# Patient Record
Sex: MALE | Race: ASIAN | NOT HISPANIC OR LATINO
[De-identification: names, ages, dates, MRNs, and addresses within clinical notes are randomized per-mention and may not be internally consistent; named-entity substitution may affect disease eponyms.]

---

## 2017-04-05 PROBLEM — Z00.00 ENCOUNTER FOR PREVENTIVE HEALTH EXAMINATION: Status: ACTIVE | Noted: 2017-04-05

## 2017-04-12 ENCOUNTER — APPOINTMENT (OUTPATIENT)
Dept: CARDIOLOGY | Facility: CLINIC | Age: 67
End: 2017-04-12

## 2017-04-13 ENCOUNTER — APPOINTMENT (OUTPATIENT)
Dept: CARDIOLOGY | Facility: CLINIC | Age: 67
End: 2017-04-13

## 2017-04-13 ENCOUNTER — NON-APPOINTMENT (OUTPATIENT)
Age: 67
End: 2017-04-13

## 2017-04-13 VITALS
SYSTOLIC BLOOD PRESSURE: 128 MMHG | DIASTOLIC BLOOD PRESSURE: 62 MMHG | HEART RATE: 46 BPM | HEIGHT: 64 IN | WEIGHT: 128 LBS | BODY MASS INDEX: 21.85 KG/M2 | OXYGEN SATURATION: 99 %

## 2017-04-13 DIAGNOSIS — Z82.49 FAMILY HISTORY OF ISCHEMIC HEART DISEASE AND OTHER DISEASES OF THE CIRCULATORY SYSTEM: ICD-10-CM

## 2017-05-02 ENCOUNTER — APPOINTMENT (OUTPATIENT)
Dept: CARDIOLOGY | Facility: CLINIC | Age: 67
End: 2017-05-02

## 2017-05-02 ENCOUNTER — NON-APPOINTMENT (OUTPATIENT)
Age: 67
End: 2017-05-02

## 2017-05-02 VITALS
OXYGEN SATURATION: 98 % | HEART RATE: 64 BPM | BODY MASS INDEX: 21.68 KG/M2 | HEIGHT: 64 IN | WEIGHT: 127 LBS | SYSTOLIC BLOOD PRESSURE: 124 MMHG | DIASTOLIC BLOOD PRESSURE: 74 MMHG

## 2017-05-02 RX ORDER — METOPROLOL TARTRATE 50 MG/1
50 TABLET, FILM COATED ORAL TWICE DAILY
Refills: 0 | Status: DISCONTINUED | COMMUNITY
End: 2017-05-02

## 2017-06-05 ENCOUNTER — MEDICATION RENEWAL (OUTPATIENT)
Age: 67
End: 2017-06-05

## 2017-06-06 ENCOUNTER — MEDICATION RENEWAL (OUTPATIENT)
Age: 67
End: 2017-06-06

## 2017-08-08 ENCOUNTER — APPOINTMENT (OUTPATIENT)
Dept: CARDIOLOGY | Facility: CLINIC | Age: 67
End: 2017-08-08

## 2018-08-14 ENCOUNTER — NON-APPOINTMENT (OUTPATIENT)
Age: 68
End: 2018-08-14

## 2018-08-14 ENCOUNTER — APPOINTMENT (OUTPATIENT)
Dept: CARDIOLOGY | Facility: CLINIC | Age: 68
End: 2018-08-14
Payer: MEDICAID

## 2018-08-14 VITALS
WEIGHT: 123 LBS | HEIGHT: 64 IN | HEART RATE: 55 BPM | DIASTOLIC BLOOD PRESSURE: 80 MMHG | SYSTOLIC BLOOD PRESSURE: 198 MMHG | OXYGEN SATURATION: 100 % | BODY MASS INDEX: 21 KG/M2

## 2018-08-14 PROCEDURE — 99214 OFFICE O/P EST MOD 30 MIN: CPT

## 2018-08-14 PROCEDURE — 93000 ELECTROCARDIOGRAM COMPLETE: CPT

## 2018-08-17 ENCOUNTER — APPOINTMENT (OUTPATIENT)
Dept: CARDIOLOGY | Facility: CLINIC | Age: 68
End: 2018-08-17
Payer: MEDICAID

## 2018-08-17 VITALS
HEART RATE: 51 BPM | HEIGHT: 64 IN | BODY MASS INDEX: 21 KG/M2 | SYSTOLIC BLOOD PRESSURE: 150 MMHG | DIASTOLIC BLOOD PRESSURE: 66 MMHG | OXYGEN SATURATION: 100 % | WEIGHT: 123 LBS

## 2018-08-17 PROCEDURE — 93306 TTE W/DOPPLER COMPLETE: CPT

## 2018-08-17 PROCEDURE — 99213 OFFICE O/P EST LOW 20 MIN: CPT

## 2018-11-13 ENCOUNTER — NON-APPOINTMENT (OUTPATIENT)
Age: 68
End: 2018-11-13

## 2018-11-13 ENCOUNTER — APPOINTMENT (OUTPATIENT)
Dept: CARDIOLOGY | Facility: CLINIC | Age: 68
End: 2018-11-13
Payer: MEDICAID

## 2018-11-13 VITALS — BODY MASS INDEX: 22.22 KG/M2 | WEIGHT: 130.13 LBS | HEIGHT: 64 IN

## 2018-11-13 VITALS
HEART RATE: 53 BPM | WEIGHT: 130.13 LBS | SYSTOLIC BLOOD PRESSURE: 158 MMHG | OXYGEN SATURATION: 100 % | HEIGHT: 64 IN | BODY MASS INDEX: 22.22 KG/M2 | DIASTOLIC BLOOD PRESSURE: 74 MMHG

## 2018-11-13 PROCEDURE — 99214 OFFICE O/P EST MOD 30 MIN: CPT

## 2018-11-13 PROCEDURE — 93000 ELECTROCARDIOGRAM COMPLETE: CPT

## 2018-11-13 RX ORDER — CLOPIDOGREL BISULFATE 75 MG/1
75 TABLET, FILM COATED ORAL
Qty: 30 | Refills: 3 | Status: DISCONTINUED | COMMUNITY
Start: 1900-01-01 | End: 2018-11-13

## 2018-11-13 NOTE — PHYSICAL EXAM
[General Appearance - Well Developed] : well developed [Normal Appearance] : normal appearance [Well Groomed] : well groomed [General Appearance - Well Nourished] : well nourished [No Deformities] : no deformities [General Appearance - In No Acute Distress] : no acute distress [Normal Conjunctiva] : the conjunctiva exhibited no abnormalities [Eyelids - No Xanthelasma] : the eyelids demonstrated no xanthelasmas [Normal Oral Mucosa] : normal oral mucosa [No Oral Pallor] : no oral pallor [No Oral Cyanosis] : no oral cyanosis [Respiration, Rhythm And Depth] : normal respiratory rhythm and effort [Auscultation Breath Sounds / Voice Sounds] : lungs were clear to auscultation bilaterally [Heart Rate And Rhythm] : heart rate and rhythm were normal [Heart Sounds] : normal S1 and S2 [Arterial Pulses Normal] : the arterial pulses were normal [Edema] : no peripheral edema present [Bowel Sounds] : normal bowel sounds [Abdomen Soft] : soft [Abdomen Tenderness] : non-tender [Abnormal Walk] : normal gait [Nail Clubbing] : no clubbing of the fingernails [Cyanosis, Localized] : no localized cyanosis [Skin Color & Pigmentation] : normal skin color and pigmentation [Skin Turgor] : normal skin turgor [] : no rash [Oriented To Time, Place, And Person] : oriented to person, place, and time [Impaired Insight] : insight and judgment were intact [No Anxiety] : not feeling anxious [FreeTextEntry1] : No JVD

## 2018-11-13 NOTE — REASON FOR VISIT
[Follow-Up - Clinic] : a clinic follow-up of [Coronary Artery Disease] : coronary artery disease [Hypertension] : hypertension

## 2018-11-13 NOTE — REVIEW OF SYSTEMS
[Blurry Vision] : blurred vision [Heartburn] : heartburn [Joint Pain] : joint pain [Dizziness] : dizziness [Numbness (Hypesthesia)] : numbness [Negative] : Endocrine [Seeing Double (Diplopia)] : no diplopia [Eye Pain] : no eye pain [Eyeglasses] : not currently wearing eyeglasses [Shortness Of Breath] : no shortness of breath [Chest Pain] : no chest pain [Lower Ext Edema] : no extremity edema [Palpitations] : no palpitations [Abdominal Pain] : no abdominal pain [Change in Appetite] : no change in appetite [Dysphagia] : no dysphagia [Muscle Cramps] : no muscle cramps [Limb Weakness (Paresis)] : no limb weakness [Tremor] : no tremor was seen [Convulsions] : no convulsions [Tingling (Paresthesia)] : no tingling [Easy Bleeding] : no tendency for easy bleeding [Easy Bruising] : no tendency for easy bruising

## 2018-11-13 NOTE — DISCUSSION/SUMMARY
[FreeTextEntry1] : In a summary Yoly López is a middle aged male with CAD s/p stent, stable. Continue Aspirin. Plavix discontinued as stent is 3 years ago. Recent stress test is negative for ischemia. Hypertension, controlled usually. Did not take medications yet.Continue current medications and 2 gm sodium diet. Hypercholesterolemia, on statins and low cholesterol diet. LDL goal less than 70 g/dL. Healthy lifestyle. Follow up in 4 months.

## 2018-11-13 NOTE — HISTORY OF PRESENT ILLNESS
[FreeTextEntry1] : Robert [place]ópezNew [place] is a 68 year old male with history of CAD s/p stent in 2015 , hypertension and hypercholesterolemia comes for follow up visit. Denies any exertional chest pain or shortness of breath. No palpitations. Complaining of gas like pain moving around the chest when he lies down and gets better when he walks around. Compliant to medications. Follows up with PCP.

## 2019-01-02 ENCOUNTER — APPOINTMENT (OUTPATIENT)
Dept: CARDIOLOGY | Facility: CLINIC | Age: 69
End: 2019-01-02
Payer: MEDICAID

## 2019-01-02 VITALS
DIASTOLIC BLOOD PRESSURE: 76 MMHG | SYSTOLIC BLOOD PRESSURE: 158 MMHG | OXYGEN SATURATION: 100 % | WEIGHT: 138 LBS | BODY MASS INDEX: 23.56 KG/M2 | HEIGHT: 64 IN | HEART RATE: 60 BPM

## 2019-01-02 PROCEDURE — 99214 OFFICE O/P EST MOD 30 MIN: CPT

## 2019-01-02 RX ORDER — AMLODIPINE BESYLATE 10 MG/1
10 TABLET ORAL
Qty: 30 | Refills: 3 | Status: ACTIVE | COMMUNITY
Start: 1900-01-01 | End: 1900-01-01

## 2019-01-02 NOTE — HISTORY OF PRESENT ILLNESS
[FreeTextEntry1] : Robert LópezGlen[place] is a 69 year old male with history of CAD s/p stent, hypertension and hypercholesterolemia comes for follow up visit. Denies any exertional chest pains or shortness of breath. No palpitations. Compliant to medications. Follows up with PCP. Physically active.

## 2019-01-02 NOTE — REVIEW OF SYSTEMS
[Blurry Vision] : blurred vision [Heartburn] : heartburn [Joint Pain] : joint pain [Numbness (Hypesthesia)] : numbness [Negative] : Endocrine [Seeing Double (Diplopia)] : no diplopia [Eye Pain] : no eye pain [Eyeglasses] : not currently wearing eyeglasses [Shortness Of Breath] : no shortness of breath [Chest Pain] : no chest pain [Lower Ext Edema] : no extremity edema [Palpitations] : no palpitations [Abdominal Pain] : no abdominal pain [Change in Appetite] : no change in appetite [Dysphagia] : no dysphagia [Muscle Cramps] : no muscle cramps [Limb Weakness (Paresis)] : no limb weakness [Dizziness] : no dizziness [Tremor] : no tremor was seen [Convulsions] : no convulsions [Tingling (Paresthesia)] : no tingling [Easy Bleeding] : no tendency for easy bleeding [Easy Bruising] : no tendency for easy bruising

## 2019-01-02 NOTE — DISCUSSION/SUMMARY
[FreeTextEntry1] : In a summary Yoly López is an elderly male with CAD s/p stent, stable. Continue Aspirin. Hypertension, continue current medications and 2 gm sodium diet. Hypercholesterolemia, on statins and low cholesterol diet. LDL goal less than 70 g/dL. Healthy lifestyle. Follow up in 3 months.

## 2019-04-04 ENCOUNTER — APPOINTMENT (OUTPATIENT)
Dept: CARDIOLOGY | Facility: CLINIC | Age: 69
End: 2019-04-04

## 2019-04-15 ENCOUNTER — NON-APPOINTMENT (OUTPATIENT)
Age: 69
End: 2019-04-15

## 2019-04-15 ENCOUNTER — APPOINTMENT (OUTPATIENT)
Dept: CARDIOLOGY | Facility: CLINIC | Age: 69
End: 2019-04-15
Payer: MEDICARE

## 2019-04-15 VITALS
WEIGHT: 128 LBS | SYSTOLIC BLOOD PRESSURE: 148 MMHG | OXYGEN SATURATION: 99 % | HEIGHT: 64 IN | BODY MASS INDEX: 21.85 KG/M2 | HEART RATE: 64 BPM | DIASTOLIC BLOOD PRESSURE: 62 MMHG

## 2019-04-15 PROCEDURE — 99214 OFFICE O/P EST MOD 30 MIN: CPT

## 2019-04-15 PROCEDURE — 93000 ELECTROCARDIOGRAM COMPLETE: CPT

## 2019-04-15 NOTE — PHYSICAL EXAM
[General Appearance - Well Developed] : well developed [General Appearance - Well Nourished] : well nourished [Normal Appearance] : normal appearance [Well Groomed] : well groomed [General Appearance - In No Acute Distress] : no acute distress [No Deformities] : no deformities [Normal Oral Mucosa] : normal oral mucosa [Eyelids - No Xanthelasma] : the eyelids demonstrated no xanthelasmas [Normal Conjunctiva] : the conjunctiva exhibited no abnormalities [No Oral Cyanosis] : no oral cyanosis [No Oral Pallor] : no oral pallor [Respiration, Rhythm And Depth] : normal respiratory rhythm and effort [Heart Rate And Rhythm] : heart rate and rhythm were normal [Auscultation Breath Sounds / Voice Sounds] : lungs were clear to auscultation bilaterally [Heart Sounds] : normal S1 and S2 [Arterial Pulses Normal] : the arterial pulses were normal [Bowel Sounds] : normal bowel sounds [Edema] : no peripheral edema present [Abnormal Walk] : normal gait [Abdomen Soft] : soft [Abdomen Tenderness] : non-tender [Nail Clubbing] : no clubbing of the fingernails [Cyanosis, Localized] : no localized cyanosis [Skin Color & Pigmentation] : normal skin color and pigmentation [] : no rash [Skin Turgor] : normal skin turgor [No Anxiety] : not feeling anxious [Impaired Insight] : insight and judgment were intact [Oriented To Time, Place, And Person] : oriented to person, place, and time [FreeTextEntry1] : No JVD

## 2019-04-15 NOTE — REVIEW OF SYSTEMS
[Blurry Vision] : blurred vision [Joint Pain] : joint pain [Numbness (Hypesthesia)] : numbness [Negative] : Gastrointestinal [Seeing Double (Diplopia)] : no diplopia [Eye Pain] : no eye pain [Shortness Of Breath] : no shortness of breath [Eyeglasses] : not currently wearing eyeglasses [Chest Pain] : no chest pain [Lower Ext Edema] : no extremity edema [Palpitations] : no palpitations [Abdominal Pain] : no abdominal pain [Heartburn] : no heartburn [Change in Appetite] : no change in appetite [Dysphagia] : no dysphagia [Muscle Cramps] : no muscle cramps [Limb Weakness (Paresis)] : no limb weakness [Tremor] : no tremor was seen [Dizziness] : no dizziness [Convulsions] : no convulsions [Tingling (Paresthesia)] : no tingling [Easy Bleeding] : no tendency for easy bleeding [Easy Bruising] : no tendency for easy bruising

## 2019-04-15 NOTE — DISCUSSION/SUMMARY
[FreeTextEntry1] : In a summary Yoly López is an elderly male with CAD s/p stent, stable. Hypertension, controlled. Continue current medications and 2 gm sodium diet. Hypercholesterolemia, on statins and low cholesterol diet. Healthy life style. Follow up in 4 months.

## 2019-04-15 NOTE — HISTORY OF PRESENT ILLNESS
[FreeTextEntry1] : Yoly López is a 69 year old male with history of CAD s/p stent, hypertension and hypercholesterolemia comes for follow up visit. Denies any chest pain or palpitations. No shortness of breath. Compliant to medications and diet. Heart burn significantly improved after starting on Omeprazole. Compliant to medications and diet.

## 2019-04-15 NOTE — REASON FOR VISIT
[Follow-Up - Clinic] : a clinic follow-up of [Hypertension] : hypertension [Coronary Artery Disease] : coronary artery disease

## 2019-07-16 ENCOUNTER — APPOINTMENT (OUTPATIENT)
Dept: SPINE | Facility: CLINIC | Age: 69
End: 2019-07-16

## 2019-08-01 ENCOUNTER — OUTPATIENT (OUTPATIENT)
Dept: OUTPATIENT SERVICES | Facility: HOSPITAL | Age: 69
LOS: 1 days | End: 2019-08-01
Payer: MEDICARE

## 2019-08-01 PROCEDURE — G9001: CPT

## 2019-08-13 ENCOUNTER — APPOINTMENT (OUTPATIENT)
Dept: SPINE | Facility: CLINIC | Age: 69
End: 2019-08-13
Payer: MEDICARE

## 2019-08-13 VITALS
SYSTOLIC BLOOD PRESSURE: 189 MMHG | HEIGHT: 64 IN | WEIGHT: 120 LBS | HEART RATE: 48 BPM | DIASTOLIC BLOOD PRESSURE: 65 MMHG | BODY MASS INDEX: 20.49 KG/M2

## 2019-08-13 DIAGNOSIS — M54.6 PAIN IN THORACIC SPINE: ICD-10-CM

## 2019-08-13 PROCEDURE — 99203 OFFICE O/P NEW LOW 30 MIN: CPT

## 2019-08-18 ENCOUNTER — INPATIENT (INPATIENT)
Facility: HOSPITAL | Age: 69
LOS: 10 days | Discharge: HOME HEALTH | End: 2019-08-29
Attending: HOSPITALIST | Admitting: HOSPITALIST
Payer: MEDICARE

## 2019-08-18 VITALS
HEART RATE: 60 BPM | SYSTOLIC BLOOD PRESSURE: 189 MMHG | RESPIRATION RATE: 16 BRPM | DIASTOLIC BLOOD PRESSURE: 90 MMHG | OXYGEN SATURATION: 100 % | TEMPERATURE: 98 F

## 2019-08-18 DIAGNOSIS — E78.5 HYPERLIPIDEMIA, UNSPECIFIED: ICD-10-CM

## 2019-08-18 DIAGNOSIS — D64.9 ANEMIA, UNSPECIFIED: ICD-10-CM

## 2019-08-18 DIAGNOSIS — M54.9 DORSALGIA, UNSPECIFIED: ICD-10-CM

## 2019-08-18 DIAGNOSIS — C79.51 SECONDARY MALIGNANT NEOPLASM OF BONE: ICD-10-CM

## 2019-08-18 DIAGNOSIS — I25.10 ATHEROSCLEROTIC HEART DISEASE OF NATIVE CORONARY ARTERY WITHOUT ANGINA PECTORIS: ICD-10-CM

## 2019-08-18 DIAGNOSIS — E83.52 HYPERCALCEMIA: ICD-10-CM

## 2019-08-18 DIAGNOSIS — Z29.9 ENCOUNTER FOR PROPHYLACTIC MEASURES, UNSPECIFIED: ICD-10-CM

## 2019-08-18 DIAGNOSIS — I10 ESSENTIAL (PRIMARY) HYPERTENSION: ICD-10-CM

## 2019-08-18 LAB
ALBUMIN SERPL ELPH-MCNC: 4.7 G/DL — SIGNIFICANT CHANGE UP (ref 3.3–5)
ALP SERPL-CCNC: 110 U/L — SIGNIFICANT CHANGE UP (ref 40–120)
ALT FLD-CCNC: 18 U/L — SIGNIFICANT CHANGE UP (ref 4–41)
ANION GAP SERPL CALC-SCNC: 14 MMO/L — SIGNIFICANT CHANGE UP (ref 7–14)
APPEARANCE UR: CLEAR — SIGNIFICANT CHANGE UP
APTT BLD: 30.8 SEC — SIGNIFICANT CHANGE UP (ref 27.5–36.3)
AST SERPL-CCNC: 25 U/L — SIGNIFICANT CHANGE UP (ref 4–40)
BASOPHILS # BLD AUTO: 0.07 K/UL — SIGNIFICANT CHANGE UP (ref 0–0.2)
BASOPHILS NFR BLD AUTO: 0.8 % — SIGNIFICANT CHANGE UP (ref 0–2)
BILIRUB SERPL-MCNC: 0.4 MG/DL — SIGNIFICANT CHANGE UP (ref 0.2–1.2)
BILIRUB UR-MCNC: NEGATIVE — SIGNIFICANT CHANGE UP
BLD GP AB SCN SERPL QL: POSITIVE — SIGNIFICANT CHANGE UP
BLOOD UR QL VISUAL: NEGATIVE — SIGNIFICANT CHANGE UP
BUN SERPL-MCNC: 9 MG/DL — SIGNIFICANT CHANGE UP (ref 7–23)
CALCIUM SERPL-MCNC: 12.1 MG/DL — HIGH (ref 8.4–10.5)
CHLORIDE SERPL-SCNC: 98 MMOL/L — SIGNIFICANT CHANGE UP (ref 98–107)
CO2 SERPL-SCNC: 23 MMOL/L — SIGNIFICANT CHANGE UP (ref 22–31)
COLOR SPEC: COLORLESS — SIGNIFICANT CHANGE UP
CREAT SERPL-MCNC: 1.01 MG/DL — SIGNIFICANT CHANGE UP (ref 0.5–1.3)
EOSINOPHIL # BLD AUTO: 0.12 K/UL — SIGNIFICANT CHANGE UP (ref 0–0.5)
EOSINOPHIL NFR BLD AUTO: 1.3 % — SIGNIFICANT CHANGE UP (ref 0–6)
GLUCOSE SERPL-MCNC: 100 MG/DL — HIGH (ref 70–99)
GLUCOSE UR-MCNC: NEGATIVE — SIGNIFICANT CHANGE UP
HCT VFR BLD CALC: 36.1 % — LOW (ref 39–50)
HGB BLD-MCNC: 11.6 G/DL — LOW (ref 13–17)
IMM GRANULOCYTES NFR BLD AUTO: 0.3 % — SIGNIFICANT CHANGE UP (ref 0–1.5)
INR BLD: 1.04 — SIGNIFICANT CHANGE UP (ref 0.88–1.17)
KETONES UR-MCNC: NEGATIVE — SIGNIFICANT CHANGE UP
LEUKOCYTE ESTERASE UR-ACNC: NEGATIVE — SIGNIFICANT CHANGE UP
LYMPHOCYTES # BLD AUTO: 1.96 K/UL — SIGNIFICANT CHANGE UP (ref 1–3.3)
LYMPHOCYTES # BLD AUTO: 21.6 % — SIGNIFICANT CHANGE UP (ref 13–44)
MCHC RBC-ENTMCNC: 28.9 PG — SIGNIFICANT CHANGE UP (ref 27–34)
MCHC RBC-ENTMCNC: 32.1 % — SIGNIFICANT CHANGE UP (ref 32–36)
MCV RBC AUTO: 89.8 FL — SIGNIFICANT CHANGE UP (ref 80–100)
MONOCYTES # BLD AUTO: 0.65 K/UL — SIGNIFICANT CHANGE UP (ref 0–0.9)
MONOCYTES NFR BLD AUTO: 7.2 % — SIGNIFICANT CHANGE UP (ref 2–14)
NEUTROPHILS # BLD AUTO: 6.23 K/UL — SIGNIFICANT CHANGE UP (ref 1.8–7.4)
NEUTROPHILS NFR BLD AUTO: 68.8 % — SIGNIFICANT CHANGE UP (ref 43–77)
NITRITE UR-MCNC: NEGATIVE — SIGNIFICANT CHANGE UP
NRBC # FLD: 0 K/UL — SIGNIFICANT CHANGE UP (ref 0–0)
PH UR: 7.5 — SIGNIFICANT CHANGE UP (ref 5–8)
PLATELET # BLD AUTO: 255 K/UL — SIGNIFICANT CHANGE UP (ref 150–400)
PMV BLD: 11.3 FL — SIGNIFICANT CHANGE UP (ref 7–13)
POTASSIUM SERPL-MCNC: 4.5 MMOL/L — SIGNIFICANT CHANGE UP (ref 3.5–5.3)
POTASSIUM SERPL-SCNC: 4.5 MMOL/L — SIGNIFICANT CHANGE UP (ref 3.5–5.3)
PROT SERPL-MCNC: 10.2 G/DL — HIGH (ref 6–8.3)
PROT SERPL-MCNC: 9 G/DL — HIGH (ref 6–8.3)
PROT UR-MCNC: 10 — SIGNIFICANT CHANGE UP
PROT UR-MCNC: 16.2 MG/DL — SIGNIFICANT CHANGE UP
PROTHROM AB SERPL-ACNC: 11.6 SEC — SIGNIFICANT CHANGE UP (ref 9.8–13.1)
PTH-INTACT SERPL-MCNC: 7.42 PG/ML — LOW (ref 15–65)
RBC # BLD: 4.02 M/UL — LOW (ref 4.2–5.8)
RBC # FLD: 12.4 % — SIGNIFICANT CHANGE UP (ref 10.3–14.5)
RH IG SCN BLD-IMP: POSITIVE — SIGNIFICANT CHANGE UP
SODIUM SERPL-SCNC: 135 MMOL/L — SIGNIFICANT CHANGE UP (ref 135–145)
SP GR SPEC: 1 — SIGNIFICANT CHANGE UP (ref 1–1.04)
UROBILINOGEN FLD QL: NORMAL — SIGNIFICANT CHANGE UP
WBC # BLD: 9.06 K/UL — SIGNIFICANT CHANGE UP (ref 3.8–10.5)
WBC # FLD AUTO: 9.06 K/UL — SIGNIFICANT CHANGE UP (ref 3.8–10.5)

## 2019-08-18 PROCEDURE — 74177 CT ABD & PELVIS W/CONTRAST: CPT | Mod: 26

## 2019-08-18 PROCEDURE — 84166 PROTEIN E-PHORESIS/URINE/CSF: CPT | Mod: 26

## 2019-08-18 PROCEDURE — 84165 PROTEIN E-PHORESIS SERUM: CPT | Mod: 26

## 2019-08-18 PROCEDURE — 99223 1ST HOSP IP/OBS HIGH 75: CPT | Mod: GC

## 2019-08-18 PROCEDURE — 71260 CT THORAX DX C+: CPT | Mod: 26

## 2019-08-18 RX ORDER — OXYCODONE HYDROCHLORIDE 5 MG/1
5 TABLET ORAL EVERY 6 HOURS
Refills: 0 | Status: DISCONTINUED | OUTPATIENT
Start: 2019-08-18 | End: 2019-08-25

## 2019-08-18 RX ORDER — METOPROLOL TARTRATE 50 MG
25 TABLET ORAL
Refills: 0 | Status: DISCONTINUED | OUTPATIENT
Start: 2019-08-18 | End: 2019-08-29

## 2019-08-18 RX ORDER — DIAZEPAM 5 MG
5 TABLET ORAL ONCE
Refills: 0 | Status: DISCONTINUED | OUTPATIENT
Start: 2019-08-18 | End: 2019-08-18

## 2019-08-18 RX ORDER — ATORVASTATIN CALCIUM 80 MG/1
40 TABLET, FILM COATED ORAL AT BEDTIME
Refills: 0 | Status: DISCONTINUED | OUTPATIENT
Start: 2019-08-18 | End: 2019-08-29

## 2019-08-18 RX ORDER — LIDOCAINE 4 G/100G
1 CREAM TOPICAL ONCE
Refills: 0 | Status: COMPLETED | OUTPATIENT
Start: 2019-08-18 | End: 2019-08-18

## 2019-08-18 RX ORDER — ACETAMINOPHEN 500 MG
650 TABLET ORAL EVERY 6 HOURS
Refills: 0 | Status: DISCONTINUED | OUTPATIENT
Start: 2019-08-18 | End: 2019-08-29

## 2019-08-18 RX ORDER — ASPIRIN/CALCIUM CARB/MAGNESIUM 324 MG
81 TABLET ORAL DAILY
Refills: 0 | Status: DISCONTINUED | OUTPATIENT
Start: 2019-08-18 | End: 2019-08-29

## 2019-08-18 RX ORDER — DOCUSATE SODIUM 100 MG
100 CAPSULE ORAL THREE TIMES A DAY
Refills: 0 | Status: DISCONTINUED | OUTPATIENT
Start: 2019-08-18 | End: 2019-08-29

## 2019-08-18 RX ORDER — MORPHINE SULFATE 50 MG/1
4 CAPSULE, EXTENDED RELEASE ORAL ONCE
Refills: 0 | Status: DISCONTINUED | OUTPATIENT
Start: 2019-08-18 | End: 2019-08-18

## 2019-08-18 RX ORDER — SENNA PLUS 8.6 MG/1
2 TABLET ORAL AT BEDTIME
Refills: 0 | Status: DISCONTINUED | OUTPATIENT
Start: 2019-08-18 | End: 2019-08-29

## 2019-08-18 RX ORDER — SODIUM CHLORIDE 9 MG/ML
1000 INJECTION INTRAMUSCULAR; INTRAVENOUS; SUBCUTANEOUS ONCE
Refills: 0 | Status: COMPLETED | OUTPATIENT
Start: 2019-08-18 | End: 2019-08-18

## 2019-08-18 RX ORDER — AMLODIPINE BESYLATE 2.5 MG/1
10 TABLET ORAL DAILY
Refills: 0 | Status: DISCONTINUED | OUTPATIENT
Start: 2019-08-18 | End: 2019-08-29

## 2019-08-18 RX ORDER — LOSARTAN POTASSIUM 100 MG/1
50 TABLET, FILM COATED ORAL DAILY
Refills: 0 | Status: DISCONTINUED | OUTPATIENT
Start: 2019-08-18 | End: 2019-08-29

## 2019-08-18 RX ADMIN — LIDOCAINE 1 PATCH: 4 CREAM TOPICAL at 15:03

## 2019-08-18 RX ADMIN — SODIUM CHLORIDE 1000 MILLILITER(S): 9 INJECTION INTRAMUSCULAR; INTRAVENOUS; SUBCUTANEOUS at 15:03

## 2019-08-18 RX ADMIN — Medication 5 MILLIGRAM(S): at 15:03

## 2019-08-18 RX ADMIN — MORPHINE SULFATE 4 MILLIGRAM(S): 50 CAPSULE, EXTENDED RELEASE ORAL at 19:41

## 2019-08-18 NOTE — H&P ADULT - PROBLEM SELECTOR PLAN 3
2/2 osteolytic lesions  -acetaminophen prn mild pain  -oxycodone prn mod-severe pain  -bowel regimen

## 2019-08-18 NOTE — ED ADULT TRIAGE NOTE - CHIEF COMPLAINT QUOTE
Pt BIB EMS for back pain x 2 months worse the past week denies trauma   PMH: HTN, High Cholesterol Pt BIB EMS for back pain x 2 months worse the past week denies trauma   PMH: HTN, High Cholesterol, cardiac stents Pt BIB EMS for back pain x 2 months worse the past week denies trauma pt has multiple complaints and states its been going on for months   PMH: HTN, High Cholesterol, cardiac stents

## 2019-08-18 NOTE — H&P ADULT - NSHPOUTPATIENTPROVIDERS_GEN_ALL_CORE
PCP: Sharon Allen [phone]  Heme/Onc: Cristhian Mays [phone]  Cards: Reji  Nephro: Lucy Gonzalez [phone]

## 2019-08-18 NOTE — ED PROVIDER NOTE - CLINICAL SUMMARY MEDICAL DECISION MAKING FREE TEXT BOX
Pt is a 68 y/o M former smoker PMHx HTN, HLD, CAD s/p stent x 1 p/w back pain x 2 months -- likely musculoskeletal back pain w/o e/o spinal cord compression or cauda equina syndrome; ? malignancy given weight loss and chronicity of symptoms -- labs, ua, ucx, ct abd and pelvis, ct chest

## 2019-08-18 NOTE — H&P ADULT - NSHPSOCIALHISTORY_GEN_ALL_CORE
Former smoker Former smoker, quit 5-6 years ago. Smoke 1ppd x10 yrs then smoked 0.25ppd x10 yrs. No alcohol or recreational drugs. Lives with wife, daughter, and grandchildren.

## 2019-08-18 NOTE — H&P ADULT - PROBLEM SELECTOR PLAN 8
DVT PPx: subcu enoxaparin renally dosed given concern for malignancy  Diet: DASH  Dispo: pending workup of osteolytic lesions DVT PPx: subcu enoxaparin, pending weight  Diet: DASH  Dispo: pending workup of osteolytic lesions

## 2019-08-18 NOTE — H&P ADULT - PROBLEM SELECTOR PLAN 4
Normocytic anemia in setting of hypercalcemia and bone lesions concerning for MM  -Ramírez positive concerning for AIHA, in setting of MM vs. Waldenstrom's macroglobulinemia  -LDH, haptoglobin  -trend cbc Normocytic anemia in setting of hypercalcemia and bone lesions concerning for MM  -Ramírez positive concerning for AIHA, in setting of MM vs. Waldenstrom's macroglobulinemia  -LDH, haptoglobin  -trend cbc  -Iron studies

## 2019-08-18 NOTE — H&P ADULT - NSHPREVIEWOFSYSTEMS_GEN_ALL_CORE
REVIEW OF SYSTEMS:    CONSTITUTIONAL: No weakness, fevers or chills  EYES/ENT: No visual changes;  No vertigo or throat pain   NECK: No pain or stiffness  RESPIRATORY: No cough, wheezing, hemoptysis; No shortness of breath  CARDIOVASCULAR: No chest pain or palpitations  GASTROINTESTINAL: +abdominal pain diffuse & constipation. No melena or hematochezia.  GENITOURINARY: No dysuria, frequency or hematuria  NEUROLOGICAL: No numbness or weakness  SKIN: No itching, burning, rashes, or lesions   All other review of systems is negative unless indicated above. REVIEW OF SYSTEMS:    CONSTITUTIONAL: No weakness, fevers or chills  EYES/ENT: No visual changes;  No vertigo or throat pain   NECK: No pain or stiffness  RESPIRATORY: No cough, wheezing, hemoptysis; No shortness of breath  CARDIOVASCULAR: No chest pain or palpitations  GASTROINTESTINAL: +abdominal pain diffuse & constipation. No melena or hematochezia.  GENITOURINARY: No dysuria, frequency or hematuria  NEUROLOGICAL: L thumb and 2nd finger numbness. No weakness.   SKIN: No itching, burning, rashes, or lesions   All other review of systems is negative unless indicated above.

## 2019-08-18 NOTE — ED PROVIDER NOTE - OBJECTIVE STATEMENT
Pt is a 68 y/o M former smoker PMHx HTN, HLD, CAD s/p stent x 1 p/w back pain x 2 months.  Pt notes initially intermittent bilateral flank pain radiating intermittently into generalized abdomen for past two months, which worsens with movement, standing, sitting up and walking.  Pt had renal sono showing renal cyst.  Pt then had MRI showing moderate central canal stenosis at L4-L5, right paracentral disc herniation at L4-L5 and mild bilateral foraminal narrowing at L4-L5 and L5-S1.  Pt had follow up with a neurosurgeon a few days ago who told pt he needed a thoracic spine MRI on outpatient basis.  Pt also notes intermittent constipation, decreased appetite and ~6 lb weight loss in past three weeks.  Pt notes last BM was three days ago, but passing gas daily.  Pt presents today because pain worsening over past three days.  Pt denies any fevers, chills, nausea, vomiting, dysuria, cloudy urine, hematuria, melena, brbpr, fecal/urinary incontinence, saddle anesthesia, numbness, weakness, falls, illicit drug use.

## 2019-08-18 NOTE — H&P ADULT - PROBLEM SELECTOR PLAN 7
CAD s/p 1 YAMILET (performed in [facility] in 2015)  -high intensity statin  -ASA CAD s/p 1 YAMILET (performed in [facility] in 2015)  -high intensity statin  -ASA  -Metoprolol 25mg bid CAD s/p 1 YAMILET (performed in [facility] in 2015)  -high intensity statin  -ASA 81mg qd  -Metoprolol 25mg bid

## 2019-08-18 NOTE — ED PROVIDER NOTE - NONTENDER LOCATION
periumbilical/left costovertebral angle/right lower quadrant/left lower quadrant/suprapubic/right costovertebral angle/left upper quadrant/right upper quadrant/umbilical

## 2019-08-18 NOTE — H&P ADULT - NSHPLABSRESULTS_GEN_ALL_CORE
11.6   9.06  )-----------( 255      ( 18 Aug 2019 15:05 )             36.1           135  |  98  |  9   ----------------------------<  100<H>  4.5   |  23  |  1.01    Ca    12.1<H>      18 Aug 2019 15:05    TPro  9.0<H>  /  Alb  x   /  TBili  x   /  DBili  x   /  AST  x   /  ALT  x   /  AlkPhos  x                 Urinalysis Basic - ( 18 Aug 2019 15:45 )    Color: COLORLESS / Appearance: CLEAR / S.005 / pH: 7.5  Gluc: NEGATIVE / Ketone: NEGATIVE  / Bili: NEGATIVE / Urobili: NORMAL   Blood: NEGATIVE / Protein: 10 / Nitrite: NEGATIVE   Leuk Esterase: NEGATIVE / RBC: x / WBC x   Sq Epi: x / Non Sq Epi: x / Bacteria: x        PT/INR - ( 18 Aug 2019 15:05 )   PT: 11.6 SEC;   INR: 1.04          PTT - ( 18 Aug 2019 15:05 )  PTT:30.8 SEC    Lactate Trend            CAPILLARY BLOOD GLUCOSE      < from: CT Chest w/ IV Cont (19 @ 17:00) >    IMPRESSION:     Diffuse lytic expansile osseous lesions representing metastatic disease.  A lytic expansile soft tissue lesion in the T8 vertebral body causing   mass effect on spinal canal and obliterating left neuroforamina at T7-8   and T8-9.    No lung mass.    Tiny/small bilateral thyroid nodules.    No CT evidence of suspicious lesion for primary malignant lesion.    < end of copied text >

## 2019-08-18 NOTE — ED PROVIDER NOTE - ATTENDING CONTRIBUTION TO CARE
Dr Bloch, ATTENDING MD-  I performed a face to face bedside interview with patient regarding history of present illness, review of symptoms and past medical history. I completed an independent physical exam.  I have discussed patient's plan of care with PA.   Documentation as above in the note.  Patient examined, thin male NAD HEENT nml heart sounds nml lungs clear, abd soft, tender RUQ epigastric. no cva tenderness, edema nml, neuro nml no thoracic spine tenderness, right and left flank pain.

## 2019-08-18 NOTE — H&P ADULT - PROBLEM SELECTOR PLAN 1
Diffuse osteolytic lesions concerning for multiple myeloma vs. waldenstrom's macroglobulinemia.   -Onc consult  -serum FLC  -speak to private heme/onc about marrow bx indication/result Diffuse osteolytic lesions concerning for multiple myeloma vs. waldenstrom's macroglobulinemia.   -Onc consult  -serum FLC  -SPEP  -Beta-2 microglobulin  -Serum viscosity  -speak to private heme/onc about marrow bx indication/result

## 2019-08-18 NOTE — H&P ADULT - NSHPPHYSICALEXAM_GEN_ALL_CORE
PHYSICAL EXAM:  GENERAL: NAD, well-developed  HEAD:  Atraumatic, Normocephalic  EYES: EOMI, PERRLA, conjunctiva and sclera clear  NECK: Supple, No JVD  CHEST/LUNG: Clear to auscultation bilaterally; No wheeze  HEART: NL s1s2, regular rate and rhythm; No murmurs, rubs, or gallops  ABDOMEN: Soft, Nontender, Nondistended; Bowel sounds present  EXTREMITIES:  2+ Peripheral Pulses, No clubbing, cyanosis, or edema  PSYCH: AAOx3  NEUROLOGY: non-focal  SKIN: No rashes or lesions PHYSICAL EXAM:  GENERAL: NAD, well-developed  HEAD:  Atraumatic, Normocephalic  EYES: EOMI, PERRLA, conjunctiva and sclera clear  NECK: Supple, No JVD  CHEST/LUNG: Clear to auscultation bilaterally; No wheeze  HEART: NL s1s2, regular rate and rhythm; No murmurs, rubs, or gallops  ABDOMEN: Soft, RLQ tender. Nondistended; Bowel sounds present  EXTREMITIES:  2+ Peripheral Pulses, No clubbing, cyanosis, or edema  PSYCH: AAOx3  NEUROLOGY: non-focal, 5/5 motor throughout all extremities.   SKIN: Mid sternal mass, nontender approx 2-3cm across.

## 2019-08-18 NOTE — H&P ADULT - HISTORY OF PRESENT ILLNESS
68 yo M former smoker with a PMHx of HTN, HLD, CAD s/p 1 YAMILET who p/w acute exacerbation over the past 3 days of his ongoing bilateral flank pain x 2 months. The pain can radiate diffusely into the abdomen and is worse on movement. He has also had intermittent constipation, decreased appetite and an unintentional 6lb weightloss over 3 weeks.  As an outpatient, he had an ultrasound showing renal cyst, MR showing moderate central canal stenosis and R disc herniation at the L4-L5 level and mild formainal narrowing @ L4-L5 & L5-S1. Denies fever, sweats, and chills. Alecia CP, SOB, and VEGA. No gait disturbance.     ED Course:  T 97.5, HR 60, /90, RR 16, SpO2 100% RA  S/p 5mg diazepam PO x1, lido patch, 4mg morphine IV x1, 1L NS x1. Hypercalcemia 12.1, PTH 7.42, serum protein 10.2, hgb 11.6. CT A/P whosing diffuse lytic expansile osseous lesions including a lesion at T8 causing mass effect on the spinal canal. Small bl thyroid nodules 68 yo M former smoker with a PMHx of HTN, HLD, CAD s/p 1 DES 2015 who p/w acute exacerbation of his back & flank pain which has been ongoing for 2-3 months. The pain can radiate diffusely into the abdomen and is worse on movement. He has also had intermittent constipation, decreased appetite and an unintentional 6lb weight loss over 3 weeks. He notes that it started with L leg pain which migrated up to the bilateral flanks and then ascended to his spine about MCC up with the pain stopped.  As an outpatient, he had an ultrasound showing renal cyst and in June 2019 he had a MR showing moderate central canal stenosis and R disc herniation at the L4-L5 level and mild formainal narrowing @ L4-L5 & L5-S1. No mention lytic lesions on MR. He also noticed a mid sternal non-painful mass for the past 2-3 weeks. Numbness and tingling in his L thumb and 2nd finger. Daughter states he went to a hematologist recently and had a marrow biopsy, but they do not know why. Denies fever, sweats, and chills. Alecia CP, SOB, and VEGA. No gait disturbance. No incontinence of stool or urine.     ED Course:  T 97.5, HR 60, /90, RR 16, SpO2 100% RA  S/p 5mg diazepam PO x1, lido patch, 4mg morphine IV x1, 1L NS x1. Hypercalcemia 12.1, PTH 7.42, serum protein 10.2, hgb 11.6. CT A/P revealing diffuse lytic expansile osseous lesions including a lesion at T8 causing mass effect on the spinal canal. Small bl thyroid nodules

## 2019-08-18 NOTE — H&P ADULT - ASSESSMENT
70 yo M former smoker with a PMHx of HTN, HLD, CAD s/p 1 YAMILET who p/w acute exacerbation over the past 3 days of his ongoing bilateral flank pain x 2 months found to have diffuse osteolytic lesions on CT chest, A/P suggesting diffuse metastatic disease c/b hypercalcemia w/o clear evidence of primary malignancy. 68 yo M former smoker with a PMHx of HTN, HLD, CAD s/p 1 YAMILET who p/w acute exacerbation over the past 3 days of his ongoing bilateral flank pain x 2 months found to have diffuse osteolytic lesions on CT chest, A/P concerning for diffuse metastatic disease c/b hypercalcemia w/o clear evidence of primary malignancy. Hypercalcemia, elevated protein, anemia concerning for MM vs. Waldenstroms macroglobulinema given + elham test. Also, reports he had a marrow Bx as outpatient, but doesn't know what for. 70 yo M former smoker with a PMHx of HTN, HLD, CAD s/p 1 YAMILET who p/w acute exacerbation over the past 3 days of his ongoing bilateral flank pain x 2 months found to have diffuse osteolytic lesions on CT chest, A/P concerning for diffuse metastatic disease c/b hypercalcemia w/o clear evidence of primary malignancy. Hypercalcemia, elevated protein, anemia concerning for MM vs. Waldenstrom's macroglobulinemia given + elham test. Also, reports he had a marrow Bx as outpatient, but doesn't know what for. Hand neuropathy could be c/w WM or MM.

## 2019-08-18 NOTE — H&P ADULT - PROBLEM SELECTOR PLAN 5
-Lisinopril   -Losartan 50mg qd  -Amlodipine 10mg qd -Lisinopril (need to rec dosage, could not remember)  -Losartan 50mg qd  -Amlodipine 10mg qd -Lisinopril (need to rec dosage, patient could not remember)  -Losartan 50mg qd  -Amlodipine 10mg qd

## 2019-08-19 ENCOUNTER — APPOINTMENT (OUTPATIENT)
Dept: CARDIOLOGY | Facility: CLINIC | Age: 69
End: 2019-08-19

## 2019-08-19 LAB
ANION GAP SERPL CALC-SCNC: 12 MMO/L — SIGNIFICANT CHANGE UP (ref 7–14)
B2 MICROGLOB SERPL-MCNC: 3.3 MG/L — HIGH (ref 0.8–2.2)
BUN SERPL-MCNC: 11 MG/DL — SIGNIFICANT CHANGE UP (ref 7–23)
CALCIUM SERPL-MCNC: 11.4 MG/DL — HIGH (ref 8.4–10.5)
CHLORIDE SERPL-SCNC: 102 MMOL/L — SIGNIFICANT CHANGE UP (ref 98–107)
CO2 SERPL-SCNC: 23 MMOL/L — SIGNIFICANT CHANGE UP (ref 22–31)
CREAT SERPL-MCNC: 1.1 MG/DL — SIGNIFICANT CHANGE UP (ref 0.5–1.3)
FERRITIN SERPL-MCNC: 368.5 NG/ML — SIGNIFICANT CHANGE UP (ref 30–400)
FOLATE SERPL-MCNC: 10.4 NG/ML — SIGNIFICANT CHANGE UP (ref 4.7–20)
GLUCOSE SERPL-MCNC: 88 MG/DL — SIGNIFICANT CHANGE UP (ref 70–99)
HAPTOGLOB SERPL-MCNC: 178 MG/DL — SIGNIFICANT CHANGE UP (ref 34–200)
HCT VFR BLD CALC: 30.2 % — LOW (ref 39–50)
HGB BLD-MCNC: 10.1 G/DL — LOW (ref 13–17)
IRON SATN MFR SERPL: 252 UG/DL — SIGNIFICANT CHANGE UP (ref 155–535)
IRON SATN MFR SERPL: 52 UG/DL — SIGNIFICANT CHANGE UP (ref 45–165)
LDH SERPL L TO P-CCNC: 147 U/L — SIGNIFICANT CHANGE UP (ref 135–225)
MAGNESIUM SERPL-MCNC: 1.7 MG/DL — SIGNIFICANT CHANGE UP (ref 1.6–2.6)
MCHC RBC-ENTMCNC: 30 PG — SIGNIFICANT CHANGE UP (ref 27–34)
MCHC RBC-ENTMCNC: 33.4 % — SIGNIFICANT CHANGE UP (ref 32–36)
MCV RBC AUTO: 89.6 FL — SIGNIFICANT CHANGE UP (ref 80–100)
NRBC # FLD: 0 K/UL — SIGNIFICANT CHANGE UP (ref 0–0)
PHOSPHATE SERPL-MCNC: 4.3 MG/DL — SIGNIFICANT CHANGE UP (ref 2.5–4.5)
PLATELET # BLD AUTO: 233 K/UL — SIGNIFICANT CHANGE UP (ref 150–400)
PMV BLD: 11.1 FL — SIGNIFICANT CHANGE UP (ref 7–13)
POTASSIUM SERPL-MCNC: 4 MMOL/L — SIGNIFICANT CHANGE UP (ref 3.5–5.3)
POTASSIUM SERPL-SCNC: 4 MMOL/L — SIGNIFICANT CHANGE UP (ref 3.5–5.3)
PROT SERPL-MCNC: 8.6 G/DL — HIGH (ref 6–8.3)
PROT UR-MCNC: 83.9 MG/DL — SIGNIFICANT CHANGE UP
RBC # BLD: 3.37 M/UL — LOW (ref 4.2–5.8)
RBC # FLD: 12.4 % — SIGNIFICANT CHANGE UP (ref 10.3–14.5)
SODIUM SERPL-SCNC: 137 MMOL/L — SIGNIFICANT CHANGE UP (ref 135–145)
UIBC SERPL-MCNC: 200.4 UG/DL — SIGNIFICANT CHANGE UP (ref 110–370)
VIT B12 SERPL-MCNC: 590 PG/ML — SIGNIFICANT CHANGE UP (ref 200–900)
WBC # BLD: 6.3 K/UL — SIGNIFICANT CHANGE UP (ref 3.8–10.5)
WBC # FLD AUTO: 6.3 K/UL — SIGNIFICANT CHANGE UP (ref 3.8–10.5)

## 2019-08-19 PROCEDURE — 99233 SBSQ HOSP IP/OBS HIGH 50: CPT | Mod: GC

## 2019-08-19 RX ORDER — ENOXAPARIN SODIUM 100 MG/ML
40 INJECTION SUBCUTANEOUS DAILY
Refills: 0 | Status: DISCONTINUED | OUTPATIENT
Start: 2019-08-19 | End: 2019-08-20

## 2019-08-19 RX ORDER — SODIUM CHLORIDE 9 MG/ML
1000 INJECTION INTRAMUSCULAR; INTRAVENOUS; SUBCUTANEOUS
Refills: 0 | Status: DISCONTINUED | OUTPATIENT
Start: 2019-08-19 | End: 2019-08-19

## 2019-08-19 RX ADMIN — Medication 25 MILLIGRAM(S): at 18:26

## 2019-08-19 RX ADMIN — Medication 100 MILLIGRAM(S): at 22:00

## 2019-08-19 RX ADMIN — LOSARTAN POTASSIUM 50 MILLIGRAM(S): 100 TABLET, FILM COATED ORAL at 05:33

## 2019-08-19 RX ADMIN — Medication 81 MILLIGRAM(S): at 12:15

## 2019-08-19 RX ADMIN — OXYCODONE HYDROCHLORIDE 5 MILLIGRAM(S): 5 TABLET ORAL at 04:12

## 2019-08-19 RX ADMIN — Medication 100 MILLIGRAM(S): at 05:34

## 2019-08-19 RX ADMIN — OXYCODONE HYDROCHLORIDE 5 MILLIGRAM(S): 5 TABLET ORAL at 18:26

## 2019-08-19 RX ADMIN — LIDOCAINE 1 PATCH: 4 CREAM TOPICAL at 02:49

## 2019-08-19 RX ADMIN — ATORVASTATIN CALCIUM 40 MILLIGRAM(S): 80 TABLET, FILM COATED ORAL at 22:00

## 2019-08-19 RX ADMIN — OXYCODONE HYDROCHLORIDE 5 MILLIGRAM(S): 5 TABLET ORAL at 03:42

## 2019-08-19 RX ADMIN — ENOXAPARIN SODIUM 40 MILLIGRAM(S): 100 INJECTION SUBCUTANEOUS at 12:06

## 2019-08-19 RX ADMIN — Medication 650 MILLIGRAM(S): at 23:00

## 2019-08-19 RX ADMIN — SODIUM CHLORIDE 150 MILLILITER(S): 9 INJECTION INTRAMUSCULAR; INTRAVENOUS; SUBCUTANEOUS at 03:09

## 2019-08-19 RX ADMIN — OXYCODONE HYDROCHLORIDE 5 MILLIGRAM(S): 5 TABLET ORAL at 12:09

## 2019-08-19 RX ADMIN — AMLODIPINE BESYLATE 10 MILLIGRAM(S): 2.5 TABLET ORAL at 05:34

## 2019-08-19 RX ADMIN — Medication 100 MILLIGRAM(S): at 14:04

## 2019-08-19 RX ADMIN — SENNA PLUS 2 TABLET(S): 8.6 TABLET ORAL at 22:00

## 2019-08-19 RX ADMIN — Medication 650 MILLIGRAM(S): at 22:06

## 2019-08-19 RX ADMIN — OXYCODONE HYDROCHLORIDE 5 MILLIGRAM(S): 5 TABLET ORAL at 13:10

## 2019-08-19 NOTE — PROGRESS NOTE ADULT - ATTENDING COMMENTS
Pt with multifocal bony pain, hypercalcemia, anemia, and lytic lesions in the spine, pelvis, and ribs concerning for metastatic malignancy, possibly MM. Appreciate IR consult. Anticipate biopsy of lytic lesion tomorrow.

## 2019-08-19 NOTE — CHART NOTE - NSCHARTNOTEFT_GEN_A_CORE
IR Procedure Note    Patient Age: 69  Patient Gender: M  Procedure (including site / side if known): sacral bone bx  Diagnosis/Indication: multiple bone lytic lesions  Interventional Radiology Attending Physician: Dr. Kitchen  Ordering Attending Physician: Dr. Meehan  Pertinent medical history: HTN, HLD, CAD s/p 1 YAMILET   Pertinent labs:     LABS:                        10.1   6.30  )-----------( 233      ( 19 Aug 2019 07:18 )             30.2         137  |  102  |  11  ----------------------------<  88  4.0   |  23  |  1.10    Ca    11.4<H>      19 Aug 2019 07:18  Phos  4.3       Mg     1.7         TPro  8.6<H>  /  Alb  x   /  TBili  x   /  DBili  x   /  AST  x   /  ALT  x   /  AlkPhos  x       PT/INR - ( 18 Aug 2019 15:05 )   PT: 11.6 SEC;   INR: 1.04          PTT - ( 18 Aug 2019 15:05 )  PTT:30.8 SEC  Urinalysis Basic - ( 18 Aug 2019 15:45 )    Color: COLORLESS / Appearance: CLEAR / S.005 / pH: 7.5  Gluc: NEGATIVE / Ketone: NEGATIVE  / Bili: NEGATIVE / Urobili: NORMAL   Blood: NEGATIVE / Protein: 10 / Nitrite: NEGATIVE   Leuk Esterase: NEGATIVE / RBC: x / WBC x   Sq Epi: x / Non Sq Epi: x / Bacteria: x          Patient and family aware?  [x] Yes    [ ] No      Patient is medically optimized to proceed for IR guided lytic lesion bx    Earlene García, PGY-3

## 2019-08-19 NOTE — PROGRESS NOTE ADULT - PROBLEM SELECTOR PLAN 4
Normocytic anemia in setting of hypercalcemia and bone lesions concerning for MM  -Ramírez positive concerning for AIHA, in setting of MM vs. Waldenstrom's macroglobulinemia  -LDH, haptoglobin  -trend cbc  -Iron studies Normocytic anemia in setting of hypercalcemia and bone lesions concerning for MM  -Ramírez positive concerning for AIHA, in setting of MM vs. Waldenstrom's macroglobulinemia  -LDH normal, haptoglobin pending  -trend cbc  -Iron studies Normocytic anemia in setting of hypercalcemia and bone lesions concerning for MM  -Ramírez positive concerning for AIHA, in setting of ?MM vs. Waldenstrom's macroglobulinemia  -LDH and Haptoglobin normal, reassuring against hemolysis  -trend cbc  -Iron studies

## 2019-08-19 NOTE — PROGRESS NOTE ADULT - PROBLEM SELECTOR PLAN 7
CAD s/p 1 YAMILET (performed in [facility] in 2015)  -high intensity statin  -ASA 81mg qd  -Metoprolol 25mg bid

## 2019-08-19 NOTE — ED ADULT NURSE NOTE - CHIEF COMPLAINT QUOTE
Pt BIB EMS for back pain x 2 months worse the past week denies trauma pt has multiple complaints and states its been going on for months   PMH: HTN, High Cholesterol, cardiac stents

## 2019-08-19 NOTE — PROGRESS NOTE ADULT - PROBLEM SELECTOR PLAN 1
Diffuse osteolytic lesions concerning for multiple myeloma vs. waldenstrom's macroglobulinemia.   -Onc consult  -serum FLC  -SPEP  -Beta-2 microglobulin  -Serum viscosity  -speak to private heme/onc about marrow bx indication/result Diffuse osteolytic lesions concerning for multiple myeloma vs. waldenstrom's macroglobulinemia.   -Onc consult  -serum FLC  -SPEP/UPEP  -Beta-2 microglobulin  -Serum viscosity  -speak to private heme/onc about marrow bx indication/result Diffuse osteolytic lesions concerning for multiple myeloma vs. waldenstrom's macroglobulinemia. Also concern for metastasis though primary cancer not yet known.  -IR consulted for biopsy of lesion  -Elevated B2 Microglobulin to 3.3, raises concern for MM. Will hold off on Heme/Onc consult at this time pending IR biopsy  -serum FLC  -SPEP/UPEP  -Serum viscosity  -speak to private heme/onc about marrow bx indication/result

## 2019-08-19 NOTE — ED ADULT NURSE NOTE - OBJECTIVE STATEMENT
pt presents to the ed today from intake a&Ox4 male presents to the ed today for back pain numbness and tinegling. ct done showing renal cysts. 20g iv in place in right ac labs drawn and sent report given to GAURANG Martinez pt presents to the ed today from intake a&Ox4 male presents to the ed today for back pain numbness and tinegling. ct done showing renal cysts. 22g iv in place in left ac by ANDREW. labs drawn and sent report given to GAURANG Martinez

## 2019-08-19 NOTE — PROGRESS NOTE ADULT - PROBLEM SELECTOR PLAN 8
DVT PPx: subcu enoxaparin, pending weight  Diet: DASH  Dispo: pending workup of osteolytic lesions DVT PPx: subq enoxaparin, pending weight  Diet: DASH  Dispo: pending workup of osteolytic lesions DVT PPx: subq enoxaparin. Will hold prior to IR procedure  Diet: DASH  Dispo: pending workup of osteolytic lesions

## 2019-08-19 NOTE — CONSULT NOTE ADULT - ASSESSMENT
68 yo M former smoker with a PMHx of HTN, HLD, CAD s/p 1 DES 2015 who p/w acute exacerbation of his back & flank pain found on CT new lytic expansile osseous lesions possible mets of unclear primary    Hypercalcemia  likely sec to lytic lesions  check pth, vit d 1,25, vit d 25, spep serum immuno fixation, k/l light chains  pth related peptide   on NS @ 150cc/hr  monitor bmp    HTN  controlled  monitor

## 2019-08-19 NOTE — PROGRESS NOTE ADULT - ASSESSMENT
70 yo M former smoker with a PMHx of HTN, HLD, CAD s/p 1 YAMILET who p/w acute exacerbation over the past 3 days of his ongoing bilateral flank pain x 2 months found to have diffuse osteolytic lesions on CT chest, A/P concerning for diffuse metastatic disease. Patient also with hypercalcemia w/o clear evidence of primary malignancy. In combination, patient's Hypercalcemia, elevated protein, anemia concerning for MM vs. Waldenstrom's macroglobulinemia given + elham test. Also, reports he had a bone marrow biopsy as outpatient, but doesn't know what for. Hand neuropathy could be c/w WM or MM. 70 yo M former smoker with a PMHx of HTN, HLD, CAD s/p 1 YAMILET who p/w acute exacerbation over the past 3 days of his ongoing bilateral flank pain x 2 months found to have diffuse osteolytic lesions on CT chest, A/P concerning for diffuse metastatic disease, weight loss, primary cancer not found at this time. Patient also with hypercalcemia w/o clear evidence of primary malignancy. In combination, patient's Hypercalcemia, elevated protein, elevated B2M, anemia concerning for MM vs. Waldenstrom's macroglobulinemia given + elham test. PCP does not recall why she had initially referred patient to heme/onc, but reports she remembers he had negative BMB in February.

## 2019-08-19 NOTE — CONSULT NOTE ADULT - SUBJECTIVE AND OBJECTIVE BOX
[facility] [place] [facility]   MD ANABEL MOSS DO ANGELA WONG, PA    TEL:  OFFICE: [phone]  DR WARREN CELL: [phone]  DR. HERNANDEZ CELL: [phone]  DANIELE DALTON CELL: [phone]      HPI:  70 yo M former smoker with a PMHx of HTN, HLD, CAD s/p 1 2015 who p/w acute exacerbation of his back & flank pain which has been ongoing for 2-3 months. As an outpatient, he had an ultrasound showing renal cyst and in 2019 he had a MR showing moderate central canal stenosis and R disc herniation at the L4-L5 level and mild formainal narrowing @ L4-L5 & L5-S1. No mention lytic lesions on MR. Denies fever, sweats, and chills. Alecia CP, SOB, and VEGA. No gait disturbance. No incontinence of stool or urine.     patient follow up with dr. warren for hyponatremia, scr ~ 132, urine work up was ordered but never done.     Allergies:  No Known Allergies      PAST MEDICAL & SURGICAL HISTORY:  Coronary artery disease  HLD (hyperlipidemia)  HTN (hypertension)  No significant past surgical history      Home Medications Reviewed    Hospital Medications:   MEDICATIONS  (STANDING):  amLODIPine   Tablet 10 milliGRAM(s) Oral daily  aspirin enteric coated 81 milliGRAM(s) Oral daily  atorvastatin 40 milliGRAM(s) Oral at bedtime  docusate sodium 100 milliGRAM(s) Oral three times a day  enoxaparin Injectable 40 milliGRAM(s) SubCutaneous daily  losartan 50 milliGRAM(s) Oral daily  metoprolol tartrate 25 milliGRAM(s) Oral two times a day  senna 2 Tablet(s) Oral at bedtime      SOCIAL HISTORY:  Denies ETOh, Smoking,     FAMILY HISTORY:  Maternal family history of hypertension: Mother      REVIEW OF SYSTEMS:  CONSTITUTIONAL: No weakness, fevers or chills  EYES/ENT: No visual changes;  No vertigo or throat pain   NECK: No pain or stiffness  RESPIRATORY: No cough, wheezing, hemoptysis; No shortness of breath  CARDIOVASCULAR: No chest pain or palpitations.  GASTROINTESTINAL: No abdominal or epigastric pain. No nausea, vomiting, or hematemesis; No diarrhea or constipation. No melena or hematochezia.  GENITOURINARY: No dysuria, frequency, foamy urine, urinary urgency, incontinence or hematuria  NEUROLOGICAL: No numbness or weakness  SKIN: No itching, burning, rashes, or lesions   Musculoskeletal see hpi  VASCULAR: No bilateral lower extremity edema.   All other review of systems is negative unless indicated above.    VITALS:  T(F): 97.2 (19 @ 05:32), Max: 98.3 (19 @ 16:16)  HR: 54 (19 @ 05:32)  BP: 130/60 (19 @ 05:32)  RR: 16 (19 05:32)  SpO2: 100% (19 @ 05:32)  Wt(kg): --     @ 07:01  -   @ 07:00  --------------------------------------------------------  IN: 800 mL / OUT: 0 mL / NET: 800 mL     @ 07:01  -   @ 13:15  --------------------------------------------------------  IN: 540 mL / OUT: 300 mL / NET: 240 mL      Height (cm): 162.6 ( 02:29)  Weight (kg): 52 ( 02:29)  BMI (kg/m2): 19.7 ( @ 02:29)  BSA (m2): 1.54 ( @ 02:29)    PHYSICAL EXAM:  Constitutional: NAD  HEENT: anicteric sclera, oropharynx clear, MMM  Neck: No JVD  Respiratory: CTAB, no wheezes, rales or rhonchi  Cardiovascular: S1, S2, RRR  Gastrointestinal: BS+, soft, NT/ND  Extremities: No cyanosis or clubbing. No peripheral edema  Neurological: A/O x 3, no focal deficits  Psychiatric: Normal mood, normal affect  : No CVA tenderness. No moser.   Skin: No rashes      LABS:      137  |  102  |  11  ----------------------------<  88  4.0   |  23  |  1.10    Ca    11.4<H>      19 Aug 2019 07:18  Phos  4.3       Mg     1.7         TPro  8.6<H>  /  Alb      /  TBili      /  DBili      /  AST      /  ALT      /  AlkPhos          Creatinine Trend: 1.10 <--, 1.01 <--                        10.1   6.30  )-----------( 233      ( 19 Aug 2019 07:18 )             30.2     Urine Studies:  Urinalysis Basic - ( 18 Aug 2019 15:45 )    Color: COLORLESS / Appearance: CLEAR / S.005 / pH: 7.5  Gluc: NEGATIVE / Ketone: NEGATIVE  / Bili: NEGATIVE / Urobili: NORMAL   Blood: NEGATIVE / Protein: 10 / Nitrite: NEGATIVE   Leuk Esterase: NEGATIVE / RBC:  / WBC    Sq Epi:  / Non Sq Epi:  / Bacteria:       Protein, Random Urine: 83.9 mg/dL ( @ 09:36)  Protein, Random Urine: 16.2 mg/dL ( @ 18:52)      RADIOLOGY & ADDITIONAL STUDIES:

## 2019-08-20 ENCOUNTER — RESULT REVIEW (OUTPATIENT)
Age: 69
End: 2019-08-20

## 2019-08-20 DIAGNOSIS — Z71.89 OTHER SPECIFIED COUNSELING: ICD-10-CM

## 2019-08-20 LAB
ANION GAP SERPL CALC-SCNC: 11 MMO/L — SIGNIFICANT CHANGE UP (ref 7–14)
APTT BLD: 32.4 SEC — SIGNIFICANT CHANGE UP (ref 27.5–36.3)
BACTERIA UR CULT: SIGNIFICANT CHANGE UP
BUN SERPL-MCNC: 19 MG/DL — SIGNIFICANT CHANGE UP (ref 7–23)
CALCIUM SERPL-MCNC: 11.1 MG/DL — HIGH (ref 8.4–10.5)
CHLORIDE SERPL-SCNC: 100 MMOL/L — SIGNIFICANT CHANGE UP (ref 98–107)
CO2 SERPL-SCNC: 25 MMOL/L — SIGNIFICANT CHANGE UP (ref 22–31)
CREAT SERPL-MCNC: 1.27 MG/DL — SIGNIFICANT CHANGE UP (ref 0.5–1.3)
GLUCOSE SERPL-MCNC: 98 MG/DL — SIGNIFICANT CHANGE UP (ref 70–99)
HCT VFR BLD CALC: 31.9 % — LOW (ref 39–50)
HCV AB S/CO SERPL IA: 0.1 S/CO — SIGNIFICANT CHANGE UP (ref 0–0.99)
HCV AB SERPL-IMP: SIGNIFICANT CHANGE UP
HGB BLD-MCNC: 10.6 G/DL — LOW (ref 13–17)
INR BLD: 0.97 — SIGNIFICANT CHANGE UP (ref 0.88–1.17)
KAPPA FREE LIGHT CHAINS, SERUM: 214.98 MG/DL — HIGH (ref 0.33–1.94)
LAMBDA FREE LIGHT CHAINS, SERUM: 1.04 MG/DL — SIGNIFICANT CHANGE UP (ref 0.57–2.63)
MAGNESIUM SERPL-MCNC: 1.7 MG/DL — SIGNIFICANT CHANGE UP (ref 1.6–2.6)
MCHC RBC-ENTMCNC: 29.5 PG — SIGNIFICANT CHANGE UP (ref 27–34)
MCHC RBC-ENTMCNC: 33.2 % — SIGNIFICANT CHANGE UP (ref 32–36)
MCV RBC AUTO: 88.9 FL — SIGNIFICANT CHANGE UP (ref 80–100)
NRBC # FLD: 0 K/UL — SIGNIFICANT CHANGE UP (ref 0–0)
PHOSPHATE SERPL-MCNC: 4.2 MG/DL — SIGNIFICANT CHANGE UP (ref 2.5–4.5)
PLATELET # BLD AUTO: 243 K/UL — SIGNIFICANT CHANGE UP (ref 150–400)
PMV BLD: 11.2 FL — SIGNIFICANT CHANGE UP (ref 7–13)
POTASSIUM SERPL-MCNC: 4 MMOL/L — SIGNIFICANT CHANGE UP (ref 3.5–5.3)
POTASSIUM SERPL-SCNC: 4 MMOL/L — SIGNIFICANT CHANGE UP (ref 3.5–5.3)
PROT SERPL-MCNC: 9 G/DL — HIGH (ref 6–8.3)
PROTHROM AB SERPL-ACNC: 11.1 SEC — SIGNIFICANT CHANGE UP (ref 9.8–13.1)
RBC # BLD: 3.59 M/UL — LOW (ref 4.2–5.8)
RBC # FLD: 12.3 % — SIGNIFICANT CHANGE UP (ref 10.3–14.5)
SODIUM SERPL-SCNC: 136 MMOL/L — SIGNIFICANT CHANGE UP (ref 135–145)
SPECIMEN SOURCE: SIGNIFICANT CHANGE UP
VISC SER: 1.9 — HIGH (ref 1.4–1.8)
WBC # BLD: 6.45 K/UL — SIGNIFICANT CHANGE UP (ref 3.8–10.5)
WBC # FLD AUTO: 6.45 K/UL — SIGNIFICANT CHANGE UP (ref 3.8–10.5)

## 2019-08-20 PROCEDURE — 88173 CYTOPATH EVAL FNA REPORT: CPT | Mod: 26

## 2019-08-20 PROCEDURE — 88342 IMHCHEM/IMCYTCHM 1ST ANTB: CPT | Mod: 26,59

## 2019-08-20 PROCEDURE — 88305 TISSUE EXAM BY PATHOLOGIST: CPT | Mod: 26

## 2019-08-20 PROCEDURE — 88364 INSITU HYBRIDIZATION (FISH): CPT | Mod: 26

## 2019-08-20 PROCEDURE — 88360 TUMOR IMMUNOHISTOCHEM/MANUAL: CPT | Mod: 26

## 2019-08-20 PROCEDURE — 99233 SBSQ HOSP IP/OBS HIGH 50: CPT | Mod: GC

## 2019-08-20 PROCEDURE — 77012 CT SCAN FOR NEEDLE BIOPSY: CPT | Mod: 26

## 2019-08-20 PROCEDURE — 88367 INSITU HYBRIDIZATION AUTO: CPT | Mod: 26

## 2019-08-20 PROCEDURE — 88365 INSITU HYBRIDIZATION (FISH): CPT | Mod: 26,59

## 2019-08-20 PROCEDURE — 20225 BONE BIOPSY TROCAR/NDL DEEP: CPT

## 2019-08-20 PROCEDURE — 88341 IMHCHEM/IMCYTCHM EA ADD ANTB: CPT | Mod: 26,59

## 2019-08-20 RX ORDER — ENOXAPARIN SODIUM 100 MG/ML
40 INJECTION SUBCUTANEOUS DAILY
Refills: 0 | Status: DISCONTINUED | OUTPATIENT
Start: 2019-08-20 | End: 2019-08-29

## 2019-08-20 RX ORDER — LIDOCAINE 4 G/100G
1 CREAM TOPICAL DAILY
Refills: 0 | Status: DISCONTINUED | OUTPATIENT
Start: 2019-08-20 | End: 2019-08-29

## 2019-08-20 RX ADMIN — ENOXAPARIN SODIUM 40 MILLIGRAM(S): 100 INJECTION SUBCUTANEOUS at 18:40

## 2019-08-20 RX ADMIN — LOSARTAN POTASSIUM 50 MILLIGRAM(S): 100 TABLET, FILM COATED ORAL at 05:57

## 2019-08-20 RX ADMIN — Medication 100 MILLIGRAM(S): at 05:57

## 2019-08-20 RX ADMIN — Medication 100 MILLIGRAM(S): at 21:22

## 2019-08-20 RX ADMIN — ATORVASTATIN CALCIUM 40 MILLIGRAM(S): 80 TABLET, FILM COATED ORAL at 21:22

## 2019-08-20 RX ADMIN — AMLODIPINE BESYLATE 10 MILLIGRAM(S): 2.5 TABLET ORAL at 05:57

## 2019-08-20 RX ADMIN — SENNA PLUS 2 TABLET(S): 8.6 TABLET ORAL at 21:22

## 2019-08-20 RX ADMIN — Medication 25 MILLIGRAM(S): at 05:57

## 2019-08-20 NOTE — PROGRESS NOTE ADULT - ASSESSMENT
68 yo M former smoker with a PMHx of HTN, HLD, CAD s/p 1 DES 2015 who p/w acute exacerbation of his back & flank pain found on CT new lytic expansile osseous lesions possible mets of unclear primary    Hypercalcemia  likely sec to lytic lesions  pth 7.42  pending vit d 1,25, vit d 25, spep serum immuno fixation, k/l light chains  pth related peptide   completed NS @ 150cc/hr x 10 hrs.  calcium improving   monitor bmp    HTN  normally controlled  monitor for now  on norvasc 10, metoprolol 25 bid and losartan 50 68 yo M former smoker with a PMHx of HTN, HLD, CAD s/p 1 YAMILET 2015 who p/w acute exacerbation of his back & flank pain found on CT new lytic expansile osseous lesions possible mets of unclear primary    Hypercalcemia  likely sec to lytic lesions  pth 7.42  pending vit d 1,25, vit d 25, spep serum immuno fixation, k/l light chains  pth related peptide   completed NS @ 150cc/hr x 10 hrs.  calcium improving   monitor bmp    HTN  normally controlled  monitor for now  on norvasc 10, metoprolol 25 bid and losartan 50    Proteinuria  mild  check urine p/c ratio  on losartan

## 2019-08-20 NOTE — PROGRESS NOTE ADULT - PROBLEM SELECTOR PLAN 8
DVT PPx: subq enoxaparin. Will hold prior to IR procedure  Diet: DASH  Dispo: pending workup of osteolytic lesions. Home health consult placed per patient preference

## 2019-08-20 NOTE — PROGRESS NOTE ADULT - PROBLEM SELECTOR PLAN 7
CAD s/p 1 YAMILET [facility])  -high intensity statin  -ASA 81mg qd  -Metoprolol 25mg bid

## 2019-08-20 NOTE — PROGRESS NOTE ADULT - ASSESSMENT
68 yo M former smoker with a PMHx of HTN, HLD, CAD s/p 1 YAMILET who p/w acute exacerbation over the past 3 days of his ongoing bilateral flank pain x 2 months found to have diffuse osteolytic lesions on CT chest, A/P concerning for diffuse metastatic disease, weight loss, primary cancer not found at this time. Patient also with hypercalcemia w/o clear evidence of primary malignancy. In combination, patient's Hypercalcemia, elevated protein, elevated B2M, anemia concerning for MM vs. Waldenstrom's macroglobulinemia given + elham test. 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 Patient also with hypercalcemia w/o clear evidence of primary malignancy. In combination, patient's Hypercalcemia, elevated protein, elevated B2M, anemia concerning for MM vs. Waldenstrom's macroglobulinemia given + elham test. 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

## 2019-08-20 NOTE — PHYSICAL THERAPY INITIAL EVALUATION ADULT - DISCHARGE DISPOSITION, PT EVAL
no skilled PT needs after discharge from hospital/no skilled PT needs/home w/ home PT no no skilled PT needs/no skilled PT needs after discharge from hospital

## 2019-08-20 NOTE — PROGRESS NOTE ADULT - PROBLEM SELECTOR PLAN 4
Normocytic anemia in setting of hypercalcemia and bone lesions concerning for MM  -Ramírez positive concerning for AIHA, in setting of ?MM vs. Waldenstrom's macroglobulinemia  -LDH and Haptoglobin normal, reassuring against hemolysis  -trend cbc

## 2019-08-20 NOTE — PROGRESS NOTE ADULT - SUBJECTIVE AND OBJECTIVE BOX
Subjective:        Medications:  acetaminophen   Tablet .. 650 milliGRAM(s) Oral every 6 hours PRN  amLODIPine   Tablet 10 milliGRAM(s) Oral daily  aspirin enteric coated 81 milliGRAM(s) Oral daily  atorvastatin 40 milliGRAM(s) Oral at bedtime  docusate sodium 100 milliGRAM(s) Oral three times a day  losartan 50 milliGRAM(s) Oral daily  metoprolol tartrate 25 milliGRAM(s) Oral two times a day  oxyCODONE    IR 5 milliGRAM(s) Oral every 6 hours PRN  senna 2 Tablet(s) Oral at bedtime      PHYSICAL EXAM:  Vital Signs Last 24 Hrs  T(C): 36.7 (20 Aug 2019 05:44), Max: 36.8 (19 Aug 2019 18:25)  T(F): 98 (20 Aug 2019 05:44), Max: 98.3 (19 Aug 2019 18:25)  HR: 59 (20 Aug 2019 05:44) (59 - 69)  BP: 168/71 (20 Aug 2019 05:44) (117/50 - 168/71)  BP(mean): --  RR: 18 (20 Aug 2019 05:44) (16 - 18)  SpO2: 98% (20 Aug 2019 05:44) (98% - 100%)  Daily     Daily   I&O's Detail    19 Aug 2019 07:01  -  20 Aug 2019 07:00  --------------------------------------------------------  IN:    Oral Fluid: 360 mL    sodium chloride 0.9%: 900 mL  Total IN: 1260 mL    OUT:    Voided: 300 mL  Total OUT: 300 mL    Total NET: 960 mL      General: A/ox 3, No acute Distress  Neck: Supple, NO JVD  Cardiac: S1 S2, No M/R/G  Pulmonary: CTAB, Breathing unlabored, No Rhonchi/Rales/Wheezing  Abdomen: Soft, Non -tender, +BS   Extremities: No Rashes, No edema  Neuro: A/o x 3, No focal deficits  Psch: normal mood , normal affect    LABS:                          10.1   6.30  )-----------( 233      ( 19 Aug 2019 07:18 )             30.2     08-19    137  |  102  |  11  ----------------------------<  88  4.0   |  23  |  1.10    Ca    11.4<H>      19 Aug 2019 07:18  Phos  4.3     08-19  Mg     1.7     08-19    TPro  8.6<H>  /  Alb  x   /  TBili  x   /  DBili  x   /  AST  x   /  ALT  x   /  AlkPhos  x   08-19    PT/INR - ( 18 Aug 2019 15:05 )   PT: 11.6 SEC;   INR: 1.04          PTT - ( 18 Aug 2019 15:05 )  PTT:30.8 SEC Subjective:    Mr. Rand is a 68 yo M former smoker with a PMHx of HTN, HLD, CAD s/p 1 YAMILET who p/w acute exacerbation over the past 3 days of his ongoing bilateral flank pain x 2 months found to have diffuse osteolytic lesions on CT chest, A/P concerning for diffuse metastatic disease, weight loss, primary cancer not found at this time. No acute events overnight. Patient with most recent /71--denies chest pain, SOB, headache, palpitations. Continues to endorse abdominal, flank pain but says he is managing. Denies f/c, n/v, diarrhea. Still feels constipated--last BM two days ago.     Pt was NPO at midnight for IR procedure today.     Medications:  acetaminophen   Tablet .. 650 milliGRAM(s) Oral every 6 hours PRN  amLODIPine   Tablet 10 milliGRAM(s) Oral daily  aspirin enteric coated 81 milliGRAM(s) Oral daily  atorvastatin 40 milliGRAM(s) Oral at bedtime  docusate sodium 100 milliGRAM(s) Oral three times a day  losartan 50 milliGRAM(s) Oral daily  metoprolol tartrate 25 milliGRAM(s) Oral two times a day  oxyCODONE    IR 5 milliGRAM(s) Oral every 6 hours PRN  senna 2 Tablet(s) Oral at bedtime      PHYSICAL EXAM:  Vital Signs Last 24 Hrs  T(C): 36.7 (20 Aug 2019 05:44), Max: 36.8 (19 Aug 2019 18:25)  T(F): 98 (20 Aug 2019 05:44), Max: 98.3 (19 Aug 2019 18:25)  HR: 59 (20 Aug 2019 05:44) (59 - 69)  BP: 168/71 (20 Aug 2019 05:44) (117/50 - 168/71)  BP(mean): --  RR: 18 (20 Aug 2019 05:44) (16 - 18)  SpO2: 98% (20 Aug 2019 05:44) (98% - 100%)  Daily     Daily   I&O's Detail    19 Aug 2019 07:01  -  20 Aug 2019 07:00  --------------------------------------------------------  IN:    Oral Fluid: 360 mL    sodium chloride 0.9%: 900 mL  Total IN: 1260 mL    OUT:    Voided: 300 mL  Total OUT: 300 mL    Total NET: 960 mL      General: A/ox 3, No acute Distress  Neck: Supple, NO JVD  Cardiac: S1 S2, No M/R/G  Pulmonary: CTAB, Breathing unlabored, No Rhonchi/Rales/Wheezing  Abdomen: Soft, Non -tender, +BS   Extremities: No Rashes, No edema  Neuro: A/o x 3, No focal deficits  Psch: normal mood , normal affect    LABS:                          10.1   6.30  )-----------( 233      ( 19 Aug 2019 07:18 )             30.2     08-19    137  |  102  |  11  ----------------------------<  88  4.0   |  23  |  1.10    Ca    11.4<H>      19 Aug 2019 07:18  Phos  4.3     08-19  Mg     1.7     08-19    TPro  8.6<H>  /  Alb  x   /  TBili  x   /  DBili  x   /  AST  x   /  ALT  x   /  AlkPhos  x   08-19    PT/INR - ( 18 Aug 2019 15:05 )   PT: 11.6 SEC;   INR: 1.04          PTT - ( 18 Aug 2019 15:05 )  PTT:30.8 SEC    No new imaging from previous CT A/P and Chest showing multiple osseus lytic lesions

## 2019-08-20 NOTE — PROGRESS NOTE ADULT - PROBLEM SELECTOR PLAN 1
Diffuse osteolytic lesions concerning for multiple myeloma vs. waldenstrom's macroglobulinemia. Also concern for metastasis though primary cancer not yet known.  -IR consulted for biopsy of lesion  -Elevated B2 Microglobulin to 3.3, raises concern for MM. 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 Anemia at that time c/w NANDINI

## 2019-08-20 NOTE — PROGRESS NOTE ADULT - ATTENDING COMMENTS
Pt's pain is unchanged today. 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 The pt was unfortunately lost to follow up. This is consistent with suspicions that the pt's current presentation (with lytic lesions) is due to MM. F/u sacral biopsy today.

## 2019-08-20 NOTE — PROGRESS NOTE ADULT - SUBJECTIVE AND OBJECTIVE BOX
[facility] [place] [facility]   MD ANABEL MOSS DO ANGELA WONG, PA    TEL:  OFFICE: [phone]  DR HOLT CELL: [phone]  DR. HERNANDEZ CELL: [phone]  DANIELE DALTON CELL: [phone]        Patient is a 69y old  Male who presents with a chief complaint of Abdominal pain (20 Aug 2019 07:01)      Patient seen and examined at bedside. No chest pain/sob    VITALS:  T(F): 98 (08-20-19 @ 05:44), Max: 98.3 (08-19-19 @ 18:25)  HR: 59 (08-20-19 @ 05:44)  BP: 168/71 (08-20-19 @ 05:44)  RR: 18 (08-20-19 @ 05:44)  SpO2: 98% (08-20-19 @ 05:44)  Wt(kg): --    08-19 @ 07:01  -  08-20 @ 07:00  --------------------------------------------------------  IN: 1260 mL / OUT: 300 mL / NET: 960 mL          PHYSICAL EXAM:  Constitutional: NAD  Neck: No JVD  Respiratory: CTAB, no wheezes, rales or rhonchi  Cardiovascular: S1, S2, RRR  Gastrointestinal: BS+, soft, NT/ND  Extremities: No peripheral edema    Hospital Medications:   MEDICATIONS  (STANDING):  amLODIPine   Tablet 10 milliGRAM(s) Oral daily  aspirin enteric coated 81 milliGRAM(s) Oral daily  atorvastatin 40 milliGRAM(s) Oral at bedtime  docusate sodium 100 milliGRAM(s) Oral three times a day  losartan 50 milliGRAM(s) Oral daily  metoprolol tartrate 25 milliGRAM(s) Oral two times a day  senna 2 Tablet(s) Oral at bedtime      LABS:  08-20    136  |  100  |  19  ----------------------------<  98  4.0   |  25  |  1.27    Ca    11.1<H>      20 Aug 2019 07:13  Phos  4.2     08-20  Mg     1.7     08-20    TPro  9.0<H>  /  Alb      /  TBili      /  DBili      /  AST      /  ALT      /  AlkPhos      08-20    Creatinine Trend: 1.27 <--, 1.10 <--, 1.01 <--    Phosphorus Level, Serum: 4.2 mg/dL (08-20 @ 07:13)                              10.6   6.45  )-----------( 243      ( 20 Aug 2019 07:13 )             31.9     Urine Studies:  Urinalysis - [08-18-19 @ 15:45]      Color COLORLESS / Appearance CLEAR / SG 1.005 / pH 7.5      Gluc NEGATIVE / Ketone NEGATIVE  / Bili NEGATIVE / Urobili NORMAL       Blood NEGATIVE / Protein 10 / Leuk Est NEGATIVE / Nitrite NEGATIVE      RBC  / WBC  / Hyaline  / Gran  / Sq Epi  / Non Sq Epi  / Bacteria     Urine Protein 83.9      [08-19-19 @ 09:36]    Iron 52, TIBC 252, %sat --      [08-19-19 @ 07:18]  Ferritin 368.5      [08-19-19 @ 07:18]  PTH 7.42 (Ca --)      [08-18-19 @ 18:52]   --        RADIOLOGY & ADDITIONAL STUDIES:

## 2019-08-21 ENCOUNTER — TRANSCRIPTION ENCOUNTER (OUTPATIENT)
Age: 69
End: 2019-08-21

## 2019-08-21 LAB
ANION GAP SERPL CALC-SCNC: 13 MMO/L — SIGNIFICANT CHANGE UP (ref 7–14)
BUN SERPL-MCNC: 24 MG/DL — HIGH (ref 7–23)
CALCIUM SERPL-MCNC: 10.6 MG/DL — HIGH (ref 8.4–10.5)
CHLORIDE SERPL-SCNC: 98 MMOL/L — SIGNIFICANT CHANGE UP (ref 98–107)
CO2 SERPL-SCNC: 21 MMOL/L — LOW (ref 22–31)
CREAT SERPL-MCNC: 1.24 MG/DL — SIGNIFICANT CHANGE UP (ref 0.5–1.3)
GLUCOSE SERPL-MCNC: 120 MG/DL — HIGH (ref 70–99)
HCT VFR BLD CALC: 26.9 % — LOW (ref 39–50)
HGB BLD-MCNC: 9.1 G/DL — LOW (ref 13–17)
KAPPA/LAMBDA FREE LIGHT CHAIN RATIO, SERUM: SIGNIFICANT CHANGE UP
MAGNESIUM SERPL-MCNC: 1.7 MG/DL — SIGNIFICANT CHANGE UP (ref 1.6–2.6)
MCHC RBC-ENTMCNC: 29.5 PG — SIGNIFICANT CHANGE UP (ref 27–34)
MCHC RBC-ENTMCNC: 33.8 % — SIGNIFICANT CHANGE UP (ref 32–36)
MCV RBC AUTO: 87.3 FL — SIGNIFICANT CHANGE UP (ref 80–100)
NRBC # FLD: 0 K/UL — SIGNIFICANT CHANGE UP (ref 0–0)
PHOSPHATE SERPL-MCNC: 4.1 MG/DL — SIGNIFICANT CHANGE UP (ref 2.5–4.5)
PLATELET # BLD AUTO: 231 K/UL — SIGNIFICANT CHANGE UP (ref 150–400)
PMV BLD: 11.2 FL — SIGNIFICANT CHANGE UP (ref 7–13)
POTASSIUM SERPL-MCNC: 4.2 MMOL/L — SIGNIFICANT CHANGE UP (ref 3.5–5.3)
POTASSIUM SERPL-SCNC: 4.2 MMOL/L — SIGNIFICANT CHANGE UP (ref 3.5–5.3)
RBC # BLD: 3.08 M/UL — LOW (ref 4.2–5.8)
RBC # FLD: 12.1 % — SIGNIFICANT CHANGE UP (ref 10.3–14.5)
SODIUM SERPL-SCNC: 132 MMOL/L — LOW (ref 135–145)
TM INTERPRETATION: SIGNIFICANT CHANGE UP
WBC # BLD: 9.49 K/UL — SIGNIFICANT CHANGE UP (ref 3.8–10.5)
WBC # FLD AUTO: 9.49 K/UL — SIGNIFICANT CHANGE UP (ref 3.8–10.5)

## 2019-08-21 PROCEDURE — 99233 SBSQ HOSP IP/OBS HIGH 50: CPT | Mod: GC

## 2019-08-21 PROCEDURE — 99223 1ST HOSP IP/OBS HIGH 75: CPT | Mod: GC

## 2019-08-21 RX ORDER — ACETAMINOPHEN 500 MG
2 TABLET ORAL
Qty: 0 | Refills: 0 | DISCHARGE
Start: 2019-08-21

## 2019-08-21 RX ORDER — OXYCODONE HYDROCHLORIDE 5 MG/1
1 TABLET ORAL
Qty: 14 | Refills: 0
Start: 2019-08-21 | End: 2019-08-27

## 2019-08-21 RX ORDER — LIDOCAINE 4 G/100G
1 CREAM TOPICAL
Qty: 14 | Refills: 0
Start: 2019-08-21 | End: 2019-09-03

## 2019-08-21 RX ORDER — DEXAMETHASONE 0.5 MG/5ML
20 ELIXIR ORAL DAILY
Refills: 0 | Status: DISCONTINUED | OUTPATIENT
Start: 2019-08-21 | End: 2019-08-22

## 2019-08-21 RX ORDER — DEXAMETHASONE 0.5 MG/5ML
20 ELIXIR ORAL DAILY
Refills: 0 | Status: DISCONTINUED | OUTPATIENT
Start: 2019-08-21 | End: 2019-08-21

## 2019-08-21 RX ADMIN — OXYCODONE HYDROCHLORIDE 5 MILLIGRAM(S): 5 TABLET ORAL at 10:19

## 2019-08-21 RX ADMIN — LOSARTAN POTASSIUM 50 MILLIGRAM(S): 100 TABLET, FILM COATED ORAL at 06:00

## 2019-08-21 RX ADMIN — Medication 100 MILLIGRAM(S): at 13:47

## 2019-08-21 RX ADMIN — OXYCODONE HYDROCHLORIDE 5 MILLIGRAM(S): 5 TABLET ORAL at 10:49

## 2019-08-21 RX ADMIN — Medication 100 MILLIGRAM(S): at 05:59

## 2019-08-21 RX ADMIN — ENOXAPARIN SODIUM 40 MILLIGRAM(S): 100 INJECTION SUBCUTANEOUS at 13:47

## 2019-08-21 RX ADMIN — ATORVASTATIN CALCIUM 40 MILLIGRAM(S): 80 TABLET, FILM COATED ORAL at 21:21

## 2019-08-21 RX ADMIN — SENNA PLUS 2 TABLET(S): 8.6 TABLET ORAL at 21:21

## 2019-08-21 RX ADMIN — AMLODIPINE BESYLATE 10 MILLIGRAM(S): 2.5 TABLET ORAL at 05:59

## 2019-08-21 RX ADMIN — Medication 110 MILLIGRAM(S): at 20:26

## 2019-08-21 RX ADMIN — Medication 100 MILLIGRAM(S): at 21:21

## 2019-08-21 RX ADMIN — Medication 81 MILLIGRAM(S): at 13:48

## 2019-08-21 NOTE — PROGRESS NOTE ADULT - PROBLEM SELECTOR PLAN 8
DVT PPx: subq enoxaparin.   Diet: DASH  Dispo: pending workup of osteolytic lesions. Home health consult placed per patient preference DVT PPx: subq enoxaparin.   Diet: DASH  Dispo: Home today with daughter

## 2019-08-21 NOTE — CONSULT NOTE ADULT - SUBJECTIVE AND OBJECTIVE BOX
REASON FOR CONSULTATION: Smoldering myeloma, lytic lesions    INTERVAL HISTORY: 68 yo M former smoker with a PMHx of smoldering myeloma diagnosed by bone marrow bx performed by outside hematologist in February 2019 who p/w acute exacerbation of his back & flank pain, now found to have diffuse lytic lesions.   Patient seen and examined. Spoke to patient with  phone ID #[phone]. He defers all questions to be answered by his daughter who is not at bedside. He is unsure what his results from his past bone marrow bx are. With regard to his pain, he states his back pain is improved, and has no pain elsewhere. No other complaints at this time. No pain in the ribs.         Allergies    No Known Allergies    Intolerances        MEDICATIONS  (STANDING):  amLODIPine   Tablet 10 milliGRAM(s) Oral daily  aspirin enteric coated 81 milliGRAM(s) Oral daily  atorvastatin 40 milliGRAM(s) Oral at bedtime  docusate sodium 100 milliGRAM(s) Oral three times a day  enoxaparin Injectable 40 milliGRAM(s) SubCutaneous daily  losartan 50 milliGRAM(s) Oral daily  metoprolol tartrate 25 milliGRAM(s) Oral two times a day  senna 2 Tablet(s) Oral at bedtime    MEDICATIONS  (PRN):  acetaminophen   Tablet .. 650 milliGRAM(s) Oral every 6 hours PRN Mild Pain (1 - 3)  lidocaine   Patch 1 Patch Transdermal daily PRN Pain  oxyCODONE    IR 5 milliGRAM(s) Oral every 6 hours PRN moderate to severe pain      Vital Signs Last 24 Hrs  T(C): 36.5 (21 Aug 2019 05:54), Max: 36.7 (20 Aug 2019 18:39)  T(F): 97.7 (21 Aug 2019 05:54), Max: 98 (20 Aug 2019 18:39)  HR: 57 (21 Aug 2019 05:54) (52 - 61)  BP: 169/76 (21 Aug 2019 05:54) (100/53 - 169/76)  BP(mean): --  RR: 18 (21 Aug 2019 05:54) (16 - 18)  SpO2: 97% (21 Aug 2019 05:54) (97% - 99%)    PHYSICAL EXAM:    General: AOx3, no acute distress  EYES: EOMI, PERRLA, conjunctiva and sclera clear  CHEST/LUNG: Clear to auscultation bilaterally; no wheezes, crackles or rales  HEART: Regular rate and rhythm; no murmurs  ABDOMEN: Soft, Nontender, Nondistended; BS+  LYMPH: No lymphadenopathy noted.  Extremities: No peripheral edema, tenderness over right flank      LABS:                        9.1    9.49  )-----------( 231      ( 21 Aug 2019 06:00 )             26.9     08-21    132<L>  |  98  |  24<H>  ----------------------------<  120<H>  4.2   |  21<L>  |  1.24    Ca    10.6<H>      21 Aug 2019 06:00  Phos  4.1     08-21  Mg     1.7     08-21    TPro  9.0<H>  /  Alb  x   /  TBili  x   /  DBili  x   /  AST  x   /  ALT  x   /  AlkPhos  x   08-20    PT/INR - ( 20 Aug 2019 07:13 )   PT: 11.1 SEC;   INR: 0.97          PTT - ( 20 Aug 2019 07:13 )  PTT:32.4 SEC          RADIOLOGY & ADDITIONAL STUDIES:    PATHOLOGY:

## 2019-08-21 NOTE — DISCHARGE NOTE NURSING/CASE MANAGEMENT/SOCIAL WORK - NSDCPNDISPN_GEN_ALL_CORE
Activities of daily living, including home environment that might     exacerbate pain or reduce effectiveness of the pain management plan of care as well as strategies to address these issues/Side effects of pain management treatment/Education provided on the pain management plan of care/Opioids not applicable/not prescribed

## 2019-08-21 NOTE — PROGRESS NOTE ADULT - SUBJECTIVE AND OBJECTIVE BOX
[facility]   MD ANABEL MOSS DO ANGELA WONG, PA    TEL:  OFFICE: [phone]  DR HOLT CELL: [phone]  DR. HERNANDEZ CELL: [phone]  DANIELE DALTON CELL: [phone]        Patient is a 69y old  Male who presents with a chief complaint of Abdominal pain (21 Aug 2019 07:04)      Patient seen and examined at bedside. No chest pain/sob. c/o right side headache and right flank pain    VITALS:  T(F): 97.7 (08-21-19 @ 05:54), Max: 98 (08-20-19 @ 18:39)  HR: 57 (08-21-19 @ 05:54)  BP: 169/76 (08-21-19 @ 05:54)  RR: 18 (08-21-19 @ 05:54)  SpO2: 97% (08-21-19 @ 05:54)  Wt(kg): --        PHYSICAL EXAM:  Constitutional: NAD  Neck: No JVD  Respiratory: CTAB, no wheezes, rales or rhonchi  Cardiovascular: S1, S2, RRR  Gastrointestinal: BS+, soft, NT/ND  Extremities: No peripheral edema    Hospital Medications:   MEDICATIONS  (STANDING):  amLODIPine   Tablet 10 milliGRAM(s) Oral daily  aspirin enteric coated 81 milliGRAM(s) Oral daily  atorvastatin 40 milliGRAM(s) Oral at bedtime  docusate sodium 100 milliGRAM(s) Oral three times a day  enoxaparin Injectable 40 milliGRAM(s) SubCutaneous daily  losartan 50 milliGRAM(s) Oral daily  metoprolol tartrate 25 milliGRAM(s) Oral two times a day  senna 2 Tablet(s) Oral at bedtime      LABS:  08-21    132<L>  |  98  |  24<H>  ----------------------------<  120<H>  4.2   |  21<L>  |  1.24    Ca    10.6<H>      21 Aug 2019 06:00  Phos  4.1     08-21  Mg     1.7     08-21    TPro  9.0<H>  /  Alb      /  TBili      /  DBili      /  AST      /  ALT      /  AlkPhos      08-20    Creatinine Trend: 1.24 <--, 1.27 <--, 1.10 <--, 1.01 <--    Phosphorus Level, Serum: 4.1 mg/dL (08-21 @ 06:00)                              9.1    9.49  )-----------( 231      ( 21 Aug 2019 06:00 )             26.9     Urine Studies:  Urinalysis - [08-18-19 @ 15:45]      Color COLORLESS / Appearance CLEAR / SG 1.005 / pH 7.5      Gluc NEGATIVE / Ketone NEGATIVE  / Bili NEGATIVE / Urobili NORMAL       Blood NEGATIVE / Protein 10 / Leuk Est NEGATIVE / Nitrite NEGATIVE      RBC  / WBC  / Hyaline  / Gran  / Sq Epi  / Non Sq Epi  / Bacteria     Urine Protein 83.9      [08-19-19 @ 09:36]    Iron 52, TIBC 252, %sat --      [08-19-19 @ 07:18]  Ferritin 368.5      [08-19-19 @ 07:18]  PTH 7.42 (Ca --)      [08-18-19 @ 18:52]   --    HCV 0.10, Nonreactive Hepatitis C AB  S/CO Ratio                        Interpretation  < 1.00                                   Non-Reactive  1.00 - 4.99                         Weakly-Reactive  >= 5.00                                Reactive  Non-Reactive: Aperson with a non-reactive HCV antibody  result is considered uninfected.  No further action is  needed unless recent infection is suspected.  In these  cases, consider repeat testing later to detect  seroconversion..  Weakly-Reactive: HCV antibody test is abnormal, HCV RNA  Qualitative test will follow.  Reactive: HCV antibody test is abnormal, HCV RNA  Qualitative test will follow.  Note: HCV antibody testing is performed on the [employer]   system.      [08-20-19 @ 07:13]    Free Light Chains: kappa 214.98, lambda 1.04, ratio = --      [08-20 @ 07:13]    RADIOLOGY & ADDITIONAL STUDIES:

## 2019-08-21 NOTE — DISCHARGE NOTE PROVIDER - NSDCFUSCHEDAPPT_GEN_ALL_CORE_FT
RASHID MEIER ; 08/27/2019 ; [facility] RASHID MEIER ; 08/27/2019 ; [facility] RASHID MEIER ; 08/27/2019 ; [facility] RASHID MEIER ; 09/03/2019 ; SHAILESH Augustine

## 2019-08-21 NOTE — DISCHARGE NOTE PROVIDER - HOSPITAL COURSE
Mr. Rand is a 69 year old Tamaz[place] speaking male with a history of HTN, HLD, CAD s/p YAMILET in 2015 who p/w acute exacerbation of his back and flank pain that has been ongoing for 2-3 months. Brought to the ED by his daughter, patient's pain started several months back in thighs then spread to his abdomen and back. MRI as an outpatient in June showed disc herniation at L4-L5, mild foraminal narrowing at l5-s1. No mention of lytic lesions on MR at that time. Patient was seen by neurosurgeon who recommended further thoracic MRI but patient presented to hospital before that could be done. 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     Besides pain, patient also endorsed some constipation but denied incontinence, saddle anesthesia, gait disturbance, f/c, n/v, chest pain, SOB. CT A/P and CT chest showed diffuse lytic lesions of manubrium, bilateral first rib, T8 lesion causing mass effect on spinal canal. Small bilateral thyroid nodules seen, but no primary malignancy found at this time. Patient's pain was controlled on oxycodone 5mg q6h. Had elevated B2 Microglobulin to 3.3, K/L ratio of 216 (H). Patient underwent biopsy of sacral lesion with Interventional Radiology,  Calcium elevated to 12.1 on admission, downtrended to 10.6. Kidney function wnl. Anemia downtrending to 9.1 on day of discharge from 11.6 on admission. He was seen by our Hematologists on day of discharge and him and daughter will be contacted to set up follow up as an outpatient to discuss next steps. On day of discahrge, patient's pain was controlled, continued to be afebrile, tolerating appropriate PO. Mr. Rand is a 69 year old English speaking male with a history of HTN, HLD, CAD s/p YAMILET in 2015 who p/w acute exacerbation of his back and flank pain that has been ongoing for 2-3 months. Brought to the ED by his daughter, patient's pain started several months back in thighs then spread to his abdomen and back. MRI as an outpatient in June showed disc herniation at L4-L5, mild foraminal narrowing at l5-s1. No mention of lytic lesions on MR at that time. Patient was seen by neurosurgeon who recommended further thoracic MRI but patient presented to hospital before that could be done. 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     Besides pain, patient also endorsed some constipation but denied incontinence, saddle anesthesia, gait disturbance, f/c, n/v, chest pain, SOB. CT A/P and CT chest showed diffuse lytic lesions of manubrium, bilateral first rib, T8 lesion causing mass effect on spinal canal. Small bilateral thyroid nodules seen, but no primary malignancy found at this time. Patient's pain was controlled on oxycodone 5mg q6h. Had elevated B2 Microglobulin to 3.3, K/L ratio of 216 (H). Patient underwent biopsy of sacral lesion with Interventional Radiology,  Calcium elevated to 12.1 on admission, downtrended to 10.6. Kidney function wnl. Anemia downtrending to 9.1 on day of discharge from 11.6 on admission. He was seen by our Hematologists on day of discharge and him and daughter will be contacted to set up follow up as an outpatient to discuss next steps. On day of discahrge, patient's pain was controlled, continued to be afebrile, tolerating appropriate PO.     Patient was treated with RT and had 5 fx which ended 8/29/19.    Hematology rec out patient follow up with Dr HURTADO    Skeletal survey was done and patient found to have multiple lesions - of note L femoral lesio  with possible risk of Fx- Ortho was called and Dr Estrada rec out patient f/u     He was d/c home 8/29/18 with f/u with Heme and Ortho Mr. Rand is a 69 year old Macedonian speaking male with a history of HTN, HLD, CAD s/p YAMILET in 2015 who p/w acute exacerbation of his back and flank pain that has been ongoing for 2-3 months. Brought to the ED by his daughter, patient's pain started several months back in thighs then spread to his abdomen and back. MRI as an outpatient in June showed disc herniation at L4-L5, mild foraminal narrowing at l5-s1. No mention of lytic lesions on MR at that time. Patient was seen by neurosurgeon who recommended further thoracic MRI but patient presented to hospital before that could be done. 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     Besides pain, patient also endorsed some constipation but denied incontinence, saddle anesthesia, gait disturbance, f/c, n/v, chest pain, SOB. CT A/P and CT chest showed diffuse lytic lesions of manubrium, bilateral first rib, T8 lesion causing mass effect on spinal canal. Small bilateral thyroid nodules seen, but no primary malignancy found at this time. Patient's pain was controlled on oxycodone 5mg q6h. Had elevated B2 Microglobulin to 3.3, K/L ratio of 216 (H). Patient underwent biopsy of sacral lesion with Interventional Radiology,  Calcium elevated to 12.1 on admission, downtrended to 10.6. Kidney function wnl. Anemia downtrending to 9.1 on day of discharge from 11.6 on admission. He was seen by our Hematologists on day of discharge and him and daughter will be contacted to set up follow up as an outpatient to discuss next steps. On day of discahrge, patient's pain was controlled, continued to be afebrile, tolerating appropriate PO.     Patient was treated with RT and had 5 fx which ended 8/29/19.     DENTAL_-Recommend extraction of teeth #1, 3, 4, 5, 12, 13, 14, 15, 20, and 21. Remaining teeth can be maintained with good oral hygiene and regular perio maintenance. Stressed to patient and daughter that once patient starts bisphosphonate therapy, he can no longer have any invasive dental procedures such as extractions done. Reviewed risks associated with having invasive dental procedures done after bisphosphonate therapy. Teetjh extractions were done.        Hematology rec out patient follow up with Dr Velásquez at [facility].        Skeletal survey was done and patient found to have multiple lesions - of note L femoral lesio  with possible risk of Fx- Ortho was called and Dr Estrada rec out patient f/u     He was d/c home 8/29/18 with f/u with Heme and Ortho    Daughter Mirlande at [phone] updated

## 2019-08-21 NOTE — DISCHARGE NOTE PROVIDER - PROVIDER TOKENS
FREE:[LAST:[Hema],FIRST:[Sharon],PHONE:[phone]],FAX:[(   )    -],FOLLOWUP:[1 week]] FREE:[LAST:[Hema],FIRST:[Sharon],PHONE:[phone]],FAX:[(   )    -],FOLLOWUP:[1 week]],PROVIDER:[TOKEN:[[id number]],FOLLOWUP:[1 week]],PROVIDER:[TOKEN:[[id number]],FOLLOWUP:[1 week]]

## 2019-08-21 NOTE — DISCHARGE NOTE NURSING/CASE MANAGEMENT/SOCIAL WORK - NSDCPNINST_GEN_ALL_CORE
Mr disla is stable for discharge Home, alert , oriented x 4, partial assistance for walking, he is otherwise self sufficient with his care.  He has a band aid at the Biopsy site; vital signs are stable and discharge Instructions are discussed and given to  him.

## 2019-08-21 NOTE — DISCHARGE NOTE NURSING/CASE MANAGEMENT/SOCIAL WORK - NSDCDPATPORTLINK_GEN_ALL_CORE
You can access the [facility] Portal, offered by [facility], by registering with the following website: [email or web address]

## 2019-08-21 NOTE — DISCHARGE NOTE PROVIDER - NSDCCPCAREPLAN_GEN_ALL_CORE_FT
PRINCIPAL DISCHARGE DIAGNOSIS  Diagnosis: Back pain  Assessment and Plan of Treatment: You presented with back, abdominal and thigh pain that has been going on for the last 2-3 months. On scans done in the ED, you were found to have bone lesions in your ribs and spine. You had a biopsy of one of the lesions done with the Interventional radiology doctors in order to get a better picture of the disease process. You were seen by the hematology doctors and they will be reaching out to your and your daughter to schedule follow up with them. Also, please see your PCP Dr. Garrido in the next week to follow up and share results of your stay here. We will be sending you out with a script for one weeks worth of oxycodone to help you manage until you are able to follow up with your PCP.      SECONDARY DISCHARGE DIAGNOSES  Diagnosis: Hypercalcemia  Assessment and Plan of Treatment: You presented with elevated calcium levels, which can sometimes be seen in patients who have a possible myeloma (an increase in a certain type of cells in the blood). Your calcium levels improved with some IV fluids and then returned back to normal on the day of discharge. Please watch for urinary pain or if you're becoming confused as these can be signs of elevated calcium causing problems in your body.    Diagnosis: Anemia  Assessment and Plan of Treatment: Similar to the high calcium level, anemia can also be present in patients with an elevated level of a certain type of cell in the blood. Anemia refers to a lower level of hemoglobin than your normal amount. Your hemoglobin was 9.1 upon discharge, lower than your initial hemoglobin of 11.6. We did not have any suspicion of bleeding as a cause of the hemoglobin being lower. PRINCIPAL DISCHARGE DIAGNOSIS  Diagnosis: Back pain  Assessment and Plan of Treatment: You presented with back, abdominal and thigh pain that has been going on for the last 2-3 months. On scans done in the ED, you were found to have bone lesions in your ribs and spine. You had a biopsy of one of the lesions done with the Interventional radiology doctors in order to get a better picture of the disease process. You were seen by the hematology doctors and to follow up with Dr HURTADO at [facility]   Also, please see your PCP Dr. Garrido in the next week to follow up and share results of your stay here. We will be sending you out with a script for one weeks worth of oxycodone to help you manage until you are able to follow up with your PCP.      SECONDARY DISCHARGE DIAGNOSES  Diagnosis: Anemia  Assessment and Plan of Treatment: Similar to the high calcium level, anemia can also be present in patients with an elevated level of a certain type of cell in the blood. Anemia refers to a lower level of hemoglobin than your normal amount. Your hemoglobin was 9.1 upon discharge, lower than your initial hemoglobin of 11.6. We did not have any suspicion of bleeding as a cause of the hemoglobin being lower.    Diagnosis: Hypercalcemia  Assessment and Plan of Treatment: You presented with elevated calcium levels, which can sometimes be seen in patients who have a possible myeloma (an increase in a certain type of cells in the blood). Your calcium levels improved with some IV fluids and then returned back to normal on the day of discharge. Please watch for urinary pain or if you're becoming confused as these can be signs of elevated calcium causing problems in your body.

## 2019-08-21 NOTE — PROGRESS NOTE ADULT - SUBJECTIVE AND OBJECTIVE BOX
Subjective:    Mr. Rand is a 70 yo M former smoker with a PMHx of HTN, HLD, CAD s/p 1 YAMILET who p/w acute exacerbation over the past 3 days of his ongoing bilateral flank pain x 2 months found to have diffuse osteolytic lesions on CT chest, A/P concerning for diffuse metastatic disease, weight loss, primary cancer not found at this time. Patient went for IR biopsy of sacral lesion yesterday. No acute events overnight.     Medications:  acetaminophen   Tablet .. 650 milliGRAM(s) Oral every 6 hours PRN  amLODIPine   Tablet 10 milliGRAM(s) Oral daily  aspirin enteric coated 81 milliGRAM(s) Oral daily  atorvastatin 40 milliGRAM(s) Oral at bedtime  docusate sodium 100 milliGRAM(s) Oral three times a day  enoxaparin Injectable 40 milliGRAM(s) SubCutaneous daily  lidocaine   Patch 1 Patch Transdermal daily PRN  losartan 50 milliGRAM(s) Oral daily  metoprolol tartrate 25 milliGRAM(s) Oral two times a day  oxyCODONE    IR 5 milliGRAM(s) Oral every 6 hours PRN  senna 2 Tablet(s) Oral at bedtime      PHYSICAL EXAM:  Vital Signs Last 24 Hrs  T(C): 36.5 (21 Aug 2019 05:54), Max: 36.7 (20 Aug 2019 18:39)  T(F): 97.7 (21 Aug 2019 05:54), Max: 98 (20 Aug 2019 18:39)  HR: 57 (21 Aug 2019 05:54) (52 - 61)  BP: 169/76 (21 Aug 2019 05:54) (100/53 - 169/76)  BP(mean): --  RR: 18 (21 Aug 2019 05:54) (16 - 18)  SpO2: 97% (21 Aug 2019 05:54) (97% - 99%)  Daily     Daily   I&O's Detail      General: A/ox 3, No acute Distress  Neck: Supple, NO JVD  Cardiac: S1 S2, No M/R/G  Pulmonary: CTAB, Breathing unlabored, No Rhonchi/Rales/Wheezing  Abdomen: Soft, Non -tender, +BS   Extremities: No Rashes, No edema  Neuro: A/o x 3, No focal deficits  Psch: normal mood , normal affect    LABS:                          9.1    9.49  )-----------( 231      ( 21 Aug 2019 06:00 )             26.9     08-20    136  |  100  |  19  ----------------------------<  98  4.0   |  25  |  1.27    Ca    11.1<H>      20 Aug 2019 07:13  Phos  4.2     08-20  Mg     1.7     08-20    TPro  9.0<H>  /  Alb  x   /  TBili  x   /  DBili  x   /  AST  x   /  ALT  x   /  AlkPhos  x   08-20    PT/INR - ( 20 Aug 2019 07:13 )   PT: 11.1 SEC;   INR: 0.97          PTT - ( 20 Aug 2019 07:13 )  PTT:32.4 SEC

## 2019-08-21 NOTE — CONSULT NOTE ADULT - ASSESSMENT
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 Now patient with anemia and hypercalcemia.   - Elevated K/L light chain 205.7 and diffuse lytic lesions found on CT abd/pelvis  - Pt underwent sacral lytic lesion:        - Monotypic plasma cells consistent with plasma cell neoplasm.            - Two aberrant monoclonal plasma cell population detected:            - Population #1 (16.5% of total analyzed cells, 68.6% of plasma cell population)  positive for cytoplasmic kappa, CD38 dimmer, CD45 dimmer, CD56, ; negative for , CD19,  CD20.              - Population # 2 (7.6% of total analyzed cells, 31.4% of plasma cell population)  positive for cytoplasmic kappa (brighter), , CD38 brighter, CD45 dim, CD56 (brighter), ;  negative for CD19, CD20.      Patient will be referred to a hematologist at [facility]. Will contact daughter and update her.     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 Now patient with anemia and hypercalcemia.   - Elevated K/L light chain 205.7 and diffuse lytic lesions found on CT abd/pelvis  - Pt underwent sacral lytic lesion:        - Monotypic plasma cells consistent with plasma cell neoplasm.            - Two aberrant monoclonal plasma cell population detected:            - Population #1 (16.5% of total analyzed cells, 68.6% of plasma cell population)  positive for cytoplasmic kappa, CD38 dimmer, CD45 dimmer, CD56, ; negative for , CD19,  CD20.              - Population # 2 (7.6% of total analyzed cells, 31.4% of plasma cell population)  positive for cytoplasmic kappa (brighter), , CD38 brighter, CD45 dim, CD56 (brighter), ;  negative for CD19, CD20.    - Based on these results, and CT scan findings of T8 vertebral body causing mass effect, pt needs thoracic MRI to r/o compression   - Will perform bone marrow bx tomorrow 8/22  - Will need inpatient treatment.         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 Now patient with anemia and hypercalcemia.   - Elevated K/L light chain 205.7 and diffuse lytic lesions found on CT abd/pelvis  - Pt underwent sacral lytic lesion:        - Monotypic plasma cells consistent with plasma cell neoplasm.            - Two aberrant monoclonal plasma cell population detected:            - Population #1 (16.5% of total analyzed cells, 68.6% of plasma cell population)  positive for cytoplasmic kappa, CD38 dimmer, CD45 dimmer, CD56, ; negative for , CD19,  CD20.              - Population # 2 (7.6% of total analyzed cells, 31.4% of plasma cell population)  positive for cytoplasmic kappa (brighter), , CD38 brighter, CD45 dim, CD56 (brighter), ;  negative for CD19, CD20.    - Based on these results, and CT scan findings of T8 vertebral body causing mass effect, pt needs thoracic MRI to r/o compression. If positive, please obtain rad onc consult    - Will perform bone marrow bx tomorrow 8/22  - Will need inpatient treatment. Please start Dexamethasone 20mg IV daily.         Domi Davila MD PGY-4  Hematology Fellow  [phone]

## 2019-08-21 NOTE — CONSULT NOTE ADULT - ATTENDING COMMENTS
Agree with above. Pt examined at bedside. He prefers that all communication to be had with his daughter. He does not want to be part of decision making process.  69 M w/ h/o MGUS now a/w diffuse lytic lesions, back pain, found to have an expansile lesion at T8 hypercalcemia. Was planned for discharge today.     Recommend:   1. cancel discharge.   2. Would obtain MRI T spine to eval for cord compression at T8.  3. rad onc consult   4. 1 dose of Decadron 20 mg x 1 today, Will help with pain, if any early cord compression, and is part of the tx for MM  5. BM bx to perform tomorrow   6. Repeat MM tests: SPEP, MICAH serum and urine, FLC, serum Ig, UPEP  7. Likely will need to start in patient treatment after above work up returns. Velcade/Dex  8. Discussed extensively with daughter by phone. Pt defers to her for all decision making.

## 2019-08-21 NOTE — PROGRESS NOTE ADULT - PROBLEM SELECTOR PLAN 7
CAD s/p 1 YAMILET (performed in [facility] in 2015)  -high intensity statin  -ASA 81mg qd  -Metoprolol 25mg bid

## 2019-08-21 NOTE — PROGRESS NOTE ADULT - ASSESSMENT
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 Anemia at that time c/w NANDINI.  pending vit d 1,25, vit d 25  pth related peptide   completed NS @ 150cc/hr x 10 hrs.  calcium improving   monitor bmp    HTN  uncontrolled  on norvasc 10, metoprolol 25 bid and losartan 50  consider up titrate losartan    Proteinuria  mild  check urine p/c ratio  on losartan    Hyponatremia  hx of siadh  check urine osmo, urine na  free water restriction <1.2L/day  monitor

## 2019-08-21 NOTE — PROGRESS NOTE ADULT - PROBLEM SELECTOR PLAN 1
Diffuse osteolytic lesions concerning for multiple myeloma vs. waldenstrom's macroglobulinemia. Also concern for metastasis though primary cancer not yet known.  -IR guided biopsy yesterday of sacral lesion, no complications  -Elevated B2 Microglobulin to 3.3, K/L ratio elevated. raises concern for MM.   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 Anemia at that time c/w NANDINI

## 2019-08-21 NOTE — PROGRESS NOTE ADULT - ATTENDING COMMENTS
Pt back pain is controlled today. Workup is consistent with multiple myeloma and plasma cell neoplasm. Appreciate Hematology consult. Will arrange outpatient follow up to discuss treatment options. Time planning discharge 35 minutes.

## 2019-08-21 NOTE — DISCHARGE NOTE PROVIDER - CARE PROVIDER_API CALL
Sharon Garrido  Phone: [phone]  Fax: (   )    -  Follow Up Time: 1 week Sharon Garrido  Phone: [phone]  Fax: (   )    -  Follow Up Time: 1 week    Rita Romero (DO)  Internal Medicine  Phone: [phone]  Follow Up Time: 1 week    Arnoldo Estrada)  [facility]  [address] [place] [address]  Phone: [phone]  Fax: [phone]  Follow Up Time: 1 week

## 2019-08-21 NOTE — PROGRESS NOTE ADULT - ASSESSMENT
68 yo M former smoker with a PMHx of HTN, HLD, CAD s/p 1 YAMILET who p/w acute exacerbation over the past 3 days of his ongoing bilateral flank pain x 2 months found to have diffuse osteolytic lesions on CT chest, A/P concerning for diffuse metastatic disease, weight loss, primary cancer not found at this time. Patient also with hypercalcemia w/o clear evidence of primary malignancy. In combination, patient's Hypercalcemia, elevated protein, elevated B2M, anemia concerning for MM vs. Waldenstrom's macroglobulinemia given + elham test. 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

## 2019-08-21 NOTE — PROGRESS NOTE ADULT - PROBLEM SELECTOR PLAN 2
Hypercalcemia 2/2 diffuse osteolytic lesions, down from 12.1-->11.4  -trend Ca  -Encourage PO Hypercalcemia 2/2 diffuse osteolytic lesions, down from 12.1-->11.4-->10.6  -trend Ca  -Encourage PO

## 2019-08-22 ENCOUNTER — RESULT REVIEW (OUTPATIENT)
Age: 69
End: 2019-08-22

## 2019-08-22 LAB
ANION GAP SERPL CALC-SCNC: 13 MMO/L — SIGNIFICANT CHANGE UP (ref 7–14)
BUN SERPL-MCNC: 20 MG/DL — SIGNIFICANT CHANGE UP (ref 7–23)
CALCIUM SERPL-MCNC: 10.3 MG/DL — SIGNIFICANT CHANGE UP (ref 8.4–10.5)
CHLORIDE SERPL-SCNC: 99 MMOL/L — SIGNIFICANT CHANGE UP (ref 98–107)
CO2 SERPL-SCNC: 22 MMOL/L — SIGNIFICANT CHANGE UP (ref 22–31)
CREAT SERPL-MCNC: 1.02 MG/DL — SIGNIFICANT CHANGE UP (ref 0.5–1.3)
GAS PNL BLDMV: SIGNIFICANT CHANGE UP
GAS PNL BLDMV: SIGNIFICANT CHANGE UP
GLUCOSE SERPL-MCNC: 120 MG/DL — HIGH (ref 70–99)
HCT VFR BLD CALC: 31.5 % — LOW (ref 39–50)
HGB BLD-MCNC: 10.3 G/DL — LOW (ref 13–17)
IGA FLD-MCNC: 80 MG/DL — SIGNIFICANT CHANGE UP (ref 70–400)
IGG FLD-MCNC: 2465 MG/DL — HIGH (ref 700–1600)
IGM SERPL-MCNC: 12 MG/DL — LOW (ref 40–230)
MAGNESIUM SERPL-MCNC: 1.8 MG/DL — SIGNIFICANT CHANGE UP (ref 1.6–2.6)
MCHC RBC-ENTMCNC: 29.2 PG — SIGNIFICANT CHANGE UP (ref 27–34)
MCHC RBC-ENTMCNC: 32.7 % — SIGNIFICANT CHANGE UP (ref 32–36)
MCV RBC AUTO: 89.2 FL — SIGNIFICANT CHANGE UP (ref 80–100)
NON-GYNECOLOGICAL CYTOLOGY STUDY: SIGNIFICANT CHANGE UP
NRBC # FLD: 0 K/UL — SIGNIFICANT CHANGE UP (ref 0–0)
OSMOLALITY UR: 552 MOSMO/KG — SIGNIFICANT CHANGE UP (ref 50–1200)
PHOSPHATE SERPL-MCNC: 4.7 MG/DL — HIGH (ref 2.5–4.5)
PLATELET # BLD AUTO: 247 K/UL — SIGNIFICANT CHANGE UP (ref 150–400)
PMV BLD: 11.3 FL — SIGNIFICANT CHANGE UP (ref 7–13)
POTASSIUM SERPL-MCNC: 4.4 MMOL/L — SIGNIFICANT CHANGE UP (ref 3.5–5.3)
POTASSIUM SERPL-SCNC: 4.4 MMOL/L — SIGNIFICANT CHANGE UP (ref 3.5–5.3)
PROT SERPL-MCNC: 8.9 G/DL — HIGH (ref 6–8.3)
PROT UR-MCNC: 19.5 MG/DL — SIGNIFICANT CHANGE UP
RBC # BLD: 3.53 M/UL — LOW (ref 4.2–5.8)
RBC # FLD: 12.3 % — SIGNIFICANT CHANGE UP (ref 10.3–14.5)
SODIUM SERPL-SCNC: 134 MMOL/L — LOW (ref 135–145)
SODIUM UR-SCNC: < 20 MMOL/L — SIGNIFICANT CHANGE UP
WBC # BLD: 9.63 K/UL — SIGNIFICANT CHANGE UP (ref 3.8–10.5)
WBC # FLD AUTO: 9.63 K/UL — SIGNIFICANT CHANGE UP (ref 3.8–10.5)

## 2019-08-22 PROCEDURE — 86335 IMMUNFIX E-PHORSIS/URINE/CSF: CPT | Mod: 26

## 2019-08-22 PROCEDURE — 77290 THER RAD SIMULAJ FIELD CPLX: CPT | Mod: 26

## 2019-08-22 PROCEDURE — 99232 SBSQ HOSP IP/OBS MODERATE 35: CPT

## 2019-08-22 PROCEDURE — 99233 SBSQ HOSP IP/OBS HIGH 50: CPT | Mod: GC

## 2019-08-22 PROCEDURE — 88189 FLOWCYTOMETRY/READ 16 & >: CPT

## 2019-08-22 PROCEDURE — 77334 RADIATION TREATMENT AID(S): CPT | Mod: 26

## 2019-08-22 PROCEDURE — 99222 1ST HOSP IP/OBS MODERATE 55: CPT | Mod: 25

## 2019-08-22 PROCEDURE — 88313 SPECIAL STAINS GROUP 2: CPT | Mod: 26

## 2019-08-22 PROCEDURE — 72157 MRI CHEST SPINE W/O & W/DYE: CPT | Mod: 26

## 2019-08-22 PROCEDURE — 77262 THER RADIOLOGY TX PLNG INTRM: CPT

## 2019-08-22 PROCEDURE — 88305 TISSUE EXAM BY PATHOLOGIST: CPT | Mod: 26

## 2019-08-22 PROCEDURE — 77333 RADIATION TREATMENT AID(S): CPT | Mod: 26,59

## 2019-08-22 PROCEDURE — 88342 IMHCHEM/IMCYTCHM 1ST ANTB: CPT | Mod: 26,59

## 2019-08-22 PROCEDURE — 86334 IMMUNOFIX E-PHORESIS SERUM: CPT | Mod: 26,59

## 2019-08-22 PROCEDURE — 77307 TELETHX ISODOSE PLAN CPLX: CPT | Mod: 26

## 2019-08-22 PROCEDURE — 88360 TUMOR IMMUNOHISTOCHEM/MANUAL: CPT | Mod: 26

## 2019-08-22 PROCEDURE — 85097 BONE MARROW INTERPRETATION: CPT

## 2019-08-22 RX ADMIN — OXYCODONE HYDROCHLORIDE 5 MILLIGRAM(S): 5 TABLET ORAL at 20:05

## 2019-08-22 RX ADMIN — ATORVASTATIN CALCIUM 40 MILLIGRAM(S): 80 TABLET, FILM COATED ORAL at 23:26

## 2019-08-22 RX ADMIN — Medication 81 MILLIGRAM(S): at 11:39

## 2019-08-22 RX ADMIN — Medication 25 MILLIGRAM(S): at 19:15

## 2019-08-22 RX ADMIN — OXYCODONE HYDROCHLORIDE 5 MILLIGRAM(S): 5 TABLET ORAL at 11:43

## 2019-08-22 RX ADMIN — LOSARTAN POTASSIUM 50 MILLIGRAM(S): 100 TABLET, FILM COATED ORAL at 06:12

## 2019-08-22 RX ADMIN — Medication 100 MILLIGRAM(S): at 06:12

## 2019-08-22 RX ADMIN — OXYCODONE HYDROCHLORIDE 5 MILLIGRAM(S): 5 TABLET ORAL at 12:13

## 2019-08-22 RX ADMIN — ENOXAPARIN SODIUM 40 MILLIGRAM(S): 100 INJECTION SUBCUTANEOUS at 11:39

## 2019-08-22 RX ADMIN — AMLODIPINE BESYLATE 10 MILLIGRAM(S): 2.5 TABLET ORAL at 06:12

## 2019-08-22 RX ADMIN — OXYCODONE HYDROCHLORIDE 5 MILLIGRAM(S): 5 TABLET ORAL at 19:19

## 2019-08-22 NOTE — PROGRESS NOTE ADULT - ASSESSMENT
70 yo M former smoker with a PMHx of HTN, HLD, CAD s/p 1 YAMILET who p/w acute exacerbation over the past 3 days of his ongoing bilateral flank pain x 2 months found to have diffuse osteolytic lesions on CT chest, A/P concerning for diffuse metastatic disease, weight loss, primary cancer not found at this time. Patient also with hypercalcemia w/o clear evidence of primary malignancy, initial labs c/w MM. BMB with heme onc today. 68 yo M former smoker with a PMHx of HTN, HLD, CAD s/p 1 YAMILET who p/w acute exacerbation over the past 3 days of his ongoing bilateral flank pain x 2 months found to have diffuse osteolytic lesions on CT chest, A/P concerning for diffuse metastatic disease, weight loss, primary cancer not found at this time. Patient also with hypercalcemia w/o clear evidence of primary malignancy, initial labs c/w MM. BMB with heme onc today at 1pm.

## 2019-08-22 NOTE — PROGRESS NOTE ADULT - PROBLEM SELECTOR PLAN 2
Hypercalcemia 2/2 diffuse osteolytic lesions, down from 12.1-->11.4-->10.6  -trend Ca  -Encourage PO Hypercalcemia 2/2 diffuse osteolytic lesions, down from 12.1-->11.4-->10.6-->10.3  -trend Ca  -Encourage PO

## 2019-08-22 NOTE — PROCEDURAL SAFETY CHECKLIST WITH OR WITHOUT SEDATION - NSPOSTCOMMENTFT_GEN_ALL_CORE
patient tolerated procedure well. no sxs of acute bleeding noted. patient and family provided with instruction, no Asprin, no Tylenol or Motrin  , lay flat 30min.

## 2019-08-22 NOTE — PROGRESS NOTE ADULT - PROBLEM SELECTOR PLAN 4
Normocytic anemia in setting of hypercalcemia and bone lesions concerning for MM. Hgb 10-->9.1 this AM  -Ramírez positive concerning for AIHA, in setting of ?MM vs. Waldenstrom's macroglobulinemia  -LDH and Haptoglobin normal, reassuring against hemolysis  -trend cbc Normocytic anemia in setting of hypercalcemia and bone lesions concerning for MM. Hgb uptrending to 10.3.   -LDH and Haptoglobin normal, reassuring against hemolysis  -trend cbc daily

## 2019-08-22 NOTE — PROGRESS NOTE ADULT - PROBLEM SELECTOR PLAN 8
DVT PPx: subq enoxaparin.   Diet: DASH  Dispo: Home today with daughter DVT PPx: subq enoxaparin.   Diet: DASH  Dispo: Likely home, no PT needs

## 2019-08-22 NOTE — PROGRESS NOTE ADULT - PROBLEM SELECTOR PLAN 7
CAD s/p 1 YAMILET (performed in [facility] in 2015)  -high intensity statin  -ASA 81mg qd  -Metoprolol 25mg bid

## 2019-08-22 NOTE — PROGRESS NOTE ADULT - ATTENDING COMMENTS
Pt feels well with controlled pain today. MRI thoracic spine does not explicitly report cord compression but does note epidural extension of masses at C7 and T8. Radiation Medicine has been consulted to simulate pt and consider palliative RT options. Anticipate bone marrow biopsy this afternoon. Appreciate Hematology consult and will discuss plans for chemotherapy.

## 2019-08-22 NOTE — PROGRESS NOTE ADULT - SUBJECTIVE AND OBJECTIVE BOX
Subjective:    Mr. Rand is a 70 yo M former smoker with a PMHx of HTN, HLD, CAD s/p 1 YAMILET who p/w acute exacerbation over the past 3 days of his ongoing bilateral flank pain x 2 months found to have diffuse osteolytic lesions on CT chest, A/P concerning for diffuse metastatic disease, weight loss, primary cancer not found at this time. No acute events overnight. Yesterday was seen by [facility] Hematology team--recommended continued inptatient stay, beginning steroids, MRI thoracic spine, inpatient BMB.     This morning patient ***    Medications:  acetaminophen   Tablet .. 650 milliGRAM(s) Oral every 6 hours PRN  amLODIPine   Tablet 10 milliGRAM(s) Oral daily  aspirin enteric coated 81 milliGRAM(s) Oral daily  atorvastatin 40 milliGRAM(s) Oral at bedtime  dexamethasone  IVPB 20 milliGRAM(s) IV Intermittent daily  docusate sodium 100 milliGRAM(s) Oral three times a day  enoxaparin Injectable 40 milliGRAM(s) SubCutaneous daily  lidocaine   Patch 1 Patch Transdermal daily PRN  losartan 50 milliGRAM(s) Oral daily  metoprolol tartrate 25 milliGRAM(s) Oral two times a day  oxyCODONE    IR 5 milliGRAM(s) Oral every 6 hours PRN  senna 2 Tablet(s) Oral at bedtime      PHYSICAL EXAM:  Vital Signs Last 24 Hrs  T(C): 36.2 (22 Aug 2019 06:08), Max: 36.8 (21 Aug 2019 22:06)  T(F): 97.2 (22 Aug 2019 06:08), Max: 98.2 (21 Aug 2019 22:06)  HR: 50 (22 Aug 2019 06:08) (50 - 59)  BP: 151/55 (22 Aug 2019 06:08) (115/52 - 151/55)  BP(mean): --  RR: 18 (22 Aug 2019 06:08) (18 - 18)  SpO2: 98% (22 Aug 2019 06:08) (97% - 99%)  Daily     Daily   I&O's Detail    21 Aug 2019 07:01  -  22 Aug 2019 07:00  --------------------------------------------------------  IN:    IV PiggyBack: 50 mL    Oral Fluid: 650 mL  Total IN: 700 mL    OUT:  Total OUT: 0 mL    Total NET: 700 mL      General: A/ox 3, No acute Distress  Neck: Supple, NO JVD  Cardiac: S1 S2, No M/R/G  Pulmonary: CTAB, Breathing unlabored, No Rhonchi/Rales/Wheezing  Abdomen: Soft, Non -tender, +BS   Extremities: No Rashes, No edema  Neuro: A/o x 3, No focal deficits  Psch: normal mood , normal affect    LABS:                          9.1    9.49  )-----------( 231      ( 21 Aug 2019 06:00 )             26.9     08-21    132<L>  |  98  |  24<H>  ----------------------------<  120<H>  4.2   |  21<L>  |  1.24    Ca    10.6<H>      21 Aug 2019 06:00  Phos  4.1     08-21  Mg     1.7     08-21    TPro  8.9<H>  /  Alb  x   /  TBili  x   /  DBili  x   /  AST  x   /  ALT  x   /  AlkPhos  x   08-22 Subjective:    Mr. Rand is a 70 yo M former smoker with a PMHx of HTN, HLD, CAD s/p 1 YAMILET who p/w acute exacerbation over the past 3 days of his ongoing bilateral flank pain x 2 months found to have diffuse osteolytic lesions on CT chest, A/P concerning for diffuse metastatic disease, weight loss, primary cancer not found at this time. No acute events overnight. Yesterday was seen by [facility] Hematology team--recommended continued inptatient stay, beginning steroids, MRI thoracic spine, inpatient BMB.     This morning patient continues to endorse pain to palpation on left scalp. Otherwise has minimal back pain at this time. Tolerating appropriate PO. Denies CP, SOB, f/c, n/v    Medications:  acetaminophen   Tablet .. 650 milliGRAM(s) Oral every 6 hours PRN  amLODIPine   Tablet 10 milliGRAM(s) Oral daily  aspirin enteric coated 81 milliGRAM(s) Oral daily  atorvastatin 40 milliGRAM(s) Oral at bedtime  dexamethasone  IVPB 20 milliGRAM(s) IV Intermittent daily  docusate sodium 100 milliGRAM(s) Oral three times a day  enoxaparin Injectable 40 milliGRAM(s) SubCutaneous daily  lidocaine   Patch 1 Patch Transdermal daily PRN  losartan 50 milliGRAM(s) Oral daily  metoprolol tartrate 25 milliGRAM(s) Oral two times a day  oxyCODONE    IR 5 milliGRAM(s) Oral every 6 hours PRN  senna 2 Tablet(s) Oral at bedtime      PHYSICAL EXAM:  Vital Signs Last 24 Hrs  T(C): 36.2 (22 Aug 2019 06:08), Max: 36.8 (21 Aug 2019 22:06)  T(F): 97.2 (22 Aug 2019 06:08), Max: 98.2 (21 Aug 2019 22:06)  HR: 50 (22 Aug 2019 06:08) (50 - 59)  BP: 151/55 (22 Aug 2019 06:08) (115/52 - 151/55)  BP(mean): --  RR: 18 (22 Aug 2019 06:08) (18 - 18)  SpO2: 98% (22 Aug 2019 06:08) (97% - 99%)  Daily     Daily   I&O's Detail    21 Aug 2019 07:01  -  22 Aug 2019 07:00  --------------------------------------------------------  IN:    IV PiggyBack: 50 mL    Oral Fluid: 650 mL  Total IN: 700 mL    OUT:  Total OUT: 0 mL    Total NET: 700 mL      General: A/ox 3, No acute Distress  Neck: Supple, NO JVD  Cardiac: S1 S2, No M/R/G  Pulmonary: CTAB, Breathing unlabored, No Rhonchi/Rales/Wheezing  Abdomen: Soft, Non -tender, +BS   Extremities: No Rashes, No edema. Pain to palpation left upper scalp  Neuro: A/o x 3, No focal deficits  Psch: normal mood , normal affect    LABS:  CBC Full  -  ( 22 Aug 2019 09:00 )  WBC Count : 9.63 K/uL  RBC Count : 3.53 M/uL  Hemoglobin : 10.3 g/dL  Hematocrit : 31.5 %  Platelet Count - Automated : 247 K/uL  Mean Cell Volume : 89.2 fL  Mean Cell Hemoglobin : 29.2 pg  Mean Cell Hemoglobin Concentration : 32.7 %    08-22    134<L>  |  99  |  20  ----------------------------<  120<H>  4.4   |  22  |  1.02    Ca    10.3      22 Aug 2019 09:00  Phos  4.7     08-22  Mg     1.8     08-22    TPro  8.9<H>  /  Alb  x   /  TBili  x   /  DBili  x   /  AST  x   /  ALT  x   /  AlkPhos  x   08-22    LIVER FUNCTIONS - ( 22 Aug 2019 04:20 )  Alb: x     / Pro: 8.9 g/dL / ALK PHOS: x     / ALT: x     / AST: x     / GGT: x            IMAGING:  MRI Thoracic Spine:  IMPRESSION:    Widespread lytic lesions involve the thoracic spine most likely   reflecting bony metastatic disease or multiple myeloma. The largest   lesions are located at the T8 and C7 levels, with associated epidural   tumor extension. The lesions are grossly stable compared to the recent   chest CT study. Consider follow-up total spine MRI with and without   contrast for further evaluation if clinically warranted.

## 2019-08-22 NOTE — CONSULT NOTE ADULT - SUBJECTIVE AND OBJECTIVE BOX
HPI:  68 yo M former smoker with a PMHx of HTN, HLD, CAD s/p 1 DES 2015 who p/w acute exacerbation of his back & flank pain which has been ongoing for 2-3 months. The pain can radiate diffusely into the abdomen and is worse on movement. He has also had intermittent constipation, decreased appetite and an unintentional 6lb weight loss over 3 weeks. He notes that it started with L leg pain which migrated up to the bilateral flanks and then ascended to his spine about assisted up with the pain stopped.  As an outpatient, he had an ultrasound showing renal cyst and in June 2019 he had a MR showing moderate central canal stenosis and R disc herniation at the L4-L5 level and mild formainal narrowing @ L4-L5 & L5-S1. No mention lytic lesions on MR. He also noticed a mid sternal non-painful mass for the past 2-3 weeks. Numbness and tingling in his L thumb and 2nd finger. Daughter states he went to a hematologist recently and had a marrow biopsy, but they do not know why. Denies fever, sweats, and chills. Alecia CP, SOB, and VEGA. No gait disturbance. No incontinence of stool or urine. No pelvic numbness, limb weakness. As per my discussion today with Dr Lynn, resident , bone marrow biopsy is pending at 1 PM today.    EXAM:  MR SPINE THORACIC      PROCEDURE DATE:  Aug 22 2019       Widespread lytic lesions are noted throughout the thoracic spine, most   likely representing either bony metastatic disease or multiple myeloma.    A large lesion is again noted replacing the majority of the T8 vertebral   body, with involvement of the left pedicle and left posterior elements,   corresponding to the large lytic lesion noted on the prior chest CT   study. Epidural tumor extension is present with resulting moderate   central spinal canal stenosis. Paraspinal extension of tumor is also   noted. Slight loss of height of the T8 vertebral body is noted, stable.    A prominent lytic lesion is noted involving the C7 vertebral body and   right pedicle with likely some epidural tumor extension, with moderate   central canal stenosis.    Rib lesions are partially visualized.    An intermittent syrinx involves the thoracic spinal cord. Evaluation for   the presence or absence of underlying spinal cord lesion is limited   without intravenous contrast. A follow-up contrast-enhanced exam is   recommended for further workup, provided there are no contrast   contraindications.    Multiple large renal cysts are partially visualized.    IMPRESSION:    Widespread lytic lesions involve the thoracic spine most likely   reflecting bony metastatic disease or multiple myeloma. The largest   lesions are located at the T8 and C7 levels, with associated epidural   tumor extension. The lesions are grossly stable compared to the recent   chest CT study. Consider follow-up total spine MRI with and without   contrast for further evaluation if clinically warranted.    ED Course:  T 97.5, HR 60, /90, RR 16, SpO2 100% RA  S/p 5mg diazepam PO x1, lido patch, 4mg morphine IV x1, 1L NS x1. Hypercalcemia 12.1, PTH 7.42, serum protein 10.2, hgb 11.6. CT A/P revealing diffuse lytic expansile osseous lesions including a lesion at T8 causing mass effect on the spinal canal. Small bl thyroid nodules (18 Aug 2019 21:19)          No Known Allergies    Intolerances        ROS: [  ] Fever  [  ] Chills  [  ]Chest Pain [  ] SOB  [  ]Cough [  ] N/V  [  ] Diarrhea [  ]Constipation [  ]Other ROS:  [  ] ROS otherwise negative    PAST MEDICAL & SURGICAL HISTORY:  Coronary artery disease  HLD (hyperlipidemia)  HTN (hypertension)  No significant past surgical history      FAMILY HISTORY:  Maternal family history of hypertension: Mother      MEDICATIONS  (STANDING):  amLODIPine   Tablet 10 milliGRAM(s) Oral daily  aspirin enteric coated 81 milliGRAM(s) Oral daily  atorvastatin 40 milliGRAM(s) Oral at bedtime  dexamethasone  IVPB 20 milliGRAM(s) IV Intermittent daily  docusate sodium 100 milliGRAM(s) Oral three times a day  enoxaparin Injectable 40 milliGRAM(s) SubCutaneous daily  losartan 50 milliGRAM(s) Oral daily  metoprolol tartrate 25 milliGRAM(s) Oral two times a day  senna 2 Tablet(s) Oral at bedtime    MEDICATIONS  (PRN):  acetaminophen   Tablet .. 650 milliGRAM(s) Oral every 6 hours PRN Mild Pain (1 - 3)  lidocaine   Patch 1 Patch Transdermal daily PRN Pain  oxyCODONE    IR 5 milliGRAM(s) Oral every 6 hours PRN moderate to severe pain      PHYSICAL EXAM  Vital Signs Last 24 Hrs  T(C): 36.2 (22 Aug 2019 06:08), Max: 36.8 (21 Aug 2019 22:06)  T(F): 97.2 (22 Aug 2019 06:08), Max: 98.2 (21 Aug 2019 22:06)  HR: 50 (22 Aug 2019 06:08) (50 - 59)  BP: 151/55 (22 Aug 2019 06:08) (115/52 - 151/55)  BP(mean): --  RR: 18 (22 Aug 2019 06:08) (18 - 18)  SpO2: 98% (22 Aug 2019 06:08) (97% - 99%)    General: Well nourished, well developed, no acute distress  HEENT: NC/AT; EOMI, PERRL, sclera nonicteric; external ears normal; no rhinorrhea or epistaxis; mucous membranes moist; oropharynx clear and without erythema  CV: NR, RR; no appreciable r/m/g  Lungs: CTAB, no increased work of breathing  Abdomen: Bowel sounds present; soft, NTND  MSK: Vertebral spine non-tender to palpation  Neuro: AAOx3; cranial nerves II-XII intact; strength 5/5 in upper and lower extremities; sensation to light touch in tact bilaterally.  Psych: Full affect; mood congruent  Skin: no visible rashes on limited examination    IMAGING/LABS/PATHOLOGY: I have personally reviewed the relevant labs, pathology, and imaging as noted in the HPI.  In addition,                          10.3   9.63  )-----------( 247      ( 22 Aug 2019 09:00 )             31.5     08-22    134<L>  |  99  |  20  ----------------------------<  120<H>  4.4   |  22  |  1.02    Ca    10.3      22 Aug 2019 09:00  Phos  4.7     08-22  Mg     1.8     08-22    TPro  8.9<H>  /  Alb  x   /  TBili  x   /  DBili  x   /  AST  x   /  ALT  x   /  AlkPhos  x   08-22          ASSESSMENT/PLAN    RASHID MEIER is a 69y man with     We discussed the use of palliative radiation in this setting, namely to improve quality of life through the reduction of symptoms.  We talked about the risks, benefits, acute and long term side effects, as well as expected treatment outcomes.  He/She was given the opportunity to ask questions, which were answered to his/her apparent satisfaction.  He/She provided written consent to proceed with radiation therapy. We will arrange for inpatient/outpatient treatment. HPI:  68 yo M former smoker with a PMHx of HTN, HLD, CAD s/p 1 DES 2015 who p/w acute exacerbation of his back & flank pain which has been ongoing for 2-3 months. The pain can radiate diffusely into the abdomen and is worse on movement. He has also had intermittent constipation, decreased appetite and an unintentional 6lb weight loss over 3 weeks. He notes that it started with L leg pain which migrated up to the bilateral flanks and then ascended to his spine about correction up with the pain stopped.  As an outpatient, he had an ultrasound showing renal cyst and in June 2019 he had a MR showing moderate central canal stenosis and R disc herniation at the L4-L5 level and mild formainal narrowing @ L4-L5 & L5-S1. No mention lytic lesions on MR. He also noticed a mid sternal non-painful mass for the past 2-3 weeks. Numbness and tingling in his L thumb and 2nd finger. Daughter states he went to a hematologist recently and had a marrow biopsy, but they do not know why. Denies fever, sweats, and chills. Alecia CP, SOB, and VEGA. No gait disturbance. Denies incontinence of stool or urine. Verified by his daughter Mirlande, who was present throughout the entire interview/exam. No pelvic numbness, limb weakness. Mid-back pain is 3-4. As per my discussion today with Dr Lynn, resident , bone marrow biopsy is pending at 1 PM today.    EXAM:  MR SPINE THORACIC      PROCEDURE DATE:  Aug 22 2019       Widespread lytic lesions are noted throughout the thoracic spine, most   likely representing either bony metastatic disease or multiple myeloma.    A large lesion is again noted replacing the majority of the T8 vertebral   body, with involvement of the left pedicle and left posterior elements,   corresponding to the large lytic lesion noted on the prior chest CT   study. Epidural tumor extension is present with resulting moderate   central spinal canal stenosis. Paraspinal extension of tumor is also   noted. Slight loss of height of the T8 vertebral body is noted, stable.    A prominent lytic lesion is noted involving the C7 vertebral body and   right pedicle with likely some epidural tumor extension, with moderate   central canal stenosis.    Rib lesions are partially visualized.    An intermittent syrinx involves the thoracic spinal cord. Evaluation for   the presence or absence of underlying spinal cord lesion is limited   without intravenous contrast. A follow-up contrast-enhanced exam is   recommended for further workup, provided there are no contrast   contraindications.    Multiple large renal cysts are partially visualized.    IMPRESSION:    Widespread lytic lesions involve the thoracic spine most likely   reflecting bony metastatic disease or multiple myeloma. The largest   lesions are located at the T8 and C7 levels, with associated epidural   tumor extension. The lesions are grossly stable compared to the recent   chest CT study. Consider follow-up total spine MRI with and without   contrast for further evaluation if clinically warranted.    ED Course:  T 97.5, HR 60, /90, RR 16, SpO2 100% RA  S/p 5mg diazepam PO x1, lido patch, 4mg morphine IV x1, 1L NS x1. Hypercalcemia 12.1, PTH 7.42, serum protein 10.2, hgb 11.6. CT A/P revealing diffuse lytic expansile osseous lesions including a lesion at T8 causing mass effect on the spinal canal. Small bl thyroid nodules (18 Aug 2019 21:19)    No Known Allergies    Intolerances    ROS: [  ] Fever  [  ] Chills  [  ]Chest Pain [  ] SOB  [  ]Cough [  ] N/V  [  ] Diarrhea [  ]Constipation [  ]Other ROS:  [  ] ROS otherwise negative    PAST MEDICAL & SURGICAL HISTORY:  Coronary artery disease  HLD (hyperlipidemia)  HTN (hypertension)  No significant past surgical history      FAMILY HISTORY:  Maternal family history of hypertension: Mother      MEDICATIONS  (STANDING):  amLODIPine   Tablet 10 milliGRAM(s) Oral daily  aspirin enteric coated 81 milliGRAM(s) Oral daily  atorvastatin 40 milliGRAM(s) Oral at bedtime  dexamethasone  IVPB 20 milliGRAM(s) IV Intermittent daily  docusate sodium 100 milliGRAM(s) Oral three times a day  enoxaparin Injectable 40 milliGRAM(s) SubCutaneous daily  losartan 50 milliGRAM(s) Oral daily  metoprolol tartrate 25 milliGRAM(s) Oral two times a day  senna 2 Tablet(s) Oral at bedtime    MEDICATIONS  (PRN):  acetaminophen   Tablet .. 650 milliGRAM(s) Oral every 6 hours PRN Mild Pain (1 - 3)  lidocaine   Patch 1 Patch Transdermal daily PRN Pain  oxyCODONE    IR 5 milliGRAM(s) Oral every 6 hours PRN moderate to severe pain      PHYSICAL EXAM  Vital Signs Last 24 Hrs  T(C): 36.2 (22 Aug 2019 06:08), Max: 36.8 (21 Aug 2019 22:06)  T(F): 97.2 (22 Aug 2019 06:08), Max: 98.2 (21 Aug 2019 22:06)  HR: 50 (22 Aug 2019 06:08) (50 - 59)  BP: 151/55 (22 Aug 2019 06:08) (115/52 - 151/55)  BP(mean): --  RR: 18 (22 Aug 2019 06:08) (18 - 18)  SpO2: 98% (22 Aug 2019 06:08) (97% - 99%)    General: Well nourished, well developed, no acute distress  HEENT: NC/AT; EOMI, PERRL, sclera nonicteric; external ears normal; no rhinorrhea or epistaxis; mucous membranes moist; oropharynx clear and without erythema  CV: NR, RR; no appreciable r/m/g  Lungs: CTAB, no increased work of breathing  Abdomen: Bowel sounds present; soft, NTND  MSK: Moderate mid T-spine tenderness to percussion  Neuro: AAOx3; cranial nerves II-XII intact; strength 5/5 in upper and lower extremities; sensation to light touch in tact bilaterally.  Psych: Full affect; mood congruent  Skin: no visible rashes on limited examination    IMAGING/LABS/PATHOLOGY: I have personally reviewed the relevant labs, pathology, and imaging as noted in the HPI.  In addition,                          10.3   9.63  )-----------( 247      ( 22 Aug 2019 09:00 )             31.5     08-22    134<L>  |  99  |  20  ----------------------------<  120<H>  4.4   |  22  |  1.02    Ca    10.3      22 Aug 2019 09:00  Phos  4.7     08-22  Mg     1.8     08-22    TPro  8.9<H>  /  Alb  x   /  TBili  x   /  DBili  x   /  AST  x   /  ALT  x   /  AlkPhos  x   08-22          ASSESSMENT/PLAN    RASHID RAND is a 69y man with suspected multiple myeloma and multiple bone lesions, including C-7 and T-8, with spinal canal encroachment but no motor signs/symptoms or cord effacement. (MRI reviewed today with Dr Edward Quinones). Bone marrow biopsy pending for today.    I discussed with Mr Rand and his daughter Mirlande the use of palliative radiation in this setting, namely to improve quality of life through the reduction of symptoms.  We talked about the risks, benefits, acute and long term side effects, as well as expected treatment outcomes.  They were given the opportunity to ask questions, which were answered to their apparent satisfaction.  Mr Rand  provided written consent to proceed with radiation therapy. Simulation/planning  will be done today, treatment will start tomorrow.

## 2019-08-22 NOTE — PROGRESS NOTE ADULT - SUBJECTIVE AND OBJECTIVE BOX
[facility] [place] [facility]OLOG[facility]   MD ANABEL MOSS DO ANGELA WONG, PA    TEL:  OFFICE: [phone]  DR HOLT CELL: [phone]  DR. HERNANDEZ CELL: [phone]  DANIELE DALTON CELL: [phone]        Patient is a 69y old  Male who presents with a chief complaint of Abdominal pain (22 Aug 2019 10:28)      Patient seen and examined at bedside. No chest pain/sob    VITALS:  T(F): 97.2 (08-22-19 @ 06:08), Max: 98.2 (08-21-19 @ 22:06)  HR: 50 (08-22-19 @ 06:08)  BP: 151/55 (08-22-19 @ 06:08)  RR: 18 (08-22-19 @ 06:08)  SpO2: 98% (08-22-19 @ 06:08)  Wt(kg): --    08-21 @ 07:01  -  08-22 @ 07:00  --------------------------------------------------------  IN: 700 mL / OUT: 0 mL / NET: 700 mL          PHYSICAL EXAM:  Constitutional: NAD  Neck: No JVD  Respiratory: CTAB, no wheezes, rales or rhonchi  Cardiovascular: S1, S2, RRR  Gastrointestinal: BS+, soft, NT/ND  Extremities: No peripheral edema    Hospital Medications:   MEDICATIONS  (STANDING):  amLODIPine   Tablet 10 milliGRAM(s) Oral daily  aspirin enteric coated 81 milliGRAM(s) Oral daily  atorvastatin 40 milliGRAM(s) Oral at bedtime  docusate sodium 100 milliGRAM(s) Oral three times a day  enoxaparin Injectable 40 milliGRAM(s) SubCutaneous daily  losartan 50 milliGRAM(s) Oral daily  metoprolol tartrate 25 milliGRAM(s) Oral two times a day  senna 2 Tablet(s) Oral at bedtime      LABS:  08-22    134<L>  |  99  |  20  ----------------------------<  120<H>  4.4   |  22  |  1.02    Ca    10.3      22 Aug 2019 09:00  Phos  4.7     08-22  Mg     1.8     08-22    TPro  8.9<H>  /  Alb      /  TBili      /  DBili      /  AST      /  ALT      /  AlkPhos      08-22    Creatinine Trend: 1.02 <--, 1.24 <--, 1.27 <--, 1.10 <--, 1.01 <--    Phosphorus Level, Serum: 4.7 mg/dL (08-22 @ 09:00)                              10.3   9.63  )-----------( 247      ( 22 Aug 2019 09:00 )             31.5     Urine Studies:  Urinalysis - [08-18-19 @ 15:45]      Color COLORLESS / Appearance CLEAR / SG 1.005 / pH 7.5      Gluc NEGATIVE / Ketone NEGATIVE  / Bili NEGATIVE / Urobili NORMAL       Blood NEGATIVE / Protein 10 / Leuk Est NEGATIVE / Nitrite NEGATIVE      RBC  / WBC  / Hyaline  / Gran  / Sq Epi  / Non Sq Epi  / Bacteria     Urine Protein 19.5      [08-22-19 @ 04:30]    Iron 52, TIBC 252, %sat --      [08-19-19 @ 07:18]  Ferritin 368.5      [08-19-19 @ 07:18]  PTH 7.42 (Ca --)      [08-18-19 @ 18:52]   --    HCV 0.10, Nonreactive Hepatitis C AB  S/CO Ratio                        Interpretation  < 1.00                                   Non-Reactive  1.00 - 4.99                         Weakly-Reactive  >= 5.00                                Reactive  Non-Reactive: Aperson with a non-reactive HCV antibody  result is considered uninfected.  No further action is  needed unless recent infection is suspected.  In these  cases, consider repeat testing later to detect  seroconversion..  Weakly-Reactive: HCV antibody test is abnormal, HCV RNA  Qualitative test will follow.  Reactive: HCV antibody test is abnormal, HCV RNA  Qualitative test will follow.  Note: HCV antibody testing is performed on the [employer]   system.      [08-20-19 @ 07:13]    Free Light Chains: kappa 214.98, lambda 1.04, ratio = 206.71 H      [08-20 @ 07:13]    RADIOLOGY & ADDITIONAL STUDIES:

## 2019-08-22 NOTE — PROGRESS NOTE ADULT - ASSESSMENT
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 Anemia at that time c/w NANDINI.  pending vit d 1,25, vit d 25  pth related peptide   completed NS @ 150cc/hr x 10 hrs.  calcium improving   monitor bmp    HTN  uncontrolled  on norvasc 10, metoprolol 25 bid and losartan 50  consider up titrate losartan    Proteinuria  mild  check urine p/c ratio  on losartan    Hyponatremia  hx of siadh  Na slightly better today  check urine osmo, urine na  free water restriction <1.2L/day  monitor

## 2019-08-22 NOTE — PROGRESS NOTE ADULT - PROBLEM SELECTOR PLAN 1
Diffuse osteolytic lesions concerning for multiple myeloma vs. waldenstrom's macroglobulinemia. Also concern for metastasis though primary cancer not yet known.  -To have BMB with Heme today  -Elevated B2 Microglobulin to 3.3, K/L ratio elevated. raises concern for MM.   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 Anemia at that time c/w NANDINI Diffuse osteolytic lesions concerning for multiple myeloma. Also concern for metastasis though primary cancer not yet known, labs most c/w MM at this time. MRI showing moderate central spinal canal stenosis and prominent T8 and C7 lesions  -To have BMB with Heme today  -Consult Rad Onc regarding concern for cord compression  -Elevated B2 Microglobulin to 3.3, K/L ratio elevated. raises concern for MM.   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 Anemia at that time c/w NANDINI

## 2019-08-23 DIAGNOSIS — Z01.818 ENCOUNTER FOR OTHER PREPROCEDURAL EXAMINATION: ICD-10-CM

## 2019-08-23 PROBLEM — I10 ESSENTIAL (PRIMARY) HYPERTENSION: Chronic | Status: ACTIVE | Noted: 2019-08-18

## 2019-08-23 PROBLEM — I25.10 ATHEROSCLEROTIC HEART DISEASE OF NATIVE CORONARY ARTERY WITHOUT ANGINA PECTORIS: Chronic | Status: ACTIVE | Noted: 2019-08-18

## 2019-08-23 PROBLEM — E78.5 HYPERLIPIDEMIA, UNSPECIFIED: Chronic | Status: ACTIVE | Noted: 2019-08-18

## 2019-08-23 LAB
24R-OH-CALCIDIOL SERPL-MCNC: 19.6 NG/ML — LOW (ref 30–80)
ANION GAP SERPL CALC-SCNC: 12 MMO/L — SIGNIFICANT CHANGE UP (ref 7–14)
BUN SERPL-MCNC: 26 MG/DL — HIGH (ref 7–23)
CALCIUM SERPL-MCNC: 9.5 MG/DL — SIGNIFICANT CHANGE UP (ref 8.4–10.5)
CHLORIDE SERPL-SCNC: 101 MMOL/L — SIGNIFICANT CHANGE UP (ref 98–107)
CO2 SERPL-SCNC: 23 MMOL/L — SIGNIFICANT CHANGE UP (ref 22–31)
CREAT SERPL-MCNC: 1.17 MG/DL — SIGNIFICANT CHANGE UP (ref 0.5–1.3)
GLUCOSE SERPL-MCNC: 90 MG/DL — SIGNIFICANT CHANGE UP (ref 70–99)
HCT VFR BLD CALC: 26.6 % — LOW (ref 39–50)
HGB BLD-MCNC: 8.9 G/DL — LOW (ref 13–17)
MAGNESIUM SERPL-MCNC: 1.7 MG/DL — SIGNIFICANT CHANGE UP (ref 1.6–2.6)
MCHC RBC-ENTMCNC: 29.4 PG — SIGNIFICANT CHANGE UP (ref 27–34)
MCHC RBC-ENTMCNC: 33.5 % — SIGNIFICANT CHANGE UP (ref 32–36)
MCV RBC AUTO: 87.8 FL — SIGNIFICANT CHANGE UP (ref 80–100)
NRBC # FLD: 0 K/UL — SIGNIFICANT CHANGE UP (ref 0–0)
PHOSPHATE SERPL-MCNC: 3.5 MG/DL — SIGNIFICANT CHANGE UP (ref 2.5–4.5)
PLATELET # BLD AUTO: 217 K/UL — SIGNIFICANT CHANGE UP (ref 150–400)
PMV BLD: 11.4 FL — SIGNIFICANT CHANGE UP (ref 7–13)
POTASSIUM SERPL-MCNC: 4 MMOL/L — SIGNIFICANT CHANGE UP (ref 3.5–5.3)
POTASSIUM SERPL-SCNC: 4 MMOL/L — SIGNIFICANT CHANGE UP (ref 3.5–5.3)
RBC # BLD: 3.03 M/UL — LOW (ref 4.2–5.8)
RBC # FLD: 12.6 % — SIGNIFICANT CHANGE UP (ref 10.3–14.5)
SODIUM SERPL-SCNC: 136 MMOL/L — SIGNIFICANT CHANGE UP (ref 135–145)
TM INTERPRETATION: SIGNIFICANT CHANGE UP
WBC # BLD: 8.75 K/UL — SIGNIFICANT CHANGE UP (ref 3.8–10.5)
WBC # FLD AUTO: 8.75 K/UL — SIGNIFICANT CHANGE UP (ref 3.8–10.5)

## 2019-08-23 PROCEDURE — 99232 SBSQ HOSP IP/OBS MODERATE 35: CPT | Mod: GC

## 2019-08-23 PROCEDURE — 77427 RADIATION TX MANAGEMENT X5: CPT

## 2019-08-23 PROCEDURE — 99233 SBSQ HOSP IP/OBS HIGH 50: CPT | Mod: GC

## 2019-08-23 PROCEDURE — 77280 THER RAD SIMULAJ FIELD SMPL: CPT | Mod: 26

## 2019-08-23 RX ORDER — ERGOCALCIFEROL 1.25 MG/1
50000 CAPSULE ORAL
Refills: 0 | Status: DISCONTINUED | OUTPATIENT
Start: 2019-08-23 | End: 2019-08-29

## 2019-08-23 RX ORDER — DEXAMETHASONE 0.5 MG/5ML
4 ELIXIR ORAL
Refills: 0 | Status: DISCONTINUED | OUTPATIENT
Start: 2019-08-23 | End: 2019-08-29

## 2019-08-23 RX ORDER — SIMETHICONE 80 MG/1
80 TABLET, CHEWABLE ORAL ONCE
Refills: 0 | Status: COMPLETED | OUTPATIENT
Start: 2019-08-23 | End: 2019-08-23

## 2019-08-23 RX ORDER — OXYCODONE HYDROCHLORIDE 5 MG/1
5 TABLET ORAL ONCE
Refills: 0 | Status: DISCONTINUED | OUTPATIENT
Start: 2019-08-23 | End: 2019-08-23

## 2019-08-23 RX ADMIN — SIMETHICONE 80 MILLIGRAM(S): 80 TABLET, CHEWABLE ORAL at 07:39

## 2019-08-23 RX ADMIN — SENNA PLUS 2 TABLET(S): 8.6 TABLET ORAL at 22:08

## 2019-08-23 RX ADMIN — LIDOCAINE 1 PATCH: 4 CREAM TOPICAL at 19:31

## 2019-08-23 RX ADMIN — ENOXAPARIN SODIUM 40 MILLIGRAM(S): 100 INJECTION SUBCUTANEOUS at 12:44

## 2019-08-23 RX ADMIN — ATORVASTATIN CALCIUM 40 MILLIGRAM(S): 80 TABLET, FILM COATED ORAL at 22:08

## 2019-08-23 RX ADMIN — OXYCODONE HYDROCHLORIDE 5 MILLIGRAM(S): 5 TABLET ORAL at 23:05

## 2019-08-23 RX ADMIN — OXYCODONE HYDROCHLORIDE 5 MILLIGRAM(S): 5 TABLET ORAL at 17:26

## 2019-08-23 RX ADMIN — OXYCODONE HYDROCHLORIDE 5 MILLIGRAM(S): 5 TABLET ORAL at 13:13

## 2019-08-23 RX ADMIN — AMLODIPINE BESYLATE 10 MILLIGRAM(S): 2.5 TABLET ORAL at 06:30

## 2019-08-23 RX ADMIN — OXYCODONE HYDROCHLORIDE 5 MILLIGRAM(S): 5 TABLET ORAL at 17:56

## 2019-08-23 RX ADMIN — SIMETHICONE 80 MILLIGRAM(S): 80 TABLET, CHEWABLE ORAL at 17:26

## 2019-08-23 RX ADMIN — OXYCODONE HYDROCHLORIDE 5 MILLIGRAM(S): 5 TABLET ORAL at 22:08

## 2019-08-23 RX ADMIN — LOSARTAN POTASSIUM 50 MILLIGRAM(S): 100 TABLET, FILM COATED ORAL at 06:30

## 2019-08-23 RX ADMIN — SIMETHICONE 80 MILLIGRAM(S): 80 TABLET, CHEWABLE ORAL at 22:08

## 2019-08-23 RX ADMIN — Medication 4 MILLIGRAM(S): at 17:28

## 2019-08-23 RX ADMIN — LIDOCAINE 1 PATCH: 4 CREAM TOPICAL at 12:43

## 2019-08-23 RX ADMIN — OXYCODONE HYDROCHLORIDE 5 MILLIGRAM(S): 5 TABLET ORAL at 12:43

## 2019-08-23 RX ADMIN — Medication 100 MILLIGRAM(S): at 06:30

## 2019-08-23 RX ADMIN — Medication 100 MILLIGRAM(S): at 22:08

## 2019-08-23 RX ADMIN — Medication 100 MILLIGRAM(S): at 17:25

## 2019-08-23 NOTE — PROGRESS NOTE ADULT - SUBJECTIVE AND OBJECTIVE BOX
Subjective:    Mr. Rand is a 68 yo M former smoker with a PMHx of HTN, HLD, CAD s/p 1 YAMILET who p/w acute exacerbation over the past 3 days of his ongoing bilateral flank pain x 2 months found to have diffuse osteolytic lesions on CT chest, A/P concerning for diffuse metastatic disease, weight loss, primary cancer not found at this time. Patient also with hypercalcemia w/o clear evidence of primary malignancy, initial labs c/w MM. s/p BMB yesterday. Radiation to spinal lesions today, Day 1/5. Overnight patient had right abdomen and chest pain 4/10, relieved by belching. HR 48, EKG ordered, simethicone given. Low suspicion for ACS. This morning ***    Medications:  acetaminophen   Tablet .. 650 milliGRAM(s) Oral every 6 hours PRN  amLODIPine   Tablet 10 milliGRAM(s) Oral daily  aspirin enteric coated 81 milliGRAM(s) Oral daily  atorvastatin 40 milliGRAM(s) Oral at bedtime  docusate sodium 100 milliGRAM(s) Oral three times a day  enoxaparin Injectable 40 milliGRAM(s) SubCutaneous daily  lidocaine   Patch 1 Patch Transdermal daily PRN  losartan 50 milliGRAM(s) Oral daily  metoprolol tartrate 25 milliGRAM(s) Oral two times a day  oxyCODONE    IR 5 milliGRAM(s) Oral every 6 hours PRN  senna 2 Tablet(s) Oral at bedtime  simethicone 80 milliGRAM(s) Chew once      PHYSICAL EXAM:  Vital Signs Last 24 Hrs  T(C): 36.6 (23 Aug 2019 06:02), Max: 36.6 (23 Aug 2019 06:02)  T(F): 97.9 (23 Aug 2019 06:02), Max: 97.9 (23 Aug 2019 06:02)  HR: 48 (23 Aug 2019 06:02) (48 - 66)  BP: 126/57 (23 Aug 2019 06:02) (123/63 - 135/55)  BP(mean): --  RR: 16 (23 Aug 2019 06:02) (16 - 18)  SpO2: 100% (23 Aug 2019 06:02) (99% - 100%)  Daily     Daily   I&O's Detail    22 Aug 2019 07:01  -  23 Aug 2019 07:00  --------------------------------------------------------  IN:    Oral Fluid: 950 mL  Total IN: 950 mL    OUT:    Voided: 250 mL  Total OUT: 250 mL    Total NET: 700 mL      General: A/ox 3, No acute Distress  Neck: Supple, NO JVD  Cardiac: S1 S2, No M/R/G  Pulmonary: CTAB, Breathing unlabored, No Rhonchi/Rales/Wheezing  Abdomen: Soft, Non -tender, +BS   Extremities: No Rashes, No edema  Neuro: A/o x 3, No focal deficits  Psch: normal mood , normal affect    LABS:                          8.9    8.75  )-----------( 217      ( 23 Aug 2019 06:15 )             26.6     08-23    136  |  101  |  26<H>  ----------------------------<  90  4.0   |  23  |  1.17    Ca    9.5      23 Aug 2019 06:15  Phos  3.5     08-23  Mg     1.7     08-23    TPro  8.9<H>  /  Alb  x   /  TBili  x   /  DBili  x   /  AST  x   /  ALT  x   /  AlkPhos  x   08-22 Subjective:    Mr. Rand is a 68 yo M former smoker with a PMHx of HTN, HLD, CAD s/p 1 YAMILET who p/w acute exacerbation over the past 3 days of his ongoing bilateral flank pain x 2 months found to have diffuse osteolytic lesions on CT chest, A/P concerning for diffuse metastatic disease, weight loss, primary cancer not found at this time. Patient also with hypercalcemia w/o clear evidence of primary malignancy, initial labs c/w MM. s/p BMB yesterday. Radiation to spinal lesions today, Day 1/5. Overnight patient had right abdomen and chest pain 4/10, relieved by belching. HR 48, EKG ordered, simethicone given. Low suspicion for ACS. This morning denies chest pain. Says it felt last night like gas. No arm, jaw pain or diaphoresis.     Medications:  acetaminophen   Tablet .. 650 milliGRAM(s) Oral every 6 hours PRN  amLODIPine   Tablet 10 milliGRAM(s) Oral daily  aspirin enteric coated 81 milliGRAM(s) Oral daily  atorvastatin 40 milliGRAM(s) Oral at bedtime  docusate sodium 100 milliGRAM(s) Oral three times a day  enoxaparin Injectable 40 milliGRAM(s) SubCutaneous daily  lidocaine   Patch 1 Patch Transdermal daily PRN  losartan 50 milliGRAM(s) Oral daily  metoprolol tartrate 25 milliGRAM(s) Oral two times a day  oxyCODONE    IR 5 milliGRAM(s) Oral every 6 hours PRN  senna 2 Tablet(s) Oral at bedtime  simethicone 80 milliGRAM(s) Chew once      PHYSICAL EXAM:  Vital Signs Last 24 Hrs  T(C): 36.6 (23 Aug 2019 06:02), Max: 36.6 (23 Aug 2019 06:02)  T(F): 97.9 (23 Aug 2019 06:02), Max: 97.9 (23 Aug 2019 06:02)  HR: 48 (23 Aug 2019 06:02) (48 - 66)  BP: 126/57 (23 Aug 2019 06:02) (123/63 - 135/55)  BP(mean): --  RR: 16 (23 Aug 2019 06:02) (16 - 18)  SpO2: 100% (23 Aug 2019 06:02) (99% - 100%)  Daily     Daily   I&O's Detail    22 Aug 2019 07:01  -  23 Aug 2019 07:00  --------------------------------------------------------  IN:    Oral Fluid: 950 mL  Total IN: 950 mL    OUT:    Voided: 250 mL  Total OUT: 250 mL    Total NET: 700 mL      General: A/ox 3, No acute Distress  Neck: Supple, NO JVD  Cardiac: S1 S2, No M/R/G  Pulmonary: CTAB, Breathing unlabored, No Rhonchi/Rales/Wheezing  Abdomen: Soft, Non -tender, +BS   Extremities: No Rashes, No edema  Neuro: A/o x 3, No focal deficits  Psch: normal mood , normal affect    LABS:                          8.9    8.75  )-----------( 217      ( 23 Aug 2019 06:15 )             26.6     08-23    136  |  101  |  26<H>  ----------------------------<  90  4.0   |  23  |  1.17    Ca    9.5      23 Aug 2019 06:15  Phos  3.5     08-23  Mg     1.7     08-23    TPro  8.9<H>  /  Alb  x   /  TBili  x   /  DBili  x   /  AST  x   /  ALT  x   /  AlkPhos  x   08-22 Subjective:    Mr. Rand is a 70 yo M former smoker with a PMHx of HTN, HLD, CAD s/p 1 YAMILET who p/w acute exacerbation over the past 3 days of his ongoing bilateral flank pain x 2 months found to have diffuse osteolytic lesions on CT chest, A/P concerning for diffuse metastatic disease, weight loss, primary cancer not found at this time. Patient also with hypercalcemia w/o clear evidence of primary malignancy, initial labs c/w MM. s/p BMB yesterday. Radiation to spinal lesions today, Day 1/5. Overnight patient had right abdomen and chest pain 4/10, relieved by belching. HR 48, EKG ordered, simethicone given. Low suspicion for ACS. This morning denies chest pain. Says it felt last night like gas. No arm, jaw pain or diaphoresis.     Medications:  acetaminophen   Tablet .. 650 milliGRAM(s) Oral every 6 hours PRN  amLODIPine   Tablet 10 milliGRAM(s) Oral daily  aspirin enteric coated 81 milliGRAM(s) Oral daily  atorvastatin 40 milliGRAM(s) Oral at bedtime  docusate sodium 100 milliGRAM(s) Oral three times a day  enoxaparin Injectable 40 milliGRAM(s) SubCutaneous daily  lidocaine   Patch 1 Patch Transdermal daily PRN  losartan 50 milliGRAM(s) Oral daily  metoprolol tartrate 25 milliGRAM(s) Oral two times a day  oxyCODONE    IR 5 milliGRAM(s) Oral every 6 hours PRN  senna 2 Tablet(s) Oral at bedtime  simethicone 80 milliGRAM(s) Chew once      PHYSICAL EXAM:  Vital Signs Last 24 Hrs  T(C): 36.6 (23 Aug 2019 06:02), Max: 36.6 (23 Aug 2019 06:02)  T(F): 97.9 (23 Aug 2019 06:02), Max: 97.9 (23 Aug 2019 06:02)  HR: 48 (23 Aug 2019 06:02) (48 - 66)  BP: 126/57 (23 Aug 2019 06:02) (123/63 - 135/55)  BP(mean): --  RR: 16 (23 Aug 2019 06:02) (16 - 18)  SpO2: 100% (23 Aug 2019 06:02) (99% - 100%)  Daily     Daily   I&O's Detail    22 Aug 2019 07:01  -  23 Aug 2019 07:00  --------------------------------------------------------  IN:    Oral Fluid: 950 mL  Total IN: 950 mL    OUT:    Voided: 250 mL  Total OUT: 250 mL    Total NET: 700 mL      General: A/ox 3, No acute Distress  Neck: Supple, NO JVD  Cardiac: S1 S2, No M/R/G  Pulmonary: CTAB, Breathing unlabored, No Rhonchi/Rales/Wheezing  Abdomen: Soft, Non -tender, +BS. No rebound or guarding  Extremities: No Rashes, No edema  Neuro: A/o x 3, No focal deficits  Psch: normal mood , normal affect    LABS:                          8.9    8.75  )-----------( 217      ( 23 Aug 2019 06:15 )             26.6     08-23    136  |  101  |  26<H>  ----------------------------<  90  4.0   |  23  |  1.17    Ca    9.5      23 Aug 2019 06:15  Phos  3.5     08-23  Mg     1.7     08-23    TPro  8.9<H>  /  Alb  x   /  TBili  x   /  DBili  x   /  AST  x   /  ALT  x   /  AlkPhos  x   08-22

## 2019-08-23 NOTE — PROGRESS NOTE ADULT - PROBLEM SELECTOR PLAN 7
CAD s/p 1 YAMILET (performed in [facility] in 2015)  -high intensity statin  -ASA 81mg qd, being held today per heme onc post BMB  -Metoprolol 25mg bid as HR tolerate CAD s/p 1 YAMILET (performed in [facility] in 2015)  -high intensity statin  -ASA 81mg qd, being held today per heme onc post BMB, will restart tomorrow  -Metoprolol 25mg bid as HR tolerate

## 2019-08-23 NOTE — PROGRESS NOTE ADULT - SUBJECTIVE AND OBJECTIVE BOX
INTERVAL History:    Allergies    No Known Allergies    Intolerances        MEDICATIONS  (STANDING):  amLODIPine   Tablet 10 milliGRAM(s) Oral daily  aspirin enteric coated 81 milliGRAM(s) Oral daily  atorvastatin 40 milliGRAM(s) Oral at bedtime  docusate sodium 100 milliGRAM(s) Oral three times a day  enoxaparin Injectable 40 milliGRAM(s) SubCutaneous daily  losartan 50 milliGRAM(s) Oral daily  metoprolol tartrate 25 milliGRAM(s) Oral two times a day  senna 2 Tablet(s) Oral at bedtime    MEDICATIONS  (PRN):  acetaminophen   Tablet .. 650 milliGRAM(s) Oral every 6 hours PRN Mild Pain (1 - 3)  lidocaine   Patch 1 Patch Transdermal daily PRN Pain  oxyCODONE    IR 5 milliGRAM(s) Oral every 6 hours PRN moderate to severe pain      Vital Signs Last 24 Hrs  T(C): 36.6 (23 Aug 2019 06:02), Max: 36.6 (23 Aug 2019 06:02)  T(F): 97.9 (23 Aug 2019 06:02), Max: 97.9 (23 Aug 2019 06:02)  HR: 48 (23 Aug 2019 06:02) (48 - 66)  BP: 126/57 (23 Aug 2019 06:02) (123/63 - 135/55)  BP(mean): --  RR: 16 (23 Aug 2019 06:02) (16 - 18)  SpO2: 100% (23 Aug 2019 06:02) (99% - 100%)    PHYSICAL EXAM:    General: AOX3, no acute distress  EYES: EOMI, PERRLA, conjunctiva and sclera clear  NECK: Supple, No JVD, Normal thyroid  CHEST/LUNG: Clear to auscultation bilaterally; No rales, rhonchi, or wheezing  HEART: Regular rate and rhythm; no murmurs  ABDOMEN: Soft, Nontender. BS+  LYMPH: No lymphadenopathy noted  Extremities: No peripheral edema      LABS:                        8.9    8.75  )-----------( 217      ( 23 Aug 2019 06:15 )             26.6     08-23    136  |  101  |  26<H>  ----------------------------<  90  4.0   |  23  |  1.17    Ca    9.5      23 Aug 2019 06:15  Phos  3.5     08-23  Mg     1.7     08-23    TPro  8.9<H>  /  Alb  x   /  TBili  x   /  DBili  x   /  AST  x   /  ALT  x   /  AlkPhos  x   08-22            RADIOLOGY & ADDITIONAL STUDIES:    PATHOLOGY: INTERVAL History: Patient away at radiation treatment this morning. Site of bone marrow bx without bleeding or hemorrhage. Pt tolerated radiation well.     Allergies    No Known Allergies    Intolerances        MEDICATIONS  (STANDING):  amLODIPine   Tablet 10 milliGRAM(s) Oral daily  aspirin enteric coated 81 milliGRAM(s) Oral daily  atorvastatin 40 milliGRAM(s) Oral at bedtime  docusate sodium 100 milliGRAM(s) Oral three times a day  enoxaparin Injectable 40 milliGRAM(s) SubCutaneous daily  losartan 50 milliGRAM(s) Oral daily  metoprolol tartrate 25 milliGRAM(s) Oral two times a day  senna 2 Tablet(s) Oral at bedtime    MEDICATIONS  (PRN):  acetaminophen   Tablet .. 650 milliGRAM(s) Oral every 6 hours PRN Mild Pain (1 - 3)  lidocaine   Patch 1 Patch Transdermal daily PRN Pain  oxyCODONE    IR 5 milliGRAM(s) Oral every 6 hours PRN moderate to severe pain      Vital Signs Last 24 Hrs  T(C): 36.6 (23 Aug 2019 06:02), Max: 36.6 (23 Aug 2019 06:02)  T(F): 97.9 (23 Aug 2019 06:02), Max: 97.9 (23 Aug 2019 06:02)  HR: 48 (23 Aug 2019 06:02) (48 - 66)  BP: 126/57 (23 Aug 2019 06:02) (123/63 - 135/55)  BP(mean): --  RR: 16 (23 Aug 2019 06:02) (16 - 18)  SpO2: 100% (23 Aug 2019 06:02) (99% - 100%)    PHYSICAL EXAM:    General: AOX3, no acute distress  EYES: EOMI, PERRLA, conjunctiva and sclera clear  NECK: Supple, No JVD, Normal thyroid  CHEST/LUNG: Clear to auscultation bilaterally; No rales, rhonchi, or wheezing  HEART: Regular rate and rhythm; no murmurs  ABDOMEN: Soft, Nontender. BS+ mild pain to palpation in right flank   LYMPH: No lymphadenopathy noted  Extremities: No peripheral edema,       LABS:                        8.9    8.75  )-----------( 217      ( 23 Aug 2019 06:15 )             26.6     08-23    136  |  101  |  26<H>  ----------------------------<  90  4.0   |  23  |  1.17    Ca    9.5      23 Aug 2019 06:15  Phos  3.5     08-23  Mg     1.7     08-23    TPro  8.9<H>  /  Alb  x   /  TBili  x   /  DBili  x   /  AST  x   /  ALT  x   /  AlkPhos  x   08-22            RADIOLOGY & ADDITIONAL STUDIES:    PATHOLOGY: INTERVAL History: Patient away at radiation treatment this morning. Site of bone marrow bx without bleeding or hemorrhage. Pt tolerated radiation well.     Allergies    No Known Allergies    Intolerances        MEDICATIONS  (STANDING):  amLODIPine   Tablet 10 milliGRAM(s) Oral daily  aspirin enteric coated 81 milliGRAM(s) Oral daily  atorvastatin 40 milliGRAM(s) Oral at bedtime  docusate sodium 100 milliGRAM(s) Oral three times a day  enoxaparin Injectable 40 milliGRAM(s) SubCutaneous daily  losartan 50 milliGRAM(s) Oral daily  metoprolol tartrate 25 milliGRAM(s) Oral two times a day  senna 2 Tablet(s) Oral at bedtime    MEDICATIONS  (PRN):  acetaminophen   Tablet .. 650 milliGRAM(s) Oral every 6 hours PRN Mild Pain (1 - 3)  lidocaine   Patch 1 Patch Transdermal daily PRN Pain  oxyCODONE    IR 5 milliGRAM(s) Oral every 6 hours PRN moderate to severe pain      Vital Signs Last 24 Hrs  T(C): 36.6 (23 Aug 2019 06:02), Max: 36.6 (23 Aug 2019 06:02)  T(F): 97.9 (23 Aug 2019 06:02), Max: 97.9 (23 Aug 2019 06:02)  HR: 48 (23 Aug 2019 06:02) (48 - 66)  BP: 126/57 (23 Aug 2019 06:02) (123/63 - 135/55)  BP(mean): --  RR: 16 (23 Aug 2019 06:02) (16 - 18)  SpO2: 100% (23 Aug 2019 06:02) (99% - 100%)    PHYSICAL EXAM:    General: AOX3, no acute distress  EYES: EOMI, PERRLA, conjunctiva and sclera clear  NECK: Supple, No JVD, Normal thyroid  CHEST/LUNG: Clear to auscultation bilaterally; No rales, rhonchi, or wheezing  HEART: Regular rate and rhythm; no murmurs  ABDOMEN: Soft, Nontender. BS+ mild pain to palpation in right flank   LYMPH: No lymphadenopathy noted  Extremities: No peripheral edema,       LABS:                        8.9    8.75  )-----------( 217      ( 23 Aug 2019 06:15 )             26.6     08-23    136  |  101  |  26<H>  ----------------------------<  90  4.0   |  23  |  1.17    Ca    9.5      23 Aug 2019 06:15  Phos  3.5     08-23  Mg     1.7     08-23    TPro  8.9<H>  /  Alb  x   /  TBili  x   /  DBili  x   /  AST  x   /  ALT  x   /  AlkPhos  x   08-22    TM Interpretation (08.22.19 @ 15:54)    TM Interpretation:   Flow Cytometry Final Report  ________________________________________________________________________  Specimen: Bone marrow  Collected: 08/22/2019 14:0008/22/2019 15:54  Received: 08/22/2019 15:54  Processed: 08/22/2019 16:15  Reported: 08/23/201917:26  Accession #: [id number]  Surgical Pathology Number: [id number]  ________________________________________________________________________  CLINICAL DATA: Rule out multiple myeloma    ________________________________________________________________________  Granulocytes: 84 % ;    Lymphocytes: 8 % ;    Monocytes: 6 % ;    Dim CD45 and/or blast gate: <1 ;    Erythroid/debris: 2 %  ________________________________________________________________________  DIAGNOSIS:  Bone marrow aspirate (hemodilute)       - The findings are consistent with plasma cell neoplasm.       - The myeloid and lymphoid immunophenotypic findings show no significant diagnostic  abnormalities.  Please see interpretation.    INTERPRETATION:  MORPHOLOGY:  CYTOSPIN: Maturing and increased mature myeloid elements with plasmacytosis (mild hemodilution).    IMMUNOPHENOTYPE: Monotypic plasma cells (1% of cells), positive for cytoplasmic kappa, CD38, ,  dimmer CD45, partial CD56; negative CD19, CD20, .  CD45/side scatter shows no significant blast population. There is no increase in CD34,  or  CD14 positive cells.  Lymphocytes (8% of cells) show a heterogeneous population of T-cells (with normal CD4 to CD8  ratio), and polytypic B-cells.    The findings are consistent with plasma cell neoplasm.  The myeloid and lymphoid immunophenotypic findings show no significant diagnostic abnormalities.    Note: The percentage of plasma cells by flow cytometry may not accurately reflect the morphologic  percentage due to cell fragility or hemodilution.  _____________________________________________________________________  Viability ................. 93 %    The results reported are based on the plasma cell population (Bright CD38 positive).  1% of cells.    CD45 .......... 100 %  CD19 ............ 3 %  CD20 ........... <1 %  CD56 ........... 35 %   ........... 2 %  CD38 ........... 99 %   .......... 96 %  CD38/cyto.kappa .......... 96 %  CD38/cyto.lambda .......... <1 %          Valuesreported are based on the lymphocyte gate. (Bright CD45 positive; low side scatter, low  forward scatter).  8% of cells.    CD45 .......... 100 %  CD3 ........... 55 %  CD5 ........... 56 %  CD7 ........... 66 %  CD4 ........... 40 %  CD8 ...........15 %  CD10 .......... <1 %  CD19 .......... 5 %  CD20 .......... 4 %  CD19/kappa ......... 3 %  CD19/lambda ........ 1 %    Results reported are based on an open gate.    CD45 .......... 100 %  CD2 ........... 21 %  CD7 ........... 7 %  CD56 .......... 3 %  CD14 .......... 3 %  HLA-DR ........ 41 %  CD34 .......... <1 %   ......... 1 %  CD11b ......... 90 %  CD13 .......... 83 %  CD15 .......... 35 %  CD33 .......... 54 %

## 2019-08-23 NOTE — PROGRESS NOTE ADULT - ASSESSMENT
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 Now patient with anemia and hypercalcemia.   - Elevated K/L light chain 205.7 and diffuse lytic lesions found on CT abd/pelvis  - Pt underwent sacral lytic lesion:        - Monotypic plasma cells consistent with plasma cell neoplasm.            - Two aberrant monoclonal plasma cell population detected:            - Population #1 (16.5% of total analyzed cells, 68.6% of plasma cell population)  positive for cytoplasmic kappa, CD38 dimmer, CD45 dimmer, CD56, ; negative for , CD19,  CD20.              - Population # 2 (7.6% of total analyzed cells, 31.4% of plasma cell population)  positive for cytoplasmic kappa (brighter), , CD38 brighter, CD45 dim, CD56 (brighter), ;  negative for CD19, CD20.    - patient underwent RT today Day #1  - Started on Dexamethasone 4mg BID IV   - Spoke with Dr. Pereira from Radiation [facility]c and pt will receive 4 more days of inpatient RT- to be completed on Thursday  - Pt can likely be discharged after RT as we have secured an appointment with pt at [facility] on September 3rd at 9:30 AM with Dr. Grayson and can get his first chemo treatment at that time.   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 Now patient with anemia and hypercalcemia. pending SPEP and serum immunofixation, and urine immunofixation and UPEP  - Elevated K/L light chain 205.7 and diffuse lytic lesions found on CT abd/pelvis  - Pt underwent sacral lytic lesion: Monotypic plasma cells consistent with plasma cell neoplasm. Two aberrant monoclonal plasma cell population detected:  Population #1 (16.5% of total analyzed cells, 68.6% of plasma cell population) positive for cytoplasmic kappa, CD38 dimmer, CD45 dimmer, CD56, ; negative for , CD19, CD20. Population # 2 (7.6% of total analyzed cells, 31.4% of plasma cell population) positive for cytoplasmic kappa (brighter), , CD38 brighter, CD45 dim, CD56 (brighter), ; negative for CD19, CD20.  - patient underwent RT today to thoracic and cervical lesion Day #1  - Started on Dexamethasone 4mg BID IV   - Spoke with Dr. Pereira from Radiation [facility]c and pt will receive 4 more days of inpatient RT- to be completed on Thursday  - Pt can likely be discharged after RT as we have secured an appointment with pt at [facility] on September 3rd at 9:30 AM with Dr. Grayson and can get his first chemo treatment at that time.   - F/u dental consult with regards to giving bisphosphonate therapy          Domi Davila MD PGY-4  Hematology Fellow  [phone] 68 yo M former smoker with a PMHx of smoldering myeloma  p/w acute exacerbation of his back & flank pain, now found to have diffuse lytic lesions concerning for multiple myeloma.     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 s/p bone marrow biopsy done on 8-22-19 , will follow up results  - Pt had biopsy at outpatient hematologist office in February c/w smoldering myeloma- 10-15% plasma cells without end organ damage. Now patient with anemia and hypercalcemia. pending SPEP and serum immunofixation, and urine immunofixation and UPEP  - Elevated K/L light chain 205.7 and diffuse lytic lesions found on CT abd/pelvis  - Pt underwent sacral lytic lesion: Monotypic plasma cells consistent with plasma cell neoplasm. Two aberrant monoclonal plasma cell population detected:  Population #1 (16.5% of total analyzed cells, 68.6% of plasma cell population) positive for cytoplasmic kappa, CD38 dimmer, CD45 dimmer, CD56, ; negative for , CD19, CD20. Population # 2 (7.6% of total analyzed cells, 31.4% of plasma cell population) positive for cytoplasmic kappa (brighter), , CD38 brighter, CD45 dim, CD56 (brighter), ; negative for CD19, CD20.  - patient underwent RT today to thoracic and cervical lesion Day #1  - Started on Dexamethasone 4mg BID IV   - Spoke with Dr. Pereira from Radiation [facility]c and pt will receive 4 more days of inpatient RT- to be completed on Thursday  - Pt can likely be discharged after RT as we have secured an appointment with pt at [facility] on September 3rd at 9:30 AM with Dr. Grayson and can get his first chemo treatment at that time.   - F/u dental consult with regards to giving bisphosphonate therapy          Domi Davila MD PGY-4  Hematology Fellow  [phone] 70 yo M former smoker with a PMHx of smoldering myeloma  p/w acute exacerbation of his back & flank pain, now found to have diffuse lytic lesions concerning for multiple myeloma.     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 s/p bone marrow biopsy done on 8-22-19 , will follow up results  - Pt had biopsy at outpatient hematologist office in February c/w smoldering myeloma- 10-15% plasma cells without end organ damage. Now patient with anemia and hypercalcemia. pending SPEP and serum immunofixation, and urine immunofixation and UPEP  - Elevated K/L light chain 205.7 and diffuse lytic lesions found on CT abd/pelvis  - Pt underwent sacral lytic lesion: Monotypic plasma cells consistent with plasma cell neoplasm. Two aberrant monoclonal plasma cell population detected:  Population #1 (16.5% of total analyzed cells, 68.6% of plasma cell population) positive for cytoplasmic kappa, CD38 dimmer, CD45 dimmer, CD56, ; negative for , CD19, CD20. Population # 2 (7.6% of total analyzed cells, 31.4% of plasma cell population) positive for cytoplasmic kappa (brighter), , CD38 brighter, CD45 dim, CD56 (brighter), ; negative for CD19, CD20.  - patient underwent RT today to thoracic and cervical lesion Day #1.  - Started on Dexamethasone 4mg BID IV   - Spoke with Dr. Pereira from Radiation Onc and pt will receive 4 more days of inpatient RT- to be completed on Thursday 8-29-19  - Pt can likely be discharged after RT as we have secured an appointment with pt at [facility] on September 3rd at 9:30 AM with Dr. Grayson and can get his first chemo treatment at that time.   - F/u dental consult with regards to giving bisphosphonate therapy          Domi Davila MD PGY-4  Hematology Fellow  [phone]

## 2019-08-23 NOTE — PROGRESS NOTE ADULT - ATTENDING COMMENTS
Agree with above.   Patient with MM s/p BM bx. Noted to have epidural extension at T8. Started RT today, plan for 5 doses in patient.   Start Decadron 4 mg BID  Will hold off on starting and MM therapy until after completion of RT.   Has an apt at [facility] on 9/3.

## 2019-08-23 NOTE — PROGRESS NOTE ADULT - PROBLEM SELECTOR PLAN 1
Diffuse osteolytic lesions concerning for multiple myeloma. Also concern for metastasis though primary cancer not yet known, labs most c/w MM at this time. MRI showing moderate central spinal canal stenosis and prominent T8 and C7 lesions  -Day 1/5 of radiation therapy today with Rad[facility]c  -Elevated B2 Microglobulin to 3.3, K/L ratio elevated. raises concern for MM.   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 Anemia at that time c/w NANDINI Diffuse osteolytic lesions concerning for multiple myeloma. Also concern for metastasis though primary cancer not yet known, labs most c/w MM at this time. MRI showing moderate central spinal canal stenosis and prominent T8 and C7 lesions. BMB with heme onc yesterday  -Day 1/5 of radiation therapy today with [facility]  -Elevated B2 Microglobulin to 3.3, K/L ratio elevated   -SPEP/UPEP showing gamma spike, c/w MM  -Serum viscosity slightly elevated  -Per private Heme/Onc patient had BMB in February c/w smoldering myeloma--10-15% Plasma cells w/o end organ damage. Anemia at that time c/w NANDINI

## 2019-08-23 NOTE — PROGRESS NOTE ADULT - PROBLEM SELECTOR PLAN 4
Normocytic anemia in setting of hypercalcemia and bone lesions concerning for MM. Hgb 8.9 this AM from 10.3 yesterday   -LDH and Haptoglobin normal, reassuring against hemolysis  -trend cbc daily

## 2019-08-23 NOTE — PROGRESS NOTE ADULT - ASSESSMENT
70 yo M former smoker with a PMHx of HTN, HLD, CAD s/p 1 YAMILET who p/w acute exacerbation over the past 3 days of his ongoing bilateral flank pain x 2 months found to have diffuse osteolytic lesions on CT chest, A/P concerning for diffuse metastatic disease, weight loss, primary cancer not found at this time. Patient also with hypercalcemia w/o clear evidence of primary malignancy, initial labs c/w MM. s/p BMB yesterday. Radiation to spinal lesions today, Day 1/5

## 2019-08-23 NOTE — PROGRESS NOTE ADULT - ASSESSMENT
68 yo M former smoker with a PMHx of HTN, HLD, CAD s/p 1 DES 2015 who p/w acute exacerbation of his back & flank pain found on CT new lytic expansile osseous lesions possible mets of unclear primary    Hypercalcemia  likely sec to lytic lesions  pth 7.42  K/L light chain 205.7, pending spep, sif  had BMB in February c/w smoldering myeloma--10-15% Plasma cells w/o end organ damage. Anemia at that time c/w NANDINI.  pending vit d 1,25, vit d 25  pth related peptide   completed NS @ 150cc/hr x 10 hrs.  calcium improving   monitor bmp    HTN  controlled  on norvasc 10, metoprolol 25 bid and losartan 50  monitor    Proteinuria  mild  check urine p/c ratio  on losartan    Hyponatremia  hx of siadh  Na  better today  check urine osmo, urine na  free water restriction <1.2L/day  monitor

## 2019-08-23 NOTE — PROGRESS NOTE ADULT - PROBLEM SELECTOR PLAN 2
Resolved. Hypercalcemia 2/2 diffuse osteolytic lesions, down from 12.1-->9.5  -trend Ca  -Encourage PO

## 2019-08-23 NOTE — PROGRESS NOTE ADULT - SUBJECTIVE AND OBJECTIVE BOX
[facility]   MD ANABEL MOSS DO ANGELA WONG, PA    TEL:  OFFICE: [phone]  DR HOLT CELL: [phone]  DR. HERNANDEZ CELL: [phone]  DANIELE DALTON CELL: [phone]        Patient is a 69y old  Male who presents with a chief complaint of Abdominal pain (23 Aug 2019 11:15)      Patient seen and examined at bedside. No chest pain/sob    VITALS:  T(F): 97.9 (08-23-19 @ 06:02), Max: 97.9 (08-23-19 @ 06:02)  HR: 48 (08-23-19 @ 06:02)  BP: 126/57 (08-23-19 @ 06:02)  RR: 16 (08-23-19 @ 06:02)  SpO2: 100% (08-23-19 @ 06:02)  Wt(kg): --    08-22 @ 07:01  -  08-23 @ 07:00  --------------------------------------------------------  IN: 950 mL / OUT: 250 mL / NET: 700 mL          PHYSICAL EXAM:  Constitutional: NAD  Neck: No JVD  Respiratory: CTAB, no wheezes, rales or rhonchi  Cardiovascular: S1, S2, RRR  Gastrointestinal: BS+, soft, NT/ND  Extremities: No peripheral edema    Hospital Medications:   MEDICATIONS  (STANDING):  amLODIPine   Tablet 10 milliGRAM(s) Oral daily  aspirin enteric coated 81 milliGRAM(s) Oral daily  atorvastatin 40 milliGRAM(s) Oral at bedtime  docusate sodium 100 milliGRAM(s) Oral three times a day  enoxaparin Injectable 40 milliGRAM(s) SubCutaneous daily  losartan 50 milliGRAM(s) Oral daily  metoprolol tartrate 25 milliGRAM(s) Oral two times a day  senna 2 Tablet(s) Oral at bedtime      LABS:  08-23    136  |  101  |  26<H>  ----------------------------<  90  4.0   |  23  |  1.17    Ca    9.5      23 Aug 2019 06:15  Phos  3.5     08-23  Mg     1.7     08-23    TPro  8.9<H>  /  Alb      /  TBili      /  DBili      /  AST      /  ALT      /  AlkPhos      08-22    Creatinine Trend: 1.17 <--, 1.02 <--, 1.24 <--, 1.27 <--, 1.10 <--, 1.01 <--    Phosphorus Level, Serum: 3.5 mg/dL (08-23 @ 06:15)                              8.9    8.75  )-----------( 217      ( 23 Aug 2019 06:15 )             26.6     Urine Studies:  Urinalysis - [08-18-19 @ 15:45]      Color COLORLESS / Appearance CLEAR / SG 1.005 / pH 7.5      Gluc NEGATIVE / Ketone NEGATIVE  / Bili NEGATIVE / Urobili NORMAL       Blood NEGATIVE / Protein 10 / Leuk Est NEGATIVE / Nitrite NEGATIVE      RBC  / WBC  / Hyaline  / Gran  / Sq Epi  / Non Sq Epi  / Bacteria     Urine Protein 19.5      [08-22-19 @ 04:30]  Urine Sodium < 20      [08-22-19 @ 20:00]  Urine Osmolality 552      [08-22-19 @ 20:00]    Iron 52, TIBC 252, %sat --      [08-19-19 @ 07:18]  Ferritin 368.5      [08-19-19 @ 07:18]  PTH 7.42 (Ca --)      [08-18-19 @ 18:52]   --    HCV 0.10, Nonreactive Hepatitis C AB  S/CO Ratio                        Interpretation  < 1.00                                   Non-Reactive  1.00 - 4.99                         Weakly-Reactive  >= 5.00                                Reactive  Non-Reactive: Aperson with a non-reactive HCV antibody  result is considered uninfected.  No further action is  needed unless recent infection is suspected.  In these  cases, consider repeat testing later to detect  seroconversion..  Weakly-Reactive: HCV antibody test is abnormal, HCV RNA  Qualitative test will follow.  Reactive: HCV antibody test is abnormal, HCV RNA  Qualitative test will follow.  Note: HCV antibody testing is performed on the [employer]   system.      [08-20-19 @ 07:13]    Free Light Chains: kappa 214.98, lambda 1.04, ratio = 206.71 H      [08-20 @ 07:13]    RADIOLOGY & ADDITIONAL STUDIES:

## 2019-08-23 NOTE — PROGRESS NOTE ADULT - ATTENDING COMMENTS
Pt with serum studies, and tissue flow cytometry/pathology consistent with MM with plasma cell neoplasm. To initiate RT to spinal lesions today (total 5 sessions planned). Will f/u with Heme regarding velcade and/or additional steroids.

## 2019-08-23 NOTE — CONSULT NOTE ADULT - SUBJECTIVE AND OBJECTIVE BOX
Dental paged for consult on 8/23/2019 for dental clearance for bisphosphonate tx. Limited bedside exam performed on 8/23/2019. Patient will need to be transported to [facility] dental clinic for full evaluation with radiographs. Appointment scheduled for 8/26/2019 at 1pm. Dental will arrange transport. Patient warrants an extraction of at least 2 teeth. Written medical clearance by MD team is required prior to patient's appointment. Medical clearance must address:  (1) Is patient medically optimized for invasive dental treatments (extractions).  (2) Is HTN well managed? Is local anesthesia with epinephrine permitted?   (3) How is the patient transportable?     Limited bedside performed. Patient reports tenderness in tooth #1 (erupted, class 3 mobility) and #14 (MO broken tooth), and discomfort tooth #23 (class 3 mobility). Patient reports that these three teeth have been bothering him.     Preliminary findings:  Generalized staining of teeth. Tooth #1 (erupted, class 3 mobility) and tooth #14 (MO broken tooth), and tooth #23 (class 3 mobility Tooth #1 and tooth #23 warrant extraction. Other remaining teeth appear clinically intact but, radiographs are necessary to fully evaluate. No acute signs of odontogenic infections present. EOE/IOE WNL.     Dental evaluation note to follow after appointment tomorrow in regards to dental clearance for bisphosphonate tx.     --------------------------------------  Patient is a 69y old  Male who presents with a chief complaint of Abdominal pain (23 Aug 2019 12:03)      HPI:  68 yo M former smoker with a PMHx of HTN, HLD, CAD s/p 1 DES 2015 who p/w acute exacerbation of his back & flank pain which has been ongoing for 2-3 months. The pain can radiate diffusely into the abdomen and is worse on movement. He has also had intermittent constipation, decreased appetite and an unintentional 6lb weight loss over 3 weeks. He notes that it started with L leg pain which migrated up to the bilateral flanks and then ascended to his spine about USP up with the pain stopped.  As an outpatient, he had an ultrasound showing renal cyst and in June 2019 he had a MR showing moderate central canal stenosis and R disc herniation at the L4-L5 level and mild formainal narrowing @ L4-L5 & L5-S1. No mention lytic lesions on MR. He also noticed a mid sternal non-painful mass for the past 2-3 weeks. Numbness and tingling in his L thumb and 2nd finger. Daughter states he went to a hematologist recently and had a marrow biopsy, but they do not know why. Denies fever, sweats, and chills. Alecia CP, SOB, and VEGA. No gait disturbance. No incontinence of stool or urine.     ED Course:  T 97.5, HR 60, /90, RR 16, SpO2 100% RA  S/p 5mg diazepam PO x1, lido patch, 4mg morphine IV x1, 1L NS x1. Hypercalcemia 12.1, PTH 7.42, serum protein 10.2, hgb 11.6. CT A/P revealing diffuse lytic expansile osseous lesions including a lesion at T8 causing mass effect on the spinal canal. Small bl thyroid nodules (18 Aug 2019 21:19)      PAST MEDICAL & SURGICAL HISTORY:  Coronary artery disease  HLD (hyperlipidemia)  HTN (hypertension)  No significant past surgical history      MEDICATIONS  (STANDING):  amLODIPine   Tablet 10 milliGRAM(s) Oral daily  aspirin enteric coated 81 milliGRAM(s) Oral daily  atorvastatin 40 milliGRAM(s) Oral at bedtime  docusate sodium 100 milliGRAM(s) Oral three times a day  enoxaparin Injectable 40 milliGRAM(s) SubCutaneous daily  ergocalciferol 06879 Unit(s) Oral every week  losartan 50 milliGRAM(s) Oral daily  metoprolol tartrate 25 milliGRAM(s) Oral two times a day  senna 2 Tablet(s) Oral at bedtime    MEDICATIONS  (PRN):  acetaminophen   Tablet .. 650 milliGRAM(s) Oral every 6 hours PRN Mild Pain (1 - 3)  lidocaine   Patch 1 Patch Transdermal daily PRN Pain  oxyCODONE    IR 5 milliGRAM(s) Oral every 6 hours PRN moderate to severe pain      Allergies    No Known Allergies    Intolerances        FAMILY HISTORY:  Maternal family history of hypertension: Mother      *SOCIAL HISTORY: Daughter was present at bedside exam. Daughter would like to be called prior to dental treatment. She would like to be present at the dental exam.     *Last Dental Visit: Recently.     Vital Signs Last 24 Hrs  T(C): 36.6 (23 Aug 2019 06:02), Max: 36.6 (23 Aug 2019 06:02)  T(F): 97.9 (23 Aug 2019 06:02), Max: 97.9 (23 Aug 2019 06:02)  HR: 48 (23 Aug 2019 06:02) (48 - 66)  BP: 126/57 (23 Aug 2019 06:02) (123/63 - 135/55)  BP(mean): --  RR: 16 (23 Aug 2019 06:02) (16 - 18)  SpO2: 100% (23 Aug 2019 06:02) (99% - 100%)    EOE:   TMJ  (  - ) clicks                    (  -  ) pops                    (  -  ) crepitus             Mandible FROM             Facial bones and MOM grossly intact             (  - ) trismus             (  - ) LAD             (  - ) swelling             (  - ) asymmetry             (  - ) palpation             (  - ) SOB             (  - ) dysphagia             (  - ) LOC  EOE WNL.     IOE:  permanent dentition: grossly intact, multiple missing teeth           hard/soft palate:  (  - ) palatal torus           tongue/FOM WNL           labial/buccal mucosa WNL           ( -  ) percussion           ( -  ) palpation           (  - ) swelling     No acute dental needs. Patient reports tenderness in tooth #1 (erupted, class 3 mobility) and #14 (MO broken tooth), and discomfort tooth #23 (class 3 mobility).    LABS:                        8.9    8.75  )-----------( 217      ( 23 Aug 2019 06:15 )             26.6     08-23    136  |  101  |  26<H>  ----------------------------<  90  4.0   |  23  |  1.17    Ca    9.5      23 Aug 2019 06:15  Phos  3.5     08-23  Mg     1.7     08-23    TPro  8.9<H>  /  Alb  x   /  TBili  x   /  DBili  x   /  AST  x   /  ALT  x   /  AlkPhos  x   08-22    WBC Count: 8.75 K/uL [3.8 - 10.5] (08-23 @ 06:15)  Platelet Count - Automated: 217 K/uL [150 - 400] (08-23 @ 06:15)  WBC Count: 9.63 K/uL [3.8 - 10.5] (08-22 @ 09:00)  Platelet Count - Automated: 247 K/uL [150 - 400] (08-22 @ 09:00)  WBC Count: 9.49 K/uL [3.8 - 10.5] (08-21 @ 06:00)  Platelet Count - Automated: 231 K/uL [150 - 400] (08-21 @ 06:00)        DENTAL RADIOGRAPHS: To be taken.     ASSESSMENT: Patient warrants an extraction of at least 2 teeth. Tooth #1 (erupted, class 3 mobility) and #14 (MO broken tooth),  tooth #23 (class 3 mobility). Further dental work up necessary.     PROCEDURE:  Limited bedside exam. Spoke to daughter who would like to be contacted prior to dental treatment.     RECOMMENDATIONS:  1) Transport to [facility] dental clinic for evaluation. Scheduled Monday 8/26/2019 at 1PM. Patient requires medical consult prior to transport. See above.   2) If any difficulty swallowing/breathing, fever occur, page dental.     Akhil Suarez DDS [id number] Dental paged for consult on 8/23/2019 for dental clearance for bisphosphonate tx. Limited bedside exam performed on 8/23/2019. Patient will need to be transported to [facility] dental clinic for full evaluation with radiographs. Appointment scheduled for 8/26/2019 at 1pm. Dental will arrange transport. Patient warrants an extraction of at least 2 teeth. Written medical clearance by MD team is required prior to patient's appointment. Medical clearance must address:  (1) Is patient medically optimized for invasive dental treatments (extractions).  (2) Is HTN well managed? Is local anesthesia with epinephrine permitted?   (3) How is the patient transportable?     Limited bedside performed. Patient reports tenderness in tooth #1 (erupted, class 3 mobility) and #14 (MO broken tooth), and discomfort tooth #23 (class 3 mobility). Patient reports that these three teeth have been bothering him.     Preliminary findings:  Generalized staining of teeth. Tooth #1 (erupted, class 3 mobility) and tooth #14 (MO broken tooth), and tooth #23 (class 3 mobility Tooth #1 and tooth #23 warrant extraction. Other remaining teeth appear clinically intact but, radiographs are necessary to fully evaluate. No acute signs of odontogenic infections present. EOE/IOE WNL.     Dental evaluation note to follow after appointment in regards to dental clearance for bisphosphonate tx.     --------------------------------------  Patient is a 69y old  Male who presents with a chief complaint of Abdominal pain (23 Aug 2019 12:03)      HPI:  68 yo M former smoker with a PMHx of HTN, HLD, CAD s/p 1 DES 2015 who p/w acute exacerbation of his back & flank pain which has been ongoing for 2-3 months. The pain can radiate diffusely into the abdomen and is worse on movement. He has also had intermittent constipation, decreased appetite and an unintentional 6lb weight loss over 3 weeks. He notes that it started with L leg pain which migrated up to the bilateral flanks and then ascended to his spine about prison up with the pain stopped.  As an outpatient, he had an ultrasound showing renal cyst and in June 2019 he had a MR showing moderate central canal stenosis and R disc herniation at the L4-L5 level and mild formainal narrowing @ L4-L5 & L5-S1. No mention lytic lesions on MR. He also noticed a mid sternal non-painful mass for the past 2-3 weeks. Numbness and tingling in his L thumb and 2nd finger. Daughter states he went to a hematologist recently and had a marrow biopsy, but they do not know why. Denies fever, sweats, and chills. Alecia CP, SOB, and VEGA. No gait disturbance. No incontinence of stool or urine.     ED Course:  T 97.5, HR 60, /90, RR 16, SpO2 100% RA  S/p 5mg diazepam PO x1, lido patch, 4mg morphine IV x1, 1L NS x1. Hypercalcemia 12.1, PTH 7.42, serum protein 10.2, hgb 11.6. CT A/P revealing diffuse lytic expansile osseous lesions including a lesion at T8 causing mass effect on the spinal canal. Small bl thyroid nodules (18 Aug 2019 21:19)      PAST MEDICAL & SURGICAL HISTORY:  Coronary artery disease  HLD (hyperlipidemia)  HTN (hypertension)  No significant past surgical history      MEDICATIONS  (STANDING):  amLODIPine   Tablet 10 milliGRAM(s) Oral daily  aspirin enteric coated 81 milliGRAM(s) Oral daily  atorvastatin 40 milliGRAM(s) Oral at bedtime  docusate sodium 100 milliGRAM(s) Oral three times a day  enoxaparin Injectable 40 milliGRAM(s) SubCutaneous daily  ergocalciferol 65053 Unit(s) Oral every week  losartan 50 milliGRAM(s) Oral daily  metoprolol tartrate 25 milliGRAM(s) Oral two times a day  senna 2 Tablet(s) Oral at bedtime    MEDICATIONS  (PRN):  acetaminophen   Tablet .. 650 milliGRAM(s) Oral every 6 hours PRN Mild Pain (1 - 3)  lidocaine   Patch 1 Patch Transdermal daily PRN Pain  oxyCODONE    IR 5 milliGRAM(s) Oral every 6 hours PRN moderate to severe pain      Allergies    No Known Allergies    Intolerances        FAMILY HISTORY:  Maternal family history of hypertension: Mother      *SOCIAL HISTORY: Daughter was present at bedside exam. Daughter would like to be called prior to dental treatment. She would like to be present at the dental exam.     *Last Dental Visit: Recently.     Vital Signs Last 24 Hrs  T(C): 36.6 (23 Aug 2019 06:02), Max: 36.6 (23 Aug 2019 06:02)  T(F): 97.9 (23 Aug 2019 06:02), Max: 97.9 (23 Aug 2019 06:02)  HR: 48 (23 Aug 2019 06:02) (48 - 66)  BP: 126/57 (23 Aug 2019 06:02) (123/63 - 135/55)  BP(mean): --  RR: 16 (23 Aug 2019 06:02) (16 - 18)  SpO2: 100% (23 Aug 2019 06:02) (99% - 100%)    EOE:   TMJ  (  - ) clicks                    (  -  ) pops                    (  -  ) crepitus             Mandible FROM             Facial bones and MOM grossly intact             (  - ) trismus             (  - ) LAD             (  - ) swelling             (  - ) asymmetry             (  - ) palpation             (  - ) SOB             (  - ) dysphagia             (  - ) LOC  EOE WNL.     IOE:  permanent dentition: grossly intact, multiple missing teeth           hard/soft palate:  (  - ) palatal torus           tongue/FOM WNL           labial/buccal mucosa WNL           ( -  ) percussion           ( -  ) palpation           (  - ) swelling     No acute dental needs. Patient reports tenderness in tooth #1 (erupted, class 3 mobility) and #14 (MO broken tooth), and discomfort tooth #23 (class 3 mobility).    LABS:                        8.9    8.75  )-----------( 217      ( 23 Aug 2019 06:15 )             26.6     08-23    136  |  101  |  26<H>  ----------------------------<  90  4.0   |  23  |  1.17    Ca    9.5      23 Aug 2019 06:15  Phos  3.5     08-23  Mg     1.7     08-23    TPro  8.9<H>  /  Alb  x   /  TBili  x   /  DBili  x   /  AST  x   /  ALT  x   /  AlkPhos  x   08-22    WBC Count: 8.75 K/uL [3.8 - 10.5] (08-23 @ 06:15)  Platelet Count - Automated: 217 K/uL [150 - 400] (08-23 @ 06:15)  WBC Count: 9.63 K/uL [3.8 - 10.5] (08-22 @ 09:00)  Platelet Count - Automated: 247 K/uL [150 - 400] (08-22 @ 09:00)  WBC Count: 9.49 K/uL [3.8 - 10.5] (08-21 @ 06:00)  Platelet Count - Automated: 231 K/uL [150 - 400] (08-21 @ 06:00)        DENTAL RADIOGRAPHS: To be taken.     ASSESSMENT: Patient warrants an extraction of at least 2 teeth. Tooth #1 (erupted, class 3 mobility) and #14 (MO broken tooth),  tooth #23 (class 3 mobility). Further dental work up necessary.     PROCEDURE:  Limited bedside exam. Spoke to daughter who would like to be contacted prior to dental treatment.     RECOMMENDATIONS:  1) Transport to [facility] dental clinic for evaluation. Scheduled Monday 8/26/2019 at 1PM. Patient requires medical consult prior to transport. See above.   2) If any difficulty swallowing/breathing, fever occur, page dental.     Akhil Suarez DDS [id number]

## 2019-08-23 NOTE — CHART NOTE - NSCHARTNOTEFT_GEN_A_CORE
Pt seen and examined at bedside. Pt noted that he had right sided chest and right upper quadrant abdominal pain that started around 10PM last night. The pain is 3/10, intermittent and changes location around his right chest and right upper abd. The pt attributes his pain to gas and said that he has had gas pain identical to this before. Belching alleviates the pain. Pt denies any shortness of breath, diaphoresis, nausea, vomiting, leg pain or swelling or any other new symptoms.     VS: T 97.9, HR 48, 126/57, RR 16, 100% RA    Physical exam:   General: NAD  CVS: bradycardic, no m/r/g  Pulm: CTA b/l   Abd: soft, non-tender, non-distended  Ext: no edema or tenderness to palpation     Plan:   -simethicone Pt seen and examined at bedside. Pt noted that he had right sided chest and right upper quadrant abdominal pain that started around 10PM last night. The pain is 3/10, intermittent and changes location around his right chest and right upper abd. The pt attributes his pain to gas and said that he has had gas pain identical to this before. Belching alleviates the pain. Pt denies any shortness of breath, diaphoresis, nausea, vomiting, leg pain or swelling or any other new symptoms.     VS: T 97.9, HR 48 (pt is bradycardic at baseline), 126/57, RR 16, 100% RA    Physical exam:   General: NAD  CVS: bradycardic, no m/r/g  Pulm: CTA b/l   Abd: soft, non-tender, non-distended  Ext: no edema or tenderness to palpation     Plan:   -simethicone

## 2019-08-23 NOTE — PROGRESS NOTE ADULT - PROBLEM SELECTOR PLAN 9
Patient is cleared medically for possible dental procedure.   He does not require prophylactic antibiotics for any potential dental procedure  He is cleared to have local anesthesia for any potential procedure  Patient can be transported with just wheelchair, without need for IV pole or any additional cardiac monitors.     [id number] if additional questions

## 2019-08-24 LAB
ANION GAP SERPL CALC-SCNC: 12 MMO/L — SIGNIFICANT CHANGE UP (ref 7–14)
BUN SERPL-MCNC: 28 MG/DL — HIGH (ref 7–23)
CALCIUM SERPL-MCNC: 9.5 MG/DL — SIGNIFICANT CHANGE UP (ref 8.4–10.5)
CHLORIDE SERPL-SCNC: 101 MMOL/L — SIGNIFICANT CHANGE UP (ref 98–107)
CO2 SERPL-SCNC: 19 MMOL/L — LOW (ref 22–31)
CREAT SERPL-MCNC: 1.34 MG/DL — HIGH (ref 0.5–1.3)
GLUCOSE BLDC GLUCOMTR-MCNC: 118 MG/DL — HIGH (ref 70–99)
GLUCOSE SERPL-MCNC: 111 MG/DL — HIGH (ref 70–99)
HCT VFR BLD CALC: 28.8 % — LOW (ref 39–50)
HGB BLD-MCNC: 9.5 G/DL — LOW (ref 13–17)
MAGNESIUM SERPL-MCNC: 1.8 MG/DL — SIGNIFICANT CHANGE UP (ref 1.6–2.6)
MCHC RBC-ENTMCNC: 29.7 PG — SIGNIFICANT CHANGE UP (ref 27–34)
MCHC RBC-ENTMCNC: 33 % — SIGNIFICANT CHANGE UP (ref 32–36)
MCV RBC AUTO: 90 FL — SIGNIFICANT CHANGE UP (ref 80–100)
NRBC # FLD: 0 K/UL — SIGNIFICANT CHANGE UP (ref 0–0)
PHOSPHATE SERPL-MCNC: 4.1 MG/DL — SIGNIFICANT CHANGE UP (ref 2.5–4.5)
PLATELET # BLD AUTO: 227 K/UL — SIGNIFICANT CHANGE UP (ref 150–400)
PMV BLD: 11.6 FL — SIGNIFICANT CHANGE UP (ref 7–13)
POTASSIUM SERPL-MCNC: 4.6 MMOL/L — SIGNIFICANT CHANGE UP (ref 3.5–5.3)
POTASSIUM SERPL-SCNC: 4.6 MMOL/L — SIGNIFICANT CHANGE UP (ref 3.5–5.3)
RBC # BLD: 3.2 M/UL — LOW (ref 4.2–5.8)
RBC # FLD: 12.6 % — SIGNIFICANT CHANGE UP (ref 10.3–14.5)
SODIUM SERPL-SCNC: 132 MMOL/L — LOW (ref 135–145)
WBC # BLD: 8.09 K/UL — SIGNIFICANT CHANGE UP (ref 3.8–10.5)
WBC # FLD AUTO: 8.09 K/UL — SIGNIFICANT CHANGE UP (ref 3.8–10.5)

## 2019-08-24 PROCEDURE — 99233 SBSQ HOSP IP/OBS HIGH 50: CPT | Mod: GC

## 2019-08-24 RX ORDER — POLYETHYLENE GLYCOL 3350 17 G/17G
17 POWDER, FOR SOLUTION ORAL DAILY
Refills: 0 | Status: DISCONTINUED | OUTPATIENT
Start: 2019-08-24 | End: 2019-08-29

## 2019-08-24 RX ORDER — SIMETHICONE 80 MG/1
80 TABLET, CHEWABLE ORAL THREE TIMES A DAY
Refills: 0 | Status: DISCONTINUED | OUTPATIENT
Start: 2019-08-24 | End: 2019-08-29

## 2019-08-24 RX ADMIN — LIDOCAINE 1 PATCH: 4 CREAM TOPICAL at 01:09

## 2019-08-24 RX ADMIN — ERGOCALCIFEROL 50000 UNIT(S): 1.25 CAPSULE ORAL at 13:50

## 2019-08-24 RX ADMIN — Medication 650 MILLIGRAM(S): at 07:05

## 2019-08-24 RX ADMIN — OXYCODONE HYDROCHLORIDE 5 MILLIGRAM(S): 5 TABLET ORAL at 17:27

## 2019-08-24 RX ADMIN — POLYETHYLENE GLYCOL 3350 17 GRAM(S): 17 POWDER, FOR SOLUTION ORAL at 13:50

## 2019-08-24 RX ADMIN — Medication 4 MILLIGRAM(S): at 17:32

## 2019-08-24 RX ADMIN — Medication 100 MILLIGRAM(S): at 13:51

## 2019-08-24 RX ADMIN — ATORVASTATIN CALCIUM 40 MILLIGRAM(S): 80 TABLET, FILM COATED ORAL at 21:48

## 2019-08-24 RX ADMIN — LOSARTAN POTASSIUM 50 MILLIGRAM(S): 100 TABLET, FILM COATED ORAL at 06:06

## 2019-08-24 RX ADMIN — Medication 100 MILLIGRAM(S): at 06:05

## 2019-08-24 RX ADMIN — SIMETHICONE 80 MILLIGRAM(S): 80 TABLET, CHEWABLE ORAL at 17:28

## 2019-08-24 RX ADMIN — SENNA PLUS 2 TABLET(S): 8.6 TABLET ORAL at 21:49

## 2019-08-24 RX ADMIN — Medication 81 MILLIGRAM(S): at 13:50

## 2019-08-24 RX ADMIN — Medication 100 MILLIGRAM(S): at 21:48

## 2019-08-24 RX ADMIN — ENOXAPARIN SODIUM 40 MILLIGRAM(S): 100 INJECTION SUBCUTANEOUS at 13:50

## 2019-08-24 RX ADMIN — OXYCODONE HYDROCHLORIDE 5 MILLIGRAM(S): 5 TABLET ORAL at 18:22

## 2019-08-24 RX ADMIN — Medication 650 MILLIGRAM(S): at 06:05

## 2019-08-24 RX ADMIN — SIMETHICONE 80 MILLIGRAM(S): 80 TABLET, CHEWABLE ORAL at 13:49

## 2019-08-24 RX ADMIN — Medication 4 MILLIGRAM(S): at 06:12

## 2019-08-24 RX ADMIN — SIMETHICONE 80 MILLIGRAM(S): 80 TABLET, CHEWABLE ORAL at 21:48

## 2019-08-24 RX ADMIN — AMLODIPINE BESYLATE 10 MILLIGRAM(S): 2.5 TABLET ORAL at 06:06

## 2019-08-24 NOTE — PROGRESS NOTE ADULT - PROBLEM SELECTOR PLAN 7
CAD s/p 1 YAMILET (performed in [facility] in 2015)  -high intensity statin  -ASA 81mg qd  -Metoprolol 25mg bid as HR tolerate

## 2019-08-24 NOTE — PROGRESS NOTE ADULT - PROBLEM SELECTOR PLAN 9
Patient is cleared medically for possible dental procedure.   He does not require prophylactic antibiotics for any potential dental procedure  He is cleared to have local anesthesia for any potential procedure  Patient can be transported with just wheelchair, without need for IV pole or any additional cardiac monitors.     [id number] if additional questions

## 2019-08-24 NOTE — PROGRESS NOTE ADULT - SUBJECTIVE AND OBJECTIVE BOX
Patient is a 69y old  Male who presents with a chief complaint of Abdominal pain (23 Aug 2019 15:13)      SUBJECTIVE / OVERNIGHT EVENTS: overnight no events, patient has no active symptoms.    MEDICATIONS  (STANDING):  amLODIPine   Tablet 10 milliGRAM(s) Oral daily  aspirin enteric coated 81 milliGRAM(s) Oral daily  atorvastatin 40 milliGRAM(s) Oral at bedtime  dexamethasone  Injectable 4 milliGRAM(s) IV Push two times a day  docusate sodium 100 milliGRAM(s) Oral three times a day  enoxaparin Injectable 40 milliGRAM(s) SubCutaneous daily  ergocalciferol 88109 Unit(s) Oral every week  losartan 50 milliGRAM(s) Oral daily  metoprolol tartrate 25 milliGRAM(s) Oral two times a day  senna 2 Tablet(s) Oral at bedtime    MEDICATIONS  (PRN):  acetaminophen   Tablet .. 650 milliGRAM(s) Oral every 6 hours PRN Mild Pain (1 - 3)  lidocaine   Patch 1 Patch Transdermal daily PRN Pain  oxyCODONE    IR 5 milliGRAM(s) Oral every 6 hours PRN moderate to severe pain        CAPILLARY BLOOD GLUCOSE      POCT Blood Glucose.: 118 mg/dL (24 Aug 2019 08:58)    I&O's Summary    23 Aug 2019 07:01  -  24 Aug 2019 07:00  --------------------------------------------------------  IN: 970 mL / OUT: 300 mL / NET: 670 mL      Vital Signs Last 24 Hrs  T(C): 36.4 (24 Aug 2019 05:47), Max: 36.8 (23 Aug 2019 15:17)  T(F): 97.5 (24 Aug 2019 05:47), Max: 98.2 (23 Aug 2019 15:17)  HR: 58 (24 Aug 2019 05:47) (54 - 58)  BP: 141/67 (24 Aug 2019 05:47) (109/57 - 141/67)  BP(mean): --  RR: 16 (24 Aug 2019 05:47) (16 - 18)  SpO2: 97% (24 Aug 2019 05:47) (97% - 100%)      PHYSICAL EXAM:  General: A/ox 3, No acute Distress  Neck: Supple, NO JVD  Cardiac: S1 S2, No M/R/G  Pulmonary: CTAB, Breathing unlabored, No Rhonchi/Rales/Wheezing  Abdomen: Soft, Non -tender, +BS. No rebound or guarding  Extremities: No Rashes, No edema  Neuro: A/o x 3, No focal deficits  Psch: normal mood , normal affect    LABS:                        9.5    8.09  )-----------( 227      ( 24 Aug 2019 07:00 )             28.8     08-24    132<L>  |  101  |  28<H>  ----------------------------<  111<H>  4.6   |  19<L>  |  1.34<H>    Ca    9.5      24 Aug 2019 07:00  Phos  4.1     08-24  Mg     1.8     08-24

## 2019-08-24 NOTE — PROGRESS NOTE ADULT - SUBJECTIVE AND OBJECTIVE BOX
[facility]   MD ANABEL MOSS D.O, PA    TELEPHONE NUMBERS:  OFFICE: [phone]  DR. HOLT CELL: [phone]  AGUEDA GONZALEZ CELL: [phone]  DR. HERNANDEZ CELL:  [phone]    RENAL FOLLOW UP NOTE  --------------------------------------------------------------------------------  HPI: Pt seen and examined at bedside. Pt has no complaints   Denies SOB, chest pain     PAST HISTORY  --------------------------------------------------------------------------------  No significant changes to PMH, PSH, FHx, SHx, unless otherwise noted    ALLERGIES & MEDICATIONS  --------------------------------------------------------------------------------  Allergies    No Known Allergies    Intolerances      Standing Inpatient Medications  amLODIPine   Tablet 10 milliGRAM(s) Oral daily  aspirin enteric coated 81 milliGRAM(s) Oral daily  atorvastatin 40 milliGRAM(s) Oral at bedtime  dexamethasone  Injectable 4 milliGRAM(s) IV Push two times a day  docusate sodium 100 milliGRAM(s) Oral three times a day  enoxaparin Injectable 40 milliGRAM(s) SubCutaneous daily  ergocalciferol 40575 Unit(s) Oral every week  losartan 50 milliGRAM(s) Oral daily  metoprolol tartrate 25 milliGRAM(s) Oral two times a day  senna 2 Tablet(s) Oral at bedtime    PRN Inpatient Medications  acetaminophen   Tablet .. 650 milliGRAM(s) Oral every 6 hours PRN  lidocaine   Patch 1 Patch Transdermal daily PRN  oxyCODONE    IR 5 milliGRAM(s) Oral every 6 hours PRN      REVIEW OF SYSTEMS  --------------------------------------------------------------------------------  General: no fever  CVS: no chest pain  RESP: no sob, no cough  ABD: no abdominal pain  : no dysuria,  MSK: no edema     VITALS/PHYSICAL EXAM  --------------------------------------------------------------------------------  T(C): 36.4 (08-24-19 @ 05:47), Max: 36.8 (08-23-19 @ 15:17)  HR: 58 (08-24-19 @ 05:47) (54 - 58)  BP: 141/67 (08-24-19 @ 05:47) (109/57 - 141/67)  RR: 16 (08-24-19 @ 05:47) (16 - 18)  SpO2: 97% (08-24-19 @ 05:47) (97% - 100%)  Wt(kg): --        08-23-19 @ 07:01  -  08-24-19 @ 07:00  --------------------------------------------------------  IN: 970 mL / OUT: 300 mL / NET: 670 mL      Physical Exam:  	Gen: NAD  	HEENT: MMM  	Pulm: CTA B/L  	CV: S1S2  	Abd: Soft, +BS  	Ext: No LE edema B/L                      Neuro: Awake   	Skin: Warm and Dry       LABS/STUDIES  --------------------------------------------------------------------------------              9.5    8.09  >-----------<  227      [08-24-19 @ 07:00]              28.8     132  |  101  |  28  ----------------------------<  111      [08-24-19 @ 07:00]  4.6   |  19  |  1.34        Ca     9.5     [08-24-19 @ 07:00]      Mg     1.8     [08-24-19 @ 07:00]      Phos  4.1     [08-24-19 @ 07:00]    Creatinine Trend:  SCr 1.34 [08-24 @ 07:00]  SCr 1.17 [08-23 @ 06:15]  SCr 1.02 [08-22 @ 09:00]  SCr 1.24 [08-21 @ 06:00]  SCr 1.27 [08-20 @ 07:13]    Urinalysis - [08-18-19 @ 15:45]      Color COLORLESS / Appearnce CLEAR / SG 1.005 / pH 7.5      Gluc NEGATIVE / Ketone NEGATIVE  / Bili NEGATIVE / Urobili NORMAL       Blood NEGATIVE / Protein 10 / Leuk Est NEGATIVE / Nitrite NEGATIVE      RBC  / WBC  / Hyaline  / Gran  / Sq Epi  / Non Sq Epi  / Bacteria     Urine Protein 19.5      [08-22-19 @ 04:30]  Urine Sodium < 20      [08-22-19 @ 20:00]  Urine Osmolality 552      [08-22-19 @ 20:00]    Iron 52, TIBC 252, %sat --      [08-19-19 @ 07:18]  Ferritin 368.5      [08-19-19 @ 07:18]  PTH 7.42 (Ca --)      [08-18-19 @ 18:52]   --  Vitamin D (25OH) 19.6      [08-23-19 @ 06:15]    HCV 0.10, Nonreactive Hepatitis C AB  S/CO Ratio                        Interpretation  < 1.00                                   Non-Reactive  1.00 - 4.99                         Weakly-Reactive  >= 5.00                                Reactive  Non-Reactive: Aperson with a non-reactive HCV antibody  result is considered uninfected.  No further action is  needed unless recent infection is suspected.  In these  cases, consider repeat testing later to detect  seroconversion..  Weakly-Reactive: HCV antibody test is abnormal, HCV RNA  Qualitative test will follow.  Reactive: HCV antibody test is abnormal, HCV RNA  Qualitative test will follow.  Note: HCV antibody testing is performed on the [employer]   system.      [08-20-19 @ 07:13]    Free Light Chains: kappa 214.98, lambda 1.04, ratio = 206.71 H      [08-20 @ 07:13]

## 2019-08-24 NOTE — PROGRESS NOTE ADULT - ASSESSMENT
70 yo M former smoker with a PMHx of HTN, HLD, CAD s/p 1 YAMILET who p/w acute exacerbation over the past 3 days of his ongoing bilateral flank pain x 2 months found to have diffuse osteolytic lesions on CT chest, A/P concerning for diffuse metastatic disease, weight loss, primary cancer not found at this time. Patient also with hypercalcemia w/o clear evidence of primary malignancy, initial labs c/w MM. s/p BM biopsy. Radiation to spinal lesions Day 1/5, will resume on monday

## 2019-08-24 NOTE — PROGRESS NOTE ADULT - PROBLEM SELECTOR PLAN 1
Diffuse osteolytic lesions concerning for multiple myeloma. Also concern for metastasis though primary cancer not yet known, labs most c/w MM at this time. MRI showing moderate central spinal canal stenosis and prominent T8 and C7 lesions. 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 Anemia at that time c/w NANDINI

## 2019-08-24 NOTE — PROGRESS NOTE ADULT - ASSESSMENT
68 yo M former smoker with a PMHx of HTN, HLD, CAD s/p 1 DES 2015 who p/w acute exacerbation of his back & flank pain found on CT new lytic expansile osseous lesions possible mets of unclear primary    CRISTI  Pt with elevated Scr to 1.3 today.   Baseline 1.0  Recommend giving IV fluids today--NS @60cc/hr for 12 hours today  Pt also on losartan. 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 Anemia at that time c/w NANDINI.  pending vit d 1,25, vit d 25  pth related peptide   completed NS @ 150cc/hr x 10 hrs.  calcium improving   monitor bmp    HTN  controlled  on norvasc 10, metoprolol 25 bid and losartan 50  monitor    Proteinuria  mild  check urine p/c ratio  on losartan    Hyponatremia  hx of siadh in the past   urine osm high however urine na low, less likely SIADH, more consistent with hypovolemia/poor oral intake   IVF as above  monitor 70 yo M former smoker with a PMHx of HTN, HLD, CAD s/p 1 DES 2015 who p/w acute exacerbation of his back & flank pain found on CT new lytic expansile osseous lesions possible mets of unclear primary    CRISTI  Pt with elevated Scr to 1.3 today.   Baseline 1.0  Recommend giving IV fluids today--NS @60cc/hr for 12 hours today  Pt also on losartan. 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 Anemia at that time c/w NANDINI.  pending vit d 1,25, vit d 25  pth related peptide   completed NS @ 150cc/hr x 10 hrs.  calcium improving   monitor bmp    HTN  controlled  on norvasc 10, metoprolol 25 bid and losartan 50  monitor    Proteinuria  mild  check urine p/c ratio  on losartan    Hyponatremia  hx of siadh in the past   urine osm high however urine na low, less likely SIADH, more consistent with hypovolemia/poor oral intake   IVF as above  monitor    Vitamin D deficiency   Vitamin D low, monitor

## 2019-08-25 LAB
ANION GAP SERPL CALC-SCNC: 11 MMO/L — SIGNIFICANT CHANGE UP (ref 7–14)
BUN SERPL-MCNC: 26 MG/DL — HIGH (ref 7–23)
CALCIUM SERPL-MCNC: 9.5 MG/DL — SIGNIFICANT CHANGE UP (ref 8.4–10.5)
CHLORIDE SERPL-SCNC: 99 MMOL/L — SIGNIFICANT CHANGE UP (ref 98–107)
CO2 SERPL-SCNC: 23 MMOL/L — SIGNIFICANT CHANGE UP (ref 22–31)
CREAT SERPL-MCNC: 1.03 MG/DL — SIGNIFICANT CHANGE UP (ref 0.5–1.3)
GLUCOSE SERPL-MCNC: 108 MG/DL — HIGH (ref 70–99)
HCT VFR BLD CALC: 30 % — LOW (ref 39–50)
HGB BLD-MCNC: 10.1 G/DL — LOW (ref 13–17)
MAGNESIUM SERPL-MCNC: 2 MG/DL — SIGNIFICANT CHANGE UP (ref 1.6–2.6)
MCHC RBC-ENTMCNC: 29.8 PG — SIGNIFICANT CHANGE UP (ref 27–34)
MCHC RBC-ENTMCNC: 33.7 % — SIGNIFICANT CHANGE UP (ref 32–36)
MCV RBC AUTO: 88.5 FL — SIGNIFICANT CHANGE UP (ref 80–100)
NRBC # FLD: 0 K/UL — SIGNIFICANT CHANGE UP (ref 0–0)
PHOSPHATE SERPL-MCNC: 3.8 MG/DL — SIGNIFICANT CHANGE UP (ref 2.5–4.5)
PLATELET # BLD AUTO: 271 K/UL — SIGNIFICANT CHANGE UP (ref 150–400)
PMV BLD: 11.2 FL — SIGNIFICANT CHANGE UP (ref 7–13)
POTASSIUM SERPL-MCNC: 4.2 MMOL/L — SIGNIFICANT CHANGE UP (ref 3.5–5.3)
POTASSIUM SERPL-SCNC: 4.2 MMOL/L — SIGNIFICANT CHANGE UP (ref 3.5–5.3)
RBC # BLD: 3.39 M/UL — LOW (ref 4.2–5.8)
RBC # FLD: 12.6 % — SIGNIFICANT CHANGE UP (ref 10.3–14.5)
SODIUM SERPL-SCNC: 133 MMOL/L — LOW (ref 135–145)
WBC # BLD: 12.27 K/UL — HIGH (ref 3.8–10.5)
WBC # FLD AUTO: 12.27 K/UL — HIGH (ref 3.8–10.5)

## 2019-08-25 PROCEDURE — 99233 SBSQ HOSP IP/OBS HIGH 50: CPT | Mod: GC

## 2019-08-25 RX ADMIN — OXYCODONE HYDROCHLORIDE 5 MILLIGRAM(S): 5 TABLET ORAL at 07:32

## 2019-08-25 RX ADMIN — Medication 81 MILLIGRAM(S): at 13:13

## 2019-08-25 RX ADMIN — Medication 100 MILLIGRAM(S): at 06:35

## 2019-08-25 RX ADMIN — OXYCODONE HYDROCHLORIDE 5 MILLIGRAM(S): 5 TABLET ORAL at 19:06

## 2019-08-25 RX ADMIN — OXYCODONE HYDROCHLORIDE 5 MILLIGRAM(S): 5 TABLET ORAL at 18:18

## 2019-08-25 RX ADMIN — Medication 100 MILLIGRAM(S): at 21:00

## 2019-08-25 RX ADMIN — LOSARTAN POTASSIUM 50 MILLIGRAM(S): 100 TABLET, FILM COATED ORAL at 06:35

## 2019-08-25 RX ADMIN — Medication 4 MILLIGRAM(S): at 18:10

## 2019-08-25 RX ADMIN — SIMETHICONE 80 MILLIGRAM(S): 80 TABLET, CHEWABLE ORAL at 13:16

## 2019-08-25 RX ADMIN — SIMETHICONE 80 MILLIGRAM(S): 80 TABLET, CHEWABLE ORAL at 21:00

## 2019-08-25 RX ADMIN — Medication 100 MILLIGRAM(S): at 13:14

## 2019-08-25 RX ADMIN — SENNA PLUS 2 TABLET(S): 8.6 TABLET ORAL at 21:01

## 2019-08-25 RX ADMIN — OXYCODONE HYDROCHLORIDE 5 MILLIGRAM(S): 5 TABLET ORAL at 06:35

## 2019-08-25 RX ADMIN — POLYETHYLENE GLYCOL 3350 17 GRAM(S): 17 POWDER, FOR SOLUTION ORAL at 13:14

## 2019-08-25 RX ADMIN — ATORVASTATIN CALCIUM 40 MILLIGRAM(S): 80 TABLET, FILM COATED ORAL at 21:01

## 2019-08-25 RX ADMIN — Medication 30 MILLILITER(S): at 18:18

## 2019-08-25 RX ADMIN — AMLODIPINE BESYLATE 10 MILLIGRAM(S): 2.5 TABLET ORAL at 06:35

## 2019-08-25 RX ADMIN — Medication 4 MILLIGRAM(S): at 06:36

## 2019-08-25 NOTE — PROGRESS NOTE ADULT - PROBLEM SELECTOR PLAN 1
Diffuse osteolytic lesions concerning for multiple myeloma. Also concern for metastasis though primary cancer not yet known, labs most c/w MM at this time. MRI showing moderate central spinal canal stenosis and prominent T8 and C7 lesions. 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 Anemia at that time c/w NANDINI - Likely multiple myeloma given bone marrow bx, but other potential primary malignancy not ruled out. MRI showing moderate central spinal canal stenosis and prominent T8 and C7 lesions.   - Started radiation, s/p 1 day, to resume 8/26  - Dental procedure on 8/26 in advance of being able to get bisphosphonates  - Oncology following: chemo to resume this week, continuing on dexamethasone.

## 2019-08-25 NOTE — PROGRESS NOTE ADULT - SUBJECTIVE AND OBJECTIVE BOX
Sarah Barker M.D.  Beeper: [phone] ([facility])/ [phone] ([facility])     INTERNAL MEDICINE PROGRESS NOTE    Patient is a 69y old  Male who presents with a chief complaint of Abdominal pain (24 Aug 2019 11:33)      Overnight events:  Subjective:    Medications:  MEDICATIONS  (STANDING):  amLODIPine   Tablet 10 milliGRAM(s) Oral daily  aspirin enteric coated 81 milliGRAM(s) Oral daily  atorvastatin 40 milliGRAM(s) Oral at bedtime  dexamethasone  Injectable 4 milliGRAM(s) IV Push two times a day  docusate sodium 100 milliGRAM(s) Oral three times a day  enoxaparin Injectable 40 milliGRAM(s) SubCutaneous daily  ergocalciferol 46450 Unit(s) Oral every week  losartan 50 milliGRAM(s) Oral daily  metoprolol tartrate 25 milliGRAM(s) Oral two times a day  polyethylene glycol 3350 17 Gram(s) Oral daily  senna 2 Tablet(s) Oral at bedtime    MEDICATIONS  (PRN):  acetaminophen   Tablet .. 650 milliGRAM(s) Oral every 6 hours PRN Mild Pain (1 - 3)  lidocaine   Patch 1 Patch Transdermal daily PRN Pain  oxyCODONE    IR 5 milliGRAM(s) Oral every 6 hours PRN moderate to severe pain  simethicone 80 milliGRAM(s) Chew three times a day PRN Gas      Objective:    Vitals: Vital Signs Last 24 Hrs  T(C): 36.4 (08-25-19 @ 06:12), Max: 36.7 (08-24-19 @ 13:26)  T(F): 97.6 (08-25-19 @ 06:12), Max: 98 (08-24-19 @ 13:26)  HR: 57 (08-25-19 @ 06:12) (55 - 57)  BP: 159/66 (08-25-19 @ 06:12) (140/62 - 159/66)  BP(mean): --  RR: 20 (08-25-19 @ 06:12) (17 - 20)  SpO2: 99% (08-25-19 @ 06:12) (97% - 100%)              I&O's Summary    24 Aug 2019 07:01  -  25 Aug 2019 07:00  --------------------------------------------------------  IN: 120 mL / OUT: 0 mL / NET: 120 mL        PHYSICAL EXAM:  GENERAL: NAD, well-groomed, well-developed  EYES: EOMI, PERRLA, conjunctiva and sclera clear  ENMT: No tonsillar erythema, exudates, or enlargement; Moist mucous membranes, Good dentition, No lesions  NECK: Supple, No JVD, Normal thyroid  CHEST/LUNG: Clear to auscultation bilaterally; No rales, rhonchi, wheezing, or rubs  HEART: Regular rate and rhythm; No murmurs, rubs, or gallops  ABDOMEN: Soft, Nontender, Nondistended; Bowel sounds present  EXTREMITIES:  2+ Peripheral Pulses, No clubbing, cyanosis, or edema  LYMPH: No lymphadenopathy noted  SKIN: No rashes or lesions  NERVOUS SYSTEM:  Alert & Oriented X3, Good concentration; Motor Strength 5/5 B/L upper and lower extremities; DTRs 2+ intact and symmetric                                             LABS:  08-24    132<L>  |  101  |  28<H>  ----------------------------<  111<H>  4.6   |  19<L>  |  1.34<H>  08-23    136  |  101  |  26<H>  ----------------------------<  90  4.0   |  23  |  1.17  08-22    134<L>  |  99  |  20  ----------------------------<  120<H>  4.4   |  22  |  1.02    Ca    9.5      24 Aug 2019 07:00  Ca    9.5      23 Aug 2019 06:15  Ca    10.3      22 Aug 2019 09:00  Phos  4.1     08-24  Mg     1.8     08-24                                                      9.5    8.09  )-----------( 227      ( 24 Aug 2019 07:00 )             28.8                         8.9    8.75  )-----------( 217      ( 23 Aug 2019 06:15 )             26.6                         10.3   9.63  )-----------( 247      ( 22 Aug 2019 09:00 )             31.5     CAPILLARY BLOOD GLUCOSE      POCT Blood Glucose.: 118 mg/dL (24 Aug 2019 08:58)        RECENT CULTURES:  08-18 @ 15:54  URINE MIDSTREAM  --  --  --  --  --      RADIOLOGY & ADDITIONAL TESTS:    Consultants involved in case: Sarah Barker M.D.  Beeper: [phone] ([facility])/ [phone] ([place])     INTERNAL MEDICINE PROGRESS NOTE    Patient is a 69y old  Male who presents with a chief complaint of Abdominal pain (24 Aug 2019 11:33)    Overnight events: HR 57 once, metoprolol held.  Subjective: not sleeping well, c/o gas pain radiating to R side, R side CP is resolved when gas pain resolves. no f/c/cp/sob/n/v/d/ has little stool on bowel regimen.    Medications:  MEDICATIONS  (STANDING):  amLODIPine   Tablet 10 milliGRAM(s) Oral daily  aspirin enteric coated 81 milliGRAM(s) Oral daily  atorvastatin 40 milliGRAM(s) Oral at bedtime  dexamethasone  Injectable 4 milliGRAM(s) IV Push two times a day  docusate sodium 100 milliGRAM(s) Oral three times a day  enoxaparin Injectable 40 milliGRAM(s) SubCutaneous daily  ergocalciferol 52122 Unit(s) Oral every week  losartan 50 milliGRAM(s) Oral daily  metoprolol tartrate 25 milliGRAM(s) Oral two times a day  polyethylene glycol 3350 17 Gram(s) Oral daily  senna 2 Tablet(s) Oral at bedtime    MEDICATIONS  (PRN):  acetaminophen   Tablet .. 650 milliGRAM(s) Oral every 6 hours PRN Mild Pain (1 - 3)  lidocaine   Patch 1 Patch Transdermal daily PRN Pain  oxyCODONE    IR 5 milliGRAM(s) Oral every 6 hours PRN moderate to severe pain  simethicone 80 milliGRAM(s) Chew three times a day PRN Gas    Objective:    Vitals: Vital Signs Last 24 Hrs  T(C): 36.4 (08-25-19 @ 06:12), Max: 36.7 (08-24-19 @ 13:26)  T(F): 97.6 (08-25-19 @ 06:12), Max: 98 (08-24-19 @ 13:26)  HR: 57 (08-25-19 @ 06:12) (55 - 57)  BP: 159/66 (08-25-19 @ 06:12) (140/62 - 159/66)  BP(mean): --  RR: 20 (08-25-19 @ 06:12) (17 - 20)  SpO2: 99% (08-25-19 @ 06:12) (97% - 100%)                I&O's Summary  24 Aug 2019 07:01  -  25 Aug 2019 07:00  --------------------------------------------------------  IN: 120 mL / OUT: 0 mL / NET: 120 mL    PHYSICAL EXAM:  GENERAL: NAD, well-groomed, well-developed, conversant  NECK: Skin marked with radiation pointer site  CHEST/LUNG: Clear to auscultation bilaterally; No rales, rhonchi, wheezing, or rubs  HEART: Regular rate and rhythm; No murmurs, rubs, or gallops  ABDOMEN: Soft, Nontender, Nondistended; Bowel sounds present  EXTREMITIES:  2+ Peripheral Pulses, No clubbing, cyanosis, or edema  Back L lateral lower back tenderness.  SKIN: No rashes or lesions  NERVOUS SYSTEM:  Alert & Oriented, Good concentration.    LABS:  08-25    133<L>  |  99  |  26<H>  ----------------------------<  108<H>  4.2   |  23  |  1.03  08-24    132<L>  |  101  |  28<H>  ----------------------------<  111<H>  4.6   |  19<L>  |  1.34<H>  08-23    136  |  101  |  26<H>  ----------------------------<  90  4.0   |  23  |  1.17    Ca    9.5      25 Aug 2019 07:25  Ca    9.5      24 Aug 2019 07:00  Ca    9.5      23 Aug 2019 06:15  Phos  3.8     08-25  Mg     2.0     08-25                   10.1   12.27 )-----------( 271      ( 25 Aug 2019 07:25 )             30.0                         9.5    8.09  )-----------( 227      ( 24 Aug 2019 07:00 )             28.8                         8.9    8.75  )-----------( 217      ( 23 Aug 2019 06:15 )             26.6     RADIOLOGY & ADDITIONAL TESTS: none new    Consultants involved in case: oncology, dentistry, rad-onc

## 2019-08-25 NOTE — PROGRESS NOTE ADULT - PROBLEM SELECTOR PLAN 9
Patient is cleared medically for possible dental procedure.   He does not require prophylactic antibiotics for any potential dental procedure  He is cleared to have local anesthesia for any potential procedure  Patient can be transported with just wheelchair, without need for IV pole or any additional cardiac monitors.     [phone] if additional questions Patient is cleared medically for possible dental procedure.   He does not require prophylactic antibiotics for any potential dental procedure  He is cleared to have local anesthesia for any potential procedure  Patient can be transported with just wheelchair, without need for IV pole or any additional cardiac monitors.     call spectra [phone] if additional questions

## 2019-08-25 NOTE — PROGRESS NOTE ADULT - PROBLEM SELECTOR PLAN 8
DVT PPx: subq enoxaparin.   Diet: DASH  Dispo: Likely home, no PT needs Gas pain: simethicone prn ordered/  Bowel regimen: colace, senna nad miralax ordered  DVT PPx: subq enoxaparin.   Diet: DASH  Dispo: Likely home, no PT needs, s/p future tx plans per onc and rad onc.

## 2019-08-25 NOTE — PROGRESS NOTE ADULT - PROBLEM SELECTOR PLAN 7
CAD s/p 1 YAMILET (performed in [facility] in 2015)  -high intensity statin  -ASA 81mg qd  -Metoprolol 25mg bid as HR tolerate

## 2019-08-25 NOTE — PROVIDER CONTACT NOTE (OTHER) - RECOMMENDATIONS
Monitor
Continue to monitor pt
Continue to monitor pt
Continue to monitor pt, as per MD no intervention needed at this time, As per MD okay to give pt oxycodone PO
EKG
Monitor

## 2019-08-25 NOTE — PROGRESS NOTE ADULT - PROBLEM SELECTOR PLAN 2
Resolved. Hypercalcemia 2/2 diffuse osteolytic lesions, down from 12.1-->9.5  -trend Ca  -Encourage PO Resolved. Hypercalcemia 2/2 diffuse osteolytic lesions.   - trend Ca  - to get dental procedure in advance of being able to get bisphosphonates

## 2019-08-25 NOTE — PROGRESS NOTE ADULT - ASSESSMENT
68 yo M former smoker with a PMHx of HTN, HLD, CAD s/p 1 DES 2015 who p/w acute exacerbation of his back & flank pain found on CT new lytic expansile osseous lesions possible mets of unclear primary    CRISTI  Pt improved Scr to 1.03 today.   Baseline 1.0  Pt also on losartan. 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 Anemia at that time c/w NANDINI.  pending vit d 1,25, vit d 25  pth related peptide   completed IVF  calcium improving   monitor bmp    HTN  controlled  on norvasc 10, metoprolol 25 bid and losartan 50  monitor    Proteinuria  mild  check urine p/c ratio  on losartan    Hyponatremia  hx of siadh in the past   urine osm high however urine na low, less likely SIADH, more consistent with hypovolemia/poor oral intake   monitor    Vitamin D deficiency   Vitamin D low, monitor

## 2019-08-25 NOTE — PROGRESS NOTE ADULT - SUBJECTIVE AND OBJECTIVE BOX
[facility]   MD ANABEL MOSS D.O, PA    TELEPHONE NUMBERS:  OFFICE: [phone]  DR. HOLT CELL: [phone]  AGUEDA GONZALEZ CELL: [phone]  DR. HERNANDEZ CELL:  [phone]    RENAL FOLLOW UP NOTE  --------------------------------------------------------------------------------  HPI: Pt seen and examined at bedside. Pt complains of gas. No other complaints.   Denies SOB, chest pain     PAST HISTORY  --------------------------------------------------------------------------------  No significant changes to PMH, PSH, FHx, SHx, unless otherwise noted    ALLERGIES & MEDICATIONS  --------------------------------------------------------------------------------  Allergies    No Known Allergies    Intolerances      Standing Inpatient Medications  amLODIPine   Tablet 10 milliGRAM(s) Oral daily  aspirin enteric coated 81 milliGRAM(s) Oral daily  atorvastatin 40 milliGRAM(s) Oral at bedtime  dexamethasone  Injectable 4 milliGRAM(s) IV Push two times a day  docusate sodium 100 milliGRAM(s) Oral three times a day  enoxaparin Injectable 40 milliGRAM(s) SubCutaneous daily  ergocalciferol 07922 Unit(s) Oral every week  losartan 50 milliGRAM(s) Oral daily  metoprolol tartrate 25 milliGRAM(s) Oral two times a day  polyethylene glycol 3350 17 Gram(s) Oral daily  senna 2 Tablet(s) Oral at bedtime    PRN Inpatient Medications  acetaminophen   Tablet .. 650 milliGRAM(s) Oral every 6 hours PRN  lidocaine   Patch 1 Patch Transdermal daily PRN  oxyCODONE    IR 5 milliGRAM(s) Oral every 6 hours PRN  simethicone 80 milliGRAM(s) Chew three times a day PRN      REVIEW OF SYSTEMS  --------------------------------------------------------------------------------  General: no fever  CVS: no chest pain  RESP: no sob, no cough  ABD: no abdominal pain  : no dysuria  MSK: no edema     VITALS/PHYSICAL EXAM  --------------------------------------------------------------------------------  T(C): 36.4 (08-25-19 @ 06:12), Max: 36.7 (08-24-19 @ 13:26)  HR: 57 (08-25-19 @ 06:12) (55 - 57)  BP: 159/66 (08-25-19 @ 06:12) (140/62 - 159/66)  RR: 20 (08-25-19 @ 06:12) (17 - 20)  SpO2: 99% (08-25-19 @ 06:12) (97% - 100%)  Wt(kg): --        08-24-19 @ 07:01  -  08-25-19 @ 07:00  --------------------------------------------------------  IN: 120 mL / OUT: 0 mL / NET: 120 mL      Physical Exam:  	Gen: NAD  	HEENT: MMM  	Pulm: CTA B/L  	CV: S1S2  	Abd: Soft, +BS  	Ext: No LE edema B/L                      Neuro: Awake   	Skin: Warm and Dry     LABS/STUDIES  --------------------------------------------------------------------------------              10.1   12.27 >-----------<  271      [08-25-19 @ 07:25]              30.0     133  |  99  |  26  ----------------------------<  108      [08-25-19 @ 07:25]  4.2   |  23  |  1.03        Ca     9.5     [08-25-19 @ 07:25]      Mg     2.0     [08-25-19 @ 07:25]      Phos  3.8     [08-25-19 @ 07:25]    Creatinine Trend:  SCr 1.03 [08-25 @ 07:25]  SCr 1.34 [08-24 @ 07:00]  SCr 1.17 [08-23 @ 06:15]  SCr 1.02 [08-22 @ 09:00]  SCr 1.24 [08-21 @ 06:00]    Urinalysis - [08-18-19 @ 15:45]      Color COLORLESS / Appearance CLEAR / SG 1.005 / pH 7.5      Gluc NEGATIVE / Ketone NEGATIVE  / Bili NEGATIVE / Urobili NORMAL       Blood NEGATIVE / Protein 10 / Leuk Est NEGATIVE / Nitrite NEGATIVE      RBC  / WBC  / Hyaline  / Gran  / Sq Epi  / Non Sq Epi  / Bacteria     Urine Protein 19.5      [08-22-19 @ 04:30]  Urine Sodium < 20      [08-22-19 @ 20:00]  Urine Osmolality 552      [08-22-19 @ 20:00]    Iron 52, TIBC 252, %sat --      [08-19-19 @ 07:18]  Ferritin 368.5      [08-19-19 @ 07:18]  PTH 7.42 (Ca --)      [08-18-19 @ 18:52]   --  Vitamin D (25OH) 19.6      [08-23-19 @ 06:15]    HCV 0.10, Nonreactive Hepatitis C AB  S/CO Ratio                        Interpretation  < 1.00                                   Non-Reactive  1.00 - 4.99                         Weakly-Reactive  >= 5.00                                Reactive  Non-Reactive: Aperson with a non-reactive HCV antibody  result is considered uninfected.  No further action is  needed unless recent infection is suspected.  In these  cases, consider repeat testing later to detect  seroconversion..  Weakly-Reactive: HCV antibody test is abnormal, HCV RNA  Qualitative test will follow.  Reactive: HCV antibody test is abnormal, HCV RNA  Qualitative test will follow.  Note: HCV antibody testing is performed on the [employer]   system.      [08-20-19 @ 07:13]    Free Light Chains: kappa 214.98, lambda 1.04, ratio = 206.71 H      [08-20 @ 07:13]

## 2019-08-25 NOTE — PROGRESS NOTE ADULT - PROBLEM SELECTOR PLAN 4
Normocytic anemia in setting of hypercalcemia and bone lesions concerning for MM. Hgb 8.9 this AM from 10.3 yesterday   -LDH and Haptoglobin normal, reassuring against hemolysis  -trend cbc daily Normocytic anemia in setting of MM  -trend cbc daily

## 2019-08-25 NOTE — PROGRESS NOTE ADULT - PROBLEM SELECTOR PLAN 3
2/2 osteolytic lesions  -acetaminophen prn mild pain  -oxycodone prn mod-severe pain  -bowel regimen 2/2 osteolytic lesions  -acetaminophen prn mild pain  -oxycodone prn mod-severe pain

## 2019-08-25 NOTE — PROGRESS NOTE ADULT - ASSESSMENT
70 yo M former smoker with a PMHx of HTN, HLD, CAD s/p 1 YAMILET who p/w acute exacerbation over the past 3 days of his ongoing bilateral flank pain x 2 months found to have diffuse osteolytic lesions on CT chest, A/P concerning for diffuse metastatic disease, weight loss, primary cancer not found at this time. Patient also with hypercalcemia w/o clear evidence of primary malignancy, initial labs c/w MM. s/p BM biopsy. Radiation to spinal lesions Day 1/5, will resume on monday 70 yo M former smoker with a PMHx of HTN, HLD, CAD s/p 1 YAMILET found to have diffuse osteolytic lesions on CT chest A/P concerning for diffuse metastatic disease i/s/o and ptalso with hypercalcemia, c/f multiple myeloma, confirmed by bone marrow biopsy  - Started radiation, s/p 1 day, to resume 8/26  - Dental procedure on 8/26 in advance of being able to get bisphosphonates  - Oncology following: chemo to resume this week, continuing on dexamethasone.

## 2019-08-26 LAB
ANION GAP SERPL CALC-SCNC: 12 MMO/L — SIGNIFICANT CHANGE UP (ref 7–14)
BUN SERPL-MCNC: 26 MG/DL — HIGH (ref 7–23)
CALCIUM SERPL-MCNC: 9.2 MG/DL — SIGNIFICANT CHANGE UP (ref 8.4–10.5)
CHLORIDE SERPL-SCNC: 103 MMOL/L — SIGNIFICANT CHANGE UP (ref 98–107)
CO2 SERPL-SCNC: 23 MMOL/L — SIGNIFICANT CHANGE UP (ref 22–31)
CREAT SERPL-MCNC: 0.95 MG/DL — SIGNIFICANT CHANGE UP (ref 0.5–1.3)
GAS PNL BLDMV: SIGNIFICANT CHANGE UP
GLUCOSE SERPL-MCNC: 102 MG/DL — HIGH (ref 70–99)
HCT VFR BLD CALC: 34 % — LOW (ref 39–50)
HGB BLD-MCNC: 10.8 G/DL — LOW (ref 13–17)
MCHC RBC-ENTMCNC: 29 PG — SIGNIFICANT CHANGE UP (ref 27–34)
MCHC RBC-ENTMCNC: 31.8 % — LOW (ref 32–36)
MCV RBC AUTO: 91.4 FL — SIGNIFICANT CHANGE UP (ref 80–100)
NRBC # FLD: 0 K/UL — SIGNIFICANT CHANGE UP (ref 0–0)
PLATELET # BLD AUTO: 272 K/UL — SIGNIFICANT CHANGE UP (ref 150–400)
PMV BLD: 11.5 FL — SIGNIFICANT CHANGE UP (ref 7–13)
POTASSIUM SERPL-MCNC: 4.2 MMOL/L — SIGNIFICANT CHANGE UP (ref 3.5–5.3)
POTASSIUM SERPL-SCNC: 4.2 MMOL/L — SIGNIFICANT CHANGE UP (ref 3.5–5.3)
RBC # BLD: 3.72 M/UL — LOW (ref 4.2–5.8)
RBC # FLD: 12.9 % — SIGNIFICANT CHANGE UP (ref 10.3–14.5)
SODIUM SERPL-SCNC: 138 MMOL/L — SIGNIFICANT CHANGE UP (ref 135–145)
WBC # BLD: 11.33 K/UL — HIGH (ref 3.8–10.5)
WBC # FLD AUTO: 11.33 K/UL — HIGH (ref 3.8–10.5)

## 2019-08-26 PROCEDURE — 99233 SBSQ HOSP IP/OBS HIGH 50: CPT | Mod: GC

## 2019-08-26 PROCEDURE — 99233 SBSQ HOSP IP/OBS HIGH 50: CPT

## 2019-08-26 RX ORDER — OXYCODONE HYDROCHLORIDE 5 MG/1
5 TABLET ORAL EVERY 6 HOURS
Refills: 0 | Status: DISCONTINUED | OUTPATIENT
Start: 2019-08-26 | End: 2019-08-29

## 2019-08-26 RX ORDER — OXYCODONE HYDROCHLORIDE 5 MG/1
5 TABLET ORAL ONCE
Refills: 0 | Status: DISCONTINUED | OUTPATIENT
Start: 2019-08-26 | End: 2019-08-26

## 2019-08-26 RX ADMIN — Medication 100 MILLIGRAM(S): at 13:11

## 2019-08-26 RX ADMIN — OXYCODONE HYDROCHLORIDE 5 MILLIGRAM(S): 5 TABLET ORAL at 13:55

## 2019-08-26 RX ADMIN — Medication 100 MILLIGRAM(S): at 06:08

## 2019-08-26 RX ADMIN — POLYETHYLENE GLYCOL 3350 17 GRAM(S): 17 POWDER, FOR SOLUTION ORAL at 13:10

## 2019-08-26 RX ADMIN — AMLODIPINE BESYLATE 10 MILLIGRAM(S): 2.5 TABLET ORAL at 06:05

## 2019-08-26 RX ADMIN — SIMETHICONE 80 MILLIGRAM(S): 80 TABLET, CHEWABLE ORAL at 22:44

## 2019-08-26 RX ADMIN — Medication 4 MILLIGRAM(S): at 06:51

## 2019-08-26 RX ADMIN — SIMETHICONE 80 MILLIGRAM(S): 80 TABLET, CHEWABLE ORAL at 06:05

## 2019-08-26 RX ADMIN — ATORVASTATIN CALCIUM 40 MILLIGRAM(S): 80 TABLET, FILM COATED ORAL at 22:45

## 2019-08-26 RX ADMIN — Medication 100 MILLIGRAM(S): at 22:45

## 2019-08-26 RX ADMIN — Medication 4 MILLIGRAM(S): at 18:19

## 2019-08-26 RX ADMIN — OXYCODONE HYDROCHLORIDE 5 MILLIGRAM(S): 5 TABLET ORAL at 23:00

## 2019-08-26 RX ADMIN — SENNA PLUS 2 TABLET(S): 8.6 TABLET ORAL at 22:45

## 2019-08-26 RX ADMIN — OXYCODONE HYDROCHLORIDE 5 MILLIGRAM(S): 5 TABLET ORAL at 13:10

## 2019-08-26 RX ADMIN — OXYCODONE HYDROCHLORIDE 5 MILLIGRAM(S): 5 TABLET ORAL at 07:05

## 2019-08-26 RX ADMIN — SIMETHICONE 80 MILLIGRAM(S): 80 TABLET, CHEWABLE ORAL at 13:10

## 2019-08-26 RX ADMIN — OXYCODONE HYDROCHLORIDE 5 MILLIGRAM(S): 5 TABLET ORAL at 06:05

## 2019-08-26 RX ADMIN — OXYCODONE HYDROCHLORIDE 5 MILLIGRAM(S): 5 TABLET ORAL at 22:44

## 2019-08-26 RX ADMIN — LOSARTAN POTASSIUM 50 MILLIGRAM(S): 100 TABLET, FILM COATED ORAL at 06:07

## 2019-08-26 NOTE — DIETITIAN INITIAL EVALUATION ADULT. - OTHER INFO
70y/o Male former smoker with a PMHx of HTN, HLD, CAD who p/w acute exacerbation of his back & flank pain, now found to have diffuse lytic lesions concerning for multiple myeloma on RT in-house. Met with patient at bedside. Patient is Slovak Speaking-RDN able to communicate with patient in preferred language. Patient reports decrease appetite and PO intake with increase pain in stomach. Also, c/o "gas pain" and +constipation, no bowel movement last 4 days on bowel regimen. Otherwise, Patient denies any nausea/vomiting or difficulty chewing and swallowing. NKFA. Avoids Beef, Goat, Pork due to Sikhism preferences. Patient reports usual weight to be in the range of 128-130lbs which he has lost over >1 month. Per outpatient record, patient weight 128lbs last on 4/15/19 and this admission 52kg/114.6lbs indicative of significant 14lbs (10.9%) weight loss over 4 months. Patient reports appetite improving, consuming ~50% of meals at this time. Discussed importance of having nutrient and protein dense meals for optimal nutrition. High Calorie and High Protein nutrition therapy reviewed. Better food choices discussed. All questions and concerns answered to patient satisfaction.

## 2019-08-26 NOTE — DIETITIAN INITIAL EVALUATION ADULT. - PROBLEM SELECTOR PLAN 1
Diffuse osteolytic lesions concerning for multiple myeloma vs. waldenstrom's macroglobulinemia.   -Onc consult  -serum FLC  -SPEP  -Beta-2 microglobulin  -Serum viscosity  -speak to private heme/onc about marrow bx indication/result

## 2019-08-26 NOTE — DIETITIAN INITIAL EVALUATION ADULT. - PROBLEM SELECTOR PLAN 4
Normocytic anemia in setting of hypercalcemia and bone lesions concerning for MM  -Ramírez positive concerning for AIHA, in setting of MM vs. Waldenstrom's macroglobulinemia  -LDH, haptoglobin  -trend cbc  -Iron studies

## 2019-08-26 NOTE — PROGRESS NOTE ADULT - PROBLEM SELECTOR PLAN 9
Patient is cleared medically for possible dental procedure.   He does not require prophylactic antibiotics for any potential dental procedure  He is cleared to have local anesthesia for any potential procedure  Patient can be transported with just wheelchair, without need for IV pole or any additional cardiac monitors.     [phone] if additional questions

## 2019-08-26 NOTE — PROVIDER CONTACT NOTE (OTHER) - NAME OF MD/NP/PA/DO NOTIFIED:
9S [facility] Model Covering MD pager [phone]
Dr Aponte aware
Dr Aponte aware
Dr Cardona
Dr Cardona [id number] aware
Dr Cardona [id number] aware
Dr Cardona [id number]
Dr Cardona [id number] aware
Dr Katy Jasso, [id number]
Dr Cardona [phone] aware

## 2019-08-26 NOTE — PROGRESS NOTE ADULT - ASSESSMENT
70 yo M former smoker with a PMHx of HTN, HLD, CAD s/p 1 DES 2015 who p/w acute exacerbation of his back & flank pain found on CT new lytic expansile osseous lesions possible mets of unclear primary    CRISTI  Pt with improved Scr today.   Baseline 1.0  Pt on losartan. 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 Anemia at that time c/w NANDINI.  pending vit d 1,25, vit d 25  pth related peptide   completed IVF  calcium improved  monitor bmp    HTN  controlled  on norvasc 10, metoprolol 25 bid and losartan 50  monitor    Proteinuria  mild  check urine p/c ratio  on losartan    Hyponatremia  hx of siadh in the past   urine osm high however urine na low, less likely SIADH, more consistent with hypovolemia/poor oral intake   monitor    Vitamin D deficiency   Vitamin D low, monitor

## 2019-08-26 NOTE — PROVIDER CONTACT NOTE (OTHER) - ACTION/TREATMENT ORDERED:
No interventions presently.
MD made aware. Hold metoprolol at this time. As per MD, OK to administer losartan and amlodopine, and oxycodone as per order.
Dr Cardona notified, Continue to monitor pt, as per MD no intervention needed at this time, As per MD aleman to give pt oxycodone PO
Dr Cardona notified, will continue to monitor pt. As per Dr Cardona okay to give amlodipine and losartan at this time
Dr Jasso notified, as per MD aleman to give amlodipine and losartan at this time. Will continue to monitor pt
No interventions presently
No interventions presently per MD
No interventions presently per MD.
No interventions presently.
No interventions presently.

## 2019-08-26 NOTE — PROGRESS NOTE ADULT - ASSESSMENT
70 yo M former smoker with a PMHx of smoldering myeloma  p/w acute exacerbation of his back & flank pain, now found to have diffuse lytic lesions concerning for multiple myeloma.     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  - has follow up with Dr. Grayson on 9/3/19, alternatively could follow up with his primary hematologist  - Pt had biopsy at outpatient hematologist office in February c/w smoldering myeloma- 10-15% plasma cells without end organ damage.  - check serum/urine immunofixation, SPEP and UPEP results noted  - Pt underwent biopsy of sacral lytic lesion: Monotypic plasma cells consistent with plasma cell neoplasm. Two aberrant monoclonal plasma cell population detected:  Population #1 (16.5% of total analyzed cells, 68.6% of plasma cell population) positive for cytoplasmic kappa, CD38 dimmer, CD45 dimmer, CD56, ; negative for , CD19, CD20. Population # 2 (7.6% of total analyzed cells, 31.4% of plasma cell population) positive for cytoplasmic kappa (brighter), , CD38 brighter, CD45 dim, CD56 (brighter), ; negative for CD19, CD20.  - c/w dexamethasone 4mg IV BID  - Pt can likely be discharged after RT as we have secured an appointment with pt at [facility] on September 3rd at 9:30 AM with Dr. Grayson and can get his first chemo cycle as an outpatient  - F/u dental consult with regards to giving bisphosphonate therapy    Gregory García, PGY6  Hematology Fellow  [phone] 68 yo M former smoker with a PMHx of smoldering myeloma  p/w acute exacerbation of his back & flank pain, now found to have diffuse lytic lesions concerning for multiple myeloma.     Multiple Myeloma, IgG Kappa subtype   - f/u BM aspiration (unable to obtain core)  - patient now with K/L ratio of 205.7, anemia (Hb < 10) presence of lytic lesions and hypercalcemia fulfilling clinical CRAB criteria of multiple myeloma with biopsy proven plasma cell neoplasm with what appears to be two monoclonal plasma cell populations  - c/w palliative RT to cervical and thoracic spine to be completed on Thursday (8/29), started on 8/23.  - has follow up with Dr. Grayson on 9/3/19 to discuss systemic therapy  - Pt had biopsy at outpatient hematologist office in February c/w smoldering myeloma- 10-15% plasma cells without end organ damage at that time  - check serum/urine immunofixation, SPEP and UPEP results noted  - Pt underwent biopsy of sacral lytic lesion: Monotypic plasma cells consistent with plasma cell neoplasm. Two aberrant monoclonal plasma cell population detected:  Population #1 (16.5% of total analyzed cells, 68.6% of plasma cell population) positive for cytoplasmic kappa, CD38 dimmer, CD45 dimmer, CD56, ; negative for , CD19, CD20. Population # 2 (7.6% of total analyzed cells, 31.4% of plasma cell population) positive for cytoplasmic kappa (brighter), , CD38 brighter, CD45 dim, CD56 (brighter), ; negative for CD19, CD20.  - c/w dexamethasone 4mg IV BID  - Pt can likely be discharged after RT as we have secured an appointment with pt at [facility] on September 3rd at 9:30 AM with Dr. Grayson and can get his first chemo cycle as an outpatient  - appreciate dental evaluation, patient will need lifelong bisphosphonate therapy and should have extractions performed accordingly    Gregory García, PGY6  Hematology Fellow  [phone] 68 yo M former smoker with a PMHx of smoldering myeloma  p/w acute exacerbation of his back & flank pain, now found to have diffuse lytic lesions concerning for multiple myeloma.     Multiple Myeloma, IgG Kappa subtype   - f/u BM aspiration (unable to obtain core)  - check skeletal survey  - patient now with K/L ratio of 205.7, anemia (Hb < 10) presence of lytic lesions and hypercalcemia fulfilling clinical CRAB criteria of multiple myeloma with biopsy proven plasma cell neoplasm with what appears to be two monoclonal plasma cell populations. Will ask pathologist to test FISH/cytogenetics on biopsy sample for prognostication  - c/w palliative RT to cervical and thoracic spine to be completed on Thursday (8/29), started on 8/23.  - has follow up with Dr. Grayson on 9/3/19 to discuss systemic therapy  - Pt had biopsy at outpatient hematologist office in February c/w smoldering myeloma- 10-15% plasma cells without end organ damage at that time  - check serum/urine immunofixation, SPEP and UPEP results noted  - Pt underwent biopsy of sacral lytic lesion: Monotypic plasma cells consistent with plasma cell neoplasm. Two aberrant monoclonal plasma cell population detected:  Population #1 (16.5% of total analyzed cells, 68.6% of plasma cell population) positive for cytoplasmic kappa, CD38 dimmer, CD45 dimmer, CD56, ; negative for , CD19, CD20. Population # 2 (7.6% of total analyzed cells, 31.4% of plasma cell population) positive for cytoplasmic kappa (brighter), , CD38 brighter, CD45 dim, CD56 (brighter), ; negative for CD19, CD20.  - c/w dexamethasone 4mg IV BID  - Pt can likely be discharged after RT as we have secured an appointment with pt at [facility] on September 3rd at 9:30 AM with Dr. Grayson and can get his first chemo cycle as an outpatient  - appreciate dental evaluation, patient will need lifelong bisphosphonate therapy and should have extractions performed accordingly    Gregory García, PGY6  Hematology Fellow  [phone]

## 2019-08-26 NOTE — DIETITIAN INITIAL EVALUATION ADULT. - PERTINENT LABORATORY DATA
08-26 Na138 mmol/L Glu 102 mg/dL<H> K+ 4.2 mmol/L Cr  0.95 mg/dL BUN 26 mg/dL<H> 08-25 Phos 3.8 mg/dL

## 2019-08-26 NOTE — PROGRESS NOTE ADULT - ASSESSMENT
70 yo M former smoker with a PMHx of HTN, HLD, CAD s/p 1 YAMILET who p/w acute exacerbation over the past 3 days of his ongoing bilateral flank pain x 2 months found to have diffuse osteolytic lesions on CT chest, A/P concerning for diffuse metastatic disease, weight loss, primary cancer not found at this time. Patient also with hypercalcemia w/o clear evidence of primary malignancy, initial labs c/w MM. s/p BM biopsy. Radiation to spinal lesions Day 1/5 on Friday, will resume on Monday 8/26 70 yo M former smoker with a PMHx of HTN, HLD, CAD s/p 1 DES who p/w acute exacerbation over the past 3 days of his ongoing bilateral flank pain x 2 months found to have diffuse osteolytic lesions on CT chest, A/P concerning for diffuse metastatic disease, weight loss, primary cancer not found at this time. Patient also with hypercalcemia w/o clear evidence of primary malignancy, initial labs c/w MM. s/p BM biopsy.   70 yo M former smoker with a PMHx of HTN, HLD, CAD s/p 1 DES 2015 who p/w acute exacerbation of his back & flank pain found on CT new lytic expansile osseous lesions possible mets of unclear primary  Radiation to spinal lesions.   Day 1/5 on Friday, will resume on Monday 8/26 70 yo M former smoker with a PMHx of HTN, HLD, CAD s/p 1 DES who p/w acute exacerbation over the past 3 days of his ongoing bilateral flank pain x 2 months found to have diffuse osteolytic lesions on CT chest, A/P concerning for diffuse metastatic disease, weight loss, primary cancer not found at this time. Patient also with hypercalcemia w/o clear evidence of primary malignancy, initial labs c/w MM. s/p BM biopsy.   70 yo M former smoker with a PMHx of HTN, HLD, CAD s/p 1 DES 2015 who p/w acute exacerbation of his back & flank pain found on CT new lytic expansile osseous lesions possible mets of unclear primary  Radiation to spinal lesions.   Day 1/5 on Friday, will resume on Monday 8/26  severe protein sabine malnutrition 70 yo M former smoker with a PMHx of HTN, HLD, CAD s/p 1 DES who p/w acute exacerbation over the past 3 days of his ongoing bilateral flank pain x 2 months found to have diffuse osteolytic lesions on CT chest, A/P concerning for diffuse metastatic disease, weight loss, primary cancer not found at this time. Patient also with hypercalcemia w/o clear evidence of primary malignancy, initial labs c/w MM. s/p BM biopsy.   70 yo M former smoker with a PMHx of HTN, HLD, CAD s/p 1 DES 2015 who p/w acute exacerbation of his back & flank pain found on CT new lytic expansile osseous lesions possible mets of unclear primary  Radiation to spinal lesions.   Day 1/5 on Friday, will resume on Monday 8/26  severe protein sabine malnutrition- suplemented

## 2019-08-26 NOTE — DIETITIAN INITIAL EVALUATION ADULT. - PROBLEM SELECTOR PLAN 7
CAD s/p 1 YAMILET (performed in [facility] in 2015)  -high intensity statin  -ASA 81mg qd  -Metoprolol 25mg bid

## 2019-08-26 NOTE — DIETITIAN INITIAL EVALUATION ADULT. - ENERGY NEEDS
Weight 52kg/114.6lbs Height 5'4" BMI=19.7kg/m2  IBW: 130lbs (+/-10%) %IBW: 88%  No edema or pressure injury per flowsheet.

## 2019-08-26 NOTE — PROVIDER CONTACT NOTE (OTHER) - DATE AND TIME:
22-Aug-2019 22:10
19-Aug-2019 02:35
19-Aug-2019 05:55
22-Aug-2019 06:10
22-Aug-2019 21:45
23-Aug-2019 06:05
23-Aug-2019 21:50
24-Aug-2019 05:58
25-Aug-2019 06:21
26-Aug-2019 06:00

## 2019-08-26 NOTE — DIETITIAN INITIAL EVALUATION ADULT. - ADD RECOMMEND
1. Monitor weights, labs, BM's, skin integrity, p.o. intake. 2. Please Encourage po intake, assist with meals and menu selections, provide alternatives PRN.

## 2019-08-26 NOTE — DIETITIAN INITIAL EVALUATION ADULT. - DIET TYPE
DASH/TLC (sodium and cholesterol restricted diet)/Ensure Enlive 240mls 3x daily (1050kcal, 60g protein). oral

## 2019-08-26 NOTE — PROVIDER CONTACT NOTE (OTHER) - REASON
HR 48
PT HR is 48 bpm
Pt HR is 48 bpm
Pt HR is 50 bpm
Pt HR is 54 bpm
Pt HR is 58 bpm
Pulse 54
Pulse 57, /66
Pulse 58
Pt HR is 53 bpm

## 2019-08-26 NOTE — PROGRESS NOTE ADULT - SUBJECTIVE AND OBJECTIVE BOX
[facility]   MD AANBEL MOSS D.O, PA    TELEPHONE NUMBERS:  OFFICE: [phone]  DR. HOLT CELL: [phone]  AGUEDA GONZALEZ CELL: [phone]  DR. HERNANDEZ CELL:  [phone]    RENAL FOLLOW UP NOTE  --------------------------------------------------------------------------------  HPI: Pt seen and examined at bedside. No complaints   Denies SOB, chest pain     PAST HISTORY  --------------------------------------------------------------------------------  No significant changes to PMH, PSH, FHx, SHx, unless otherwise noted    ALLERGIES & MEDICATIONS  --------------------------------------------------------------------------------  Allergies    No Known Allergies    Intolerances      Standing Inpatient Medications  amLODIPine   Tablet 10 milliGRAM(s) Oral daily  aspirin enteric coated 81 milliGRAM(s) Oral daily  atorvastatin 40 milliGRAM(s) Oral at bedtime  dexamethasone  Injectable 4 milliGRAM(s) IV Push two times a day  docusate sodium 100 milliGRAM(s) Oral three times a day  enoxaparin Injectable 40 milliGRAM(s) SubCutaneous daily  ergocalciferol 60485 Unit(s) Oral every week  losartan 50 milliGRAM(s) Oral daily  metoprolol tartrate 25 milliGRAM(s) Oral two times a day  polyethylene glycol 3350 17 Gram(s) Oral daily  senna 2 Tablet(s) Oral at bedtime    PRN Inpatient Medications  acetaminophen   Tablet .. 650 milliGRAM(s) Oral every 6 hours PRN  lidocaine   Patch 1 Patch Transdermal daily PRN  oxyCODONE    IR 5 milliGRAM(s) Oral every 6 hours PRN  simethicone 80 milliGRAM(s) Chew three times a day PRN      REVIEW OF SYSTEMS  --------------------------------------------------------------------------------  General: no fever  CVS: no chest pain  RESP: no sob, no cough  ABD: no abdominal pain  : no dysuria,  MSK: no edema     VITALS/PHYSICAL EXAM  --------------------------------------------------------------------------------  T(C): 36.6 (08-26-19 @ 05:56), Max: 36.7 (08-25-19 @ 19:55)  HR: 56 (08-26-19 @ 07:05) (48 - 69)  BP: 132/58 (08-26-19 @ 07:05) (125/61 - 154/64)  RR: 19 (08-26-19 @ 05:56) (18 - 19)  SpO2: 100% (08-26-19 @ 05:56) (99% - 100%)  Wt(kg): --    Physical Exam:  	Gen: NAD  	HEENT: MMM  	Pulm: CTA B/L  	CV: S1S2  	Abd: Soft, +BS  	Ext: No LE edema B/L                      Neuro: Awake   	Skin: Warm and Dry       LABS/STUDIES  --------------------------------------------------------------------------------              10.8   11.33 >-----------<  272      [08-26-19 @ 06:08]              34.0     138  |  103  |  26  ----------------------------<  102      [08-26-19 @ 06:08]  4.2   |  23  |  0.95        Ca     9.2     [08-26-19 @ 06:08]      Mg     2.0     [08-25-19 @ 07:25]      Phos  3.8     [08-25-19 @ 07:25]    Creatinine Trend:  SCr 0.95 [08-26 @ 06:08]  SCr 1.03 [08-25 @ 07:25]  SCr 1.34 [08-24 @ 07:00]  SCr 1.17 [08-23 @ 06:15]  SCr 1.02 [08-22 @ 09:00]    Urinalysis - [08-18-19 @ 15:45]      Color COLORLESS / Appearance CLEAR / SG 1.005 / pH 7.5      Gluc NEGATIVE / Ketone NEGATIVE  / Bili NEGATIVE / Urobili NORMAL       Blood NEGATIVE / Protein 10 / Leuk Est NEGATIVE / Nitrite NEGATIVE      RBC  / WBC  / Hyaline  / Gran  / Sq Epi  / Non Sq Epi  / Bacteria     Urine Protein 19.5      [08-22-19 @ 04:30]  Urine Sodium < 20      [08-22-19 @ 20:00]  Urine Osmolality 552      [08-22-19 @ 20:00]    Iron 52, TIBC 252, %sat --      [08-19-19 @ 07:18]  Ferritin 368.5      [08-19-19 @ 07:18]  PTH 7.42 (Ca --)      [08-18-19 @ 18:52]   --  Vitamin D (25OH) 19.6      [08-23-19 @ 06:15]    HCV 0.10, Nonreactive Hepatitis C AB  S/CO Ratio                        Interpretation  < 1.00                                   Non-Reactive  1.00 - 4.99                         Weakly-Reactive  >= 5.00                                Reactive  Non-Reactive: Aperson with a non-reactive HCV antibody  result is considered uninfected.  No further action is  needed unless recent infection is suspected.  In these  cases, consider repeat testing later to detect  seroconversion..  Weakly-Reactive: HCV antibody test is abnormal, HCV RNA  Qualitative test will follow.  Reactive: HCV antibody test is abnormal, HCV RNA  Qualitative test will follow.  Note: HCV antibody testing is performed on the [employer]   system.      [08-20-19 @ 07:13]    Free Light Chains: kappa 214.98, lambda 1.04, ratio = 206.71 H      [08-20 @ 07:13]

## 2019-08-26 NOTE — PROVIDER CONTACT NOTE (OTHER) - ASSESSMENT
Pt HR is 53 bpm. Asymptomatic
No s/s of acute distress. Asymptomatic. Denies SOB, dizziness, lightheadedness. Pt states he feels "OK except pain."
PT HR is 48 bpm. Pt c/o R chest pain 4/10 sharp
Pt HR is 48 bpm. Asymptomatic
Pt HR is 50 bpm. Asymptomatic
Pt HR is 54 bpm. asymptomatic
Pt HR is 58 bpm
Pt alert, asymptomatic, no s/s of distress
Pt alert, asymptomatic, no s/s of distress
Pt alert, no s/s of distress

## 2019-08-26 NOTE — PROGRESS NOTE ADULT - SUBJECTIVE AND OBJECTIVE BOX
Medicine Accept Note:- transf frm HS  Patient is a 69y old  Male who presents with a chief complaint of Abdominal pain (26 Aug 2019 09:20)      SUBJECTIVE / OVERNIGHT EVENTS:    MEDICATIONS  (STANDING):  amLODIPine   Tablet 10 milliGRAM(s) Oral daily  aspirin enteric coated 81 milliGRAM(s) Oral daily  atorvastatin 40 milliGRAM(s) Oral at bedtime  dexamethasone  Injectable 4 milliGRAM(s) IV Push two times a day  docusate sodium 100 milliGRAM(s) Oral three times a day  enoxaparin Injectable 40 milliGRAM(s) SubCutaneous daily  ergocalciferol 52551 Unit(s) Oral every week  losartan 50 milliGRAM(s) Oral daily  metoprolol tartrate 25 milliGRAM(s) Oral two times a day  polyethylene glycol 3350 17 Gram(s) Oral daily  senna 2 Tablet(s) Oral at bedtime    MEDICATIONS  (PRN):  acetaminophen   Tablet .. 650 milliGRAM(s) Oral every 6 hours PRN Mild Pain (1 - 3)  lidocaine   Patch 1 Patch Transdermal daily PRN Pain  oxyCODONE    IR 5 milliGRAM(s) Oral every 6 hours PRN Moderate Pain (4 - 6)  simethicone 80 milliGRAM(s) Chew three times a day PRN Gas    Vital Signs Last 24 Hrs  T(C): 36.6 (26 Aug 2019 05:56), Max: 36.7 (25 Aug 2019 19:55)  T(F): 97.8 (26 Aug 2019 05:56), Max: 98 (25 Aug 2019 19:55)  HR: 56 (26 Aug 2019 07:05) (48 - 69)  BP: 132/58 (26 Aug 2019 07:05) (125/61 - 154/64)  BP(mean): --  RR: 19 (26 Aug 2019 05:56) (18 - 19)  SpO2: 100% (26 Aug 2019 05:56) (99% - 100%)      PHYSICAL EXAM:  GENERAL: NAD, well-developed  HEAD:  Atraumatic, Normocephalic  EYES: EOMI, PERRLA, conjunctiva and sclera clear  NECK: Supple, No JVD  CHEST/LUNG: Clear to auscultation bilaterally; No wheeze  HEART: Regular rate and rhythm; No murmurs, rubs, or gallops  ABDOMEN: Soft, Nontender, Nondistended; Bowel sounds present  EXTREMITIES:  2+ Peripheral Pulses, No clubbing, cyanosis, or edema  PSYCH: AAOx3  NEUROLOGY: non-focal  SKIN: No rashes or lesions    LABS:                        10.8   11.33 )-----------( 272      ( 26 Aug 2019 06:08 )             34.0     08-26    138  |  103  |  26<H>  ----------------------------<  102<H>  4.2   |  23  |  0.95    Ca    9.2      26 Aug 2019 06:08  Phos  3.8     08-25  Mg     2.0     08-25                RADIOLOGY & ADDITIONAL TESTS:    Imaging Personally Reviewed:    Consultant(s) Notes Reviewed:      Care Discussed with Consultants/Other Providers: Medicine Accept Note:- transf frm HS  Patient is a 69y old  Male who presents with a chief complaint of Abdominal pain (26 Aug 2019 09:20)      Patient notes pain3/10  SUBJECTIVE / OVERNIGHT EVENTS:  Patient notes pain3/10    MEDICATIONS  (STANDING):  amLODIPine   Tablet 10 milliGRAM(s) Oral daily  aspirin enteric coated 81 milliGRAM(s) Oral daily  atorvastatin 40 milliGRAM(s) Oral at bedtime  dexamethasone  Injectable 4 milliGRAM(s) IV Push two times a day  docusate sodium 100 milliGRAM(s) Oral three times a day  enoxaparin Injectable 40 milliGRAM(s) SubCutaneous daily  ergocalciferol 02163 Unit(s) Oral every week  losartan 50 milliGRAM(s) Oral daily  metoprolol tartrate 25 milliGRAM(s) Oral two times a day  polyethylene glycol 3350 17 Gram(s) Oral daily  senna 2 Tablet(s) Oral at bedtime    MEDICATIONS  (PRN):  acetaminophen   Tablet .. 650 milliGRAM(s) Oral every 6 hours PRN Mild Pain (1 - 3)  lidocaine   Patch 1 Patch Transdermal daily PRN Pain  oxyCODONE    IR 5 milliGRAM(s) Oral every 6 hours PRN Moderate Pain (4 - 6)  simethicone 80 milliGRAM(s) Chew three times a day PRN Gas    Vital Signs Last 24 Hrs  T(C): 36.6 (26 Aug 2019 05:56), Max: 36.7 (25 Aug 2019 19:55)  T(F): 97.8 (26 Aug 2019 05:56), Max: 98 (25 Aug 2019 19:55)  HR: 56 (26 Aug 2019 07:05) (48 - 69)  BP: 132/58 (26 Aug 2019 07:05) (125/61 - 154/64)  BP(mean): --  RR: 19 (26 Aug 2019 05:56) (18 - 19)  SpO2: 100% (26 Aug 2019 05:56) (99% - 100%)      PHYSICAL EXAM:  GENERAL: NAD, well-developed  HEAD:  Atraumatic, Normocephalic  EYES: EOMI, PERRLA, conjunctiva and sclera clear  NECK: Supple, No JVD  CHEST/LUNG: Clear to auscultation bilaterally; No wheeze  HEART: Regular rate and rhythm; No murmurs, rubs, or gallops  ABDOMEN: Soft, Nontender, Nondistended; Bowel sounds present  EXTREMITIES:  2+ Peripheral Pulses, No clubbing, cyanosis, or edema  PSYCH: AAOx3  NEUROLOGY: non-focal  SKIN: No rashes or lesions- has rt marks to abd/ neck- multiple areas    LABS:                        10.8   11.33 )-----------( 272      ( 26 Aug 2019 06:08 )             34.0     08-26    138  |  103  |  26<H>  ----------------------------<  102<H>  4.2   |  23  |  0.95    Ca    9.2      26 Aug 2019 06:08  Phos  3.8     08-25  Mg     2.0     08-25                RADIOLOGY & ADDITIONAL TESTS:    Imaging Personally Reviewed:    Consultant(s) Notes Reviewed:      Care Discussed with Consultants/Other Providers:

## 2019-08-26 NOTE — PROGRESS NOTE ADULT - ATTENDING COMMENTS
Mr. Rand was seen during hematology consultation followup rounds today. He has a new diagnosis of myeloma. Previous bone marrow biopsy was told he had smoldering myeloma. Imaging studies show an expansile lesion of the sternum as well as a lesion at T8 which compresses the spinal cord on MRI. He started on radiation and has been on steroids. He is able to walk without difficulty. He says his legs are not week at this time. He is not experiencing any pain.    Physical examination reveals the palpable abnormality over the manubrium. The lungs are clear. There is no palpable tenderness over the spine or ribs at this time. The lungs are clear.    I agree with the assessment and plan as stated above the note from Dr. García. A skeletal survey must be performed to assess for any other areas of lytic bone lesion along with the risk of fracture.

## 2019-08-26 NOTE — DIETITIAN INITIAL EVALUATION ADULT. - PHYSICAL APPEARANCE
Nutrition focused physical exam conducted - found signs of malnutrition [ ]absent [X]presentSubcutaneous fat loss: [Severe ] Orbital fat pads region, [Severe]Buccal fat region, [Severe ]Triceps region, Muscle wasting: [Severe ]Temples region, [Severe ]Clavicle region, [Severe ]Shoulder region, region, [Severe ]Interosseous region,  [Severe ]thigh region, [Severe ]Calf region

## 2019-08-26 NOTE — CHART NOTE - NSCHARTNOTEFT_GEN_A_CORE
NUTRITION SERVICES     Upon Nutritional Assessment by the Registered Dietitian your patient was determined to meet criteria/ has evidence of the following diagnosis/diagnoses:  [ ] Mild Protein Calorie Malnutrition   [ ] Moderate Protein Calorie Malnutrition   [ X] Severe Protein Calorie Malnutrition   [ ] Unspecified Protein Calorie Malnutrition   [ ] Underweight / BMI <19  [ ] Morbid Obesity / BMI >40    Findings as based on:  •  Comprehensive nutritional assessment and consultation    Please refer to Initial Dietitian Evaluation or Nutrition Follow-up via documents section of [employer] EMR for further recommendations.

## 2019-08-26 NOTE — DIETITIAN INITIAL EVALUATION ADULT. - PERTINENT MEDS FT
MEDICATIONS  (STANDING):  amLODIPine   Tablet 10 milliGRAM(s) Oral daily  aspirin enteric coated 81 milliGRAM(s) Oral daily  atorvastatin 40 milliGRAM(s) Oral at bedtime  dexamethasone  Injectable 4 milliGRAM(s) IV Push two times a day  docusate sodium 100 milliGRAM(s) Oral three times a day  enoxaparin Injectable 40 milliGRAM(s) SubCutaneous daily  ergocalciferol 53802 Unit(s) Oral every week  losartan 50 milliGRAM(s) Oral daily  metoprolol tartrate 25 milliGRAM(s) Oral two times a day  polyethylene glycol 3350 17 Gram(s) Oral daily  senna 2 Tablet(s) Oral at bedtime    MEDICATIONS  (PRN):  acetaminophen   Tablet .. 650 milliGRAM(s) Oral every 6 hours PRN Mild Pain (1 - 3)  lidocaine   Patch 1 Patch Transdermal daily PRN Pain  oxyCODONE    IR 5 milliGRAM(s) Oral every 6 hours PRN Moderate Pain (4 - 6)  simethicone 80 milliGRAM(s) Chew three times a day PRN Gas

## 2019-08-26 NOTE — PROVIDER CONTACT NOTE (OTHER) - BACKGROUND
Pt was admitted for dorsalgia
Patient admitted for dorsalgia. Diagnosed with multiple myeloma. Currently being treated with radiation during this admission. Patient on metoprolol.
Pt admitted with bilateral flank pain, pt found to have osteolytic bone lesions
Pt admitted with osteolytic bone lesions and bilateral flank pain
Pt admitted with pain in bilateral flank. Pt has osteolytic bone lesions
Pt was admitted for dorsalgia

## 2019-08-26 NOTE — PROGRESS NOTE ADULT - PROBLEM SELECTOR PLAN 7
CAD s/p 1 YAMILET (performed in [facility] in 2015)  -high intensity statin  -ASA 81mg qd  -Metoprolol 25mg bid as HR tolerate

## 2019-08-26 NOTE — PROVIDER CONTACT NOTE (OTHER) - SITUATION
Pt HR is 53 bpm
PT HR is 48 bpm
Pt HR is 48 bpm
Pt HR is 50 bpm
Pt HR is 54 bpm
Pt HR is 58 bpm
Pt pulse 54
Pts pulse 58
Pulse 57, /66
VSS except HR 48. Patient c/o back pain at this time.

## 2019-08-26 NOTE — PROGRESS NOTE ADULT - ATTENDING COMMENTS
Day 1/5 on Friday, will resume on Monday 8/26 Day 1/5 on Friday, will resume on Monday 8/26  70 yo M former smoker with a PMHx of HTN, HLD, CAD s/p 1 DES 2015 who p/w acute exacerbation of his back & flank pain found on CT new lytic expansile osseous lesions possible mets of unclear primary  Radiation to spinal lesions.

## 2019-08-26 NOTE — PROGRESS NOTE ADULT - SUBJECTIVE AND OBJECTIVE BOX
INTERVAL HPI/OVERNIGHT EVENTS:  Patient S&E at bedside. No o/n events, patient resting comfortably. No complaints at this time. Patient denies fever, chills, dizziness, weakness, CP, palpitations, SOB, cough, N/V/D/C, dysuria, changes in bowel movements, LE edema.    VITAL SIGNS:  T(F): 97.8 (08-26-19 @ 05:56)  HR: 56 (08-26-19 @ 07:05)  BP: 132/58 (08-26-19 @ 07:05)  RR: 19 (08-26-19 @ 05:56)  SpO2: 100% (08-26-19 @ 05:56)  Wt(kg): --    PHYSICAL EXAM:    Constitutional: NAD  Eyes: EOMI, sclera non-icteric  Neck: supple, no masses, no JVD  Respiratory: CTA b/l, good air entry b/l  Cardiovascular: RRR, no M/R/G  Gastrointestinal: soft, NTND, no masses palpable, + BS, no hepatosplenomegaly  Extremities: no c/c/e  Neurological: AAOx3      MEDICATIONS  (STANDING):  amLODIPine   Tablet 10 milliGRAM(s) Oral daily  aspirin enteric coated 81 milliGRAM(s) Oral daily  atorvastatin 40 milliGRAM(s) Oral at bedtime  dexamethasone  Injectable 4 milliGRAM(s) IV Push two times a day  docusate sodium 100 milliGRAM(s) Oral three times a day  enoxaparin Injectable 40 milliGRAM(s) SubCutaneous daily  ergocalciferol 04363 Unit(s) Oral every week  losartan 50 milliGRAM(s) Oral daily  metoprolol tartrate 25 milliGRAM(s) Oral two times a day  polyethylene glycol 3350 17 Gram(s) Oral daily  senna 2 Tablet(s) Oral at bedtime    MEDICATIONS  (PRN):  acetaminophen   Tablet .. 650 milliGRAM(s) Oral every 6 hours PRN Mild Pain (1 - 3)  lidocaine   Patch 1 Patch Transdermal daily PRN Pain  oxyCODONE    IR 5 milliGRAM(s) Oral every 6 hours PRN Moderate Pain (4 - 6)  simethicone 80 milliGRAM(s) Chew three times a day PRN Gas      Allergies    No Known Allergies    Intolerances        LABS:                        10.8   11.33 )-----------( 272      ( 26 Aug 2019 06:08 )             34.0     08-26    138  |  103  |  26<H>  ----------------------------<  102<H>  4.2   |  23  |  0.95    Ca    9.2      26 Aug 2019 06:08  Phos  3.8     08-25  Mg     2.0     08-25            RADIOLOGY & ADDITIONAL TESTS:  Studies reviewed.    ASSESSMENT & PLAN: INTERVAL HPI/OVERNIGHT EVENTS:  Patient reports improvement in his back pain. He reports the current pain regimen of oxycodone 5mg, taken approximately twice daily keeps his pain at a 2-3. He denies any weakness in his legs and is ambulating well. Denies urinary/fecal incontinence. Denies numbness/tingling.     VITAL SIGNS:  T(F): 97.8 (08-26-19 @ 05:56)  HR: 56 (08-26-19 @ 07:05)  BP: 132/58 (08-26-19 @ 07:05)  RR: 19 (08-26-19 @ 05:56)  SpO2: 100% (08-26-19 @ 05:56)  Wt(kg): --    PHYSICAL EXAM:    Constitutional: NAD, lean  Eyes: EOMI, sclera non-icteric  Neck: supple, no masses, no JVD  Respiratory: CTA b/l, good air entry b/l  Cardiovascular: RRR, no M/R/G  Gastrointestinal: soft, NTND, no masses palpable, + BS, no hepatosplenomegaly  Extremities: no c/c/e  Neurological: AAOx3      MEDICATIONS  (STANDING):  amLODIPine   Tablet 10 milliGRAM(s) Oral daily  aspirin enteric coated 81 milliGRAM(s) Oral daily  atorvastatin 40 milliGRAM(s) Oral at bedtime  dexamethasone  Injectable 4 milliGRAM(s) IV Push two times a day  docusate sodium 100 milliGRAM(s) Oral three times a day  enoxaparin Injectable 40 milliGRAM(s) SubCutaneous daily  ergocalciferol 50627 Unit(s) Oral every week  losartan 50 milliGRAM(s) Oral daily  metoprolol tartrate 25 milliGRAM(s) Oral two times a day  polyethylene glycol 3350 17 Gram(s) Oral daily  senna 2 Tablet(s) Oral at bedtime    MEDICATIONS  (PRN):  acetaminophen   Tablet .. 650 milliGRAM(s) Oral every 6 hours PRN Mild Pain (1 - 3)  lidocaine   Patch 1 Patch Transdermal daily PRN Pain  oxyCODONE    IR 5 milliGRAM(s) Oral every 6 hours PRN Moderate Pain (4 - 6)  simethicone 80 milliGRAM(s) Chew three times a day PRN Gas      Allergies    No Known Allergies    Intolerances        LABS:                        10.8   11.33 )-----------( 272      ( 26 Aug 2019 06:08 )             34.0     08-26    138  |  103  |  26<H>  ----------------------------<  102<H>  4.2   |  23  |  0.95    Ca    9.2      26 Aug 2019 06:08  Phos  3.8     08-25  Mg     2.0     08-25            RADIOLOGY & ADDITIONAL TESTS:  Studies reviewed.    ASSESSMENT & PLAN:

## 2019-08-26 NOTE — DIETITIAN INITIAL EVALUATION ADULT. - PROBLEM SELECTOR PLAN 5
-Lisinopril (need to rec dosage, patient could not remember)  -Losartan 50mg qd  -Amlodipine 10mg qd

## 2019-08-26 NOTE — PROGRESS NOTE ADULT - SUBJECTIVE AND OBJECTIVE BOX
Patient is a 69y old  Male who presents with a chief complaint of Abdominal pain (26 Aug 2019 11:28). Dental consulted for clearance prior to starting bisphosphonate therapy.     HPI:  70 yo M former smoker with a PMHx of HTN, HLD, CAD s/p 1 DES 2015 who p/w acute exacerbation of his back & flank pain which has been ongoing for 2-3 months. The pain can radiate diffusely into the abdomen and is worse on movement. He has also had intermittent constipation, decreased appetite and an unintentional 6lb weight loss over 3 weeks. He notes that it started with L leg pain which migrated up to the bilateral flanks and then ascended to his spine about senior living up with the pain stopped.  As an outpatient, he had an ultrasound showing renal cyst and in June 2019 he had a MR showing moderate central canal stenosis and R disc herniation at the L4-L5 level and mild formainal narrowing @ L4-L5 & L5-S1. No mention lytic lesions on MR. He also noticed a mid sternal non-painful mass for the past 2-3 weeks. Numbness and tingling in his L thumb and 2nd finger. Daughter states he went to a hematologist recently and had a marrow biopsy, but they do not know why. Denies fever, sweats, and chills. Alecia CP, SOB, and VEGA. No gait disturbance. No incontinence of stool or urine.     ED Course:  T 97.5, HR 60, /90, RR 16, SpO2 100% RA  S/p 5mg diazepam PO x1, lido patch, 4mg morphine IV x1, 1L NS x1. Hypercalcemia 12.1, PTH 7.42, serum protein 10.2, hgb 11.6. CT A/P revealing diffuse lytic expansile osseous lesions including a lesion at T8 causing mass effect on the spinal canal. Small bl thyroid nodules (18 Aug 2019 21:19)      PAST MEDICAL & SURGICAL HISTORY:  Coronary artery disease  HLD (hyperlipidemia)  HTN (hypertension)  No significant past surgical history    ( -  ) heart valve replacement  ( -  ) joint replacement  ( -  ) pregnancy    MEDICATIONS  (STANDING):  amLODIPine   Tablet 10 milliGRAM(s) Oral daily  aspirin enteric coated 81 milliGRAM(s) Oral daily  atorvastatin 40 milliGRAM(s) Oral at bedtime  dexamethasone  Injectable 4 milliGRAM(s) IV Push two times a day  docusate sodium 100 milliGRAM(s) Oral three times a day  enoxaparin Injectable 40 milliGRAM(s) SubCutaneous daily  ergocalciferol 69311 Unit(s) Oral every week  losartan 50 milliGRAM(s) Oral daily  metoprolol tartrate 25 milliGRAM(s) Oral two times a day  polyethylene glycol 3350 17 Gram(s) Oral daily  senna 2 Tablet(s) Oral at bedtime    MEDICATIONS  (PRN):  acetaminophen   Tablet .. 650 milliGRAM(s) Oral every 6 hours PRN Mild Pain (1 - 3)  lidocaine   Patch 1 Patch Transdermal daily PRN Pain  oxyCODONE    IR 5 milliGRAM(s) Oral every 6 hours PRN Moderate Pain (4 - 6)  simethicone 80 milliGRAM(s) Chew three times a day PRN Gas    Allergies    Beef (Unknown)  Drug Allergies Not Recorded  pork (Unknown)    Intolerances      FAMILY HISTORY:  Maternal family history of hypertension: Mother      *SOCIAL HISTORY: Patient presented to dental clinic alone. Pt has limited understanding of English. Pt's daughter would like to be called prior to dental treatment ([facility] [phone]).    *Last Dental Visit: Unknown    Vital Signs Last 24 Hrs  T(C): 36.6 (26 Aug 2019 13:17), Max: 36.7 (25 Aug 2019 19:55)  T(F): 97.8 (26 Aug 2019 13:17), Max: 98 (25 Aug 2019 19:55)  HR: 58 (26 Aug 2019 13:17) (48 - 66)  BP: 155/68 (26 Aug 2019 13:17) (125/61 - 155/68)  BP(mean): --  RR: 19 (26 Aug 2019 13:17) (18 - 19)  SpO2: 100% (26 Aug 2019 13:17) (99% - 100%)    EOE:  TMJ ( -  ) clicks                    (  -  ) pops                    (  -  ) crepitus             Mandible FROM             Facial bones and MOM grossly intact             ( -  ) trismus             ( -  ) LAD             ( -  ) swelling             ( -  ) asymmetry             ( -  ) palpation             ( -  ) SOB             ( -  ) dysphagia             ( -  ) LOC    IOE:  permanent dentition: grossly intact, generalized staining. Severe buccal abfraction of teeth #12, 13, 14.           hard/soft palate:  ( - ) palatal torus           tongue/FOM WNL           labial/buccal mucosa WNL           ( -  ) percussion           ( -  ) palpation           ( -  ) swelling     Dentition present: Complete permanent dentition with the exception of teeth #2, 16, 24, 25, and 26.   Mobility: Class 3 mobility: #1, 23. Class 1-2 mobility: #4, 5, 12, 13, 14, 15, 20, 21. Class + to 1 mobility of all other remaining teeth.  Caries: #15 distal caries approximating/into pulp. #6 mesial caries.    Radiographs: FMX, interpreted: Generalized chronic severe periodontitis. #15 distal caries approximating/into pulp. #6 mesial caries. Generalized radiographic calculus tags present.    LABS:                        10.8   11.33 )-----------( 272      ( 26 Aug 2019 06:08 )             34.0     08-26    138  |  103  |  26<H>  ----------------------------<  102<H>  4.2   |  23  |  0.95  mhs  Ca    9.2      26 Aug 2019 06:08  Phos  3.8     08-25  Mg     2.0     08-25      WBC Count: 11.33 K/uL <H> [3.8 - 10.5] (08-26 @ 06:08)    Platelet Count - Automated: 272 K/uL [150 - 400] (08-26 @ 06:08)  Platelet Count - Automated: 271 K/uL [150 - 400] (08-25 @ 07:25)    ASSESSMENT: Generalized chronic severe periodontitis. Large carious lesion tooth #15 approximating/into pulp. Caries #6 mesial. Teeth #4 and #5 have probing depths 6-8mm. Severe buccal abfraction of teeth #12, 13, 14.     Reviewed findings with patient and patient's daughter over the phone. Recommend extraction of teeth #1, 3, 4, 5, 12, 13, 14, 15, 20, and 21. Remaining teeth can be maintained with good oral hygiene and regular perio maintenance. Stressed to patient and daughter that once patient starts bisphosphonate therapy, he can no longer have any invasive dental procedures such as extractions done. Reviewed risks associated with having invasive dental procedures done after bisphosphonate therapy. Patient and his daughter understand. Daughter would like to review patient's FMX and treatment plan prior to starting extractions.    PROCEDURE: EOE, IOE, FMX  Verbal consent given.     RECOMMENDATIONS:   1) Dental extractions of teeth #1, 3, 4, 5, 12, 13, 14, 15, 20, and 21 in two separate visits. Will coordinate appointments once daughter reviews findings.  2) Dental F/U with outpatient dentist for comprehensive dental care and fabrication of partial denture.  3) If any difficulty swallowing/breathing, fever occur, page dental.     Maura Jasso DDS, pager #[phone]  Cindy Yee, #[phone] Patient is a 69y old  Male who presents with a chief complaint of Abdominal pain (26 Aug 2019 11:28). Dental consulted for clearance prior to starting bisphosphonate therapy.     HPI:  70 yo M former smoker with a PMHx of HTN, HLD, CAD s/p 1 DES 2015 who p/w acute exacerbation of his back & flank pain which has been ongoing for 2-3 months. The pain can radiate diffusely into the abdomen and is worse on movement. He has also had intermittent constipation, decreased appetite and an unintentional 6lb weight loss over 3 weeks. He notes that it started with L leg pain which migrated up to the bilateral flanks and then ascended to his spine about [facility] up with the pain stopped.  As an outpatient, he had an ultrasound showing renal cyst and in June 2019 he had a MR showing moderate central canal stenosis and R disc herniation at the L4-L5 level and mild formainal narrowing @ L4-L5 & L5-S1. No mention lytic lesions on MR. He also noticed a mid sternal non-painful mass for the past 2-3 weeks. Numbness and tingling in his L thumb and 2nd finger. Daughter states he went to a hematologist recently and had a marrow biopsy, but they do not know why. Denies fever, sweats, and chills. Alecia CP, SOB, and VEGA. No gait disturbance. No incontinence of stool or urine.     ED Course:  T 97.5, HR 60, /90, RR 16, SpO2 100% RA  S/p 5mg diazepam PO x1, lido patch, 4mg morphine IV x1, 1L NS x1. Hypercalcemia 12.1, PTH 7.42, serum protein 10.2, hgb 11.6. CT A/P revealing diffuse lytic expansile osseous lesions including a lesion at T8 causing mass effect on the spinal canal. Small bl thyroid nodules (18 Aug 2019 21:19)      PAST MEDICAL & SURGICAL HISTORY:  Coronary artery disease  HLD (hyperlipidemia)  HTN (hypertension)  No significant past surgical history    ( -  ) heart valve replacement  ( -  ) joint replacement  ( -  ) pregnancy    MEDICATIONS  (STANDING):  amLODIPine   Tablet 10 milliGRAM(s) Oral daily  aspirin enteric coated 81 milliGRAM(s) Oral daily  atorvastatin 40 milliGRAM(s) Oral at bedtime  dexamethasone  Injectable 4 milliGRAM(s) IV Push two times a day  docusate sodium 100 milliGRAM(s) Oral three times a day  enoxaparin Injectable 40 milliGRAM(s) SubCutaneous daily  ergocalciferol 47924 Unit(s) Oral every week  losartan 50 milliGRAM(s) Oral daily  metoprolol tartrate 25 milliGRAM(s) Oral two times a day  polyethylene glycol 3350 17 Gram(s) Oral daily  senna 2 Tablet(s) Oral at bedtime    MEDICATIONS  (PRN):  acetaminophen   Tablet .. 650 milliGRAM(s) Oral every 6 hours PRN Mild Pain (1 - 3)  lidocaine   Patch 1 Patch Transdermal daily PRN Pain  oxyCODONE    IR 5 milliGRAM(s) Oral every 6 hours PRN Moderate Pain (4 - 6)  simethicone 80 milliGRAM(s) Chew three times a day PRN Gas    Allergies    Beef (Unknown)  Drug Allergies Not Recorded  pork (Unknown)    Intolerances      FAMILY HISTORY:  Maternal family history of hypertension: Mother      *SOCIAL HISTORY: Patient presented to dental clinic alone. Pt has limited understanding of English. Pt's daughter would like to be called prior to dental treatment ([facility] [phone]).    *Last Dental Visit: Unknown    Vital Signs Last 24 Hrs  T(C): 36.6 (26 Aug 2019 13:17), Max: 36.7 (25 Aug 2019 19:55)  T(F): 97.8 (26 Aug 2019 13:17), Max: 98 (25 Aug 2019 19:55)  HR: 58 (26 Aug 2019 13:17) (48 - 66)  BP: 155/68 (26 Aug 2019 13:17) (125/61 - 155/68)  BP(mean): --  RR: 19 (26 Aug 2019 13:17) (18 - 19)  SpO2: 100% (26 Aug 2019 13:17) (99% - 100%)    EOE:  TMJ ( -  ) clicks                    (  -  ) pops                    (  -  ) crepitus             Mandible FROM             Facial bones and MOM grossly intact             ( -  ) trismus             ( -  ) LAD             ( -  ) swelling             ( -  ) asymmetry             ( -  ) palpation             ( -  ) SOB             ( -  ) dysphagia             ( -  ) LOC    IOE:  permanent dentition: grossly intact, generalized staining. Severe buccal abfraction of teeth #12, 13, 14.           hard/soft palate:  ( - ) palatal torus           tongue/FOM WNL           labial/buccal mucosa WNL           ( -  ) percussion           ( -  ) palpation           ( -  ) swelling     Dentition present: Complete permanent dentition with the exception of teeth #2, 16, 24, 25, and 26.   Mobility: Class 3 mobility: #1, 23. Class 1-2 mobility: #4, 5, 12, 13, 14, 15, 20, 21. Class + to 1 mobility of all other remaining teeth.  Caries: #15 distal caries approximating/into pulp. #6 mesial caries.    Radiographs: FMX, interpreted: Generalized chronic severe periodontitis. #15 distal caries approximating/into pulp. #6 mesial caries. Generalized radiographic calculus tags present.    LABS:                        10.8   11.33 )-----------( 272      ( 26 Aug 2019 06:08 )             34.0     08-26    138  |  103  |  26<H>  ----------------------------<  102<H>  4.2   |  23  |  0.95  mhs  Ca    9.2      26 Aug 2019 06:08  Phos  3.8     08-25  Mg     2.0     08-25      WBC Count: 11.33 K/uL <H> [3.8 - 10.5] (08-26 @ 06:08)    Platelet Count - Automated: 272 K/uL [150 - 400] (08-26 @ 06:08)  Platelet Count - Automated: 271 K/uL [150 - 400] (08-25 @ 07:25)    ASSESSMENT: Patient is not cleared for bisphosphonate therapy from dental perspective.  Generalized chronic severe periodontitis. Large carious lesion tooth #15 approximating/into pulp. Caries #6 mesial. Teeth #4 and #5 have probing depths 6-8mm. Severe buccal abfraction of teeth #12, 13, 14.     Reviewed findings with patient and patient's daughter over the phone. Recommend extraction of teeth #1, 3, 4, 5, 12, 13, 14, 15, 20, and 21. Remaining teeth can be maintained with good oral hygiene and regular perio maintenance. Stressed to patient and daughter that once patient starts bisphosphonate therapy, he can no longer have any invasive dental procedures such as extractions done. Reviewed risks associated with having invasive dental procedures done after bisphosphonate therapy. Patient and his daughter understand. Daughter would like to review patient's FMX and treatment plan prior to starting extractions.    PROCEDURE: EOE, IOE, FMX  Verbal consent given.     RECOMMENDATIONS:   1) Dental extractions of teeth #1, 3, 4, 5, 12, 13, 14, 15, 20, and 21 in two separate visits. Will coordinate appointments once daughter reviews findings.  2) Dental F/U with outpatient dentist for comprehensive dental care and fabrication of partial denture.  3) If any difficulty swallowing/breathing, fever occur, page dental.     Maura Jasso DDS, pager #[phone]  Cindy Yee, #[phone]

## 2019-08-27 ENCOUNTER — APPOINTMENT (OUTPATIENT)
Dept: SPINE | Facility: CLINIC | Age: 69
End: 2019-08-27

## 2019-08-27 PROCEDURE — 99232 SBSQ HOSP IP/OBS MODERATE 35: CPT

## 2019-08-27 PROCEDURE — 77075 RADEX OSSEOUS SURVEY COMPL: CPT | Mod: 26

## 2019-08-27 RX ADMIN — Medication 81 MILLIGRAM(S): at 14:51

## 2019-08-27 RX ADMIN — OXYCODONE HYDROCHLORIDE 5 MILLIGRAM(S): 5 TABLET ORAL at 22:30

## 2019-08-27 RX ADMIN — Medication 100 MILLIGRAM(S): at 21:35

## 2019-08-27 RX ADMIN — Medication 100 MILLIGRAM(S): at 14:51

## 2019-08-27 RX ADMIN — ENOXAPARIN SODIUM 40 MILLIGRAM(S): 100 INJECTION SUBCUTANEOUS at 14:51

## 2019-08-27 RX ADMIN — Medication 4 MILLIGRAM(S): at 17:49

## 2019-08-27 RX ADMIN — SIMETHICONE 80 MILLIGRAM(S): 80 TABLET, CHEWABLE ORAL at 15:04

## 2019-08-27 RX ADMIN — OXYCODONE HYDROCHLORIDE 5 MILLIGRAM(S): 5 TABLET ORAL at 21:35

## 2019-08-27 RX ADMIN — Medication 25 MILLIGRAM(S): at 07:31

## 2019-08-27 RX ADMIN — Medication 10 MILLIGRAM(S): at 21:38

## 2019-08-27 RX ADMIN — OXYCODONE HYDROCHLORIDE 5 MILLIGRAM(S): 5 TABLET ORAL at 08:15

## 2019-08-27 RX ADMIN — Medication 100 MILLIGRAM(S): at 07:31

## 2019-08-27 RX ADMIN — SENNA PLUS 2 TABLET(S): 8.6 TABLET ORAL at 21:37

## 2019-08-27 RX ADMIN — SIMETHICONE 80 MILLIGRAM(S): 80 TABLET, CHEWABLE ORAL at 22:51

## 2019-08-27 RX ADMIN — AMLODIPINE BESYLATE 10 MILLIGRAM(S): 2.5 TABLET ORAL at 07:31

## 2019-08-27 RX ADMIN — LOSARTAN POTASSIUM 50 MILLIGRAM(S): 100 TABLET, FILM COATED ORAL at 07:31

## 2019-08-27 RX ADMIN — POLYETHYLENE GLYCOL 3350 17 GRAM(S): 17 POWDER, FOR SOLUTION ORAL at 14:51

## 2019-08-27 RX ADMIN — SIMETHICONE 80 MILLIGRAM(S): 80 TABLET, CHEWABLE ORAL at 07:34

## 2019-08-27 RX ADMIN — OXYCODONE HYDROCHLORIDE 5 MILLIGRAM(S): 5 TABLET ORAL at 07:57

## 2019-08-27 RX ADMIN — ATORVASTATIN CALCIUM 40 MILLIGRAM(S): 80 TABLET, FILM COATED ORAL at 21:38

## 2019-08-27 RX ADMIN — Medication 4 MILLIGRAM(S): at 07:31

## 2019-08-27 NOTE — PROGRESS NOTE ADULT - SUBJECTIVE AND OBJECTIVE BOX
Patient is a 69y old  Male who presents with a chief complaint of Abdominal pain (27 Aug 2019 09:28)      SUBJECTIVE / OVERNIGHT EVENTS:  seen by dental  heme want [facility] survey  rt until Thursday    MEDICATIONS  (STANDING):  amLODIPine   Tablet 10 milliGRAM(s) Oral daily  aspirin enteric coated 81 milliGRAM(s) Oral daily  atorvastatin 40 milliGRAM(s) Oral at bedtime  dexamethasone  Injectable 4 milliGRAM(s) IV Push two times a day  docusate sodium 100 milliGRAM(s) Oral three times a day  enoxaparin Injectable 40 milliGRAM(s) SubCutaneous daily  ergocalciferol 40311 Unit(s) Oral every week  losartan 50 milliGRAM(s) Oral daily  metoprolol tartrate 25 milliGRAM(s) Oral two times a day  polyethylene glycol 3350 17 Gram(s) Oral daily  senna 2 Tablet(s) Oral at bedtime    MEDICATIONS  (PRN):  acetaminophen   Tablet .. 650 milliGRAM(s) Oral every 6 hours PRN Mild Pain (1 - 3)  lidocaine   Patch 1 Patch Transdermal daily PRN Pain  oxyCODONE    IR 5 milliGRAM(s) Oral every 6 hours PRN Moderate Pain (4 - 6)  simethicone 80 milliGRAM(s) Chew three times a day PRN Gas      Vital Signs Last 24 Hrs  T(C): 36.7 (27 Aug 2019 06:06), Max: 36.7 (27 Aug 2019 06:06)  T(F): 98.1 (27 Aug 2019 06:06), Max: 98.1 (27 Aug 2019 06:06)  HR: 60 (27 Aug 2019 06:06) (50 - 60)  BP: 141/74 (27 Aug 2019 06:06) (141/74 - 155/68)  BP(mean): --  RR: 17 (27 Aug 2019 06:06) (17 - 19)  SpO2: 100% (27 Aug 2019 06:06) (100% - 100%)      PHYSICAL EXAM:  GENERAL: NAD, well-developed  HEAD:  Atraumatic, Normocephalic  EYES: EOMI, PERRLA, conjunctiva and sclera clear  NECK: Supple, No JVD  CHEST/LUNG: Clear to auscultation bilaterally; No wheeze  HEART: Regular rate and rhythm; No murmurs, rubs, or gallops  ABDOMEN: Soft, Nontender, Nondistended; Bowel sounds present  EXTREMITIES:  2+ Peripheral Pulses, No clubbing, cyanosis, or edema  PSYCH: AAOx3  NEUROLOGY: non-focal  SKIN: No rashes or lesions    LABS:                        10.8   11.33 )-----------( 272      ( 26 Aug 2019 06:08 )             34.0     08-26    138  |  103  |  26<H>  ----------------------------<  102<H>  4.2   |  23  |  0.95    Ca    9.2      26 Aug 2019 06:08                RADIOLOGY & ADDITIONAL TESTS:    Imaging Personally Reviewed:    Consultant(s) Notes Reviewed:      Care Discussed with Consultants/Other Providers:

## 2019-08-27 NOTE — PROGRESS NOTE ADULT - PROBLEM SELECTOR PLAN 7
CAD s/p 1 YAMILET (performed in [facility] in 2015)  -high intensity statin  -ASA 81mg qd  -Metoprolol 25mg bid as HR tolerate

## 2019-08-27 NOTE — PROGRESS NOTE ADULT - ASSESSMENT
70 yo M former smoker with a PMHx of HTN, HLD, CAD s/p 1 DES who p/w acute exacerbation over the past 3 days of his ongoing bilateral flank pain x 2 months found to have diffuse osteolytic lesions on CT chest, A/P concerning for diffuse metastatic disease, weight loss, primary cancer not found at this time. Patient also with hypercalcemia w/o clear evidence of primary malignancy, initial labs c/w MM. s/p BM biopsy.   70 yo M former smoker with a PMHx of HTN, HLD, CAD s/p 1 DES 2015 who p/w acute exacerbation of his back & flank pain found on CT new lytic expansile osseous lesions possible mets of unclear primary  Radiation to spinal lesions.   Day 1/5 on Friday, will resume on Monday 8/26  severe protein sabine malnutrition- suplemented  seen by dental  heme want [facility] survey  rt until Thursday

## 2019-08-27 NOTE — PROGRESS NOTE ADULT - SUBJECTIVE AND OBJECTIVE BOX
[facility] [place] [facility]ROLOGY PRACTICE   MD ANABEL MOSS D.O, PA    TELEPHONE NUMBERS:  OFFICE: [phone]  DR. HOLT CELL: [phone]  AGUEDA GONZALEZ CELL: [phone]  DR. HERNANDEZ CELL:  [phone]    RENAL FOLLOW UP NOTE  --------------------------------------------------------------------------------  HPI: Pt seen and examined at bedside. No complaints  Denies SOB, chest pain     PAST HISTORY  --------------------------------------------------------------------------------  No significant changes to PMH, PSH, FHx, SHx, unless otherwise noted    ALLERGIES & MEDICATIONS  --------------------------------------------------------------------------------  Allergies    Beef (Unknown)  Drug Allergies Not Recorded  pork (Unknown)    Intolerances      Standing Inpatient Medications  amLODIPine   Tablet 10 milliGRAM(s) Oral daily  aspirin enteric coated 81 milliGRAM(s) Oral daily  atorvastatin 40 milliGRAM(s) Oral at bedtime  dexamethasone  Injectable 4 milliGRAM(s) IV Push two times a day  docusate sodium 100 milliGRAM(s) Oral three times a day  enoxaparin Injectable 40 milliGRAM(s) SubCutaneous daily  ergocalciferol 66813 Unit(s) Oral every week  losartan 50 milliGRAM(s) Oral daily  metoprolol tartrate 25 milliGRAM(s) Oral two times a day  polyethylene glycol 3350 17 Gram(s) Oral daily  senna 2 Tablet(s) Oral at bedtime    PRN Inpatient Medications  acetaminophen   Tablet .. 650 milliGRAM(s) Oral every 6 hours PRN  lidocaine   Patch 1 Patch Transdermal daily PRN  oxyCODONE    IR 5 milliGRAM(s) Oral every 6 hours PRN  simethicone 80 milliGRAM(s) Chew three times a day PRN      REVIEW OF SYSTEMS  --------------------------------------------------------------------------------  General: no fever  CVS: no chest pain  RESP: no sob, no cough  ABD: no abdominal pain  : no dysuria  MSK: no edema     VITALS/PHYSICAL EXAM  --------------------------------------------------------------------------------  T(C): 36.6 (08-27-19 @ 13:33), Max: 36.7 (08-27-19 @ 06:06)  HR: 66 (08-27-19 @ 13:33) (50 - 66)  BP: 144/60 (08-27-19 @ 13:33) (141/74 - 154/64)  RR: 17 (08-27-19 @ 06:06) (17 - 18)  SpO2: 99% (08-27-19 @ 13:33) (99% - 100%)  Wt(kg): --    Physical Exam:  	Gen: NAD  	HEENT: MMM  	Pulm: CTA B/L  	CV: S1S2  	Abd: Soft, +BS  	Ext: No LE edema B/L                      Neuro: Awake   	Skin: Warm and Dry     LABS/STUDIES  --------------------------------------------------------------------------------              10.8   11.33 >-----------<  272      [08-26-19 @ 06:08]              34.0     138  |  103  |  26  ----------------------------<  102      [08-26-19 @ 06:08]  4.2   |  23  |  0.95        Ca     9.2     [08-26-19 @ 06:08]    Creatinine Trend:  SCr 0.95 [08-26 @ 06:08]  SCr 1.03 [08-25 @ 07:25]  SCr 1.34 [08-24 @ 07:00]  SCr 1.17 [08-23 @ 06:15]  SCr 1.02 [08-22 @ 09:00]    Urinalysis - [08-18-19 @ 15:45]      Color COLORLESS / Appearance CLEAR / SG 1.005 / pH 7.5      Gluc NEGATIVE / Ketone NEGATIVE  / Bili NEGATIVE / Urobili NORMAL       Blood NEGATIVE / Protein 10 / Leuk Est NEGATIVE / Nitrite NEGATIVE      RBC  / WBC  / Hyaline  / Gran  / Sq Epi  / Non Sq Epi  / Bacteria     Urine Protein 19.5      [08-22-19 @ 04:30]  Urine Sodium < 20      [08-22-19 @ 20:00]  Urine Osmolality 552      [08-22-19 @ 20:00]    Iron 52, TIBC 252, %sat --      [08-19-19 @ 07:18]  Ferritin 368.5      [08-19-19 @ 07:18]  PTH 7.42 (Ca --)      [08-18-19 @ 18:52]   --  Vitamin D (25OH) 19.6      [08-23-19 @ 06:15]    HCV 0.10, Nonreactive Hepatitis C AB  S/CO Ratio                        Interpretation  < 1.00                                   Non-Reactive  1.00 - 4.99                         Weakly-Reactive  >= 5.00                                Reactive  Non-Reactive: Aperson with a non-reactive HCV antibody  result is considered uninfected.  No further action is  needed unless recent infection is suspected.  In these  cases, consider repeat testing later to detect  seroconversion..  Weakly-Reactive: HCV antibody test is abnormal, HCV RNA  Qualitative test will follow.  Reactive: HCV antibody test is abnormal, HCV RNA  Qualitative test will follow.  Note: HCV antibody testing is performed on the Abbott   system.      [08-20-19 @ 07:13]    Free Light Chains: kappa 214.98, lambda 1.04, ratio = 206.71 H      [08-20 @ 07:13]

## 2019-08-27 NOTE — PROGRESS NOTE ADULT - ASSESSMENT
68 yo M former smoker with a PMHx of HTN, HLD, CAD s/p 1 DES 2015 who p/w acute exacerbation of his back & flank pain found on CT new lytic expansile osseous lesions possible mets of unclear primary    CRISTI  Pt with improved Scr to baseline.   Scr peaked at 1.34; Baseline Scr 1.0  Pt on losartan. 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 Anemia at that time c/w NANDINI.  pth related peptide pending  completed IVF  serum calcium improved  monitor bmp    HTN  controlled  on norvasc 10, metoprolol 25 bid and losartan 50  monitor    Proteinuria  mild  check urine p/c ratio  on losartan    Hyponatremia  hx of siadh in the past   urine osm high however urine na low, less likely SIADH, more consistent with hypovolemia/poor oral intake   Promote oral intake   monitor

## 2019-08-27 NOTE — PROGRESS NOTE ADULT - PROBLEM SELECTOR PLAN 9
Patient is cleared medically for possible dental procedure.   He does not require prophylactic antibiotics for any potential dental procedure  He is cleared to have local anesthesia for any potential procedure  Patient can be transported with just wheelchair, without need for IV pole or any additional cardiac monitors.     [id number] if additional questions

## 2019-08-27 NOTE — PROGRESS NOTE ADULT - ATTENDING COMMENTS
Day 1/5 on Friday, will resume on Monday 8/26  70 yo M former smoker with a PMHx of HTN, HLD, CAD s/p 1 DES 2015 who p/w acute exacerbation of his back & flank pain found on CT new lytic expansile osseous lesions possible mets of unclear primary  Radiation to spinal lesions day 3/5 Day 1/5 on Friday, will resume on Monday 8/26  70 yo M former smoker with a PMHx of HTN, HLD, CAD s/p 1 DES 2015 who p/w acute exacerbation of his back & flank pain found on CT new lytic expansile osseous lesions possible mets of unclear primary  Radiation to spinal lesions day 3/5  seen by dental  heme want skeltal survey  rt until Thursday Day 1/5 on Friday, will resume on Monday 8/26  70 yo M former smoker with a PMHx of HTN, HLD, CAD s/p 1 DES 2015 who p/w acute exacerbation of his back & flank pain found on CT new lytic expansile osseous lesions possible mets of unclear primary  Radiation to spinal lesions day 3/5  seen by dental  manjeet andrews survey  rt until Thursday    called Daughter Mirlande at [phone]  she was updated to  [facility] hosp course- has appointment on 9/23/19 at [facility] with Dr Velásquez  d/c planning 9/29/19

## 2019-08-28 DIAGNOSIS — M89.9 DISORDER OF BONE, UNSPECIFIED: ICD-10-CM

## 2019-08-28 LAB
ANION GAP SERPL CALC-SCNC: 11 MMO/L — SIGNIFICANT CHANGE UP (ref 7–14)
BUN SERPL-MCNC: 24 MG/DL — HIGH (ref 7–23)
CALCIUM SERPL-MCNC: 9.1 MG/DL — SIGNIFICANT CHANGE UP (ref 8.4–10.5)
CHLORIDE SERPL-SCNC: 105 MMOL/L — SIGNIFICANT CHANGE UP (ref 98–107)
CO2 SERPL-SCNC: 21 MMOL/L — LOW (ref 22–31)
CREAT SERPL-MCNC: 0.92 MG/DL — SIGNIFICANT CHANGE UP (ref 0.5–1.3)
DEPRECATED KAPPA LC FREE/LAMBDA SER: 1015.54 — HIGH (ref 2.04–10.37)
GLUCOSE SERPL-MCNC: 100 MG/DL — HIGH (ref 70–99)
HCT VFR BLD CALC: 30 % — LOW (ref 39–50)
HGB BLD-MCNC: 9.9 G/DL — LOW (ref 13–17)
KAPPA LC UR-MCNC: 1960 MG/L — HIGH (ref 1.35–24.19)
LAMBDA LC UR-MCNC: 1.93 MG/L — SIGNIFICANT CHANGE UP (ref 0.24–6.66)
MCHC RBC-ENTMCNC: 29.7 PG — SIGNIFICANT CHANGE UP (ref 27–34)
MCHC RBC-ENTMCNC: 33 % — SIGNIFICANT CHANGE UP (ref 32–36)
MCV RBC AUTO: 90.1 FL — SIGNIFICANT CHANGE UP (ref 80–100)
NRBC # FLD: 0 K/UL — SIGNIFICANT CHANGE UP (ref 0–0)
PLATELET # BLD AUTO: 262 K/UL — SIGNIFICANT CHANGE UP (ref 150–400)
PMV BLD: 11.3 FL — SIGNIFICANT CHANGE UP (ref 7–13)
POTASSIUM SERPL-MCNC: 4.1 MMOL/L — SIGNIFICANT CHANGE UP (ref 3.5–5.3)
POTASSIUM SERPL-SCNC: 4.1 MMOL/L — SIGNIFICANT CHANGE UP (ref 3.5–5.3)
RBC # BLD: 3.33 M/UL — LOW (ref 4.2–5.8)
RBC # FLD: 12.9 % — SIGNIFICANT CHANGE UP (ref 10.3–14.5)
SODIUM SERPL-SCNC: 137 MMOL/L — SIGNIFICANT CHANGE UP (ref 135–145)
WBC # BLD: 9.7 K/UL — SIGNIFICANT CHANGE UP (ref 3.8–10.5)
WBC # FLD AUTO: 9.7 K/UL — SIGNIFICANT CHANGE UP (ref 3.8–10.5)

## 2019-08-28 PROCEDURE — 99233 SBSQ HOSP IP/OBS HIGH 50: CPT

## 2019-08-28 RX ADMIN — SIMETHICONE 80 MILLIGRAM(S): 80 TABLET, CHEWABLE ORAL at 22:26

## 2019-08-28 RX ADMIN — AMLODIPINE BESYLATE 10 MILLIGRAM(S): 2.5 TABLET ORAL at 06:22

## 2019-08-28 RX ADMIN — SENNA PLUS 1 TABLET(S): 8.6 TABLET ORAL at 22:26

## 2019-08-28 RX ADMIN — OXYCODONE HYDROCHLORIDE 5 MILLIGRAM(S): 5 TABLET ORAL at 23:00

## 2019-08-28 RX ADMIN — Medication 100 MILLIGRAM(S): at 14:34

## 2019-08-28 RX ADMIN — LOSARTAN POTASSIUM 50 MILLIGRAM(S): 100 TABLET, FILM COATED ORAL at 06:22

## 2019-08-28 RX ADMIN — Medication 4 MILLIGRAM(S): at 18:01

## 2019-08-28 RX ADMIN — Medication 100 MILLIGRAM(S): at 06:22

## 2019-08-28 RX ADMIN — ATORVASTATIN CALCIUM 40 MILLIGRAM(S): 80 TABLET, FILM COATED ORAL at 22:26

## 2019-08-28 RX ADMIN — Medication 100 MILLIGRAM(S): at 22:27

## 2019-08-28 RX ADMIN — SIMETHICONE 80 MILLIGRAM(S): 80 TABLET, CHEWABLE ORAL at 11:39

## 2019-08-28 RX ADMIN — Medication 81 MILLIGRAM(S): at 12:16

## 2019-08-28 RX ADMIN — OXYCODONE HYDROCHLORIDE 5 MILLIGRAM(S): 5 TABLET ORAL at 22:26

## 2019-08-28 RX ADMIN — ENOXAPARIN SODIUM 40 MILLIGRAM(S): 100 INJECTION SUBCUTANEOUS at 12:16

## 2019-08-28 RX ADMIN — POLYETHYLENE GLYCOL 3350 17 GRAM(S): 17 POWDER, FOR SOLUTION ORAL at 12:16

## 2019-08-28 RX ADMIN — Medication 4 MILLIGRAM(S): at 06:22

## 2019-08-28 NOTE — PROGRESS NOTE ADULT - PROBLEM SELECTOR PLAN 7
CAD s/p 1 YAMILET (performed in [facility] in 2015)  -high intensity statin  -ASA 81mg qd  -Metoprolol 25mg bid as HR tolerate

## 2019-08-28 NOTE — PROGRESS NOTE ADULT - SUBJECTIVE AND OBJECTIVE BOX
[facility] [place] [facility]   MD ANABEL MOSS DO ANGELA WONG, PA    TEL:  OFFICE: [phone]  DR HOLT CELL: [phone]  DR. HERNANDEZ CELL: [phone]  DANIELE DALTON CELL: [phone]        Patient is a 69y old  Male who presents with a chief complaint of Abdominal pain (28 Aug 2019 08:37)      Patient seen and examined at bedside. No chest pain/sob    VITALS:  T(F): 98.7 (08-28-19 @ 13:07), Max: 98.7 (08-28-19 @ 13:07)  HR: 66 (08-28-19 @ 13:07)  BP: 123/63 (08-28-19 @ 13:07)  RR: 17 (08-28-19 @ 09:50)  SpO2: 100% (08-28-19 @ 13:07)  Wt(kg): --        PHYSICAL EXAM:  Constitutional: NAD  Neck: No JVD  Respiratory: CTAB, no wheezes, rales or rhonchi  Cardiovascular: S1, S2, RRR  Gastrointestinal: BS+, soft, NT/ND  Extremities: No peripheral edema    Hospital Medications:   MEDICATIONS  (STANDING):  amLODIPine   Tablet 10 milliGRAM(s) Oral daily  aspirin enteric coated 81 milliGRAM(s) Oral daily  atorvastatin 40 milliGRAM(s) Oral at bedtime  dexamethasone  Injectable 4 milliGRAM(s) IV Push two times a day  docusate sodium 100 milliGRAM(s) Oral three times a day  enoxaparin Injectable 40 milliGRAM(s) SubCutaneous daily  ergocalciferol 58168 Unit(s) Oral every week  losartan 50 milliGRAM(s) Oral daily  metoprolol tartrate 25 milliGRAM(s) Oral two times a day  polyethylene glycol 3350 17 Gram(s) Oral daily  senna 2 Tablet(s) Oral at bedtime      LABS:  08-28    137  |  105  |  24<H>  ----------------------------<  100<H>  4.1   |  21<L>  |  0.92    Ca    9.1      28 Aug 2019 06:20      Creatinine Trend: 0.92 <--, 0.95 <--, 1.03 <--, 1.34 <--, 1.17 <--, 1.02 <--                                9.9    9.70  )-----------( 262      ( 28 Aug 2019 06:20 )             30.0     Urine Studies:  Urinalysis - [08-18-19 @ 15:45]      Color COLORLESS / Appearance CLEAR / SG 1.005 / pH 7.5      Gluc NEGATIVE / Ketone NEGATIVE  / Bili NEGATIVE / Urobili NORMAL       Blood NEGATIVE / Protein 10 / Leuk Est NEGATIVE / Nitrite NEGATIVE      RBC  / WBC  / Hyaline  / Gran  / Sq Epi  / Non Sq Epi  / Bacteria     Urine Protein 19.5      [08-22-19 @ 04:30]  Urine Sodium < 20      [08-22-19 @ 20:00]  Urine Osmolality 552      [08-22-19 @ 20:00]    Iron 52, TIBC 252, %sat --      [08-19-19 @ 07:18]  Ferritin 368.5      [08-19-19 @ 07:18]  PTH 7.42 (Ca --)      [08-18-19 @ 18:52]   --  Vitamin D (25OH) 19.6      [08-23-19 @ 06:15]    HCV 0.10, Nonreactive Hepatitis C AB  S/CO Ratio                        Interpretation  < 1.00                                   Non-Reactive  1.00 - 4.99                         Weakly-Reactive  >= 5.00                                Reactive  Non-Reactive: Aperson with a non-reactive HCV antibody  result is considered uninfected.  No further action is  needed unless recent infection is suspected.  In these  cases, consider repeat testing later to detect  seroconversion..  Weakly-Reactive: HCV antibody test is abnormal, HCV RNA  Qualitative test will follow.  Reactive: HCV antibody test is abnormal, HCV RNA  Qualitative test will follow.  Note: HCV antibody testing is performed on the Abbott   system.      [08-20-19 @ 07:13]    Free Light Chains: kappa 214.98, lambda 1.04, ratio = 206.71 H      [08-20 @ 07:13]    RADIOLOGY & ADDITIONAL STUDIES:

## 2019-08-28 NOTE — PROGRESS NOTE ADULT - ATTENDING COMMENTS
rt until 8/29 rt until 8/29  d/c planning 8/29 after RT rt until 8/29  d/c planning 8/29 after RT  Orthopedics called to asses Femur lesion- ? imp fx rt until 8/29  d/c planning 8/29 after RT  Orthopedics called to asses Femur lesion- ? imp fx- L femur.  < from: Xray Skeletal Survey, Complete (08.27.19 @ 19:35) >    IMPRESSION:  Multifocal osteolytic lesions consistent with disseminated myeloma. More   focal expansile lytic destructive lesion in left aspect of T8 indicated   by loss of the left pedicle cortical margins at this level. Pathologic   posterior left 4th and anterolateral left 7th rib fractures. Pathologic   left superior and inferior pubic rami fractures. Lesions better   demonstrated on chest abdomen and pelvis CT from 8/18/2019.    Slightly expansile intramedullary permeative lytic lesion in proximal   left femoral diaphysis with slightly scalloped endosteal margins places   this area at slightly increased risk for pathologic fracture. No   additional upper or lower extremity long bone lesions.     No vertebral compression fractures.      < end of copied text > rt until 8/29  d/c planning 8/29 after RT  Orthopedics called to asses Femur lesion- ? imp fx- L femur.  < from: Xray Skeletal Survey, Complete (08.27.19 @ 19:35) >    IMPRESSION:  Multifocal osteolytic lesions consistent with disseminated myeloma. More   focal expansile lytic destructive lesion in left aspect of T8 indicated   by loss of the left pedicle cortical margins at this level. Pathologic   posterior left 4th and anterolateral left 7th rib fractures. Pathologic   left superior and inferior pubic rami fractures. Lesions better   demonstrated on chest abdomen and pelvis CT from 8/18/2019.    Slightly expansile intramedullary permeative lytic lesion in proximal   left femoral diaphysis with slightly scalloped endosteal margins places   this area at slightly increased risk for pathologic fracture. No   additional upper or lower extremity long bone lesions.     No vertebral compression fractures.    Called Mirlande- daughter and updated- she will be in tomorrow to speak with heme and ortho

## 2019-08-28 NOTE — PROGRESS NOTE ADULT - SUBJECTIVE AND OBJECTIVE BOX
Patient is a 69y old  Male who presents with a chief complaint of Abdominal pain (28 Aug 2019 08:37)      SUBJECTIVE / OVERNIGHT EVENTS:  Sitting up and comfortably eating breakfast.  last rt 8/29    MEDICATIONS  (STANDING):  amLODIPine   Tablet 10 milliGRAM(s) Oral daily  aspirin enteric coated 81 milliGRAM(s) Oral daily  atorvastatin 40 milliGRAM(s) Oral at bedtime  dexamethasone  Injectable 4 milliGRAM(s) IV Push two times a day  docusate sodium 100 milliGRAM(s) Oral three times a day  enoxaparin Injectable 40 milliGRAM(s) SubCutaneous daily  ergocalciferol 87871 Unit(s) Oral every week  losartan 50 milliGRAM(s) Oral daily  metoprolol tartrate 25 milliGRAM(s) Oral two times a day  polyethylene glycol 3350 17 Gram(s) Oral daily  senna 2 Tablet(s) Oral at bedtime    MEDICATIONS  (PRN):  acetaminophen   Tablet .. 650 milliGRAM(s) Oral every 6 hours PRN Mild Pain (1 - 3)  lidocaine   Patch 1 Patch Transdermal daily PRN Pain  oxyCODONE    IR 5 milliGRAM(s) Oral every 6 hours PRN Moderate Pain (4 - 6)  simethicone 80 milliGRAM(s) Chew three times a day PRN Gas      Vital Signs Last 24 Hrs  T(C): 36.5 (28 Aug 2019 09:50), Max: 36.7 (27 Aug 2019 21:11)  T(F): 97.7 (28 Aug 2019 09:50), Max: 98.1 (27 Aug 2019 21:11)  HR: 53 (28 Aug 2019 09:50) (52 - 66)  BP: 127/55 (28 Aug 2019 09:50) (127/55 - 145/70)  BP(mean): --  RR: 17 (28 Aug 2019 09:50) (16 - 18)  SpO2: 100% (28 Aug 2019 09:50) (99% - 100%)      PHYSICAL EXAM:  GENERAL: NAD, well-developed  HEAD:  Atraumatic, Normocephalic  EYES: EOMI, PERRLA, conjunctiva and sclera clear  NECK: Supple, No JVD  CHEST/LUNG: Clear to auscultation bilaterally; No wheeze  HEART: Regular rate and rhythm; No murmurs, rubs, or gallops  ABDOMEN: Soft, Nontender, Nondistended; Bowel sounds present  EXTREMITIES:  2+ Peripheral Pulses, No clubbing, cyanosis, or edema  PSYCH: AAOx3  NEUROLOGY: non-focal  SKIN: No rashes or lesions    LABS:                        9.9    9.70  )-----------( 262      ( 28 Aug 2019 06:20 )             30.0     08-28    137  |  105  |  24<H>  ----------------------------<  100<H>  4.1   |  21<L>  |  0.92    Ca    9.1      28 Aug 2019 06:20        RADIOLOGY & ADDITIONAL TESTS:  Skel surv- done    Care Discussed with Consultants/Other Providers:  ONC

## 2019-08-28 NOTE — PROGRESS NOTE ADULT - PROBLEM SELECTOR PLAN 9
Patient is cleared medically for possible dental procedure.   He does not require prophylactic antibiotics for any potential dental procedure  He is cleared to have local anesthesia for any potential procedure  Patient can be transported with just wheelchair, without need for IV pole or any additional cardiac monitors.     [id number] if additional questions

## 2019-08-28 NOTE — PROGRESS NOTE ADULT - ASSESSMENT
70 yo M former smoker with a PMHx of HTN, HLD, CAD s/p 1 DES 2015 who p/w acute exacerbation of his back & flank pain found on CT new lytic expansile osseous lesions possible mets of unclear primary    CRISTI  Pt with improved Scr to baseline.   Scr peaked at 1.34; Baseline Scr 1.0  Pt on losartan. 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 Anemia at that time c/w NANDINI.  pth related peptide pending  completed IVF  serum calcium improved  monitor bmp    HTN  controlled  on norvasc 10, metoprolol 25 bid and losartan 50  monitor    Proteinuria  mild  check urine p/c ratio  on losartan    Hyponatremia  hx of siadh in the past   urine osm high however urine na low, less likely SIADH, more consistent with hypovolemia/poor oral intake   Promote oral intake   monitor

## 2019-08-28 NOTE — CONSULT NOTE ADULT - SUBJECTIVE AND OBJECTIVE BOX
69y Male community ambulatory presents to [facility] after being newly diagnosed with Multiple Myeloma. Patient states he was sent by his PMD to a hematologist for abnormal lab work. Biopsy obtained at hematology office and found to be multiple myeloma. Patient undergoing Radiation Therapy treatment. Skeletal survery was performed and found to have a femoral diaphysis lytic lesion and ortho consulted for further evaluation.     PAST MEDICAL & SURGICAL HISTORY:  Coronary artery disease  HLD (hyperlipidemia)  HTN (hypertension)  Multiple Myeloma   No significant past surgical history    Allergies  No Known Drug Allergies    Intolerances                            9.9    9.70  )-----------( 262      ( 28 Aug 2019 06:20 )             30.0     28 Aug 2019 06:20    137    |  105    |  24     ----------------------------<  100    4.1     |  21     |  0.92     Ca    9.1        28 Aug 2019 06:20        Vital Signs Last 24 Hrs  T(C): 37.1 (08-28-19 @ 13:07), Max: 37.1 (08-28-19 @ 13:07)  T(F): 98.7 (08-28-19 @ 13:07), Max: 98.7 (08-28-19 @ 13:07)  HR: 66 (08-28-19 @ 13:07) (52 - 66)  BP: 123/63 (08-28-19 @ 13:07) (123/63 - 145/70)  BP(mean): --  RR: 17 (08-28-19 @ 09:50) (16 - 18)  SpO2: 100% (08-28-19 @ 13:07) (100% - 100%)  Imaging: < from: Xray Skeletal Survey, Complete (08.27.19 @ 19:35) >    EXAM:  RAD SKELETAL SURVEY        PROCEDURE DATE:  Aug 27 2019         INTERPRETATION:  CLINICAL INDICATION: myeloma; evaluate evaluate for   osteolytic lesions    EXAM:   A skeletal survey was performed including images of the calvarium, spinal   column, chest, pelvis, and both upper and lower extremities on 8/27/2019   at 1935. No prior skeletal survey available for comparison.    FINDINGS/  IMPRESSION:  Multifocal osteolytic lesions consistent with disseminated myeloma. More   focal expansile lytic destructive lesion in left aspect of T8 indicated   by loss of the left pedicle cortical margins at this level. Pathologic   posterior left 4th and anterolateral left 7th rib fractures. Pathologic   left superior and inferior pubic rami fractures. Lesions better   demonstrated on chest abdomen and pelvis CT from 8/18/2019.    Slightly expansile intramedullary permeative lytic lesion in proximal   left femoral diaphysis with slightly scalloped endosteal margins places   this area at slightly increased risk for pathologic fracture. No   additional upper or lower extremity long bone lesions.     No vertebral compression fractures.    < end of copied text >    Physical Exam  General: NAD, Alert, Awake and oriented      LEFT LE: No open skin. Full painless ROM at ankle and toes with Negative log-roll and heel strike. Able to actively SLR without difficulty and pain. SILT. Extremities are warm. Compartments soft and compressible. No tenderness to palpation over femur and hip.     A/P: 70 y/o Male with L Femoral Diaphysis Lytic Lesion    -Pain control / analgesia  -WBAT  -FU Femur Xrays  -Patient refusing surgical intervention at moment even if indicated  - Consult and patient discussed and reviewed with Dr Estrada. Patient to follow up outpatient in 1-2 weeks.  patient can be weight bearing as tolerated

## 2019-08-29 ENCOUNTER — OUTPATIENT (OUTPATIENT)
Dept: OUTPATIENT SERVICES | Facility: HOSPITAL | Age: 69
LOS: 1 days | Discharge: HOME | End: 2019-08-29

## 2019-08-29 VITALS
HEART RATE: 72 BPM | DIASTOLIC BLOOD PRESSURE: 68 MMHG | TEMPERATURE: 98 F | SYSTOLIC BLOOD PRESSURE: 140 MMHG | OXYGEN SATURATION: 100 %

## 2019-08-29 DIAGNOSIS — C90.00 MULTIPLE MYELOMA NOT HAVING ACHIEVED REMISSION: ICD-10-CM

## 2019-08-29 LAB
ANION GAP SERPL CALC-SCNC: 11 MMO/L — SIGNIFICANT CHANGE UP (ref 7–14)
BUN SERPL-MCNC: 24 MG/DL — HIGH (ref 7–23)
CALCIUM SERPL-MCNC: 9.3 MG/DL — SIGNIFICANT CHANGE UP (ref 8.4–10.5)
CHLORIDE SERPL-SCNC: 104 MMOL/L — SIGNIFICANT CHANGE UP (ref 98–107)
CO2 SERPL-SCNC: 21 MMOL/L — LOW (ref 22–31)
CREAT SERPL-MCNC: 0.95 MG/DL — SIGNIFICANT CHANGE UP (ref 0.5–1.3)
GLUCOSE SERPL-MCNC: 91 MG/DL — SIGNIFICANT CHANGE UP (ref 70–99)
HCT VFR BLD CALC: 35 % — LOW (ref 39–50)
HGB BLD-MCNC: 11.1 G/DL — LOW (ref 13–17)
MCHC RBC-ENTMCNC: 29.4 PG — SIGNIFICANT CHANGE UP (ref 27–34)
MCHC RBC-ENTMCNC: 31.7 % — LOW (ref 32–36)
MCV RBC AUTO: 92.8 FL — SIGNIFICANT CHANGE UP (ref 80–100)
NRBC # FLD: 0 K/UL — SIGNIFICANT CHANGE UP (ref 0–0)
PLATELET # BLD AUTO: 291 K/UL — SIGNIFICANT CHANGE UP (ref 150–400)
PMV BLD: 12 FL — SIGNIFICANT CHANGE UP (ref 7–13)
POTASSIUM SERPL-MCNC: 4 MMOL/L — SIGNIFICANT CHANGE UP (ref 3.5–5.3)
POTASSIUM SERPL-SCNC: 4 MMOL/L — SIGNIFICANT CHANGE UP (ref 3.5–5.3)
RBC # BLD: 3.77 M/UL — LOW (ref 4.2–5.8)
RBC # FLD: 13 % — SIGNIFICANT CHANGE UP (ref 10.3–14.5)
SODIUM SERPL-SCNC: 136 MMOL/L — SIGNIFICANT CHANGE UP (ref 135–145)
WBC # BLD: 9.49 K/UL — SIGNIFICANT CHANGE UP (ref 3.8–10.5)
WBC # FLD AUTO: 9.49 K/UL — SIGNIFICANT CHANGE UP (ref 3.8–10.5)

## 2019-08-29 PROCEDURE — 73552 X-RAY EXAM OF FEMUR 2/>: CPT | Mod: 26,LT

## 2019-08-29 PROCEDURE — 99239 HOSP IP/OBS DSCHRG MGMT >30: CPT

## 2019-08-29 PROCEDURE — 99221 1ST HOSP IP/OBS SF/LOW 40: CPT

## 2019-08-29 RX ORDER — DEXAMETHASONE 0.5 MG/5ML
1 ELIXIR ORAL
Qty: 14 | Refills: 0
Start: 2019-08-29 | End: 2019-09-11

## 2019-08-29 RX ORDER — OXYCODONE HYDROCHLORIDE 5 MG/1
1 TABLET ORAL
Qty: 20 | Refills: 0
Start: 2019-08-29 | End: 2019-09-02

## 2019-08-29 RX ORDER — DEXAMETHASONE 0.5 MG/5ML
4 ELIXIR ORAL DAILY
Refills: 0 | Status: DISCONTINUED | OUTPATIENT
Start: 2019-08-29 | End: 2019-08-29

## 2019-08-29 RX ORDER — ERGOCALCIFEROL 1.25 MG/1
1 CAPSULE ORAL
Qty: 4 | Refills: 0
Start: 2019-08-29 | End: 2019-09-27

## 2019-08-29 RX ORDER — LIDOCAINE 4 G/100G
1 CREAM TOPICAL
Qty: 14 | Refills: 0
Start: 2019-08-29 | End: 2019-09-11

## 2019-08-29 RX ORDER — POLYETHYLENE GLYCOL 3350 17 G/17G
17 POWDER, FOR SOLUTION ORAL
Qty: 0 | Refills: 0 | DISCHARGE
Start: 2019-08-29

## 2019-08-29 RX ADMIN — OXYCODONE HYDROCHLORIDE 5 MILLIGRAM(S): 5 TABLET ORAL at 12:26

## 2019-08-29 RX ADMIN — OXYCODONE HYDROCHLORIDE 5 MILLIGRAM(S): 5 TABLET ORAL at 13:26

## 2019-08-29 RX ADMIN — Medication 100 MILLIGRAM(S): at 12:21

## 2019-08-29 RX ADMIN — ENOXAPARIN SODIUM 40 MILLIGRAM(S): 100 INJECTION SUBCUTANEOUS at 12:21

## 2019-08-29 RX ADMIN — SIMETHICONE 80 MILLIGRAM(S): 80 TABLET, CHEWABLE ORAL at 12:26

## 2019-08-29 RX ADMIN — SIMETHICONE 80 MILLIGRAM(S): 80 TABLET, CHEWABLE ORAL at 06:08

## 2019-08-29 RX ADMIN — Medication 81 MILLIGRAM(S): at 12:20

## 2019-08-29 RX ADMIN — Medication 4 MILLIGRAM(S): at 06:08

## 2019-08-29 RX ADMIN — LOSARTAN POTASSIUM 50 MILLIGRAM(S): 100 TABLET, FILM COATED ORAL at 06:08

## 2019-08-29 RX ADMIN — Medication 100 MILLIGRAM(S): at 06:08

## 2019-08-29 RX ADMIN — AMLODIPINE BESYLATE 10 MILLIGRAM(S): 2.5 TABLET ORAL at 06:08

## 2019-08-29 NOTE — PROGRESS NOTE ADULT - PROBLEM SELECTOR PROBLEM 6
HLD (hyperlipidemia)

## 2019-08-29 NOTE — PROGRESS NOTE ADULT - ASSESSMENT
70 yo M former smoker with a PMHx of HTN, HLD, CAD s/p 1 YAMILET who p/w acute exacerbation over the past 3 days of his ongoing bilateral flank pain x 2 months found to have diffuse osteolytic lesions on CT chest, A/P concerning for diffuse metastatic disease, weight loss, primary cancer not found at this time. Patient also with hypercalcemia w/o clear evidence of primary malignancy, initial labs c/w MM. s/p BM biopsy.   70 yo M former smoker with a PMHx of HTN, HLD, CAD s/p 1 DES 2015 who p/w acute exacerbation of his back & flank pain found on CT new lytic expansile osseous lesions possible mets of unclear primary  Radiation to spinal lesions.   Day 1/5 on Friday, will resume on Monday 8/26  severe protein sabine malnutrition- suplemented  seen by dental   skeltal survey- L prox lytic lesion- Out patient f/u with ortho  rt until Thursday    < from: Xray Skeletal Survey, Complete (08.27.19 @ 19:35) >  IMPRESSION:  Multifocal osteolytic lesions consistent with disseminated myeloma. More   focal expansile lytic destructive lesion in left aspect of T8 indicated   by loss of the left pedicle cortical margins at this level. Pathologic   posterior left 4th and anterolateral left 7th rib fractures. Pathologic   left superior and inferior pubic rami fractures. Lesions better   demonstrated on chest abdomen and pelvis CT from 8/18/2019.  Slightly expansile intramedullary permeative lytic lesion in proximal   left femoral diaphysis with slightly scalloped endosteal margins places   this area at slightly increased risk for pathologic fracture. No   additional upper or lower extremity long bone lesions.   No vertebral compression fractures.

## 2019-08-29 NOTE — PROGRESS NOTE ADULT - PROBLEM SELECTOR PROBLEM 7
Coronary artery disease

## 2019-08-29 NOTE — PROGRESS NOTE ADULT - PROBLEM SELECTOR PROBLEM 9
Pre-operative clearance

## 2019-08-29 NOTE — PROGRESS NOTE ADULT - SUBJECTIVE AND OBJECTIVE BOX
Patient is a 69y old  Male who presents with a chief complaint of Abdominal pain (28 Aug 2019 15:58)      SUBJECTIVE / OVERNIGHT EVENTS:  Patient wants no surgery- otho says out patient f/u    MEDICATIONS  (STANDING):  amLODIPine   Tablet 10 milliGRAM(s) Oral daily  aspirin enteric coated 81 milliGRAM(s) Oral daily  atorvastatin 40 milliGRAM(s) Oral at bedtime  dexamethasone  Injectable 4 milliGRAM(s) IV Push two times a day  docusate sodium 100 milliGRAM(s) Oral three times a day  enoxaparin Injectable 40 milliGRAM(s) SubCutaneous daily  ergocalciferol 36652 Unit(s) Oral every week  losartan 50 milliGRAM(s) Oral daily  metoprolol tartrate 25 milliGRAM(s) Oral two times a day  polyethylene glycol 3350 17 Gram(s) Oral daily  senna 2 Tablet(s) Oral at bedtime    MEDICATIONS  (PRN):  acetaminophen   Tablet .. 650 milliGRAM(s) Oral every 6 hours PRN Mild Pain (1 - 3)  lidocaine   Patch 1 Patch Transdermal daily PRN Pain  oxyCODONE    IR 5 milliGRAM(s) Oral every 6 hours PRN Moderate Pain (4 - 6)  simethicone 80 milliGRAM(s) Chew three times a day PRN Gas    Vital Signs Last 24 Hrs  T(C): 36.5 (29 Aug 2019 05:43), Max: 37.1 (28 Aug 2019 13:07)  T(F): 97.7 (29 Aug 2019 05:43), Max: 98.7 (28 Aug 2019 13:07)  HR: 54 (29 Aug 2019 05:43) (54 - 66)  BP: 155/76 (29 Aug 2019 05:43) (123/63 - 160/74)  BP(mean): --  RR: 18 (29 Aug 2019 05:43) (17 - 18)  SpO2: 100% (29 Aug 2019 05:43) (100% - 100%)    PHYSICAL EXAM:  GENERAL: NAD, well-developed  HEAD:  Atraumatic, Normocephalic  EYES: EOMI, PERRLA, conjunctiva and sclera clear  NECK: Supple, No JVD  CHEST/LUNG: Clear to auscultation bilaterally; No wheeze  HEART: Regular rate and rhythm; No murmurs, rubs, or gallops  ABDOMEN: Soft, Nontender, Nondistended; Bowel sounds present  EXTREMITIES:  2+ Peripheral Pulses, No clubbing, cyanosis, or edema  PSYCH: AAOx3  NEUROLOGY: non-focal  SKIN: No rashes or lesions    LABS:                        11.1   9.49  )-----------( 291      ( 29 Aug 2019 06:10 )             35.0     08-29    136  |  104  |  24<H>  ----------------------------<  91  4.0   |  21<L>  |  0.95    Ca    9.3      29 Aug 2019 06:10        RADIOLOGY & ADDITIONAL TESTS:    Imaging Personally Reviewed:    Consultant(s) Notes Reviewed:      Care Discussed with Consultants/Other Providers:  Ortho - Dr Estrada- out patient f/u  Heme- out patient f/u Patient is a 69y old  Male who presents with a chief complaint of Abdominal pain (28 Aug 2019 15:58)      SUBJECTIVE / OVERNIGHT EVENTS:  Patient wants no surgery- ortho says out patient f/u after xrays.    MEDICATIONS  (STANDING):  amLODIPine   Tablet 10 milliGRAM(s) Oral daily  aspirin enteric coated 81 milliGRAM(s) Oral daily  atorvastatin 40 milliGRAM(s) Oral at bedtime  dexamethasone  Injectable 4 milliGRAM(s) IV Push two times a day  docusate sodium 100 milliGRAM(s) Oral three times a day  enoxaparin Injectable 40 milliGRAM(s) SubCutaneous daily  ergocalciferol 64736 Unit(s) Oral every week  losartan 50 milliGRAM(s) Oral daily  metoprolol tartrate 25 milliGRAM(s) Oral two times a day  polyethylene glycol 3350 17 Gram(s) Oral daily  senna 2 Tablet(s) Oral at bedtime    MEDICATIONS  (PRN):  acetaminophen   Tablet .. 650 milliGRAM(s) Oral every 6 hours PRN Mild Pain (1 - 3)  lidocaine   Patch 1 Patch Transdermal daily PRN Pain  oxyCODONE    IR 5 milliGRAM(s) Oral every 6 hours PRN Moderate Pain (4 - 6)  simethicone 80 milliGRAM(s) Chew three times a day PRN Gas    Vital Signs Last 24 Hrs  T(C): 36.5 (29 Aug 2019 05:43), Max: 37.1 (28 Aug 2019 13:07)  T(F): 97.7 (29 Aug 2019 05:43), Max: 98.7 (28 Aug 2019 13:07)  HR: 54 (29 Aug 2019 05:43) (54 - 66)  BP: 155/76 (29 Aug 2019 05:43) (123/63 - 160/74)  BP(mean): --  RR: 18 (29 Aug 2019 05:43) (17 - 18)  SpO2: 100% (29 Aug 2019 05:43) (100% - 100%)    PHYSICAL EXAM:  GENERAL: NAD, well-developed  HEAD:  Atraumatic, Normocephalic  EYES: EOMI, PERRLA, conjunctiva and sclera clear  NECK: Supple, No JVD  CHEST/LUNG: Clear to auscultation bilaterally; No wheeze  HEART: Regular rate and rhythm; No murmurs, rubs, or gallops  ABDOMEN: Soft, Nontender, Nondistended; Bowel sounds present  EXTREMITIES:  2+ Peripheral Pulses, No clubbing, cyanosis, or edema  PSYCH: AAOx3  NEUROLOGY: non-focal  SKIN: No rashes or lesions    LABS:                        11.1   9.49  )-----------( 291      ( 29 Aug 2019 06:10 )             35.0     08-29    136  |  104  |  24<H>  ----------------------------<  91  4.0   |  21<L>  |  0.95    Ca    9.3      29 Aug 2019 06:10        RADIOLOGY & ADDITIONAL TESTS:    Imaging Personally Reviewed:    Consultant(s) Notes Reviewed:      Care Discussed with Consultants/Other Providers:  [facility] - Dr Estrada- out patient f/u  Heme- out patient f/u

## 2019-08-29 NOTE — PROGRESS NOTE ADULT - PROBLEM SELECTOR PLAN 9
Patient is cleared medically for possible dental procedure.   He does not require prophylactic antibiotics for any potential dental procedure  He is cleared to have local anesthesia for any potential procedure  Patient can be transported with just wheelchair, without need for IV pole or any additional cardiac monitors.     [id number] if additional questions

## 2019-08-29 NOTE — PROGRESS NOTE ADULT - PROBLEM SELECTOR PROBLEM 1
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

## 2019-08-29 NOTE — PROGRESS NOTE ADULT - SUBJECTIVE AND OBJECTIVE BOX
[facility]   MD ANABEL MOSS DO ANGELA WONG, PA    TEL:  OFFICE: [phone]  DR HOLT CELL: [phone]  DR. HERNANDEZ CELL: [phone]  DANIELE DALTON CELL: [phone]        Patient is a 69y old  Male who presents with a chief complaint of Abdominal pain (28 Aug 2019 15:58)      Patient seen and examined at bedside. No chest pain/sob    VITALS:  T(F): 97.7 (08-29-19 @ 05:43), Max: 98.7 (08-28-19 @ 13:07)  HR: 54 (08-29-19 @ 05:43)  BP: 155/76 (08-29-19 @ 05:43)  RR: 18 (08-29-19 @ 05:43)  SpO2: 100% (08-29-19 @ 05:43)  Wt(kg): --        PHYSICAL EXAM:  Constitutional: NAD  Neck: No JVD  Respiratory: CTAB, no wheezes, rales or rhonchi  Cardiovascular: S1, S2, RRR  Gastrointestinal: BS+, soft, NT/ND  Extremities: No peripheral edema    Hospital Medications:   MEDICATIONS  (STANDING):  amLODIPine   Tablet 10 milliGRAM(s) Oral daily  aspirin enteric coated 81 milliGRAM(s) Oral daily  atorvastatin 40 milliGRAM(s) Oral at bedtime  dexamethasone  Injectable 4 milliGRAM(s) IV Push two times a day  docusate sodium 100 milliGRAM(s) Oral three times a day  enoxaparin Injectable 40 milliGRAM(s) SubCutaneous daily  ergocalciferol 19474 Unit(s) Oral every week  losartan 50 milliGRAM(s) Oral daily  metoprolol tartrate 25 milliGRAM(s) Oral two times a day  polyethylene glycol 3350 17 Gram(s) Oral daily  senna 2 Tablet(s) Oral at bedtime      LABS:  08-29    136  |  104  |  24<H>  ----------------------------<  91  4.0   |  21<L>  |  0.95    Ca    9.3      29 Aug 2019 06:10      Creatinine Trend: 0.95 <--, 0.92 <--, 0.95 <--, 1.03 <--, 1.34 <--, 1.17 <--                                11.1   9.49  )-----------( 291      ( 29 Aug 2019 06:10 )             35.0     Urine Studies:  Urinalysis - [08-18-19 @ 15:45]      Color COLORLESS / Appearance CLEAR / SG 1.005 / pH 7.5      Gluc NEGATIVE / Ketone NEGATIVE  / Bili NEGATIVE / Urobili NORMAL       Blood NEGATIVE / Protein 10 / Leuk Est NEGATIVE / Nitrite NEGATIVE      RBC  / WBC  / Hyaline  / Gran  / Sq Epi  / Non Sq Epi  / Bacteria     Urine Sodium < 20      [08-22-19 @ 20:00]  Urine Osmolality 552      [08-22-19 @ 20:00]    Iron 52, TIBC 252, %sat --      [08-19-19 @ 07:18]  Ferritin 368.5      [08-19-19 @ 07:18]  PTH 7.42 (Ca --)      [08-18-19 @ 18:52]   --  Vitamin D (25OH) 19.6      [08-23-19 @ 06:15]    HCV 0.10, Nonreactive Hepatitis C AB  S/CO Ratio                        Interpretation  < 1.00                                   Non-Reactive  1.00 - 4.99                         Weakly-Reactive  >= 5.00                                Reactive  Non-Reactive: Aperson with a non-reactive HCV antibody  result is considered uninfected.  No further action is  needed unless recent infection is suspected.  In these  cases, consider repeat testing later to detect  seroconversion..  Weakly-Reactive: HCV antibody test is abnormal, HCV RNA  Qualitative test will follow.  Reactive: HCV antibody test is abnormal, HCV RNA  Qualitative test will follow.  Note: HCV antibody testing is performed on the [employer]   system.      [08-20-19 @ 07:13]    Free Light Chains: kappa 214.98, lambda 1.04, ratio = 206.71 H      [08-20 @ 07:13]    RADIOLOGY & ADDITIONAL STUDIES:

## 2019-08-29 NOTE — PROGRESS NOTE ADULT - PROBLEM SELECTOR PLAN 7
CAD s/p 1 YAMILET (performed in [facility] in 2015)  -high intensity statin  -ASA 81mg qd  -Metoprolol 25mg bid as HR tolerate

## 2019-08-29 NOTE — PROGRESS NOTE ADULT - ATTENDING COMMENTS
MM- out patient f/u with Heme  L lytic femur lesion- out patient f./u with Ortho  Rt - ends today    45 min to coordinate d/c

## 2019-08-29 NOTE — CHART NOTE - NSCHARTNOTEFT_GEN_A_CORE
Pt cleared for discharge. Writer spoke Ortho femur xray reviewed. Pt cleared an is to follow up with Dr. Madera   as out/pt.

## 2019-08-29 NOTE — PROGRESS NOTE ADULT - PROBLEM SELECTOR PROBLEM 2
Hypercalcemia

## 2019-08-29 NOTE — PROGRESS NOTE ADULT - REASON FOR ADMISSION
Abdominal pain

## 2019-08-29 NOTE — PROGRESS NOTE ADULT - PROBLEM SELECTOR PROBLEM 3
Back pain

## 2019-08-29 NOTE — PROGRESS NOTE ADULT - PROBLEM SELECTOR PLAN 5
-Losartan 50mg qd  -Amlodipine 10mg qd
-Lisinopril (need to rec dosage, patient could not remember)  -Losartan 50mg qd  -Amlodipine 10mg qd
-Losartan 50mg qd  -Amlodipine 10mg qd

## 2019-08-29 NOTE — PROGRESS NOTE ADULT - PROBLEM SELECTOR PLAN 6
-atorvastatin 40mg qhs

## 2019-09-03 ENCOUNTER — RESULT REVIEW (OUTPATIENT)
Age: 69
End: 2019-09-03

## 2019-09-03 ENCOUNTER — MED ADMIN CHARGE (OUTPATIENT)
Age: 69
End: 2019-09-03

## 2019-09-03 ENCOUNTER — LABORATORY RESULT (OUTPATIENT)
Age: 69
End: 2019-09-03

## 2019-09-03 ENCOUNTER — APPOINTMENT (OUTPATIENT)
Dept: HEMATOLOGY ONCOLOGY | Facility: CLINIC | Age: 69
End: 2019-09-03
Payer: MEDICARE

## 2019-09-03 VITALS
DIASTOLIC BLOOD PRESSURE: 67 MMHG | RESPIRATION RATE: 16 BRPM | WEIGHT: 122.58 LBS | BODY MASS INDEX: 20.67 KG/M2 | OXYGEN SATURATION: 99 % | SYSTOLIC BLOOD PRESSURE: 168 MMHG | TEMPERATURE: 98.7 F | HEIGHT: 64.57 IN | HEART RATE: 70 BPM

## 2019-09-03 DIAGNOSIS — Z86.79 PERSONAL HISTORY OF OTHER DISEASES OF THE CIRCULATORY SYSTEM: ICD-10-CM

## 2019-09-03 DIAGNOSIS — R06.02 SHORTNESS OF BREATH: ICD-10-CM

## 2019-09-03 DIAGNOSIS — Z87.39 PERSONAL HISTORY OF OTHER DISEASES OF THE MUSCULOSKELETAL SYSTEM AND CONNECTIVE TISSUE: ICD-10-CM

## 2019-09-03 DIAGNOSIS — Z87.891 PERSONAL HISTORY OF NICOTINE DEPENDENCE: ICD-10-CM

## 2019-09-03 DIAGNOSIS — Z92.3 PERSONAL HISTORY OF IRRADIATION: ICD-10-CM

## 2019-09-03 LAB
ALBUMIN SERPL ELPH-MCNC: 4.2 G/DL
ALP BLD-CCNC: 87 U/L
ALT SERPL-CCNC: 24 U/L
ANION GAP SERPL CALC-SCNC: 13 MMOL/L
AST SERPL-CCNC: 16 U/L
B2 MICROGLOB SERPL-MCNC: 2.4 MG/L
BASOPHILS # BLD AUTO: 0 K/UL — SIGNIFICANT CHANGE UP (ref 0–0.2)
BASOPHILS NFR BLD AUTO: 0.1 % — SIGNIFICANT CHANGE UP (ref 0–2)
BILIRUB SERPL-MCNC: 0.2 MG/DL
BUN SERPL-MCNC: 15 MG/DL
CALCIUM SERPL-MCNC: 9.6 MG/DL
CHLORIDE SERPL-SCNC: 102 MMOL/L
CO2 SERPL-SCNC: 25 MMOL/L
CREAT SERPL-MCNC: 0.98 MG/DL
EOSINOPHIL # BLD AUTO: 0.2 K/UL — SIGNIFICANT CHANGE UP (ref 0–0.5)
EOSINOPHIL NFR BLD AUTO: 1.5 % — SIGNIFICANT CHANGE UP (ref 0–6)
GLUCOSE SERPL-MCNC: 141 MG/DL
HCT VFR BLD CALC: 32.4 % — LOW (ref 39–50)
HGB BLD-MCNC: 10.8 G/DL — LOW (ref 13–17)
HIV1+2 AB SPEC QL IA.RAPID: NONREACTIVE
LDH SERPL-CCNC: 170 U/L
LYMPHOCYTES # BLD AUTO: 0.8 K/UL — LOW (ref 1–3.3)
LYMPHOCYTES # BLD AUTO: 6.5 % — LOW (ref 13–44)
MCHC RBC-ENTMCNC: 31 PG — SIGNIFICANT CHANGE UP (ref 27–34)
MCHC RBC-ENTMCNC: 33.3 G/DL — SIGNIFICANT CHANGE UP (ref 32–36)
MCV RBC AUTO: 93 FL — SIGNIFICANT CHANGE UP (ref 80–100)
MONOCYTES # BLD AUTO: 0.5 K/UL — SIGNIFICANT CHANGE UP (ref 0–0.9)
MONOCYTES NFR BLD AUTO: 4.3 % — SIGNIFICANT CHANGE UP (ref 2–14)
NEUTROPHILS # BLD AUTO: 10.4 K/UL — HIGH (ref 1.8–7.4)
NEUTROPHILS NFR BLD AUTO: 87.6 % — HIGH (ref 43–77)
PLATELET # BLD AUTO: 239 K/UL — SIGNIFICANT CHANGE UP (ref 150–400)
POTASSIUM SERPL-SCNC: 5.1 MMOL/L
PROT SERPL-MCNC: 8.2 G/DL
RBC # BLD: 3.48 M/UL — LOW (ref 4.2–5.8)
RBC # FLD: 12.9 % — SIGNIFICANT CHANGE UP (ref 10.3–14.5)
SODIUM SERPL-SCNC: 140 MMOL/L
WBC # BLD: 11.9 K/UL — HIGH (ref 3.8–10.5)
WBC # FLD AUTO: 11.9 K/UL — HIGH (ref 3.8–10.5)

## 2019-09-03 PROCEDURE — 99215 OFFICE O/P EST HI 40 MIN: CPT

## 2019-09-03 RX ORDER — OMEPRAZOLE 20 MG/1
20 TABLET, DELAYED RELEASE ORAL DAILY
Refills: 0 | Status: DISCONTINUED | COMMUNITY
End: 2019-09-03

## 2019-09-03 RX ORDER — METOPROLOL SUCCINATE 25 MG/1
25 TABLET, EXTENDED RELEASE ORAL DAILY
Qty: 30 | Refills: 5 | Status: DISCONTINUED | COMMUNITY
Start: 2017-05-02 | End: 2019-09-03

## 2019-09-03 RX ORDER — LISINOPRIL 40 MG/1
40 TABLET ORAL
Qty: 30 | Refills: 3 | Status: DISCONTINUED | COMMUNITY
End: 2019-09-03

## 2019-09-03 RX ORDER — OXYCODONE 5 MG/1
5 TABLET ORAL DAILY
Qty: 30 | Refills: 0 | Status: ACTIVE | COMMUNITY
Start: 2019-09-03

## 2019-09-03 NOTE — CONSULT LETTER
[Dear  ___] : Dear  [unfilled], [Consult Letter:] : I had the pleasure of evaluating your patient, [unfilled]. [Please see my note below.] : Please see my note below. [Sincerely,] : Sincerely, [Consult Closing:] : Thank you very much for allowing me to participate in the care of this patient.  If you have any questions, please do not hesitate to contact me. [DrDennys  ___] : Dr. MAZARIEGOS [DrDennys ___] : Dr. MAZARIEGOS

## 2019-09-03 NOTE — RESULTS/DATA
[FreeTextEntry1] : Today's CBC (On 9/3/19) wbc 11.9 Hb 10.8 plt 239 ANC 10.4k/ul  Mo 500 Eo 200 Ba 0\par \par The previous medical records were reviewed\par RADIOLOGY\par On 8/29/19 X ray femur\par IMPRESSION:\par Redemonstrated spotty lucent lesions in the imaged osseous structures \par consistent with disseminated myeloma.\par \par Healing subacute pathologic left superior and inferior pubic rami \par fractures again noted.\par \par Permeative lytic lesions in proximal left femoral diaphysis with \par associated slight circumferential endosteal scalloping placing this area \par at slightly increased risk for pathologic fracture. No additional lytic \par lesions in the distal two thirds of the left femur or imaged proximal \par tibia and fibula.\par \par No current fractures or dislocations.\par \par Preserved left hip and knee joint spaces.\par \par On 8/27/19 Skeletal survey\par FINDINGS/\par IMPRESSION:\par Multifocal osteolytic lesions consistent with disseminated myeloma. More \par focal expansile lytic destructive lesion in left aspect of T8 indicated \par by loss of the left pedicle cortical margins at this level. Pathologic \par posterior left 4th and anterolateral left 7th rib fractures. Pathologic \par left superior and inferior pubic rami fractures. Lesions better \par demonstrated on chest abdomen and pelvis CT from 8/18/2019.\par \par Slightly expansile intramedullary permeative lytic lesion in proximal \par left femoral diaphysis with slightly scalloped endosteal margins places \par this area at slightly increased risk for pathologic fracture. No \par additional upper or lower extremity long bone lesions. \par \par No vertebral compression fractures.\par \par PATHOLOGY\par On 8/22/19 BM bx\par Final Diagnosis\par 1, 2. Bone marrow biopsy and bone marrow aspirate\par - Plasma cell myeloma (10% with focal 25% by  stain)\par - Slight erythroid predominant trilineage hematopoiesis\par with maturation\par \par See note and description.\par \par Diagnostic note:\par Correlation with studies for myeloma-related organ dysfunction is\par necessary.\par Comprehensive report with results of pending ancillary studies to\par follow.\par \par Ancillary studies\par Bone marrow aspirate iron stain: No spicules are present to\par evaluate for iron stores and there are insufficient nRBC to\par evaluate for ring sideroblasts.\par \par Flow cytometry:  Monotypic plasma cells (1% of cells), positive\par for cytoplasmic kappa, CD38, , dimmer CD45, partial CD56;\par negative CD19, CD20, .\par CD45/side scatter shows no significant blast population. There is\par no increase in CD34,  or CD14 positive cells.\par Lymphocytes (8% of cells) show a heterogeneous population of T-\par cells (with normal CD4 to CD8 ratio), and polytypic B-cells.\par \par Immunohistochemical stains ( performed on the block 1A and\par 1B, cyclin-D1 performed on block 1A):   stains show overall\par approximately 10% plasma cells, with foci of small clusters,\par focal up to 25%. Plasma cells are negative for cyclin-D1.\par

## 2019-09-03 NOTE — REVIEW OF SYSTEMS
[Recent Change In Weight] : ~T recent weight change [Negative] : Allergic/Immunologic [Fever] : no fever [Night Sweats] : no night sweats [Chills] : no chills [Eye Pain] : no eye pain [Fatigue] : no fatigue [Red Eyes] : eyes not red [Dry Eyes] : no dryness of the eyes [Vision Problems] : no vision problems [Loss of Hearing] : no loss of hearing [Dysphagia] : no dysphagia [Hoarseness] : no hoarseness [Odynophagia] : no odynophagia [Nosebleeds] : no nosebleeds [Mucosal Pain] : no mucosal pain [Chest Pain] : no chest pain [Palpitations] : no palpitations [Leg Claudication] : no intermittent leg claudication [Lower Ext Edema] : no lower extremity edema [Shortness Of Breath] : no shortness of breath [Wheezing] : no wheezing [Cough] : no cough [Abdominal Pain] : no abdominal pain [SOB on Exertion] : no shortness of breath during exertion [Diarrhea] : no diarrhea [Vomiting] : no vomiting [Dysuria] : no dysuria [Incontinence] : no incontinence [Muscle Pain] : no muscle pain [Joint Pain] : no joint pain [Muscle Weakness] : no muscle weakness [Skin Wound] : no skin wound [Skin Rash] : no skin rash [Confused] : no confusion [Difficulty Walking] : no difficulty walking [Depression] : no depression [Suicidal] : not suicidal [Easy Bruising] : no tendency for easy bruising [Easy Bleeding] : no tendency for easy bleeding [FreeTextEntry2] : lost about 6-7 lbs in the past 2 months [FreeTextEntry7] : colonoscopy in June 2019 -normal.  [de-identified] : no numbness and tingling [FreeTextEntry9] : back pain -improved after radiation

## 2019-09-03 NOTE — REASON FOR VISIT
[Initial Consultation] : an initial consultation for [Family Member] : family member [FreeTextEntry2] : myeloma

## 2019-09-03 NOTE — HISTORY OF PRESENT ILLNESS
[de-identified] : Mr Rand was referred to my office for newly diagnosed IgG kappa multiple myeloma. The patient's primary language is Kyrgyz, he has his daughter Mirlande as the , per his wishes. He was seen by Dr. Andrés Valle (hematology) in Feb 2019  and had a BM bx showing 10-15% plasma cells, concerning for smoldering myeloma. According to the daughter, he was awaiting insurance approval for imaging studies. He was admitted from 8/18/19 at [facility] where he presented with pain in the L leg/lower back, worsening for the past 2-3 months. On labs, he was found to have a total protein of 10, Ca level 12 until 8/29/19. Dental consult was called and patient needs to have 10 teeth extracted -thus, IV bisphosphonates were not given, pt improved with hydration. He also had a slightly inc'ed creatinine -improved after IVF. CT C/A/P 8/18/19 showed a lytic expansile soft tissue lesion centered in the manubrium measuring approximately 7.3 x 3.4 x 4.3 cm, invading both 1st ribs. There was also a A lytic expansile soft tissue lesion in the T8 vertebral body causing mass effect on spinal canal and obliterating left neural foramina at T7-8 and T8-9. ON 8/22/19 an MRI thoracic spine was done showing multiple areas of tumor involvement within the thoracic spine in keeping with the patient's history of multiple myeloma. Prominent lesion within C7 on the right incompletely seen.\par Large lesion within T8 on the left producing epidural tumor extension and moderate narrowing of the spinal canal with mild flattening of the spinal cord. He was evaluated on 8/22/19 by Dr. Foley (Rad Onc) and was given 5 fractions of XRT from 8/23/19 to 8/29/19 to C7 and T8. He was discharged home on 8/29/19 with follow up in the outpatient office, and on Dexamethasone 4mg po daily. \par \par  [de-identified] : The patient is here for IgG kappa MM with multiple bone lytic lesions, hypercalcemia (resolved) and mild anemia. He still has pain in b/l ribs, takes Oxy 5mg po once daily. He has no weakness in his legs and back pain has improved after XRT.

## 2019-09-03 NOTE — ASSESSMENT
[FreeTextEntry1] : 70 yo M with hx MI s/p stent 2015, HTN, high chol, presents for MM newly dx'ed -with extensive lytic lesions as well as C7/T8 spine lesions causing nerve compression -s/p XRT 8/22-8/29/19\par \par -I discussed with patient and his daughter Mirlande the diagnosis and plan of treatment extensively, and I answered all of their questions. BM cytogenetics/FISH panel pending -will f/u for cytogenetic abnormalities. Otherwise, based on high beta-2 microglobulin/normal albumin, pt is stage II IPSS score\par \par -discussed with pt and daughter treatment plan, written consent signed for Revlimid/Dexamethasone/Velcade. Revlimid enrollment done today -will determine dose after obtaining GFR today. Dexa 20mg qweekly, Velcade 1.3mg/m2 SQ weekly. Will STOP po Dexa daily after starting chemo\par \par -will schedule MRI cervical spine to visualize C7 lesion and cord better. Will get PET/CT to eval whole body -pt has multiple bone lesions and plasmacytomas, including large manubrial one\par \par -will get Hep B and HIV baseline testing. Hep C tested as inpt -nonreactive\par \par -will get 24 hr urine testing for protein/creatinine/UPEP and Bence Peterson\par \par -will check CMP, LDH, beta-2 microglobulin, Ig's, free light chains, SPEP/MICAH today. Pt was incompletely treated by having steroids only (due to cord impingement)\par \par -d/w daughter importance of finishing dental work (pt has to have 10 teeth extracted), after which monthly IV bisphosphonate tx can commence\par \par -GI eval -pt referred due to dyspepsia, abd bloating. Would do EGD with biopsies to r/o amyloid involvement of GI tract\par \par -prophylactic Valacyclovir 500mg po daily given to prevent VZV while on Velcade\par \par -Reglan PRN given for nausea/vomiting. Pt is taking ASA for hx MI, asked him to continue given increased VTE risk with Revlimid\par \par -baseline EKG done today -will try to obtain recent Echo results from cardiology to see if pt may still be an autoSCT candidate\par \par -follow up in 3-4 wks

## 2019-09-04 LAB
DEPRECATED KAPPA LC FREE/LAMBDA SER: 220.53 RATIO
DEPRECATED KAPPA LC FREE/LAMBDA SER: 220.53 RATIO
GAS PNL BLDMV: SIGNIFICANT CHANGE UP
HBV CORE IGG+IGM SER QL: NONREACTIVE
HBV SURFACE AB SER QL: NONREACTIVE
HBV SURFACE AG SER QL: NONREACTIVE
IGA SER QL IEP: 85 MG/DL
IGG SER QL IEP: 2171 MG/DL
IGM SER QL IEP: 12 MG/DL
KAPPA LC CSF-MCNC: 0.58 MG/DL
KAPPA LC CSF-MCNC: 0.58 MG/DL
KAPPA LC SERPL-MCNC: 127.91 MG/DL
KAPPA LC SERPL-MCNC: 127.91 MG/DL

## 2019-09-05 LAB
ALBUMIN MFR SERPL ELPH: 50.3 %
ALBUMIN SERPL-MCNC: 4.1 G/DL
ALBUMIN/GLOB SERPL: 1 RATIO
ALPHA1 GLOB MFR SERPL ELPH: 4.3 %
ALPHA1 GLOB SERPL ELPH-MCNC: 0.4 G/DL
ALPHA2 GLOB MFR SERPL ELPH: 11.5 %
ALPHA2 GLOB SERPL ELPH-MCNC: 0.9 G/DL
B-GLOBULIN MFR SERPL ELPH: 9.8 %
B-GLOBULIN SERPL ELPH-MCNC: 0.8 G/DL
GAMMA GLOB FLD ELPH-MCNC: 2 G/DL
GAMMA GLOB MFR SERPL ELPH: 24.1 %
INTERPRETATION SERPL IEP-IMP: NORMAL
M PROTEIN MFR SERPL ELPH: 21.8 %
M PROTEIN SPEC IFE-MCNC: NORMAL
MONOCLON BAND OBS SERPL: 1.8 G/DL
PROT SERPL-MCNC: 8.2 G/DL
PROT SERPL-MCNC: 8.2 G/DL

## 2019-09-05 NOTE — ASSESSMENT
[FreeTextEntry1] : Assess:\par Thoracic pain \par Ataxic gait\par \par PLAN: \par MRI of thoracic spine with and without contrast \par Bun / Creat prior to scan \par Return after images complete \par

## 2019-09-05 NOTE — REASON FOR VISIT
[New Patient Visit] : a new patient visit [Referred By: _________] : Patient was referred by YAIR [Family Member] : family member [FreeTextEntry1] : Lumbar radc iulopathy

## 2019-09-05 NOTE — HISTORY OF PRESENT ILLNESS
[de-identified] : accompanoied with peter and c/o lower back pain awith trunk pain and lef tlaeg pain  No bowel or bladder symptoms pain in thoracic regio nad around waist .  No tibngling and nubmness just ache pain   Ataxic gait

## 2019-09-06 ENCOUNTER — FORM ENCOUNTER (OUTPATIENT)
Age: 69
End: 2019-09-06

## 2019-09-07 ENCOUNTER — OUTPATIENT (OUTPATIENT)
Dept: OUTPATIENT SERVICES | Facility: HOSPITAL | Age: 69
LOS: 1 days | End: 2019-09-07

## 2019-09-07 ENCOUNTER — APPOINTMENT (OUTPATIENT)
Dept: NUCLEAR MEDICINE | Facility: IMAGING CENTER | Age: 69
End: 2019-09-07
Payer: MEDICARE

## 2019-09-07 DIAGNOSIS — Z00.8 ENCOUNTER FOR OTHER GENERAL EXAMINATION: ICD-10-CM

## 2019-09-07 PROCEDURE — 78816 PET IMAGE W/CT FULL BODY: CPT | Mod: 26,PI

## 2019-09-08 ENCOUNTER — FORM ENCOUNTER (OUTPATIENT)
Age: 69
End: 2019-09-08

## 2019-09-09 ENCOUNTER — RESULT REVIEW (OUTPATIENT)
Age: 69
End: 2019-09-09

## 2019-09-09 ENCOUNTER — APPOINTMENT (OUTPATIENT)
Dept: INFUSION THERAPY | Facility: HOSPITAL | Age: 69
End: 2019-09-09

## 2019-09-09 ENCOUNTER — APPOINTMENT (OUTPATIENT)
Dept: MRI IMAGING | Facility: IMAGING CENTER | Age: 69
End: 2019-09-09
Payer: MEDICARE

## 2019-09-09 ENCOUNTER — OUTPATIENT (OUTPATIENT)
Dept: OUTPATIENT SERVICES | Facility: HOSPITAL | Age: 69
LOS: 1 days | End: 2019-09-09
Payer: MEDICARE

## 2019-09-09 DIAGNOSIS — C90.00 MULTIPLE MYELOMA NOT HAVING ACHIEVED REMISSION: ICD-10-CM

## 2019-09-09 LAB
BASOPHILS # BLD AUTO: 0 K/UL — SIGNIFICANT CHANGE UP (ref 0–0.2)
BASOPHILS NFR BLD AUTO: 0.1 % — SIGNIFICANT CHANGE UP (ref 0–2)
CREAT 24H UR-MCNC: 0.9 G/24 H
CREAT 24H UR-MCNC: 0.9 G/24 H
CREAT ?TM UR-MCNC: 45 MG/DL
CREAT ?TM UR-MCNC: 45 MG/DL
EOSINOPHIL # BLD AUTO: 0 K/UL — SIGNIFICANT CHANGE UP (ref 0–0.5)
EOSINOPHIL NFR BLD AUTO: 0.4 % — SIGNIFICANT CHANGE UP (ref 0–6)
HCT VFR BLD CALC: 32.2 % — LOW (ref 39–50)
HGB BLD-MCNC: 11 G/DL — LOW (ref 13–17)
LYMPHOCYTES # BLD AUTO: 0.4 K/UL — LOW (ref 1–3.3)
LYMPHOCYTES # BLD AUTO: 5.2 % — LOW (ref 13–44)
MCHC RBC-ENTMCNC: 31.6 PG — SIGNIFICANT CHANGE UP (ref 27–34)
MCHC RBC-ENTMCNC: 34.1 G/DL — SIGNIFICANT CHANGE UP (ref 32–36)
MCV RBC AUTO: 92.7 FL — SIGNIFICANT CHANGE UP (ref 80–100)
MONOCYTES # BLD AUTO: 0.3 K/UL — SIGNIFICANT CHANGE UP (ref 0–0.9)
MONOCYTES NFR BLD AUTO: 3.8 % — SIGNIFICANT CHANGE UP (ref 2–14)
NEUTROPHILS # BLD AUTO: 7.2 K/UL — SIGNIFICANT CHANGE UP (ref 1.8–7.4)
NEUTROPHILS NFR BLD AUTO: 90.4 % — HIGH (ref 43–77)
PLATELET # BLD AUTO: 187 K/UL — SIGNIFICANT CHANGE UP (ref 150–400)
PROT 24H UR-MRATE: 20 MG/DL
PROT ?TM UR-MCNC: 24 HR
PROT ?TM UR-MCNC: 24 HR
PROT UR-MCNC: 420 MG/24 H
RBC # BLD: 3.47 M/UL — LOW (ref 4.2–5.8)
RBC # FLD: 13.5 % — SIGNIFICANT CHANGE UP (ref 10.3–14.5)
SPECIMEN VOL 24H UR: 2100 ML
SPECIMEN VOL 24H UR: 2100 ML
WBC # BLD: 8 K/UL — SIGNIFICANT CHANGE UP (ref 3.8–10.5)
WBC # FLD AUTO: 8 K/UL — SIGNIFICANT CHANGE UP (ref 3.8–10.5)

## 2019-09-09 PROCEDURE — 72156 MRI NECK SPINE W/O & W/DYE: CPT | Mod: 26

## 2019-09-09 PROCEDURE — A9585: CPT

## 2019-09-09 PROCEDURE — 72156 MRI NECK SPINE W/O & W/DYE: CPT

## 2019-09-10 DIAGNOSIS — R11.2 NAUSEA WITH VOMITING, UNSPECIFIED: ICD-10-CM

## 2019-09-10 DIAGNOSIS — Z51.11 ENCOUNTER FOR ANTINEOPLASTIC CHEMOTHERAPY: ICD-10-CM

## 2019-09-10 LAB
ALBUPE: 14 %
ALPHA1UPE: 26.5 %
ALPHA2UPE: 17.2 %
BENCE JONES EXCRETION: 71.4 MG/24HR
BETAUPE: 16.1 %
CREAT 24H UR-MCNC: 0.9 G/24 H
CREATININE UR (MAYO): 45 MG/DL
GAMMAUPE: 26.2 %
IGA 24H UR QL IFE: NORMAL
KAPPA LC 24H UR QL: 3.4 MG/DL
M PROTEIN 24H MFR UR ELPH: 17 %
PROT ?TM UR-MCNC: 24 HR
PROT PATTERN 24H UR ELPH-IMP: NORMAL
PROT UR-MCNC: 20 MG/DL
PROT UR-MCNC: 20 MG/DL
SPECIMEN VOL 24H UR: 2100 ML
U PROTEIN QNT CALCULATION: 420 MG/24 H

## 2019-09-16 ENCOUNTER — RESULT REVIEW (OUTPATIENT)
Age: 69
End: 2019-09-16

## 2019-09-16 ENCOUNTER — APPOINTMENT (OUTPATIENT)
Dept: INFUSION THERAPY | Facility: HOSPITAL | Age: 69
End: 2019-09-16

## 2019-09-16 LAB
BASOPHILS # BLD AUTO: 0 K/UL — SIGNIFICANT CHANGE UP (ref 0–0.2)
BASOPHILS NFR BLD AUTO: 0.1 % — SIGNIFICANT CHANGE UP (ref 0–2)
EOSINOPHIL # BLD AUTO: 0.2 K/UL — SIGNIFICANT CHANGE UP (ref 0–0.5)
EOSINOPHIL NFR BLD AUTO: 1.6 % — SIGNIFICANT CHANGE UP (ref 0–6)
HCT VFR BLD CALC: 32.6 % — LOW (ref 39–50)
HGB BLD-MCNC: 11.1 G/DL — LOW (ref 13–17)
LYMPHOCYTES # BLD AUTO: 1.1 K/UL — SIGNIFICANT CHANGE UP (ref 1–3.3)
LYMPHOCYTES # BLD AUTO: 11.5 % — LOW (ref 13–44)
MCHC RBC-ENTMCNC: 31.8 PG — SIGNIFICANT CHANGE UP (ref 27–34)
MCHC RBC-ENTMCNC: 34 G/DL — SIGNIFICANT CHANGE UP (ref 32–36)
MCV RBC AUTO: 93.5 FL — SIGNIFICANT CHANGE UP (ref 80–100)
MONOCYTES # BLD AUTO: 0.4 K/UL — SIGNIFICANT CHANGE UP (ref 0–0.9)
MONOCYTES NFR BLD AUTO: 4.8 % — SIGNIFICANT CHANGE UP (ref 2–14)
NEUTROPHILS # BLD AUTO: 7.5 K/UL — HIGH (ref 1.8–7.4)
NEUTROPHILS NFR BLD AUTO: 82 % — HIGH (ref 43–77)
PLATELET # BLD AUTO: 212 K/UL — SIGNIFICANT CHANGE UP (ref 150–400)
RBC # BLD: 3.49 M/UL — LOW (ref 4.2–5.8)
RBC # FLD: 13.6 % — SIGNIFICANT CHANGE UP (ref 10.3–14.5)
WBC # BLD: 9.2 K/UL — SIGNIFICANT CHANGE UP (ref 3.8–10.5)
WBC # FLD AUTO: 9.2 K/UL — SIGNIFICANT CHANGE UP (ref 3.8–10.5)

## 2019-09-23 ENCOUNTER — APPOINTMENT (OUTPATIENT)
Dept: INFUSION THERAPY | Facility: HOSPITAL | Age: 69
End: 2019-09-23

## 2019-09-23 ENCOUNTER — RESULT REVIEW (OUTPATIENT)
Age: 69
End: 2019-09-23

## 2019-09-23 LAB
BASOPHILS # BLD AUTO: 0 K/UL — SIGNIFICANT CHANGE UP (ref 0–0.2)
BASOPHILS NFR BLD AUTO: 0.1 % — SIGNIFICANT CHANGE UP (ref 0–2)
EOSINOPHIL # BLD AUTO: 0.1 K/UL — SIGNIFICANT CHANGE UP (ref 0–0.5)
EOSINOPHIL NFR BLD AUTO: 0.9 % — SIGNIFICANT CHANGE UP (ref 0–6)
HCT VFR BLD CALC: 33.8 % — LOW (ref 39–50)
HGB BLD-MCNC: 10.9 G/DL — LOW (ref 13–17)
LYMPHOCYTES # BLD AUTO: 0.9 K/UL — LOW (ref 1–3.3)
LYMPHOCYTES # BLD AUTO: 7.4 % — LOW (ref 13–44)
MCHC RBC-ENTMCNC: 30.5 PG — SIGNIFICANT CHANGE UP (ref 27–34)
MCHC RBC-ENTMCNC: 32.3 G/DL — SIGNIFICANT CHANGE UP (ref 32–36)
MCV RBC AUTO: 94.5 FL — SIGNIFICANT CHANGE UP (ref 80–100)
MONOCYTES # BLD AUTO: 0.6 K/UL — SIGNIFICANT CHANGE UP (ref 0–0.9)
MONOCYTES NFR BLD AUTO: 4.6 % — SIGNIFICANT CHANGE UP (ref 2–14)
NEUTROPHILS # BLD AUTO: 10.8 K/UL — HIGH (ref 1.8–7.4)
NEUTROPHILS NFR BLD AUTO: 87 % — HIGH (ref 43–77)
PLATELET # BLD AUTO: 215 K/UL — SIGNIFICANT CHANGE UP (ref 150–400)
RBC # BLD: 3.57 M/UL — LOW (ref 4.2–5.8)
RBC # FLD: 13.9 % — SIGNIFICANT CHANGE UP (ref 10.3–14.5)
WBC # BLD: 12.5 K/UL — HIGH (ref 3.8–10.5)
WBC # FLD AUTO: 12.5 K/UL — HIGH (ref 3.8–10.5)

## 2019-09-26 ENCOUNTER — OUTPATIENT (OUTPATIENT)
Dept: OUTPATIENT SERVICES | Facility: HOSPITAL | Age: 69
LOS: 1 days | Discharge: HOME | End: 2019-09-26

## 2019-09-26 DIAGNOSIS — C90.00 MULTIPLE MYELOMA NOT HAVING ACHIEVED REMISSION: ICD-10-CM

## 2019-09-26 NOTE — CONSULT LETTER
[Dear  ___] : Dear  [unfilled], [Consult Letter:] : I had the pleasure of evaluating your patient, [unfilled]. [Please see my note below.] : Please see my note below. [Consult Closing:] : Thank you very much for allowing me to participate in the care of this patient.  If you have any questions, please do not hesitate to contact me. [Sincerely,] : Sincerely, [DrDennys  ___] : Dr. MAZARIEGOS [DrDennys ___] : Dr. MAZARIEGOS

## 2019-09-30 ENCOUNTER — RESULT REVIEW (OUTPATIENT)
Age: 69
End: 2019-09-30

## 2019-09-30 ENCOUNTER — LABORATORY RESULT (OUTPATIENT)
Age: 69
End: 2019-09-30

## 2019-09-30 ENCOUNTER — APPOINTMENT (OUTPATIENT)
Dept: INFUSION THERAPY | Facility: HOSPITAL | Age: 69
End: 2019-09-30

## 2019-09-30 LAB
BASOPHILS # BLD AUTO: 0 K/UL — SIGNIFICANT CHANGE UP (ref 0–0.2)
BASOPHILS NFR BLD AUTO: 0.1 % — SIGNIFICANT CHANGE UP (ref 0–2)
EOSINOPHIL # BLD AUTO: 0 K/UL — SIGNIFICANT CHANGE UP (ref 0–0.5)
EOSINOPHIL NFR BLD AUTO: 0.4 % — SIGNIFICANT CHANGE UP (ref 0–6)
HCT VFR BLD CALC: 33.9 % — LOW (ref 39–50)
HGB BLD-MCNC: 11.2 G/DL — LOW (ref 13–17)
LYMPHOCYTES # BLD AUTO: 0.6 K/UL — LOW (ref 1–3.3)
LYMPHOCYTES # BLD AUTO: 7.2 % — LOW (ref 13–44)
MCHC RBC-ENTMCNC: 32.1 PG — SIGNIFICANT CHANGE UP (ref 27–34)
MCHC RBC-ENTMCNC: 33.1 G/DL — SIGNIFICANT CHANGE UP (ref 32–36)
MCV RBC AUTO: 97 FL — SIGNIFICANT CHANGE UP (ref 80–100)
MONOCYTES # BLD AUTO: 0.2 K/UL — SIGNIFICANT CHANGE UP (ref 0–0.9)
MONOCYTES NFR BLD AUTO: 2.6 % — SIGNIFICANT CHANGE UP (ref 2–14)
NEUTROPHILS # BLD AUTO: 7.7 K/UL — HIGH (ref 1.8–7.4)
NEUTROPHILS NFR BLD AUTO: 89.6 % — HIGH (ref 43–77)
PLATELET # BLD AUTO: 180 K/UL — SIGNIFICANT CHANGE UP (ref 150–400)
RBC # BLD: 3.49 M/UL — LOW (ref 4.2–5.8)
RBC # FLD: 14 % — SIGNIFICANT CHANGE UP (ref 10.3–14.5)
WBC # BLD: 8.6 K/UL — SIGNIFICANT CHANGE UP (ref 3.8–10.5)
WBC # FLD AUTO: 8.6 K/UL — SIGNIFICANT CHANGE UP (ref 3.8–10.5)

## 2019-10-01 ENCOUNTER — APPOINTMENT (OUTPATIENT)
Dept: HEMATOLOGY ONCOLOGY | Facility: CLINIC | Age: 69
End: 2019-10-01
Payer: MEDICARE

## 2019-10-01 VITALS
BODY MASS INDEX: 21.25 KG/M2 | RESPIRATION RATE: 16 BRPM | TEMPERATURE: 97.8 F | DIASTOLIC BLOOD PRESSURE: 68 MMHG | WEIGHT: 125.99 LBS | OXYGEN SATURATION: 98 % | SYSTOLIC BLOOD PRESSURE: 163 MMHG | HEART RATE: 66 BPM

## 2019-10-01 DIAGNOSIS — Z51.11 ENCOUNTER FOR ANTINEOPLASTIC CHEMOTHERAPY: ICD-10-CM

## 2019-10-01 DIAGNOSIS — R11.2 NAUSEA WITH VOMITING, UNSPECIFIED: ICD-10-CM

## 2019-10-01 PROCEDURE — 99214 OFFICE O/P EST MOD 30 MIN: CPT

## 2019-10-01 NOTE — REASON FOR VISIT
[Family Member] : family member [Follow-Up Visit] : a follow-up visit for [FreeTextEntry2] : myeloma

## 2019-10-01 NOTE — HISTORY OF PRESENT ILLNESS
[de-identified] : The patient is here for IgG kappa MM with multiple bone lytic lesions, hypercalcemia (resolved) and mild anemia. He still has pain in b/l ribs, takes Oxy 5mg po once daily. He has no weakness in his legs and back pain has improved after XRT.  He started Velcade/Dexa weekly on 9/919. The daughter reports that they still did not receive the Revlimid.\par He has been to the dentist and had multiple dental extractions -most recently this week, has f/u next week. Tolerating well -no numbness in feet, but has chronic numbness in the 1st and 2nd finger of R hand -chronic 'tingling.' He has some lower back pain.  [de-identified] : Mr Rand was referred to my office for newly diagnosed IgG kappa multiple myeloma. The patient's primary language is Arabic, he has his daughter Mirlande as the , per his wishes. He was seen by Dr. Andrés Valle (hematology) in Feb 2019  and had a BM bx showing 10-15% plasma cells, concerning for smoldering myeloma. According to the daughter, he was awaiting insurance approval for imaging studies. He was admitted from 8/18/19 at [facility] where he presented with pain in the L leg/lower back, worsening for the past 2-3 months. On labs, he was found to have a total protein of 10, Ca level 12 until 8/29/19. Dental consult was called and patient needs to have 10 teeth extracted -thus, IV bisphosphonates were not given, pt improved with hydration. He also had a slightly inc'ed creatinine -improved after IVF. CT C/A/P 8/18/19 showed a lytic expansile soft tissue lesion centered in the manubrium measuring approximately 7.3 x 3.4 x 4.3 cm, invading both 1st ribs. There was also a A lytic expansile soft tissue lesion in the T8 vertebral body causing mass effect on spinal canal and obliterating left neural foramina at T7-8 and T8-9. ON 8/22/19 an MRI thoracic spine was done showing multiple areas of tumor involvement within the thoracic spine in keeping with the patient's history of multiple myeloma. Prominent lesion within C7 on the right incompletely seen.\par Large lesion within T8 on the left producing epidural tumor extension and moderate narrowing of the spinal canal with mild flattening of the spinal cord. He was evaluated on 8/22/19 by Dr. Foley (Rad Onc) and was given 5 fractions of XRT from 8/23/19 to 8/29/19 to C7 and T8. He was discharged home on 8/29/19 with follow up in the outpatient office, and on Dexamethasone 4mg po daily. \par \par  [Date: ____________] : Patient's last distress assessment performed on [unfilled]. [2 - Distress Level] : Distress Level: 2 [Referred Patient  to social work for follow-up] : Patient was referred to social work for follow-up [Patient given social work contact information and resource sheet] : Patient was given social work contact information and resource sheet

## 2019-10-01 NOTE — RESULTS/DATA
[FreeTextEntry1] : Today's CBC (On 10/1/19) wbc 12.5 Hb 10.9 plt 215 ANC 7700\par \par On 9/3/19) wbc 11.9 Hb 10.8 plt 239 ANC 10.4k/ul  Mo 500 Eo 200 Ba 0\par \par RADIOLOGY\par On 9/7/19 PET/CT \par IMPRESSION:  Abnormal whole body FDG-PET/CT scan.\par \par 1. Numerous hypermetabolic osseous foci in the axial and appendicular \par skeleton mostly with corresponding lucencies on CT compatible with the \par diagnosis of multiple myeloma. Lesions in bilateral proximal humeri and \par femurs, although not demonstrating cortical disruption, may place the \par patient at risk for pathologic fractures.\par \par 2. Nonspecific hypermetabolism in the distal esophagus extending into the \par GE junction with questionable wall thickening on CT with associated \par adjacent hypermetabolic paraesophageal lymph node. Suggest correlation \par with endoscopy to exclude any abnormality in this region.\par \par 3. Nonspecific hypermetabolism in the right palmar musculature and left \par masseter muscle. Please correlate clinically.\par \par ON 9/9/19 MRI cervical\par \par IMPRESSION: \par Diffuse osseous metastases, consistent with multiple myeloma.\par Large calvarial lesion which may extend into the sella turcica.\par No evidence of epidural extension of tumor.\par Multilevel cervical spondylosis described.

## 2019-10-01 NOTE — REVIEW OF SYSTEMS
[Recent Change In Weight] : ~T recent weight change [Negative] : Allergic/Immunologic [Fever] : no fever [Chills] : no chills [Night Sweats] : no night sweats [Fatigue] : no fatigue [Eye Pain] : no eye pain [Red Eyes] : eyes not red [Dry Eyes] : no dryness of the eyes [Vision Problems] : no vision problems [Dysphagia] : no dysphagia [Loss of Hearing] : no loss of hearing [Hoarseness] : no hoarseness [Nosebleeds] : no nosebleeds [Odynophagia] : no odynophagia [Mucosal Pain] : no mucosal pain [Chest Pain] : no chest pain [Leg Claudication] : no intermittent leg claudication [Palpitations] : no palpitations [Lower Ext Edema] : no lower extremity edema [Shortness Of Breath] : no shortness of breath [Wheezing] : no wheezing [Cough] : no cough [SOB on Exertion] : no shortness of breath during exertion [Abdominal Pain] : no abdominal pain [Vomiting] : no vomiting [Diarrhea] : no diarrhea [Dysuria] : no dysuria [Incontinence] : no incontinence [Joint Pain] : no joint pain [Muscle Pain] : no muscle pain [Muscle Weakness] : no muscle weakness [Skin Rash] : no skin rash [Skin Wound] : no skin wound [Confused] : no confusion [Difficulty Walking] : no difficulty walking [Depression] : no depression [Suicidal] : not suicidal [Easy Bleeding] : no tendency for easy bleeding [Easy Bruising] : no tendency for easy bruising [FreeTextEntry7] : colonoscopy in June 2019 -normal.  [FreeTextEntry2] : lost about 6-7 lbs in the past 2 months [de-identified] : no numbness and tingling [FreeTextEntry9] : back pain -improved after radiation

## 2019-10-01 NOTE — ASSESSMENT
[FreeTextEntry1] : 70 yo M with hx MI s/p stent 2015, HTN, high chol, presents for MM newly dx'ed -with extensive lytic lesions as well as C7/T8 spine lesions causing nerve compression -s/p XRT 8/22-8/29/19\par Started Velcade/Dexa  weekly 9/9/19\par \par -Revlimid sent to [facility] today\par \par -f/u after dental clearance -will give Zometa q4 wks\par \par - BM cytogenetics/FISH panel -normal. Based on high beta-2 microglobulin/normal albumin, pt is stage II IPSS score\par \par -HIV, hep B and hep C negative done on 9/5/19\par \par - 24 hr urine testing showed 71.4mg/24hr Bence Peterson -2 protein kappa type\par \par -repeat myeloma labs done on 9/30/19 -improving. Manubrial lesion decreased in size\par \par -GI eval -pt referred due to dyspepsia, abd bloating. Would do EGD with biopsies to r/o amyloid involvement of GI tract. Pt had abnormal uptake in esophagus on PET scan -encouraged pt and daughter to f/u with this\par \par -cont prophylactic Valacyclovir 500mg po daily given to prevent VZV while on Velcade\par \par -influenza vaccine done this week\par \par -follow up in 1 months

## 2019-10-02 ENCOUNTER — APPOINTMENT (OUTPATIENT)
Dept: CARDIOLOGY | Facility: CLINIC | Age: 69
End: 2019-10-02
Payer: MEDICARE

## 2019-10-02 ENCOUNTER — NON-APPOINTMENT (OUTPATIENT)
Age: 69
End: 2019-10-02

## 2019-10-02 VITALS
HEART RATE: 64 BPM | SYSTOLIC BLOOD PRESSURE: 168 MMHG | DIASTOLIC BLOOD PRESSURE: 74 MMHG | BODY MASS INDEX: 21.58 KG/M2 | WEIGHT: 128 LBS | OXYGEN SATURATION: 98 %

## 2019-10-02 VITALS — SYSTOLIC BLOOD PRESSURE: 152 MMHG | DIASTOLIC BLOOD PRESSURE: 72 MMHG

## 2019-10-02 PROCEDURE — 99214 OFFICE O/P EST MOD 30 MIN: CPT

## 2019-10-02 PROCEDURE — 93306 TTE W/DOPPLER COMPLETE: CPT

## 2019-10-02 PROCEDURE — 93000 ELECTROCARDIOGRAM COMPLETE: CPT

## 2019-10-02 NOTE — HISTORY OF PRESENT ILLNESS
[FreeTextEntry1] : Robert LópezSykeston is a 69 year old male with history of CAD s/p stent, hypertension and hypercholesterolemia comes for follow up visit. Denies any exertional chest pain or shortness of breath. No palpitations. Recently diagnosed Multiple Myeloma and getting treatment. Compliant to medications and diet.

## 2019-10-02 NOTE — DISCUSSION/SUMMARY
[FreeTextEntry1] : In a summary Yoly López is an elderly male with CAD S/P STENT, stable. Continue current medications . Echo done showed normal LV systolic function. Hypertension, continue current medications and 2 gm sodium diet. Hypercholesterolemia, on statins and low cholesterol diet. Healthy lifestyle. Follow up in 4 months.

## 2019-10-02 NOTE — REVIEW OF SYSTEMS
[Blurry Vision] : blurred vision [Joint Pain] : joint pain [Numbness (Hypesthesia)] : numbness [Negative] : Endocrine [Seeing Double (Diplopia)] : no diplopia [Eye Pain] : no eye pain [Eyeglasses] : not currently wearing eyeglasses [Shortness Of Breath] : no shortness of breath [Chest Pain] : no chest pain [Lower Ext Edema] : no extremity edema [Palpitations] : no palpitations [Abdominal Pain] : no abdominal pain [Heartburn] : no heartburn [Change in Appetite] : no change in appetite [Dysphagia] : no dysphagia [Muscle Cramps] : no muscle cramps [Limb Weakness (Paresis)] : no limb weakness [Dizziness] : no dizziness [Tremor] : no tremor was seen [Convulsions] : no convulsions [Tingling (Paresthesia)] : no tingling [Easy Bleeding] : no tendency for easy bleeding [Easy Bruising] : no tendency for easy bruising

## 2019-10-02 NOTE — PHYSICAL EXAM
[General Appearance - Well Developed] : well developed [Normal Appearance] : normal appearance [Well Groomed] : well groomed [No Deformities] : no deformities [General Appearance - Well Nourished] : well nourished [General Appearance - In No Acute Distress] : no acute distress [Normal Conjunctiva] : the conjunctiva exhibited no abnormalities [Eyelids - No Xanthelasma] : the eyelids demonstrated no xanthelasmas [Normal Oral Mucosa] : normal oral mucosa [No Oral Pallor] : no oral pallor [No Oral Cyanosis] : no oral cyanosis [Auscultation Breath Sounds / Voice Sounds] : lungs were clear to auscultation bilaterally [Respiration, Rhythm And Depth] : normal respiratory rhythm and effort [Heart Sounds] : normal S1 and S2 [Heart Rate And Rhythm] : heart rate and rhythm were normal [Arterial Pulses Normal] : the arterial pulses were normal [Bowel Sounds] : normal bowel sounds [Edema] : no peripheral edema present [Abdomen Soft] : soft [Abdomen Tenderness] : non-tender [Abnormal Walk] : normal gait [Nail Clubbing] : no clubbing of the fingernails [Cyanosis, Localized] : no localized cyanosis [Skin Color & Pigmentation] : normal skin color and pigmentation [Skin Turgor] : normal skin turgor [] : no rash [Oriented To Time, Place, And Person] : oriented to person, place, and time [No Anxiety] : not feeling anxious [Impaired Insight] : insight and judgment were intact [FreeTextEntry1] : No JVD

## 2019-10-07 ENCOUNTER — RESULT REVIEW (OUTPATIENT)
Age: 69
End: 2019-10-07

## 2019-10-07 ENCOUNTER — LABORATORY RESULT (OUTPATIENT)
Age: 69
End: 2019-10-07

## 2019-10-07 ENCOUNTER — MED ADMIN CHARGE (OUTPATIENT)
Age: 69
End: 2019-10-07

## 2019-10-07 ENCOUNTER — APPOINTMENT (OUTPATIENT)
Dept: INFUSION THERAPY | Facility: HOSPITAL | Age: 69
End: 2019-10-07

## 2019-10-07 LAB
BASOPHILS # BLD AUTO: 0 K/UL — SIGNIFICANT CHANGE UP (ref 0–0.2)
BASOPHILS NFR BLD AUTO: 0.2 % — SIGNIFICANT CHANGE UP (ref 0–2)
EOSINOPHIL # BLD AUTO: 0.1 K/UL — SIGNIFICANT CHANGE UP (ref 0–0.5)
EOSINOPHIL NFR BLD AUTO: 0.9 % — SIGNIFICANT CHANGE UP (ref 0–6)
HCT VFR BLD CALC: 34.2 % — LOW (ref 39–50)
HGB BLD-MCNC: 11.7 G/DL — LOW (ref 13–17)
LYMPHOCYTES # BLD AUTO: 0.8 K/UL — LOW (ref 1–3.3)
LYMPHOCYTES # BLD AUTO: 9.3 % — LOW (ref 13–44)
MCHC RBC-ENTMCNC: 33.1 PG — SIGNIFICANT CHANGE UP (ref 27–34)
MCHC RBC-ENTMCNC: 34.2 G/DL — SIGNIFICANT CHANGE UP (ref 32–36)
MCV RBC AUTO: 96.7 FL — SIGNIFICANT CHANGE UP (ref 80–100)
MONOCYTES # BLD AUTO: 0.4 K/UL — SIGNIFICANT CHANGE UP (ref 0–0.9)
MONOCYTES NFR BLD AUTO: 5.2 % — SIGNIFICANT CHANGE UP (ref 2–14)
NEUTROPHILS # BLD AUTO: 6.9 K/UL — SIGNIFICANT CHANGE UP (ref 1.8–7.4)
NEUTROPHILS NFR BLD AUTO: 84.5 % — HIGH (ref 43–77)
PLATELET # BLD AUTO: 194 K/UL — SIGNIFICANT CHANGE UP (ref 150–400)
RBC # BLD: 3.53 M/UL — LOW (ref 4.2–5.8)
RBC # FLD: 14 % — SIGNIFICANT CHANGE UP (ref 10.3–14.5)
WBC # BLD: 8.1 K/UL — SIGNIFICANT CHANGE UP (ref 3.8–10.5)
WBC # FLD AUTO: 8.1 K/UL — SIGNIFICANT CHANGE UP (ref 3.8–10.5)

## 2019-10-14 ENCOUNTER — LABORATORY RESULT (OUTPATIENT)
Age: 69
End: 2019-10-14

## 2019-10-14 ENCOUNTER — RESULT REVIEW (OUTPATIENT)
Age: 69
End: 2019-10-14

## 2019-10-14 ENCOUNTER — APPOINTMENT (OUTPATIENT)
Dept: INFUSION THERAPY | Facility: HOSPITAL | Age: 69
End: 2019-10-14

## 2019-10-14 LAB
BASOPHILS # BLD AUTO: 0 K/UL — SIGNIFICANT CHANGE UP (ref 0–0.2)
BASOPHILS NFR BLD AUTO: 0.4 % — SIGNIFICANT CHANGE UP (ref 0–2)
EOSINOPHIL # BLD AUTO: 0.1 K/UL — SIGNIFICANT CHANGE UP (ref 0–0.5)
EOSINOPHIL NFR BLD AUTO: 1 % — SIGNIFICANT CHANGE UP (ref 0–6)
HCT VFR BLD CALC: 33.8 % — LOW (ref 39–50)
HGB BLD-MCNC: 11.4 G/DL — LOW (ref 13–17)
LYMPHOCYTES # BLD AUTO: 0.9 K/UL — LOW (ref 1–3.3)
LYMPHOCYTES # BLD AUTO: 9.8 % — LOW (ref 13–44)
MCHC RBC-ENTMCNC: 32.6 PG — SIGNIFICANT CHANGE UP (ref 27–34)
MCHC RBC-ENTMCNC: 33.7 G/DL — SIGNIFICANT CHANGE UP (ref 32–36)
MCV RBC AUTO: 97 FL — SIGNIFICANT CHANGE UP (ref 80–100)
MONOCYTES # BLD AUTO: 0.5 K/UL — SIGNIFICANT CHANGE UP (ref 0–0.9)
MONOCYTES NFR BLD AUTO: 5.6 % — SIGNIFICANT CHANGE UP (ref 2–14)
NEUTROPHILS # BLD AUTO: 7.6 K/UL — HIGH (ref 1.8–7.4)
NEUTROPHILS NFR BLD AUTO: 83.3 % — HIGH (ref 43–77)
PLATELET # BLD AUTO: 197 K/UL — SIGNIFICANT CHANGE UP (ref 150–400)
RBC # BLD: 3.48 M/UL — LOW (ref 4.2–5.8)
RBC # FLD: 13.8 % — SIGNIFICANT CHANGE UP (ref 10.3–14.5)
WBC # BLD: 9.2 K/UL — SIGNIFICANT CHANGE UP (ref 3.8–10.5)
WBC # FLD AUTO: 9.2 K/UL — SIGNIFICANT CHANGE UP (ref 3.8–10.5)

## 2019-10-21 ENCOUNTER — APPOINTMENT (OUTPATIENT)
Dept: INFUSION THERAPY | Facility: HOSPITAL | Age: 69
End: 2019-10-21

## 2019-10-21 ENCOUNTER — RESULT REVIEW (OUTPATIENT)
Age: 69
End: 2019-10-21

## 2019-10-21 LAB
BASOPHILS # BLD AUTO: 0 K/UL — SIGNIFICANT CHANGE UP (ref 0–0.2)
BASOPHILS NFR BLD AUTO: 0.4 % — SIGNIFICANT CHANGE UP (ref 0–2)
EOSINOPHIL # BLD AUTO: 0.1 K/UL — SIGNIFICANT CHANGE UP (ref 0–0.5)
EOSINOPHIL NFR BLD AUTO: 1.2 % — SIGNIFICANT CHANGE UP (ref 0–6)
HCT VFR BLD CALC: 35.6 % — LOW (ref 39–50)
HGB BLD-MCNC: 11.7 G/DL — LOW (ref 13–17)
LYMPHOCYTES # BLD AUTO: 1 K/UL — SIGNIFICANT CHANGE UP (ref 1–3.3)
LYMPHOCYTES # BLD AUTO: 7.8 % — LOW (ref 13–44)
MCHC RBC-ENTMCNC: 32.1 PG — SIGNIFICANT CHANGE UP (ref 27–34)
MCHC RBC-ENTMCNC: 32.9 G/DL — SIGNIFICANT CHANGE UP (ref 32–36)
MCV RBC AUTO: 97.5 FL — SIGNIFICANT CHANGE UP (ref 80–100)
MONOCYTES # BLD AUTO: 0.7 K/UL — SIGNIFICANT CHANGE UP (ref 0–0.9)
MONOCYTES NFR BLD AUTO: 5.5 % — SIGNIFICANT CHANGE UP (ref 2–14)
NEUTROPHILS # BLD AUTO: 10.8 K/UL — HIGH (ref 1.8–7.4)
NEUTROPHILS NFR BLD AUTO: 85.1 % — HIGH (ref 43–77)
PLATELET # BLD AUTO: 174 K/UL — SIGNIFICANT CHANGE UP (ref 150–400)
RBC # BLD: 3.65 M/UL — LOW (ref 4.2–5.8)
RBC # FLD: 14.6 % — HIGH (ref 10.3–14.5)
WBC # BLD: 12.6 K/UL — HIGH (ref 3.8–10.5)
WBC # FLD AUTO: 12.6 K/UL — HIGH (ref 3.8–10.5)

## 2019-10-22 ENCOUNTER — OUTPATIENT (OUTPATIENT)
Dept: OUTPATIENT SERVICES | Facility: HOSPITAL | Age: 69
LOS: 1 days | Discharge: HOME | End: 2019-10-22

## 2019-10-22 DIAGNOSIS — C90.00 MULTIPLE MYELOMA NOT HAVING ACHIEVED REMISSION: ICD-10-CM

## 2019-10-23 ENCOUNTER — APPOINTMENT (OUTPATIENT)
Dept: ORTHOPEDIC SURGERY | Facility: HOSPITAL | Age: 69
End: 2019-10-23

## 2019-10-24 ENCOUNTER — OTHER (OUTPATIENT)
Age: 69
End: 2019-10-24

## 2019-10-28 ENCOUNTER — APPOINTMENT (OUTPATIENT)
Dept: HEMATOLOGY ONCOLOGY | Facility: CLINIC | Age: 69
End: 2019-10-28
Payer: MEDICARE

## 2019-10-28 ENCOUNTER — RESULT REVIEW (OUTPATIENT)
Age: 69
End: 2019-10-28

## 2019-10-28 ENCOUNTER — APPOINTMENT (OUTPATIENT)
Dept: INFUSION THERAPY | Facility: HOSPITAL | Age: 69
End: 2019-10-28

## 2019-10-28 VITALS
OXYGEN SATURATION: 98 % | RESPIRATION RATE: 17 BRPM | SYSTOLIC BLOOD PRESSURE: 166 MMHG | TEMPERATURE: 98.5 F | DIASTOLIC BLOOD PRESSURE: 69 MMHG | HEART RATE: 72 BPM | WEIGHT: 129.41 LBS | BODY MASS INDEX: 21.82 KG/M2

## 2019-10-28 LAB
BASOPHILS # BLD AUTO: 0 K/UL — SIGNIFICANT CHANGE UP (ref 0–0.2)
BASOPHILS NFR BLD AUTO: 0.1 % — SIGNIFICANT CHANGE UP (ref 0–2)
EOSINOPHIL # BLD AUTO: 0.2 K/UL — SIGNIFICANT CHANGE UP (ref 0–0.5)
EOSINOPHIL NFR BLD AUTO: 2.4 % — SIGNIFICANT CHANGE UP (ref 0–6)
HCT VFR BLD CALC: 34.7 % — LOW (ref 39–50)
HGB BLD-MCNC: 12 G/DL — LOW (ref 13–17)
LYMPHOCYTES # BLD AUTO: 0.9 K/UL — LOW (ref 1–3.3)
LYMPHOCYTES # BLD AUTO: 10.5 % — LOW (ref 13–44)
MCHC RBC-ENTMCNC: 33.3 PG — SIGNIFICANT CHANGE UP (ref 27–34)
MCHC RBC-ENTMCNC: 34.6 G/DL — SIGNIFICANT CHANGE UP (ref 32–36)
MCV RBC AUTO: 96.3 FL — SIGNIFICANT CHANGE UP (ref 80–100)
MONOCYTES # BLD AUTO: 0.6 K/UL — SIGNIFICANT CHANGE UP (ref 0–0.9)
MONOCYTES NFR BLD AUTO: 6.8 % — SIGNIFICANT CHANGE UP (ref 2–14)
NEUTROPHILS # BLD AUTO: 7.1 K/UL — SIGNIFICANT CHANGE UP (ref 1.8–7.4)
NEUTROPHILS NFR BLD AUTO: 80.3 % — HIGH (ref 43–77)
PLATELET # BLD AUTO: 117 K/UL — LOW (ref 150–400)
RBC # BLD: 3.6 M/UL — LOW (ref 4.2–5.8)
RBC # FLD: 13.4 % — SIGNIFICANT CHANGE UP (ref 10.3–14.5)
WBC # BLD: 8.8 K/UL — SIGNIFICANT CHANGE UP (ref 3.8–10.5)
WBC # FLD AUTO: 8.8 K/UL — SIGNIFICANT CHANGE UP (ref 3.8–10.5)

## 2019-10-28 PROCEDURE — 99214 OFFICE O/P EST MOD 30 MIN: CPT

## 2019-10-28 NOTE — ASSESSMENT
[FreeTextEntry1] : 70 yo M with hx MI s/p stent 2015, HTN, high chol, presents for IgG kappa/kappa light chain MM newly dx'ed stage II IPSS score\par -with extensive lytic lesions as well as C7/T8 spine lesions causing nerve compression -s/p XRT 8/22-8/29/19\par Started Velcade/Dexa  weekly 9/9/19\par Started Revlimid on 10/21/19\par \par -cont RVD, tolerating well, monitor counts while on it\par \par -f/u after dental clearance, pt has f/u on 10/29/19, seen by Dr. Kramer ph  -will give Zometa q4 wks afterwards\par \par - BM cytogenetics/FISH panel -normal. Based on high beta-2 microglobulin/normal albumin, pt is stage II IPSS score\par \par -repeat myeloma labs done on 9/30/19 -improving. Manubrial lesion decreased in size. Repeat labs for myeloma today and monthly at each visit\par \par -GI eval -pt referred due to dyspepsia, abd bloating. Would do EGD with biopsies to r/o amyloid involvement of GI tract. Pt had abnormal uptake in esophagus on PET scan -pt reports he has an appointment on 11/4/19\par \par -cont prophylactic Valacyclovir 500mg po daily given to prevent VZV while on Velcade\par \par -influenza vaccine done this year\par \par -follow up in 1 months

## 2019-10-28 NOTE — RESULTS/DATA
[FreeTextEntry1] : Today's CBC (On 10/28/19) wbc 8.8 Hb 12 plt 117\par \par On 10/1/19) wbc 12.5 Hb 10.9 plt 215 ANC 7700\par On 9/3/19) wbc 11.9 Hb 10.8 plt 239 ANC 10.4k/ul  Mo 500 Eo 200 Ba 0\par \par RADIOLOGY\par On 9/7/19 PET/CT \par IMPRESSION:  Abnormal whole body FDG-PET/CT scan.\par \par 1. Numerous hypermetabolic osseous foci in the axial and appendicular \par skeleton mostly with corresponding lucencies on CT compatible with the \par diagnosis of multiple myeloma. Lesions in bilateral proximal humeri and \par femurs, although not demonstrating cortical disruption, may place the \par patient at risk for pathologic fractures.\par \par 2. Nonspecific hypermetabolism in the distal esophagus extending into the \par GE junction with questionable wall thickening on CT with associated \par adjacent hypermetabolic paraesophageal lymph node. Suggest correlation \par with endoscopy to exclude any abnormality in this region.\par \par 3. Nonspecific hypermetabolism in the right palmar musculature and left \par masseter muscle. Please correlate clinically.\par \par ON 9/9/19 MRI cervical\par \par IMPRESSION: \par Diffuse osseous metastases, consistent with multiple myeloma.\par Large calvarial lesion which may extend into the sella turcica.\par No evidence of epidural extension of tumor.\par Multilevel cervical spondylosis described.

## 2019-10-28 NOTE — HISTORY OF PRESENT ILLNESS
[de-identified] : Mr Rand was referred to my office for newly diagnosed IgG kappa multiple myeloma. The patient's primary language is Syriac, he has his daughter Mirlande as the , per his wishes. He was seen by Dr. Andrés Valle (hematology) in Feb 2019  and had a BM bx showing 10-15% plasma cells, concerning for smoldering myeloma. According to the daughter, he was awaiting insurance approval for imaging studies. He was admitted from 8/18/19 at [facility] where he presented with pain in the L leg/lower back, worsening for the past 2-3 months. On labs, he was found to have a total protein of 10, Ca level 12 until 8/29/19. Dental consult was called and patient needs to have 10 teeth extracted -thus, IV bisphosphonates were not given, pt improved with hydration. He also had a slightly inc'ed creatinine -improved after IVF. CT C/A/P 8/18/19 showed a lytic expansile soft tissue lesion centered in the manubrium measuring approximately 7.3 x 3.4 x 4.3 cm, invading both 1st ribs. There was also a A lytic expansile soft tissue lesion in the T8 vertebral body causing mass effect on spinal canal and obliterating left neural foramina at T7-8 and T8-9. ON 8/22/19 an MRI thoracic spine was done showing multiple areas of tumor involvement within the thoracic spine in keeping with the patient's history of multiple myeloma. Prominent lesion within C7 on the right incompletely seen.\par Large lesion within T8 on the left producing epidural tumor extension and moderate narrowing of the spinal canal with mild flattening of the spinal cord. He was evaluated on 8/22/19 by Dr. Foley (Rad Onc) and was given 5 fractions of XRT from 8/23/19 to 8/29/19 to C7 and T8. He was discharged home on 8/29/19 with follow up in the outpatient office, and on Dexamethasone 4mg po daily. \par \par  [de-identified] : The patient is here for IgG kappa MM with multiple bone lytic lesions, hypercalcemia (resolved) and mild anemia. He still has pain in b/l ribs, takes Oxy 5mg po once daily. He has no weakness in his legs and back pain has improved after XRT.  He started Velcade/Dexa weekly on 9/919.  The patient got the Revlimid and started it C1 day 1 on 10/21/19. \par He reports no diarrhea, no neuropathy. He has had 2x a feeling 'of getting cold and getting hot. \par \par He was seen at [facility] by Dr. Kramer ph  -had several extractions, healing well, has cavities to be done as well -has a f/u on 10/29/19.

## 2019-10-28 NOTE — PHYSICAL EXAM
[Fully active, able to carry on all pre-disease performance without restriction] : Status 0 - Fully active, able to carry on all pre-disease performance without restriction [Normal] : normal spine exam without palpable tenderness, no kyphosis or scoliosis [de-identified] : no manubrial lesion noted today [de-identified] : no edema [de-identified] : no point tenderness

## 2019-10-28 NOTE — REVIEW OF SYSTEMS
[Recent Change In Weight] : ~T recent weight change [Negative] : Allergic/Immunologic [Fever] : no fever [Chills] : no chills [Night Sweats] : no night sweats [Fatigue] : no fatigue [Eye Pain] : no eye pain [Red Eyes] : eyes not red [Dry Eyes] : no dryness of the eyes [Vision Problems] : no vision problems [Dysphagia] : no dysphagia [Loss of Hearing] : no loss of hearing [Nosebleeds] : no nosebleeds [Hoarseness] : no hoarseness [Odynophagia] : no odynophagia [Mucosal Pain] : no mucosal pain [Chest Pain] : no chest pain [Palpitations] : no palpitations [Leg Claudication] : no intermittent leg claudication [Lower Ext Edema] : no lower extremity edema [Shortness Of Breath] : no shortness of breath [Wheezing] : no wheezing [Cough] : no cough [SOB on Exertion] : no shortness of breath during exertion [Abdominal Pain] : no abdominal pain [Vomiting] : no vomiting [Diarrhea] : no diarrhea [Dysuria] : no dysuria [Incontinence] : no incontinence [Joint Pain] : no joint pain [Muscle Pain] : no muscle pain [Muscle Weakness] : no muscle weakness [Skin Rash] : no skin rash [Skin Wound] : no skin wound [Confused] : no confusion [Difficulty Walking] : no difficulty walking [Suicidal] : not suicidal [Depression] : no depression [Easy Bleeding] : no tendency for easy bleeding [Easy Bruising] : no tendency for easy bruising [FreeTextEntry2] : lost about 6-7 lbs in the past 2 months [FreeTextEntry7] : colonoscopy in June 2019 -normal.  [FreeTextEntry9] : back pain -improved after radiation [de-identified] : no numbness and tingling

## 2019-10-28 NOTE — REASON FOR VISIT
[Follow-Up Visit] : a follow-up visit for [Family Member] : family member [FreeTextEntry2] : myeloma

## 2019-10-29 DIAGNOSIS — Z51.11 ENCOUNTER FOR ANTINEOPLASTIC CHEMOTHERAPY: ICD-10-CM

## 2019-10-29 DIAGNOSIS — R11.2 NAUSEA WITH VOMITING, UNSPECIFIED: ICD-10-CM

## 2019-10-29 LAB
ALBUMIN MFR SERPL ELPH: 55.7 %
ALBUMIN SERPL ELPH-MCNC: 4.4 G/DL
ALBUMIN SERPL-MCNC: 3.8 G/DL
ALBUMIN/GLOB SERPL: 1.2 RATIO
ALP BLD-CCNC: 129 U/L
ALPHA1 GLOB MFR SERPL ELPH: 7.3 %
ALPHA1 GLOB SERPL ELPH-MCNC: 0.5 G/DL
ALPHA2 GLOB MFR SERPL ELPH: 15.4 %
ALPHA2 GLOB SERPL ELPH-MCNC: 1.1 G/DL
ALT SERPL-CCNC: 25 U/L
ANION GAP SERPL CALC-SCNC: 15 MMOL/L
AST SERPL-CCNC: 20 U/L
B-GLOBULIN MFR SERPL ELPH: 11.5 %
B-GLOBULIN SERPL ELPH-MCNC: 0.8 G/DL
BILIRUB SERPL-MCNC: 0.6 MG/DL
BUN SERPL-MCNC: 15 MG/DL
CALCIUM SERPL-MCNC: 8.8 MG/DL
CHLORIDE SERPL-SCNC: 98 MMOL/L
CO2 SERPL-SCNC: 21 MMOL/L
CREAT SERPL-MCNC: 1.21 MG/DL
DEPRECATED KAPPA LC FREE/LAMBDA SER: 2.3 RATIO
DEPRECATED KAPPA LC FREE/LAMBDA SER: 2.3 RATIO
GAMMA GLOB FLD ELPH-MCNC: 0.7 G/DL
GAMMA GLOB MFR SERPL ELPH: 10.1 %
GLUCOSE SERPL-MCNC: 131 MG/DL
IGA SER QL IEP: 75 MG/DL
IGG SER QL IEP: 723 MG/DL
IGM SER QL IEP: 13 MG/DL
INTERPRETATION SERPL IEP-IMP: NORMAL
KAPPA LC CSF-MCNC: 1.76 MG/DL
KAPPA LC CSF-MCNC: 1.76 MG/DL
KAPPA LC SERPL-MCNC: 4.04 MG/DL
KAPPA LC SERPL-MCNC: 4.04 MG/DL
M PROTEIN MFR SERPL ELPH: 7.1 %
M PROTEIN SPEC IFE-MCNC: NORMAL
MONOCLON BAND OBS SERPL: 0.5 G/DL
POTASSIUM SERPL-SCNC: 4.3 MMOL/L
PROT SERPL-MCNC: 6.9 G/DL
SODIUM SERPL-SCNC: 134 MMOL/L

## 2019-10-31 ENCOUNTER — RX RENEWAL (OUTPATIENT)
Age: 69
End: 2019-10-31

## 2019-11-04 ENCOUNTER — RESULT REVIEW (OUTPATIENT)
Age: 69
End: 2019-11-04

## 2019-11-04 ENCOUNTER — APPOINTMENT (OUTPATIENT)
Dept: INFUSION THERAPY | Facility: HOSPITAL | Age: 69
End: 2019-11-04

## 2019-11-04 LAB
BASOPHILS # BLD AUTO: 0 K/UL — SIGNIFICANT CHANGE UP (ref 0–0.2)
BASOPHILS NFR BLD AUTO: 0.2 % — SIGNIFICANT CHANGE UP (ref 0–2)
EOSINOPHIL # BLD AUTO: 0.7 K/UL — HIGH (ref 0–0.5)
EOSINOPHIL NFR BLD AUTO: 9.6 % — HIGH (ref 0–6)
HCT VFR BLD CALC: 31.2 % — LOW (ref 39–50)
HGB BLD-MCNC: 11 G/DL — LOW (ref 13–17)
LYMPHOCYTES # BLD AUTO: 0.7 K/UL — LOW (ref 1–3.3)
LYMPHOCYTES # BLD AUTO: 10 % — LOW (ref 13–44)
MCHC RBC-ENTMCNC: 33 PG — SIGNIFICANT CHANGE UP (ref 27–34)
MCHC RBC-ENTMCNC: 35.2 G/DL — SIGNIFICANT CHANGE UP (ref 32–36)
MCV RBC AUTO: 93.8 FL — SIGNIFICANT CHANGE UP (ref 80–100)
MONOCYTES # BLD AUTO: 0.8 K/UL — SIGNIFICANT CHANGE UP (ref 0–0.9)
MONOCYTES NFR BLD AUTO: 12.1 % — SIGNIFICANT CHANGE UP (ref 2–14)
NEUTROPHILS # BLD AUTO: 4.8 K/UL — SIGNIFICANT CHANGE UP (ref 1.8–7.4)
NEUTROPHILS NFR BLD AUTO: 68 % — SIGNIFICANT CHANGE UP (ref 43–77)
PLATELET # BLD AUTO: 125 K/UL — LOW (ref 150–400)
RBC # BLD: 3.33 M/UL — LOW (ref 4.2–5.8)
RBC # FLD: 14 % — SIGNIFICANT CHANGE UP (ref 10.3–14.5)
WBC # BLD: 7 K/UL — SIGNIFICANT CHANGE UP (ref 3.8–10.5)
WBC # FLD AUTO: 7 K/UL — SIGNIFICANT CHANGE UP (ref 3.8–10.5)

## 2019-11-05 ENCOUNTER — APPOINTMENT (OUTPATIENT)
Dept: GASTROENTEROLOGY | Facility: CLINIC | Age: 69
End: 2019-11-05
Payer: MEDICARE

## 2019-11-05 VITALS
SYSTOLIC BLOOD PRESSURE: 125 MMHG | HEIGHT: 64 IN | WEIGHT: 132 LBS | TEMPERATURE: 97.6 F | OXYGEN SATURATION: 98 % | BODY MASS INDEX: 22.53 KG/M2 | HEART RATE: 73 BPM | DIASTOLIC BLOOD PRESSURE: 70 MMHG

## 2019-11-05 DIAGNOSIS — Z95.5 PRESENCE OF CORONARY ANGIOPLASTY IMPLANT AND GRAFT: ICD-10-CM

## 2019-11-05 DIAGNOSIS — Z86.79 PERSONAL HISTORY OF OTHER DISEASES OF THE CIRCULATORY SYSTEM: ICD-10-CM

## 2019-11-05 DIAGNOSIS — I10 ESSENTIAL (PRIMARY) HYPERTENSION: ICD-10-CM

## 2019-11-05 PROCEDURE — 99204 OFFICE O/P NEW MOD 45 MIN: CPT

## 2019-11-05 RX ORDER — DEXAMETHASONE 4 MG/1
4 TABLET ORAL DAILY
Refills: 0 | Status: DISCONTINUED | COMMUNITY
Start: 2019-09-03 | End: 2019-11-05

## 2019-11-05 NOTE — HISTORY OF PRESENT ILLNESS
[Heartburn] : denies heartburn [Nausea] : denies nausea [Vomiting] : denies vomiting [Diarrhea] : denies diarrhea [Constipation] : improvement in constipation [Yellow Skin Or Eyes (Jaundice)] : denies jaundice [Abdominal Swelling] : denies abdominal swelling [Rectal Pain] : denies rectal pain [Wt Loss ___ Lbs] : recent [unfilled] ~Upound(s) weight loss [Abdominal Pain] : abdominal pain [Malignancy] : malignancy [Wt Gain ___ Lbs] : no recent weight gain [GERD] : no gastroesophageal reflux disease [Hiatus Hernia] : no hiatus hernia [Peptic Ulcer Disease] : no peptic ulcer disease [Pancreatitis] : no pancreatitis [Cholelithiasis] : no cholelithiasis [Kidney Stone] : no kidney stone [Inflammatory Bowel Disease] : no inflammatory bowel disease [Irritable Bowel Syndrome] : no irritable bowel syndrome [Diverticulitis] : no diverticulitis [Alcohol Abuse] : no alcohol abuse [Abdominal Surgery] : no abdominal surgery [Appendectomy] : no appendectomy [Cholecystectomy] : no cholecystectomy [de-identified] : 69 year old man wit multiple myeloma on chemotherapy. He underwent a PET scan that showed increased uptake at the GE junction. He denies dysphagia to solids or liquids. He had a negative colonoscopy about 1 year ago. He denies rectal bleeding, melena or hematemesis. He complains of a bloated abdomen and episodes of belching for which he takes Pantoprazole. He is s/p CA stent on Plavix.

## 2019-11-05 NOTE — CONSULT LETTER
[Dear  ___] : Dear  [unfilled], [Consult Letter:] : I had the pleasure of evaluating your patient, [unfilled]. [Please see my note below.] : Please see my note below. [Consult Closing:] : Thank you very much for allowing me to participate in the care of this patient.  If you have any questions, please do not hesitate to contact me. [Sincerely,] : Sincerely, [FreeTextEntry3] : Lamont Sanders MD, FACG\par

## 2019-11-06 ENCOUNTER — RX RENEWAL (OUTPATIENT)
Age: 69
End: 2019-11-06

## 2019-11-11 ENCOUNTER — APPOINTMENT (OUTPATIENT)
Dept: INFUSION THERAPY | Facility: HOSPITAL | Age: 69
End: 2019-11-11

## 2019-11-11 ENCOUNTER — APPOINTMENT (OUTPATIENT)
Dept: CARDIOLOGY | Facility: CLINIC | Age: 69
End: 2019-11-11
Payer: MEDICARE

## 2019-11-11 VITALS
DIASTOLIC BLOOD PRESSURE: 66 MMHG | WEIGHT: 123 LBS | OXYGEN SATURATION: 99 % | BODY MASS INDEX: 21 KG/M2 | HEIGHT: 64 IN | SYSTOLIC BLOOD PRESSURE: 152 MMHG | HEART RATE: 56 BPM

## 2019-11-11 DIAGNOSIS — Z01.810 ENCOUNTER FOR PREPROCEDURAL CARDIOVASCULAR EXAMINATION: ICD-10-CM

## 2019-11-11 PROCEDURE — 99214 OFFICE O/P EST MOD 30 MIN: CPT

## 2019-11-11 NOTE — DISCUSSION/SUMMARY
[FreeTextEntry1] : In a summary Mr. Rand , Yoly is an elderly male with CAD s/p stent , for endoscopy, asymptomatic cardiac wise , will be low risk cardiac wise. Recommend to continue Aspirin as he had stent. Spoke with Ms. Dawson at Dr. Sanders office. Hypertension, controlled. Continue current medications and 2 gm sodium diet. Hypercholesterolemia, on statins and low cholesterol diet. Discussed with Mr. Rand and his daughter.

## 2019-11-11 NOTE — REVIEW OF SYSTEMS
[Blurry Vision] : blurred vision [Joint Pain] : joint pain [Numbness (Hypesthesia)] : numbness [Negative] : Psychiatric [Seeing Double (Diplopia)] : no diplopia [Eye Pain] : no eye pain [Eyeglasses] : not currently wearing eyeglasses [Shortness Of Breath] : no shortness of breath [Lower Ext Edema] : no extremity edema [Chest Pain] : no chest pain [Heartburn] : no heartburn [Palpitations] : no palpitations [Abdominal Pain] : no abdominal pain [Change in Appetite] : no change in appetite [Dysphagia] : no dysphagia [Limb Weakness (Paresis)] : no limb weakness [Muscle Cramps] : no muscle cramps [Tremor] : no tremor was seen [Dizziness] : no dizziness [Tingling (Paresthesia)] : no tingling [Convulsions] : no convulsions [Easy Bruising] : no tendency for easy bruising [Easy Bleeding] : no tendency for easy bleeding

## 2019-11-11 NOTE — HISTORY OF PRESENT ILLNESS
[FreeTextEntry1] : Robert LópezCarolina is a 69 year old male with history of CAD s/p stent in October 2015, hypertension and hypercholesterolemia comes for pre endoscopy cardiac evaluation. Denies any chest pain or palpitations. No shortness of breath on exertion. Compliant to medications and diet.

## 2019-11-11 NOTE — REASON FOR VISIT
[Coronary Artery Disease] : coronary artery disease [Follow-Up - Clinic] : a clinic follow-up of [Hypertension] : hypertension [FreeTextEntry1] : pre endoscopy cardiac evaluation.

## 2019-11-11 NOTE — PHYSICAL EXAM
[General Appearance - Well Developed] : well developed [Normal Appearance] : normal appearance [Well Groomed] : well groomed [No Deformities] : no deformities [General Appearance - Well Nourished] : well nourished [General Appearance - In No Acute Distress] : no acute distress [Normal Conjunctiva] : the conjunctiva exhibited no abnormalities [Eyelids - No Xanthelasma] : the eyelids demonstrated no xanthelasmas [Normal Oral Mucosa] : normal oral mucosa [No Oral Pallor] : no oral pallor [No Oral Cyanosis] : no oral cyanosis [Heart Rate And Rhythm] : heart rate and rhythm were normal [Respiration, Rhythm And Depth] : normal respiratory rhythm and effort [Auscultation Breath Sounds / Voice Sounds] : lungs were clear to auscultation bilaterally [Arterial Pulses Normal] : the arterial pulses were normal [Heart Sounds] : normal S1 and S2 [Edema] : no peripheral edema present [Bowel Sounds] : normal bowel sounds [Abdomen Soft] : soft [Abnormal Walk] : normal gait [Abdomen Tenderness] : non-tender [Nail Clubbing] : no clubbing of the fingernails [Cyanosis, Localized] : no localized cyanosis [Skin Color & Pigmentation] : normal skin color and pigmentation [Skin Turgor] : normal skin turgor [] : no rash [Oriented To Time, Place, And Person] : oriented to person, place, and time [Impaired Insight] : insight and judgment were intact [No Anxiety] : not feeling anxious [FreeTextEntry1] : No JVD

## 2019-11-18 ENCOUNTER — RESULT REVIEW (OUTPATIENT)
Age: 69
End: 2019-11-18

## 2019-11-18 ENCOUNTER — APPOINTMENT (OUTPATIENT)
Dept: INFUSION THERAPY | Facility: HOSPITAL | Age: 69
End: 2019-11-18

## 2019-11-18 ENCOUNTER — APPOINTMENT (OUTPATIENT)
Dept: HEMATOLOGY ONCOLOGY | Facility: CLINIC | Age: 69
End: 2019-11-18
Payer: MEDICARE

## 2019-11-18 VITALS
OXYGEN SATURATION: 99 % | SYSTOLIC BLOOD PRESSURE: 170 MMHG | RESPIRATION RATE: 16 BRPM | BODY MASS INDEX: 21.12 KG/M2 | HEART RATE: 80 BPM | WEIGHT: 123.02 LBS | DIASTOLIC BLOOD PRESSURE: 71 MMHG | TEMPERATURE: 98.4 F

## 2019-11-18 LAB
BASOPHILS # BLD AUTO: 0 K/UL — SIGNIFICANT CHANGE UP (ref 0–0.2)
BASOPHILS NFR BLD AUTO: 0.4 % — SIGNIFICANT CHANGE UP (ref 0–2)
EOSINOPHIL # BLD AUTO: 0.1 K/UL — SIGNIFICANT CHANGE UP (ref 0–0.5)
EOSINOPHIL NFR BLD AUTO: 0.7 % — SIGNIFICANT CHANGE UP (ref 0–6)
HCT VFR BLD CALC: 31.9 % — LOW (ref 39–50)
HGB BLD-MCNC: 11.4 G/DL — LOW (ref 13–17)
LYMPHOCYTES # BLD AUTO: 0.9 K/UL — LOW (ref 1–3.3)
LYMPHOCYTES # BLD AUTO: 9.3 % — LOW (ref 13–44)
MCHC RBC-ENTMCNC: 34.3 PG — HIGH (ref 27–34)
MCHC RBC-ENTMCNC: 35.7 G/DL — SIGNIFICANT CHANGE UP (ref 32–36)
MCV RBC AUTO: 96 FL — SIGNIFICANT CHANGE UP (ref 80–100)
MONOCYTES # BLD AUTO: 1.4 K/UL — HIGH (ref 0–0.9)
MONOCYTES NFR BLD AUTO: 14.5 % — HIGH (ref 2–14)
NEUTROPHILS # BLD AUTO: 7.4 K/UL — SIGNIFICANT CHANGE UP (ref 1.8–7.4)
NEUTROPHILS NFR BLD AUTO: 75.1 % — SIGNIFICANT CHANGE UP (ref 43–77)
PLATELET # BLD AUTO: 169 K/UL — SIGNIFICANT CHANGE UP (ref 150–400)
RBC # BLD: 3.32 M/UL — LOW (ref 4.2–5.8)
RBC # FLD: 13.4 % — SIGNIFICANT CHANGE UP (ref 10.3–14.5)
WBC # BLD: 9.9 K/UL — SIGNIFICANT CHANGE UP (ref 3.8–10.5)
WBC # FLD AUTO: 9.9 K/UL — SIGNIFICANT CHANGE UP (ref 3.8–10.5)

## 2019-11-18 PROCEDURE — 99213 OFFICE O/P EST LOW 20 MIN: CPT

## 2019-11-18 NOTE — REVIEW OF SYSTEMS
[Recent Change In Weight] : ~T recent weight change [Negative] : Heme/Lymph [Fever] : no fever [Chills] : no chills [Fatigue] : no fatigue [Night Sweats] : no night sweats [Eye Pain] : no eye pain [Red Eyes] : eyes not red [Dry Eyes] : no dryness of the eyes [Vision Problems] : no vision problems [Nosebleeds] : no nosebleeds [Dysphagia] : no dysphagia [Loss of Hearing] : no loss of hearing [Odynophagia] : no odynophagia [Hoarseness] : no hoarseness [Mucosal Pain] : no mucosal pain [Chest Pain] : no chest pain [Palpitations] : no palpitations [Lower Ext Edema] : no lower extremity edema [Leg Claudication] : no intermittent leg claudication [Wheezing] : no wheezing [Shortness Of Breath] : no shortness of breath [Abdominal Pain] : no abdominal pain [SOB on Exertion] : no shortness of breath during exertion [Cough] : no cough [Vomiting] : no vomiting [Dysuria] : no dysuria [Diarrhea] : no diarrhea [Incontinence] : no incontinence [Joint Pain] : no joint pain [Muscle Pain] : no muscle pain [Muscle Weakness] : no muscle weakness [Skin Rash] : no skin rash [Skin Wound] : no skin wound [Confused] : no confusion [Suicidal] : not suicidal [Difficulty Walking] : no difficulty walking [Depression] : no depression [Easy Bleeding] : no tendency for easy bleeding [FreeTextEntry2] : lost about 6-7 lbs in the past 2 months [Easy Bruising] : no tendency for easy bruising [de-identified] : no numbness and tingling [FreeTextEntry7] : colonoscopy in June 2019 -normal.  [FreeTextEntry9] : back pain -improved after radiation

## 2019-11-18 NOTE — ASSESSMENT
[FreeTextEntry1] : 68 yo M with hx MI s/p stent 2015, HTN, high chol, presents for IgG kappa/kappa light chain MM newly dx'ed stage II IPSS score\par -with extensive lytic lesions as well as C7/T8 spine lesions causing nerve compression -s/p XRT 8/22-8/29/19\par Started Velcade/Dexa  weekly 9/9/19\par Started Revlimid on 10/21/19\par \par -cont RVD, tolerating well, monitor counts while on it. He is starting C2 today\par \par -pt had dental clearance on 10/29/19, seen by Dr. Kramer ph  - Zometa q4 wks started on 11/11/19, plan to give for 2 yrs\par \par - BM cytogenetics/FISH panel -normal. Based on high beta-2 microglobulin/normal albumin, pt is stage II IPSS score\par \par -repeat myeloma labs done on 10/28/19-improving. Manubrial lesion decreased in size. Repeat labs for myeloma today and monthly at each visit\par \par -GI eval -pt referred due to dyspepsia, abd bloating. Would do EGD with biopsies to r/o amyloid involvement of GI tract. Pt had abnormal uptake in esophagus on PET scan. Pt awaiting endoscopy on 11/27/19\par \par -cont prophylactic Valacyclovir 500mg po daily given to prevent VZV while on Velcade\par \par -influenza vaccine done this year\par \par -pt referral to palliative medicine to manage for weight loss, may benefit from THS for appetite stimulation\par \par -follow up in 1 months

## 2019-11-18 NOTE — HISTORY OF PRESENT ILLNESS
[de-identified] : Mr Rand was referred to my office for newly diagnosed IgG kappa multiple myeloma. The patient's primary language is Icelandic, he has his daughter Mirlande as the , per his wishes. He was seen by Dr. Andrés Valle (hematology) in Feb 2019  and had a BM bx showing 10-15% plasma cells, concerning for smoldering myeloma. According to the daughter, he was awaiting insurance approval for imaging studies. He was admitted from 8/18/19 at [facility] where he presented with pain in the L leg/lower back, worsening for the past 2-3 months. On labs, he was found to have a total protein of 10, Ca level 12 until 8/29/19. Dental consult was called and patient needs to have 10 teeth extracted -thus, IV bisphosphonates were not given, pt improved with hydration. He also had a slightly inc'ed creatinine -improved after IVF. CT C/A/P 8/18/19 showed a lytic expansile soft tissue lesion centered in the manubrium measuring approximately 7.3 x 3.4 x 4.3 cm, invading both 1st ribs. There was also a A lytic expansile soft tissue lesion in the T8 vertebral body causing mass effect on spinal canal and obliterating left neural foramina at T7-8 and T8-9. ON 8/22/19 an MRI thoracic spine was done showing multiple areas of tumor involvement within the thoracic spine in keeping with the patient's history of multiple myeloma. Prominent lesion within C7 on the right incompletely seen.\par Large lesion within T8 on the left producing epidural tumor extension and moderate narrowing of the spinal canal with mild flattening of the spinal cord. He was evaluated on 8/22/19 by Dr. Foley (Rad Onc) and was given 5 fractions of XRT from 8/23/19 to 8/29/19 to C7 and T8. He was discharged home on 8/29/19 with follow up in the outpatient office, and on Dexamethasone 4mg po daily. \par \par  [de-identified] : The patient is here for IgG kappa MM with multiple bone lytic lesions, hypercalcemia (resolved) and mild anemia. He still has pain in b/l ribs, takes Oxy 5mg po once daily. He has no weakness in his legs and back pain has improved after XRT.  He started Velcade/Dexa weekly on 9/919.  \par \par The patient got the Revlimid and started it C1 day 1 on 10/21/19. He reports no diarrhea, no neuropathy. He is starting C2 day 1 Revlimid today, 11/18/19. \par He started Zometa IV q4wks on 11/11/19 (was dentally cleared on 10/29/19).\par \par He c/o poor appetite and loss of taste sensation.

## 2019-11-18 NOTE — CONSULT LETTER
[Dear  ___] : Dear  [unfilled], [Please see my note below.] : Please see my note below. [Consult Letter:] : I had the pleasure of evaluating your patient, [unfilled]. [Consult Closing:] : Thank you very much for allowing me to participate in the care of this patient.  If you have any questions, please do not hesitate to contact me. [Sincerely,] : Sincerely, [DrDennys  ___] : Dr. MAZARIEGOS [DrDennys ___] : Dr. MAZARIEGOS

## 2019-11-18 NOTE — RESULTS/DATA
[FreeTextEntry1] : Today's CBC (On 11/18/19) wbc 9.9 Hb 11.4 plt 169 ANC 7400\par \par On 10/28/19) wbc 8.8 Hb 12 plt 117\par On 10/1/19) wbc 12.5 Hb 10.9 plt 215 ANC 7700\par On 9/3/19) wbc 11.9 Hb 10.8 plt 239 ANC 10.4k/ul  Mo 500 Eo 200 Ba 0\par \par RADIOLOGY\par On 9/7/19 PET/CT \par IMPRESSION:  Abnormal whole body FDG-PET/CT scan.\par \par 1. Numerous hypermetabolic osseous foci in the axial and appendicular \par skeleton mostly with corresponding lucencies on CT compatible with the \par diagnosis of multiple myeloma. Lesions in bilateral proximal humeri and \par femurs, although not demonstrating cortical disruption, may place the \par patient at risk for pathologic fractures.\par \par 2. Nonspecific hypermetabolism in the distal esophagus extending into the \par GE junction with questionable wall thickening on CT with associated \par adjacent hypermetabolic paraesophageal lymph node. Suggest correlation \par with endoscopy to exclude any abnormality in this region.\par \par 3. Nonspecific hypermetabolism in the right palmar musculature and left \par masseter muscle. Please correlate clinically.\par \par ON 9/9/19 MRI cervical\par \par IMPRESSION: \par Diffuse osseous metastases, consistent with multiple myeloma.\par Large calvarial lesion which may extend into the sella turcica.\par No evidence of epidural extension of tumor.\par Multilevel cervical spondylosis described.

## 2019-11-19 ENCOUNTER — OUTPATIENT (OUTPATIENT)
Dept: OUTPATIENT SERVICES | Facility: HOSPITAL | Age: 69
LOS: 1 days | Discharge: HOME | End: 2019-11-19

## 2019-11-19 DIAGNOSIS — C90.00 MULTIPLE MYELOMA NOT HAVING ACHIEVED REMISSION: ICD-10-CM

## 2019-11-19 LAB
ALBUMIN SERPL ELPH-MCNC: 4.4 G/DL
ALP BLD-CCNC: 178 U/L
ALT SERPL-CCNC: 20 U/L
ANION GAP SERPL CALC-SCNC: 15 MMOL/L
AST SERPL-CCNC: 17 U/L
BILIRUB SERPL-MCNC: 0.4 MG/DL
BUN SERPL-MCNC: 10 MG/DL
CALCIUM SERPL-MCNC: 7.7 MG/DL
CHLORIDE SERPL-SCNC: 105 MMOL/L
CO2 SERPL-SCNC: 20 MMOL/L
CREAT SERPL-MCNC: 1.14 MG/DL
GLUCOSE SERPL-MCNC: 110 MG/DL
POTASSIUM SERPL-SCNC: 4.3 MMOL/L
PROT SERPL-MCNC: 6.8 G/DL
SODIUM SERPL-SCNC: 140 MMOL/L

## 2019-11-20 ENCOUNTER — OUTPATIENT (OUTPATIENT)
Dept: OUTPATIENT SERVICES | Facility: HOSPITAL | Age: 69
LOS: 1 days | End: 2019-11-20

## 2019-11-20 ENCOUNTER — APPOINTMENT (OUTPATIENT)
Dept: ORTHOPEDIC SURGERY | Facility: HOSPITAL | Age: 69
End: 2019-11-20

## 2019-11-20 VITALS
BODY MASS INDEX: 21.11 KG/M2 | DIASTOLIC BLOOD PRESSURE: 57 MMHG | SYSTOLIC BLOOD PRESSURE: 140 MMHG | WEIGHT: 123 LBS | HEART RATE: 60 BPM

## 2019-11-20 LAB
ALBUMIN MFR SERPL ELPH: 55.2 %
ALBUMIN SERPL-MCNC: 3.8 G/DL
ALBUMIN/GLOB SERPL: 1.3 RATIO
ALPHA1 GLOB MFR SERPL ELPH: 7.4 %
ALPHA1 GLOB SERPL ELPH-MCNC: 0.5 G/DL
ALPHA2 GLOB MFR SERPL ELPH: 17.8 %
ALPHA2 GLOB SERPL ELPH-MCNC: 1.2 G/DL
B-GLOBULIN MFR SERPL ELPH: 11.3 %
B-GLOBULIN SERPL ELPH-MCNC: 0.8 G/DL
DEPRECATED KAPPA LC FREE/LAMBDA SER: 1.3 RATIO
GAMMA GLOB FLD ELPH-MCNC: 0.6 G/DL
GAMMA GLOB MFR SERPL ELPH: 8.3 %
IGA SER QL IEP: 70 MG/DL
IGG SER QL IEP: 590 MG/DL
IGM SER QL IEP: 16 MG/DL
INTERPRETATION SERPL IEP-IMP: NORMAL
KAPPA LC CSF-MCNC: 1.52 MG/DL
KAPPA LC SERPL-MCNC: 1.97 MG/DL
M PROTEIN MFR SERPL ELPH: 4.6 %
M PROTEIN SPEC IFE-MCNC: NORMAL
MONOCLON BAND OBS SERPL: 0.3 G/DL
PROT SERPL-MCNC: 6.8 G/DL
PROT SERPL-MCNC: 6.8 G/DL

## 2019-11-20 NOTE — HISTORY OF PRESENT ILLNESS
[de-identified] : 70 yo M with recent ddx of MM with diffuse lesions in skull, ribs, spine.  Comes to clinic for concerning lesion in L proximal femur.  Currently, patient is asymptomatic and has no complaints.  He denies any pain or limitations.  He is ambulating well.  He states his pain began to resolve once he began XRT and chemo.

## 2019-11-20 NOTE — END OF VISIT
[] : Resident [FreeTextEntry3] : Agree  the patient was accompanied by his daughter who speaks English and the patient speaks some English all communication was clearly understood and questions answered.  There are some lytic lesions of the proximal left femur but none visualized more distally.  There is no pain a all by lhistory or physical exam  The question is how extensively involved is the lesion and if it poses a threat for pathologic fx.  Many of these lesions can be treated with RT and resolve but some require prophylactic stabilization.  YThe area of the lesser troch is a bit suspicious as well.  He needs a Ct of the femur and we have plans to discuss the case with Dr ROSEMARY Estrada for his opinion  The pt and dtr agree with this plan  The resident is to get back to me after discussion with Onelia Estrada and we will speak with the family if the plan changes

## 2019-11-20 NOTE — ASSESSMENT
[FreeTextEntry1] : 68 yo M with MM presenting with proximal femur lesion.\par \par Plan:\par - Will discuss case with Dr. Arnoldo Estrada\par - Outpatient CT of L femur\par - Will consider XRT vs. prophylactic fixation

## 2019-11-21 DIAGNOSIS — C90.00 MULTIPLE MYELOMA NOT HAVING ACHIEVED REMISSION: ICD-10-CM

## 2019-11-21 DIAGNOSIS — M89.9 DISORDER OF BONE, UNSPECIFIED: ICD-10-CM

## 2019-11-25 ENCOUNTER — RESULT REVIEW (OUTPATIENT)
Age: 69
End: 2019-11-25

## 2019-11-25 ENCOUNTER — APPOINTMENT (OUTPATIENT)
Dept: INFUSION THERAPY | Facility: HOSPITAL | Age: 69
End: 2019-11-25

## 2019-11-25 LAB
BASOPHILS # BLD AUTO: 0 K/UL — SIGNIFICANT CHANGE UP (ref 0–0.2)
BASOPHILS NFR BLD AUTO: 0.7 % — SIGNIFICANT CHANGE UP (ref 0–2)
EOSINOPHIL # BLD AUTO: 0.3 K/UL — SIGNIFICANT CHANGE UP (ref 0–0.5)
EOSINOPHIL NFR BLD AUTO: 5.4 % — SIGNIFICANT CHANGE UP (ref 0–6)
HCT VFR BLD CALC: 29.1 % — LOW (ref 39–50)
HGB BLD-MCNC: 10.1 G/DL — LOW (ref 13–17)
LYMPHOCYTES # BLD AUTO: 0.6 K/UL — LOW (ref 1–3.3)
LYMPHOCYTES # BLD AUTO: 9 % — LOW (ref 13–44)
MCHC RBC-ENTMCNC: 32.8 PG — SIGNIFICANT CHANGE UP (ref 27–34)
MCHC RBC-ENTMCNC: 34.6 G/DL — SIGNIFICANT CHANGE UP (ref 32–36)
MCV RBC AUTO: 94.8 FL — SIGNIFICANT CHANGE UP (ref 80–100)
MONOCYTES # BLD AUTO: 0.5 K/UL — SIGNIFICANT CHANGE UP (ref 0–0.9)
MONOCYTES NFR BLD AUTO: 8.7 % — SIGNIFICANT CHANGE UP (ref 2–14)
NEUTROPHILS # BLD AUTO: 4.8 K/UL — SIGNIFICANT CHANGE UP (ref 1.8–7.4)
NEUTROPHILS NFR BLD AUTO: 76.2 % — SIGNIFICANT CHANGE UP (ref 43–77)
PLATELET # BLD AUTO: 105 K/UL — LOW (ref 150–400)
RBC # BLD: 3.07 M/UL — LOW (ref 4.2–5.8)
RBC # FLD: 13.5 % — SIGNIFICANT CHANGE UP (ref 10.3–14.5)
WBC # BLD: 6.3 K/UL — SIGNIFICANT CHANGE UP (ref 3.8–10.5)
WBC # FLD AUTO: 6.3 K/UL — SIGNIFICANT CHANGE UP (ref 3.8–10.5)

## 2019-11-26 DIAGNOSIS — Z51.11 ENCOUNTER FOR ANTINEOPLASTIC CHEMOTHERAPY: ICD-10-CM

## 2019-11-26 DIAGNOSIS — R11.2 NAUSEA WITH VOMITING, UNSPECIFIED: ICD-10-CM

## 2019-11-27 ENCOUNTER — APPOINTMENT (OUTPATIENT)
Dept: GASTROENTEROLOGY | Facility: AMBULATORY MEDICAL SERVICES | Age: 69
End: 2019-11-27
Payer: MEDICARE

## 2019-11-27 PROCEDURE — 43239 EGD BIOPSY SINGLE/MULTIPLE: CPT

## 2019-12-02 ENCOUNTER — RESULT REVIEW (OUTPATIENT)
Age: 69
End: 2019-12-02

## 2019-12-02 ENCOUNTER — APPOINTMENT (OUTPATIENT)
Dept: INFUSION THERAPY | Facility: HOSPITAL | Age: 69
End: 2019-12-02

## 2019-12-02 ENCOUNTER — LABORATORY RESULT (OUTPATIENT)
Age: 69
End: 2019-12-02

## 2019-12-02 ENCOUNTER — RX RENEWAL (OUTPATIENT)
Age: 69
End: 2019-12-02

## 2019-12-02 LAB
BASOPHILS # BLD AUTO: 0 K/UL — SIGNIFICANT CHANGE UP (ref 0–0.2)
BASOPHILS NFR BLD AUTO: 0.3 % — SIGNIFICANT CHANGE UP (ref 0–2)
EOSINOPHIL # BLD AUTO: 0.4 K/UL — SIGNIFICANT CHANGE UP (ref 0–0.5)
EOSINOPHIL NFR BLD AUTO: 8.1 % — HIGH (ref 0–6)
HCT VFR BLD CALC: 27.9 % — LOW (ref 39–50)
HGB BLD-MCNC: 9.5 G/DL — LOW (ref 13–17)
LYMPHOCYTES # BLD AUTO: 0.8 K/UL — LOW (ref 1–3.3)
LYMPHOCYTES # BLD AUTO: 15.6 % — SIGNIFICANT CHANGE UP (ref 13–44)
MCHC RBC-ENTMCNC: 32.3 PG — SIGNIFICANT CHANGE UP (ref 27–34)
MCHC RBC-ENTMCNC: 34.1 G/DL — SIGNIFICANT CHANGE UP (ref 32–36)
MCV RBC AUTO: 94.5 FL — SIGNIFICANT CHANGE UP (ref 80–100)
MONOCYTES # BLD AUTO: 0.6 K/UL — SIGNIFICANT CHANGE UP (ref 0–0.9)
MONOCYTES NFR BLD AUTO: 12.1 % — SIGNIFICANT CHANGE UP (ref 2–14)
NEUTROPHILS # BLD AUTO: 3.3 K/UL — SIGNIFICANT CHANGE UP (ref 1.8–7.4)
NEUTROPHILS NFR BLD AUTO: 63.9 % — SIGNIFICANT CHANGE UP (ref 43–77)
PLATELET # BLD AUTO: 82 K/UL — SIGNIFICANT CHANGE UP (ref 150–400)
RBC # BLD: 2.95 M/UL — LOW (ref 4.2–5.8)
RBC # FLD: 13.7 % — SIGNIFICANT CHANGE UP (ref 10.3–14.5)
WBC # BLD: 5.2 K/UL — SIGNIFICANT CHANGE UP (ref 3.8–10.5)
WBC # FLD AUTO: 5.2 K/UL — SIGNIFICANT CHANGE UP (ref 3.8–10.5)

## 2019-12-09 ENCOUNTER — RESULT REVIEW (OUTPATIENT)
Age: 69
End: 2019-12-09

## 2019-12-09 ENCOUNTER — APPOINTMENT (OUTPATIENT)
Dept: INFUSION THERAPY | Facility: HOSPITAL | Age: 69
End: 2019-12-09

## 2019-12-09 LAB
BASOPHILS # BLD AUTO: 0 K/UL — SIGNIFICANT CHANGE UP (ref 0–0.2)
BASOPHILS NFR BLD AUTO: 0.5 % — SIGNIFICANT CHANGE UP (ref 0–2)
EOSINOPHIL # BLD AUTO: 0.6 K/UL — HIGH (ref 0–0.5)
EOSINOPHIL NFR BLD AUTO: 11.3 % — HIGH (ref 0–6)
HCT VFR BLD CALC: 25.3 % — LOW (ref 39–50)
HGB BLD-MCNC: 8.7 G/DL — LOW (ref 13–17)
LYMPHOCYTES # BLD AUTO: 0.8 K/UL — LOW (ref 1–3.3)
LYMPHOCYTES # BLD AUTO: 15.5 % — SIGNIFICANT CHANGE UP (ref 13–44)
MCHC RBC-ENTMCNC: 32.7 PG — SIGNIFICANT CHANGE UP (ref 27–34)
MCHC RBC-ENTMCNC: 34.4 G/DL — SIGNIFICANT CHANGE UP (ref 32–36)
MCV RBC AUTO: 95 FL — SIGNIFICANT CHANGE UP (ref 80–100)
MONOCYTES # BLD AUTO: 0.8 K/UL — SIGNIFICANT CHANGE UP (ref 0–0.9)
MONOCYTES NFR BLD AUTO: 14.7 % — HIGH (ref 2–14)
NEUTROPHILS # BLD AUTO: 3.1 K/UL — SIGNIFICANT CHANGE UP (ref 1.8–7.4)
NEUTROPHILS NFR BLD AUTO: 58 % — SIGNIFICANT CHANGE UP (ref 43–77)
PLATELET # BLD AUTO: 66 K/UL — LOW (ref 150–400)
RBC # BLD: 2.66 M/UL — LOW (ref 4.2–5.8)
RBC # FLD: 13.9 % — SIGNIFICANT CHANGE UP (ref 10.3–14.5)
WBC # BLD: 5.4 K/UL — SIGNIFICANT CHANGE UP (ref 3.8–10.5)
WBC # FLD AUTO: 5.4 K/UL — SIGNIFICANT CHANGE UP (ref 3.8–10.5)

## 2019-12-10 ENCOUNTER — APPOINTMENT (OUTPATIENT)
Dept: GASTROENTEROLOGY | Facility: CLINIC | Age: 69
End: 2019-12-10
Payer: MEDICARE

## 2019-12-10 VITALS
WEIGHT: 128 LBS | DIASTOLIC BLOOD PRESSURE: 60 MMHG | HEART RATE: 65 BPM | HEIGHT: 64 IN | SYSTOLIC BLOOD PRESSURE: 120 MMHG | OXYGEN SATURATION: 98 % | TEMPERATURE: 97.5 F | BODY MASS INDEX: 21.85 KG/M2

## 2019-12-10 DIAGNOSIS — R14.3 FLATULENCE: ICD-10-CM

## 2019-12-10 DIAGNOSIS — R14.2 ERUCTATION: ICD-10-CM

## 2019-12-10 DIAGNOSIS — K31.A0 GASTRIC INTESTINAL METAPLASIA, UNSPECIFIED: ICD-10-CM

## 2019-12-10 DIAGNOSIS — R94.8 ABNORMAL RESULTS OF FUNCTION STUDIES OF OTHER ORGANS AND SYSTEMS: ICD-10-CM

## 2019-12-10 PROCEDURE — 99213 OFFICE O/P EST LOW 20 MIN: CPT

## 2019-12-10 NOTE — PHYSICAL EXAM
[General Appearance - Alert] : alert [Neck Appearance] : the appearance of the neck was normal [General Appearance - In No Acute Distress] : in no acute distress [Neck Cervical Mass (___cm)] : no neck mass was observed [Jugular Venous Distention Increased] : there was no jugular-venous distention [Thyroid Diffuse Enlargement] : the thyroid was not enlarged [Thyroid Nodule] : there were no palpable thyroid nodules [Auscultation Breath Sounds / Voice Sounds] : lungs were clear to auscultation bilaterally [Heart Rate And Rhythm] : heart rate was normal and rhythm regular [Heart Sounds Gallop] : no gallops [Murmurs] : no murmurs [Heart Sounds] : normal S1 and S2 [Bowel Sounds] : normal bowel sounds [Heart Sounds Pericardial Friction Rub] : no pericardial rub [Abdomen Soft] : soft [] : no hepato-splenomegaly [Abdomen Tenderness] : non-tender [Abdomen Mass (___ Cm)] : no abdominal mass palpated [Cervical Lymph Nodes Enlarged Posterior Bilaterally] : posterior cervical [Supraclavicular Lymph Nodes Enlarged Bilaterally] : supraclavicular [Cervical Lymph Nodes Enlarged Anterior Bilaterally] : anterior cervical [No Spinal Tenderness] : no spinal tenderness [No CVA Tenderness] : no ~M costovertebral angle tenderness

## 2019-12-10 NOTE — HISTORY OF PRESENT ILLNESS
[de-identified] : 69 year old man with multiple myeloma. PET can showed non-specific uptake at the GE junction. He underwent an EGD on 11/27 that was negative. biopsy shoed some gastric intestinal metaplasia but was otherwise unremarkable. He denies abdominal pain or dysphagia.

## 2019-12-16 ENCOUNTER — APPOINTMENT (OUTPATIENT)
Dept: INFUSION THERAPY | Facility: HOSPITAL | Age: 69
End: 2019-12-16

## 2019-12-16 ENCOUNTER — RESULT REVIEW (OUTPATIENT)
Age: 69
End: 2019-12-16

## 2019-12-16 ENCOUNTER — LABORATORY RESULT (OUTPATIENT)
Age: 69
End: 2019-12-16

## 2019-12-16 LAB
BASOPHILS # BLD AUTO: 0.1 K/UL — SIGNIFICANT CHANGE UP (ref 0–0.2)
EOSINOPHIL # BLD AUTO: 0.2 K/UL — SIGNIFICANT CHANGE UP (ref 0–0.5)
EOSINOPHIL NFR BLD AUTO: 4 % — SIGNIFICANT CHANGE UP (ref 0–6)
HCT VFR BLD CALC: 25.8 % — LOW (ref 39–50)
HGB BLD-MCNC: 9.1 G/DL — LOW (ref 13–17)
LYMPHOCYTES # BLD AUTO: 0.7 K/UL — LOW (ref 1–3.3)
LYMPHOCYTES # BLD AUTO: 19 % — SIGNIFICANT CHANGE UP (ref 13–44)
MCHC RBC-ENTMCNC: 33.7 PG — SIGNIFICANT CHANGE UP (ref 27–34)
MCHC RBC-ENTMCNC: 35.1 G/DL — SIGNIFICANT CHANGE UP (ref 32–36)
MCV RBC AUTO: 96 FL — SIGNIFICANT CHANGE UP (ref 80–100)
MONOCYTES # BLD AUTO: 0.8 K/UL — SIGNIFICANT CHANGE UP (ref 0–0.9)
MONOCYTES NFR BLD AUTO: 12 % — SIGNIFICANT CHANGE UP (ref 2–14)
NEUTROPHILS # BLD AUTO: 2.4 K/UL — SIGNIFICANT CHANGE UP (ref 1.8–7.4)
NEUTROPHILS NFR BLD AUTO: 65 % — SIGNIFICANT CHANGE UP (ref 43–77)
PLAT MORPH BLD: NORMAL — SIGNIFICANT CHANGE UP
PLATELET # BLD AUTO: 90 K/UL — LOW (ref 150–400)
RBC # BLD: 2.69 M/UL — LOW (ref 4.2–5.8)
RBC # FLD: 14.2 % — SIGNIFICANT CHANGE UP (ref 10.3–14.5)
RBC BLD AUTO: SIGNIFICANT CHANGE UP
WBC # BLD: 4 K/UL — SIGNIFICANT CHANGE UP (ref 3.8–10.5)
WBC # FLD AUTO: 4 K/UL — SIGNIFICANT CHANGE UP (ref 3.8–10.5)

## 2019-12-18 ENCOUNTER — RESULT REVIEW (OUTPATIENT)
Age: 69
End: 2019-12-18

## 2019-12-18 ENCOUNTER — APPOINTMENT (OUTPATIENT)
Dept: HEMATOLOGY ONCOLOGY | Facility: CLINIC | Age: 69
End: 2019-12-18
Payer: MEDICARE

## 2019-12-18 VITALS
OXYGEN SATURATION: 99 % | DIASTOLIC BLOOD PRESSURE: 69 MMHG | SYSTOLIC BLOOD PRESSURE: 146 MMHG | RESPIRATION RATE: 18 BRPM | WEIGHT: 125.64 LBS | HEART RATE: 62 BPM | BODY MASS INDEX: 21.57 KG/M2 | TEMPERATURE: 97.8 F

## 2019-12-18 LAB
BASOPHILS NFR BLD AUTO: 1 % — SIGNIFICANT CHANGE UP (ref 0–2)
BUN SERPL-MCNC: 23 MG/DL — SIGNIFICANT CHANGE UP (ref 7–23)
CA-I BLDA-SCNC: 1.12 MMOL/L — SIGNIFICANT CHANGE UP (ref 1.12–1.3)
CHLORIDE SERPL-SCNC: 106 MMOL/L — SIGNIFICANT CHANGE UP (ref 96–108)
CO2 SERPL-SCNC: 21 MMOL/L — LOW (ref 22–31)
CREAT SERPL-MCNC: 1.1 MG/DL — SIGNIFICANT CHANGE UP (ref 0.5–1.3)
EOSINOPHIL NFR BLD AUTO: 2 % — SIGNIFICANT CHANGE UP (ref 0–6)
GLUCOSE SERPL-MCNC: 97 MG/DL — SIGNIFICANT CHANGE UP (ref 70–99)
HCT VFR BLD CALC: 25.3 % — LOW (ref 39–50)
HGB BLD-MCNC: 8.8 G/DL — LOW (ref 13–17)
LYMPHOCYTES # BLD AUTO: 25 % — SIGNIFICANT CHANGE UP (ref 13–44)
MCHC RBC-ENTMCNC: 33.4 PG — SIGNIFICANT CHANGE UP (ref 27–34)
MCHC RBC-ENTMCNC: 34.7 G/DL — SIGNIFICANT CHANGE UP (ref 32–36)
MCV RBC AUTO: 96.1 FL — SIGNIFICANT CHANGE UP (ref 80–100)
MONOCYTES # BLD AUTO: SIGNIFICANT CHANGE UP (ref 0–0.9)
MONOCYTES NFR BLD AUTO: 10 % — SIGNIFICANT CHANGE UP (ref 2–14)
NEUTROPHILS # BLD AUTO: 1.2 K/UL — LOW (ref 1.8–7.4)
NEUTROPHILS NFR BLD AUTO: 62 % — SIGNIFICANT CHANGE UP (ref 43–77)
PLAT MORPH BLD: NORMAL — SIGNIFICANT CHANGE UP
PLATELET # BLD AUTO: 91 K/UL — LOW (ref 150–400)
POTASSIUM SERPL-MCNC: 3.7 MMOL/L — SIGNIFICANT CHANGE UP (ref 3.5–5.3)
POTASSIUM SERPL-SCNC: 3.7 MMOL/L — SIGNIFICANT CHANGE UP (ref 3.5–5.3)
RBC # BLD: 2.63 M/UL — LOW (ref 4.2–5.8)
RBC # FLD: 14.4 % — SIGNIFICANT CHANGE UP (ref 10.3–14.5)
RBC BLD AUTO: SIGNIFICANT CHANGE UP
SODIUM SERPL-SCNC: 138 MMOL/L — SIGNIFICANT CHANGE UP (ref 135–145)
WBC # BLD: 2.5 K/UL — LOW (ref 3.8–10.5)
WBC # FLD AUTO: 2.5 K/UL — LOW (ref 3.8–10.5)

## 2019-12-18 PROCEDURE — 99214 OFFICE O/P EST MOD 30 MIN: CPT

## 2019-12-18 NOTE — HISTORY OF PRESENT ILLNESS
[de-identified] : Mr Rand was referred to my office for newly diagnosed IgG kappa multiple myeloma. The patient's primary language is Persian, he has his daughter Mirlande as the , per his wishes. He was seen by Dr. Andrés Valle (hematology) in Feb 2019  and had a BM bx showing 10-15% plasma cells, concerning for smoldering myeloma. According to the daughter, he was awaiting insurance approval for imaging studies. He was admitted from 8/18/19 at [facility] where he presented with pain in the L leg/lower back, worsening for the past 2-3 months. On labs, he was found to have a total protein of 10, Ca level 12 until 8/29/19. Dental consult was called and patient needs to have 10 teeth extracted -thus, IV bisphosphonates were not given, pt improved with hydration. He also had a slightly inc'ed creatinine -improved after IVF. CT C/A/P 8/18/19 showed a lytic expansile soft tissue lesion centered in the manubrium measuring approximately 7.3 x 3.4 x 4.3 cm, invading both 1st ribs. There was also a A lytic expansile soft tissue lesion in the T8 vertebral body causing mass effect on spinal canal and obliterating left neural foramina at T7-8 and T8-9. ON 8/22/19 an MRI thoracic spine was done showing multiple areas of tumor involvement within the thoracic spine in keeping with the patient's history of multiple myeloma. Prominent lesion within C7 on the right incompletely seen.\par Large lesion within T8 on the left producing epidural tumor extension and moderate narrowing of the spinal canal with mild flattening of the spinal cord. He was evaluated on 8/22/19 by Dr. Foley (Rad Onc) and was given 5 fractions of XRT from 8/23/19 to 8/29/19 to C7 and T8. He was discharged home on 8/29/19 with follow up in the outpatient office, and on Dexamethasone 4mg po daily. \par \par  [de-identified] : The patient is here for IgG kappa MM with multiple bone lytic lesions, hypercalcemia (resolved) and mild anemia. He still has pain in b/l ribs, takes Oxy 5mg po once daily. He has no weakness in his legs and back pain has improved after XRT.  He started Velcade/Dexa weekly on 9/919.  \par \par The patient got the Revlimid and started it C1 day 1 on 10/21/19. He reports no diarrhea, no neuropathy. He started C3 1 Revlimid today on 12/16/19. \par He started Zometa IV q4wks on 11/11/19 (was dentally cleared on 10/29/19).\par \par He c/o poor appetite and loss of taste sensation, reports some constipation.   Otherwise feels well.  Reports energy level is better.

## 2019-12-18 NOTE — ASSESSMENT
[FreeTextEntry1] : 68 yo M with hx MI s/p stent 2015, HTN, high chol, presents for IgG kappa/kappa light chain MM newly dx'ed stage II IPSS score\par -with extensive lytic lesions as well as C7/T8 spine lesions causing nerve compression -s/p XRT 8/22-8/29/19\par Started Velcade/Dexa  weekly 9/9/19\par Started Revlimid on 10/21/19\par \par -cont RVD, tolerating well, monitor counts while on it. He started C3 on 12/16.\par \par -pt had dental clearance on 10/29/19, seen by Dr. Kramer ph  - Zometa q4 wks started on 11/11/19, plan to give for 2 yrs.  Zometa was held on 12/3/19 due to low calcium (7.9), pt advised to start Citracal 2 tabs daily.\par \par -Hct-8.8 today.  Will hold Revlimid starting today and recheck counts on 12/23.  Pt aware of plan.   \par \par - BM cytogenetics/FISH panel -normal. Based on high beta-2 microglobulin/normal albumin, pt is stage II IPSS score\par \par -repeat myeloma labs done on 11/20/19-continue to improve.  Manubrial lesion decreased in size. Repeat labs for myeloma today and monthly at each visit\par \par -GI eval -pt referred due to dyspepsia, abd bloating. He underwent an EGD on 11/27 that was negative. biopsy shoed some gastric intestinal metaplasia but was otherwise unremarkable. \par \par -cont prophylactic Valacyclovir 500mg po daily given to prevent VZV while on Velcade\par \par -influenza vaccine done this year\par \par -pt referral to palliative medicine to manage for weight loss, may benefit from THS for appetite stimulation.  Pt never saw palliative medicine.\par \par -follow up in 1 months

## 2019-12-18 NOTE — REVIEW OF SYSTEMS
[Recent Change In Weight] : ~T recent weight change [Negative] : Allergic/Immunologic [Constipation] : constipation [Fever] : no fever [Chills] : no chills [Night Sweats] : no night sweats [Eye Pain] : no eye pain [Fatigue] : no fatigue [Red Eyes] : eyes not red [Dry Eyes] : no dryness of the eyes [Vision Problems] : no vision problems [Dysphagia] : no dysphagia [Loss of Hearing] : no loss of hearing [Hoarseness] : no hoarseness [Odynophagia] : no odynophagia [Nosebleeds] : no nosebleeds [Palpitations] : no palpitations [Chest Pain] : no chest pain [Mucosal Pain] : no mucosal pain [Shortness Of Breath] : no shortness of breath [Lower Ext Edema] : no lower extremity edema [Leg Claudication] : no intermittent leg claudication [Cough] : no cough [Wheezing] : no wheezing [Abdominal Pain] : no abdominal pain [SOB on Exertion] : no shortness of breath during exertion [Diarrhea] : no diarrhea [Dysuria] : no dysuria [Vomiting] : no vomiting [Incontinence] : no incontinence [Joint Pain] : no joint pain [Skin Rash] : no skin rash [Muscle Weakness] : no muscle weakness [Muscle Pain] : no muscle pain [Confused] : no confusion [Difficulty Walking] : no difficulty walking [Skin Wound] : no skin wound [Suicidal] : not suicidal [Depression] : no depression [Easy Bruising] : no tendency for easy bruising [Easy Bleeding] : no tendency for easy bleeding [FreeTextEntry7] : colonoscopy in June 2019 -normal. reports some constipation-uses Dulcolax with good results  [FreeTextEntry2] : lost about 6-7 lbs in the past 2 months [de-identified] : had one day of numbness in hands, none since then [FreeTextEntry9] : back pain -improved after radiation

## 2019-12-18 NOTE — CONSULT LETTER
[Dear  ___] : Dear  [unfilled], [Sincerely,] : Sincerely, [Consult Closing:] : Thank you very much for allowing me to participate in the care of this patient.  If you have any questions, please do not hesitate to contact me. [Please see my note below.] : Please see my note below. [Consult Letter:] : I had the pleasure of evaluating your patient, [unfilled]. [DrDennys  ___] : Dr. MAZARIEGOS [DrDennys ___] : Dr. MAZARIEGOS

## 2019-12-18 NOTE — RESULTS/DATA
[FreeTextEntry1] : Today's CBC (12/18/19) wbc 2.5 Hb 8.8 plt 91 ANC 1200   \par \par On 11/18/19) wbc 9.9 Hb 11.4 plt 169 ANC 7400\par On 10/28/19) wbc 8.8 Hb 12 plt 117\par On 10/1/19) wbc 12.5 Hb 10.9 plt 215 ANC 7700\par On 9/3/19) wbc 11.9 Hb 10.8 plt 239 ANC 10.4k/ul  Mo 500 Eo 200 Ba 0\par \par RADIOLOGY\par On 9/7/19 PET/CT \par IMPRESSION:  Abnormal whole body FDG-PET/CT scan.\par \par 1. Numerous hypermetabolic osseous foci in the axial and appendicular \par skeleton mostly with corresponding lucencies on CT compatible with the \par diagnosis of multiple myeloma. Lesions in bilateral proximal humeri and \par femurs, although not demonstrating cortical disruption, may place the \par patient at risk for pathologic fractures.\par \par 2. Nonspecific hypermetabolism in the distal esophagus extending into the \par GE junction with questionable wall thickening on CT with associated \par adjacent hypermetabolic paraesophageal lymph node. Suggest correlation \par with endoscopy to exclude any abnormality in this region.\par \par 3. Nonspecific hypermetabolism in the right palmar musculature and left \par masseter muscle. Please correlate clinically.\par \par ON 9/9/19 MRI cervical\par \par IMPRESSION: \par Diffuse osseous metastases, consistent with multiple myeloma.\par Large calvarial lesion which may extend into the sella turcica.\par No evidence of epidural extension of tumor.\par Multilevel cervical spondylosis described.

## 2019-12-19 ENCOUNTER — OUTPATIENT (OUTPATIENT)
Dept: OUTPATIENT SERVICES | Facility: HOSPITAL | Age: 69
LOS: 1 days | Discharge: HOME | End: 2019-12-19

## 2019-12-19 DIAGNOSIS — C90.00 MULTIPLE MYELOMA NOT HAVING ACHIEVED REMISSION: ICD-10-CM

## 2019-12-19 LAB
ALBUMIN MFR SERPL ELPH: 61.1 %
ALBUMIN SERPL ELPH-MCNC: 4.2 G/DL
ALBUMIN SERPL-MCNC: 3.8 G/DL
ALBUMIN/GLOB SERPL: 1.6 RATIO
ALP BLD-CCNC: 121 U/L
ALPHA1 GLOB MFR SERPL ELPH: 6.3 %
ALPHA1 GLOB SERPL ELPH-MCNC: 0.4 G/DL
ALPHA2 GLOB MFR SERPL ELPH: 14 %
ALPHA2 GLOB SERPL ELPH-MCNC: 0.9 G/DL
ALT SERPL-CCNC: 18 U/L
ANION GAP SERPL CALC-SCNC: 15 MMOL/L
AST SERPL-CCNC: 13 U/L
B-GLOBULIN MFR SERPL ELPH: 11.3 %
B-GLOBULIN SERPL ELPH-MCNC: 0.7 G/DL
BILIRUB SERPL-MCNC: 0.3 MG/DL
BUN SERPL-MCNC: 24 MG/DL
CALCIUM SERPL-MCNC: 8.4 MG/DL
CHLORIDE SERPL-SCNC: 107 MMOL/L
CO2 SERPL-SCNC: 18 MMOL/L
CREAT SERPL-MCNC: 1.11 MG/DL
DEPRECATED KAPPA LC FREE/LAMBDA SER: 1.67 RATIO
GAMMA GLOB FLD ELPH-MCNC: 0.5 G/DL
GAMMA GLOB MFR SERPL ELPH: 7.3 %
GLUCOSE SERPL-MCNC: 99 MG/DL
IGA SER QL IEP: 68 MG/DL
IGG SER QL IEP: 489 MG/DL
IGM SER QL IEP: <10 MG/DL
INTERPRETATION SERPL IEP-IMP: NORMAL
KAPPA LC CSF-MCNC: 1.28 MG/DL
KAPPA LC SERPL-MCNC: 2.14 MG/DL
M PROTEIN MFR SERPL ELPH: 2.7 %
M PROTEIN SPEC IFE-MCNC: NORMAL
MONOCLON BAND OBS SERPL: 0.2 G/DL
POTASSIUM SERPL-SCNC: 4 MMOL/L
PROT SERPL-MCNC: 6.2 G/DL
SODIUM SERPL-SCNC: 140 MMOL/L

## 2019-12-23 ENCOUNTER — RESULT REVIEW (OUTPATIENT)
Age: 69
End: 2019-12-23

## 2019-12-23 ENCOUNTER — APPOINTMENT (OUTPATIENT)
Dept: INFUSION THERAPY | Facility: HOSPITAL | Age: 69
End: 2019-12-23

## 2019-12-23 ENCOUNTER — LABORATORY RESULT (OUTPATIENT)
Age: 69
End: 2019-12-23

## 2019-12-23 LAB
BASOPHILS # BLD AUTO: 0 K/UL — SIGNIFICANT CHANGE UP (ref 0–0.2)
BASOPHILS NFR BLD AUTO: 0.9 % — SIGNIFICANT CHANGE UP (ref 0–2)
EOSINOPHIL # BLD AUTO: 0.2 K/UL — SIGNIFICANT CHANGE UP (ref 0–0.5)
EOSINOPHIL NFR BLD AUTO: 5.6 % — SIGNIFICANT CHANGE UP (ref 0–6)
HCT VFR BLD CALC: 26.8 % — LOW (ref 39–50)
HGB BLD-MCNC: 9.3 G/DL — LOW (ref 13–17)
LYMPHOCYTES # BLD AUTO: 0.8 K/UL — LOW (ref 1–3.3)
LYMPHOCYTES # BLD AUTO: 20.6 % — SIGNIFICANT CHANGE UP (ref 13–44)
MCHC RBC-ENTMCNC: 33.4 PG — SIGNIFICANT CHANGE UP (ref 27–34)
MCHC RBC-ENTMCNC: 34.6 G/DL — SIGNIFICANT CHANGE UP (ref 32–36)
MCV RBC AUTO: 96.4 FL — SIGNIFICANT CHANGE UP (ref 80–100)
MONOCYTES # BLD AUTO: 0.5 K/UL — SIGNIFICANT CHANGE UP (ref 0–0.9)
MONOCYTES NFR BLD AUTO: 12.3 % — SIGNIFICANT CHANGE UP (ref 2–14)
NEUTROPHILS # BLD AUTO: 2.5 K/UL — SIGNIFICANT CHANGE UP (ref 1.8–7.4)
NEUTROPHILS NFR BLD AUTO: 60.6 % — SIGNIFICANT CHANGE UP (ref 43–77)
PLATELET # BLD AUTO: 129 K/UL — LOW (ref 150–400)
RBC # BLD: 2.77 M/UL — LOW (ref 4.2–5.8)
RBC # FLD: 14.5 % — SIGNIFICANT CHANGE UP (ref 10.3–14.5)
WBC # BLD: 4.1 K/UL — SIGNIFICANT CHANGE UP (ref 3.8–10.5)
WBC # FLD AUTO: 4.1 K/UL — SIGNIFICANT CHANGE UP (ref 3.8–10.5)

## 2019-12-24 DIAGNOSIS — Z51.11 ENCOUNTER FOR ANTINEOPLASTIC CHEMOTHERAPY: ICD-10-CM

## 2019-12-24 DIAGNOSIS — R11.2 NAUSEA WITH VOMITING, UNSPECIFIED: ICD-10-CM

## 2019-12-30 ENCOUNTER — RESULT REVIEW (OUTPATIENT)
Age: 69
End: 2019-12-30

## 2019-12-30 ENCOUNTER — LABORATORY RESULT (OUTPATIENT)
Age: 69
End: 2019-12-30

## 2019-12-30 ENCOUNTER — APPOINTMENT (OUTPATIENT)
Dept: INFUSION THERAPY | Facility: HOSPITAL | Age: 69
End: 2019-12-30

## 2019-12-30 LAB
BASOPHILS # BLD AUTO: 0 K/UL — SIGNIFICANT CHANGE UP (ref 0–0.2)
BASOPHILS NFR BLD AUTO: 0.6 % — SIGNIFICANT CHANGE UP (ref 0–2)
EOSINOPHIL # BLD AUTO: 0.1 K/UL — SIGNIFICANT CHANGE UP (ref 0–0.5)
EOSINOPHIL NFR BLD AUTO: 3 % — SIGNIFICANT CHANGE UP (ref 0–6)
HCT VFR BLD CALC: 26.4 % — LOW (ref 39–50)
HGB BLD-MCNC: 9 G/DL — LOW (ref 13–17)
LYMPHOCYTES # BLD AUTO: 0.8 K/UL — LOW (ref 1–3.3)
LYMPHOCYTES # BLD AUTO: 17.7 % — SIGNIFICANT CHANGE UP (ref 13–44)
MCHC RBC-ENTMCNC: 33.6 PG — SIGNIFICANT CHANGE UP (ref 27–34)
MCHC RBC-ENTMCNC: 34.1 G/DL — SIGNIFICANT CHANGE UP (ref 32–36)
MCV RBC AUTO: 98.5 FL — SIGNIFICANT CHANGE UP (ref 80–100)
MONOCYTES # BLD AUTO: 0.7 K/UL — SIGNIFICANT CHANGE UP (ref 0–0.9)
MONOCYTES NFR BLD AUTO: 14.9 % — HIGH (ref 2–14)
NEUTROPHILS # BLD AUTO: 2.9 K/UL — SIGNIFICANT CHANGE UP (ref 1.8–7.4)
NEUTROPHILS NFR BLD AUTO: 63.8 % — SIGNIFICANT CHANGE UP (ref 43–77)
PLATELET # BLD AUTO: 117 K/UL — LOW (ref 150–400)
RBC # BLD: 2.68 M/UL — LOW (ref 4.2–5.8)
RBC # FLD: 14.8 % — HIGH (ref 10.3–14.5)
WBC # BLD: 4.5 K/UL — SIGNIFICANT CHANGE UP (ref 3.8–10.5)
WBC # FLD AUTO: 4.5 K/UL — SIGNIFICANT CHANGE UP (ref 3.8–10.5)

## 2020-01-02 RX ORDER — OXYCODONE 5 MG/1
5 TABLET ORAL
Qty: 30 | Refills: 0 | Status: DISCONTINUED | COMMUNITY
Start: 2020-01-02 | End: 2020-01-02

## 2020-01-06 ENCOUNTER — APPOINTMENT (OUTPATIENT)
Dept: INFUSION THERAPY | Facility: HOSPITAL | Age: 70
End: 2020-01-06

## 2020-01-06 ENCOUNTER — RESULT REVIEW (OUTPATIENT)
Age: 70
End: 2020-01-06

## 2020-01-06 LAB
BASOPHILS # BLD AUTO: 0 K/UL — SIGNIFICANT CHANGE UP (ref 0–0.2)
BASOPHILS NFR BLD AUTO: 1 % — SIGNIFICANT CHANGE UP (ref 0–2)
EOSINOPHIL # BLD AUTO: 0.4 K/UL — SIGNIFICANT CHANGE UP (ref 0–0.5)
EOSINOPHIL NFR BLD AUTO: 5 % — SIGNIFICANT CHANGE UP (ref 0–6)
HCT VFR BLD CALC: 25.7 % — LOW (ref 39–50)
HGB BLD-MCNC: 9 G/DL — LOW (ref 13–17)
LYMPHOCYTES # BLD AUTO: 0.7 K/UL — LOW (ref 1–3.3)
LYMPHOCYTES # BLD AUTO: 21 % — SIGNIFICANT CHANGE UP (ref 13–44)
MCHC RBC-ENTMCNC: 34 PG — SIGNIFICANT CHANGE UP (ref 27–34)
MCHC RBC-ENTMCNC: 34.8 G/DL — SIGNIFICANT CHANGE UP (ref 32–36)
MCV RBC AUTO: 97.5 FL — SIGNIFICANT CHANGE UP (ref 80–100)
MONOCYTES # BLD AUTO: 1 K/UL — HIGH (ref 0–0.9)
MONOCYTES NFR BLD AUTO: 22 % — HIGH (ref 2–14)
NEUTROPHILS # BLD AUTO: 2.2 K/UL — SIGNIFICANT CHANGE UP (ref 1.8–7.4)
NEUTROPHILS NFR BLD AUTO: 51 % — SIGNIFICANT CHANGE UP (ref 43–77)
PLAT MORPH BLD: NORMAL — SIGNIFICANT CHANGE UP
PLATELET # BLD AUTO: 113 K/UL — LOW (ref 150–400)
RBC # BLD: 2.64 M/UL — LOW (ref 4.2–5.8)
RBC # FLD: 14.3 % — SIGNIFICANT CHANGE UP (ref 10.3–14.5)
RBC BLD AUTO: SIGNIFICANT CHANGE UP
WBC # BLD: 4.4 K/UL — SIGNIFICANT CHANGE UP (ref 3.8–10.5)
WBC # FLD AUTO: 4.4 K/UL — SIGNIFICANT CHANGE UP (ref 3.8–10.5)

## 2020-01-13 ENCOUNTER — APPOINTMENT (OUTPATIENT)
Dept: INFUSION THERAPY | Facility: HOSPITAL | Age: 70
End: 2020-01-13

## 2020-01-13 ENCOUNTER — RESULT REVIEW (OUTPATIENT)
Age: 70
End: 2020-01-13

## 2020-01-13 ENCOUNTER — APPOINTMENT (OUTPATIENT)
Dept: HEMATOLOGY ONCOLOGY | Facility: CLINIC | Age: 70
End: 2020-01-13
Payer: MEDICAID

## 2020-01-13 VITALS
DIASTOLIC BLOOD PRESSURE: 74 MMHG | OXYGEN SATURATION: 99 % | SYSTOLIC BLOOD PRESSURE: 148 MMHG | HEART RATE: 78 BPM | TEMPERATURE: 98.1 F | RESPIRATION RATE: 18 BRPM | BODY MASS INDEX: 21.12 KG/M2 | WEIGHT: 123.02 LBS

## 2020-01-13 LAB
BASOPHILS # BLD AUTO: 0 K/UL — SIGNIFICANT CHANGE UP (ref 0–0.2)
BASOPHILS NFR BLD AUTO: 0.5 % — SIGNIFICANT CHANGE UP (ref 0–2)
EOSINOPHIL # BLD AUTO: 0.2 K/UL — SIGNIFICANT CHANGE UP (ref 0–0.5)
EOSINOPHIL NFR BLD AUTO: 4 % — SIGNIFICANT CHANGE UP (ref 0–6)
HCT VFR BLD CALC: 26 % — LOW (ref 39–50)
HGB BLD-MCNC: 8.9 G/DL — LOW (ref 13–17)
LYMPHOCYTES # BLD AUTO: 0.9 K/UL — LOW (ref 1–3.3)
LYMPHOCYTES # BLD AUTO: 16.5 % — SIGNIFICANT CHANGE UP (ref 13–44)
MCHC RBC-ENTMCNC: 33.7 PG — SIGNIFICANT CHANGE UP (ref 27–34)
MCHC RBC-ENTMCNC: 34.3 G/DL — SIGNIFICANT CHANGE UP (ref 32–36)
MCV RBC AUTO: 98.2 FL — SIGNIFICANT CHANGE UP (ref 80–100)
MONOCYTES # BLD AUTO: 1.3 K/UL — HIGH (ref 0–0.9)
MONOCYTES NFR BLD AUTO: 25.7 % — HIGH (ref 2–14)
NEUTROPHILS # BLD AUTO: 2.8 K/UL — SIGNIFICANT CHANGE UP (ref 1.8–7.4)
NEUTROPHILS NFR BLD AUTO: 53.3 % — SIGNIFICANT CHANGE UP (ref 43–77)
PLATELET # BLD AUTO: 183 K/UL — SIGNIFICANT CHANGE UP (ref 150–400)
RBC # BLD: 2.65 M/UL — LOW (ref 4.2–5.8)
RBC # FLD: 14.4 % — SIGNIFICANT CHANGE UP (ref 10.3–14.5)
WBC # BLD: 5.2 K/UL — SIGNIFICANT CHANGE UP (ref 3.8–10.5)
WBC # FLD AUTO: 5.2 K/UL — SIGNIFICANT CHANGE UP (ref 3.8–10.5)

## 2020-01-13 PROCEDURE — 99213 OFFICE O/P EST LOW 20 MIN: CPT

## 2020-01-13 NOTE — RESULTS/DATA
[FreeTextEntry1] : Today's CBC (1/13/20) wbc 5.2 Hb 8.9 plt 183\par \par ON 12/18/19) wbc 2.5 Hb 8.8 plt 91 ANC 1200   \par On 11/18/19) wbc 9.9 Hb 11.4 plt 169 ANC 7400\par On 10/28/19) wbc 8.8 Hb 12 plt 117\par On 10/1/19) wbc 12.5 Hb 10.9 plt 215 ANC 7700\par On 9/3/19) wbc 11.9 Hb 10.8 plt 239 ANC 10.4k/ul  Mo 500 Eo 200 Ba 0\par \par RADIOLOGY\par On 9/7/19 PET/CT \par IMPRESSION:  Abnormal whole body FDG-PET/CT scan.\par \par 1. Numerous hypermetabolic osseous foci in the axial and appendicular \par skeleton mostly with corresponding lucencies on CT compatible with the \par diagnosis of multiple myeloma. Lesions in bilateral proximal humeri and \par femurs, although not demonstrating cortical disruption, may place the \par patient at risk for pathologic fractures.\par \par 2. Nonspecific hypermetabolism in the distal esophagus extending into the \par GE junction with questionable wall thickening on CT with associated \par adjacent hypermetabolic paraesophageal lymph node. Suggest correlation \par with endoscopy to exclude any abnormality in this region.\par \par 3. Nonspecific hypermetabolism in the right palmar musculature and left \par masseter muscle. Please correlate clinically.\par \par ON 9/9/19 MRI cervical\par \par IMPRESSION: \par Diffuse osseous metastases, consistent with multiple myeloma.\par Large calvarial lesion which may extend into the sella turcica.\par No evidence of epidural extension of tumor.\par Multilevel cervical spondylosis described.

## 2020-01-13 NOTE — ASSESSMENT
[FreeTextEntry1] : 71 yo M with hx MI s/p stent 2015, HTN, high chol, presents for IgG kappa/kappa light chain MM newly dx'ed stage II IPSS score\par Pt has extensive lytic lesions as well as C7/T8 spine lesions causing nerve compression -s/p XRT 8/22-8/29/19\par Started Velcade/Dexa  weekly 9/9/19\par Started Revlimid on 10/21/19\par \par -cont RVD, tolerating well, monitor counts while on it. He finished C3 with Revlimid. Starting C4 Revlimid today\par \par -pt had dental clearance on 10/29/19, seen by Dr. Kramer ph  - Zometa q4 wks started on 11/11/19, plan to give for 2 yrs.  Zometa was held on 12/3/19 due to low calcium (7.9), now pt on Ca. Cont q4 wks Zometa -last one was given on 1/6, next scheduled for 2/3/20\par \par -Hct-8.9 today.  Monitor weekly CBC; if counts decrease again, will decrease Revlimid dose to 15mg\par \par -BM cytogenetics/FISH panel -normal. Based on high beta-2 microglobulin/normal albumin, pt is stage II IPSS score\par \par -repeat myeloma labs done on 11/20/19-continue to improve.  Manubrial lesion decreased in size. Repeat labs for myeloma today and monthly at each visit\par \par -cont prophylactic Valacyclovir 500mg po daily given to prevent VZV while on Velcade\par \par -influenza vaccine done this year\par \par -pt referral to palliative medicine to manage for weight loss, may benefit from THS for appetite stimulation.  \par \par -follow up monthly

## 2020-01-13 NOTE — REVIEW OF SYSTEMS
[Recent Change In Weight] : ~T recent weight change [Constipation] : constipation [Negative] : Allergic/Immunologic [Fever] : no fever [Chills] : no chills [Night Sweats] : no night sweats [Fatigue] : no fatigue [Eye Pain] : no eye pain [Red Eyes] : eyes not red [Vision Problems] : no vision problems [Dry Eyes] : no dryness of the eyes [Dysphagia] : no dysphagia [Loss of Hearing] : no loss of hearing [Nosebleeds] : no nosebleeds [Hoarseness] : no hoarseness [Odynophagia] : no odynophagia [Mucosal Pain] : no mucosal pain [Chest Pain] : no chest pain [Palpitations] : no palpitations [Leg Claudication] : no intermittent leg claudication [Lower Ext Edema] : no lower extremity edema [Shortness Of Breath] : no shortness of breath [Wheezing] : no wheezing [Cough] : no cough [SOB on Exertion] : no shortness of breath during exertion [Abdominal Pain] : no abdominal pain [Vomiting] : no vomiting [Diarrhea] : no diarrhea [Dysuria] : no dysuria [Incontinence] : no incontinence [Muscle Pain] : no muscle pain [Joint Pain] : no joint pain [Skin Rash] : no skin rash [Skin Wound] : no skin wound [Muscle Weakness] : no muscle weakness [Confused] : no confusion [Difficulty Walking] : no difficulty walking [Easy Bleeding] : no tendency for easy bleeding [Suicidal] : not suicidal [Depression] : no depression [FreeTextEntry2] : lost about 6-7 lbs in the past 2 months [Easy Bruising] : no tendency for easy bruising [de-identified] : had one day of numbness in hands, none since then [FreeTextEntry9] : back pain -improved after radiation [FreeTextEntry7] : colonoscopy in June 2019 -normal. reports some constipation-uses Dulcolax with good results

## 2020-01-13 NOTE — HISTORY OF PRESENT ILLNESS
[de-identified] : Mr Rand was referred to my office for newly diagnosed IgG kappa multiple myeloma. The patient's primary language is Yoruba, he has his daughter Mirlande as the , per his wishes. He was seen by Dr. Andrés Valle (hematology) in Feb 2019  and had a BM bx showing 10-15% plasma cells, concerning for smoldering myeloma. According to the daughter, he was awaiting insurance approval for imaging studies. He was admitted from 8/18/19 at [facility] where he presented with pain in the L leg/lower back, worsening for the past 2-3 months. On labs, he was found to have a total protein of 10, Ca level 12 until 8/29/19. Dental consult was called and patient needs to have 10 teeth extracted -thus, IV bisphosphonates were not given, pt improved with hydration. He also had a slightly inc'ed creatinine -improved after IVF. CT C/A/P 8/18/19 showed a lytic expansile soft tissue lesion centered in the manubrium measuring approximately 7.3 x 3.4 x 4.3 cm, invading both 1st ribs. There was also a A lytic expansile soft tissue lesion in the T8 vertebral body causing mass effect on spinal canal and obliterating left neural foramina at T7-8 and T8-9. ON 8/22/19 an MRI thoracic spine was done showing multiple areas of tumor involvement within the thoracic spine in keeping with the patient's history of multiple myeloma. Prominent lesion within C7 on the right incompletely seen.\par Large lesion within T8 on the left producing epidural tumor extension and moderate narrowing of the spinal canal with mild flattening of the spinal cord. He was evaluated on 8/22/19 by Dr. Foley (Rad Onc) and was given 5 fractions of XRT from 8/23/19 to 8/29/19 to C7 and T8. He was discharged home on 8/29/19 with follow up in the outpatient office, and on Dexamethasone 4mg po daily. \par \par  [de-identified] : The patient is here for IgG kappa MM with multiple bone lytic lesions, hypercalcemia (resolved) and mild anemia. He still has pain in b/l ribs, takes Oxy 5mg po once daily. He has no weakness in his legs and back pain has improved after XRT.  He started Velcade/Dexa weekly on 9/919.  \par \par The patient got the Revlimid and started it C1 day 1 on 10/21/19.  He still has poor appetite. No neuropathy. HE had some pain over the R lateral rib area x 4 days, took Oxycontin for it, now it has resolved.

## 2020-01-14 LAB
ALBUMIN MFR SERPL ELPH: 57.7 %
ALBUMIN SERPL ELPH-MCNC: 4.4 G/DL
ALBUMIN SERPL-MCNC: 3.6 G/DL
ALBUMIN/GLOB SERPL: 1.4 RATIO
ALP BLD-CCNC: 84 U/L
ALPHA1 GLOB MFR SERPL ELPH: 6.9 %
ALPHA1 GLOB SERPL ELPH-MCNC: 0.4 G/DL
ALPHA2 GLOB MFR SERPL ELPH: 15.7 %
ALPHA2 GLOB SERPL ELPH-MCNC: 1 G/DL
ALT SERPL-CCNC: 17 U/L
ANION GAP SERPL CALC-SCNC: 14 MMOL/L
AST SERPL-CCNC: 14 U/L
B-GLOBULIN MFR SERPL ELPH: 11.8 %
B-GLOBULIN SERPL ELPH-MCNC: 0.7 G/DL
B2 MICROGLOB SERPL-MCNC: 3.1 MG/L
BILIRUB SERPL-MCNC: 0.5 MG/DL
BUN SERPL-MCNC: 16 MG/DL
CALCIUM SERPL-MCNC: 8.7 MG/DL
CHLORIDE SERPL-SCNC: 104 MMOL/L
CO2 SERPL-SCNC: 21 MMOL/L
CREAT SERPL-MCNC: 1.21 MG/DL
DEPRECATED KAPPA LC FREE/LAMBDA SER: 1.59 RATIO
GAMMA GLOB FLD ELPH-MCNC: 0.5 G/DL
GAMMA GLOB MFR SERPL ELPH: 7.9 %
GLUCOSE SERPL-MCNC: 104 MG/DL
IGA SER QL IEP: 109 MG/DL
IGG SER QL IEP: 503 MG/DL
IGM SER QL IEP: 10 MG/DL
INTERPRETATION SERPL IEP-IMP: NORMAL
KAPPA LC CSF-MCNC: 2.4 MG/DL
KAPPA LC SERPL-MCNC: 3.81 MG/DL
M PROTEIN MFR SERPL ELPH: 1.3 %
M PROTEIN SPEC IFE-MCNC: NORMAL
MONOCLON BAND OBS SERPL: 0.1 G/DL
POTASSIUM SERPL-SCNC: 4.1 MMOL/L
PROT SERPL-MCNC: 6.2 G/DL
SODIUM SERPL-SCNC: 139 MMOL/L

## 2020-01-20 ENCOUNTER — APPOINTMENT (OUTPATIENT)
Dept: INFUSION THERAPY | Facility: HOSPITAL | Age: 70
End: 2020-01-20

## 2020-01-21 ENCOUNTER — APPOINTMENT (OUTPATIENT)
Dept: INFUSION THERAPY | Facility: HOSPITAL | Age: 70
End: 2020-01-21

## 2020-01-21 ENCOUNTER — OUTPATIENT (OUTPATIENT)
Dept: OUTPATIENT SERVICES | Facility: HOSPITAL | Age: 70
LOS: 1 days | Discharge: HOME | End: 2020-01-21

## 2020-01-21 ENCOUNTER — RESULT REVIEW (OUTPATIENT)
Age: 70
End: 2020-01-21

## 2020-01-21 DIAGNOSIS — C90.00 MULTIPLE MYELOMA NOT HAVING ACHIEVED REMISSION: ICD-10-CM

## 2020-01-21 LAB
BASOPHILS # BLD AUTO: 0 K/UL — SIGNIFICANT CHANGE UP (ref 0–0.2)
BASOPHILS NFR BLD AUTO: 0.3 % — SIGNIFICANT CHANGE UP (ref 0–2)
EOSINOPHIL # BLD AUTO: 0.1 K/UL — SIGNIFICANT CHANGE UP (ref 0–0.5)
EOSINOPHIL NFR BLD AUTO: 2 % — SIGNIFICANT CHANGE UP (ref 0–6)
HCT VFR BLD CALC: 26 % — LOW (ref 39–50)
HGB BLD-MCNC: 8.7 G/DL — LOW (ref 13–17)
LYMPHOCYTES # BLD AUTO: 0.7 K/UL — LOW (ref 1–3.3)
LYMPHOCYTES # BLD AUTO: 11.7 % — LOW (ref 13–44)
MCHC RBC-ENTMCNC: 33.2 PG — SIGNIFICANT CHANGE UP (ref 27–34)
MCHC RBC-ENTMCNC: 33.6 G/DL — SIGNIFICANT CHANGE UP (ref 32–36)
MCV RBC AUTO: 99 FL — SIGNIFICANT CHANGE UP (ref 80–100)
MONOCYTES # BLD AUTO: 1 K/UL — HIGH (ref 0–0.9)
MONOCYTES NFR BLD AUTO: 17.7 % — HIGH (ref 2–14)
NEUTROPHILS # BLD AUTO: 4 K/UL — SIGNIFICANT CHANGE UP (ref 1.8–7.4)
NEUTROPHILS NFR BLD AUTO: 68.3 % — SIGNIFICANT CHANGE UP (ref 43–77)
PLATELET # BLD AUTO: 149 K/UL — LOW (ref 150–400)
RBC # BLD: 2.62 M/UL — LOW (ref 4.2–5.8)
RBC # FLD: 14.5 % — SIGNIFICANT CHANGE UP (ref 10.3–14.5)
WBC # BLD: 5.8 K/UL — SIGNIFICANT CHANGE UP (ref 3.8–10.5)
WBC # FLD AUTO: 5.8 K/UL — SIGNIFICANT CHANGE UP (ref 3.8–10.5)

## 2020-01-22 DIAGNOSIS — R11.2 NAUSEA WITH VOMITING, UNSPECIFIED: ICD-10-CM

## 2020-01-22 DIAGNOSIS — Z51.11 ENCOUNTER FOR ANTINEOPLASTIC CHEMOTHERAPY: ICD-10-CM

## 2020-01-27 ENCOUNTER — APPOINTMENT (OUTPATIENT)
Dept: INFUSION THERAPY | Facility: HOSPITAL | Age: 70
End: 2020-01-27

## 2020-01-27 ENCOUNTER — RESULT REVIEW (OUTPATIENT)
Age: 70
End: 2020-01-27

## 2020-01-27 ENCOUNTER — LABORATORY RESULT (OUTPATIENT)
Age: 70
End: 2020-01-27

## 2020-01-27 LAB
ANION GAP SERPL CALC-SCNC: 13 MMOL/L — SIGNIFICANT CHANGE UP (ref 5–17)
BASOPHILS # BLD AUTO: 0 K/UL — SIGNIFICANT CHANGE UP (ref 0–0.2)
BASOPHILS NFR BLD AUTO: 0.9 % — SIGNIFICANT CHANGE UP (ref 0–2)
BUN SERPL-MCNC: 15 MG/DL — SIGNIFICANT CHANGE UP (ref 7–23)
CALCIUM SERPL-MCNC: 8.2 MG/DL — LOW (ref 8.4–10.5)
CHLORIDE SERPL-SCNC: 106 MMOL/L — SIGNIFICANT CHANGE UP (ref 96–108)
CO2 SERPL-SCNC: 22 MMOL/L — SIGNIFICANT CHANGE UP (ref 22–31)
CREAT SERPL-MCNC: 1.23 MG/DL — SIGNIFICANT CHANGE UP (ref 0.5–1.3)
EOSINOPHIL # BLD AUTO: 0.1 K/UL — SIGNIFICANT CHANGE UP (ref 0–0.5)
EOSINOPHIL NFR BLD AUTO: 1.3 % — SIGNIFICANT CHANGE UP (ref 0–6)
GLUCOSE SERPL-MCNC: 99 MG/DL — SIGNIFICANT CHANGE UP (ref 70–99)
HCT VFR BLD CALC: 25.8 % — LOW (ref 39–50)
HGB BLD-MCNC: 8.8 G/DL — LOW (ref 13–17)
LYMPHOCYTES # BLD AUTO: 0.7 K/UL — LOW (ref 1–3.3)
LYMPHOCYTES # BLD AUTO: 14 % — SIGNIFICANT CHANGE UP (ref 13–44)
MCHC RBC-ENTMCNC: 34 PG — SIGNIFICANT CHANGE UP (ref 27–34)
MCHC RBC-ENTMCNC: 34.1 G/DL — SIGNIFICANT CHANGE UP (ref 32–36)
MCV RBC AUTO: 99.9 FL — SIGNIFICANT CHANGE UP (ref 80–100)
MONOCYTES # BLD AUTO: 0.9 K/UL — SIGNIFICANT CHANGE UP (ref 0–0.9)
MONOCYTES NFR BLD AUTO: 18.3 % — HIGH (ref 2–14)
NEUTROPHILS # BLD AUTO: 3.4 K/UL — SIGNIFICANT CHANGE UP (ref 1.8–7.4)
NEUTROPHILS NFR BLD AUTO: 65.5 % — SIGNIFICANT CHANGE UP (ref 43–77)
PLATELET # BLD AUTO: 92 K/UL — SIGNIFICANT CHANGE UP (ref 150–400)
POTASSIUM SERPL-MCNC: 4.4 MMOL/L — SIGNIFICANT CHANGE UP (ref 3.5–5.3)
POTASSIUM SERPL-SCNC: 4.4 MMOL/L — SIGNIFICANT CHANGE UP (ref 3.5–5.3)
RBC # BLD: 2.58 M/UL — LOW (ref 4.2–5.8)
RBC # FLD: 14.1 % — SIGNIFICANT CHANGE UP (ref 10.3–14.5)
SODIUM SERPL-SCNC: 141 MMOL/L — SIGNIFICANT CHANGE UP (ref 135–145)
WBC # BLD: 5.1 K/UL — SIGNIFICANT CHANGE UP (ref 3.8–10.5)
WBC # FLD AUTO: 5.1 K/UL — SIGNIFICANT CHANGE UP (ref 3.8–10.5)

## 2020-01-27 RX ORDER — LOSARTAN POTASSIUM 100 MG/1
1 TABLET, FILM COATED ORAL
Qty: 0 | Refills: 0 | DISCHARGE

## 2020-02-03 ENCOUNTER — RESULT REVIEW (OUTPATIENT)
Age: 70
End: 2020-02-03

## 2020-02-03 ENCOUNTER — APPOINTMENT (OUTPATIENT)
Dept: INFUSION THERAPY | Facility: HOSPITAL | Age: 70
End: 2020-02-03

## 2020-02-03 LAB
BASOPHILS # BLD AUTO: 0 K/UL — SIGNIFICANT CHANGE UP (ref 0–0.2)
BASOPHILS NFR BLD AUTO: 0.2 % — SIGNIFICANT CHANGE UP (ref 0–2)
EOSINOPHIL # BLD AUTO: 0.2 K/UL — SIGNIFICANT CHANGE UP (ref 0–0.5)
EOSINOPHIL NFR BLD AUTO: 2.9 % — SIGNIFICANT CHANGE UP (ref 0–6)
HCT VFR BLD CALC: 25.1 % — LOW (ref 39–50)
HGB BLD-MCNC: 8.3 G/DL — LOW (ref 13–17)
LYMPHOCYTES # BLD AUTO: 0.8 K/UL — LOW (ref 1–3.3)
LYMPHOCYTES # BLD AUTO: 14.3 % — SIGNIFICANT CHANGE UP (ref 13–44)
MCHC RBC-ENTMCNC: 32.8 PG — SIGNIFICANT CHANGE UP (ref 27–34)
MCHC RBC-ENTMCNC: 33.2 G/DL — SIGNIFICANT CHANGE UP (ref 32–36)
MCV RBC AUTO: 98.8 FL — SIGNIFICANT CHANGE UP (ref 80–100)
MONOCYTES # BLD AUTO: 1.1 K/UL — HIGH (ref 0–0.9)
MONOCYTES NFR BLD AUTO: 19.7 % — HIGH (ref 2–14)
NEUTROPHILS # BLD AUTO: 3.6 K/UL — SIGNIFICANT CHANGE UP (ref 1.8–7.4)
NEUTROPHILS NFR BLD AUTO: 62.9 % — SIGNIFICANT CHANGE UP (ref 43–77)
PLATELET # BLD AUTO: 54 K/UL — LOW (ref 150–400)
RBC # BLD: 2.54 M/UL — LOW (ref 4.2–5.8)
RBC # FLD: 14.4 % — SIGNIFICANT CHANGE UP (ref 10.3–14.5)
WBC # BLD: 5.8 K/UL — SIGNIFICANT CHANGE UP (ref 3.8–10.5)
WBC # FLD AUTO: 5.8 K/UL — SIGNIFICANT CHANGE UP (ref 3.8–10.5)

## 2020-02-04 ENCOUNTER — APPOINTMENT (OUTPATIENT)
Dept: CARDIOLOGY | Facility: CLINIC | Age: 70
End: 2020-02-04
Payer: MEDICARE

## 2020-02-04 VITALS
BODY MASS INDEX: 21.34 KG/M2 | HEART RATE: 65 BPM | WEIGHT: 125 LBS | HEIGHT: 64 IN | DIASTOLIC BLOOD PRESSURE: 58 MMHG | OXYGEN SATURATION: 98 % | SYSTOLIC BLOOD PRESSURE: 144 MMHG

## 2020-02-04 PROCEDURE — 99214 OFFICE O/P EST MOD 30 MIN: CPT

## 2020-02-04 NOTE — PHYSICAL EXAM
[General Appearance - Well Developed] : well developed [General Appearance - Well Nourished] : well nourished [Well Groomed] : well groomed [Normal Appearance] : normal appearance [Normal Conjunctiva] : the conjunctiva exhibited no abnormalities [General Appearance - In No Acute Distress] : no acute distress [No Deformities] : no deformities [Normal Oral Mucosa] : normal oral mucosa [Eyelids - No Xanthelasma] : the eyelids demonstrated no xanthelasmas [No Oral Cyanosis] : no oral cyanosis [No Oral Pallor] : no oral pallor [Respiration, Rhythm And Depth] : normal respiratory rhythm and effort [Auscultation Breath Sounds / Voice Sounds] : lungs were clear to auscultation bilaterally [Heart Rate And Rhythm] : heart rate and rhythm were normal [Arterial Pulses Normal] : the arterial pulses were normal [Heart Sounds] : normal S1 and S2 [Edema] : no peripheral edema present [Bowel Sounds] : normal bowel sounds [Abdomen Soft] : soft [Abdomen Tenderness] : non-tender [Abnormal Walk] : normal gait [Nail Clubbing] : no clubbing of the fingernails [Skin Color & Pigmentation] : normal skin color and pigmentation [Cyanosis, Localized] : no localized cyanosis [Oriented To Time, Place, And Person] : oriented to person, place, and time [Skin Turgor] : normal skin turgor [] : no rash [Impaired Insight] : insight and judgment were intact [No Anxiety] : not feeling anxious [FreeTextEntry1] : No JVD

## 2020-02-04 NOTE — DISCUSSION/SUMMARY
[FreeTextEntry1] : In a summary Yoly López is an elderly male with CAD s/p stent, stable. Hypertension, controlled. Continue current medications and 2 gm sodium diet. Hypercholesterolemia, on statins and low cholesterol diet. Continue healthy lifestyle. Follow up in 3 months.

## 2020-02-04 NOTE — REVIEW OF SYSTEMS
[Blurry Vision] : blurred vision [Joint Pain] : joint pain [Numbness (Hypesthesia)] : numbness [Negative] : Psychiatric [Seeing Double (Diplopia)] : no diplopia [Eye Pain] : no eye pain [Eyeglasses] : not currently wearing eyeglasses [Shortness Of Breath] : no shortness of breath [Chest Pain] : no chest pain [Lower Ext Edema] : no extremity edema [Palpitations] : no palpitations [Abdominal Pain] : no abdominal pain [Heartburn] : no heartburn [Change in Appetite] : no change in appetite [Dysphagia] : no dysphagia [Muscle Cramps] : no muscle cramps [Limb Weakness (Paresis)] : no limb weakness [Dizziness] : no dizziness [Tremor] : no tremor was seen [Convulsions] : no convulsions [Tingling (Paresthesia)] : no tingling [Easy Bleeding] : no tendency for easy bleeding [Easy Bruising] : no tendency for easy bruising

## 2020-02-04 NOTE — HISTORY OF PRESENT ILLNESS
[FreeTextEntry1] : Yoly López is a 70 year old male with history of CAD s/p stent, hypertension and hypercholesterolemia comes for follow up visit. Denies any chest pain or palpitations. No shortness of breath on exertion. Compliant to medications and diet. Follows up with PCP.

## 2020-02-06 ENCOUNTER — RESULT REVIEW (OUTPATIENT)
Age: 70
End: 2020-02-06

## 2020-02-06 ENCOUNTER — APPOINTMENT (OUTPATIENT)
Dept: HEMATOLOGY ONCOLOGY | Facility: CLINIC | Age: 70
End: 2020-02-06

## 2020-02-06 LAB
ALBUMIN SERPL ELPH-MCNC: 3.9 G/DL
ALP BLD-CCNC: 63 U/L
ALT SERPL-CCNC: 21 U/L
ANION GAP SERPL CALC-SCNC: 13 MMOL/L
AST SERPL-CCNC: 16 U/L
BASOPHILS # BLD AUTO: 0 K/UL — SIGNIFICANT CHANGE UP (ref 0–0.2)
BILIRUB SERPL-MCNC: 0.3 MG/DL
BUN SERPL-MCNC: 18 MG/DL
CALCIUM SERPL-MCNC: 8.3 MG/DL
CHLORIDE SERPL-SCNC: 106 MMOL/L
CO2 SERPL-SCNC: 22 MMOL/L
CREAT SERPL-MCNC: 1.2 MG/DL
EOSINOPHIL # BLD AUTO: 0.1 K/UL — SIGNIFICANT CHANGE UP (ref 0–0.5)
EOSINOPHIL NFR BLD AUTO: 3 % — SIGNIFICANT CHANGE UP (ref 0–6)
GLUCOSE SERPL-MCNC: 105 MG/DL
HCT VFR BLD CALC: 25.8 % — LOW (ref 39–50)
HGB BLD-MCNC: 8.4 G/DL — LOW (ref 13–17)
LYMPHOCYTES # BLD AUTO: 0.7 K/UL — LOW (ref 1–3.3)
LYMPHOCYTES # BLD AUTO: 14 % — SIGNIFICANT CHANGE UP (ref 13–44)
MCHC RBC-ENTMCNC: 32.4 G/DL — SIGNIFICANT CHANGE UP (ref 32–36)
MCHC RBC-ENTMCNC: 32.5 PG — SIGNIFICANT CHANGE UP (ref 27–34)
MCV RBC AUTO: 100 FL — SIGNIFICANT CHANGE UP (ref 80–100)
MONOCYTES # BLD AUTO: 1.1 K/UL — HIGH (ref 0–0.9)
MONOCYTES NFR BLD AUTO: 17 % — HIGH (ref 2–14)
NEUTROPHILS # BLD AUTO: 3.5 K/UL — SIGNIFICANT CHANGE UP (ref 1.8–7.4)
NEUTROPHILS NFR BLD AUTO: 66 % — SIGNIFICANT CHANGE UP (ref 43–77)
PLAT MORPH BLD: NORMAL — SIGNIFICANT CHANGE UP
PLATELET # BLD AUTO: 45 K/UL — LOW (ref 150–400)
POTASSIUM SERPL-SCNC: 3.9 MMOL/L
PROT SERPL-MCNC: 6.1 G/DL
RBC # BLD: 2.57 M/UL — LOW (ref 4.2–5.8)
RBC # FLD: 14.5 % — SIGNIFICANT CHANGE UP (ref 10.3–14.5)
RBC BLD AUTO: SIGNIFICANT CHANGE UP
SODIUM SERPL-SCNC: 140 MMOL/L
WBC # BLD: 5.4 K/UL — SIGNIFICANT CHANGE UP (ref 3.8–10.5)
WBC # FLD AUTO: 5.4 K/UL — SIGNIFICANT CHANGE UP (ref 3.8–10.5)

## 2020-02-07 LAB
ALBUMIN MFR SERPL ELPH: 58.2 %
ALBUMIN SERPL-MCNC: 3.6 G/DL
ALBUMIN/GLOB SERPL: 1.4 RATIO
ALPHA1 GLOB MFR SERPL ELPH: 6.7 %
ALPHA1 GLOB SERPL ELPH-MCNC: 0.4 G/DL
ALPHA2 GLOB MFR SERPL ELPH: 15.5 %
ALPHA2 GLOB SERPL ELPH-MCNC: 0.9 G/DL
B-GLOBULIN MFR SERPL ELPH: 11.8 %
B-GLOBULIN SERPL ELPH-MCNC: 0.7 G/DL
DEPRECATED KAPPA LC FREE/LAMBDA SER: 1.39 RATIO
DEPRECATED KAPPA LC FREE/LAMBDA SER: 1.39 RATIO
GAMMA GLOB FLD ELPH-MCNC: 0.5 G/DL
GAMMA GLOB MFR SERPL ELPH: 7.8 %
IGA SER QL IEP: 85 MG/DL
IGG SER QL IEP: 500 MG/DL
IGM SER QL IEP: <10 MG/DL
INTERPRETATION SERPL IEP-IMP: NORMAL
KAPPA LC CSF-MCNC: 1.86 MG/DL
KAPPA LC CSF-MCNC: 1.86 MG/DL
KAPPA LC SERPL-MCNC: 2.58 MG/DL
KAPPA LC SERPL-MCNC: 2.58 MG/DL
M PROTEIN MFR SERPL ELPH: 1.2 %
M PROTEIN SPEC IFE-MCNC: NORMAL
MONOCLON BAND OBS SERPL: 0.1 G/DL
PROT SERPL-MCNC: 6.1 G/DL
PROT SERPL-MCNC: 6.1 G/DL

## 2020-02-10 ENCOUNTER — OUTPATIENT (OUTPATIENT)
Dept: OUTPATIENT SERVICES | Facility: HOSPITAL | Age: 70
LOS: 1 days | End: 2020-02-10
Payer: MEDICARE

## 2020-02-10 ENCOUNTER — RESULT REVIEW (OUTPATIENT)
Age: 70
End: 2020-02-10

## 2020-02-10 ENCOUNTER — APPOINTMENT (OUTPATIENT)
Dept: INFUSION THERAPY | Facility: HOSPITAL | Age: 70
End: 2020-02-10

## 2020-02-10 DIAGNOSIS — C90.00 MULTIPLE MYELOMA NOT HAVING ACHIEVED REMISSION: ICD-10-CM

## 2020-02-10 LAB
BASOPHILS # BLD AUTO: 0 K/UL — SIGNIFICANT CHANGE UP (ref 0–0.2)
BASOPHILS NFR BLD AUTO: 2 % — SIGNIFICANT CHANGE UP (ref 0–2)
BLD GP AB SCN SERPL QL: NEGATIVE — SIGNIFICANT CHANGE UP
EOSINOPHIL # BLD AUTO: 0.1 K/UL — SIGNIFICANT CHANGE UP (ref 0–0.5)
EOSINOPHIL NFR BLD AUTO: 4 % — SIGNIFICANT CHANGE UP (ref 0–6)
HCT VFR BLD CALC: 23.4 % — LOW (ref 39–50)
HGB BLD-MCNC: 7.9 G/DL — LOW (ref 13–17)
LYMPHOCYTES # BLD AUTO: 0.7 K/UL — LOW (ref 1–3.3)
LYMPHOCYTES # BLD AUTO: 12 % — LOW (ref 13–44)
MCHC RBC-ENTMCNC: 33.4 PG — SIGNIFICANT CHANGE UP (ref 27–34)
MCHC RBC-ENTMCNC: 33.8 G/DL — SIGNIFICANT CHANGE UP (ref 32–36)
MCV RBC AUTO: 98.7 FL — SIGNIFICANT CHANGE UP (ref 80–100)
MONOCYTES # BLD AUTO: 0.8 K/UL — SIGNIFICANT CHANGE UP (ref 0–0.9)
MONOCYTES NFR BLD AUTO: 18 % — HIGH (ref 2–14)
NEUTROPHILS # BLD AUTO: 3 K/UL — SIGNIFICANT CHANGE UP (ref 1.8–7.4)
NEUTROPHILS NFR BLD AUTO: 64 % — SIGNIFICANT CHANGE UP (ref 43–77)
PLAT MORPH BLD: NORMAL — SIGNIFICANT CHANGE UP
PLATELET # BLD AUTO: 46 K/UL — LOW (ref 150–400)
RBC # BLD: 2.37 M/UL — LOW (ref 4.2–5.8)
RBC # FLD: 14 % — SIGNIFICANT CHANGE UP (ref 10.3–14.5)
RBC BLD AUTO: SIGNIFICANT CHANGE UP
RH IG SCN BLD-IMP: POSITIVE — SIGNIFICANT CHANGE UP
RH IG SCN BLD-IMP: POSITIVE — SIGNIFICANT CHANGE UP
WBC # BLD: 4.6 K/UL — SIGNIFICANT CHANGE UP (ref 3.8–10.5)
WBC # FLD AUTO: 4.6 K/UL — SIGNIFICANT CHANGE UP (ref 3.8–10.5)

## 2020-02-11 LAB
ALBUMIN SERPL ELPH-MCNC: 4.3 G/DL
ALP BLD-CCNC: 68 U/L
ALT SERPL-CCNC: 19 U/L
ANION GAP SERPL CALC-SCNC: 13 MMOL/L
AST SERPL-CCNC: 18 U/L
BILIRUB SERPL-MCNC: 0.4 MG/DL
BUN SERPL-MCNC: 17 MG/DL
CALCIUM SERPL-MCNC: 8.7 MG/DL
CHLORIDE SERPL-SCNC: 108 MMOL/L
CO2 SERPL-SCNC: 19 MMOL/L
CREAT SERPL-MCNC: 1.13 MG/DL
GLUCOSE SERPL-MCNC: 107 MG/DL
POTASSIUM SERPL-SCNC: 4 MMOL/L
PROT SERPL-MCNC: 6.4 G/DL
SODIUM SERPL-SCNC: 140 MMOL/L

## 2020-02-13 ENCOUNTER — APPOINTMENT (OUTPATIENT)
Dept: INFUSION THERAPY | Facility: HOSPITAL | Age: 70
End: 2020-02-13

## 2020-02-13 ENCOUNTER — RESULT REVIEW (OUTPATIENT)
Age: 70
End: 2020-02-13

## 2020-02-13 ENCOUNTER — APPOINTMENT (OUTPATIENT)
Dept: HEMATOLOGY ONCOLOGY | Facility: CLINIC | Age: 70
End: 2020-02-13

## 2020-02-13 LAB
BLD GP AB SCN SERPL QL: NEGATIVE — SIGNIFICANT CHANGE UP
EOSINOPHIL # BLD AUTO: 0.1 K/UL — SIGNIFICANT CHANGE UP (ref 0–0.5)
EOSINOPHIL NFR BLD AUTO: 9 % — HIGH (ref 0–6)
LYMPHOCYTES # BLD AUTO: 0.7 K/UL — LOW (ref 1–3.3)
LYMPHOCYTES # BLD AUTO: 15 % — SIGNIFICANT CHANGE UP (ref 13–44)
MONOCYTES # BLD AUTO: 0.6 K/UL — SIGNIFICANT CHANGE UP (ref 0–0.9)
MONOCYTES NFR BLD AUTO: 13 % — SIGNIFICANT CHANGE UP (ref 2–14)
NEUTROPHILS # BLD AUTO: 2.1 K/UL — SIGNIFICANT CHANGE UP (ref 1.8–7.4)
NEUTROPHILS NFR BLD AUTO: 63 % — SIGNIFICANT CHANGE UP (ref 43–77)
PLAT MORPH BLD: NORMAL — SIGNIFICANT CHANGE UP
RBC BLD AUTO: SIGNIFICANT CHANGE UP
RH IG SCN BLD-IMP: POSITIVE — SIGNIFICANT CHANGE UP

## 2020-02-18 ENCOUNTER — RESULT REVIEW (OUTPATIENT)
Age: 70
End: 2020-02-18

## 2020-02-18 ENCOUNTER — APPOINTMENT (OUTPATIENT)
Dept: INFUSION THERAPY | Facility: HOSPITAL | Age: 70
End: 2020-02-18

## 2020-02-18 LAB
BASOPHILS # BLD AUTO: 0.1 K/UL — SIGNIFICANT CHANGE UP (ref 0–0.2)
BASOPHILS NFR BLD AUTO: 2 % — SIGNIFICANT CHANGE UP (ref 0–2)
EOSINOPHIL # BLD AUTO: 0.1 K/UL — SIGNIFICANT CHANGE UP (ref 0–0.5)
EOSINOPHIL NFR BLD AUTO: 2.6 % — SIGNIFICANT CHANGE UP (ref 0–6)
HCT VFR BLD CALC: 26 % — LOW (ref 39–50)
HGB BLD-MCNC: 9.1 G/DL — LOW (ref 13–17)
LYMPHOCYTES # BLD AUTO: 0.8 K/UL — LOW (ref 1–3.3)
LYMPHOCYTES # BLD AUTO: 18.6 % — SIGNIFICANT CHANGE UP (ref 13–44)
MCHC RBC-ENTMCNC: 34.6 PG — HIGH (ref 27–34)
MCHC RBC-ENTMCNC: 34.9 G/DL — SIGNIFICANT CHANGE UP (ref 32–36)
MCV RBC AUTO: 99 FL — SIGNIFICANT CHANGE UP (ref 80–100)
MONOCYTES # BLD AUTO: 0.3 K/UL — SIGNIFICANT CHANGE UP (ref 0–0.9)
MONOCYTES NFR BLD AUTO: 7.6 % — SIGNIFICANT CHANGE UP (ref 2–14)
NEUTROPHILS # BLD AUTO: 3 K/UL — SIGNIFICANT CHANGE UP (ref 1.8–7.4)
NEUTROPHILS NFR BLD AUTO: 69.1 % — SIGNIFICANT CHANGE UP (ref 43–77)
PLATELET # BLD AUTO: 32 K/UL — LOW (ref 150–400)
RBC # BLD: 2.63 M/UL — LOW (ref 4.2–5.8)
RBC # FLD: 14.4 % — SIGNIFICANT CHANGE UP (ref 10.3–14.5)
WBC # BLD: 4.4 K/UL — SIGNIFICANT CHANGE UP (ref 3.8–10.5)
WBC # FLD AUTO: 4.4 K/UL — SIGNIFICANT CHANGE UP (ref 3.8–10.5)

## 2020-02-21 ENCOUNTER — OUTPATIENT (OUTPATIENT)
Dept: OUTPATIENT SERVICES | Facility: HOSPITAL | Age: 70
LOS: 1 days | Discharge: HOME | End: 2020-02-21

## 2020-02-21 DIAGNOSIS — C90.00 MULTIPLE MYELOMA NOT HAVING ACHIEVED REMISSION: ICD-10-CM

## 2020-02-24 ENCOUNTER — APPOINTMENT (OUTPATIENT)
Dept: HEMATOLOGY ONCOLOGY | Facility: CLINIC | Age: 70
End: 2020-02-24
Payer: MEDICARE

## 2020-02-24 ENCOUNTER — RESULT REVIEW (OUTPATIENT)
Age: 70
End: 2020-02-24

## 2020-02-24 ENCOUNTER — APPOINTMENT (OUTPATIENT)
Dept: INFUSION THERAPY | Facility: HOSPITAL | Age: 70
End: 2020-02-24

## 2020-02-24 VITALS
RESPIRATION RATE: 16 BRPM | OXYGEN SATURATION: 100 % | WEIGHT: 121.23 LBS | DIASTOLIC BLOOD PRESSURE: 69 MMHG | HEART RATE: 74 BPM | SYSTOLIC BLOOD PRESSURE: 165 MMHG | TEMPERATURE: 97.6 F | BODY MASS INDEX: 20.81 KG/M2

## 2020-02-24 LAB
BASOPHILS # BLD AUTO: 0 K/UL — SIGNIFICANT CHANGE UP (ref 0–0.2)
BASOPHILS NFR BLD AUTO: 1 % — SIGNIFICANT CHANGE UP (ref 0–2)
EOSINOPHIL # BLD AUTO: 0.2 K/UL — SIGNIFICANT CHANGE UP (ref 0–0.5)
EOSINOPHIL NFR BLD AUTO: 10 % — HIGH (ref 0–6)
HCT VFR BLD CALC: 26.3 % — LOW (ref 39–50)
HGB BLD-MCNC: 9.2 G/DL — LOW (ref 13–17)
LYMPHOCYTES # BLD AUTO: 0.7 K/UL — LOW (ref 1–3.3)
LYMPHOCYTES # BLD AUTO: 27 % — SIGNIFICANT CHANGE UP (ref 13–44)
MCHC RBC-ENTMCNC: 35.1 G/DL — SIGNIFICANT CHANGE UP (ref 32–36)
MCHC RBC-ENTMCNC: 35.1 PG — HIGH (ref 27–34)
MCV RBC AUTO: 99.8 FL — SIGNIFICANT CHANGE UP (ref 80–100)
MONOCYTES # BLD AUTO: 0.2 K/UL — SIGNIFICANT CHANGE UP (ref 0–0.9)
MONOCYTES NFR BLD AUTO: 2 % — SIGNIFICANT CHANGE UP (ref 2–14)
NEUTROPHILS # BLD AUTO: 1.6 K/UL — LOW (ref 1.8–7.4)
NEUTROPHILS NFR BLD AUTO: 60 % — SIGNIFICANT CHANGE UP (ref 43–77)
PLAT MORPH BLD: NORMAL — SIGNIFICANT CHANGE UP
PLATELET # BLD AUTO: 40 K/UL — LOW (ref 150–400)
RBC # BLD: 2.64 M/UL — LOW (ref 4.2–5.8)
RBC # FLD: 14.1 % — SIGNIFICANT CHANGE UP (ref 10.3–14.5)
RBC BLD AUTO: SIGNIFICANT CHANGE UP
WBC # BLD: 2.6 K/UL — LOW (ref 3.8–10.5)
WBC # FLD AUTO: 2.6 K/UL — LOW (ref 3.8–10.5)

## 2020-02-24 PROCEDURE — 99214 OFFICE O/P EST MOD 30 MIN: CPT

## 2020-02-24 RX ORDER — LENALIDOMIDE 20 MG/1
20 CAPSULE ORAL
Qty: 21 | Refills: 0 | Status: DISCONTINUED | COMMUNITY
Start: 2019-09-03 | End: 2020-02-24

## 2020-02-24 NOTE — ASSESSMENT
[FreeTextEntry1] : 71 yo M with hx MI s/p stent 2015, HTN, high chol, presents for IgG kappa/kappa light chain MM newly dx'ed stage II IPSS score\par Pt has extensive lytic lesions as well as C7/T8 spine lesions causing nerve compression -s/p XRT 8/22-8/29/19\par Started Velcade/Dexa  weekly 9/9/19\par Started Revlimid on 10/21/19\par BOTH ON HOLD SINCE 2/3/20 FOR PANCYTOPENIA\par \par -scheduled for BM bx on 3/2/20 -r/o acute leukemia, r/o MDS, do HOLD for Bioreference molecular studies, check cytogenetics, FISH for myeloma, FISH for ALL/AML\par \par -RVD on HOLD. VGPR on last blood work on 2/2/30\par \par -pt had dental clearance on 10/29/19, seen by Dr. Kramer ph  - Zometa q4 wks started on 11/11/19, plan to give for 2 yrs.  Zometa was held on 12/3/19 due to low calcium (7.9), now pt on Ca. Cont q4 wks Zometa -last one was given on 2/3/20\par \par -Hct 9.2.  Monitor weekly CBC\par \par -BM cytogenetics/FISH panel -normal. Based on high beta-2 microglobulin/normal albumin, pt is stage II IPSS score\par \par -repeat myeloma labs done on 11/20/19-continue to improve.  Manubrial lesion decreased in size. Repeat labs for myeloma today and monthly at each visit\par \par -cont prophylactic Valacyclovir 500mg po daily given to prevent VZV while on Velcade\par \par -follow up after BM bx to discuss results

## 2020-02-24 NOTE — HISTORY OF PRESENT ILLNESS
[de-identified] : Mr Rand was referred to my office for newly diagnosed IgG kappa multiple myeloma. The patient's primary language is Luxembourgish, he has his daughter Mirlande as the , per his wishes. He was seen by Dr. Andrés Valle (hematology) in Feb 2019  and had a BM bx showing 10-15% plasma cells, concerning for smoldering myeloma. According to the daughter, he was awaiting insurance approval for imaging studies. He was admitted from 8/18/19 at [facility] where he presented with pain in the L leg/lower back, worsening for the past 2-3 months. On labs, he was found to have a total protein of 10, Ca level 12 until 8/29/19. Dental consult was called and patient needs to have 10 teeth extracted -thus, IV bisphosphonates were not given, pt improved with hydration. He also had a slightly inc'ed creatinine -improved after IVF. CT C/A/P 8/18/19 showed a lytic expansile soft tissue lesion centered in the manubrium measuring approximately 7.3 x 3.4 x 4.3 cm, invading both 1st ribs. There was also a A lytic expansile soft tissue lesion in the T8 vertebral body causing mass effect on spinal canal and obliterating left neural foramina at T7-8 and T8-9. ON 8/22/19 an MRI thoracic spine was done showing multiple areas of tumor involvement within the thoracic spine in keeping with the patient's history of multiple myeloma. Prominent lesion within C7 on the right incompletely seen.\par Large lesion within T8 on the left producing epidural tumor extension and moderate narrowing of the spinal canal with mild flattening of the spinal cord. He was evaluated on 8/22/19 by Dr. Foley (Rad Onc) and was given 5 fractions of XRT from 8/23/19 to 8/29/19 to C7 and T8. He was discharged home on 8/29/19 with follow up in the outpatient office, and on Dexamethasone 4mg po daily. \par \par  [de-identified] : The patient is here for IgG kappa MM with multiple bone lytic lesions, hypercalcemia (resolved) and mild anemia. He still has pain in b/l ribs, takes Oxy 5mg po once daily. He has no weakness in his legs and back pain has improved after XRT.  He started Velcade/Dexa weekly on 9/919.  \par \par The patient got the Revlimid and started it C1 day 1 on 10/21/19 and hed been tolerating it well. Starting in February, his blood counts became low -plt count 54, Hb 8.3 wbc 5.8. Revlimid was held -after 2/3/20. He got 1 shot Velcade/Dexa on 2/3/20, NO chemo since then. His counts have not recovered. He feels well -no fevers, no bruising, no changes in medications.

## 2020-02-24 NOTE — RESULTS/DATA
[FreeTextEntry1] : Today's CBC (ON 2/24/20) wbc 2.6 ANC 1500 Hb 9.2 plt 40\par \par On 1/13/20) wbc 5.2 Hb 8.9 plt 183\par ON 12/18/19) wbc 2.5 Hb 8.8 plt 91 ANC 1200   \par On 11/18/19) wbc 9.9 Hb 11.4 plt 169 ANC 7400\par On 10/28/19) wbc 8.8 Hb 12 plt 117\par On 10/1/19) wbc 12.5 Hb 10.9 plt 215 ANC 7700\par On 9/3/19) wbc 11.9 Hb 10.8 plt 239 ANC 10.4k/ul  Mo 500 Eo 200 Ba 0\par \par RADIOLOGY\par On 9/7/19 PET/CT \par IMPRESSION:  Abnormal whole body FDG-PET/CT scan.\par \par 1. Numerous hypermetabolic osseous foci in the axial and appendicular \par skeleton mostly with corresponding lucencies on CT compatible with the \par diagnosis of multiple myeloma. Lesions in bilateral proximal humeri and \par femurs, although not demonstrating cortical disruption, may place the \par patient at risk for pathologic fractures.\par \par 2. Nonspecific hypermetabolism in the distal esophagus extending into the \par GE junction with questionable wall thickening on CT with associated \par adjacent hypermetabolic paraesophageal lymph node. Suggest correlation \par with endoscopy to exclude any abnormality in this region.\par \par 3. Nonspecific hypermetabolism in the right palmar musculature and left \par masseter muscle. Please correlate clinically.\par \par ON 9/9/19 MRI cervical\par \par IMPRESSION: \par Diffuse osseous metastases, consistent with multiple myeloma.\par Large calvarial lesion which may extend into the sella turcica.\par No evidence of epidural extension of tumor.\par Multilevel cervical spondylosis described.

## 2020-02-24 NOTE — REVIEW OF SYSTEMS
[Recent Change In Weight] : ~T recent weight change [Constipation] : constipation [Negative] : Allergic/Immunologic [Fever] : no fever [Chills] : no chills [Night Sweats] : no night sweats [Eye Pain] : no eye pain [Fatigue] : no fatigue [Red Eyes] : eyes not red [Dry Eyes] : no dryness of the eyes [Vision Problems] : no vision problems [Loss of Hearing] : no loss of hearing [Dysphagia] : no dysphagia [Nosebleeds] : no nosebleeds [Hoarseness] : no hoarseness [Mucosal Pain] : no mucosal pain [Odynophagia] : no odynophagia [Chest Pain] : no chest pain [Palpitations] : no palpitations [Lower Ext Edema] : no lower extremity edema [Leg Claudication] : no intermittent leg claudication [Wheezing] : no wheezing [Shortness Of Breath] : no shortness of breath [Cough] : no cough [Abdominal Pain] : no abdominal pain [SOB on Exertion] : no shortness of breath during exertion [Vomiting] : no vomiting [Diarrhea] : no diarrhea [Dysuria] : no dysuria [Joint Pain] : no joint pain [Incontinence] : no incontinence [Muscle Pain] : no muscle pain [Muscle Weakness] : no muscle weakness [Skin Wound] : no skin wound [Skin Rash] : no skin rash [Difficulty Walking] : no difficulty walking [Confused] : no confusion [Suicidal] : not suicidal [Depression] : no depression [Easy Bleeding] : no tendency for easy bleeding [FreeTextEntry2] : lost about 6-7 lbs in the past 2 months [Easy Bruising] : no tendency for easy bruising [FreeTextEntry7] : colonoscopy in June 2019 -normal. reports some constipation-uses Dulcolax with good results  [FreeTextEntry9] : back pain -improved after radiation [de-identified] : had one day of numbness in hands, none since then

## 2020-02-25 LAB
ALBUMIN SERPL ELPH-MCNC: 4.7 G/DL
ALP BLD-CCNC: 84 U/L
ALT SERPL-CCNC: 14 U/L
ANION GAP SERPL CALC-SCNC: 15 MMOL/L
AST SERPL-CCNC: 15 U/L
BILIRUB SERPL-MCNC: 0.4 MG/DL
BUN SERPL-MCNC: 19 MG/DL
CALCIUM SERPL-MCNC: 9 MG/DL
CHLORIDE SERPL-SCNC: 105 MMOL/L
CO2 SERPL-SCNC: 21 MMOL/L
CREAT SERPL-MCNC: 1.18 MG/DL
GLUCOSE SERPL-MCNC: 171 MG/DL
POTASSIUM SERPL-SCNC: 4.6 MMOL/L
PROT SERPL-MCNC: 6.9 G/DL
SODIUM SERPL-SCNC: 141 MMOL/L

## 2020-03-02 ENCOUNTER — APPOINTMENT (OUTPATIENT)
Dept: INFUSION THERAPY | Facility: HOSPITAL | Age: 70
End: 2020-03-02

## 2020-03-02 LAB
ALBUMIN MFR SERPL ELPH: 57.8 %
ALBUMIN SERPL-MCNC: 4 G/DL
ALBUMIN/GLOB SERPL: 1.4 RATIO
ALPHA1 GLOB MFR SERPL ELPH: 5.9 %
ALPHA1 GLOB SERPL ELPH-MCNC: 0.4 G/DL
ALPHA2 GLOB MFR SERPL ELPH: 13.5 %
ALPHA2 GLOB SERPL ELPH-MCNC: 0.9 G/DL
B-GLOBULIN MFR SERPL ELPH: 12.1 %
B-GLOBULIN SERPL ELPH-MCNC: 0.8 G/DL
DEPRECATED KAPPA LC FREE/LAMBDA SER: 1.42 RATIO
GAMMA GLOB FLD ELPH-MCNC: 0.7 G/DL
GAMMA GLOB MFR SERPL ELPH: 10.7 %
IGA SER QL IEP: 170 MG/DL
IGG SER QL IEP: 807 MG/DL
IGM SER QL IEP: 13 MG/DL
INTERPRETATION SERPL IEP-IMP: NORMAL
KAPPA LC CSF-MCNC: 2.36 MG/DL
KAPPA LC SERPL-MCNC: 3.36 MG/DL
M PROTEIN MFR SERPL ELPH: NORMAL
M PROTEIN SPEC IFE-MCNC: NORMAL
MONOCLON BAND OBS SERPL: NORMAL
PROT SERPL-MCNC: 6.9 G/DL
PROT SERPL-MCNC: 6.9 G/DL

## 2020-03-04 ENCOUNTER — RESULT REVIEW (OUTPATIENT)
Age: 70
End: 2020-03-04

## 2020-03-04 ENCOUNTER — LABORATORY RESULT (OUTPATIENT)
Age: 70
End: 2020-03-04

## 2020-03-04 ENCOUNTER — APPOINTMENT (OUTPATIENT)
Dept: HEMATOLOGY ONCOLOGY | Facility: CLINIC | Age: 70
End: 2020-03-04
Payer: MEDICARE

## 2020-03-04 VITALS
RESPIRATION RATE: 17 BRPM | DIASTOLIC BLOOD PRESSURE: 68 MMHG | SYSTOLIC BLOOD PRESSURE: 156 MMHG | TEMPERATURE: 97.7 F | WEIGHT: 121.25 LBS | OXYGEN SATURATION: 100 % | HEART RATE: 62 BPM | BODY MASS INDEX: 20.81 KG/M2

## 2020-03-04 LAB
BASOPHILS # BLD AUTO: 0 K/UL — SIGNIFICANT CHANGE UP (ref 0–0.2)
BASOPHILS NFR BLD AUTO: 0 % — SIGNIFICANT CHANGE UP (ref 0–2)
BLD GP AB SCN SERPL QL: NEGATIVE — SIGNIFICANT CHANGE UP
EOSINOPHIL # BLD AUTO: 0.2 K/UL — SIGNIFICANT CHANGE UP (ref 0–0.5)
EOSINOPHIL NFR BLD AUTO: 9 % — HIGH (ref 0–6)
HCT VFR BLD CALC: 22.6 % — LOW (ref 39–50)
HGB BLD-MCNC: 7.7 G/DL — LOW (ref 13–17)
LYMPHOCYTES # BLD AUTO: 0.64 K/UL — LOW (ref 1–3.3)
LYMPHOCYTES # BLD AUTO: 29 % — SIGNIFICANT CHANGE UP (ref 13–44)
MCHC RBC-ENTMCNC: 34.1 GM/DL — SIGNIFICANT CHANGE UP (ref 32–36)
MCHC RBC-ENTMCNC: 34.1 PG — HIGH (ref 27–34)
MCV RBC AUTO: 100 FL — SIGNIFICANT CHANGE UP (ref 80–100)
METAMYELOCYTES # FLD: 1 % — HIGH (ref 0–0)
MONOCYTES # BLD AUTO: 0.38 K/UL — SIGNIFICANT CHANGE UP (ref 0–0.9)
MONOCYTES NFR BLD AUTO: 17 % — HIGH (ref 2–14)
NEUTROPHILS # BLD AUTO: 0.98 K/UL — LOW (ref 1.8–7.4)
NEUTROPHILS NFR BLD AUTO: 44 % — SIGNIFICANT CHANGE UP (ref 43–77)
NRBC # BLD: 0 — SIGNIFICANT CHANGE UP
NRBC # BLD: SIGNIFICANT CHANGE UP /100 WBCS (ref 0–0)
PLAT MORPH BLD: NORMAL — SIGNIFICANT CHANGE UP
PLATELET # BLD AUTO: 119 K/UL — LOW (ref 150–400)
RBC # BLD: 2.26 M/UL — LOW (ref 4.2–5.8)
RBC # FLD: 14.8 % — HIGH (ref 10.3–14.5)
RBC BLD AUTO: SIGNIFICANT CHANGE UP
RH IG SCN BLD-IMP: POSITIVE — SIGNIFICANT CHANGE UP
WBC # BLD: 2.22 K/UL — LOW (ref 3.8–10.5)
WBC # FLD AUTO: 2.22 K/UL — LOW (ref 3.8–10.5)

## 2020-03-04 PROCEDURE — 85097 BONE MARROW INTERPRETATION: CPT

## 2020-03-04 PROCEDURE — 88342 IMHCHEM/IMCYTCHM 1ST ANTB: CPT | Mod: 26,59

## 2020-03-04 PROCEDURE — 88313 SPECIAL STAINS GROUP 2: CPT | Mod: 26,59

## 2020-03-04 PROCEDURE — 88360 TUMOR IMMUNOHISTOCHEM/MANUAL: CPT | Mod: 26

## 2020-03-04 PROCEDURE — 88341 IMHCHEM/IMCYTCHM EA ADD ANTB: CPT | Mod: 26,59

## 2020-03-04 PROCEDURE — 88305 TISSUE EXAM BY PATHOLOGIST: CPT | Mod: 26

## 2020-03-04 PROCEDURE — 38222 DX BONE MARROW BX & ASPIR: CPT | Mod: RT

## 2020-03-04 NOTE — REASON FOR VISIT
[Bone Marrow Biopsy] : bone marrow biopsy [Bone Marrow Aspiration] : bone marrow aspiration [Family Member] : family member [FreeTextEntry2] : 69 yo Male w/ newly dxed IgG Kappa MM w/extensive lytic bone lesions, RVD on hold due to pancytopenia. BMBX to r/o AML/ALL/MDS.

## 2020-03-04 NOTE — PROCEDURE
[Bone Marrow Biopsy] : bone marrow biopsy [Bone Marrow Aspiration] : bone marrow aspiration  [Patient] : the patient [Patient identification verified] : patient identification verified [Procedure verified and consent obtained] : procedure verified and consent obtained [Correct positioning] : correct positioning [Prone] : prone [The right posterior iliac crest was prepped with betadine and draped, using sterile technique.] : The right posterior iliac crest was prepped with betadine and draped, using sterile technique. [Aspirate] : aspirate [Lidocaine was injected and into the periosteum overlying the site.] : Lidocaine was injected and into the periosteum overlying the site. [Cytogenetics] : cytogenetics [FISH] : FISH [Other ___] : [unfilled] [Biopsy] : biopsy [Flow Cytometry] : flow cytometry [] : The patient was instructed to remove the bandage the following AM. The patient may bathe. Acetaminophen may be taken for discomfort, as per package directions.If there are any other problems, the patient was instructed to call the office. The patient verbalized understanding, and is aware of the office contact numbers. [FreeTextEntry1] : 71 yo Male w/ newly dxed IgG Kappa MM w/extensive lytic bone lesions, RVD on hold due to pancytopenia. BMBX to r/o AML/ALL/MDS.  [FreeTextEntry2] : CBC prior to procedure\par WBC 2.22\par Hgb  7.7   Hct 22.6\par Plt 119\par BM Bx and aspiration was performed by CARLOS Delarosa. Pt's hgb trending down 7.7 today, informed NP Anjali Gong, type & cross ordered today, will schedule 2U PRBC on 3/6/20. Pt and daughter were agreeable. \par

## 2020-03-06 ENCOUNTER — APPOINTMENT (OUTPATIENT)
Dept: INFUSION THERAPY | Facility: HOSPITAL | Age: 70
End: 2020-03-06

## 2020-03-06 PROCEDURE — 86905 BLOOD TYPING RBC ANTIGENS: CPT

## 2020-03-06 PROCEDURE — 86902 BLOOD TYPE ANTIGEN DONOR EA: CPT

## 2020-03-06 PROCEDURE — 86901 BLOOD TYPING SEROLOGIC RH(D): CPT

## 2020-03-06 PROCEDURE — 86900 BLOOD TYPING SEROLOGIC ABO: CPT

## 2020-03-06 PROCEDURE — 86922 COMPATIBILITY TEST ANTIGLOB: CPT

## 2020-03-06 PROCEDURE — 86850 RBC ANTIBODY SCREEN: CPT

## 2020-03-06 PROCEDURE — 88341 IMHCHEM/IMCYTCHM EA ADD ANTB: CPT

## 2020-03-06 PROCEDURE — 88313 SPECIAL STAINS GROUP 2: CPT

## 2020-03-06 PROCEDURE — 88360 TUMOR IMMUNOHISTOCHEM/MANUAL: CPT

## 2020-03-06 PROCEDURE — 85097 BONE MARROW INTERPRETATION: CPT

## 2020-03-06 PROCEDURE — 88342 IMHCHEM/IMCYTCHM 1ST ANTB: CPT

## 2020-03-06 PROCEDURE — 88305 TISSUE EXAM BY PATHOLOGIST: CPT

## 2020-03-09 ENCOUNTER — RESULT REVIEW (OUTPATIENT)
Age: 70
End: 2020-03-09

## 2020-03-09 ENCOUNTER — LABORATORY RESULT (OUTPATIENT)
Age: 70
End: 2020-03-09

## 2020-03-09 ENCOUNTER — APPOINTMENT (OUTPATIENT)
Dept: INFUSION THERAPY | Facility: HOSPITAL | Age: 70
End: 2020-03-09

## 2020-03-09 DIAGNOSIS — Z51.89 ENCOUNTER FOR OTHER SPECIFIED AFTERCARE: ICD-10-CM

## 2020-03-09 LAB
BASOPHILS # BLD AUTO: 0.02 K/UL — SIGNIFICANT CHANGE UP (ref 0–0.2)
BASOPHILS NFR BLD AUTO: 0.6 % — SIGNIFICANT CHANGE UP (ref 0–2)
EOSINOPHIL # BLD AUTO: 0.06 K/UL — SIGNIFICANT CHANGE UP (ref 0–0.5)
EOSINOPHIL NFR BLD AUTO: 1.7 % — SIGNIFICANT CHANGE UP (ref 0–6)
HCT VFR BLD CALC: 34.2 % — LOW (ref 39–50)
HEMATOPATHOLOGY REPORT: SIGNIFICANT CHANGE UP
HGB BLD-MCNC: 12.3 G/DL — LOW (ref 13–17)
IMM GRANULOCYTES NFR BLD AUTO: 0.6 % — SIGNIFICANT CHANGE UP (ref 0–1.5)
LYMPHOCYTES # BLD AUTO: 0.82 K/UL — LOW (ref 1–3.3)
LYMPHOCYTES # BLD AUTO: 23 % — SIGNIFICANT CHANGE UP (ref 13–44)
MCHC RBC-ENTMCNC: 34.4 PG — HIGH (ref 27–34)
MCHC RBC-ENTMCNC: 36 GM/DL — SIGNIFICANT CHANGE UP (ref 32–36)
MCV RBC AUTO: 95.5 FL — SIGNIFICANT CHANGE UP (ref 80–100)
MONOCYTES # BLD AUTO: 0.86 K/UL — SIGNIFICANT CHANGE UP (ref 0–0.9)
MONOCYTES NFR BLD AUTO: 24.1 % — HIGH (ref 2–14)
NEUTROPHILS # BLD AUTO: 1.79 K/UL — LOW (ref 1.8–7.4)
NEUTROPHILS NFR BLD AUTO: 50 % — SIGNIFICANT CHANGE UP (ref 43–77)
NRBC # BLD: 0 /100 WBCS — SIGNIFICANT CHANGE UP (ref 0–0)
PLATELET # BLD AUTO: 172 K/UL — SIGNIFICANT CHANGE UP (ref 150–400)
RBC # BLD: 3.58 M/UL — LOW (ref 4.2–5.8)
RBC # FLD: 15.9 % — HIGH (ref 10.3–14.5)
WBC # BLD: 3.57 K/UL — LOW (ref 3.8–10.5)
WBC # FLD AUTO: 3.57 K/UL — LOW (ref 3.8–10.5)

## 2020-03-10 DIAGNOSIS — Z51.11 ENCOUNTER FOR ANTINEOPLASTIC CHEMOTHERAPY: ICD-10-CM

## 2020-03-10 DIAGNOSIS — R11.2 NAUSEA WITH VOMITING, UNSPECIFIED: ICD-10-CM

## 2020-03-16 ENCOUNTER — RESULT REVIEW (OUTPATIENT)
Age: 70
End: 2020-03-16

## 2020-03-16 ENCOUNTER — APPOINTMENT (OUTPATIENT)
Dept: HEMATOLOGY ONCOLOGY | Facility: CLINIC | Age: 70
End: 2020-03-16
Payer: MEDICARE

## 2020-03-16 ENCOUNTER — APPOINTMENT (OUTPATIENT)
Dept: INFUSION THERAPY | Facility: HOSPITAL | Age: 70
End: 2020-03-16

## 2020-03-16 VITALS
TEMPERATURE: 97.9 F | SYSTOLIC BLOOD PRESSURE: 184 MMHG | WEIGHT: 121.7 LBS | OXYGEN SATURATION: 100 % | BODY MASS INDEX: 20.89 KG/M2 | HEART RATE: 66 BPM | DIASTOLIC BLOOD PRESSURE: 78 MMHG | RESPIRATION RATE: 16 BRPM

## 2020-03-16 LAB
BASOPHILS # BLD AUTO: 0.01 K/UL — SIGNIFICANT CHANGE UP (ref 0–0.2)
BASOPHILS NFR BLD AUTO: 0.2 % — SIGNIFICANT CHANGE UP (ref 0–2)
EOSINOPHIL # BLD AUTO: 0.03 K/UL — SIGNIFICANT CHANGE UP (ref 0–0.5)
EOSINOPHIL NFR BLD AUTO: 0.5 % — SIGNIFICANT CHANGE UP (ref 0–6)
HCT VFR BLD CALC: 35.1 % — LOW (ref 39–50)
HGB BLD-MCNC: 12 G/DL — LOW (ref 13–17)
IMM GRANULOCYTES NFR BLD AUTO: 0.8 % — SIGNIFICANT CHANGE UP (ref 0–1.5)
LYMPHOCYTES # BLD AUTO: 0.98 K/UL — LOW (ref 1–3.3)
LYMPHOCYTES # BLD AUTO: 16.4 % — SIGNIFICANT CHANGE UP (ref 13–44)
MCHC RBC-ENTMCNC: 33.6 PG — SIGNIFICANT CHANGE UP (ref 27–34)
MCHC RBC-ENTMCNC: 34.2 GM/DL — SIGNIFICANT CHANGE UP (ref 32–36)
MCV RBC AUTO: 98.3 FL — SIGNIFICANT CHANGE UP (ref 80–100)
MONOCYTES # BLD AUTO: 0.97 K/UL — HIGH (ref 0–0.9)
MONOCYTES NFR BLD AUTO: 16.3 % — HIGH (ref 2–14)
NEUTROPHILS # BLD AUTO: 3.92 K/UL — SIGNIFICANT CHANGE UP (ref 1.8–7.4)
NEUTROPHILS NFR BLD AUTO: 65.8 % — SIGNIFICANT CHANGE UP (ref 43–77)
NRBC # BLD: 0 /100 WBCS — SIGNIFICANT CHANGE UP (ref 0–0)
PLATELET # BLD AUTO: 171 K/UL — SIGNIFICANT CHANGE UP (ref 150–400)
RBC # BLD: 3.57 M/UL — LOW (ref 4.2–5.8)
RBC # FLD: 15.3 % — HIGH (ref 10.3–14.5)
WBC # BLD: 5.96 K/UL — SIGNIFICANT CHANGE UP (ref 3.8–10.5)
WBC # FLD AUTO: 5.96 K/UL — SIGNIFICANT CHANGE UP (ref 3.8–10.5)

## 2020-03-16 PROCEDURE — 99214 OFFICE O/P EST MOD 30 MIN: CPT

## 2020-03-17 ENCOUNTER — OUTPATIENT (OUTPATIENT)
Dept: OUTPATIENT SERVICES | Facility: HOSPITAL | Age: 70
LOS: 1 days | Discharge: HOME | End: 2020-03-17

## 2020-03-17 DIAGNOSIS — C90.00 MULTIPLE MYELOMA NOT HAVING ACHIEVED REMISSION: ICD-10-CM

## 2020-03-23 ENCOUNTER — APPOINTMENT (OUTPATIENT)
Dept: INFUSION THERAPY | Facility: HOSPITAL | Age: 70
End: 2020-03-23

## 2020-03-30 ENCOUNTER — APPOINTMENT (OUTPATIENT)
Dept: INFUSION THERAPY | Facility: HOSPITAL | Age: 70
End: 2020-03-30

## 2020-04-20 ENCOUNTER — APPOINTMENT (OUTPATIENT)
Dept: HEMATOLOGY ONCOLOGY | Facility: CLINIC | Age: 70
End: 2020-04-20

## 2020-04-27 ENCOUNTER — OUTPATIENT (OUTPATIENT)
Dept: OUTPATIENT SERVICES | Facility: HOSPITAL | Age: 70
LOS: 1 days | Discharge: HOME | End: 2020-04-27

## 2020-04-27 DIAGNOSIS — C90.00 MULTIPLE MYELOMA NOT HAVING ACHIEVED REMISSION: ICD-10-CM

## 2020-04-30 NOTE — ASSESSMENT
[FreeTextEntry1] : 69 yo M with hx MI s/p stent 2015, HTN, high chol, presents for IgG kappa/kappa light chain MM newly dx'ed stage II IPSS score\par Pt has extensive lytic lesions as well as C7/T8 spine lesions causing nerve compression -s/p XRT 8/22-8/29/19\par Started Velcade/Dexa  weekly 9/9/19\par Started Revlimid on 10/21/19\par BOTH ON HOLD SINCE 2/3/20 FOR PANCYTOPENIA\par \par -RVD on HOLD. VGPR on last blood work on 2/2/30\par \par -pt had dental clearance on 10/29/19, seen by Dr. Kramer ph  - Zometa q4 wks started on 11/11/19, plan to give for 2 yrs.  Zometa was held on 12/3/19 due to low calcium (7.9), now pt on Ca. Cont q4 wks Zometa -last one was given on 2/3/20\par \par -Hct 12.   Monitor CBC.\par \par -repeat myeloma labs done on 3/9/2020-continue to improve.  Manubrial lesion decreased in size. Repeat labs for myeloma today and monthly at each visit\par \par BM bx done on 3/4/20- Bone marrow biopsy and bone marrow aspirate\par - Cellular bone marrow with trilineage hematopoiesis,\par increased monocytic cells,  increased iron stores\par - Plasma cells present (less than 5%)\par \par Discussed with Dr Rahman.  Pt in VGPR.  Will hold further treatment and reevaluate in one month due to Cornoavirus pandemic.   \par \par

## 2020-04-30 NOTE — REVIEW OF SYSTEMS
[Constipation] : constipation [Negative] : Allergic/Immunologic [Fever] : no fever [Chills] : no chills [Night Sweats] : no night sweats [Fatigue] : no fatigue [Recent Change In Weight] : ~T no recent weight change [Eye Pain] : no eye pain [Red Eyes] : eyes not red [Dry Eyes] : no dryness of the eyes [Vision Problems] : no vision problems [Dysphagia] : no dysphagia [Loss of Hearing] : no loss of hearing [Nosebleeds] : no nosebleeds [Hoarseness] : no hoarseness [Odynophagia] : no odynophagia [Mucosal Pain] : no mucosal pain [Chest Pain] : no chest pain [Palpitations] : no palpitations [Leg Claudication] : no intermittent leg claudication [Lower Ext Edema] : no lower extremity edema [Shortness Of Breath] : no shortness of breath [Wheezing] : no wheezing [Cough] : no cough [SOB on Exertion] : no shortness of breath during exertion [Abdominal Pain] : no abdominal pain [Vomiting] : no vomiting [Diarrhea] : no diarrhea [Dysuria] : no dysuria [Incontinence] : no incontinence [Joint Pain] : no joint pain [Muscle Pain] : no muscle pain [Muscle Weakness] : no muscle weakness [Skin Rash] : no skin rash [Skin Wound] : no skin wound [Confused] : no confusion [Difficulty Walking] : no difficulty walking [Suicidal] : not suicidal [Depression] : no depression [Easy Bleeding] : no tendency for easy bleeding [Easy Bruising] : no tendency for easy bruising [FreeTextEntry2] : weight is now stable [FreeTextEntry7] : colonoscopy in June 2019 -normal. reports some constipation-uses Dulcolax with good results  [FreeTextEntry9] : back pain -improved after radiation

## 2020-04-30 NOTE — RESULTS/DATA
[FreeTextEntry1] : Today's CBC (3/16/20) wbc 5.96 ANC 3920 Hb 12.0 plt 171 \par \par (ON 2/24/20) wbc 2.6 ANC 1500 Hb 9.2 plt 40\par On 1/13/20) wbc 5.2 Hb 8.9 plt 183\par ON 12/18/19) wbc 2.5 Hb 8.8 plt 91 ANC 1200   \par On 11/18/19) wbc 9.9 Hb 11.4 plt 169 ANC 7400\par On 10/28/19) wbc 8.8 Hb 12 plt 117\par On 10/1/19) wbc 12.5 Hb 10.9 plt 215 ANC 7700\par On 9/3/19) wbc 11.9 Hb 10.8 plt 239 ANC 10.4k/ul  Mo 500 Eo 200 Ba 0\par \par RADIOLOGY\par On 9/7/19 PET/CT \par IMPRESSION:  Abnormal whole body FDG-PET/CT scan.\par \par 1. Numerous hypermetabolic osseous foci in the axial and appendicular \par skeleton mostly with corresponding lucencies on CT compatible with the \par diagnosis of multiple myeloma. Lesions in bilateral proximal humeri and \par femurs, although not demonstrating cortical disruption, may place the \par patient at risk for pathologic fractures.\par \par 2. Nonspecific hypermetabolism in the distal esophagus extending into the \par GE junction with questionable wall thickening on CT with associated \par adjacent hypermetabolic paraesophageal lymph node. Suggest correlation \par with endoscopy to exclude any abnormality in this region.\par \par 3. Nonspecific hypermetabolism in the right palmar musculature and left \par masseter muscle. Please correlate clinically.\par \par ON 9/9/19 MRI cervical\par \par IMPRESSION: \par Diffuse osseous metastases, consistent with multiple myeloma.\par Large calvarial lesion which may extend into the sella turcica.\par No evidence of epidural extension of tumor.\par Multilevel cervical spondylosis described.

## 2020-04-30 NOTE — HISTORY OF PRESENT ILLNESS
[de-identified] : Mr Rand was referred to my office for newly diagnosed IgG kappa multiple myeloma. The patient's primary language is Occitan, he has his daughter Mirlande as the , per his wishes. He was seen by Dr. Andrés Valle (hematology) in Feb 2019  and had a BM bx showing 10-15% plasma cells, concerning for smoldering myeloma. According to the daughter, he was awaiting insurance approval for imaging studies. He was admitted from 8/18/19 at [facility] where he presented with pain in the L leg/lower back, worsening for the past 2-3 months. On labs, he was found to have a total protein of 10, Ca level 12 until 8/29/19. Dental consult was called and patient needs to have 10 teeth extracted -thus, IV bisphosphonates were not given, pt improved with hydration. He also had a slightly inc'ed creatinine -improved after IVF. CT C/A/P 8/18/19 showed a lytic expansile soft tissue lesion centered in the manubrium measuring approximately 7.3 x 3.4 x 4.3 cm, invading both 1st ribs. There was also a A lytic expansile soft tissue lesion in the T8 vertebral body causing mass effect on spinal canal and obliterating left neural foramina at T7-8 and T8-9. ON 8/22/19 an MRI thoracic spine was done showing multiple areas of tumor involvement within the thoracic spine in keeping with the patient's history of multiple myeloma. Prominent lesion within C7 on the right incompletely seen.\par Large lesion within T8 on the left producing epidural tumor extension and moderate narrowing of the spinal canal with mild flattening of the spinal cord. He was evaluated on 8/22/19 by Dr. Foley (Rad Onc) and was given 5 fractions of XRT from 8/23/19 to 8/29/19 to C7 and T8. He was discharged home on 8/29/19 with follow up in the outpatient office, and on Dexamethasone 4mg po daily. \par \par  [de-identified] : The patient is here for IgG kappa MM with multiple bone lytic lesions, hypercalcemia (resolved) and mild anemia. He still has pain in b/l ribs, takes Oxy 5mg po once daily. He has no weakness in his legs and back pain has improved after XRT.  He started Velcade/Dexa weekly on 9/919.  \par \par The patient got the Revlimid and started it C1 day 1 on 10/21/19 and hed been tolerating it well. Starting in February, his blood counts became low -plt count 54, Hb 8.3 wbc 5.8. Revlimid was held -after 2/3/20. He got 1 shot Velcade/Dexa on 2/3/20, NO chemo since then. His counts have not recovered. He feels well -no fevers, no bruising, no changes in medications. \par \par Pt feeling well-denies any c/o. Has not had treatment since 2/3/20 due to cytopenias.  BM done on 3/4/20.

## 2020-05-04 ENCOUNTER — APPOINTMENT (OUTPATIENT)
Dept: HEMATOLOGY ONCOLOGY | Facility: CLINIC | Age: 70
End: 2020-05-04
Payer: MEDICARE

## 2020-05-04 DIAGNOSIS — Z86.2 PERSONAL HISTORY OF DISEASES OF THE BLOOD AND BLOOD-FORMING ORGANS AND CERTAIN DISORDERS INVOLVING THE IMMUNE MECHANISM: ICD-10-CM

## 2020-05-04 PROCEDURE — 99213 OFFICE O/P EST LOW 20 MIN: CPT | Mod: 95

## 2020-05-04 PROCEDURE — 99202 OFFICE O/P NEW SF 15 MIN: CPT | Mod: 95

## 2020-05-04 RX ORDER — OXYCODONE 5 MG/1
5 TABLET ORAL DAILY
Qty: 30 | Refills: 0 | Status: DISCONTINUED | COMMUNITY
Start: 2020-01-02 | End: 2020-05-04

## 2020-05-04 NOTE — HISTORY OF PRESENT ILLNESS
[Home] : at home, [unfilled] , at the time of the visit. [Medical Office: ([facility])___] : at the medical office located in  [Family Member] : family member [Self] : self [Patient] : the patient [de-identified] : Mr Rand was referred to my office for newly diagnosed IgG kappa multiple myeloma. The patient's primary language is Lao, he has his daughter Mirlande as the , per his wishes. He was seen by Dr. Andrés Valle (hematology) in Feb 2019  and had a BM bx showing 10-15% plasma cells, concerning for smoldering myeloma. According to the daughter, he was awaiting insurance approval for imaging studies. He was admitted from 8/18/19 at [facility] where he presented with pain in the L leg/lower back, worsening for the past 2-3 months. On labs, he was found to have a total protein of 10, Ca level 12 until 8/29/19. Dental consult was called and patient needs to have 10 teeth extracted -thus, IV bisphosphonates were not given, pt improved with hydration. He also had a slightly inc'ed creatinine -improved after IVF. CT C/A/P 8/18/19 showed a lytic expansile soft tissue lesion centered in the manubrium measuring approximately 7.3 x 3.4 x 4.3 cm, invading both 1st ribs. There was also a A lytic expansile soft tissue lesion in the T8 vertebral body causing mass effect on spinal canal and obliterating left neural foramina at T7-8 and T8-9. ON 8/22/19 an MRI thoracic spine was done showing multiple areas of tumor involvement within the thoracic spine in keeping with the patient's history of multiple myeloma. Prominent lesion within C7 on the right incompletely seen.\par Large lesion within T8 on the left producing epidural tumor extension and moderate narrowing of the spinal canal with mild flattening of the spinal cord. He was evaluated on 8/22/19 by Dr. Foley (Rad Onc) and was given 5 fractions of XRT from 8/23/19 to 8/29/19 to C7 and T8. He was discharged home on 8/29/19 with follow up in the outpatient office, and on Dexamethasone 4mg po daily. \par \par  [de-identified] : The patient is being followed for IgG kappa MM with multiple bone lytic lesions, hypercalcemia (resolved) and mild anemia.  He started Velcade/Dexa weekly on 9/919.  The patient got the Revlimid and started it C1 day 1 on 10/21/19 and hed been tolerating it well. \par \par Starting in February, his blood counts became low -plt count 54, Hb 8.3 wbc 5.8. Revlimid was held -after 2/3/20. He got 1 shot Velcade/Dexa on 2/3/20, NO chemo since then. \par BM done on 3/4/20 to eval for residual disease (MM) vs. MDS. BM showed recovering marrow elements, plasma cells <5%. SPEP/MICAH from 3/9/20 showed NO monoclonal proteins, c/w CR.  \par \par Patient seen via Telehealth today in order to minimize risk of vale COVID-19 infection. Verbal consent given on 5/3/20 at 11:20am by ludwin Nogueira and patient. He has not been sick; he lives with his wife and daughter and 2 grandchildren (11yo and 11yo). No fevers/cough/sore throat/SOB. No back pain. \par \par \par

## 2020-05-04 NOTE — REVIEW OF SYSTEMS
[Constipation] : constipation [Negative] : Allergic/Immunologic [Chills] : no chills [Fever] : no fever [Night Sweats] : no night sweats [Fatigue] : no fatigue [Recent Change In Weight] : ~T no recent weight change [Eye Pain] : no eye pain [Dry Eyes] : no dryness of the eyes [Red Eyes] : eyes not red [Loss of Hearing] : no loss of hearing [Dysphagia] : no dysphagia [Vision Problems] : no vision problems [Hoarseness] : no hoarseness [Nosebleeds] : no nosebleeds [Odynophagia] : no odynophagia [Mucosal Pain] : no mucosal pain [Chest Pain] : no chest pain [Palpitations] : no palpitations [Leg Claudication] : no intermittent leg claudication [Lower Ext Edema] : no lower extremity edema [Wheezing] : no wheezing [Shortness Of Breath] : no shortness of breath [Cough] : no cough [SOB on Exertion] : no shortness of breath during exertion [Vomiting] : no vomiting [Abdominal Pain] : no abdominal pain [Diarrhea] : no diarrhea [Dysuria] : no dysuria [Incontinence] : no incontinence [Joint Pain] : no joint pain [Muscle Pain] : no muscle pain [Muscle Weakness] : no muscle weakness [Skin Rash] : no skin rash [Skin Wound] : no skin wound [Confused] : no confusion [Difficulty Walking] : no difficulty walking [Suicidal] : not suicidal [Depression] : no depression [Easy Bleeding] : no tendency for easy bleeding [Easy Bruising] : no tendency for easy bruising [FreeTextEntry2] : weight is now stable [FreeTextEntry7] : colonoscopy in June 2019 -normal. reports some constipation-uses Dulcolax with good results  [FreeTextEntry9] : back pain -improved after radiation

## 2020-05-04 NOTE — ASSESSMENT
[FreeTextEntry1] : 69 yo M with hx MI s/p stent 2015, HTN, high chol, presents for IgG kappa/kappa light chain MM newly dx'ed stage II IPSS score\par Pt has extensive lytic lesions as well as C7/T8 spine lesions causing nerve compression -s/p XRT 8/22-8/29/19\par Started Velcade/Dexa  weekly 9/9/19\par Started Revlimid on 10/21/19\par BOTH ON HOLD SINCE 2/3/20 FOR PANCYTOPENIA\par \par -visit done today via telemedicine ([employer]) in order to minimize COVID19 transmission. Spoke to patient and his daughter. He had been on RVD, on HOLD due to pancytopenia. BM bx done 3/4/20 showed <5% plasma cells. \par Plan is to do Zometa/PET scan and full myeloma blood work next week (same day, to minimize pt getting out of the house). If PET scan is good (no new bone lesions) and CBC normal, will start maintenance Revlimid 10mg po daily. Once Revlimid is started, plan to do home blood draws weekly to monitor for cytopenias\par \par -also discussed with patient signs/symptoms of COVID-19 infection and preventative measures\par \par -pt had dental clearance on 10/29/19, seen by Dr. Kramer ph  - Zometa q4 wks started on 11/11/19, plan to give for 2 yrs.  Zometa was held on 12/3/19 due to low calcium (7.9), now pt on Ca. Cont q4 wks Zometa -last one was given on 3/3/20. Will resume Zometa, asked pt to schedule for next week. Also, last PET/CT in Sept 2019 --> will schedule PET/CT at this visit to eval bone lesions\par \par -repeat myeloma labs done on 3/9/2020- showed CR. Repeat full blood work to be done at [facility] next week when he comes in for Zometa\par \par -follow up in July 2020 via telemedicine\par \par \par

## 2020-05-04 NOTE — RESULTS/DATA
[FreeTextEntry1] : 3/16/20) wbc 5.96 ANC 3920 Hb 12.0 plt 171 \par \par (ON 2/24/20) wbc 2.6 ANC 1500 Hb 9.2 plt 40\par On 1/13/20) wbc 5.2 Hb 8.9 plt 183\par ON 12/18/19) wbc 2.5 Hb 8.8 plt 91 ANC 1200   \par On 11/18/19) wbc 9.9 Hb 11.4 plt 169 ANC 7400\par On 10/28/19) wbc 8.8 Hb 12 plt 117\par On 10/1/19) wbc 12.5 Hb 10.9 plt 215 ANC 7700\par On 9/3/19) wbc 11.9 Hb 10.8 plt 239 ANC 10.4k/ul  Mo 500 Eo 200 Ba 0\par \par RADIOLOGY\par On 9/7/19 PET/CT \par IMPRESSION:  Abnormal whole body FDG-PET/CT scan.\par \par 1. Numerous hypermetabolic osseous foci in the axial and appendicular \par skeleton mostly with corresponding lucencies on CT compatible with the \par diagnosis of multiple myeloma. Lesions in bilateral proximal humeri and \par femurs, although not demonstrating cortical disruption, may place the \par patient at risk for pathologic fractures.\par \par 2. Nonspecific hypermetabolism in the distal esophagus extending into the \par GE junction with questionable wall thickening on CT with associated \par adjacent hypermetabolic paraesophageal lymph node. Suggest correlation \par with endoscopy to exclude any abnormality in this region.\par \par 3. Nonspecific hypermetabolism in the right palmar musculature and left \par masseter muscle. Please correlate clinically.\par \par ON 9/9/19 MRI cervical\par \par IMPRESSION: \par Diffuse osseous metastases, consistent with multiple myeloma.\par Large calvarial lesion which may extend into the sella turcica.\par No evidence of epidural extension of tumor.\par Multilevel cervical spondylosis described.

## 2020-05-05 ENCOUNTER — APPOINTMENT (OUTPATIENT)
Dept: CARDIOLOGY | Facility: CLINIC | Age: 70
End: 2020-05-05
Payer: MEDICARE

## 2020-05-05 PROCEDURE — 99213 OFFICE O/P EST LOW 20 MIN: CPT | Mod: 95

## 2020-05-05 RX ORDER — CALCIUM CITRATE/VITAMIN D3 200MG-6.25
TABLET ORAL TWICE DAILY
Refills: 0 | Status: ACTIVE | COMMUNITY

## 2020-05-05 RX ORDER — CHOLECALCIFEROL (VITAMIN D3) 1250 MCG
1.25 MG CAPSULE ORAL
Qty: 6 | Refills: 0 | Status: DISCONTINUED | COMMUNITY
Start: 2019-09-03 | End: 2020-05-05

## 2020-05-05 RX ORDER — LOSARTAN POTASSIUM 100 MG/1
100 TABLET, FILM COATED ORAL DAILY
Refills: 0 | Status: ACTIVE | COMMUNITY
Start: 2019-09-03

## 2020-05-05 NOTE — HISTORY OF PRESENT ILLNESS
[Home] : at home, [unfilled] , at the time of the visit. [Medical Office: ([facility])___] : at the medical office located in  [Other:____] : [unfilled] [Patient] : the patient [FreeTextEntry1] : Robert LópezLisman is a 70 year old male with history of CAD s/p stent, hypertension, hypercholesterolemia for tele health visit. Denies any chest pain or palpitations. No shortness of breath on exertion. Works in garden. History of Myeloma, had bone marrow biopsy and chemotherapy was stopped. Getting PET scan. Received 2 units of PRBC in March. Compliant to medications.  [FreeTextEntry4] : daughter Ms Nogueira nighat

## 2020-05-05 NOTE — REVIEW OF SYSTEMS
[Blurry Vision] : blurred vision [Joint Pain] : joint pain [Numbness (Hypesthesia)] : numbness [Negative] : Integumentary [Seeing Double (Diplopia)] : no diplopia [Eye Pain] : no eye pain [Shortness Of Breath] : no shortness of breath [Eyeglasses] : not currently wearing eyeglasses [Chest Pain] : no chest pain [Palpitations] : no palpitations [Lower Ext Edema] : no extremity edema [Abdominal Pain] : no abdominal pain [Heartburn] : no heartburn [Change in Appetite] : no change in appetite [Dysphagia] : no dysphagia [Muscle Cramps] : no muscle cramps [Tremor] : no tremor was seen [Dizziness] : no dizziness [Limb Weakness (Paresis)] : no limb weakness [Tingling (Paresthesia)] : no tingling [Convulsions] : no convulsions [Easy Bruising] : no tendency for easy bruising [Easy Bleeding] : no tendency for easy bleeding

## 2020-05-05 NOTE — DISCUSSION/SUMMARY
[FreeTextEntry1] : In a summary Yoly López is an elderly male with CAD s/p stent, stable. Hypertension, controlled. Continue current medications. Hypercholesterolemia, on statins and low cholesterol diet. Follow up in 6 months.

## 2020-05-05 NOTE — PHYSICAL EXAM
[General Appearance - Well Developed] : well developed [Normal Appearance] : normal appearance [Well Groomed] : well groomed [General Appearance - Well Nourished] : well nourished [Oriented To Time, Place, And Person] : oriented to person, place, and time [General Appearance - In No Acute Distress] : no acute distress [Affect] : the affect was normal [Impaired Insight] : insight and judgment were intact [Mood] : the mood was normal [No Anxiety] : not feeling anxious

## 2020-05-06 ENCOUNTER — APPOINTMENT (OUTPATIENT)
Dept: HEMATOLOGY ONCOLOGY | Facility: CLINIC | Age: 70
End: 2020-05-06

## 2020-05-07 ENCOUNTER — LABORATORY RESULT (OUTPATIENT)
Age: 70
End: 2020-05-07

## 2020-05-07 ENCOUNTER — RESULT REVIEW (OUTPATIENT)
Age: 70
End: 2020-05-07

## 2020-05-07 ENCOUNTER — APPOINTMENT (OUTPATIENT)
Dept: INFUSION THERAPY | Facility: HOSPITAL | Age: 70
End: 2020-05-07

## 2020-05-07 ENCOUNTER — APPOINTMENT (OUTPATIENT)
Dept: NUCLEAR MEDICINE | Facility: IMAGING CENTER | Age: 70
End: 2020-05-07
Payer: MEDICARE

## 2020-05-07 ENCOUNTER — APPOINTMENT (OUTPATIENT)
Dept: HEMATOLOGY ONCOLOGY | Facility: CLINIC | Age: 70
End: 2020-05-07

## 2020-05-07 ENCOUNTER — OUTPATIENT (OUTPATIENT)
Dept: OUTPATIENT SERVICES | Facility: HOSPITAL | Age: 70
LOS: 1 days | End: 2020-05-07
Payer: MEDICARE

## 2020-05-07 DIAGNOSIS — Z85.79 PERSONAL HISTORY OF OTHER MALIGNANT NEOPLASMS OF LYMPHOID, HEMATOPOIETIC AND RELATED TISSUES: ICD-10-CM

## 2020-05-07 LAB
BASOPHILS # BLD AUTO: 0.05 K/UL — SIGNIFICANT CHANGE UP (ref 0–0.2)
BASOPHILS NFR BLD AUTO: 0.7 % — SIGNIFICANT CHANGE UP (ref 0–2)
EOSINOPHIL # BLD AUTO: 0.1 K/UL — SIGNIFICANT CHANGE UP (ref 0–0.5)
EOSINOPHIL NFR BLD AUTO: 1.4 % — SIGNIFICANT CHANGE UP (ref 0–6)
HCT VFR BLD CALC: 32 % — LOW (ref 39–50)
HGB BLD-MCNC: 10.7 G/DL — LOW (ref 13–17)
IMM GRANULOCYTES NFR BLD AUTO: 0.4 % — SIGNIFICANT CHANGE UP (ref 0–1.5)
LYMPHOCYTES # BLD AUTO: 0.81 K/UL — LOW (ref 1–3.3)
LYMPHOCYTES # BLD AUTO: 11.7 % — LOW (ref 13–44)
MCHC RBC-ENTMCNC: 32.7 PG — SIGNIFICANT CHANGE UP (ref 27–34)
MCHC RBC-ENTMCNC: 33.4 GM/DL — SIGNIFICANT CHANGE UP (ref 32–36)
MCV RBC AUTO: 97.9 FL — SIGNIFICANT CHANGE UP (ref 80–100)
MONOCYTES # BLD AUTO: 0.59 K/UL — SIGNIFICANT CHANGE UP (ref 0–0.9)
MONOCYTES NFR BLD AUTO: 8.5 % — SIGNIFICANT CHANGE UP (ref 2–14)
NEUTROPHILS # BLD AUTO: 5.34 K/UL — SIGNIFICANT CHANGE UP (ref 1.8–7.4)
NEUTROPHILS NFR BLD AUTO: 77.3 % — HIGH (ref 43–77)
NRBC # BLD: 0 /100 WBCS — SIGNIFICANT CHANGE UP (ref 0–0)
PLATELET # BLD AUTO: 166 K/UL — SIGNIFICANT CHANGE UP (ref 150–400)
RBC # BLD: 3.27 M/UL — LOW (ref 4.2–5.8)
RBC # FLD: 13.1 % — SIGNIFICANT CHANGE UP (ref 10.3–14.5)
WBC # BLD: 6.92 K/UL — SIGNIFICANT CHANGE UP (ref 3.8–10.5)
WBC # FLD AUTO: 6.92 K/UL — SIGNIFICANT CHANGE UP (ref 3.8–10.5)

## 2020-05-07 PROCEDURE — 78816 PET IMAGE W/CT FULL BODY: CPT | Mod: 26,PS

## 2020-05-07 PROCEDURE — A9552: CPT

## 2020-05-07 PROCEDURE — 78816 PET IMAGE W/CT FULL BODY: CPT

## 2020-06-12 ENCOUNTER — OUTPATIENT (OUTPATIENT)
Dept: OUTPATIENT SERVICES | Facility: HOSPITAL | Age: 70
LOS: 1 days | Discharge: HOME | End: 2020-06-12

## 2020-06-12 DIAGNOSIS — D64.9 ANEMIA, UNSPECIFIED: ICD-10-CM

## 2020-06-15 ENCOUNTER — OUTPATIENT (OUTPATIENT)
Dept: OUTPATIENT SERVICES | Facility: HOSPITAL | Age: 70
LOS: 1 days | Discharge: HOME | End: 2020-06-15

## 2020-06-15 DIAGNOSIS — C90.00 MULTIPLE MYELOMA NOT HAVING ACHIEVED REMISSION: ICD-10-CM

## 2020-06-17 ENCOUNTER — APPOINTMENT (OUTPATIENT)
Dept: HEMATOLOGY ONCOLOGY | Facility: CLINIC | Age: 70
End: 2020-06-17
Payer: MEDICARE

## 2020-06-17 PROCEDURE — 99213 OFFICE O/P EST LOW 20 MIN: CPT | Mod: 95

## 2020-06-17 NOTE — ASSESSMENT
[FreeTextEntry1] : 69 yo M with hx MI s/p stent 2015, HTN, high chol, presents for IgG kappa/kappa light chain MM newly dx'ed stage II IPSS score\par Pt has extensive lytic lesions as well as C7/T8 spine lesions causing nerve compression -s/p XRT 8/22-8/29/19\par Started Velcade/Dexa  weekly 9/9/19\par Started Revlimid on 10/21/19\par BOTH ON HOLD SINCE 2/3/20 FOR PANCYTOPENIA -resolved since March 2020\par \par -visit done today via telemedicine ([employer]) in order to minimize COVID19 transmission. Spoke to patient and his daughter. Blood work done on 5/7/20 showed CR. PET scan 5/7/20 also reviewed -also in CR other than dental, pt advised to f/u with dental. Rib fractures ? etiology, will follow. He had been on RVD, on hold since Feb. d/w pt and daughter maintenance Revlimid 10mg po vs. SQ Velcade -would like to do Velcade q2wks due to more tolerable side effects. Pt's daughter will schedule it for this week or next week\par \par -blood work monthly -will check myeloma labs when pt comes in for Velcade, to be done in the next 2 wks\par \par -pt had dental clearance on 10/29/19, seen by Dr. Kramer ph  - Zometa q4 wks started on 11/11/19, plan to give for 2 yrs.  Zometa was held on 12/3/19 due to low calcium (7.9), now pt on Ca. Cont q4 wks Zometa -last one was given on 3/3/20, then pt had treatment interruption due to COVID. Given new finding on PET scan, will HOLD Zometa until dental re-eval\par \par -follow up monthly \par \par

## 2020-06-17 NOTE — HISTORY OF PRESENT ILLNESS
[Medical Office: ([facility])___] : at the medical office located in  [Home] : at home, [unfilled] , at the time of the visit. [Family Member] : family member [Patient] : the patient [Self] : self [de-identified] : Mr Rand was referred to my office for newly diagnosed IgG kappa multiple myeloma. The patient's primary language is Urdu, he has his daughter Mirlande as the , per his wishes. He was seen by Dr. Andrés Valle (hematology) in Feb 2019  and had a BM bx showing 10-15% plasma cells, concerning for smoldering myeloma. According to the daughter, he was awaiting insurance approval for imaging studies. He was admitted from 8/18/19 at [facility] where he presented with pain in the L leg/lower back, worsening for the past 2-3 months. On labs, he was found to have a total protein of 10, Ca level 12 until 8/29/19. Dental consult was called and patient needs to have 10 teeth extracted -thus, IV bisphosphonates were not given, pt improved with hydration. He also had a slightly inc'ed creatinine -improved after IVF. CT C/A/P 8/18/19 showed a lytic expansile soft tissue lesion centered in the manubrium measuring approximately 7.3 x 3.4 x 4.3 cm, invading both 1st ribs. There was also a A lytic expansile soft tissue lesion in the T8 vertebral body causing mass effect on spinal canal and obliterating left neural foramina at T7-8 and T8-9. ON 8/22/19 an MRI thoracic spine was done showing multiple areas of tumor involvement within the thoracic spine in keeping with the patient's history of multiple myeloma. Prominent lesion within C7 on the right incompletely seen.\par Large lesion within T8 on the left producing epidural tumor extension and moderate narrowing of the spinal canal with mild flattening of the spinal cord. He was evaluated on 8/22/19 by Dr. Foley (Rad Onc) and was given 5 fractions of XRT from 8/23/19 to 8/29/19 to C7 and T8. He was discharged home on 8/29/19 with follow up in the outpatient office, and on Dexamethasone 4mg po daily. \par \par  [de-identified] : The patient is being followed for IgG kappa MM with multiple bone lytic lesions, hypercalcemia (resolved) and mild anemia.  He started Velcade/Dexa weekly on 9/919.  The patient got the Revlimid and started it C1 day 1 on 10/21/19 and hed been tolerating it well. \par \par Starting in February, his blood counts became low -plt count 54, Hb 8.3 wbc 5.8. Revlimid was held -after 2/3/20. He got 1 shot Velcade/Dexa on 2/3/20, NO chemo since then. \par BM done on 3/4/20 to eval for residual disease (MM) vs. MDS. BM showed recovering marrow elements, plasma cells <5%. SPEP/MICAH from 3/9/20 showed NO monoclonal proteins, c/w CR.  \par \par Patient seen via Telehealth today in order to minimize risk of vale COVID-19 infection. Verbal consent given on 6/17/20 at 12:20pm by ludwin Nogueira and patient. He feels well, has not been sick. He had blood drawn at home on 5/7/20, which was reviewed with him. The PET scan from 5/7/20 was also reviewed with them. There was L uptake maxillary -pt reports that he had dentures done in Jan 2020, has discomfort in his teeth but now improved, but he has not been to the dentist due to COVID19.  Rib fractures -pt denied falls/trauma. \par \par \par

## 2020-06-17 NOTE — RESULTS/DATA
[FreeTextEntry1] : On 5/7/20 wbc 6.9 Hb 10.7 plt 166\par \par On 3/16/20) wbc 5.96 ANC 3920 Hb 12.0 plt 171 \par ON 2/24/20) wbc 2.6 ANC 1500 Hb 9.2 plt 40\par On 1/13/20) wbc 5.2 Hb 8.9 plt 183\par ON 12/18/19) wbc 2.5 Hb 8.8 plt 91 ANC 1200   \par On 11/18/19) wbc 9.9 Hb 11.4 plt 169 ANC 7400\par On 10/28/19) wbc 8.8 Hb 12 plt 117\par On 10/1/19) wbc 12.5 Hb 10.9 plt 215 ANC 7700\par On 9/3/19) wbc 11.9 Hb 10.8 plt 239 ANC 10.4k/ul  Mo 500 Eo 200 Ba 0\par \par RADIOLOGY\par ON 5/8/20 PET/CT \par IMPRESSION:  Abnormal whole body FDG-PET/CT scan.\par \par 1. Near total resolution of hypermetabolism associated with lytic and intramedullary lesions in the axial and appendicular skeleton as compared to prior study dated 9/7/2019. Previously seen lytic lesions demonstrate new sclerosis. Findings are compatible with response to interval therapy.\par \par 2. Few new mildly FDG-avid bilateral rib fractures. An FDG-avid fracture in the left inferior pubic ramus demonstrates increased callus formation and is similar in metabolism.\par \par 3. New hypermetabolic lucency in left maxillary alveolus and new small hypermetabolic focus in right maxilla are nonspecific. Recommend correlation with dental exam.\par \par 4. Few new mildly FDG-avid nonspecific bilateral hilar lymph nodes.\par \par 5. Resolution of many specific hypermetabolism in distal esophagus/GE junction.\par \par On 9/7/19 PET/CT \par IMPRESSION:  Abnormal whole body FDG-PET/CT scan.\par \par 1. Numerous hypermetabolic osseous foci in the axial and appendicular \par skeleton mostly with corresponding lucencies on CT compatible with the \par diagnosis of multiple myeloma. Lesions in bilateral proximal humeri and \par femurs, although not demonstrating cortical disruption, may place the \par patient at risk for pathologic fractures.\par \par 2. Nonspecific hypermetabolism in the distal esophagus extending into the \par GE junction with questionable wall thickening on CT with associated \par adjacent hypermetabolic paraesophageal lymph node. Suggest correlation \par with endoscopy to exclude any abnormality in this region.\par \par 3. Nonspecific hypermetabolism in the right palmar musculature and left \par masseter muscle. Please correlate clinically.\par \par ON 9/9/19 MRI cervical\par \par IMPRESSION: \par Diffuse osseous metastases, consistent with multiple myeloma.\par Large calvarial lesion which may extend into the sella turcica.\par No evidence of epidural extension of tumor.\par Multilevel cervical spondylosis described.

## 2020-06-17 NOTE — REVIEW OF SYSTEMS
[Constipation] : constipation [Negative] : Endocrine [Fever] : no fever [Chills] : no chills [Night Sweats] : no night sweats [Recent Change In Weight] : ~T no recent weight change [Fatigue] : no fatigue [Red Eyes] : eyes not red [Eye Pain] : no eye pain [Dry Eyes] : no dryness of the eyes [Vision Problems] : no vision problems [Dysphagia] : no dysphagia [Loss of Hearing] : no loss of hearing [Nosebleeds] : no nosebleeds [Odynophagia] : no odynophagia [Hoarseness] : no hoarseness [Mucosal Pain] : no mucosal pain [Chest Pain] : no chest pain [Palpitations] : no palpitations [Leg Claudication] : no intermittent leg claudication [Shortness Of Breath] : no shortness of breath [Lower Ext Edema] : no lower extremity edema [Wheezing] : no wheezing [Abdominal Pain] : no abdominal pain [Cough] : no cough [SOB on Exertion] : no shortness of breath during exertion [Vomiting] : no vomiting [Diarrhea] : no diarrhea [Incontinence] : no incontinence [Dysuria] : no dysuria [Joint Pain] : no joint pain [Muscle Pain] : no muscle pain [Muscle Weakness] : no muscle weakness [Skin Rash] : no skin rash [Skin Wound] : no skin wound [Suicidal] : not suicidal [Confused] : no confusion [Difficulty Walking] : no difficulty walking [Easy Bleeding] : no tendency for easy bleeding [Easy Bruising] : no tendency for easy bruising [Depression] : no depression [FreeTextEntry2] : weight is now stable [FreeTextEntry7] : colonoscopy in June 2019 -normal. reports some constipation-uses Dulcolax with good results  [FreeTextEntry9] : back pain -improved after radiation

## 2020-06-19 ENCOUNTER — APPOINTMENT (OUTPATIENT)
Dept: HEMATOLOGY ONCOLOGY | Facility: CLINIC | Age: 70
End: 2020-06-19

## 2020-06-19 ENCOUNTER — RESULT REVIEW (OUTPATIENT)
Age: 70
End: 2020-06-19

## 2020-06-19 ENCOUNTER — APPOINTMENT (OUTPATIENT)
Dept: INFUSION THERAPY | Facility: HOSPITAL | Age: 70
End: 2020-06-19

## 2020-06-19 LAB
BASOPHILS # BLD AUTO: 0.04 K/UL — SIGNIFICANT CHANGE UP (ref 0–0.2)
BASOPHILS NFR BLD AUTO: 0.6 % — SIGNIFICANT CHANGE UP (ref 0–2)
EOSINOPHIL # BLD AUTO: 0.17 K/UL — SIGNIFICANT CHANGE UP (ref 0–0.5)
EOSINOPHIL NFR BLD AUTO: 2.5 % — SIGNIFICANT CHANGE UP (ref 0–6)
HCT VFR BLD CALC: 29.1 % — LOW (ref 39–50)
HGB BLD-MCNC: 9.9 G/DL — LOW (ref 13–17)
IMM GRANULOCYTES NFR BLD AUTO: 0.7 % — SIGNIFICANT CHANGE UP (ref 0–1.5)
LYMPHOCYTES # BLD AUTO: 1.35 K/UL — SIGNIFICANT CHANGE UP (ref 1–3.3)
LYMPHOCYTES # BLD AUTO: 19.9 % — SIGNIFICANT CHANGE UP (ref 13–44)
MCHC RBC-ENTMCNC: 32.4 PG — SIGNIFICANT CHANGE UP (ref 27–34)
MCHC RBC-ENTMCNC: 34 GM/DL — SIGNIFICANT CHANGE UP (ref 32–36)
MCV RBC AUTO: 95.1 FL — SIGNIFICANT CHANGE UP (ref 80–100)
MONOCYTES # BLD AUTO: 0.81 K/UL — SIGNIFICANT CHANGE UP (ref 0–0.9)
MONOCYTES NFR BLD AUTO: 11.9 % — SIGNIFICANT CHANGE UP (ref 2–14)
NEUTROPHILS # BLD AUTO: 4.36 K/UL — SIGNIFICANT CHANGE UP (ref 1.8–7.4)
NEUTROPHILS NFR BLD AUTO: 64.4 % — SIGNIFICANT CHANGE UP (ref 43–77)
NRBC # BLD: 0 /100 WBCS — SIGNIFICANT CHANGE UP (ref 0–0)
PLATELET # BLD AUTO: 146 K/UL — LOW (ref 150–400)
RBC # BLD: 3.06 M/UL — LOW (ref 4.2–5.8)
RBC # FLD: 11.6 % — SIGNIFICANT CHANGE UP (ref 10.3–14.5)
WBC # BLD: 6.78 K/UL — SIGNIFICANT CHANGE UP (ref 3.8–10.5)
WBC # FLD AUTO: 6.78 K/UL — SIGNIFICANT CHANGE UP (ref 3.8–10.5)

## 2020-06-21 LAB
ALBUMIN SERPL ELPH-MCNC: 5.1 G/DL
ALP BLD-CCNC: 51 U/L
ALT SERPL-CCNC: 15 U/L
ANION GAP SERPL CALC-SCNC: 13 MMOL/L
AST SERPL-CCNC: 22 U/L
BILIRUB SERPL-MCNC: 0.5 MG/DL
BUN SERPL-MCNC: 23 MG/DL
CALCIUM SERPL-MCNC: 9.5 MG/DL
CHLORIDE SERPL-SCNC: 105 MMOL/L
CO2 SERPL-SCNC: 24 MMOL/L
CREAT SERPL-MCNC: 1.49 MG/DL
GLUCOSE SERPL-MCNC: 107 MG/DL
POTASSIUM SERPL-SCNC: 4.7 MMOL/L
PROT SERPL-MCNC: 7.5 G/DL
SODIUM SERPL-SCNC: 142 MMOL/L

## 2020-06-22 DIAGNOSIS — Z51.11 ENCOUNTER FOR ANTINEOPLASTIC CHEMOTHERAPY: ICD-10-CM

## 2020-06-22 DIAGNOSIS — R11.2 NAUSEA WITH VOMITING, UNSPECIFIED: ICD-10-CM

## 2020-06-22 LAB
DEPRECATED KAPPA LC FREE/LAMBDA SER: 1.61 RATIO
DEPRECATED KAPPA LC FREE/LAMBDA SER: 1.61 RATIO
IGA SER QL IEP: 125 MG/DL
IGG SER QL IEP: 958 MG/DL
IGM SER QL IEP: 20 MG/DL
KAPPA LC CSF-MCNC: 1.71 MG/DL
KAPPA LC CSF-MCNC: 1.71 MG/DL
KAPPA LC SERPL-MCNC: 2.75 MG/DL
KAPPA LC SERPL-MCNC: 2.75 MG/DL

## 2020-06-23 LAB
ALBUMIN MFR SERPL ELPH: 61.9 %
ALBUMIN SERPL-MCNC: 4.6 G/DL
ALBUMIN/GLOB SERPL: 1.6 RATIO
ALPHA1 GLOB MFR SERPL ELPH: 4.2 %
ALPHA1 GLOB SERPL ELPH-MCNC: 0.3 G/DL
ALPHA2 GLOB MFR SERPL ELPH: 10.5 %
ALPHA2 GLOB SERPL ELPH-MCNC: 0.8 G/DL
B-GLOBULIN MFR SERPL ELPH: 10.9 %
B-GLOBULIN SERPL ELPH-MCNC: 0.8 G/DL
GAMMA GLOB FLD ELPH-MCNC: 0.9 G/DL
GAMMA GLOB MFR SERPL ELPH: 12.5 %
INTERPRETATION SERPL IEP-IMP: NORMAL
M PROTEIN SPEC IFE-MCNC: NORMAL
PROT SERPL-MCNC: 7.5 G/DL
PROT SERPL-MCNC: 7.5 G/DL

## 2020-07-01 ENCOUNTER — APPOINTMENT (OUTPATIENT)
Dept: HEMATOLOGY ONCOLOGY | Facility: CLINIC | Age: 70
End: 2020-07-01

## 2020-07-01 ENCOUNTER — APPOINTMENT (OUTPATIENT)
Dept: INFUSION THERAPY | Facility: HOSPITAL | Age: 70
End: 2020-07-01

## 2020-07-01 ENCOUNTER — RESULT REVIEW (OUTPATIENT)
Age: 70
End: 2020-07-01

## 2020-07-01 LAB
BASOPHILS # BLD AUTO: 0.06 K/UL — SIGNIFICANT CHANGE UP (ref 0–0.2)
BASOPHILS NFR BLD AUTO: 0.8 % — SIGNIFICANT CHANGE UP (ref 0–2)
EOSINOPHIL # BLD AUTO: 0.17 K/UL — SIGNIFICANT CHANGE UP (ref 0–0.5)
EOSINOPHIL NFR BLD AUTO: 2.3 % — SIGNIFICANT CHANGE UP (ref 0–6)
HCT VFR BLD CALC: 29.4 % — LOW (ref 39–50)
HGB BLD-MCNC: 10 G/DL — LOW (ref 13–17)
IMM GRANULOCYTES NFR BLD AUTO: 0.4 % — SIGNIFICANT CHANGE UP (ref 0–1.5)
LYMPHOCYTES # BLD AUTO: 1.18 K/UL — SIGNIFICANT CHANGE UP (ref 1–3.3)
LYMPHOCYTES # BLD AUTO: 15.7 % — SIGNIFICANT CHANGE UP (ref 13–44)
MCHC RBC-ENTMCNC: 32.7 PG — SIGNIFICANT CHANGE UP (ref 27–34)
MCHC RBC-ENTMCNC: 34 GM/DL — SIGNIFICANT CHANGE UP (ref 32–36)
MCV RBC AUTO: 96.1 FL — SIGNIFICANT CHANGE UP (ref 80–100)
MONOCYTES # BLD AUTO: 0.59 K/UL — SIGNIFICANT CHANGE UP (ref 0–0.9)
MONOCYTES NFR BLD AUTO: 7.9 % — SIGNIFICANT CHANGE UP (ref 2–14)
NEUTROPHILS # BLD AUTO: 5.48 K/UL — SIGNIFICANT CHANGE UP (ref 1.8–7.4)
NEUTROPHILS NFR BLD AUTO: 72.9 % — SIGNIFICANT CHANGE UP (ref 43–77)
NRBC # BLD: 0 /100 WBCS — SIGNIFICANT CHANGE UP (ref 0–0)
PLATELET # BLD AUTO: 148 K/UL — LOW (ref 150–400)
RBC # BLD: 3.06 M/UL — LOW (ref 4.2–5.8)
RBC # FLD: 12.1 % — SIGNIFICANT CHANGE UP (ref 10.3–14.5)
WBC # BLD: 7.51 K/UL — SIGNIFICANT CHANGE UP (ref 3.8–10.5)
WBC # FLD AUTO: 7.51 K/UL — SIGNIFICANT CHANGE UP (ref 3.8–10.5)

## 2020-07-10 ENCOUNTER — APPOINTMENT (OUTPATIENT)
Dept: INFUSION THERAPY | Facility: HOSPITAL | Age: 70
End: 2020-07-10

## 2020-07-13 LAB — SARS-COV-2 N GENE NPH QL NAA+PROBE: NOT DETECTED

## 2020-07-14 ENCOUNTER — LABORATORY RESULT (OUTPATIENT)
Age: 70
End: 2020-07-14

## 2020-07-14 ENCOUNTER — RESULT REVIEW (OUTPATIENT)
Age: 70
End: 2020-07-14

## 2020-07-14 ENCOUNTER — APPOINTMENT (OUTPATIENT)
Dept: HEMATOLOGY ONCOLOGY | Facility: CLINIC | Age: 70
End: 2020-07-14
Payer: MEDICARE

## 2020-07-14 VITALS
BODY MASS INDEX: 20.81 KG/M2 | SYSTOLIC BLOOD PRESSURE: 155 MMHG | DIASTOLIC BLOOD PRESSURE: 72 MMHG | TEMPERATURE: 98 F | HEART RATE: 65 BPM | OXYGEN SATURATION: 99 % | WEIGHT: 121.25 LBS | RESPIRATION RATE: 14 BRPM

## 2020-07-14 LAB
ALBUMIN SERPL ELPH-MCNC: 5 G/DL
ALP BLD-CCNC: 49 U/L
ALT SERPL-CCNC: 17 U/L
ANION GAP SERPL CALC-SCNC: 17 MMOL/L
AST SERPL-CCNC: 19 U/L
B2 MICROGLOB SERPL-MCNC: 3.1 MG/L
BASOPHILS # BLD AUTO: 0.04 K/UL — SIGNIFICANT CHANGE UP (ref 0–0.2)
BASOPHILS NFR BLD AUTO: 0.6 % — SIGNIFICANT CHANGE UP (ref 0–2)
BILIRUB SERPL-MCNC: 0.4 MG/DL
BUN SERPL-MCNC: 24 MG/DL
CALCIUM SERPL-MCNC: 9.4 MG/DL
CHLORIDE SERPL-SCNC: 103 MMOL/L
CO2 SERPL-SCNC: 20 MMOL/L
CREAT SERPL-MCNC: 1.72 MG/DL
EOSINOPHIL # BLD AUTO: 0.17 K/UL — SIGNIFICANT CHANGE UP (ref 0–0.5)
EOSINOPHIL NFR BLD AUTO: 2.5 % — SIGNIFICANT CHANGE UP (ref 0–6)
GLUCOSE SERPL-MCNC: 116 MG/DL
HCT VFR BLD CALC: 29.9 % — LOW (ref 39–50)
HGB BLD-MCNC: 10.2 G/DL — LOW (ref 13–17)
IMM GRANULOCYTES NFR BLD AUTO: 0.6 % — SIGNIFICANT CHANGE UP (ref 0–1.5)
LYMPHOCYTES # BLD AUTO: 1.27 K/UL — SIGNIFICANT CHANGE UP (ref 1–3.3)
LYMPHOCYTES # BLD AUTO: 18.4 % — SIGNIFICANT CHANGE UP (ref 13–44)
MCHC RBC-ENTMCNC: 32.6 PG — SIGNIFICANT CHANGE UP (ref 27–34)
MCHC RBC-ENTMCNC: 34.1 GM/DL — SIGNIFICANT CHANGE UP (ref 32–36)
MCV RBC AUTO: 95.5 FL — SIGNIFICANT CHANGE UP (ref 80–100)
MONOCYTES # BLD AUTO: 0.67 K/UL — SIGNIFICANT CHANGE UP (ref 0–0.9)
MONOCYTES NFR BLD AUTO: 9.7 % — SIGNIFICANT CHANGE UP (ref 2–14)
NEUTROPHILS # BLD AUTO: 4.73 K/UL — SIGNIFICANT CHANGE UP (ref 1.8–7.4)
NEUTROPHILS NFR BLD AUTO: 68.2 % — SIGNIFICANT CHANGE UP (ref 43–77)
NRBC # BLD: 0 /100 WBCS — SIGNIFICANT CHANGE UP (ref 0–0)
PLATELET # BLD AUTO: 151 K/UL — SIGNIFICANT CHANGE UP (ref 150–400)
POTASSIUM SERPL-SCNC: 4.9 MMOL/L
PROT SERPL-MCNC: 7.3 G/DL
RBC # BLD: 3.13 M/UL — LOW (ref 4.2–5.8)
RBC # FLD: 12.3 % — SIGNIFICANT CHANGE UP (ref 10.3–14.5)
SODIUM SERPL-SCNC: 140 MMOL/L
WBC # BLD: 6.92 K/UL — SIGNIFICANT CHANGE UP (ref 3.8–10.5)
WBC # FLD AUTO: 6.92 K/UL — SIGNIFICANT CHANGE UP (ref 3.8–10.5)

## 2020-07-14 PROCEDURE — 99214 OFFICE O/P EST MOD 30 MIN: CPT

## 2020-07-14 NOTE — PHYSICAL EXAM
[Fully active, able to carry on all pre-disease performance without restriction] : Status 0 - Fully active, able to carry on all pre-disease performance without restriction [Normal] : grossly intact [de-identified] : KINGS [de-identified] : no edema [de-identified] : no point tenderness [de-identified] : no tenderness, appears distended, no hyper bowel sounds

## 2020-07-14 NOTE — HISTORY OF PRESENT ILLNESS
[Home] : at home, [unfilled] , at the time of the visit. [Medical Office: ([facility])___] : at the medical office located in  [Patient] : the patient [Family Member] : family member [Self] : self [de-identified] : Mr Rand was referred to my office for newly diagnosed IgG kappa multiple myeloma. The patient's primary language is Irish, he has his daughter Mirlande as the , per his wishes. He was seen by Dr. Andrés Valle (hematology) in Feb 2019  and had a BM bx showing 10-15% plasma cells, concerning for smoldering myeloma. According to the daughter, he was awaiting insurance approval for imaging studies. He was admitted from 8/18/19 at [facility] where he presented with pain in the L leg/lower back, worsening for the past 2-3 months. On labs, he was found to have a total protein of 10, Ca level 12 until 8/29/19. Dental consult was called and patient needs to have 10 teeth extracted -thus, IV bisphosphonates were not given, pt improved with hydration. He also had a slightly inc'ed creatinine -improved after IVF. CT C/A/P 8/18/19 showed a lytic expansile soft tissue lesion centered in the manubrium measuring approximately 7.3 x 3.4 x 4.3 cm, invading both 1st ribs. There was also a A lytic expansile soft tissue lesion in the T8 vertebral body causing mass effect on spinal canal and obliterating left neural foramina at T7-8 and T8-9. ON 8/22/19 an MRI thoracic spine was done showing multiple areas of tumor involvement within the thoracic spine in keeping with the patient's history of multiple myeloma. Prominent lesion within C7 on the right incompletely seen.\par Large lesion within T8 on the left producing epidural tumor extension and moderate narrowing of the spinal canal with mild flattening of the spinal cord. He was evaluated on 8/22/19 by Dr. Foley (Rad Onc) and was given 5 fractions of XRT from 8/23/19 to 8/29/19 to C7 and T8. He was discharged home on 8/29/19 with follow up in the outpatient office, and on Dexamethasone 4mg po daily. \par \par  [de-identified] : The patient is being followed for IgG kappa MM with multiple bone lytic lesions, hypercalcemia (resolved) and mild anemia.  He started Velcade/Dexa weekly on 9/919.  The patient got the Revlimid and started it C1 day 1 on 10/21/19 and hed been tolerating it well. \par \par Starting in February, his blood counts became low -plt count 54, Hb 8.3 wbc 5.8. Revlimid was held -after 2/3/20. He got 1 shot Velcade/Dexa on 2/3/20, NO chemo since then. \par BM done on 3/4/20 to eval for residual disease (MM) vs. MDS. BM showed recovering marrow elements, plasma cells <5%. SPEP/MICAH from 3/9/20 showed NO monoclonal proteins, c/w CR.  \par \par The PET scan from 5/7/20 was also reviewed with them. There was L uptake maxillary -pt reports that he had dentures done in Jan 2020, has discomfort in his teeth but now improved, but he has not been to the dentist due to COVID19.  Rib fractures -pt denied falls/trauma. \par \par The last visit I had with the patient was via tele visit. He reports that he is going to the dentist next week. He c/o abdominal discomfort, bloating.He denies any neuropathy. \par \par \par

## 2020-07-14 NOTE — ASSESSMENT
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 Kramer ph  - Zometa q4 wks started on 11/11/19, plan to give for 2 yrs.  Zometa was held on 12/3/19 due to low calcium (7.9), now pt on Ca. Cont q4 wks Zometa -last one was given on 5/7/20, then pt had treatment interruption due to COVID. Given new finding on PET scan, will HOLD Zometa until dental re-eval\par \par -follow up monthly \par \par

## 2020-07-14 NOTE — REVIEW OF SYSTEMS
[Constipation] : constipation [Negative] : Heme/Lymph [Fever] : no fever [Chills] : no chills [Fatigue] : no fatigue [Night Sweats] : no night sweats [Recent Change In Weight] : ~T no recent weight change [Eye Pain] : no eye pain [Red Eyes] : eyes not red [Dry Eyes] : no dryness of the eyes [Vision Problems] : no vision problems [Dysphagia] : no dysphagia [Loss of Hearing] : no loss of hearing [Nosebleeds] : no nosebleeds [Hoarseness] : no hoarseness [Mucosal Pain] : no mucosal pain [Odynophagia] : no odynophagia [Palpitations] : no palpitations [Chest Pain] : no chest pain [Shortness Of Breath] : no shortness of breath [Leg Claudication] : no intermittent leg claudication [Lower Ext Edema] : no lower extremity edema [Cough] : no cough [Wheezing] : no wheezing [SOB on Exertion] : no shortness of breath during exertion [Vomiting] : no vomiting [Abdominal Pain] : no abdominal pain [Diarrhea] : no diarrhea [Dysuria] : no dysuria [Joint Pain] : no joint pain [Incontinence] : no incontinence [Muscle Pain] : no muscle pain [Muscle Weakness] : no muscle weakness [Skin Rash] : no skin rash [Skin Wound] : no skin wound [Confused] : no confusion [Suicidal] : not suicidal [Difficulty Walking] : no difficulty walking [Easy Bleeding] : no tendency for easy bleeding [Depression] : no depression [FreeTextEntry2] : weight is now stable [FreeTextEntry7] : colonoscopy in June 2019 -normal. reports some constipation-uses Dulcolax with good results  [Easy Bruising] : no tendency for easy bruising [FreeTextEntry9] : back pain -improved after radiation

## 2020-07-14 NOTE — RESULTS/DATA
[FreeTextEntry1] : Today's CBC (On 7/14/20) wbc 6.9 hb 10.2 plt 151\par \par On 5/7/20 wbc 6.9 Hb 10.7 plt 166\par On 3/16/20) wbc 5.96 ANC 3920 Hb 12.0 plt 171 \par ON 2/24/20) wbc 2.6 ANC 1500 Hb 9.2 plt 40\par On 1/13/20) wbc 5.2 Hb 8.9 plt 183\par ON 12/18/19) wbc 2.5 Hb 8.8 plt 91 ANC 1200   \par On 11/18/19) wbc 9.9 Hb 11.4 plt 169 ANC 7400\par On 10/28/19) wbc 8.8 Hb 12 plt 117\par On 10/1/19) wbc 12.5 Hb 10.9 plt 215 ANC 7700\par On 9/3/19) wbc 11.9 Hb 10.8 plt 239 ANC 10.4k/ul  Mo 500 Eo 200 Ba 0\par \par RADIOLOGY\par ON 5/8/20 PET/CT \par IMPRESSION:  Abnormal whole body FDG-PET/CT scan.\par \par 1. Near total resolution of hypermetabolism associated with lytic and intramedullary lesions in the axial and appendicular skeleton as compared to prior study dated 9/7/2019. Previously seen lytic lesions demonstrate new sclerosis. Findings are compatible with response to interval therapy.\par \par 2. Few new mildly FDG-avid bilateral rib fractures. An FDG-avid fracture in the left inferior pubic ramus demonstrates increased callus formation and is similar in metabolism.\par \par 3. New hypermetabolic lucency in left maxillary alveolus and new small hypermetabolic focus in right maxilla are nonspecific. Recommend correlation with dental exam.\par \par 4. Few new mildly FDG-avid nonspecific bilateral hilar lymph nodes.\par \par 5. Resolution of many specific hypermetabolism in distal esophagus/GE junction.\par \par On 9/7/19 PET/CT \par IMPRESSION:  Abnormal whole body FDG-PET/CT scan.\par \par 1. Numerous hypermetabolic osseous foci in the axial and appendicular \par skeleton mostly with corresponding lucencies on CT compatible with the \par diagnosis of multiple myeloma. Lesions in bilateral proximal humeri and \par femurs, although not demonstrating cortical disruption, may place the \par patient at risk for pathologic fractures.\par \par 2. Nonspecific hypermetabolism in the distal esophagus extending into the \par GE junction with questionable wall thickening on CT with associated \par adjacent hypermetabolic paraesophageal lymph node. Suggest correlation \par with endoscopy to exclude any abnormality in this region.\par \par 3. Nonspecific hypermetabolism in the right palmar musculature and left \par masseter muscle. Please correlate clinically.\par \par ON 9/9/19 MRI cervical\par \par IMPRESSION: \par Diffuse osseous metastases, consistent with multiple myeloma.\par Large calvarial lesion which may extend into the sella turcica.\par No evidence of epidural extension of tumor.\par Multilevel cervical spondylosis described.

## 2020-07-15 ENCOUNTER — APPOINTMENT (OUTPATIENT)
Dept: INFUSION THERAPY | Facility: HOSPITAL | Age: 70
End: 2020-07-15

## 2020-07-16 LAB
ALBUMIN MFR SERPL ELPH: 61.8 %
ALBUMIN SERPL-MCNC: 4.5 G/DL
ALBUMIN/GLOB SERPL: 1.6 RATIO
ALPHA1 GLOB MFR SERPL ELPH: 4.3 %
ALPHA1 GLOB SERPL ELPH-MCNC: 0.3 G/DL
ALPHA2 GLOB MFR SERPL ELPH: 12.1 %
ALPHA2 GLOB SERPL ELPH-MCNC: 0.9 G/DL
B-GLOBULIN MFR SERPL ELPH: 10.8 %
B-GLOBULIN SERPL ELPH-MCNC: 0.8 G/DL
DEPRECATED KAPPA LC FREE/LAMBDA SER: 1.65 RATIO
GAMMA GLOB FLD ELPH-MCNC: 0.8 G/DL
GAMMA GLOB MFR SERPL ELPH: 11 %
IGA SER QL IEP: 97 MG/DL
IGG SER QL IEP: 799 MG/DL
IGM SER QL IEP: 17 MG/DL
INTERPRETATION SERPL IEP-IMP: NORMAL
KAPPA LC CSF-MCNC: 1.3 MG/DL
KAPPA LC SERPL-MCNC: 2.14 MG/DL
M PROTEIN SPEC IFE-MCNC: NORMAL
PROT SERPL-MCNC: 7.3 G/DL
PROT SERPL-MCNC: 7.3 G/DL

## 2020-07-24 ENCOUNTER — OUTPATIENT (OUTPATIENT)
Dept: OUTPATIENT SERVICES | Facility: HOSPITAL | Age: 70
LOS: 1 days | Discharge: HOME | End: 2020-07-24

## 2020-07-24 DIAGNOSIS — C90.00 MULTIPLE MYELOMA NOT HAVING ACHIEVED REMISSION: ICD-10-CM

## 2020-07-29 ENCOUNTER — RESULT REVIEW (OUTPATIENT)
Age: 70
End: 2020-07-29

## 2020-07-29 ENCOUNTER — APPOINTMENT (OUTPATIENT)
Dept: INFUSION THERAPY | Facility: HOSPITAL | Age: 70
End: 2020-07-29

## 2020-07-29 LAB
BASOPHILS # BLD AUTO: 0.04 K/UL — SIGNIFICANT CHANGE UP (ref 0–0.2)
BASOPHILS NFR BLD AUTO: 0.7 % — SIGNIFICANT CHANGE UP (ref 0–2)
EOSINOPHIL # BLD AUTO: 0.18 K/UL — SIGNIFICANT CHANGE UP (ref 0–0.5)
EOSINOPHIL NFR BLD AUTO: 3.1 % — SIGNIFICANT CHANGE UP (ref 0–6)
HCT VFR BLD CALC: 28.3 % — LOW (ref 39–50)
HGB BLD-MCNC: 9.6 G/DL — LOW (ref 13–17)
IMM GRANULOCYTES NFR BLD AUTO: 0.7 % — SIGNIFICANT CHANGE UP (ref 0–1.5)
LYMPHOCYTES # BLD AUTO: 1.17 K/UL — SIGNIFICANT CHANGE UP (ref 1–3.3)
LYMPHOCYTES # BLD AUTO: 20.2 % — SIGNIFICANT CHANGE UP (ref 13–44)
MCHC RBC-ENTMCNC: 32.4 PG — SIGNIFICANT CHANGE UP (ref 27–34)
MCHC RBC-ENTMCNC: 33.9 GM/DL — SIGNIFICANT CHANGE UP (ref 32–36)
MCV RBC AUTO: 95.6 FL — SIGNIFICANT CHANGE UP (ref 80–100)
MONOCYTES # BLD AUTO: 0.58 K/UL — SIGNIFICANT CHANGE UP (ref 0–0.9)
MONOCYTES NFR BLD AUTO: 10 % — SIGNIFICANT CHANGE UP (ref 2–14)
NEUTROPHILS # BLD AUTO: 3.77 K/UL — SIGNIFICANT CHANGE UP (ref 1.8–7.4)
NEUTROPHILS NFR BLD AUTO: 65.3 % — SIGNIFICANT CHANGE UP (ref 43–77)
NRBC # BLD: 0 /100 WBCS — SIGNIFICANT CHANGE UP (ref 0–0)
PLATELET # BLD AUTO: 124 K/UL — LOW (ref 150–400)
RBC # BLD: 2.96 M/UL — LOW (ref 4.2–5.8)
RBC # FLD: 12.4 % — SIGNIFICANT CHANGE UP (ref 10.3–14.5)
WBC # BLD: 5.78 K/UL — SIGNIFICANT CHANGE UP (ref 3.8–10.5)
WBC # FLD AUTO: 5.78 K/UL — SIGNIFICANT CHANGE UP (ref 3.8–10.5)

## 2020-07-30 DIAGNOSIS — Z51.11 ENCOUNTER FOR ANTINEOPLASTIC CHEMOTHERAPY: ICD-10-CM

## 2020-07-30 DIAGNOSIS — R11.2 NAUSEA WITH VOMITING, UNSPECIFIED: ICD-10-CM

## 2020-07-30 LAB
ALBUMIN MFR SERPL ELPH: 62.6 %
ALBUMIN SERPL ELPH-MCNC: 5 G/DL
ALBUMIN SERPL-MCNC: 4.3 G/DL
ALBUMIN/GLOB SERPL: 1.7 RATIO
ALP BLD-CCNC: 50 U/L
ALPHA1 GLOB MFR SERPL ELPH: 4.1 %
ALPHA1 GLOB SERPL ELPH-MCNC: 0.3 G/DL
ALPHA2 GLOB MFR SERPL ELPH: 12.2 %
ALPHA2 GLOB SERPL ELPH-MCNC: 0.8 G/DL
ALT SERPL-CCNC: 14 U/L
ANION GAP SERPL CALC-SCNC: 12 MMOL/L
AST SERPL-CCNC: 17 U/L
B-GLOBULIN MFR SERPL ELPH: 10.7 %
B-GLOBULIN SERPL ELPH-MCNC: 0.7 G/DL
B2 MICROGLOB SERPL-MCNC: 3.2 MG/L
BILIRUB SERPL-MCNC: 0.3 MG/DL
BUN SERPL-MCNC: 16 MG/DL
CALCIUM SERPL-MCNC: 9.7 MG/DL
CHLORIDE SERPL-SCNC: 107 MMOL/L
CO2 SERPL-SCNC: 23 MMOL/L
CREAT SERPL-MCNC: 1.62 MG/DL
DEPRECATED KAPPA LC FREE/LAMBDA SER: 1.62 RATIO
GAMMA GLOB FLD ELPH-MCNC: 0.7 G/DL
GAMMA GLOB MFR SERPL ELPH: 10.4 %
GLUCOSE SERPL-MCNC: 100 MG/DL
IGA SER QL IEP: 95 MG/DL
IGG SER QL IEP: 762 MG/DL
IGM SER QL IEP: 17 MG/DL
INTERPRETATION SERPL IEP-IMP: NORMAL
KAPPA LC CSF-MCNC: 1.07 MG/DL
KAPPA LC SERPL-MCNC: 1.73 MG/DL
M PROTEIN SPEC IFE-MCNC: NORMAL
POTASSIUM SERPL-SCNC: 4.4 MMOL/L
PROT SERPL-MCNC: 6.9 G/DL
PROT SERPL-MCNC: 6.9 G/DL
PROT SERPL-MCNC: 7 G/DL
SODIUM SERPL-SCNC: 142 MMOL/L

## 2020-08-18 ENCOUNTER — RESULT REVIEW (OUTPATIENT)
Age: 70
End: 2020-08-18

## 2020-08-18 ENCOUNTER — APPOINTMENT (OUTPATIENT)
Dept: HEMATOLOGY ONCOLOGY | Facility: CLINIC | Age: 70
End: 2020-08-18
Payer: MEDICARE

## 2020-08-18 VITALS
TEMPERATURE: 98 F | DIASTOLIC BLOOD PRESSURE: 74 MMHG | WEIGHT: 122.33 LBS | SYSTOLIC BLOOD PRESSURE: 166 MMHG | HEIGHT: 64 IN | BODY MASS INDEX: 20.89 KG/M2 | OXYGEN SATURATION: 100 % | HEART RATE: 64 BPM | RESPIRATION RATE: 16 BRPM

## 2020-08-18 LAB
ALBUMIN SERPL ELPH-MCNC: 4.9 G/DL
ALP BLD-CCNC: 51 U/L
ALT SERPL-CCNC: 15 U/L
ANION GAP SERPL CALC-SCNC: 14 MMOL/L
AST SERPL-CCNC: 17 U/L
B2 MICROGLOB SERPL-MCNC: 2.9 MG/L
BASOPHILS # BLD AUTO: 0.06 K/UL — SIGNIFICANT CHANGE UP (ref 0–0.2)
BASOPHILS NFR BLD AUTO: 1 % — SIGNIFICANT CHANGE UP (ref 0–2)
BILIRUB SERPL-MCNC: 0.2 MG/DL
BUN SERPL-MCNC: 18 MG/DL
CALCIUM SERPL-MCNC: 9.3 MG/DL
CHLORIDE SERPL-SCNC: 107 MMOL/L
CO2 SERPL-SCNC: 21 MMOL/L
CREAT SERPL-MCNC: 1.16 MG/DL
EOSINOPHIL # BLD AUTO: 0.33 K/UL — SIGNIFICANT CHANGE UP (ref 0–0.5)
EOSINOPHIL NFR BLD AUTO: 5.3 % — SIGNIFICANT CHANGE UP (ref 0–6)
GLUCOSE SERPL-MCNC: 127 MG/DL
HCT VFR BLD CALC: 31.3 % — LOW (ref 39–50)
HGB BLD-MCNC: 10.5 G/DL — LOW (ref 13–17)
IMM GRANULOCYTES NFR BLD AUTO: 0.5 % — SIGNIFICANT CHANGE UP (ref 0–1.5)
LYMPHOCYTES # BLD AUTO: 1.14 K/UL — SIGNIFICANT CHANGE UP (ref 1–3.3)
LYMPHOCYTES # BLD AUTO: 18.4 % — SIGNIFICANT CHANGE UP (ref 13–44)
MCHC RBC-ENTMCNC: 32.6 PG — SIGNIFICANT CHANGE UP (ref 27–34)
MCHC RBC-ENTMCNC: 33.5 GM/DL — SIGNIFICANT CHANGE UP (ref 32–36)
MCV RBC AUTO: 97.2 FL — SIGNIFICANT CHANGE UP (ref 80–100)
MONOCYTES # BLD AUTO: 0.75 K/UL — SIGNIFICANT CHANGE UP (ref 0–0.9)
MONOCYTES NFR BLD AUTO: 12.1 % — SIGNIFICANT CHANGE UP (ref 2–14)
NEUTROPHILS # BLD AUTO: 3.88 K/UL — SIGNIFICANT CHANGE UP (ref 1.8–7.4)
NEUTROPHILS NFR BLD AUTO: 62.7 % — SIGNIFICANT CHANGE UP (ref 43–77)
NRBC # BLD: 0 /100 WBCS — SIGNIFICANT CHANGE UP (ref 0–0)
PLATELET # BLD AUTO: 131 K/UL — LOW (ref 150–400)
POTASSIUM SERPL-SCNC: 4.7 MMOL/L
PROT SERPL-MCNC: 7 G/DL
RBC # BLD: 3.22 M/UL — LOW (ref 4.2–5.8)
RBC # FLD: 12.8 % — SIGNIFICANT CHANGE UP (ref 10.3–14.5)
SODIUM SERPL-SCNC: 141 MMOL/L
WBC # BLD: 6.19 K/UL — SIGNIFICANT CHANGE UP (ref 3.8–10.5)
WBC # FLD AUTO: 6.19 K/UL — SIGNIFICANT CHANGE UP (ref 3.8–10.5)

## 2020-08-18 PROCEDURE — 99214 OFFICE O/P EST MOD 30 MIN: CPT

## 2020-08-18 NOTE — ASSESSMENT
[FreeTextEntry1] : 69 yo M with hx MI s/p stent 2015, HTN, high chol, presents for IgG kappa/kappa light chain MM newly dx'ed stage II IPSS score\par Pt has extensive lytic lesions as well as C7/T8 spine lesions causing nerve compression -s/p XRT 8/22-8/29/19\par Started Velcade/Dexa  weekly 9/9/19\par Started Revlimid on 10/21/19\par BOTH ON HOLD SINCE 2/3/20 FOR PANCYTOPENIA -resolved since March 2020\par \par -on maintenance Velcade q2wks started 6/19/20 -pt missed 1 week, spoke to daughter on the phone today Mirlande, and will resume Velcades\par \par -blood work monthly -myeloma labs to be done today\par \par -pt had dental clearance on 10/29/19, seen by Dr. Kramer ph  - Zometa q4 wks started on 11/11/19, plan to give for 2 yrs.  Zometa -last one was given on 5/7/20, then pt had treatment interruption due to COVID. Given new finding on PET scan, Zometa ON HOLD; dental re-eval done, but pt is being referred to different dentist -will f/u with this\par \par -follow up in 2 months. Patient informed today that I will be leaving [facility], and my office with call to facilitate transfer of care and follow up appointments with Dr. Julian\par \par

## 2020-08-18 NOTE — HISTORY OF PRESENT ILLNESS
[de-identified] : Mr Rand was referred to my office for newly diagnosed IgG kappa multiple myeloma. The patient's primary language is Armenian, he has his daughter Mirlande as the , per his wishes. He was seen by Dr. Andrés Valle (hematology) in Feb 2019  and had a BM bx showing 10-15% plasma cells, concerning for smoldering myeloma. According to the daughter, he was awaiting insurance approval for imaging studies. He was admitted from 8/18/19 at [facility] where he presented with pain in the L leg/lower back, worsening for the past 2-3 months. On labs, he was found to have a total protein of 10, Ca level 12 until 8/29/19. Dental consult was called and patient needs to have 10 teeth extracted -thus, IV bisphosphonates were not given, pt improved with hydration. He also had a slightly inc'ed creatinine -improved after IVF. CT C/A/P 8/18/19 showed a lytic expansile soft tissue lesion centered in the manubrium measuring approximately 7.3 x 3.4 x 4.3 cm, invading both 1st ribs. There was also a A lytic expansile soft tissue lesion in the T8 vertebral body causing mass effect on spinal canal and obliterating left neural foramina at T7-8 and T8-9. ON 8/22/19 an MRI thoracic spine was done showing multiple areas of tumor involvement within the thoracic spine in keeping with the patient's history of multiple myeloma. Prominent lesion within C7 on the right incompletely seen.\par Large lesion within T8 on the left producing epidural tumor extension and moderate narrowing of the spinal canal with mild flattening of the spinal cord. He was evaluated on 8/22/19 by Dr. Foley (Rad Onc) and was given 5 fractions of XRT from 8/23/19 to 8/29/19 to C7 and T8. He was discharged home on 8/29/19 with follow up in the outpatient office, and on Dexamethasone 4mg po daily. \par \par  [de-identified] : The patient is being followed for IgG kappa MM with multiple bone lytic lesions, hypercalcemia (resolved) and mild anemia.  He started Velcade/Dexa weekly on 9/919.  The patient got the Revlimid and started it C1 day 1 on 10/21/19 and hed been tolerating it well. \par \par Starting in February, his blood counts became low -plt count 54, Hb 8.3 wbc 5.8. Revlimid was held -after 2/3/20. He got 1 shot Velcade/Dexa on 2/3/20, NO chemo since then. \par BM done on 3/4/20 to eval for residual disease (MM) vs. MDS. BM showed recovering marrow elements, plasma cells <5%. SPEP/MICAH from 3/9/20 showed NO monoclonal proteins, c/w CR.  \par \par The PET scan from 5/7/20 showed L uptake maxillary -pt reports that he had dentures done in Jan 2020 and has discomfort from them. He did not go to the dentist during COVID  Rib fractures -pt denied falls/trauma.  Patient has dental appointment for next week. He has some discomfort in the upper teeth b/l. He also missed his Velcade SQ last week 'i did not know if i am supposed to continue it.' He has no neuropathy from it, tolerating well. \par \par \par

## 2020-08-18 NOTE — REVIEW OF SYSTEMS
[Constipation] : constipation [Negative] : Allergic/Immunologic [Fever] : no fever [Chills] : no chills [Fatigue] : no fatigue [Recent Change In Weight] : ~T no recent weight change [Night Sweats] : no night sweats [Eye Pain] : no eye pain [Red Eyes] : eyes not red [Dry Eyes] : no dryness of the eyes [Vision Problems] : no vision problems [Dysphagia] : no dysphagia [Loss of Hearing] : no loss of hearing [Nosebleeds] : no nosebleeds [Hoarseness] : no hoarseness [Odynophagia] : no odynophagia [Mucosal Pain] : no mucosal pain [Leg Claudication] : no intermittent leg claudication [Palpitations] : no palpitations [Chest Pain] : no chest pain [Lower Ext Edema] : no lower extremity edema [Shortness Of Breath] : no shortness of breath [Cough] : no cough [Wheezing] : no wheezing [SOB on Exertion] : no shortness of breath during exertion [Abdominal Pain] : no abdominal pain [Vomiting] : no vomiting [Diarrhea] : no diarrhea [Incontinence] : no incontinence [Dysuria] : no dysuria [Muscle Pain] : no muscle pain [Joint Pain] : no joint pain [Muscle Weakness] : no muscle weakness [Confused] : no confusion [Skin Wound] : no skin wound [Skin Rash] : no skin rash [Difficulty Walking] : no difficulty walking [Suicidal] : not suicidal [Easy Bruising] : no tendency for easy bruising [Depression] : no depression [Easy Bleeding] : no tendency for easy bleeding [FreeTextEntry2] : weight is now stable [FreeTextEntry9] : back pain -improved after radiation [FreeTextEntry7] : colonoscopy in June 2019 -normal. reports some constipation-uses Dulcolax with good results

## 2020-08-18 NOTE — PHYSICAL EXAM
[Fully active, able to carry on all pre-disease performance without restriction] : Status 0 - Fully active, able to carry on all pre-disease performance without restriction [Normal] : affect appropriate [de-identified] : KINGS [de-identified] : no edema [de-identified] : no point tenderness

## 2020-08-18 NOTE — RESULTS/DATA
[FreeTextEntry1] : Today's CBC (On 8/18/20) wbc 6.2 Hb 10.5 plt 131\par \par On 7/14/20) wbc 6.9 hb 10.2 plt 151\par On 5/7/20 wbc 6.9 Hb 10.7 plt 166\par On 3/16/20) wbc 5.96 ANC 3920 Hb 12.0 plt 171 \par ON 2/24/20) wbc 2.6 ANC 1500 Hb 9.2 plt 40\par On 1/13/20) wbc 5.2 Hb 8.9 plt 183\par ON 12/18/19) wbc 2.5 Hb 8.8 plt 91 ANC 1200   \par On 11/18/19) wbc 9.9 Hb 11.4 plt 169 ANC 7400\par On 10/28/19) wbc 8.8 Hb 12 plt 117\par On 10/1/19) wbc 12.5 Hb 10.9 plt 215 ANC 7700\par On 9/3/19) wbc 11.9 Hb 10.8 plt 239 ANC 10.4k/ul  Mo 500 Eo 200 Ba 0\par \par RADIOLOGY\par ON 5/8/20 PET/CT \par IMPRESSION:  Abnormal whole body FDG-PET/CT scan.\par \par 1. Near total resolution of hypermetabolism associated with lytic and intramedullary lesions in the axial and appendicular skeleton as compared to prior study dated 9/7/2019. Previously seen lytic lesions demonstrate new sclerosis. Findings are compatible with response to interval therapy.\par \par 2. Few new mildly FDG-avid bilateral rib fractures. An FDG-avid fracture in the left inferior pubic ramus demonstrates increased callus formation and is similar in metabolism.\par \par 3. New hypermetabolic lucency in left maxillary alveolus and new small hypermetabolic focus in right maxilla are nonspecific. Recommend correlation with dental exam.\par \par 4. Few new mildly FDG-avid nonspecific bilateral hilar lymph nodes.\par \par 5. Resolution of many specific hypermetabolism in distal esophagus/GE junction.\par \par On 9/7/19 PET/CT \par IMPRESSION:  Abnormal whole body FDG-PET/CT scan.\par \par 1. Numerous hypermetabolic osseous foci in the axial and appendicular \par skeleton mostly with corresponding lucencies on CT compatible with the \par diagnosis of multiple myeloma. Lesions in bilateral proximal humeri and \par femurs, although not demonstrating cortical disruption, may place the \par patient at risk for pathologic fractures.\par \par 2. Nonspecific hypermetabolism in the distal esophagus extending into the \par GE junction with questionable wall thickening on CT with associated \par adjacent hypermetabolic paraesophageal lymph node. Suggest correlation \par with endoscopy to exclude any abnormality in this region.\par \par 3. Nonspecific hypermetabolism in the right palmar musculature and left \par masseter muscle. Please correlate clinically.\par \par ON 9/9/19 MRI cervical\par \par IMPRESSION: \par Diffuse osseous metastases, consistent with multiple myeloma.\par Large calvarial lesion which may extend into the sella turcica.\par No evidence of epidural extension of tumor.\par Multilevel cervical spondylosis described.

## 2020-08-21 ENCOUNTER — APPOINTMENT (OUTPATIENT)
Dept: INFUSION THERAPY | Facility: HOSPITAL | Age: 70
End: 2020-08-21

## 2020-08-21 LAB
ALBUMIN MFR SERPL ELPH: 62.4 %
ALBUMIN SERPL-MCNC: 4.4 G/DL
ALBUMIN/GLOB SERPL: 1.7 RATIO
ALPHA1 GLOB MFR SERPL ELPH: 4.6 %
ALPHA1 GLOB SERPL ELPH-MCNC: 0.3 G/DL
ALPHA2 GLOB MFR SERPL ELPH: 12.8 %
ALPHA2 GLOB SERPL ELPH-MCNC: 0.9 G/DL
B-GLOBULIN MFR SERPL ELPH: 10.5 %
B-GLOBULIN SERPL ELPH-MCNC: 0.7 G/DL
DEPRECATED KAPPA LC FREE/LAMBDA SER: 1.49 RATIO
GAMMA GLOB FLD ELPH-MCNC: 0.7 G/DL
GAMMA GLOB MFR SERPL ELPH: 9.7 %
IGA SER QL IEP: 87 MG/DL
IGG SER QL IEP: 600 MG/DL
IGM SER QL IEP: 16 MG/DL
INTERPRETATION SERPL IEP-IMP: NORMAL
KAPPA LC CSF-MCNC: 1.23 MG/DL
KAPPA LC SERPL-MCNC: 1.83 MG/DL
M PROTEIN SPEC IFE-MCNC: NORMAL
PROT SERPL-MCNC: 7 G/DL
PROT SERPL-MCNC: 7 G/DL

## 2020-08-29 ENCOUNTER — OUTPATIENT (OUTPATIENT)
Dept: OUTPATIENT SERVICES | Facility: HOSPITAL | Age: 70
LOS: 1 days | Discharge: HOME | End: 2020-08-29

## 2020-08-29 DIAGNOSIS — C90.00 MULTIPLE MYELOMA NOT HAVING ACHIEVED REMISSION: ICD-10-CM

## 2020-09-04 ENCOUNTER — RESULT REVIEW (OUTPATIENT)
Age: 70
End: 2020-09-04

## 2020-09-04 ENCOUNTER — APPOINTMENT (OUTPATIENT)
Dept: INFUSION THERAPY | Facility: HOSPITAL | Age: 70
End: 2020-09-04

## 2020-09-04 LAB
BASOPHILS # BLD AUTO: 0.06 K/UL — SIGNIFICANT CHANGE UP (ref 0–0.2)
BASOPHILS NFR BLD AUTO: 0.7 % — SIGNIFICANT CHANGE UP (ref 0–2)
EOSINOPHIL # BLD AUTO: 0.21 K/UL — SIGNIFICANT CHANGE UP (ref 0–0.5)
EOSINOPHIL NFR BLD AUTO: 2.4 % — SIGNIFICANT CHANGE UP (ref 0–6)
HCT VFR BLD CALC: 30.3 % — LOW (ref 39–50)
HGB BLD-MCNC: 10.5 G/DL — LOW (ref 13–17)
IMM GRANULOCYTES NFR BLD AUTO: 0.7 % — SIGNIFICANT CHANGE UP (ref 0–1.5)
LYMPHOCYTES # BLD AUTO: 1.24 K/UL — SIGNIFICANT CHANGE UP (ref 1–3.3)
LYMPHOCYTES # BLD AUTO: 14.3 % — SIGNIFICANT CHANGE UP (ref 13–44)
MCHC RBC-ENTMCNC: 32.4 PG — SIGNIFICANT CHANGE UP (ref 27–34)
MCHC RBC-ENTMCNC: 34.7 GM/DL — SIGNIFICANT CHANGE UP (ref 32–36)
MCV RBC AUTO: 93.5 FL — SIGNIFICANT CHANGE UP (ref 80–100)
MONOCYTES # BLD AUTO: 0.8 K/UL — SIGNIFICANT CHANGE UP (ref 0–0.9)
MONOCYTES NFR BLD AUTO: 9.2 % — SIGNIFICANT CHANGE UP (ref 2–14)
NEUTROPHILS # BLD AUTO: 6.29 K/UL — SIGNIFICANT CHANGE UP (ref 1.8–7.4)
NEUTROPHILS NFR BLD AUTO: 72.7 % — SIGNIFICANT CHANGE UP (ref 43–77)
NRBC # BLD: 0 /100 WBCS — SIGNIFICANT CHANGE UP (ref 0–0)
PLATELET # BLD AUTO: 141 K/UL — LOW (ref 150–400)
RBC # BLD: 3.24 M/UL — LOW (ref 4.2–5.8)
RBC # FLD: 13.2 % — SIGNIFICANT CHANGE UP (ref 10.3–14.5)
WBC # BLD: 8.66 K/UL — SIGNIFICANT CHANGE UP (ref 3.8–10.5)
WBC # FLD AUTO: 8.66 K/UL — SIGNIFICANT CHANGE UP (ref 3.8–10.5)

## 2020-09-05 LAB — SARS-COV-2 RNA SPEC QL NAA+PROBE: SIGNIFICANT CHANGE UP

## 2020-09-06 DIAGNOSIS — R11.2 NAUSEA WITH VOMITING, UNSPECIFIED: ICD-10-CM

## 2020-09-06 DIAGNOSIS — Z51.11 ENCOUNTER FOR ANTINEOPLASTIC CHEMOTHERAPY: ICD-10-CM

## 2020-09-18 ENCOUNTER — RESULT REVIEW (OUTPATIENT)
Age: 70
End: 2020-09-18

## 2020-09-18 ENCOUNTER — APPOINTMENT (OUTPATIENT)
Dept: INFUSION THERAPY | Facility: HOSPITAL | Age: 70
End: 2020-09-18

## 2020-09-18 ENCOUNTER — LABORATORY RESULT (OUTPATIENT)
Age: 70
End: 2020-09-18

## 2020-09-18 LAB
BASOPHILS # BLD AUTO: 0.05 K/UL — SIGNIFICANT CHANGE UP (ref 0–0.2)
BASOPHILS NFR BLD AUTO: 0.6 % — SIGNIFICANT CHANGE UP (ref 0–2)
EOSINOPHIL # BLD AUTO: 0.29 K/UL — SIGNIFICANT CHANGE UP (ref 0–0.5)
EOSINOPHIL NFR BLD AUTO: 3.6 % — SIGNIFICANT CHANGE UP (ref 0–6)
HCT VFR BLD CALC: 29.9 % — LOW (ref 39–50)
HGB BLD-MCNC: 10.3 G/DL — LOW (ref 13–17)
IMM GRANULOCYTES NFR BLD AUTO: 0.9 % — SIGNIFICANT CHANGE UP (ref 0–1.5)
LYMPHOCYTES # BLD AUTO: 1.18 K/UL — SIGNIFICANT CHANGE UP (ref 1–3.3)
LYMPHOCYTES # BLD AUTO: 14.8 % — SIGNIFICANT CHANGE UP (ref 13–44)
MCHC RBC-ENTMCNC: 32.8 PG — SIGNIFICANT CHANGE UP (ref 27–34)
MCHC RBC-ENTMCNC: 34.4 G/DL — SIGNIFICANT CHANGE UP (ref 32–36)
MCV RBC AUTO: 95.2 FL — SIGNIFICANT CHANGE UP (ref 80–100)
MONOCYTES # BLD AUTO: 0.86 K/UL — SIGNIFICANT CHANGE UP (ref 0–0.9)
MONOCYTES NFR BLD AUTO: 10.8 % — SIGNIFICANT CHANGE UP (ref 2–14)
NEUTROPHILS # BLD AUTO: 5.52 K/UL — SIGNIFICANT CHANGE UP (ref 1.8–7.4)
NEUTROPHILS NFR BLD AUTO: 69.3 % — SIGNIFICANT CHANGE UP (ref 43–77)
NRBC # BLD: 0 /100 WBCS — SIGNIFICANT CHANGE UP (ref 0–0)
PLATELET # BLD AUTO: 161 K/UL — SIGNIFICANT CHANGE UP (ref 150–400)
RBC # BLD: 3.14 M/UL — LOW (ref 4.2–5.8)
RBC # FLD: 13.7 % — SIGNIFICANT CHANGE UP (ref 10.3–14.5)
WBC # BLD: 7.97 K/UL — SIGNIFICANT CHANGE UP (ref 3.8–10.5)
WBC # FLD AUTO: 7.97 K/UL — SIGNIFICANT CHANGE UP (ref 3.8–10.5)

## 2020-09-25 ENCOUNTER — OUTPATIENT (OUTPATIENT)
Dept: OUTPATIENT SERVICES | Facility: HOSPITAL | Age: 70
LOS: 1 days | Discharge: HOME | End: 2020-09-25

## 2020-09-25 DIAGNOSIS — C90.00 MULTIPLE MYELOMA NOT HAVING ACHIEVED REMISSION: ICD-10-CM

## 2020-10-02 ENCOUNTER — RESULT REVIEW (OUTPATIENT)
Age: 70
End: 2020-10-02

## 2020-10-02 ENCOUNTER — APPOINTMENT (OUTPATIENT)
Dept: INFUSION THERAPY | Facility: HOSPITAL | Age: 70
End: 2020-10-02

## 2020-10-02 LAB
BASOPHILS # BLD AUTO: 0.06 K/UL — SIGNIFICANT CHANGE UP (ref 0–0.2)
BASOPHILS NFR BLD AUTO: 0.9 % — SIGNIFICANT CHANGE UP (ref 0–2)
EOSINOPHIL # BLD AUTO: 0.24 K/UL — SIGNIFICANT CHANGE UP (ref 0–0.5)
EOSINOPHIL NFR BLD AUTO: 3.7 % — SIGNIFICANT CHANGE UP (ref 0–6)
HCT VFR BLD CALC: 30.3 % — LOW (ref 39–50)
HGB BLD-MCNC: 10.4 G/DL — LOW (ref 13–17)
IMM GRANULOCYTES NFR BLD AUTO: 0.5 % — SIGNIFICANT CHANGE UP (ref 0–1.5)
LYMPHOCYTES # BLD AUTO: 1.07 K/UL — SIGNIFICANT CHANGE UP (ref 1–3.3)
LYMPHOCYTES # BLD AUTO: 16.7 % — SIGNIFICANT CHANGE UP (ref 13–44)
MCHC RBC-ENTMCNC: 32.4 PG — SIGNIFICANT CHANGE UP (ref 27–34)
MCHC RBC-ENTMCNC: 34.3 G/DL — SIGNIFICANT CHANGE UP (ref 32–36)
MCV RBC AUTO: 94.4 FL — SIGNIFICANT CHANGE UP (ref 80–100)
MONOCYTES # BLD AUTO: 0.74 K/UL — SIGNIFICANT CHANGE UP (ref 0–0.9)
MONOCYTES NFR BLD AUTO: 11.5 % — SIGNIFICANT CHANGE UP (ref 2–14)
NEUTROPHILS # BLD AUTO: 4.27 K/UL — SIGNIFICANT CHANGE UP (ref 1.8–7.4)
NEUTROPHILS NFR BLD AUTO: 66.7 % — SIGNIFICANT CHANGE UP (ref 43–77)
NRBC # BLD: 0 /100 WBCS — SIGNIFICANT CHANGE UP (ref 0–0)
PLATELET # BLD AUTO: 172 K/UL — SIGNIFICANT CHANGE UP (ref 150–400)
RBC # BLD: 3.21 M/UL — LOW (ref 4.2–5.8)
RBC # FLD: 13.6 % — SIGNIFICANT CHANGE UP (ref 10.3–14.5)
WBC # BLD: 6.41 K/UL — SIGNIFICANT CHANGE UP (ref 3.8–10.5)
WBC # FLD AUTO: 6.41 K/UL — SIGNIFICANT CHANGE UP (ref 3.8–10.5)

## 2020-10-04 DIAGNOSIS — Z51.11 ENCOUNTER FOR ANTINEOPLASTIC CHEMOTHERAPY: ICD-10-CM

## 2020-10-04 DIAGNOSIS — R11.2 NAUSEA WITH VOMITING, UNSPECIFIED: ICD-10-CM

## 2020-10-05 LAB — SARS-COV-2 N GENE NPH QL NAA+PROBE: NOT DETECTED

## 2020-10-06 ENCOUNTER — APPOINTMENT (OUTPATIENT)
Dept: HEMATOLOGY ONCOLOGY | Facility: CLINIC | Age: 70
End: 2020-10-06
Payer: MEDICARE

## 2020-10-06 ENCOUNTER — RESULT REVIEW (OUTPATIENT)
Age: 70
End: 2020-10-06

## 2020-10-06 ENCOUNTER — LABORATORY RESULT (OUTPATIENT)
Age: 70
End: 2020-10-06

## 2020-10-06 VITALS
HEART RATE: 57 BPM | RESPIRATION RATE: 16 BRPM | DIASTOLIC BLOOD PRESSURE: 72 MMHG | TEMPERATURE: 97.3 F | SYSTOLIC BLOOD PRESSURE: 132 MMHG | HEIGHT: 64 IN | WEIGHT: 126.96 LBS | BODY MASS INDEX: 21.68 KG/M2 | OXYGEN SATURATION: 100 %

## 2020-10-06 LAB
BASOPHILS # BLD AUTO: 0.05 K/UL — SIGNIFICANT CHANGE UP (ref 0–0.2)
BASOPHILS NFR BLD AUTO: 0.7 % — SIGNIFICANT CHANGE UP (ref 0–2)
EOSINOPHIL # BLD AUTO: 0.16 K/UL — SIGNIFICANT CHANGE UP (ref 0–0.5)
EOSINOPHIL NFR BLD AUTO: 2.3 % — SIGNIFICANT CHANGE UP (ref 0–6)
HCT VFR BLD CALC: 29.7 % — LOW (ref 39–50)
HGB BLD-MCNC: 9.9 G/DL — LOW (ref 13–17)
IMM GRANULOCYTES NFR BLD AUTO: 0.3 % — SIGNIFICANT CHANGE UP (ref 0–1.5)
LYMPHOCYTES # BLD AUTO: 0.91 K/UL — LOW (ref 1–3.3)
LYMPHOCYTES # BLD AUTO: 13.2 % — SIGNIFICANT CHANGE UP (ref 13–44)
MCHC RBC-ENTMCNC: 32.6 PG — SIGNIFICANT CHANGE UP (ref 27–34)
MCHC RBC-ENTMCNC: 33.3 G/DL — SIGNIFICANT CHANGE UP (ref 32–36)
MCV RBC AUTO: 97.7 FL — SIGNIFICANT CHANGE UP (ref 80–100)
MONOCYTES # BLD AUTO: 0.79 K/UL — SIGNIFICANT CHANGE UP (ref 0–0.9)
MONOCYTES NFR BLD AUTO: 11.5 % — SIGNIFICANT CHANGE UP (ref 2–14)
NEUTROPHILS # BLD AUTO: 4.96 K/UL — SIGNIFICANT CHANGE UP (ref 1.8–7.4)
NEUTROPHILS NFR BLD AUTO: 72 % — SIGNIFICANT CHANGE UP (ref 43–77)
NRBC # BLD: 0 /100 WBCS — SIGNIFICANT CHANGE UP (ref 0–0)
PLATELET # BLD AUTO: 138 K/UL — LOW (ref 150–400)
RBC # BLD: 3.04 M/UL — LOW (ref 4.2–5.8)
RBC # FLD: 13.4 % — SIGNIFICANT CHANGE UP (ref 10.3–14.5)
WBC # BLD: 6.89 K/UL — SIGNIFICANT CHANGE UP (ref 3.8–10.5)
WBC # FLD AUTO: 6.89 K/UL — SIGNIFICANT CHANGE UP (ref 3.8–10.5)

## 2020-10-06 PROCEDURE — 99215 OFFICE O/P EST HI 40 MIN: CPT

## 2020-10-06 RX ORDER — ATORVASTATIN CALCIUM 40 MG/1
40 TABLET, FILM COATED ORAL
Qty: 30 | Refills: 3 | Status: ACTIVE | COMMUNITY
Start: 1900-01-01 | End: 1900-01-01

## 2020-10-06 RX ORDER — METOPROLOL TARTRATE 25 MG/1
25 TABLET, FILM COATED ORAL TWICE DAILY
Qty: 60 | Refills: 3 | Status: ACTIVE | COMMUNITY
Start: 2020-10-06 | End: 1900-01-01

## 2020-10-06 RX ORDER — PANTOPRAZOLE 40 MG/1
40 TABLET, DELAYED RELEASE ORAL
Qty: 30 | Refills: 3 | Status: ACTIVE | COMMUNITY
Start: 2019-09-03 | End: 1900-01-01

## 2020-10-06 NOTE — ASSESSMENT
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

## 2020-10-06 NOTE — RESULTS/DATA
[FreeTextEntry1] : \par   PET/CT Whole Body Oncology FDG             Final\par \par No Documents Attached\par \par \par \par \par   EXAM:  PETCT WB ONC FDG SUBS\par \par \par PROCEDURE DATE:  05/07/2020\par \par \par \par INTERPRETATION:  PROCEDURE:  PET/CT WHOLE BODY\par \par RADIOPHARMACEUTICAL:  9.97 mCi F-18, FDG, I.V.\par \par CLINICAL INFORMATION:  70-year-old male referred for follow-up of multiple myeloma on Zometa. Bone marrow biopsy done 3/4/2020 showed less than 5% plasma cells and repeat myeloma labs done on 3/9/2020, showed CT are. PET/CT is done as part of the subsequent treatment strategy evaluation.\par \par TECHNIQUE:  Fasting blood sugar measured prior to injection of radiopharmaceutical was 116 mg/dl. Following intravenous injection of the radiopharmaceutical and an uptake period of approximately 60 minutes, FDG-PET/CT was obtained on a  [employer] 710 from the vertex of the skull to the toes. Oral contrast was given during the uptake period. CT was performed during shallow respiration. The CT protocol was optimized for PET attenuation correction and to provide anatomic detail for localization of PET abnormalities. The CT protocol was not designed to produce and cannot replace state-of-the-art diagnostic CT images with specific imaging protocols for different body parts and indications. Images were reviewed on a dedicated workstation using multiplanar reconstruction.\par \par The standardized uptake values (SUV) are normalized to patient body weight and indicate the highest activity concentration (SUVmax) in a given disease site. All image numbers refer to the axial image number.\par \par COMPARISON:  FDG PET/CT dated 9/7/2019.\par \par OTHER STUDIES USED FOR CORRELATION: MRI of cervical spine dated 9/9/2019.\par \par FINDINGS:\par \par HEAD/NECK: Physiologic FDG activity in the brain.\par \par Mild hypermetabolism in left masseter muscle, decreased as compared to prior study, may be secondary to bruxism. Elongated focus of increased FDG activity in left lower cervical region may reflect inflammation or muscle spasm (image 77).\par \par New hypermetabolic lucency in left maxillary alveolus is nonspecific (SUV 8.0; image 53). Recommend correlation with dental exam. A new small hypermetabolic focus is seen in the right maxilla (SUV 3.7; image 55).\par \par CHEST: Few new small hypermetabolic foci in bilateral hilar region likely correspond to small lymph nodes that are difficult to delineate on CT. For reference, left perihilar region demonstrates SUV 3.6 (image 117). Resolution of FDG-avid subcentimeter left posterior paraesophageal lymph node.\par \par LUNGS: No abnormal FDG activity. Calcified right lower lobe granuloma, unchanged (image 128).\par \par PLEURA/PERICARDIUM: No abnormal FDG activity. No effusion.\par \par HEPATOBILIARY/PANCREAS: Physiologic FDG activity. Liver SUV mean is 2.1; previous 2.3.\par \par SPLEEN: No abnormal FDG activity. Normal in size.\par \par ADRENAL GLANDS: No abnormal FDG activity. No nodule.\par \par KIDNEYS/URINARY BLADDER: Physiologic FDG activity. Multiple bilateral renal cysts, measuring up to 4.6 cm on the right, unchanged.\par \par PELVIC ORGANS: No abnormal FDG activity.\par \par ABDOMINOPELVIC NODES: No enlarged or FDG avid lymph node\par \par BOWEL/PERITONEUM/MESENTERY: Physiologic FDG activity. Resolution of hypermetabolism in distal esophagus/GE junction.\par \par BONES: Near total resolution of previously seen hypermetabolic foci in the axial skeleton, right humerus, and proximal bilateral femurs. Previously seen lytic lesions demonstrate new areas of sclerosis on CT. For reference, one of the most FDG-avid residual lesions is a mixed lytic and sclerotic lesion in the left scapula which demonstrates SUV 3.0 (image 98); previously lytic with SUV 5.7. Previously seen hypermetabolic lesion in right sphenoid bone has resolved. Previously seen large destructive lesion in the manubrium the sternum demonstrates new sclerosis with SUV 3.0 (image 103); previous SUV 5.3. Resolution of hypermetabolic intramedullary soft tissue in proximal right humerus. Near total resolution of hypermetabolic intramedullary soft tissue in proximal left femur (SUV 1.3; image 273; previous SUV 5.3).\par \par FDG-avid fracture in left inferior pubic ramus demonstrates increased callus formation and is similar in metabolism (SUV 3.8; image 250; previous SUV 3.1). Few new mildly FDG-avid bilateral rib fractures. For reference, fracture in the anterior aspect of the left second rib demonstrates SUV 3.4 (image 105).\par \par Resolution of hypermetabolism in left palmar musculature.\par \par IMPRESSION:  Abnormal whole body FDG-PET/CT scan.\par \par 1. Near total resolution of hypermetabolism associated with lytic and intramedullary lesions in the axial and appendicular skeleton as compared to prior study dated 9/7/2019. Previously seen lytic lesions demonstrate new sclerosis. Findings are compatible with response to interval therapy.\par \par 2. Few new mildly FDG-avid bilateral rib fractures. An FDG-avid fracture in the left inferior pubic ramus demonstrates increased callus formation and is similar in metabolism.\par \par 3. New hypermetabolic lucency in left maxillary alveolus and new small hypermetabolic focus in right maxilla are nonspecific. Recommend correlation with dental exam.\par \par 4. Few new mildly FDG-avid nonspecific bilateral hilar lymph nodes.\par \par 5. Resolution of many specific hypermetabolism in distal esophagus/GE junction.\par \par \par \par \par \par \par \par \par \par \par \par \par \par \par \par LUANA RAMOS M.D., NUCLEAR MEDICINE ATTENDING\par This document has been electronically signed. May  7 2020  4:08PM\par \par  \par \par  Ordered by: OZZY ALMEIDA       Collected/Examined: 07May2020 12:57PM       \par Verified by: OZZY ALMEIDA 11May2020 09:27AM       \par  Result Communication: Call patient with results,Discussed results with patient;\par Stage: Final       \par  Performed at: [facility]       Resulted: 07May2020 04:11PM       Last Updated: 11May2020 09:27AM       Accession: [id number]       \par \par \par  Pathology             Final\par \par No Documents Attached\par \par \par \par \par   [facility] Accession Number : [id number]\par \par HARDEEP, SUBASH                          2\par \par \par \par Cytopathology Report\par \par \par \par \par \par Final Diagnosis\par SOFT TISSUE, SACRUM, CT GUIDED CORE BIOPSY\par Plasma cell neoplasm\par See note and description.\par \par Diagnostic note:  Overall the morphologic and immunophenotypic\par findings are consistent with plasma cell neoplasm. The\par differential diagnosis includes plasmacytoma vs plasma cells\par myeloma. Correlation with laboratory, radiologic and clinical\par findings is required for further classification.\par \par Microscopic description:\par The touch prep slides are composed of numerous enlarged plasma\par cells with prominent nucleoli, occasional binucleated forms are\par seen.\par \par Histologic sections consist of multiple needle shaped cores of\par soft tissue showing proliferation of enlarged plasma cells with\par prominent nucleoli, forming sheets.\par \par Immunohistochemistry:  , kappaISH, lambdaISH, cyclinD1, p53\par stains performed on paraffin embedded section of block 1C.\par \par Neoplastic cells are:\par Positive:  , KappaISH, p53\par Negative:  LambdaISH, cyclinD1\par \par Flow cytometry analysis (59-MZ-): Monotypic plasma cells\par (24.1% of total analyzed cells) are present. There are two\par aberrant monoclonal plasma cell populations:\par Population #1 (16.5% of total analyzed cells, 68.6% of plasma\par cell population) positive for cytoplasmic kappa, CD38 dimmer,\par CD45 dimmer, CD56, ; negative for , CD19, CD20.\par Population # 2 (7.6% of total analyzed cells, 31.4% of plasma\par cell population) positive for cytoplasmic kappa\par \par \par \par \par \par \par HARDEEP, SUBASH                          2\par \par \par \par Cytopathology Report\par \par \par \par \par (brighter), , CD38 brighter, CD45 dim, CD56 (brighter),\par ; negative for CD19, CD20.\par \par Screened by: Jose Juan LARA(ASCP)\par Verified by: Orlando Spicer MD\par (Electronic Signature)\par Reported on: 08/22/19 13:22 EDT, [address] [place], [place]\par [address]\par Cytology technical processing performed at [address] [place], [place] [address] [place] [address]\par _________________________________________________________________\par \par \par Specimen(s) Submitted\par SOFT TISSUE, SACRUM, CT GUIDED CORE BIOPSY\par \par \par Statement of Adequacy\par Immediate cytologic study for adequacy of specimen using a Diff-\par Quik stain was performed at [facility], [address] [place] [address]. Touch\par imprints acceptable for further evaluation by Jose Juan Smith\par CT(ASCP).\par \par \par Clinical Information\par Sacral mass.\par \par \par Gross Description\par Core Biopsy:\par Tissue collected: Specimen container has been inspected to\par confirm patient?s name and date of birth, contains 3  tan cores\par measuring 1.0, 1.0, 0.8 cm in length (and multiple fragments).\par Entire specimen submitted in 2 cassette(s) labeled 1B and 1 C.\par \par 4 Touch prep slides ( 2 air dried + 2 fixed)\par \par FNA:\par No material submitted for cell block (No block 1A)\par \par Prepared:\par 4 Touch Prep,\par 1 core biopsy labeled 1B\par 1 core biopsy labeled 1C\par 6 Total slides\par \par  \par \par  Ordered by: DELORES MOSS       Collected/Examined: 20Aug2019 03:14PM       \par Verified by: OZZY ALMEIDA 18Jun2020 03:07PM       \par  Result Communication: No patient communication needed at this time;\par Stage: Final       \par  Performed at: [facility]       Resulted: 22Aug2019 01:22PM       Last Updated: 18Jun2020 03:07PM       Accession: [id number]       \par \par \par  Pathology             Final\par \par No Documents Attached\par \par \par \par \par   [facility] Accession Number : [id number]\par \par HARDEEP, SUBASH                          4\par \par \par \par Addendum Report\par \par \par \par \par Hematopathology Addendum\par Comprehensive Hematopathology Report\par \par Final Diagnosis:\par 1, 2. Bone marrow biopsy and bone marrow aspirate\par - Plasma cell myeloma (10% with focal 25% by  stain)\par - Slight erythroid predominant trilineage hematopoiesis\par with maturation\par \par Diagnostic Note:\par Please note findings of a normal male karyotype and a negative\par multiple myeloma FISH panel. Based on the additional findings,\par the diagnosis has not changed. Correlation with studies for\par myeloma-related organ dysfunction is necessary.\par \par Morphology:\par Microscopic description:\par 1. Biopsy: Sections of bone marrow biopsy show normocellularity\par (60 to 70% cellularity), mild plasmacytosis with foci of small\par clusters, slight erythroid predominant trilineage hematopoiesis\par with maturation, mild megakaryocytosis with normal morphology,\par and increased iron stores.\par 2. Aspirate: Cellular spicules are not present, precluding\par differential count. Review of the smear shows some maturing and\par predominant mature myeloid cells, a few erythroid elements, and\par rare megakaryocytes.\par \par Ancillary Studies:\par Bone marrow aspirate iron stain: No spicules are present to\par evaluate for iron stores and there are insufficient nRBC to\par evaluate for ring sideroblasts.\par Congo red stain is negative for amyloidosis.\par Flow cytometry:  Monotypic plasma cells (1% of cells), positive\par for cytoplasmic kappa, CD38, , dimmer CD45, partial CD56;\par negative CD19, CD20, .\par CD45/side scatter shows no significant blast population. There is\par no increase in CD34,  or CD14 positive cells.\par Lymphocytes (8% of cells) show a heterogeneous population of T-\par cells (with normal CD4 to CD8 ratio), and polytypic B-cells.\par Immunohistochemical stains ( performed on the block 1A and\par 1B, cyclin-D1 performed on block 1A):   stains show overall\par approximately 10% plasma cells, with foci of small clusters,\par focal up to 25%. Plasma cells are negative for cyclin-D1.\par \par Cytogenetics:\par Result: Normal male karyotype\par Karyotype: 46,XY[20]\par \par \par \par \par \par \par HARDEEP, SUBASH                          4\par \par \par \par Addendum Report\par \par \par \par \par FISH:\par Result: NORMAL FISH -\par - MULTIPLE MYELOMA PANEL\par Probe(s) and Location(s): FGFR3(4p16), CCND1(11q13), RB1(13q14),\par IGH(14q32), MAF(16q23), TP53(17p13.1)\par \par Clinical History/Data:\par New diagnosis multiple myeloma\par CBC on 08/24/2019: WBC: 8.09 K/uL, HGB: 9.5 g/dL, MCV: 90 fl,\par Plt: 227 K/uL\par Serum Immunofixation: IgG kappa\par \par Verified by: Nancy Farris M.D.\par (Electronic Signature)\par Reported on: 09/07/19 10:37 EDT, [address] [place], [place]\par [address]\par _________________________________________________________________\par \par \par Hematopathology Report\par \par \par \par \par Final Diagnosis\par 1, 2. Bone marrow biopsy and bone marrow aspirate\par - Plasma cell myeloma (10% with focal 25% by  stain)\par - Slight erythroid predominant trilineage hematopoiesis\par with maturation\par \par See note and description.\par \par Diagnostic note:\par Correlation with studies for myeloma-related organ dysfunction is\par necessary.\par Comprehensive report with results of pending ancillary studies to\par follow.\par \par Ancillary studies\par Bone marrow aspirate iron stain: No spicules are present to\par evaluate for iron stores and there are insufficient nRBC to\par evaluate for ring sideroblasts.\par \par Flow cytometry:  Monotypic plasma cells (1% of cells), positive\par for cytoplasmic kappa, CD38, , dimmer CD45, partial CD56;\par negative CD19, CD20, .\par CD45/side scatter shows no significant blast population. There is\par no increase in CD34,  or CD14 positive cells.\par \par \par \par \par \par \par HARDEEP, SUBASH                          4\par \par \par \par Hematopathology Report\par \par \par \par \par Lymphocytes (8% of cells) show a heterogeneous population of T-\par cells (with normal CD4 to CD8 ratio), and polytypic B-cells.\par \par Immunohistochemical stains ( performed on the block 1A and\par 1B, cyclin-D1 performed on block 1A):   stains show overall\par approximately 10% plasma cells, with foci of small clusters,\par focal up to 25%. Plasma cells are negative for cyclin-D1.\par \par Microscopic description:\par 1. Biopsy: Sections of bone marrow biopsy show normocellularity\par (60 to 70% cellularity), mild plasmacytosis with foci of small\par clusters, slight erythroid predominant trilineage hematopoiesis\par with maturation, mild megakaryocytosis with normal morphology,\par and increased iron stores.\par \par 2. Aspirate: Cellular spicules are not present, precluding\par differential count. Review of the smear shows some maturing and\par predominant mature myeloid cells, a few erythroid elements, and\par rare megakaryocytes.\par \par Comment:  Iron stain (examined to evaluate for iron stores; see\par microscopic description) and Giemsa stain (shows appropriate\par staining pattern) are performed and evaluated on block(s): 1A,\par 1B.\par \par Slide(s) with built in immunohistochemical study control(s)\par associated and negative control with this case has/have been\par verified by the sign out pathologist.\par For slide(s) without built in controls positive control slides\par has/have been reviewed and approved by immunohistochemistry lab\par These immunohistochemical/ in-situ hybridization tests have been\par developed and their performance characteristics determined by\par [facility], Department of\par Pathology, Division of Immunopathology, [address] [place] [address]\[place] [address].  It has not been cleared or approved by the\par U.S. Food and Drug Administration.  The FDA has determined that\par such clearance or approval is not necessary.  This test is used\par for clinical purposes.  The laboratory is certified under the\par CLIA-88 as qualified to perform high\par complexity clinical testing.\par \par Verified by: Nancy Farris M.D.\par (Electronic Signature)\par Reported on: 08/27/19 10:00 EDT, [address] [place] [address]\par [id number]\par _________________________________________________________________\par \par \par \par \par \par \par \par HARDEEP, SUBASH                          4\par \par \par \par Hematopathology Report\par \par \par \par \par Clinical History\par New diagnosis multiple myeloma\par CBC on 08/24/2019: WBC: 8.09 K/uL, HGB: 9.5 g/dL, MCV: 90 fl,\par Plt: 227 K/uL\par Serum Immunofixation: IgG kappa\par \par Specimen(s) Submitted\par 1     Bone marrow biopsy left\par 2     Bone marrow aspirate\par \par Gross Description\par 1. The specimen is received in bouin's fixative and the specimen\par container is labeled: Left bone marrow biopsy.  It consists of a\par 0.5 x 0.2 cm bone marrow core with a 1.5 x 1.3 x 0.5 cm blood\par clot.  Entirely submitted.  Two cassettes: A = bone marrow core;\par B = blood clot.\par 2. The specimen is received labeled with patient's name and\par consists of 3 air dried slide(s). 2 slide(s) stained with Monzon\par Giemsa stain. 1 stained for iron stain.\par In addition to other data that may appear on the specimen\par container, the label has been inspected to confirm the presence\par of the patient's name and date of birth.\par Catarina Torres 08/22/2019 17:12\par \par  \par \par  Ordered by: BECKY BILLS       Collected/Examined: [id number] 03:50PM       \par Verified by: BECKY BILLS [id number] 05:42PM       \par  Result Communication: No patient communication needed at this time;\par Stage: Final       \par  Performed at: [facility]       Resulted: [id number] 10:37AM       Last Updated: 07Sep2019 05:42PM       Accession: [id number]

## 2020-10-09 LAB
ALBUMIN MFR SERPL ELPH: 62.4 %
ALBUMIN SERPL-MCNC: 4.4 G/DL
ALBUMIN/GLOB SERPL: 1.6 RATIO
ALPHA1 GLOB MFR SERPL ELPH: 4.6 %
ALPHA1 GLOB SERPL ELPH-MCNC: 0.3 G/DL
ALPHA2 GLOB MFR SERPL ELPH: 12.7 %
ALPHA2 GLOB SERPL ELPH-MCNC: 0.9 G/DL
B-GLOBULIN MFR SERPL ELPH: 10.8 %
B-GLOBULIN SERPL ELPH-MCNC: 0.8 G/DL
DEPRECATED KAPPA LC FREE/LAMBDA SER: 1.79 RATIO
GAMMA GLOB FLD ELPH-MCNC: 0.7 G/DL
GAMMA GLOB MFR SERPL ELPH: 9.5 %
IGA SER QL IEP: 94 MG/DL
IGG SER QL IEP: 601 MG/DL
IGM SER QL IEP: 17 MG/DL
INTERPRETATION SERPL IEP-IMP: NORMAL
KAPPA LC CSF-MCNC: 1.12 MG/DL
KAPPA LC SERPL-MCNC: 2.01 MG/DL
M PROTEIN SPEC IFE-MCNC: NORMAL
PROT SERPL-MCNC: 7.1 G/DL
PROT SERPL-MCNC: 7.1 G/DL

## 2020-10-16 ENCOUNTER — RESULT REVIEW (OUTPATIENT)
Age: 70
End: 2020-10-16

## 2020-10-16 ENCOUNTER — APPOINTMENT (OUTPATIENT)
Dept: INFUSION THERAPY | Facility: HOSPITAL | Age: 70
End: 2020-10-16

## 2020-10-16 ENCOUNTER — LABORATORY RESULT (OUTPATIENT)
Age: 70
End: 2020-10-16

## 2020-10-16 LAB
BASOPHILS # BLD AUTO: 0.06 K/UL — SIGNIFICANT CHANGE UP (ref 0–0.2)
BASOPHILS # BLD AUTO: 0.06 K/UL — SIGNIFICANT CHANGE UP (ref 0–0.2)
BASOPHILS NFR BLD AUTO: 0.8 % — SIGNIFICANT CHANGE UP (ref 0–2)
BASOPHILS NFR BLD AUTO: 0.8 % — SIGNIFICANT CHANGE UP (ref 0–2)
EOSINOPHIL # BLD AUTO: 0.19 K/UL — SIGNIFICANT CHANGE UP (ref 0–0.5)
EOSINOPHIL # BLD AUTO: 0.19 K/UL — SIGNIFICANT CHANGE UP (ref 0–0.5)
EOSINOPHIL NFR BLD AUTO: 2.6 % — SIGNIFICANT CHANGE UP (ref 0–6)
EOSINOPHIL NFR BLD AUTO: 2.6 % — SIGNIFICANT CHANGE UP (ref 0–6)
HCT VFR BLD CALC: 30.2 % — LOW (ref 39–50)
HCT VFR BLD CALC: 30.2 % — LOW (ref 39–50)
HGB BLD-MCNC: 10.5 G/DL — LOW (ref 13–17)
HGB BLD-MCNC: 10.5 G/DL — LOW (ref 13–17)
IMM GRANULOCYTES NFR BLD AUTO: 0.4 % — SIGNIFICANT CHANGE UP (ref 0–1.5)
LYMPHOCYTES # BLD AUTO: 1.39 K/UL — SIGNIFICANT CHANGE UP (ref 1–3.3)
LYMPHOCYTES # BLD AUTO: 1.39 K/UL — SIGNIFICANT CHANGE UP (ref 1–3.3)
LYMPHOCYTES # BLD AUTO: 18.8 % — SIGNIFICANT CHANGE UP (ref 13–44)
LYMPHOCYTES # BLD AUTO: 18.8 % — SIGNIFICANT CHANGE UP (ref 13–44)
MCHC RBC-ENTMCNC: 32.6 PG — SIGNIFICANT CHANGE UP (ref 27–34)
MCHC RBC-ENTMCNC: 32.6 PG — SIGNIFICANT CHANGE UP (ref 27–34)
MCHC RBC-ENTMCNC: 34.8 G/DL — SIGNIFICANT CHANGE UP (ref 32–36)
MCHC RBC-ENTMCNC: 34.8 G/DL — SIGNIFICANT CHANGE UP (ref 32–36)
MCV RBC AUTO: 93.8 FL — SIGNIFICANT CHANGE UP (ref 80–100)
MCV RBC AUTO: 93.8 FL — SIGNIFICANT CHANGE UP (ref 80–100)
MONOCYTES # BLD AUTO: 0.86 K/UL — SIGNIFICANT CHANGE UP (ref 0–0.9)
MONOCYTES # BLD AUTO: 0.86 K/UL — SIGNIFICANT CHANGE UP (ref 0–0.9)
MONOCYTES NFR BLD AUTO: 11.6 % — SIGNIFICANT CHANGE UP (ref 2–14)
MONOCYTES NFR BLD AUTO: 11.6 % — SIGNIFICANT CHANGE UP (ref 2–14)
NEUTROPHILS # BLD AUTO: 4.87 K/UL — SIGNIFICANT CHANGE UP (ref 1.8–7.4)
NEUTROPHILS # BLD AUTO: 4.87 K/UL — SIGNIFICANT CHANGE UP (ref 1.8–7.4)
NEUTROPHILS NFR BLD AUTO: 65.8 % — SIGNIFICANT CHANGE UP (ref 43–77)
NEUTROPHILS NFR BLD AUTO: 65.8 % — SIGNIFICANT CHANGE UP (ref 43–77)
NRBC # BLD: 0 /100 WBCS — SIGNIFICANT CHANGE UP (ref 0–0)
NRBC # BLD: 0 /100 WBCS — SIGNIFICANT CHANGE UP (ref 0–0)
PLATELET # BLD AUTO: 149 K/UL — LOW (ref 150–400)
PLATELET # BLD AUTO: 149 K/UL — LOW (ref 150–400)
RBC # BLD: 3.22 M/UL — LOW (ref 4.2–5.8)
RBC # BLD: 3.22 M/UL — LOW (ref 4.2–5.8)
RBC # FLD: 13.2 % — SIGNIFICANT CHANGE UP (ref 10.3–14.5)
RBC # FLD: 13.2 % — SIGNIFICANT CHANGE UP (ref 10.3–14.5)
WBC # BLD: 7.4 K/UL — SIGNIFICANT CHANGE UP (ref 3.8–10.5)
WBC # BLD: 7.4 K/UL — SIGNIFICANT CHANGE UP (ref 3.8–10.5)
WBC # FLD AUTO: 7.4 K/UL — SIGNIFICANT CHANGE UP (ref 3.8–10.5)
WBC # FLD AUTO: 7.4 K/UL — SIGNIFICANT CHANGE UP (ref 3.8–10.5)

## 2020-10-26 ENCOUNTER — OUTPATIENT (OUTPATIENT)
Dept: OUTPATIENT SERVICES | Facility: HOSPITAL | Age: 70
LOS: 1 days | Discharge: HOME | End: 2020-10-26

## 2020-10-26 DIAGNOSIS — C90.00 MULTIPLE MYELOMA NOT HAVING ACHIEVED REMISSION: ICD-10-CM

## 2020-10-30 ENCOUNTER — RESULT REVIEW (OUTPATIENT)
Age: 70
End: 2020-10-30

## 2020-10-30 ENCOUNTER — APPOINTMENT (OUTPATIENT)
Dept: INFUSION THERAPY | Facility: HOSPITAL | Age: 70
End: 2020-10-30

## 2020-10-30 ENCOUNTER — LABORATORY RESULT (OUTPATIENT)
Age: 70
End: 2020-10-30

## 2020-10-30 LAB
BASOPHILS # BLD AUTO: 0.06 K/UL — SIGNIFICANT CHANGE UP (ref 0–0.2)
BASOPHILS NFR BLD AUTO: 0.8 % — SIGNIFICANT CHANGE UP (ref 0–2)
EOSINOPHIL # BLD AUTO: 0.2 K/UL — SIGNIFICANT CHANGE UP (ref 0–0.5)
EOSINOPHIL NFR BLD AUTO: 2.7 % — SIGNIFICANT CHANGE UP (ref 0–6)
HCT VFR BLD CALC: 30.2 % — LOW (ref 39–50)
HGB BLD-MCNC: 10.5 G/DL — LOW (ref 13–17)
IMM GRANULOCYTES NFR BLD AUTO: 0.8 % — SIGNIFICANT CHANGE UP (ref 0–1.5)
LYMPHOCYTES # BLD AUTO: 1.3 K/UL — SIGNIFICANT CHANGE UP (ref 1–3.3)
LYMPHOCYTES # BLD AUTO: 17.4 % — SIGNIFICANT CHANGE UP (ref 13–44)
MCHC RBC-ENTMCNC: 32.8 PG — SIGNIFICANT CHANGE UP (ref 27–34)
MCHC RBC-ENTMCNC: 34.8 G/DL — SIGNIFICANT CHANGE UP (ref 32–36)
MCV RBC AUTO: 94.4 FL — SIGNIFICANT CHANGE UP (ref 80–100)
MONOCYTES # BLD AUTO: 0.76 K/UL — SIGNIFICANT CHANGE UP (ref 0–0.9)
MONOCYTES NFR BLD AUTO: 10.2 % — SIGNIFICANT CHANGE UP (ref 2–14)
NEUTROPHILS # BLD AUTO: 5.09 K/UL — SIGNIFICANT CHANGE UP (ref 1.8–7.4)
NEUTROPHILS NFR BLD AUTO: 68.1 % — SIGNIFICANT CHANGE UP (ref 43–77)
NRBC # BLD: 0 /100 WBCS — SIGNIFICANT CHANGE UP (ref 0–0)
PLATELET # BLD AUTO: 154 K/UL — SIGNIFICANT CHANGE UP (ref 150–400)
RBC # BLD: 3.2 M/UL — LOW (ref 4.2–5.8)
RBC # FLD: 12.9 % — SIGNIFICANT CHANGE UP (ref 10.3–14.5)
WBC # BLD: 7.47 K/UL — SIGNIFICANT CHANGE UP (ref 3.8–10.5)
WBC # FLD AUTO: 7.47 K/UL — SIGNIFICANT CHANGE UP (ref 3.8–10.5)

## 2020-11-01 DIAGNOSIS — R11.2 NAUSEA WITH VOMITING, UNSPECIFIED: ICD-10-CM

## 2020-11-01 DIAGNOSIS — Z51.11 ENCOUNTER FOR ANTINEOPLASTIC CHEMOTHERAPY: ICD-10-CM

## 2020-11-13 ENCOUNTER — RESULT REVIEW (OUTPATIENT)
Age: 70
End: 2020-11-13

## 2020-11-13 ENCOUNTER — APPOINTMENT (OUTPATIENT)
Dept: INFUSION THERAPY | Facility: HOSPITAL | Age: 70
End: 2020-11-13

## 2020-11-13 ENCOUNTER — LABORATORY RESULT (OUTPATIENT)
Age: 70
End: 2020-11-13

## 2020-11-13 LAB
BASOPHILS # BLD AUTO: 0.07 K/UL — SIGNIFICANT CHANGE UP (ref 0–0.2)
BASOPHILS NFR BLD AUTO: 1 % — SIGNIFICANT CHANGE UP (ref 0–2)
EOSINOPHIL # BLD AUTO: 0.28 K/UL — SIGNIFICANT CHANGE UP (ref 0–0.5)
EOSINOPHIL NFR BLD AUTO: 4 % — SIGNIFICANT CHANGE UP (ref 0–6)
HCT VFR BLD CALC: 29 % — LOW (ref 39–50)
HGB BLD-MCNC: 10.1 G/DL — LOW (ref 13–17)
LYMPHOCYTES # BLD AUTO: 1.52 K/UL — SIGNIFICANT CHANGE UP (ref 1–3.3)
LYMPHOCYTES # BLD AUTO: 22 % — SIGNIFICANT CHANGE UP (ref 13–44)
MCHC RBC-ENTMCNC: 33.2 PG — SIGNIFICANT CHANGE UP (ref 27–34)
MCHC RBC-ENTMCNC: 34.8 G/DL — SIGNIFICANT CHANGE UP (ref 32–36)
MCV RBC AUTO: 95.4 FL — SIGNIFICANT CHANGE UP (ref 80–100)
MONOCYTES # BLD AUTO: 0.48 K/UL — SIGNIFICANT CHANGE UP (ref 0–0.9)
MONOCYTES NFR BLD AUTO: 7 % — SIGNIFICANT CHANGE UP (ref 2–14)
NEUTROPHILS # BLD AUTO: 4.55 K/UL — SIGNIFICANT CHANGE UP (ref 1.8–7.4)
NEUTROPHILS NFR BLD AUTO: 66 % — SIGNIFICANT CHANGE UP (ref 43–77)
NRBC # BLD: 0 /100 — SIGNIFICANT CHANGE UP (ref 0–0)
NRBC # BLD: SIGNIFICANT CHANGE UP /100 WBCS (ref 0–0)
PLAT MORPH BLD: NORMAL — SIGNIFICANT CHANGE UP
PLATELET # BLD AUTO: 143 K/UL — LOW (ref 150–400)
RBC # BLD: 3.04 M/UL — LOW (ref 4.2–5.8)
RBC # FLD: 12.9 % — SIGNIFICANT CHANGE UP (ref 10.3–14.5)
RBC BLD AUTO: SIGNIFICANT CHANGE UP
WBC # BLD: 6.89 K/UL — SIGNIFICANT CHANGE UP (ref 3.8–10.5)
WBC # FLD AUTO: 6.89 K/UL — SIGNIFICANT CHANGE UP (ref 3.8–10.5)

## 2020-11-20 ENCOUNTER — OUTPATIENT (OUTPATIENT)
Dept: OUTPATIENT SERVICES | Facility: HOSPITAL | Age: 70
LOS: 1 days | Discharge: HOME | End: 2020-11-20

## 2020-11-23 ENCOUNTER — NON-APPOINTMENT (OUTPATIENT)
Age: 70
End: 2020-11-23

## 2020-11-27 ENCOUNTER — RESULT REVIEW (OUTPATIENT)
Age: 70
End: 2020-11-27

## 2020-11-27 ENCOUNTER — LABORATORY RESULT (OUTPATIENT)
Age: 70
End: 2020-11-27

## 2020-11-27 ENCOUNTER — APPOINTMENT (OUTPATIENT)
Dept: INFUSION THERAPY | Facility: HOSPITAL | Age: 70
End: 2020-11-27

## 2020-12-11 ENCOUNTER — RESULT REVIEW (OUTPATIENT)
Age: 70
End: 2020-12-11

## 2020-12-11 ENCOUNTER — APPOINTMENT (OUTPATIENT)
Dept: INFUSION THERAPY | Facility: HOSPITAL | Age: 70
End: 2020-12-11

## 2020-12-11 ENCOUNTER — LABORATORY RESULT (OUTPATIENT)
Age: 70
End: 2020-12-11

## 2020-12-14 ENCOUNTER — APPOINTMENT (OUTPATIENT)
Dept: HEMATOLOGY ONCOLOGY | Facility: CLINIC | Age: 70
End: 2020-12-14
Payer: MEDICARE

## 2020-12-14 ENCOUNTER — RESULT REVIEW (OUTPATIENT)
Age: 70
End: 2020-12-14

## 2020-12-14 VITALS
DIASTOLIC BLOOD PRESSURE: 74 MMHG | OXYGEN SATURATION: 99 % | WEIGHT: 130.71 LBS | HEART RATE: 62 BPM | SYSTOLIC BLOOD PRESSURE: 135 MMHG | RESPIRATION RATE: 16 BRPM | TEMPERATURE: 97.2 F | BODY MASS INDEX: 22.44 KG/M2

## 2020-12-14 PROCEDURE — 99214 OFFICE O/P EST MOD 30 MIN: CPT

## 2020-12-15 NOTE — RESULTS/DATA
[FreeTextEntry1] : \par   PET/CT Whole Body Oncology FDG             Final\par \par No Documents Attached\par \par \par \par \par   EXAM:  PETCT WB ONC FDG SUBS\par \par \par PROCEDURE DATE:  05/07/2020\par \par \par \par INTERPRETATION:  PROCEDURE:  PET/CT WHOLE BODY\par \par RADIOPHARMACEUTICAL:  9.97 mCi F-18, FDG, I.V.\par \par CLINICAL INFORMATION:  70-year-old male referred for follow-up of multiple myeloma on Zometa. Bone marrow biopsy done 3/4/2020 showed less than 5% plasma cells and repeat myeloma labs done on 3/9/2020, showed CT are. PET/CT is done as part of the subsequent treatment strategy evaluation.\par \par TECHNIQUE:  Fasting blood sugar measured prior to injection of radiopharmaceutical was 116 mg/dl. Following intravenous injection of the radiopharmaceutical and an uptake period of approximately 60 minutes, FDG-PET/CT was obtained on a  [address] from the vertex of the skull to the toes. Oral contrast was given during the uptake period. CT was performed during shallow respiration. The CT protocol was optimized for PET attenuation correction and to provide anatomic detail for localization of PET abnormalities. The CT protocol was not designed to produce and cannot replace state-of-the-art diagnostic CT images with specific imaging protocols for different body parts and indications. Images were reviewed on a dedicated workstation using multiplanar reconstruction.\par \par The standardized uptake values (SUV) are normalized to patient body weight and indicate the highest activity concentration (SUVmax) in a given disease site. All image numbers refer to the axial image number.\par \par COMPARISON:  FDG PET/CT dated 9/7/2019.\par \par OTHER STUDIES USED FOR CORRELATION: MRI of cervical spine dated 9/9/2019.\par \par FINDINGS:\par \par HEAD/NECK: Physiologic FDG activity in the brain.\par \par Mild hypermetabolism in left masseter muscle, decreased as compared to prior study, may be secondary to bruxism. Elongated focus of increased FDG activity in left lower cervical region may reflect inflammation or muscle spasm (image 77).\par \par New hypermetabolic lucency in left maxillary alveolus is nonspecific (SUV 8.0; image 53). Recommend correlation with dental exam. A new small hypermetabolic focus is seen in the right maxilla (SUV 3.7; image 55).\par \par CHEST: Few new small hypermetabolic foci in bilateral hilar region likely correspond to small lymph nodes that are difficult to delineate on CT. For reference, left perihilar region demonstrates SUV 3.6 (image 117). Resolution of FDG-avid subcentimeter left posterior paraesophageal lymph node.\par \par LUNGS: No abnormal FDG activity. Calcified right lower lobe granuloma, unchanged (image 128).\par \par PLEURA/PERICARDIUM: No abnormal FDG activity. No effusion.\par \par HEPATOBILIARY/PANCREAS: Physiologic FDG activity. Liver SUV mean is 2.1; previous 2.3.\par \par SPLEEN: No abnormal FDG activity. Normal in size.\par \par ADRENAL GLANDS: No abnormal FDG activity. No nodule.\par \par KIDNEYS/URINARY BLADDER: Physiologic FDG activity. Multiple bilateral renal cysts, measuring up to 4.6 cm on the right, unchanged.\par \par PELVIC ORGANS: No abnormal FDG activity.\par \par ABDOMINOPELVIC NODES: No enlarged or FDG avid lymph node\par \par BOWEL/PERITONEUM/MESENTERY: Physiologic FDG activity. Resolution of hypermetabolism in distal esophagus/GE junction.\par \par BONES: Near total resolution of previously seen hypermetabolic foci in the axial skeleton, right humerus, and proximal bilateral femurs. Previously seen lytic lesions demonstrate new areas of sclerosis on CT. For reference, one of the most FDG-avid residual lesions is a mixed lytic and sclerotic lesion in the left scapula which demonstrates SUV 3.0 (image 98); previously lytic with SUV 5.7. Previously seen hypermetabolic lesion in right sphenoid bone has resolved. Previously seen large destructive lesion in the manubrium the sternum demonstrates new sclerosis with SUV 3.0 (image 103); previous SUV 5.3. Resolution of hypermetabolic intramedullary soft tissue in proximal right humerus. Near total resolution of hypermetabolic intramedullary soft tissue in proximal left femur (SUV 1.3; image 273; previous SUV 5.3).\par \par FDG-avid fracture in left inferior pubic ramus demonstrates increased callus formation and is similar in metabolism (SUV 3.8; image 250; previous SUV 3.1). Few new mildly FDG-avid bilateral rib fractures. For reference, fracture in the anterior aspect of the left second rib demonstrates SUV 3.4 (image 105).\par \par Resolution of hypermetabolism in left palmar musculature.\par \par IMPRESSION:  Abnormal whole body FDG-PET/CT scan.\par \par 1. Near total resolution of hypermetabolism associated with lytic and intramedullary lesions in the axial and appendicular skeleton as compared to prior study dated 9/7/2019. Previously seen lytic lesions demonstrate new sclerosis. Findings are compatible with response to interval therapy.\par \par 2. Few new mildly FDG-avid bilateral rib fractures. An FDG-avid fracture in the left inferior pubic ramus demonstrates increased callus formation and is similar in metabolism.\par \par 3. New hypermetabolic lucency in left maxillary alveolus and new small hypermetabolic focus in right maxilla are nonspecific. Recommend correlation with dental exam.\par \par 4. Few new mildly FDG-avid nonspecific bilateral hilar lymph nodes.\par \par 5. Resolution of many specific hypermetabolism in distal esophagus/GE junction.\par \par \par \par \par \par \par \par \par \par \par \par \par \par \par \par LUANA RAMOS M.D., NUCLEAR MEDICINE ATTENDING\par This document has been electronically signed. May  7 2020  4:08PM\par \par  \par \par  Ordered by: OZZY ALMEIDA       Collected/Examined: 07May2020 12:57PM       \par Verified by: OZZY ALMEIDA 11May2020 09:27AM       \par  Result Communication: Call patient with results,Discussed results with patient;\par Stage: Final       \par  Performed at: [facility]       Resulted: 07May2020 04:11PM       Last Updated: 11May2020 09:27AM       Accession: [id number]       \par \par \par  Pathology             Final\par \par No Documents Attached\par \par \par \par \par   [facility] Accession Number : [id number]\par \par HARDEEP, SUBASH                          2\par \par \par \par Cytopathology Report\par \par \par \par \par \par Final Diagnosis\par SOFT TISSUE, SACRUM, CT GUIDED CORE BIOPSY\par Plasma cell neoplasm\par See note and description.\par \par Diagnostic note:  Overall the morphologic and immunophenotypic\par findings are consistent with plasma cell neoplasm. The\par differential diagnosis includes plasmacytoma vs plasma cells\par myeloma. Correlation with laboratory, radiologic and clinical\par findings is required for further classification.\par \par Microscopic description:\par The touch prep slides are composed of numerous enlarged plasma\par cells with prominent nucleoli, occasional binucleated forms are\par seen.\par \par Histologic sections consist of multiple needle shaped cores of\par soft tissue showing proliferation of enlarged plasma cells with\par prominent nucleoli, forming sheets.\par \par Immunohistochemistry:  , kappaISH, lambdaISH, cyclinD1, p53\par stains performed on paraffin embedded section of block 1C.\par \par Neoplastic cells are:\par Positive:  , KappaISH, p53\par Negative:  LambdaISH, cyclinD1\par \par Flow cytometry analysis (69-JL-): Monotypic plasma cells\par (24.1% of total analyzed cells) are present. There are two\par aberrant monoclonal plasma cell populations:\par Population #1 (16.5% of total analyzed cells, 68.6% of plasma\par cell population) positive for cytoplasmic kappa, CD38 dimmer,\par CD45 dimmer, CD56, ; negative for , CD19, CD20.\par Population # 2 (7.6% of total analyzed cells, 31.4% of plasma\par cell population) positive for cytoplasmic kappa\par \par \par \par \par \par \par HARDEEP, SUBASH                          2\par \par \par \par Cytopathology Report\par \par \par \par \par (brighter), , CD38 brighter, CD45 dim, CD56 (brighter),\par ; negative for CD19, CD20.\par \par Screened by: Jose Juan LARA(ASCP)\par Verified by: Orlando Spicer MD\par (Electronic Signature)\par Reported on: 08/22/19 13:22 EDT, [address] [place], [place]\par [address]\par Cytology technical processing performed at [address] [place], [place] [address] [address]\par _________________________________________________________________\par \par \par Specimen(s) Submitted\par SOFT TISSUE, SACRUM, CT GUIDED CORE BIOPSY\par \par \par Statement of Adequacy\par Immediate cytologic study for adequacy of specimen using a Diff-\par Quik stain was performed at [facility], [address] [place] [address]. Touch\par imprints acceptable for further evaluation by Jose Juan Smith\par CT(ASCP).\par \par \par Clinical Information\par Sacral mass.\par \par \par Gross Description\par Core Biopsy:\par Tissue collected: Specimen container has been inspected to\par confirm patient?s name and date of birth, contains 3  tan cores\par measuring 1.0, 1.0, 0.8 cm in length (and multiple fragments).\par Entire specimen submitted in 2 cassette(s) labeled 1B and 1 C.\par \par 4 Touch prep slides ( 2 air dried + 2 fixed)\par \par FNA:\par No material submitted for cell block (No block 1A)\par \par Prepared:\par 4 Touch Prep,\par 1 core biopsy labeled 1B\par 1 core biopsy labeled 1C\par 6 Total slides\par \par  \par \par  Ordered by: DELORES MOSS       Collected/Examined: 20Aug2019 03:14PM       \par Verified by: OZZY ALMEIDA 18Jun2020 03:07PM       \par  Result Communication: No patient communication needed at this time;\par Stage: Final       \par  Performed at: [facility]       Resulted: 22Aug2019 01:22PM       Last Updated: 18Jun2020 03:07PM       Accession: [id number]       \par \par \par  Pathology             Final\par \par No Documents Attached\par \par \par \par \par   [facility] Accession Number : [id number]\par \par HARDEEP, SUBASH                          4\par \par \par \par Addendum Report\par \par \par \par \par Hematopathology Addendum\par Comprehensive Hematopathology Report\par \par Final Diagnosis:\par 1, 2. Bone marrow biopsy and bone marrow aspirate\par - Plasma cell myeloma (10% with focal 25% by  stain)\par - Slight erythroid predominant trilineage hematopoiesis\par with maturation\par \par Diagnostic Note:\par Please note findings of a normal male karyotype and a negative\par multiple myeloma FISH panel. Based on the additional findings,\par the diagnosis has not changed. Correlation with studies for\par myeloma-related organ dysfunction is necessary.\par \par Morphology:\par Microscopic description:\par 1. Biopsy: Sections of bone marrow biopsy show normocellularity\par (60 to 70% cellularity), mild plasmacytosis with foci of small\par clusters, slight erythroid predominant trilineage hematopoiesis\par with maturation, mild megakaryocytosis with normal morphology,\par and increased iron stores.\par 2. Aspirate: Cellular spicules are not present, precluding\par differential count. Review of the smear shows some maturing and\par predominant mature myeloid cells, a few erythroid elements, and\par rare megakaryocytes.\par \par Ancillary Studies:\par Bone marrow aspirate iron stain: No spicules are present to\par evaluate for iron stores and there are insufficient nRBC to\par evaluate for ring sideroblasts.\par Congo red stain is negative for amyloidosis.\par Flow cytometry:  Monotypic plasma cells (1% of cells), positive\par for cytoplasmic kappa, CD38, , dimmer CD45, partial CD56;\par negative CD19, CD20, .\par CD45/side scatter shows no significant blast population. There is\par no increase in CD34,  or CD14 positive cells.\par Lymphocytes (8% of cells) show a heterogeneous population of T-\par cells (with normal CD4 to CD8 ratio), and polytypic B-cells.\par Immunohistochemical stains ( performed on the block 1A and\par 1B, cyclin-D1 performed on block 1A):   stains show overall\par approximately 10% plasma cells, with foci of small clusters,\par focal up to 25%. Plasma cells are negative for cyclin-D1.\par \par Cytogenetics:\par Result: Normal male karyotype\par Karyotype: 46,XY[20]\par \par \par \par \par \par \par HARDEEP, SUBASH                          4\par \par \par \par Addendum Report\par \par \par \par \par FISH:\par Result: NORMAL FISH -\par - MULTIPLE MYELOMA PANEL\par Probe(s) and Location(s): FGFR3(4p16), CCND1(11q13), RB1(13q14),\par IGH(14q32), MAF(16q23), TP53(17p13.1)\par \par Clinical History/Data:\par New diagnosis multiple myeloma\par CBC on 08/24/2019: WBC: 8.09 K/uL, HGB: 9.5 g/dL, MCV: 90 fl,\par Plt: 227 K/uL\par Serum Immunofixation: IgG kappa\par \par Verified by: Nancy Farris M.D.\par (Electronic Signature)\par Reported on: 09/07/19 10:37 EDT, [address], [place]\par [address]\par _________________________________________________________________\par \par \par Hematopathology Report\par \par \par \par \par Final Diagnosis\par 1, 2. Bone marrow biopsy and bone marrow aspirate\par - Plasma cell myeloma (10% with focal 25% by  stain)\par - Slight erythroid predominant trilineage hematopoiesis\par with maturation\par \par See note and description.\par \par Diagnostic note:\par Correlation with studies for myeloma-related organ dysfunction is\par necessary.\par Comprehensive report with results of pending ancillary studies to\par follow.\par \par Ancillary studies\par Bone marrow aspirate iron stain: No spicules are present to\par evaluate for iron stores and there are insufficient nRBC to\par evaluate for ring sideroblasts.\par \par Flow cytometry:  Monotypic plasma cells (1% of cells), positive\par for cytoplasmic kappa, CD38, , dimmer CD45, partial CD56;\par negative CD19, CD20, .\par CD45/side scatter shows no significant blast population. There is\par no increase in CD34,  or CD14 positive cells.\par \par \par \par \par \par \par HARDEEP, SUBASH                          4\par \par \par \par Hematopathology Report\par \par \par \par \par Lymphocytes (8% of cells) show a heterogeneous population of T-\par cells (with normal CD4 to CD8 ratio), and polytypic B-cells.\par \par Immunohistochemical stains ( performed on the block 1A and\par 1B, cyclin-D1 performed on block 1A):   stains show overall\par approximately 10% plasma cells, with foci of small clusters,\par focal up to 25%. Plasma cells are negative for cyclin-D1.\par \par Microscopic description:\par 1. Biopsy: Sections of bone marrow biopsy show normocellularity\par (60 to 70% cellularity), mild plasmacytosis with foci of small\par clusters, slight erythroid predominant trilineage hematopoiesis\par with maturation, mild megakaryocytosis with normal morphology,\par and increased iron stores.\par \par 2. Aspirate: Cellular spicules are not present, precluding\par differential count. Review of the smear shows some maturing and\par predominant mature myeloid cells, a few erythroid elements, and\par rare megakaryocytes.\par \par Comment:  Iron stain (examined to evaluate for iron stores; see\par microscopic description) and Giemsa stain (shows appropriate\par staining pattern) are performed and evaluated on block(s): 1A,\par 1B.\par \par Slide(s) with built in immunohistochemical study control(s)\par associated and negative control with this case has/have been\par verified by the sign out pathologist.\par For slide(s) without built in controls positive control slides\par has/have been reviewed and approved by immunohistochemistry lab\par These immunohistochemical/ in-situ hybridization tests have been\par developed and their performance characteristics determined by\par [facility], Department of\par Pathology, Division of Immunopathology, [address] [place] [address]\[place] [address].  It has not been cleared or approved by the\par U.S. Food and Drug Administration.  The FDA has determined that\par such clearance or approval is not necessary.  This test is used\par for clinical purposes.  The laboratory is certified under the\par CLIA-88 as qualified to perform high\par complexity clinical testing.\par \par Verified by: Nancy Farris M.D.\par (Electronic Signature)\par Reported on: 08/27/19 10:00 EDT, [address] [place] [address]\par [address]\par _________________________________________________________________\par \par \par \par \par \par \par \par HARDEEP, SUBASH                          4\par \par \par \par Hematopathology Report\par \par \par \par \par Clinical History\par New diagnosis multiple myeloma\par CBC on 08/24/2019: WBC: 8.09 K/uL, HGB: 9.5 g/dL, MCV: 90 fl,\par Plt: 227 K/uL\par Serum Immunofixation: IgG kappa\par \par Specimen(s) Submitted\par 1     Bone marrow biopsy left\par 2     Bone marrow aspirate\par \par Gross Description\par 1. The specimen is received in bouin's fixative and the specimen\par container is labeled: Left bone marrow biopsy.  It consists of a\par 0.5 x 0.2 cm bone marrow core with a 1.5 x 1.3 x 0.5 cm blood\par clot.  Entirely submitted.  Two cassettes: A = bone marrow core;\par B = blood clot.\par 2. The specimen is received labeled with patient's name and\par consists of 3 air dried slide(s). 2 slide(s) stained with Monzon\par Giemsa stain. 1 stained for iron stain.\par In addition to other data that may appear on the specimen\par container, the label has been inspected to confirm the presence\par of the patient's name and date of birth.\par Catarina Torres 08/22/2019 17:12\par \par  \par \par  Ordered by: BECKY BILLS       Collected/Examined: [id number] 03:50PM       \par Verified by: BECKY BILLS [id number] 05:42PM       \par  Result Communication: No patient communication needed at this time;\par Stage: Final       \par  Performed at: [facility]       Resulted: [id number] 10:37AM       Last Updated: 07Sep2019 05:42PM       Accession: [id number]

## 2020-12-15 NOTE — ASSESSMENT
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

## 2020-12-15 NOTE — HISTORY OF PRESENT ILLNESS
[Disease:__________________________] : Disease: [unfilled] [Treatment Protocol] : Treatment Protocol [de-identified] : As per Dr Kilgore's note on 8/18/2020\par \par "Mr Rand was referred to my office for newly diagnosed IgG kappa multiple myeloma. The patient's primary language is Danish, he has his daughter Mirlande as the , per his wishes. He was seen by Dr. Andrés Valle (hematology) in Feb 2019 and had a BM bx showing 10-15% plasma cells, concerning for smoldering myeloma. According to the daughter, he was awaiting insurance approval for imaging studies. He was admitted from 8/18/19 at [facility] where he presented with pain in the L leg/lower back, worsening for the past 2-3 months. On labs, he was found to have a total protein of 10, Ca level 12 until 8/29/19. Dental consult was called and patient needs to have 10 teeth extracted -thus, IV bisphosphonates were not given, pt improved with hydration. He also had a slightly inc'ed creatinine -improved after IVF. CT C/A/P 8/18/19 showed a lytic expansile soft tissue lesion centered in the manubrium measuring approximately 7.3 x 3.4 x 4.3 cm, invading both 1st ribs. There was also a A lytic expansile soft tissue lesion in the T8 vertebral body causing mass effect on spinal canal and obliterating left neural foramina at T7-8 and T8-9. ON 8/22/19 an MRI thoracic spine was done showing multiple areas of tumor involvement within the thoracic spine in keeping with the patient's history of multiple myeloma. Prominent lesion within C7 on the right incompletely seen.\par Large lesion within T8 on the left producing epidural tumor extension and moderate narrowing of the spinal canal with mild flattening of the spinal cord. He was evaluated on 8/22/19 by Dr. Foley (Rad Onc) and was given 5 fractions of XRT from 8/23/19 to 8/29/19 to C7 and T8. He was discharged home on 8/29/19 with follow up in the outpatient office, and on Dexamethasone 4mg po daily. \par \par  \par \par \par \par Interval History: The patient is being followed for IgG kappa MM with multiple bone lytic lesions, hypercalcemia (resolved) and mild anemia. He started Velcade/Dexa weekly on 9/919. The patient got the Revlimid and started it C1 day 1 on 10/21/19 and hed been tolerating it well. \par \par Starting in February, his blood counts became low -plt count 54, Hb 8.3 wbc 5.8. Revlimid was held -after 2/3/20. He got 1 shot Velcade/Dexa on 2/3/20, NO chemo since then. \par BM done on 3/4/20 to eval for residual disease (MM) vs. MDS. BM showed recovering marrow elements, plasma cells <5%. SPEP/MICAH from 3/9/20 showed NO monoclonal proteins, c/w CR. \par \par The PET scan from 5/7/20 showed L uptake maxillary -pt reports that he had dentures done in Jan 2020 and has discomfort from them. He did not go to the dentist during COVID Rib fractures -pt denied falls/trauma. Patient has dental appointment for next week. He has some discomfort in the upper teeth b/l. He also missed his Velcade SQ last week 'i did not know if i am supposed to continue it.' He has no neuropathy from it, tolerating well. "\par  [de-identified] : RISS- II [FreeTextEntry1] : s/p RT to C7/T8 spine\par RVD 9/2019 -6/2020 transitioned to maintenance velcade q2wk started 6/19/2020 [de-identified] : Oct 6, 2020\kris Pt is here for initial visit with me. He has his dtr on the phone, prefers her included in conversation and declined an . Pt is feeling well. He is tolerating monthly zometa and maintenance velcade w/o any adverse effects. Last set of myeloma labs done in August showed stable remission. He is requesting refill of several meds today.\kris ---------------------------------tanya Pt is here for scheduled follow-up. He is interested in traveling to visit his son in early 2021. He is asking how to manage his chemo meds then. His dtr is included in the conversation via phone call.

## 2020-12-19 LAB
ALBUMIN MFR SERPL ELPH: 61.1 %
ALBUMIN SERPL-MCNC: 4.6 G/DL
ALBUMIN/GLOB SERPL: 1.5 RATIO
ALPHA1 GLOB MFR SERPL ELPH: 4.1 %
ALPHA1 GLOB SERPL ELPH-MCNC: 0.3 G/DL
ALPHA2 GLOB MFR SERPL ELPH: 12.5 %
ALPHA2 GLOB SERPL ELPH-MCNC: 1 G/DL
B-GLOBULIN MFR SERPL ELPH: 11.3 %
B-GLOBULIN SERPL ELPH-MCNC: 0.9 G/DL
DEPRECATED KAPPA LC FREE/LAMBDA SER: 1.46 RATIO
GAMMA GLOB FLD ELPH-MCNC: 0.8 G/DL
GAMMA GLOB MFR SERPL ELPH: 11 %
IGA SER QL IEP: 139 MG/DL
IGG SER QL IEP: 924 MG/DL
IGM SER QL IEP: 24 MG/DL
INTERPRETATION SERPL IEP-IMP: NORMAL
KAPPA LC CSF-MCNC: 1.69 MG/DL
KAPPA LC SERPL-MCNC: 2.47 MG/DL
M PROTEIN SPEC IFE-MCNC: NORMAL
PROT SERPL-MCNC: 7.6 G/DL
PROT SERPL-MCNC: 7.6 G/DL

## 2020-12-20 ENCOUNTER — OUTPATIENT (OUTPATIENT)
Dept: OUTPATIENT SERVICES | Facility: HOSPITAL | Age: 70
LOS: 1 days | Discharge: HOME | End: 2020-12-20

## 2020-12-20 DIAGNOSIS — C90.00 MULTIPLE MYELOMA NOT HAVING ACHIEVED REMISSION: ICD-10-CM

## 2020-12-24 ENCOUNTER — APPOINTMENT (OUTPATIENT)
Dept: INFUSION THERAPY | Facility: HOSPITAL | Age: 70
End: 2020-12-24

## 2020-12-24 ENCOUNTER — RESULT REVIEW (OUTPATIENT)
Age: 70
End: 2020-12-24

## 2020-12-24 DIAGNOSIS — Z51.11 ENCOUNTER FOR ANTINEOPLASTIC CHEMOTHERAPY: ICD-10-CM

## 2020-12-24 LAB
BASOPHILS # BLD AUTO: 0.06 K/UL — SIGNIFICANT CHANGE UP (ref 0–0.2)
BASOPHILS NFR BLD AUTO: 0.9 % — SIGNIFICANT CHANGE UP (ref 0–2)
EOSINOPHIL # BLD AUTO: 0.14 K/UL — SIGNIFICANT CHANGE UP (ref 0–0.5)
EOSINOPHIL NFR BLD AUTO: 2.2 % — SIGNIFICANT CHANGE UP (ref 0–6)
HCT VFR BLD CALC: 29.2 % — LOW (ref 39–50)
HGB BLD-MCNC: 10 G/DL — LOW (ref 13–17)
IMM GRANULOCYTES NFR BLD AUTO: 0.6 % — SIGNIFICANT CHANGE UP (ref 0–1.5)
LYMPHOCYTES # BLD AUTO: 1.23 K/UL — SIGNIFICANT CHANGE UP (ref 1–3.3)
LYMPHOCYTES # BLD AUTO: 19.2 % — SIGNIFICANT CHANGE UP (ref 13–44)
MCHC RBC-ENTMCNC: 32.8 PG — SIGNIFICANT CHANGE UP (ref 27–34)
MCHC RBC-ENTMCNC: 34.2 G/DL — SIGNIFICANT CHANGE UP (ref 32–36)
MCV RBC AUTO: 95.7 FL — SIGNIFICANT CHANGE UP (ref 80–100)
MONOCYTES # BLD AUTO: 0.64 K/UL — SIGNIFICANT CHANGE UP (ref 0–0.9)
MONOCYTES NFR BLD AUTO: 10 % — SIGNIFICANT CHANGE UP (ref 2–14)
NEUTROPHILS # BLD AUTO: 4.31 K/UL — SIGNIFICANT CHANGE UP (ref 1.8–7.4)
NEUTROPHILS NFR BLD AUTO: 67.1 % — SIGNIFICANT CHANGE UP (ref 43–77)
NRBC # BLD: 0 /100 WBCS — SIGNIFICANT CHANGE UP (ref 0–0)
PLATELET # BLD AUTO: 160 K/UL — SIGNIFICANT CHANGE UP (ref 150–400)
RBC # BLD: 3.05 M/UL — LOW (ref 4.2–5.8)
RBC # FLD: 12.4 % — SIGNIFICANT CHANGE UP (ref 10.3–14.5)
WBC # BLD: 6.42 K/UL — SIGNIFICANT CHANGE UP (ref 3.8–10.5)
WBC # FLD AUTO: 6.42 K/UL — SIGNIFICANT CHANGE UP (ref 3.8–10.5)

## 2021-01-06 ENCOUNTER — APPOINTMENT (OUTPATIENT)
Dept: INFUSION THERAPY | Facility: HOSPITAL | Age: 71
End: 2021-01-06

## 2021-01-08 ENCOUNTER — RESULT REVIEW (OUTPATIENT)
Age: 71
End: 2021-01-08

## 2021-01-08 ENCOUNTER — APPOINTMENT (OUTPATIENT)
Dept: INFUSION THERAPY | Facility: HOSPITAL | Age: 71
End: 2021-01-08

## 2021-01-08 ENCOUNTER — LABORATORY RESULT (OUTPATIENT)
Age: 71
End: 2021-01-08

## 2021-01-08 ENCOUNTER — APPOINTMENT (OUTPATIENT)
Dept: HEMATOLOGY ONCOLOGY | Facility: CLINIC | Age: 71
End: 2021-01-08
Payer: MEDICARE

## 2021-01-08 DIAGNOSIS — M54.5 LOW BACK PAIN: ICD-10-CM

## 2021-01-08 LAB
BASOPHILS # BLD AUTO: 0.06 K/UL — SIGNIFICANT CHANGE UP (ref 0–0.2)
BASOPHILS NFR BLD AUTO: 0.7 % — SIGNIFICANT CHANGE UP (ref 0–2)
EOSINOPHIL # BLD AUTO: 0.22 K/UL — SIGNIFICANT CHANGE UP (ref 0–0.5)
EOSINOPHIL NFR BLD AUTO: 2.6 % — SIGNIFICANT CHANGE UP (ref 0–6)
HCT VFR BLD CALC: 30.6 % — LOW (ref 39–50)
HGB BLD-MCNC: 10.7 G/DL — LOW (ref 13–17)
IMM GRANULOCYTES NFR BLD AUTO: 0.5 % — SIGNIFICANT CHANGE UP (ref 0–1.5)
LYMPHOCYTES # BLD AUTO: 1.54 K/UL — SIGNIFICANT CHANGE UP (ref 1–3.3)
LYMPHOCYTES # BLD AUTO: 18.5 % — SIGNIFICANT CHANGE UP (ref 13–44)
MCHC RBC-ENTMCNC: 32.7 PG — SIGNIFICANT CHANGE UP (ref 27–34)
MCHC RBC-ENTMCNC: 35 G/DL — SIGNIFICANT CHANGE UP (ref 32–36)
MCV RBC AUTO: 93.6 FL — SIGNIFICANT CHANGE UP (ref 80–100)
MONOCYTES # BLD AUTO: 0.69 K/UL — SIGNIFICANT CHANGE UP (ref 0–0.9)
MONOCYTES NFR BLD AUTO: 8.3 % — SIGNIFICANT CHANGE UP (ref 2–14)
NEUTROPHILS # BLD AUTO: 5.77 K/UL — SIGNIFICANT CHANGE UP (ref 1.8–7.4)
NEUTROPHILS NFR BLD AUTO: 69.4 % — SIGNIFICANT CHANGE UP (ref 43–77)
NRBC # BLD: 0 /100 WBCS — SIGNIFICANT CHANGE UP (ref 0–0)
PLATELET # BLD AUTO: 175 K/UL — SIGNIFICANT CHANGE UP (ref 150–400)
RBC # BLD: 3.27 M/UL — LOW (ref 4.2–5.8)
RBC # FLD: 12 % — SIGNIFICANT CHANGE UP (ref 10.3–14.5)
SARS-COV-2 N GENE NPH QL NAA+PROBE: NOT DETECTED
WBC # BLD: 8.32 K/UL — SIGNIFICANT CHANGE UP (ref 3.8–10.5)
WBC # FLD AUTO: 8.32 K/UL — SIGNIFICANT CHANGE UP (ref 3.8–10.5)

## 2021-01-08 PROCEDURE — 99213 OFFICE O/P EST LOW 20 MIN: CPT

## 2021-01-12 PROBLEM — M54.5 LOWER BACK PAIN: Status: ACTIVE | Noted: 2019-08-13

## 2021-01-14 ENCOUNTER — OUTPATIENT (OUTPATIENT)
Dept: OUTPATIENT SERVICES | Facility: HOSPITAL | Age: 71
LOS: 1 days | End: 2021-01-14
Payer: MEDICARE

## 2021-01-14 ENCOUNTER — RESULT REVIEW (OUTPATIENT)
Age: 71
End: 2021-01-14

## 2021-01-14 ENCOUNTER — APPOINTMENT (OUTPATIENT)
Dept: RADIOLOGY | Facility: IMAGING CENTER | Age: 71
End: 2021-01-14
Payer: MEDICARE

## 2021-01-14 DIAGNOSIS — M54.5 LOW BACK PAIN: ICD-10-CM

## 2021-01-14 PROCEDURE — 72110 X-RAY EXAM L-2 SPINE 4/>VWS: CPT

## 2021-01-14 PROCEDURE — 72110 X-RAY EXAM L-2 SPINE 4/>VWS: CPT | Mod: 26

## 2021-01-16 ENCOUNTER — OUTPATIENT (OUTPATIENT)
Dept: OUTPATIENT SERVICES | Facility: HOSPITAL | Age: 71
LOS: 1 days | Discharge: HOME | End: 2021-01-16

## 2021-01-16 DIAGNOSIS — C90.00 MULTIPLE MYELOMA NOT HAVING ACHIEVED REMISSION: ICD-10-CM

## 2021-01-22 ENCOUNTER — RESULT REVIEW (OUTPATIENT)
Age: 71
End: 2021-01-22

## 2021-01-22 ENCOUNTER — APPOINTMENT (OUTPATIENT)
Dept: INFUSION THERAPY | Facility: HOSPITAL | Age: 71
End: 2021-01-22

## 2021-01-22 LAB
BASOPHILS # BLD AUTO: 0.06 K/UL — SIGNIFICANT CHANGE UP (ref 0–0.2)
BASOPHILS NFR BLD AUTO: 0.7 % — SIGNIFICANT CHANGE UP (ref 0–2)
EOSINOPHIL # BLD AUTO: 0.2 K/UL — SIGNIFICANT CHANGE UP (ref 0–0.5)
EOSINOPHIL NFR BLD AUTO: 2.4 % — SIGNIFICANT CHANGE UP (ref 0–6)
HCT VFR BLD CALC: 29.8 % — LOW (ref 39–50)
HGB BLD-MCNC: 10.3 G/DL — LOW (ref 13–17)
IMM GRANULOCYTES NFR BLD AUTO: 0.6 % — SIGNIFICANT CHANGE UP (ref 0–1.5)
LYMPHOCYTES # BLD AUTO: 1.61 K/UL — SIGNIFICANT CHANGE UP (ref 1–3.3)
LYMPHOCYTES # BLD AUTO: 19.6 % — SIGNIFICANT CHANGE UP (ref 13–44)
MCHC RBC-ENTMCNC: 32.5 PG — SIGNIFICANT CHANGE UP (ref 27–34)
MCHC RBC-ENTMCNC: 34.6 G/DL — SIGNIFICANT CHANGE UP (ref 32–36)
MCV RBC AUTO: 94 FL — SIGNIFICANT CHANGE UP (ref 80–100)
MONOCYTES # BLD AUTO: 0.68 K/UL — SIGNIFICANT CHANGE UP (ref 0–0.9)
MONOCYTES NFR BLD AUTO: 8.3 % — SIGNIFICANT CHANGE UP (ref 2–14)
NEUTROPHILS # BLD AUTO: 5.6 K/UL — SIGNIFICANT CHANGE UP (ref 1.8–7.4)
NEUTROPHILS NFR BLD AUTO: 68.4 % — SIGNIFICANT CHANGE UP (ref 43–77)
NRBC # BLD: 0 /100 WBCS — SIGNIFICANT CHANGE UP (ref 0–0)
PLATELET # BLD AUTO: 166 K/UL — SIGNIFICANT CHANGE UP (ref 150–400)
RBC # BLD: 3.17 M/UL — LOW (ref 4.2–5.8)
RBC # FLD: 12.1 % — SIGNIFICANT CHANGE UP (ref 10.3–14.5)
WBC # BLD: 8.2 K/UL — SIGNIFICANT CHANGE UP (ref 3.8–10.5)
WBC # FLD AUTO: 8.2 K/UL — SIGNIFICANT CHANGE UP (ref 3.8–10.5)

## 2021-01-23 DIAGNOSIS — Z51.11 ENCOUNTER FOR ANTINEOPLASTIC CHEMOTHERAPY: ICD-10-CM

## 2021-01-23 DIAGNOSIS — R11.2 NAUSEA WITH VOMITING, UNSPECIFIED: ICD-10-CM

## 2021-01-25 ENCOUNTER — APPOINTMENT (OUTPATIENT)
Dept: HEMATOLOGY ONCOLOGY | Facility: CLINIC | Age: 71
End: 2021-01-25
Payer: MEDICARE

## 2021-01-25 VITALS
TEMPERATURE: 97.3 F | WEIGHT: 128.97 LBS | SYSTOLIC BLOOD PRESSURE: 158 MMHG | OXYGEN SATURATION: 99 % | HEIGHT: 64 IN | HEART RATE: 63 BPM | DIASTOLIC BLOOD PRESSURE: 73 MMHG | BODY MASS INDEX: 22.02 KG/M2 | RESPIRATION RATE: 16 BRPM

## 2021-01-25 PROCEDURE — 99215 OFFICE O/P EST HI 40 MIN: CPT

## 2021-01-26 NOTE — ASSESSMENT
[FreeTextEntry1] : 71 year-old Mr. MEIER is seen  in follow up for IgG Kappa Multiple Myeloma presented with lytic percy lesions. He received XRT to percy lesions,was started on RVD induction,  achieved VGPR.  Last PET 5/2020 - markedly improved and reduced FDG avidity throughout. His most recent SPEP/MICAH (1/8/2021) was normal.  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 Discussed skipping a dose vs switching to a oral formulation (Nilaro 3 mg) on days 1, 8 and 15 of 28 days cycle. Since he is in CR, skipping a dose (chemo holiday) would not be unreasonable.   \par \par

## 2021-01-26 NOTE — RESULTS/DATA
[FreeTextEntry1] : 1/25/2021: Mot recent SPEP / MICAH (1/8/2021) No monoclonal band \par \par ------------------------------------------------------------------------------------------\par   PET/CT Whole Body Oncology FDG             Final\par \par No Documents Attached\par \par \par \par \par   EXAM:  PETCT WB ONC FDG SUBS\par \par \par PROCEDURE DATE:  05/07/2020\par \par \par \par INTERPRETATION:  PROCEDURE:  PET/CT WHOLE BODY\par \par RADIOPHARMACEUTICAL:  9.97 mCi F-18, FDG, I.V.\par \par CLINICAL INFORMATION:  70-year-old male referred for follow-up of multiple myeloma on Zometa. Bone marrow biopsy done 3/4/2020 showed less than 5% plasma cells and repeat myeloma labs done on 3/9/2020, showed CT are. PET/CT is done as part of the subsequent treatment strategy evaluation.\par \par TECHNIQUE:  Fasting blood sugar measured prior to injection of radiopharmaceutical was 116 mg/dl. Following intravenous injection of the radiopharmaceutical and an uptake period of approximately 60 minutes, FDG-PET/CT was obtained on a  Icarus Ascending Discovery 710 from the vertex of the skull to the toes. Oral contrast was given during the uptake period. CT was performed during shallow respiration. The CT protocol was optimized for PET attenuation correction and to provide anatomic detail for localization of PET abnormalities. The CT protocol was not designed to produce and cannot replace state-of-the-art diagnostic CT images with specific imaging protocols for different body parts and indications. Images were reviewed on a dedicated workstation using multiplanar reconstruction.\par \par The standardized uptake values (SUV) are normalized to patient body weight and indicate the highest activity concentration (SUVmax) in a given disease site. All image numbers refer to the axial image number.\par \par COMPARISON:  FDG PET/CT dated 9/7/2019.\par \par OTHER STUDIES USED FOR CORRELATION: MRI of cervical spine dated 9/9/2019.\par \par FINDINGS:\par \par HEAD/NECK: Physiologic FDG activity in the brain.\par \par Mild hypermetabolism in left masseter muscle, decreased as compared to prior study, may be secondary to bruxism. Elongated focus of increased FDG activity in left lower cervical region may reflect inflammation or muscle spasm (image 77).\par \par New hypermetabolic lucency in left maxillary alveolus is nonspecific (SUV 8.0; image 53). Recommend correlation with dental exam. A new small hypermetabolic focus is seen in the right maxilla (SUV 3.7; image 55).\par \par CHEST: Few new small hypermetabolic foci in bilateral hilar region likely correspond to small lymph nodes that are difficult to delineate on CT. For reference, left perihilar region demonstrates SUV 3.6 (image 117). Resolution of FDG-avid subcentimeter left posterior paraesophageal lymph node.\par \par LUNGS: No abnormal FDG activity. Calcified right lower lobe granuloma, unchanged (image 128).\par \par PLEURA/PERICARDIUM: No abnormal FDG activity. No effusion.\par \par HEPATOBILIARY/PANCREAS: Physiologic FDG activity. Liver SUV mean is 2.1; previous 2.3.\par \par SPLEEN: No abnormal FDG activity. Normal in size.\par \par ADRENAL GLANDS: No abnormal FDG activity. No nodule.\par \par KIDNEYS/URINARY BLADDER: Physiologic FDG activity. Multiple bilateral renal cysts, measuring up to 4.6 cm on the right, unchanged.\par \par PELVIC ORGANS: No abnormal FDG activity.\par \par ABDOMINOPELVIC NODES: No enlarged or FDG avid lymph node\par \par BOWEL/PERITONEUM/MESENTERY: Physiologic FDG activity. Resolution of hypermetabolism in distal esophagus/GE junction.\par \par BONES: Near total resolution of previously seen hypermetabolic foci in the axial skeleton, right humerus, and proximal bilateral femurs. Previously seen lytic lesions demonstrate new areas of sclerosis on CT. For reference, one of the most FDG-avid residual lesions is a mixed lytic and sclerotic lesion in the left scapula which demonstrates SUV 3.0 (image 98); previously lytic with SUV 5.7. Previously seen hypermetabolic lesion in right sphenoid bone has resolved. Previously seen large destructive lesion in the manubrium the sternum demonstrates new sclerosis with SUV 3.0 (image 103); previous SUV 5.3. Resolution of hypermetabolic intramedullary soft tissue in proximal right humerus. Near total resolution of hypermetabolic intramedullary soft tissue in proximal left femur (SUV 1.3; image 273; previous SUV 5.3).\par \par FDG-avid fracture in left inferior pubic ramus demonstrates increased callus formation and is similar in metabolism (SUV 3.8; image 250; previous SUV 3.1). Few new mildly FDG-avid bilateral rib fractures. For reference, fracture in the anterior aspect of the left second rib demonstrates SUV 3.4 (image 105).\par \par Resolution of hypermetabolism in left palmar musculature.\par \par IMPRESSION:  Abnormal whole body FDG-PET/CT scan.\par \par 1. Near total resolution of hypermetabolism associated with lytic and intramedullary lesions in the axial and appendicular skeleton as compared to prior study dated 9/7/2019. Previously seen lytic lesions demonstrate new sclerosis. Findings are compatible with response to interval therapy.\par \par 2. Few new mildly FDG-avid bilateral rib fractures. An FDG-avid fracture in the left inferior pubic ramus demonstrates increased callus formation and is similar in metabolism.\par \par 3. New hypermetabolic lucency in left maxillary alveolus and new small hypermetabolic focus in right maxilla are nonspecific. Recommend correlation with dental exam.\par \par 4. Few new mildly FDG-avid nonspecific bilateral hilar lymph nodes.\par \par 5. Resolution of many specific hypermetabolism in distal esophagus/GE junction.\par \par \par \par \par \par \par \par \par \par \par \par \par \par \par \par LUANA RAMOS M.D., NUCLEAR MEDICINE ATTENDING\par This document has been electronically signed. May  7 2020  4:08PM\par \par  \par \par  Ordered by: OZZY ALMEIDA       Collected/Examined: [id number] 12:57PM       \par Verified by: OZZY ALMEIDA [id number] 09:27AM       \par  Result Communication: Call patient with results,Discussed results with patient;\par Stage: Final       \par  Performed at: [facility]       Resulted: 07May2020 04:11PM       Last Updated: 11May2020 09:27AM       Accession: [id number]       \par \par \par  Pathology             Final\par \par No Documents Attached\par \par \par \par \par   [facility] Accession Number : [id number]\par \par HARDEEP, SUBASH                          2\par \par \par \par Cytopathology Report\par \par \par \par \par \par Final Diagnosis\par SOFT TISSUE, SACRUM, CT GUIDED CORE BIOPSY\par Plasma cell neoplasm\par See note and description.\par \par Diagnostic note:  Overall the morphologic and immunophenotypic\par findings are consistent with plasma cell neoplasm. The\par differential diagnosis includes plasmacytoma vs plasma cells\par myeloma. Correlation with laboratory, radiologic and clinical\par findings is required for further classification.\par \par Microscopic description:\par The touch prep slides are composed of numerous enlarged plasma\par cells with prominent nucleoli, occasional binucleated forms are\par seen.\par \par Histologic sections consist of multiple needle shaped cores of\par soft tissue showing proliferation of enlarged plasma cells with\par prominent nucleoli, forming sheets.\par \par Immunohistochemistry:  , kappaISH, lambdaISH, cyclinD1, p53\par stains performed on paraffin embedded section of block 1C.\par \par Neoplastic cells are:\par Positive:  , KappaISH, p53\par Negative:  LambdaISH, cyclinD1\par \par Flow cytometry analysis (69-EE-): Monotypic plasma cells\par (24.1% of total analyzed cells) are present. There are two\par aberrant monoclonal plasma cell populations:\par Population #1 (16.5% of total analyzed cells, 68.6% of plasma\par cell population) positive for cytoplasmic kappa, CD38 dimmer,\par CD45 dimmer, CD56, ; negative for , CD19, CD20.\par Population # 2 (7.6% of total analyzed cells, 31.4% of plasma\par cell population) positive for cytoplasmic kappa\par \par \par \par \par \par \par HARDEEP, SUBASH                          2\par \par \par \par Cytopathology Report\par \par \par \par \par (brighter), , CD38 brighter, CD45 dim, CD56 (brighter),\par ; negative for CD19, CD20.\par \par Screened by: Jose Juan Smith CT(ASCP)\par Verified by: Orlando Spicer MD\par (Electronic Signature)\par Reported on: 08/22/19 13:22 EDT, [address], [place]\par [address]\par Cytology technical processing performed at [address] [place] [id number]\par _________________________________________________________________\par \par \par Specimen(s) Submitted\par SOFT TISSUE, SACRUM, CT GUIDED CORE BIOPSY\par \par \par Statement of Adequacy\par Immediate cytologic study for adequacy of specimen using a Diff-\par Quik stain was performed at [facility], [address] [place] [address]. Touch\par imprints acceptable for further evaluation by Jose Juan Smith\par CT(ASC).\par \par \par Clinical Information\par Sacral mass.\par \par \par Gross Description\par Core Biopsy:\par Tissue collected: Specimen container has been inspected to\par confirm patient?s name and date of birth, contains 3  tan cores\par measuring 1.0, 1.0, 0.8 cm in length (and multiple fragments).\par Entire specimen submitted in 2 cassette(s) labeled 1B and 1 C.\par \par 4 Touch prep slides ( 2 air dried + 2 fixed)\par \par FNA:\par No material submitted for cell block (No block 1A)\par \par Prepared:\par 4 Touch Prep,\par 1 core biopsy labeled 1B\par 1 core biopsy labeled 1C\par 6 Total slides\par \par  \par \par  Ordered by: DELORES MOSS       Collected/Examined: [id number] 03:14PM       \par Verified by: OZZY ALMEIDA 18Jun2020 03:07PM       \par  Result Communication: No patient communication needed at this time;\par Stage: Final       \par  Performed at: [facility], [place]       Resulted: [id number] 01:22PM       Last Updated: 18Jun2020 03:07PM       Accession: [id number]       \par \par \par  Pathology             Final\par \par No Documents Attached\par \par \par \par \par   [facility] Accession Number : [id number]\par \par HARDEEP, SUBASH                          4\par \par \par \par Addendum Report\par \par \par \par \par Hematopathology Addendum\par Comprehensive Hematopathology Report\par \par Final Diagnosis:\par 1, 2. Bone marrow biopsy and bone marrow aspirate\par - Plasma cell myeloma (10% with focal 25% by  stain)\par - Slight erythroid predominant trilineage hematopoiesis\par with maturation\par \par Diagnostic Note:\par Please note findings of a normal male karyotype and a negative\par multiple myeloma FISH panel. Based on the additional findings,\par the diagnosis has not changed. Correlation with studies for\par myeloma-related organ dysfunction is necessary.\par \par Morphology:\par Microscopic description:\par 1. Biopsy: Sections of bone marrow biopsy show normocellularity\par (60 to 70% cellularity), mild plasmacytosis with foci of small\par clusters, slight erythroid predominant trilineage hematopoiesis\par with maturation, mild megakaryocytosis with normal morphology,\par and increased iron stores.\par 2. Aspirate: Cellular spicules are not present, precluding\par differential count. Review of the smear shows some maturing and\par predominant mature myeloid cells, a few erythroid elements, and\par rare megakaryocytes.\par \par Ancillary Studies:\par Bone marrow aspirate iron stain: No spicules are present to\par evaluate for iron stores and there are insufficient nRBC to\par evaluate for ring sideroblasts.\par Congo red stain is negative for amyloidosis.\par Flow cytometry:  Monotypic plasma cells (1% of cells), positive\par for cytoplasmic kappa, CD38, , dimmer CD45, partial CD56;\par negative CD19, CD20, .\par CD45/side scatter shows no significant blast population. There is\par no increase in CD34,  or CD14 positive cells.\par Lymphocytes (8% of cells) show a heterogeneous population of T-\par cells (with normal CD4 to CD8 ratio), and polytypic B-cells.\par Immunohistochemical stains ( performed on the block 1A and\par 1B, cyclin-D1 performed on block 1A):   stains show overall\par approximately 10% plasma cells, with foci of small clusters,\par focal up to 25%. Plasma cells are negative for cyclin-D1.\par \par Cytogenetics:\par Result: Normal male karyotype\par Karyotype: 46,XY[20]\par \par \par \par \par \par \par HARDEEP, SUBASH                          4\par \par \par \par Addendum Report\par \par \par \par \par FISH:\par Result: NORMAL FISH -\par - MULTIPLE MYELOMA PANEL\par Probe(s) and Location(s): FGFR3(4p16), CCND1(11q13), RB1(13q14),\par IGH(14q32), MAF(16q23), TP53(17p13.1)\par \par Clinical History/Data:\par New diagnosis multiple myeloma\par CBC on 08/24/2019: WBC: 8.09 K/uL, HGB: 9.5 g/dL, MCV: 90 fl,\par Plt: 227 K/uL\par Serum Immunofixation: IgG kappa\par \par Verified by: Nancy Farris M.D.\par (Electronic Signature)\par Reported on: 09/07/19 10:37 EDT, [address], [place]\par [address]\par _________________________________________________________________\par \par \par Hematopathology Report\par \par \par \par \par Final Diagnosis\par 1, 2. Bone marrow biopsy and bone marrow aspirate\par - Plasma cell myeloma (10% with focal 25% by  stain)\par - Slight erythroid predominant trilineage hematopoiesis\par with maturation\par \par See note and description.\par \par Diagnostic note:\par Correlation with studies for myeloma-related organ dysfunction is\par necessary.\par Comprehensive report with results of pending ancillary studies to\par follow.\par \par Ancillary studies\par Bone marrow aspirate iron stain: No spicules are present to\par evaluate for iron stores and there are insufficient nRBC to\par evaluate for ring sideroblasts.\par \par Flow cytometry:  Monotypic plasma cells (1% of cells), positive\par for cytoplasmic kappa, CD38, , dimmer CD45, partial CD56;\par negative CD19, CD20, .\par CD45/side scatter shows no significant blast population. There is\par no increase in CD34,  or CD14 positive cells.\par \par \par \par \par \par \par HARDEEP, SUBASH                          4\par \par \par \par Hematopathology Report\par \par \par \par \par Lymphocytes (8% of cells) show a heterogeneous population of T-\par cells (with normal CD4 to CD8 ratio), and polytypic B-cells.\par \par Immunohistochemical stains ( performed on the block 1A and\par 1B, cyclin-D1 performed on block 1A):   stains show overall\par approximately 10% plasma cells, with foci of small clusters,\par focal up to 25%. Plasma cells are negative for cyclin-D1.\par \par Microscopic description:\par 1. Biopsy: Sections of bone marrow biopsy show normocellularity\par (60 to 70% cellularity), mild plasmacytosis with foci of small\par clusters, slight erythroid predominant trilineage hematopoiesis\par with maturation, mild megakaryocytosis with normal morphology,\par and increased iron stores.\par \par 2. Aspirate: Cellular spicules are not present, precluding\par differential count. Review of the smear shows some maturing and\par predominant mature myeloid cells, a few erythroid elements, and\par rare megakaryocytes.\par \par Comment:  Iron stain (examined to evaluate for iron stores; see\par microscopic description) and Giemsa stain (shows appropriate\par staining pattern) are performed and evaluated on block(s): 1A,\par 1B.\par \par Slide(s) with built in immunohistochemical study control(s)\par associated and negative control with this case has/have been\par verified by the sign out pathologist.\par For slide(s) without built in controls positive control slides\par has/have been reviewed and approved by immunohistochemistry lab\par These immunohistochemical/ in-situ hybridization tests have been\par developed and their performance characteristics determined by\par [facility], Department of\par Pathology, Division of Immunopathology, [address] [place] [address]\[place] [address].  It has not been cleared or approved by the\par U.S. Food and Drug Administration.  The FDA has determined that\par such clearance or approval is not necessary.  This test is used\par for clinical purposes.  The laboratory is certified under the\par CLIA-88 as qualified to perform high\par complexity clinical testing.\par \par Verified by: Nancy Farris M.D.\par (Electronic Signature)\par Reported on: 08/27/19 10:00 EDT, [address] [place] [address]\par [id number]\par _________________________________________________________________\par \par \par \par \par \par \par \par HARDEEP, SUBASH                          4\par \par \par \par Hematopathology Report\par \par \par \par \par Clinical History\par New diagnosis multiple myeloma\par CBC on 08/24/2019: WBC: 8.09 K/uL, HGB: 9.5 g/dL, MCV: 90 fl,\par Plt: 227 K/uL\par Serum Immunofixation: IgG kappa\par \par Specimen(s) Submitted\par 1     Bone marrow biopsy left\par 2     Bone marrow aspirate\par \par Gross Description\par 1. The specimen is received in bouin's fixative and the specimen\par container is labeled: Left bone marrow biopsy.  It consists of a\par 0.5 x 0.2 cm bone marrow core with a 1.5 x 1.3 x 0.5 cm blood\par clot.  Entirely submitted.  Two cassettes: A = bone marrow core;\par B = blood clot.\par 2. The specimen is received labeled with patient's name and\par consists of 3 air dried slide(s). 2 slide(s) stained with Monzon\par Giemsa stain. 1 stained for iron stain.\par In addition to other data that may appear on the specimen\par container, the label has been inspected to confirm the presence\par of the patient's name and date of birth.\par Catarina PENADennys Torres 08/22/2019 17:12\par \par  \par \par  Ordered by: BECKY BILLS       Collected/Examined: [id number] 03:50PM       \par Verified by: BECKY BILLS [id number] 05:42PM       \par  Result Communication: No patient communication needed at this time;\par Stage: Final       \par  Performed at: [facility], [place]       Resulted: [id number] 10:37AM       Last Updated: 07Sep2019 05:42PM       Accession: [id number]

## 2021-01-26 NOTE — HISTORY OF PRESENT ILLNESS
[Disease:__________________________] : Disease: [unfilled] [Treatment Protocol] : Treatment Protocol [de-identified] : As per Dr Kilgore's note on 8/18/2020\par \par "Mr Rand was referred to my office for newly diagnosed IgG kappa multiple myeloma. The patient's primary language is Croatian, he has his daughter Mirlande as the , per his wishes. He was seen by Dr. Andrés Valle (hematology) in Feb 2019 and had a BM bx showing 10-15% plasma cells, concerning for smoldering myeloma. According to the daughter, he was awaiting insurance approval for imaging studies. He was admitted from 8/18/19 at [facility] where he presented with pain in the L leg/lower back, worsening for the past 2-3 months. On labs, he was found to have a total protein of 10, Ca level 12 until 8/29/19. Dental consult was called and patient needs to have 10 teeth extracted -thus, IV bisphosphonates were not given, pt improved with hydration. He also had a slightly inc'ed creatinine -improved after IVF. CT C/A/P 8/18/19 showed a lytic expansile soft tissue lesion centered in the manubrium measuring approximately 7.3 x 3.4 x 4.3 cm, invading both 1st ribs. There was also a A lytic expansile soft tissue lesion in the T8 vertebral body causing mass effect on spinal canal and obliterating left neural foramina at T7-8 and T8-9. ON 8/22/19 an MRI thoracic spine was done showing multiple areas of tumor involvement within the thoracic spine in keeping with the patient's history of multiple myeloma. Prominent lesion within C7 on the right incompletely seen.\par Large lesion within T8 on the left producing epidural tumor extension and moderate narrowing of the spinal canal with mild flattening of the spinal cord. He was evaluated on 8/22/19 by Dr. Foley (Rad Onc) and was given 5 fractions of XRT from 8/23/19 to 8/29/19 to C7 and T8. He was discharged home on 8/29/19 with follow up in the outpatient office, and on Dexamethasone 4mg po daily. \par \par  \par \par \par \par Interval History: The patient is being followed for IgG kappa MM with multiple bone lytic lesions, hypercalcemia (resolved) and mild anemia. He started Velcade/Dexa weekly on 9/919. The patient got the Revlimid and started it C1 day 1 on 10/21/19 and hed been tolerating it well. \par \par Starting in February, his blood counts became low -plt count 54, Hb 8.3 wbc 5.8. Revlimid was held -after 2/3/20. He got 1 shot Velcade/Dexa on 2/3/20, NO chemo since then. \par BM done on 3/4/20 to eval for residual disease (MM) vs. MDS. BM showed recovering marrow elements, plasma cells <5%. SPEP/MICAH from 3/9/20 showed NO monoclonal proteins, c/w CR. \par \par The PET scan from 5/7/20 showed L uptake maxillary -pt reports that he had dentures done in Jan 2020 and has discomfort from them. He did not go to the dentist during COVID Rib fractures -pt denied falls/trauma. Patient has dental appointment for next week. He has some discomfort in the upper teeth b/l. He also missed his Velcade SQ last week 'i did not know if i am supposed to continue it.' He has no neuropathy from it, tolerating well. "\par  [de-identified] : RISS- II [FreeTextEntry1] : s/p RT to C7/T8 spine\par RVD 9/2019 -6/2020 transitioned to maintenance velcade q2wk started 6/19/2020 [de-identified] : Oct 6, 2020\par Pt is here for initial visit with me. He has his dtr on the phone, prefers her included in conversation and declined an . Pt is feeling well. He is tolerating monthly zometa and maintenance velcade w/o any adverse effects. Last set of myeloma labs done in August showed stable remission. He is requesting refill of several meds today.\par ---------------------------------\par \par 1/25/2021\par Patient presents for a follow up. He has no complaints except infrequent short duration chills with no fever. He is on biweekly Velcade and monthly Zometa. His last treatment of Zometa was on 1/8/2021 and last Velcade on 1/22/2021. His most recent SPEP/MICAH was normal.

## 2021-01-26 NOTE — HISTORY OF PRESENT ILLNESS
[Disease:__________________________] : Disease: [unfilled] [Treatment Protocol] : Treatment Protocol [de-identified] : As per Dr Kilgore's note on 8/18/2020\par \par "Mr Rand was referred to my office for newly diagnosed IgG kappa multiple myeloma. The patient's primary language is Greenlandic, he has his daughter Mirlande as the , per his wishes. He was seen by Dr. Andrés Valle (hematology) in Feb 2019 and had a BM bx showing 10-15% plasma cells, concerning for smoldering myeloma. According to the daughter, he was awaiting insurance approval for imaging studies. He was admitted from 8/18/19 at [facility] where he presented with pain in the L leg/lower back, worsening for the past 2-3 months. On labs, he was found to have a total protein of 10, Ca level 12 until 8/29/19. Dental consult was called and patient needs to have 10 teeth extracted -thus, IV bisphosphonates were not given, pt improved with hydration. He also had a slightly inc'ed creatinine -improved after IVF. CT C/A/P 8/18/19 showed a lytic expansile soft tissue lesion centered in the manubrium measuring approximately 7.3 x 3.4 x 4.3 cm, invading both 1st ribs. There was also a A lytic expansile soft tissue lesion in the T8 vertebral body causing mass effect on spinal canal and obliterating left neural foramina at T7-8 and T8-9. ON 8/22/19 an MRI thoracic spine was done showing multiple areas of tumor involvement within the thoracic spine in keeping with the patient's history of multiple myeloma. Prominent lesion within C7 on the right incompletely seen.\par Large lesion within T8 on the left producing epidural tumor extension and moderate narrowing of the spinal canal with mild flattening of the spinal cord. He was evaluated on 8/22/19 by Dr. Foley (Rad Onc) and was given 5 fractions of XRT from 8/23/19 to 8/29/19 to C7 and T8. He was discharged home on 8/29/19 with follow up in the outpatient office, and on Dexamethasone 4mg po daily. \par \par  \par \par \par \par Interval History: The patient is being followed for IgG kappa MM with multiple bone lytic lesions, hypercalcemia (resolved) and mild anemia. He started Velcade/Dexa weekly on 9/919. The patient got the Revlimid and started it C1 day 1 on 10/21/19 and hed been tolerating it well. \par \par Starting in February, his blood counts became low -plt count 54, Hb 8.3 wbc 5.8. Revlimid was held -after 2/3/20. He got 1 shot Velcade/Dexa on 2/3/20, NO chemo since then. \par BM done on 3/4/20 to eval for residual disease (MM) vs. MDS. BM showed recovering marrow elements, plasma cells <5%. SPEP/MICAH from 3/9/20 showed NO monoclonal proteins, c/w CR. \par \par The PET scan from 5/7/20 showed L uptake maxillary -pt reports that he had dentures done in Jan 2020 and has discomfort from them. He did not go to the dentist during COVID Rib fractures -pt denied falls/trauma. Patient has dental appointment for next week. He has some discomfort in the upper teeth b/l. He also missed his Velcade SQ last week 'i did not know if i am supposed to continue it.' He has no neuropathy from it, tolerating well. "\par  [de-identified] : RISS- II [FreeTextEntry1] : s/p RT to C7/T8 spine\par RVD 9/2019 -6/2020 transitioned to maintenance velcade q2wk started 6/19/2020 [de-identified] : Oct 6, 2020\par Pt is here for initial visit with me. He has his dtr on the phone, prefers her included in conversation and declined an . Pt is feeling well. He is tolerating monthly zometa and maintenance velcade w/o any adverse effects. Last set of myeloma labs done in August showed stable remission. He is requesting refill of several meds today.\par ---------------------------------\par \par 1/25/2021\par Patient presents for a follow up. He has no complaints except infrequent short duration chills with no fever. He is on biweekly Velcade and monthly Zometa. His last treatment of Zometa was on 1/8/2021 and last Velcade on 1/22/2021. His most recent SPEP/MICAH was normal.

## 2021-01-26 NOTE — RESULTS/DATA
[FreeTextEntry1] : 1/25/2021: Mot recent SPEP / MICAH (1/8/2021) No monoclonal band \par \par ------------------------------------------------------------------------------------------\par   PET/CT Whole Body Oncology FDG             Final\par \par No Documents Attached\par \par \par \par \par   EXAM:  PETCT WB ONC FDG SUBS\par \par \par PROCEDURE DATE:  05/07/2020\par \par \par \par INTERPRETATION:  PROCEDURE:  PET/CT WHOLE BODY\par \par RADIOPHARMACEUTICAL:  9.97 mCi F-18, FDG, I.V.\par \par CLINICAL INFORMATION:  70-year-old male referred for follow-up of multiple myeloma on Zometa. Bone marrow biopsy done 3/4/2020 showed less than 5% plasma cells and repeat myeloma labs done on 3/9/2020, showed CT are. PET/CT is done as part of the subsequent treatment strategy evaluation.\par \par TECHNIQUE:  Fasting blood sugar measured prior to injection of radiopharmaceutical was 116 mg/dl. Following intravenous injection of the radiopharmaceutical and an uptake period of approximately 60 minutes, FDG-PET/CT was obtained on a  Quick [employer] Discovery 710 from the vertex of the skull to the toes. Oral contrast was given during the uptake period. CT was performed during shallow respiration. The CT protocol was optimized for PET attenuation correction and to provide anatomic detail for localization of PET abnormalities. The CT protocol was not designed to produce and cannot replace state-of-the-art diagnostic CT images with specific imaging protocols for different body parts and indications. Images were reviewed on a dedicated workstation using multiplanar reconstruction.\par \par The standardized uptake values (SUV) are normalized to patient body weight and indicate the highest activity concentration (SUVmax) in a given disease site. All image numbers refer to the axial image number.\par \par COMPARISON:  FDG PET/CT dated 9/7/2019.\par \par OTHER STUDIES USED FOR CORRELATION: MRI of cervical spine dated 9/9/2019.\par \par FINDINGS:\par \par HEAD/NECK: Physiologic FDG activity in the brain.\par \par Mild hypermetabolism in left masseter muscle, decreased as compared to prior study, may be secondary to bruxism. Elongated focus of increased FDG activity in left lower cervical region may reflect inflammation or muscle spasm (image 77).\par \par New hypermetabolic lucency in left maxillary alveolus is nonspecific (SUV 8.0; image 53). Recommend correlation with dental exam. A new small hypermetabolic focus is seen in the right maxilla (SUV 3.7; image 55).\par \par CHEST: Few new small hypermetabolic foci in bilateral hilar region likely correspond to small lymph nodes that are difficult to delineate on CT. For reference, left perihilar region demonstrates SUV 3.6 (image 117). Resolution of FDG-avid subcentimeter left posterior paraesophageal lymph node.\par \par LUNGS: No abnormal FDG activity. Calcified right lower lobe granuloma, unchanged (image 128).\par \par PLEURA/PERICARDIUM: No abnormal FDG activity. No effusion.\par \par HEPATOBILIARY/PANCREAS: Physiologic FDG activity. Liver SUV mean is 2.1; previous 2.3.\par \par SPLEEN: No abnormal FDG activity. Normal in size.\par \par ADRENAL GLANDS: No abnormal FDG activity. No nodule.\par \par KIDNEYS/URINARY BLADDER: Physiologic FDG activity. Multiple bilateral renal cysts, measuring up to 4.6 cm on the right, unchanged.\par \par PELVIC ORGANS: No abnormal FDG activity.\par \par ABDOMINOPELVIC NODES: No enlarged or FDG avid lymph node\par \par BOWEL/PERITONEUM/MESENTERY: Physiologic FDG activity. Resolution of hypermetabolism in distal esophagus/GE junction.\par \par BONES: Near total resolution of previously seen hypermetabolic foci in the axial skeleton, right humerus, and proximal bilateral femurs. Previously seen lytic lesions demonstrate new areas of sclerosis on CT. For reference, one of the most FDG-avid residual lesions is a mixed lytic and sclerotic lesion in the left scapula which demonstrates SUV 3.0 (image 98); previously lytic with SUV 5.7. Previously seen hypermetabolic lesion in right sphenoid bone has resolved. Previously seen large destructive lesion in the manubrium the sternum demonstrates new sclerosis with SUV 3.0 (image 103); previous SUV 5.3. Resolution of hypermetabolic intramedullary soft tissue in proximal right humerus. Near total resolution of hypermetabolic intramedullary soft tissue in proximal left femur (SUV 1.3; image 273; previous SUV 5.3).\par \par FDG-avid fracture in left inferior pubic ramus demonstrates increased callus formation and is similar in metabolism (SUV 3.8; image 250; previous SUV 3.1). Few new mildly FDG-avid bilateral rib fractures. For reference, fracture in the anterior aspect of the left second rib demonstrates SUV 3.4 (image 105).\par \par Resolution of hypermetabolism in left palmar musculature.\par \par IMPRESSION:  Abnormal whole body FDG-PET/CT scan.\par \par 1. Near total resolution of hypermetabolism associated with lytic and intramedullary lesions in the axial and appendicular skeleton as compared to prior study dated 9/7/2019. Previously seen lytic lesions demonstrate new sclerosis. Findings are compatible with response to interval therapy.\par \par 2. Few new mildly FDG-avid bilateral rib fractures. An FDG-avid fracture in the left inferior pubic ramus demonstrates increased callus formation and is similar in metabolism.\par \par 3. New hypermetabolic lucency in left maxillary alveolus and new small hypermetabolic focus in right maxilla are nonspecific. Recommend correlation with dental exam.\par \par 4. Few new mildly FDG-avid nonspecific bilateral hilar lymph nodes.\par \par 5. Resolution of many specific hypermetabolism in distal esophagus/GE junction.\par \par \par \par \par \par \par \par \par \par \par \par \par \par \par \par LUANA RAMOS M.D., NUCLEAR MEDICINE ATTENDING\par This document has been electronically signed. May  7 2020  4:08PM\par \par  \par \par  Ordered by: OZZY ALMEIDA       Collected/Examined: [id number] 12:57PM       \par Verified by: OZZY ALMEIDA [id number] 09:27AM       \par  Result Communication: Call patient with results,Discussed results with patient;\par Stage: Final       \par  Performed at: [facility] at [facility]       Resulted: 07May2020 04:11PM       Last Updated: 11May2020 09:27AM       Accession: [id number]       \par \par \par  Pathology             Final\par \par No Documents Attached\par \par \par \par \par   [facility] Accession Number : [id number]\par \par HARDEEP, SUBASH                          2\par \par \par \par Cytopathology Report\par \par \par \par \par \par Final Diagnosis\par SOFT TISSUE, SACRUM, CT GUIDED CORE BIOPSY\par Plasma cell neoplasm\par See note and description.\par \par Diagnostic note:  Overall the morphologic and immunophenotypic\par findings are consistent with plasma cell neoplasm. The\par differential diagnosis includes plasmacytoma vs plasma cells\par myeloma. Correlation with laboratory, radiologic and clinical\par findings is required for further classification.\par \par Microscopic description:\par The touch prep slides are composed of numerous enlarged plasma\par cells with prominent nucleoli, occasional binucleated forms are\par seen.\par \par Histologic sections consist of multiple needle shaped cores of\par soft tissue showing proliferation of enlarged plasma cells with\par prominent nucleoli, forming sheets.\par \par Immunohistochemistry:  , kappaISH, lambdaISH, cyclinD1, p53\par stains performed on paraffin embedded section of block 1C.\par \par Neoplastic cells are:\par Positive:  , KappaISH, p53\par Negative:  LambdaISH, cyclinD1\par \par Flow cytometry analysis (62-PY-): Monotypic plasma cells\par (24.1% of total analyzed cells) are present. There are two\par aberrant monoclonal plasma cell populations:\par Population #1 (16.5% of total analyzed cells, 68.6% of plasma\par cell population) positive for cytoplasmic kappa, CD38 dimmer,\par CD45 dimmer, CD56, ; negative for , CD19, CD20.\par Population # 2 (7.6% of total analyzed cells, 31.4% of plasma\par cell population) positive for cytoplasmic kappa\par \par \par \par \par \par \par HARDEEP, SUBASH                          2\par \par \par \par Cytopathology Report\par \par \par \par \par (brighter), , CD38 brighter, CD45 dim, CD56 (brighter),\par ; negative for CD19, CD20.\par \par Screened by: Jose Juan Smith CT(ASCP)\par Verified by: Orlando Spicer MD\par (Electronic Signature)\par Reported on: 08/22/19 13:22 EDT, [address] [place], [place]\par [address]\par Cytology technical processing performed at [address] [place] [id number]\par _________________________________________________________________\par \par \par Specimen(s) Submitted\par SOFT TISSUE, SACRUM, CT GUIDED CORE BIOPSY\par \par \par Statement of Adequacy\par Immediate cytologic study for adequacy of specimen using a Diff-\par Quik stain was performed at [facility], [address] [place] [address]. Touch\par imprints acceptable for further evaluation by Jose Juan Smith\par CT(ASC).\par \par \par Clinical Information\par Sacral mass.\par \par \par Gross Description\par Core Biopsy:\par Tissue collected: Specimen container has been inspected to\par confirm patient?s name and date of birth, contains 3  tan cores\par measuring 1.0, 1.0, 0.8 cm in length (and multiple fragments).\par Entire specimen submitted in 2 cassette(s) labeled 1B and 1 C.\par \par 4 Touch prep slides ( 2 air dried + 2 fixed)\par \par FNA:\par No material submitted for cell block (No block 1A)\par \par Prepared:\par 4 Touch Prep,\par 1 core biopsy labeled 1B\par 1 core biopsy labeled 1C\par 6 Total slides\par \par  \par \par  Ordered by: DELORES MOSS       Collected/Examined: [id number] 03:14PM       \par Verified by: OZZY ALMEIDA 18Jun2020 03:07PM       \par  Result Communication: No patient communication needed at this time;\par Stage: Final       \par  Performed at: [facility]       Resulted: [id number] 01:22PM       Last Updated: 18Jun2020 03:07PM       Accession: [id number]       \par \par \par  Pathology             Final\par \par No Documents Attached\par \par \par \par \par   [facility] Accession Number : [id number]\par \par HARDEEP, SUBASH                          4\par \par \par \par Addendum Report\par \par \par \par \par Hematopathology Addendum\par Comprehensive Hematopathology Report\par \par Final Diagnosis:\par 1, 2. Bone marrow biopsy and bone marrow aspirate\par - Plasma cell myeloma (10% with focal 25% by  stain)\par - Slight erythroid predominant trilineage hematopoiesis\par with maturation\par \par Diagnostic Note:\par Please note findings of a normal male karyotype and a negative\par multiple myeloma FISH panel. Based on the additional findings,\par the diagnosis has not changed. Correlation with studies for\par myeloma-related organ dysfunction is necessary.\par \par Morphology:\par Microscopic description:\par 1. Biopsy: Sections of bone marrow biopsy show normocellularity\par (60 to 70% cellularity), mild plasmacytosis with foci of small\par clusters, slight erythroid predominant trilineage hematopoiesis\par with maturation, mild megakaryocytosis with normal morphology,\par and increased iron stores.\par 2. Aspirate: Cellular spicules are not present, precluding\par differential count. Review of the smear shows some maturing and\par predominant mature myeloid cells, a few erythroid elements, and\par rare megakaryocytes.\par \par Ancillary Studies:\par Bone marrow aspirate iron stain: No spicules are present to\par evaluate for iron stores and there are insufficient nRBC to\par evaluate for ring sideroblasts.\par Congo red stain is negative for amyloidosis.\par Flow cytometry:  Monotypic plasma cells (1% of cells), positive\par for cytoplasmic kappa, CD38, , dimmer CD45, partial CD56;\par negative CD19, CD20, .\par CD45/side scatter shows no significant blast population. There is\par no increase in CD34,  or CD14 positive cells.\par Lymphocytes (8% of cells) show a heterogeneous population of T-\par cells (with normal CD4 to CD8 ratio), and polytypic B-cells.\par Immunohistochemical stains ( performed on the block 1A and\par 1B, cyclin-D1 performed on block 1A):   stains show overall\par approximately 10% plasma cells, with foci of small clusters,\par focal up to 25%. Plasma cells are negative for cyclin-D1.\par \par Cytogenetics:\par Result: Normal male karyotype\par Karyotype: 46,XY[20]\par \par \par \par \par \par \par HARDEEP, SUBASH                          4\par \par \par \par Addendum Report\par \par \par \par \par FISH:\par Result: NORMAL FISH -\par - MULTIPLE MYELOMA PANEL\par Probe(s) and Location(s): FGFR3(4p16), CCND1(11q13), RB1(13q14),\par IGH(14q32), MAF(16q23), TP53(17p13.1)\par \par Clinical History/Data:\par New diagnosis multiple myeloma\par CBC on 08/24/2019: WBC: 8.09 K/uL, HGB: 9.5 g/dL, MCV: 90 fl,\par Plt: 227 K/uL\par Serum Immunofixation: IgG kappa\par \par Verified by: Nancy Farris M.D.\par (Electronic Signature)\par Reported on: 09/07/19 10:37 EDT, [address], [place]\par [address]\par _________________________________________________________________\par \par \par Hematopathology Report\par \par \par \par \par Final Diagnosis\par 1, 2. Bone marrow biopsy and bone marrow aspirate\par - Plasma cell myeloma (10% with focal 25% by  stain)\par - Slight erythroid predominant trilineage hematopoiesis\par with maturation\par \par See note and description.\par \par Diagnostic note:\par Correlation with studies for myeloma-related organ dysfunction is\par necessary.\par Comprehensive report with results of pending ancillary studies to\par follow.\par \par Ancillary studies\par Bone marrow aspirate iron stain: No spicules are present to\par evaluate for iron stores and there are insufficient nRBC to\par evaluate for ring sideroblasts.\par \par Flow cytometry:  Monotypic plasma cells (1% of cells), positive\par for cytoplasmic kappa, CD38, , dimmer CD45, partial CD56;\par negative CD19, CD20, .\par CD45/side scatter shows no significant blast population. There is\par no increase in CD34,  or CD14 positive cells.\par \par \par \par \par \par \par HARDEEP, SUBASH                          4\par \par \par \par Hematopathology Report\par \par \par \par \par Lymphocytes (8% of cells) show a heterogeneous population of T-\par cells (with normal CD4 to CD8 ratio), and polytypic B-cells.\par \par Immunohistochemical stains ( performed on the block 1A and\par 1B, cyclin-D1 performed on block 1A):   stains show overall\par approximately 10% plasma cells, with foci of small clusters,\par focal up to 25%. Plasma cells are negative for cyclin-D1.\par \par Microscopic description:\par 1. Biopsy: Sections of bone marrow biopsy show normocellularity\par (60 to 70% cellularity), mild plasmacytosis with foci of small\par clusters, slight erythroid predominant trilineage hematopoiesis\par with maturation, mild megakaryocytosis with normal morphology,\par and increased iron stores.\par \par 2. Aspirate: Cellular spicules are not present, precluding\par differential count. Review of the smear shows some maturing and\par predominant mature myeloid cells, a few erythroid elements, and\par rare megakaryocytes.\par \par Comment:  Iron stain (examined to evaluate for iron stores; see\par microscopic description) and Giemsa stain (shows appropriate\par staining pattern) are performed and evaluated on block(s): 1A,\par 1B.\par \par Slide(s) with built in immunohistochemical study control(s)\par associated and negative control with this case has/have been\par verified by the sign out pathologist.\par For slide(s) without built in controls positive control slides\par has/have been reviewed and approved by immunohistochemistry lab\par These immunohistochemical/ in-situ hybridization tests have been\par developed and their performance characteristics determined by\par [facility], Department of\par Pathology, Division of Immunopathology, [address] [place] [address]\[place] [address].  It has not been cleared or approved by the\par U.S. Food and Drug Administration.  The FDA has determined that\par such clearance or approval is not necessary.  This test is used\par for clinical purposes.  The laboratory is certified under the\par CLIA-88 as qualified to perform high\par complexity clinical testing.\par \par Verified by: Nancy Farris M.D.\par (Electronic Signature)\par Reported on: 08/27/19 10:00 EDT, [address] [place] [address]\par [id number]\par _________________________________________________________________\par \par \par \par \par \par \par \par HARDEEP, SUBASH                          4\par \par \par \par Hematopathology Report\par \par \par \par \par Clinical History\par New diagnosis multiple myeloma\par CBC on 08/24/2019: WBC: 8.09 K/uL, HGB: 9.5 g/dL, MCV: 90 fl,\par Plt: 227 K/uL\par Serum Immunofixation: IgG kappa\par \par Specimen(s) Submitted\par 1     Bone marrow biopsy left\par 2     Bone marrow aspirate\par \par Gross Description\par 1. The specimen is received in bouin's fixative and the specimen\par container is labeled: Left bone marrow biopsy.  It consists of a\par 0.5 x 0.2 cm bone marrow core with a 1.5 x 1.3 x 0.5 cm blood\par clot.  Entirely submitted.  Two cassettes: A = bone marrow core;\par B = blood clot.\par 2. The specimen is received labeled with patient's name and\par consists of 3 air dried slide(s). 2 slide(s) stained with Monzon\par Giemsa stain. 1 stained for iron stain.\par In addition to other data that may appear on the specimen\par container, the label has been inspected to confirm the presence\par of the patient's name and date of birth.\par Catarina PENADennys Torres 08/22/2019 17:12\par \par  \par \par  Ordered by: BECKY BILLS       Collected/Examined: [id number] 03:50PM       \par Verified by: BECKY BILLS [id number] 05:42PM       \par  Result Communication: No patient communication needed at this time;\par Stage: Final       \par  Performed at: [facility]       Resulted: [id number] 10:37AM       Last Updated: 07Sep2019 05:42PM       Accession: [id number]

## 2021-02-03 ENCOUNTER — APPOINTMENT (OUTPATIENT)
Dept: INFUSION THERAPY | Facility: HOSPITAL | Age: 71
End: 2021-02-03

## 2021-02-03 ENCOUNTER — LABORATORY RESULT (OUTPATIENT)
Age: 71
End: 2021-02-03

## 2021-02-04 ENCOUNTER — APPOINTMENT (OUTPATIENT)
Dept: CARDIOLOGY | Facility: CLINIC | Age: 71
End: 2021-02-04
Payer: MEDICARE

## 2021-02-04 ENCOUNTER — NON-APPOINTMENT (OUTPATIENT)
Age: 71
End: 2021-02-04

## 2021-02-04 VITALS
SYSTOLIC BLOOD PRESSURE: 152 MMHG | HEART RATE: 66 BPM | WEIGHT: 128 LBS | BODY MASS INDEX: 21.85 KG/M2 | TEMPERATURE: 98.7 F | OXYGEN SATURATION: 99 % | DIASTOLIC BLOOD PRESSURE: 68 MMHG | HEIGHT: 64 IN

## 2021-02-04 PROCEDURE — 93306 TTE W/DOPPLER COMPLETE: CPT

## 2021-02-04 PROCEDURE — 99213 OFFICE O/P EST LOW 20 MIN: CPT

## 2021-02-04 PROCEDURE — 93000 ELECTROCARDIOGRAM COMPLETE: CPT

## 2021-02-04 NOTE — DISCUSSION/SUMMARY
[FreeTextEntry1] : In a summary Yoly López is an elderly male with CAD s/p stent, stable. Continue Aspirin. Hypertension, controlled and continue current medications. 2 gm sodium diet. Hypercholesterolemia, on statins and low cholesterol diet. LDL goal less than 70 g/dL. Hypertension and heart murmur, echo done showed normal LV systolic function, mild AR, MR and TR. Echo results explained to Mr. Rand. Continue healthy lifestyle. Follow up in 6 months.

## 2021-02-04 NOTE — HISTORY OF PRESENT ILLNESS
[FreeTextEntry1] : Yoly López is a 71 year old male with history of CAD s/p stent, hypertension and hypercholesterolemia comes for follow up visit. Denies any chest pain or palpitations. No shortness of breath on exertion. Compliant to medications and diet. Follows up with PCP regularly. Walks  as tolerated.

## 2021-02-05 ENCOUNTER — APPOINTMENT (OUTPATIENT)
Dept: INFUSION THERAPY | Facility: HOSPITAL | Age: 71
End: 2021-02-05

## 2021-02-05 ENCOUNTER — RESULT REVIEW (OUTPATIENT)
Age: 71
End: 2021-02-05

## 2021-02-05 LAB
BASOPHILS # BLD AUTO: 0.05 K/UL — SIGNIFICANT CHANGE UP (ref 0–0.2)
BASOPHILS NFR BLD AUTO: 0.7 % — SIGNIFICANT CHANGE UP (ref 0–2)
EOSINOPHIL # BLD AUTO: 0.2 K/UL — SIGNIFICANT CHANGE UP (ref 0–0.5)
EOSINOPHIL NFR BLD AUTO: 2.9 % — SIGNIFICANT CHANGE UP (ref 0–6)
HCT VFR BLD CALC: 29.7 % — LOW (ref 39–50)
HGB BLD-MCNC: 10.2 G/DL — LOW (ref 13–17)
IMM GRANULOCYTES NFR BLD AUTO: 0.3 % — SIGNIFICANT CHANGE UP (ref 0–1.5)
LYMPHOCYTES # BLD AUTO: 1.29 K/UL — SIGNIFICANT CHANGE UP (ref 1–3.3)
LYMPHOCYTES # BLD AUTO: 18.7 % — SIGNIFICANT CHANGE UP (ref 13–44)
MCHC RBC-ENTMCNC: 32 PG — SIGNIFICANT CHANGE UP (ref 27–34)
MCHC RBC-ENTMCNC: 34.3 G/DL — SIGNIFICANT CHANGE UP (ref 32–36)
MCV RBC AUTO: 93.1 FL — SIGNIFICANT CHANGE UP (ref 80–100)
MONOCYTES # BLD AUTO: 0.61 K/UL — SIGNIFICANT CHANGE UP (ref 0–0.9)
MONOCYTES NFR BLD AUTO: 8.9 % — SIGNIFICANT CHANGE UP (ref 2–14)
NEUTROPHILS # BLD AUTO: 4.72 K/UL — SIGNIFICANT CHANGE UP (ref 1.8–7.4)
NEUTROPHILS NFR BLD AUTO: 68.5 % — SIGNIFICANT CHANGE UP (ref 43–77)
NRBC # BLD: 0 /100 WBCS — SIGNIFICANT CHANGE UP (ref 0–0)
PLATELET # BLD AUTO: 172 K/UL — SIGNIFICANT CHANGE UP (ref 150–400)
RBC # BLD: 3.19 M/UL — LOW (ref 4.2–5.8)
RBC # FLD: 12.2 % — SIGNIFICANT CHANGE UP (ref 10.3–14.5)
WBC # BLD: 6.89 K/UL — SIGNIFICANT CHANGE UP (ref 3.8–10.5)
WBC # FLD AUTO: 6.89 K/UL — SIGNIFICANT CHANGE UP (ref 3.8–10.5)

## 2021-02-16 ENCOUNTER — OUTPATIENT (OUTPATIENT)
Dept: OUTPATIENT SERVICES | Facility: HOSPITAL | Age: 71
LOS: 1 days | Discharge: HOME | End: 2021-02-16

## 2021-02-16 DIAGNOSIS — C90.00 MULTIPLE MYELOMA NOT HAVING ACHIEVED REMISSION: ICD-10-CM

## 2021-02-19 ENCOUNTER — RESULT REVIEW (OUTPATIENT)
Age: 71
End: 2021-02-19

## 2021-02-19 ENCOUNTER — APPOINTMENT (OUTPATIENT)
Dept: INFUSION THERAPY | Facility: HOSPITAL | Age: 71
End: 2021-02-19

## 2021-02-19 LAB
ALBUMIN SERPL ELPH-MCNC: 4.9 G/DL
ALP BLD-CCNC: 55 U/L
ALT SERPL-CCNC: 15 U/L
ANION GAP SERPL CALC-SCNC: 14 MMOL/L
AST SERPL-CCNC: 19 U/L
BASOPHILS # BLD AUTO: 0.05 K/UL — SIGNIFICANT CHANGE UP (ref 0–0.2)
BASOPHILS NFR BLD AUTO: 0.7 % — SIGNIFICANT CHANGE UP (ref 0–2)
BILIRUB SERPL-MCNC: 0.4 MG/DL
BUN SERPL-MCNC: 21 MG/DL
CALCIUM SERPL-MCNC: 9.5 MG/DL
CHLORIDE SERPL-SCNC: 101 MMOL/L
CO2 SERPL-SCNC: 22 MMOL/L
CREAT SERPL-MCNC: 1.72 MG/DL
EOSINOPHIL # BLD AUTO: 0.13 K/UL — SIGNIFICANT CHANGE UP (ref 0–0.5)
EOSINOPHIL NFR BLD AUTO: 1.8 % — SIGNIFICANT CHANGE UP (ref 0–6)
GLUCOSE SERPL-MCNC: 106 MG/DL
HCT VFR BLD CALC: 31.3 % — LOW (ref 39–50)
HGB BLD-MCNC: 10.7 G/DL — LOW (ref 13–17)
IMM GRANULOCYTES NFR BLD AUTO: 0.1 % — SIGNIFICANT CHANGE UP (ref 0–1.5)
LYMPHOCYTES # BLD AUTO: 1.26 K/UL — SIGNIFICANT CHANGE UP (ref 1–3.3)
LYMPHOCYTES # BLD AUTO: 17.2 % — SIGNIFICANT CHANGE UP (ref 13–44)
MCHC RBC-ENTMCNC: 32.6 PG — SIGNIFICANT CHANGE UP (ref 27–34)
MCHC RBC-ENTMCNC: 34.2 G/DL — SIGNIFICANT CHANGE UP (ref 32–36)
MCV RBC AUTO: 95.4 FL — SIGNIFICANT CHANGE UP (ref 80–100)
MONOCYTES # BLD AUTO: 0.67 K/UL — SIGNIFICANT CHANGE UP (ref 0–0.9)
MONOCYTES NFR BLD AUTO: 9.1 % — SIGNIFICANT CHANGE UP (ref 2–14)
NEUTROPHILS # BLD AUTO: 5.22 K/UL — SIGNIFICANT CHANGE UP (ref 1.8–7.4)
NEUTROPHILS NFR BLD AUTO: 71.1 % — SIGNIFICANT CHANGE UP (ref 43–77)
NRBC # BLD: 0 /100 WBCS — SIGNIFICANT CHANGE UP (ref 0–0)
PLATELET # BLD AUTO: 170 K/UL — SIGNIFICANT CHANGE UP (ref 150–400)
POTASSIUM SERPL-SCNC: 4.3 MMOL/L
PROT SERPL-MCNC: 7.3 G/DL
RBC # BLD: 3.28 M/UL — LOW (ref 4.2–5.8)
RBC # FLD: 12.3 % — SIGNIFICANT CHANGE UP (ref 10.3–14.5)
SODIUM SERPL-SCNC: 136 MMOL/L
WBC # BLD: 7.34 K/UL — SIGNIFICANT CHANGE UP (ref 3.8–10.5)
WBC # FLD AUTO: 7.34 K/UL — SIGNIFICANT CHANGE UP (ref 3.8–10.5)

## 2021-02-22 DIAGNOSIS — Z51.11 ENCOUNTER FOR ANTINEOPLASTIC CHEMOTHERAPY: ICD-10-CM

## 2021-03-05 ENCOUNTER — RESULT REVIEW (OUTPATIENT)
Age: 71
End: 2021-03-05

## 2021-03-05 ENCOUNTER — APPOINTMENT (OUTPATIENT)
Dept: HEMATOLOGY ONCOLOGY | Facility: CLINIC | Age: 71
End: 2021-03-05

## 2021-03-05 ENCOUNTER — APPOINTMENT (OUTPATIENT)
Dept: INFUSION THERAPY | Facility: HOSPITAL | Age: 71
End: 2021-03-05

## 2021-03-05 ENCOUNTER — LABORATORY RESULT (OUTPATIENT)
Age: 71
End: 2021-03-05

## 2021-03-05 LAB
BASOPHILS # BLD AUTO: 0.07 K/UL — SIGNIFICANT CHANGE UP (ref 0–0.2)
BASOPHILS NFR BLD AUTO: 1 % — SIGNIFICANT CHANGE UP (ref 0–2)
EOSINOPHIL # BLD AUTO: 0.21 K/UL — SIGNIFICANT CHANGE UP (ref 0–0.5)
EOSINOPHIL NFR BLD AUTO: 2.9 % — SIGNIFICANT CHANGE UP (ref 0–6)
HCT VFR BLD CALC: 30.3 % — LOW (ref 39–50)
HGB BLD-MCNC: 10.3 G/DL — LOW (ref 13–17)
IMM GRANULOCYTES NFR BLD AUTO: 1 % — SIGNIFICANT CHANGE UP (ref 0–1.5)
LYMPHOCYTES # BLD AUTO: 1.28 K/UL — SIGNIFICANT CHANGE UP (ref 1–3.3)
LYMPHOCYTES # BLD AUTO: 17.9 % — SIGNIFICANT CHANGE UP (ref 13–44)
MCHC RBC-ENTMCNC: 32.1 PG — SIGNIFICANT CHANGE UP (ref 27–34)
MCHC RBC-ENTMCNC: 34 G/DL — SIGNIFICANT CHANGE UP (ref 32–36)
MCV RBC AUTO: 94.4 FL — SIGNIFICANT CHANGE UP (ref 80–100)
MONOCYTES # BLD AUTO: 0.57 K/UL — SIGNIFICANT CHANGE UP (ref 0–0.9)
MONOCYTES NFR BLD AUTO: 8 % — SIGNIFICANT CHANGE UP (ref 2–14)
NEUTROPHILS # BLD AUTO: 4.96 K/UL — SIGNIFICANT CHANGE UP (ref 1.8–7.4)
NEUTROPHILS NFR BLD AUTO: 69.2 % — SIGNIFICANT CHANGE UP (ref 43–77)
NRBC # BLD: 0 /100 WBCS — SIGNIFICANT CHANGE UP (ref 0–0)
PLATELET # BLD AUTO: 157 K/UL — SIGNIFICANT CHANGE UP (ref 150–400)
RBC # BLD: 3.21 M/UL — LOW (ref 4.2–5.8)
RBC # FLD: 12.4 % — SIGNIFICANT CHANGE UP (ref 10.3–14.5)
WBC # BLD: 7.16 K/UL — SIGNIFICANT CHANGE UP (ref 3.8–10.5)
WBC # FLD AUTO: 7.16 K/UL — SIGNIFICANT CHANGE UP (ref 3.8–10.5)

## 2021-03-08 ENCOUNTER — APPOINTMENT (OUTPATIENT)
Dept: HEMATOLOGY ONCOLOGY | Facility: CLINIC | Age: 71
End: 2021-03-08
Payer: MEDICARE

## 2021-03-08 VITALS
HEIGHT: 64 IN | TEMPERATURE: 97.2 F | WEIGHT: 131.37 LBS | BODY MASS INDEX: 22.43 KG/M2 | HEART RATE: 63 BPM | DIASTOLIC BLOOD PRESSURE: 70 MMHG | SYSTOLIC BLOOD PRESSURE: 151 MMHG | OXYGEN SATURATION: 100 % | RESPIRATION RATE: 14 BRPM

## 2021-03-08 DIAGNOSIS — R11.2 NAUSEA WITH VOMITING, UNSPECIFIED: ICD-10-CM

## 2021-03-08 PROCEDURE — 99213 OFFICE O/P EST LOW 20 MIN: CPT

## 2021-03-08 NOTE — RESULTS/DATA
[FreeTextEntry1] : 3/8/2021: No new labs to review.  \par 1/25/2021: Mot recent SPEP / MICAH (1/8/2021) No monoclonal band \par \par ------------------------------------------------------------------------------------------\par   PET/CT Whole Body Oncology FDG             Final\par \par No Documents Attached\par \par \par \par \par   EXAM:  PETCT WB ONC FDG SUBS\par \par \par PROCEDURE DATE:  05/07/2020\par \par \par \par INTERPRETATION:  PROCEDURE:  PET/CT WHOLE BODY\par \par RADIOPHARMACEUTICAL:  9.97 mCi F-18, FDG, I.V.\par \par CLINICAL INFORMATION:  70-year-old male referred for follow-up of multiple myeloma on Zometa. Bone marrow biopsy done 3/4/2020 showed less than 5% plasma cells and repeat myeloma labs done on 3/9/2020, showed CT are. PET/CT is done as part of the subsequent treatment strategy evaluation.\par \par TECHNIQUE:  Fasting blood sugar measured prior to injection of radiopharmaceutical was 116 mg/dl. Following intravenous injection of the radiopharmaceutical and an uptake period of approximately 60 minutes, FDG-PET/CT was obtained on a  Inspiris Discovery 710 from the vertex of the skull to the toes. Oral contrast was given during the uptake period. CT was performed during shallow respiration. The CT protocol was optimized for PET attenuation correction and to provide anatomic detail for localization of PET abnormalities. The CT protocol was not designed to produce and cannot replace state-of-the-art diagnostic CT images with specific imaging protocols for different body parts and indications. Images were reviewed on a dedicated workstation using multiplanar reconstruction.\par \par The standardized uptake values (SUV) are normalized to patient body weight and indicate the highest activity concentration (SUVmax) in a given disease site. All image numbers refer to the axial image number.\par \par COMPARISON:  FDG PET/CT dated 9/7/2019.\par \par OTHER STUDIES USED FOR CORRELATION: MRI of cervical spine dated 9/9/2019.\par \par FINDINGS:\par \par HEAD/NECK: Physiologic FDG activity in the brain.\par \par Mild hypermetabolism in left masseter muscle, decreased as compared to prior study, may be secondary to bruxism. Elongated focus of increased FDG activity in left lower cervical region may reflect inflammation or muscle spasm (image 77).\par \par New hypermetabolic lucency in left maxillary alveolus is nonspecific (SUV 8.0; image 53). Recommend correlation with dental exam. A new small hypermetabolic focus is seen in the right maxilla (SUV 3.7; image 55).\par \par CHEST: Few new small hypermetabolic foci in bilateral hilar region likely correspond to small lymph nodes that are difficult to delineate on CT. For reference, left perihilar region demonstrates SUV 3.6 (image 117). Resolution of FDG-avid subcentimeter left posterior paraesophageal lymph node.\par \par LUNGS: No abnormal FDG activity. Calcified right lower lobe granuloma, unchanged (image 128).\par \par PLEURA/PERICARDIUM: No abnormal FDG activity. No effusion.\par \par HEPATOBILIARY/PANCREAS: Physiologic FDG activity. Liver SUV mean is 2.1; previous 2.3.\par \par SPLEEN: No abnormal FDG activity. Normal in size.\par \par ADRENAL GLANDS: No abnormal FDG activity. No nodule.\par \par KIDNEYS/URINARY BLADDER: Physiologic FDG activity. Multiple bilateral renal cysts, measuring up to 4.6 cm on the right, unchanged.\par \par PELVIC ORGANS: No abnormal FDG activity.\par \par ABDOMINOPELVIC NODES: No enlarged or FDG avid lymph node\par \par BOWEL/PERITONEUM/MESENTERY: Physiologic FDG activity. Resolution of hypermetabolism in distal esophagus/GE junction.\par \par BONES: Near total resolution of previously seen hypermetabolic foci in the axial skeleton, right humerus, and proximal bilateral femurs. Previously seen lytic lesions demonstrate new areas of sclerosis on CT. For reference, one of the most FDG-avid residual lesions is a mixed lytic and sclerotic lesion in the left scapula which demonstrates SUV 3.0 (image 98); previously lytic with SUV 5.7. Previously seen hypermetabolic lesion in right sphenoid bone has resolved. Previously seen large destructive lesion in the manubrium the sternum demonstrates new sclerosis with SUV 3.0 (image 103); previous SUV 5.3. Resolution of hypermetabolic intramedullary soft tissue in proximal right humerus. Near total resolution of hypermetabolic intramedullary soft tissue in proximal left femur (SUV 1.3; image 273; previous SUV 5.3).\par \par FDG-avid fracture in left inferior pubic ramus demonstrates increased callus formation and is similar in metabolism (SUV 3.8; image 250; previous SUV 3.1). Few new mildly FDG-avid bilateral rib fractures. For reference, fracture in the anterior aspect of the left second rib demonstrates SUV 3.4 (image 105).\par \par Resolution of hypermetabolism in left palmar musculature.\par \par IMPRESSION:  Abnormal whole body FDG-PET/CT scan.\par \par 1. Near total resolution of hypermetabolism associated with lytic and intramedullary lesions in the axial and appendicular skeleton as compared to prior study dated 9/7/2019. Previously seen lytic lesions demonstrate new sclerosis. Findings are compatible with response to interval therapy.\par \par 2. Few new mildly FDG-avid bilateral rib fractures. An FDG-avid fracture in the left inferior pubic ramus demonstrates increased callus formation and is similar in metabolism.\par \par 3. New hypermetabolic lucency in left maxillary alveolus and new small hypermetabolic focus in right maxilla are nonspecific. Recommend correlation with dental exam.\par \par 4. Few new mildly FDG-avid nonspecific bilateral hilar lymph nodes.\par \par 5. Resolution of many specific hypermetabolism in distal esophagus/GE junction.\par \par \par \par \par \par \par \par \par \par \par \par \par \par \par \par LUANA RAMOS M.D., NUCLEAR MEDICINE ATTENDING\par This document has been electronically signed. May  7 2020  4:08PM\par \par  \par \par  Ordered by: OZZY ALMEIDA       Collected/Examined: [id number] 12:57PM       \par Verified by: OZZY ALMEIDA 11May2020 09:27AM       \par  Result Communication: Call patient with results,Discussed results with patient;\par Stage: Final       \par  Performed at: [facility]       Resulted: 07May2020 04:11PM       Last Updated: 11May2020 09:27AM       Accession: [id number]       \par \par \par  Pathology             Final\par \par No Documents Attached\par \par \par \par \par   [facility] Accession Number : [id number]\par \par HARDEEP, SUBASH                          2\par \par \par \par Cytopathology Report\par \par \par \par \par \par Final Diagnosis\par SOFT TISSUE, SACRUM, CT GUIDED CORE BIOPSY\par Plasma cell neoplasm\par See note and description.\par \par Diagnostic note:  Overall the morphologic and immunophenotypic\par findings are consistent with plasma cell neoplasm. The\par differential diagnosis includes plasmacytoma vs plasma cells\par myeloma. Correlation with laboratory, radiologic and clinical\par findings is required for further classification.\par \par Microscopic description:\par The touch prep slides are composed of numerous enlarged plasma\par cells with prominent nucleoli, occasional binucleated forms are\par seen.\par \par Histologic sections consist of multiple needle shaped cores of\par soft tissue showing proliferation of enlarged plasma cells with\par prominent nucleoli, forming sheets.\par \par Immunohistochemistry:  , kappaISH, lambdaISH, cyclinD1, p53\par stains performed on paraffin embedded section of block 1C.\par \par Neoplastic cells are:\par Positive:  , KappaISH, p53\par Negative:  LambdaISH, cyclinD1\par \par Flow cytometry analysis (54-KF-): Monotypic plasma cells\par (24.1% of total analyzed cells) are present. There are two\par aberrant monoclonal plasma cell populations:\par Population #1 (16.5% of total analyzed cells, 68.6% of plasma\par cell population) positive for cytoplasmic kappa, CD38 dimmer,\par CD45 dimmer, CD56, ; negative for , CD19, CD20.\par Population # 2 (7.6% of total analyzed cells, 31.4% of plasma\par cell population) positive for cytoplasmic kappa\par \par \par \par \par \par \par HARDEEP, SUBASH                          2\par \par \par \par Cytopathology Report\par \par \par \par \par (brighter), , CD38 brighter, CD45 dim, CD56 (brighter),\par ; negative for CD19, CD20.\par \par Screened by: Jose Juan Smith CT(ASC)\par Verified by: Orlando Spicer MD\par (Electronic Signature)\par Reported on: 08/22/19 13:22 EDT, [address] [place], [place]\par [address]\par Cytology technical processing performed at [address] [place] [id number]\par _________________________________________________________________\par \par \par Specimen(s) Submitted\par SOFT TISSUE, SACRUM, CT GUIDED CORE BIOPSY\par \par \par Statement of Adequacy\par Immediate cytologic study for adequacy of specimen using a Diff-\par Quik stain was performed at [facility], [address] [place] [address]. Touch\par imprints acceptable for further evaluation by Jose Juan Smith\par CT([facility]).\par \par \par Clinical Information\par Sacral mass.\par \par \par Gross Description\par Core Biopsy:\par Tissue collected: Specimen container has been inspected to\par confirm patient?s name and date of birth, contains 3  tan cores\par measuring 1.0, 1.0, 0.8 cm in length (and multiple fragments).\par Entire specimen submitted in 2 cassette(s) labeled 1B and 1 C.\par \par 4 Touch prep slides ( 2 air dried + 2 fixed)\par \par FNA:\par No material submitted for cell block (No block 1A)\par \par Prepared:\par 4 Touch Prep,\par 1 core biopsy labeled 1B\par 1 core biopsy labeled 1C\par 6 Total slides\par \par  \par \par  Ordered by: DELORES MSOS       Collected/Examined: [id number] 03:14PM       \par Verified by: OZZY ALMEIDA [id number] 03:07PM       \par  Result Communication: No patient communication needed at this time;\par Stage: Final       \par  Performed at: [facility]       Resulted: [id number] 01:22PM       Last Updated: 18Jun2020 03:07PM       Accession: [id number]       \par \par \par  Pathology             Final\par \par No Documents Attached\par \par \par \par \par   [facility] Accession Number : [id number]\par \par HARDEEP, SUBASH                          4\par \par \par \par Addendum Report\par \par \par \par \par Hematopathology Addendum\par Comprehensive Hematopathology Report\par \par Final Diagnosis:\par 1, 2. Bone marrow biopsy and bone marrow aspirate\par - Plasma cell myeloma (10% with focal 25% by  stain)\par - Slight erythroid predominant trilineage hematopoiesis\par with maturation\par \par Diagnostic Note:\par Please note findings of a normal male karyotype and a negative\par multiple myeloma FISH panel. Based on the additional findings,\par the diagnosis has not changed. Correlation with studies for\par myeloma-related organ dysfunction is necessary.\par \par Morphology:\par Microscopic description:\par 1. Biopsy: Sections of bone marrow biopsy show normocellularity\par (60 to 70% cellularity), mild plasmacytosis with foci of small\par clusters, slight erythroid predominant trilineage hematopoiesis\par with maturation, mild megakaryocytosis with normal morphology,\par and increased iron stores.\par 2. Aspirate: Cellular spicules are not present, precluding\par differential count. Review of the smear shows some maturing and\par predominant mature myeloid cells, a few erythroid elements, and\par rare megakaryocytes.\par \par Ancillary Studies:\par Bone marrow aspirate iron stain: No spicules are present to\par evaluate for iron stores and there are insufficient nRBC to\par evaluate for ring sideroblasts.\par Congo red stain is negative for amyloidosis.\par Flow cytometry:  Monotypic plasma cells (1% of cells), positive\par for cytoplasmic kappa, CD38, , dimmer CD45, partial CD56;\par negative CD19, CD20, .\par CD45/side scatter shows no significant blast population. There is\par no increase in CD34,  or CD14 positive cells.\par Lymphocytes (8% of cells) show a heterogeneous population of T-\par cells (with normal CD4 to CD8 ratio), and polytypic B-cells.\par Immunohistochemical stains ( performed on the block 1A and\par 1B, cyclin-D1 performed on block 1A):   stains show overall\par approximately 10% plasma cells, with foci of small clusters,\par focal up to 25%. Plasma cells are negative for cyclin-D1.\par \par Cytogenetics:\par Result: Normal male karyotype\par Karyotype: 46,XY[20]\par \par \par \par \par \par \par HARDEEP, SUBASH                          4\par \par \par \par Addendum Report\par \par \par \par \par FISH:\par Result: NORMAL FISH -\par - MULTIPLE MYELOMA PANEL\par Probe(s) and Location(s): FGFR3(4p16), CCND1(11q13), RB1(13q14),\par IGH(14q32), MAF(16q23), TP53(17p13.1)\par \par Clinical History/Data:\par New diagnosis multiple myeloma\par CBC on 08/24/2019: WBC: 8.09 K/uL, HGB: 9.5 g/dL, MCV: 90 fl,\par Plt: 227 K/uL\par Serum Immunofixation: IgG kappa\par \par Verified by: Nancy Farris M.D.\par (Electronic Signature)\par Reported on: 09/07/19 10:37 EDT, [address] [place], [place]\par [address]\par _________________________________________________________________\par \par \par Hematopathology Report\par \par \par \par \par Final Diagnosis\par 1, 2. Bone marrow biopsy and bone marrow aspirate\par - Plasma cell myeloma (10% with focal 25% by  stain)\par - Slight erythroid predominant trilineage hematopoiesis\par with maturation\par \par See note and description.\par \par Diagnostic note:\par Correlation with studies for myeloma-related organ dysfunction is\par necessary.\par Comprehensive report with results of pending ancillary studies to\par follow.\par \par Ancillary studies\par Bone marrow aspirate iron stain: No spicules are present to\par evaluate for iron stores and there are insufficient nRBC to\par evaluate for ring sideroblasts.\par \par Flow cytometry:  Monotypic plasma cells (1% of cells), positive\par for cytoplasmic kappa, CD38, , dimmer CD45, partial CD56;\par negative CD19, CD20, .\par CD45/side scatter shows no significant blast population. There is\par no increase in CD34,  or CD14 positive cells.\par \par \par \par \par \par \par HARDEEP, SUBASH                          4\par \par \par \par Hematopathology Report\par \par \par \par \par Lymphocytes (8% of cells) show a heterogeneous population of T-\par cells (with normal CD4 to CD8 ratio), and polytypic B-cells.\par \par Immunohistochemical stains ( performed on the block 1A and\par 1B, cyclin-D1 performed on block 1A):   stains show overall\par approximately 10% plasma cells, with foci of small clusters,\par focal up to 25%. Plasma cells are negative for cyclin-D1.\par \par Microscopic description:\par 1. Biopsy: Sections of bone marrow biopsy show normocellularity\par (60 to 70% cellularity), mild plasmacytosis with foci of small\par clusters, slight erythroid predominant trilineage hematopoiesis\par with maturation, mild megakaryocytosis with normal morphology,\par and increased iron stores.\par \par 2. Aspirate: Cellular spicules are not present, precluding\par differential count. Review of the smear shows some maturing and\par predominant mature myeloid cells, a few erythroid elements, and\par rare megakaryocytes.\par \par Comment:  Iron stain (examined to evaluate for iron stores; see\par microscopic description) and Giemsa stain (shows appropriate\par staining pattern) are performed and evaluated on block(s): 1A,\par 1B.\par \par Slide(s) with built in immunohistochemical study control(s)\par associated and negative control with this case has/have been\par verified by the sign out pathologist.\par For slide(s) without built in controls positive control slides\par has/have been reviewed and approved by immunohistochemistry lab\par These immunohistochemical/ in-situ hybridization tests have been\par developed and their performance characteristics determined by\par [facility], Department of\par Pathology, Division of Immunopathology, [facility] [place] [address]\[place] [address].  It has not been cleared or approved by the\par U.S. Food and Drug Administration.  The FDA has determined that\par such clearance or approval is not necessary.  This test is used\par for clinical purposes.  The laboratory is certified under the\par CLIA-88 as qualified to perform high\par complexity clinical testing.\par \par Verified by: Nancy Farris M.D.\par (Electronic Signature)\par Reported on: 08/27/19 10:00 EDT, [address] [place] [address]\par [id number]\par _________________________________________________________________\par \par \par \par \par \par \par \par HARDEEP, SUBASH                          4\par \par \par \par Hematopathology Report\par \par \par \par \par Clinical History\par New diagnosis multiple myeloma\par CBC on 08/24/2019: WBC: 8.09 K/uL, HGB: 9.5 g/dL, MCV: 90 fl,\par Plt: 227 K/uL\par Serum Immunofixation: IgG kappa\par \par Specimen(s) Submitted\par 1     Bone marrow biopsy left\par 2     Bone marrow aspirate\par \par Gross Description\par 1. The specimen is received in bouin's fixative and the specimen\par container is labeled: Left bone marrow biopsy.  It consists of a\par 0.5 x 0.2 cm bone marrow core with a 1.5 x 1.3 x 0.5 cm blood\par clot.  Entirely submitted.  Two cassettes: A = bone marrow core;\par B = blood clot.\par 2. The specimen is received labeled with patient's name and\par consists of 3 air dried slide(s). 2 slide(s) stained with Monzon\par Giemsa stain. 1 stained for iron stain.\par In addition to other data that may appear on the specimen\par container, the label has been inspected to confirm the presence\par of the patient's name and date of birth.\par Catarina Torres 08/22/2019 17:12\par \par  \par \par  Ordered by: BECKY BILLS       Collected/Examined: [id number] 03:50PM       \par Verified by: BECKY BILLS [id number] 05:42PM       \par  Result Communication: No patient communication needed at this time;\par Stage: Final       \par  Performed at: [facility]       Resulted: [id number] 10:37AM       Last Updated: [id number] 05:42PM       Accession: [id number]

## 2021-03-08 NOTE — ASSESSMENT
[FreeTextEntry1] : 71 year-old Mr. MEIER is seen  in follow up for IgG Kappa Multiple Myeloma presented with lytic percy lesions. He received XRT to percy lesions,was started on RVD induction,  achieved VGPR.  Last PET 5/2020 - markedly improved and reduced FDG avidity throughout. His most recent SPEP/MICAH (1/8/2021) was normal.  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 Discussed skipping a dose vs switching to a oral formulation (Nilaro 3 mg) on days 1, 8 and 15 of 28 days cycle. Since he is in CR, skipping a dose (chemo holiday) would not be unreasonable.   \par \par

## 2021-03-08 NOTE — HISTORY OF PRESENT ILLNESS
[Disease:__________________________] : Disease: [unfilled] [Treatment Protocol] : Treatment Protocol [de-identified] : As per Dr Kilgore's note on 8/18/2020\par \par "Mr Rand was referred to my office for newly diagnosed IgG kappa multiple myeloma. The patient's primary language is French, he has his daughter Mirlande as the , per his wishes. He was seen by Dr. Andrés Valle (hematology) in Feb 2019 and had a BM bx showing 10-15% plasma cells, concerning for smoldering myeloma. According to the daughter, he was awaiting insurance approval for imaging studies. He was admitted from 8/18/19 at [facility] where he presented with pain in the L leg/lower back, worsening for the past 2-3 months. On labs, he was found to have a total protein of 10, Ca level 12 until 8/29/19. Dental consult was called and patient needs to have 10 teeth extracted -thus, IV bisphosphonates were not given, pt improved with hydration. He also had a slightly inc'ed creatinine -improved after IVF. CT C/A/P 8/18/19 showed a lytic expansile soft tissue lesion centered in the manubrium measuring approximately 7.3 x 3.4 x 4.3 cm, invading both 1st ribs. There was also a A lytic expansile soft tissue lesion in the T8 vertebral body causing mass effect on spinal canal and obliterating left neural foramina at T7-8 and T8-9. ON 8/22/19 an MRI thoracic spine was done showing multiple areas of tumor involvement within the thoracic spine in keeping with the patient's history of multiple myeloma. Prominent lesion within C7 on the right incompletely seen.\par Large lesion within T8 on the left producing epidural tumor extension and moderate narrowing of the spinal canal with mild flattening of the spinal cord. He was evaluated on 8/22/19 by Dr. Foley (Rad Onc) and was given 5 fractions of XRT from 8/23/19 to 8/29/19 to C7 and T8. He was discharged home on 8/29/19 with follow up in the outpatient office, and on Dexamethasone 4mg po daily. \par \par - s/p RT to C7/T8 spine.\par  \par \par \par \par Interval History: The patient is being followed for IgG kappa MM with multiple bone lytic lesions, hypercalcemia (resolved) and mild anemia. He started Velcade/Dexa weekly on 9/919. The patient got the Revlimid and started it C1 day 1 on 10/21/19 and hed been tolerating it well. \par \par Starting in February, his blood counts became low -plt count 54, Hb 8.3 wbc 5.8. Revlimid was held -after 2/3/20. He got 1 shot Velcade/Dexa on 2/3/20, NO chemo since then. \par BM done on 3/4/20 to eval for residual disease (MM) vs. MDS. BM showed recovering marrow elements, plasma cells <5%. SPEP/MICAH from 3/9/20 showed NO monoclonal proteins, c/w CR. \par \par The PET scan from 5/7/20 showed L uptake maxillary -pt reports that he had dentures done in Jan 2020 and has discomfort from them. He did not go to the dentist during COVID Rib fractures -pt denied falls/trauma. Patient has dental appointment for next week. He has some discomfort in the upper teeth b/l. He also missed his Velcade SQ last week 'i did not know if i am supposed to continue it.' He has no neuropathy from it, tolerating well. "\par  [de-identified] : RISS- II [FreeTextEntry1] : \par RVD 9/2019 -6/2020 transitioned to maintenance velcade q2wk started 6/19/2020 [de-identified] : Oct 6, 2020\par Pt is here for initial visit with me. He has his dtr on the phone, prefers her included in conversation and declined an . Pt is feeling well. He is tolerating monthly zometa and maintenance velcade w/o any adverse effects. Last set of myeloma labs done in August showed stable remission. He is requesting refill of several meds today.\par ---------------------------------\par \par 1/25/2021\par Patient presents for a follow up. He has no complaints except infrequent short duration chills with no fever. He is on biweekly Velcade and monthly Zometa. His last treatment of Zometa was on 1/8/2021 and last Velcade on 1/22/2021. His most recent SPEP/MICAH was normal.   \par \par 3/8/2021\par Patient seen in follow up.  He endorses no change in his health history except felling tightness in the mid back following prolonged standing that eases with lying down on movement. He has received COIVID VAC first dose. He has deferred his visit to [facility].  His last MICAH did not show in M-band.

## 2021-03-16 ENCOUNTER — OUTPATIENT (OUTPATIENT)
Dept: OUTPATIENT SERVICES | Facility: HOSPITAL | Age: 71
LOS: 1 days | Discharge: HOME | End: 2021-03-16

## 2021-03-16 DIAGNOSIS — C90.00 MULTIPLE MYELOMA NOT HAVING ACHIEVED REMISSION: ICD-10-CM

## 2021-03-19 ENCOUNTER — APPOINTMENT (OUTPATIENT)
Dept: INFUSION THERAPY | Facility: HOSPITAL | Age: 71
End: 2021-03-19

## 2021-03-19 ENCOUNTER — RESULT REVIEW (OUTPATIENT)
Age: 71
End: 2021-03-19

## 2021-03-19 LAB
BASOPHILS # BLD AUTO: 0.07 K/UL — SIGNIFICANT CHANGE UP (ref 0–0.2)
BASOPHILS NFR BLD AUTO: 0.9 % — SIGNIFICANT CHANGE UP (ref 0–2)
EOSINOPHIL # BLD AUTO: 0.24 K/UL — SIGNIFICANT CHANGE UP (ref 0–0.5)
EOSINOPHIL NFR BLD AUTO: 3.2 % — SIGNIFICANT CHANGE UP (ref 0–6)
HCT VFR BLD CALC: 30.4 % — LOW (ref 39–50)
HGB BLD-MCNC: 10.2 G/DL — LOW (ref 13–17)
IMM GRANULOCYTES NFR BLD AUTO: 0.5 % — SIGNIFICANT CHANGE UP (ref 0–1.5)
LYMPHOCYTES # BLD AUTO: 1.37 K/UL — SIGNIFICANT CHANGE UP (ref 1–3.3)
LYMPHOCYTES # BLD AUTO: 18.2 % — SIGNIFICANT CHANGE UP (ref 13–44)
MCHC RBC-ENTMCNC: 32.2 PG — SIGNIFICANT CHANGE UP (ref 27–34)
MCHC RBC-ENTMCNC: 33.6 G/DL — SIGNIFICANT CHANGE UP (ref 32–36)
MCV RBC AUTO: 95.9 FL — SIGNIFICANT CHANGE UP (ref 80–100)
MONOCYTES # BLD AUTO: 0.7 K/UL — SIGNIFICANT CHANGE UP (ref 0–0.9)
MONOCYTES NFR BLD AUTO: 9.3 % — SIGNIFICANT CHANGE UP (ref 2–14)
NEUTROPHILS # BLD AUTO: 5.11 K/UL — SIGNIFICANT CHANGE UP (ref 1.8–7.4)
NEUTROPHILS NFR BLD AUTO: 67.9 % — SIGNIFICANT CHANGE UP (ref 43–77)
NRBC # BLD: 0 /100 WBCS — SIGNIFICANT CHANGE UP (ref 0–0)
PLATELET # BLD AUTO: 174 K/UL — SIGNIFICANT CHANGE UP (ref 150–400)
RBC # BLD: 3.17 M/UL — LOW (ref 4.2–5.8)
RBC # FLD: 12.4 % — SIGNIFICANT CHANGE UP (ref 10.3–14.5)
WBC # BLD: 7.53 K/UL — SIGNIFICANT CHANGE UP (ref 3.8–10.5)
WBC # FLD AUTO: 7.53 K/UL — SIGNIFICANT CHANGE UP (ref 3.8–10.5)

## 2021-03-20 DIAGNOSIS — Z51.11 ENCOUNTER FOR ANTINEOPLASTIC CHEMOTHERAPY: ICD-10-CM

## 2021-03-22 LAB
ALBUMIN SERPL ELPH-MCNC: 4.8 G/DL
ALP BLD-CCNC: 63 U/L
ALT SERPL-CCNC: 14 U/L
ANION GAP SERPL CALC-SCNC: 13 MMOL/L
AST SERPL-CCNC: 19 U/L
BILIRUB SERPL-MCNC: 0.2 MG/DL
BUN SERPL-MCNC: 20 MG/DL
CALCIUM SERPL-MCNC: 9.4 MG/DL
CHLORIDE SERPL-SCNC: 109 MMOL/L
CO2 SERPL-SCNC: 22 MMOL/L
CREAT SERPL-MCNC: 1.65 MG/DL
DEPRECATED KAPPA LC FREE/LAMBDA SER: 1.65 RATIO
GLUCOSE SERPL-MCNC: 130 MG/DL
KAPPA LC CSF-MCNC: 2.04 MG/DL
KAPPA LC SERPL-MCNC: 3.37 MG/DL
LDH SERPL-CCNC: 184 U/L
POTASSIUM SERPL-SCNC: 5 MMOL/L
PROT SERPL-MCNC: 7.5 G/DL
SODIUM SERPL-SCNC: 144 MMOL/L

## 2021-03-25 LAB
ALBUMIN MFR SERPL ELPH: 58.9 %
ALBUMIN SERPL-MCNC: 4.4 G/DL
ALBUMIN/GLOB SERPL: 1.4 RATIO
ALPHA1 GLOB MFR SERPL ELPH: 4.7 %
ALPHA1 GLOB SERPL ELPH-MCNC: 0.4 G/DL
ALPHA2 GLOB MFR SERPL ELPH: 13.2 %
ALPHA2 GLOB SERPL ELPH-MCNC: 1 G/DL
B-GLOBULIN MFR SERPL ELPH: 11.6 %
B-GLOBULIN SERPL ELPH-MCNC: 0.9 G/DL
DEPRECATED KAPPA LC FREE/LAMBDA SER: 1.65 RATIO
GAMMA GLOB FLD ELPH-MCNC: 0.9 G/DL
GAMMA GLOB MFR SERPL ELPH: 11.6 %
IGA SER QL IEP: 172 MG/DL
IGG SER QL IEP: 1061 MG/DL
IGM SER QL IEP: 24 MG/DL
INTERPRETATION SERPL IEP-IMP: NORMAL
KAPPA LC CSF-MCNC: 2.04 MG/DL
KAPPA LC SERPL-MCNC: 3.37 MG/DL
M PROTEIN SPEC IFE-MCNC: NORMAL
PROT SERPL-MCNC: 7.5 G/DL
PROT SERPL-MCNC: 7.5 G/DL

## 2021-04-02 ENCOUNTER — RESULT REVIEW (OUTPATIENT)
Age: 71
End: 2021-04-02

## 2021-04-02 ENCOUNTER — APPOINTMENT (OUTPATIENT)
Dept: INFUSION THERAPY | Facility: HOSPITAL | Age: 71
End: 2021-04-02

## 2021-04-02 LAB
BASOPHILS # BLD AUTO: 0.06 K/UL — SIGNIFICANT CHANGE UP (ref 0–0.2)
BASOPHILS NFR BLD AUTO: 0.9 % — SIGNIFICANT CHANGE UP (ref 0–2)
EOSINOPHIL # BLD AUTO: 0.18 K/UL — SIGNIFICANT CHANGE UP (ref 0–0.5)
EOSINOPHIL NFR BLD AUTO: 2.7 % — SIGNIFICANT CHANGE UP (ref 0–6)
HCT VFR BLD CALC: 28.4 % — LOW (ref 39–50)
HGB BLD-MCNC: 9.7 G/DL — LOW (ref 13–17)
IMM GRANULOCYTES NFR BLD AUTO: 0.9 % — SIGNIFICANT CHANGE UP (ref 0–1.5)
LYMPHOCYTES # BLD AUTO: 1.33 K/UL — SIGNIFICANT CHANGE UP (ref 1–3.3)
LYMPHOCYTES # BLD AUTO: 19.6 % — SIGNIFICANT CHANGE UP (ref 13–44)
MCHC RBC-ENTMCNC: 32.1 PG — SIGNIFICANT CHANGE UP (ref 27–34)
MCHC RBC-ENTMCNC: 34.2 G/DL — SIGNIFICANT CHANGE UP (ref 32–36)
MCV RBC AUTO: 94 FL — SIGNIFICANT CHANGE UP (ref 80–100)
MONOCYTES # BLD AUTO: 0.6 K/UL — SIGNIFICANT CHANGE UP (ref 0–0.9)
MONOCYTES NFR BLD AUTO: 8.9 % — SIGNIFICANT CHANGE UP (ref 2–14)
NEUTROPHILS # BLD AUTO: 4.54 K/UL — SIGNIFICANT CHANGE UP (ref 1.8–7.4)
NEUTROPHILS NFR BLD AUTO: 67 % — SIGNIFICANT CHANGE UP (ref 43–77)
NRBC # BLD: 0 /100 WBCS — SIGNIFICANT CHANGE UP (ref 0–0)
PLATELET # BLD AUTO: 156 K/UL — SIGNIFICANT CHANGE UP (ref 150–400)
RBC # BLD: 3.02 M/UL — LOW (ref 4.2–5.8)
RBC # FLD: 12.3 % — SIGNIFICANT CHANGE UP (ref 10.3–14.5)
WBC # BLD: 6.77 K/UL — SIGNIFICANT CHANGE UP (ref 3.8–10.5)
WBC # FLD AUTO: 6.77 K/UL — SIGNIFICANT CHANGE UP (ref 3.8–10.5)

## 2021-04-12 NOTE — REASON FOR VISIT
[Follow-Up Visit] : a follow-up visit for [Family Member] : family member [FreeTextEntry2] : myeloma Yes - the patient is able to be screened

## 2021-04-14 ENCOUNTER — OUTPATIENT (OUTPATIENT)
Dept: OUTPATIENT SERVICES | Facility: HOSPITAL | Age: 71
LOS: 1 days | Discharge: HOME | End: 2021-04-14

## 2021-04-14 DIAGNOSIS — C90.00 MULTIPLE MYELOMA NOT HAVING ACHIEVED REMISSION: ICD-10-CM

## 2021-04-16 ENCOUNTER — APPOINTMENT (OUTPATIENT)
Dept: INFUSION THERAPY | Facility: HOSPITAL | Age: 71
End: 2021-04-16

## 2021-04-16 ENCOUNTER — RESULT REVIEW (OUTPATIENT)
Age: 71
End: 2021-04-16

## 2021-04-16 LAB
BASOPHILS # BLD AUTO: 0.04 K/UL — SIGNIFICANT CHANGE UP (ref 0–0.2)
BASOPHILS NFR BLD AUTO: 0.6 % — SIGNIFICANT CHANGE UP (ref 0–2)
EOSINOPHIL # BLD AUTO: 0.18 K/UL — SIGNIFICANT CHANGE UP (ref 0–0.5)
EOSINOPHIL NFR BLD AUTO: 2.6 % — SIGNIFICANT CHANGE UP (ref 0–6)
HCT VFR BLD CALC: 27.6 % — LOW (ref 39–50)
HGB BLD-MCNC: 9.5 G/DL — LOW (ref 13–17)
IMM GRANULOCYTES NFR BLD AUTO: 0.4 % — SIGNIFICANT CHANGE UP (ref 0–1.5)
LYMPHOCYTES # BLD AUTO: 1.37 K/UL — SIGNIFICANT CHANGE UP (ref 1–3.3)
LYMPHOCYTES # BLD AUTO: 19.5 % — SIGNIFICANT CHANGE UP (ref 13–44)
MCHC RBC-ENTMCNC: 32.8 PG — SIGNIFICANT CHANGE UP (ref 27–34)
MCHC RBC-ENTMCNC: 34.4 G/DL — SIGNIFICANT CHANGE UP (ref 32–36)
MCV RBC AUTO: 95.2 FL — SIGNIFICANT CHANGE UP (ref 80–100)
MONOCYTES # BLD AUTO: 0.68 K/UL — SIGNIFICANT CHANGE UP (ref 0–0.9)
MONOCYTES NFR BLD AUTO: 9.7 % — SIGNIFICANT CHANGE UP (ref 2–14)
NEUTROPHILS # BLD AUTO: 4.74 K/UL — SIGNIFICANT CHANGE UP (ref 1.8–7.4)
NEUTROPHILS NFR BLD AUTO: 67.2 % — SIGNIFICANT CHANGE UP (ref 43–77)
NRBC # BLD: 0 /100 WBCS — SIGNIFICANT CHANGE UP (ref 0–0)
PLATELET # BLD AUTO: 157 K/UL — SIGNIFICANT CHANGE UP (ref 150–400)
RBC # BLD: 2.9 M/UL — LOW (ref 4.2–5.8)
RBC # FLD: 12.7 % — SIGNIFICANT CHANGE UP (ref 10.3–14.5)
WBC # BLD: 7.04 K/UL — SIGNIFICANT CHANGE UP (ref 3.8–10.5)
WBC # FLD AUTO: 7.04 K/UL — SIGNIFICANT CHANGE UP (ref 3.8–10.5)

## 2021-04-18 DIAGNOSIS — R11.2 NAUSEA WITH VOMITING, UNSPECIFIED: ICD-10-CM

## 2021-04-18 DIAGNOSIS — Z51.11 ENCOUNTER FOR ANTINEOPLASTIC CHEMOTHERAPY: ICD-10-CM

## 2021-04-30 ENCOUNTER — APPOINTMENT (OUTPATIENT)
Dept: INFUSION THERAPY | Facility: HOSPITAL | Age: 71
End: 2021-04-30

## 2021-04-30 ENCOUNTER — RESULT REVIEW (OUTPATIENT)
Age: 71
End: 2021-04-30

## 2021-04-30 LAB
BASOPHILS # BLD AUTO: 0.07 K/UL — SIGNIFICANT CHANGE UP (ref 0–0.2)
BASOPHILS NFR BLD AUTO: 0.9 % — SIGNIFICANT CHANGE UP (ref 0–2)
EOSINOPHIL # BLD AUTO: 0.15 K/UL — SIGNIFICANT CHANGE UP (ref 0–0.5)
EOSINOPHIL NFR BLD AUTO: 2 % — SIGNIFICANT CHANGE UP (ref 0–6)
HCT VFR BLD CALC: 28.8 % — LOW (ref 39–50)
HGB BLD-MCNC: 9.8 G/DL — LOW (ref 13–17)
IMM GRANULOCYTES NFR BLD AUTO: 1.1 % — SIGNIFICANT CHANGE UP (ref 0–1.5)
LYMPHOCYTES # BLD AUTO: 1.58 K/UL — SIGNIFICANT CHANGE UP (ref 1–3.3)
LYMPHOCYTES # BLD AUTO: 20.9 % — SIGNIFICANT CHANGE UP (ref 13–44)
MCHC RBC-ENTMCNC: 32.3 PG — SIGNIFICANT CHANGE UP (ref 27–34)
MCHC RBC-ENTMCNC: 34 G/DL — SIGNIFICANT CHANGE UP (ref 32–36)
MCV RBC AUTO: 95 FL — SIGNIFICANT CHANGE UP (ref 80–100)
MONOCYTES # BLD AUTO: 0.74 K/UL — SIGNIFICANT CHANGE UP (ref 0–0.9)
MONOCYTES NFR BLD AUTO: 9.8 % — SIGNIFICANT CHANGE UP (ref 2–14)
NEUTROPHILS # BLD AUTO: 4.94 K/UL — SIGNIFICANT CHANGE UP (ref 1.8–7.4)
NEUTROPHILS NFR BLD AUTO: 65.3 % — SIGNIFICANT CHANGE UP (ref 43–77)
NRBC # BLD: 0 /100 WBCS — SIGNIFICANT CHANGE UP (ref 0–0)
PLATELET # BLD AUTO: 147 K/UL — LOW (ref 150–400)
RBC # BLD: 3.03 M/UL — LOW (ref 4.2–5.8)
RBC # FLD: 13 % — SIGNIFICANT CHANGE UP (ref 10.3–14.5)
WBC # BLD: 7.56 K/UL — SIGNIFICANT CHANGE UP (ref 3.8–10.5)
WBC # FLD AUTO: 7.56 K/UL — SIGNIFICANT CHANGE UP (ref 3.8–10.5)

## 2021-05-14 ENCOUNTER — RESULT REVIEW (OUTPATIENT)
Age: 71
End: 2021-05-14

## 2021-05-14 ENCOUNTER — APPOINTMENT (OUTPATIENT)
Dept: INFUSION THERAPY | Facility: HOSPITAL | Age: 71
End: 2021-05-14

## 2021-05-14 LAB
BASOPHILS # BLD AUTO: 0.07 K/UL — SIGNIFICANT CHANGE UP (ref 0–0.2)
BASOPHILS NFR BLD AUTO: 0.9 % — SIGNIFICANT CHANGE UP (ref 0–2)
EOSINOPHIL # BLD AUTO: 0.19 K/UL — SIGNIFICANT CHANGE UP (ref 0–0.5)
EOSINOPHIL NFR BLD AUTO: 2.4 % — SIGNIFICANT CHANGE UP (ref 0–6)
HCT VFR BLD CALC: 27.2 % — LOW (ref 39–50)
HGB BLD-MCNC: 9.3 G/DL — LOW (ref 13–17)
IMM GRANULOCYTES NFR BLD AUTO: 0.9 % — SIGNIFICANT CHANGE UP (ref 0–1.5)
LYMPHOCYTES # BLD AUTO: 1.52 K/UL — SIGNIFICANT CHANGE UP (ref 1–3.3)
LYMPHOCYTES # BLD AUTO: 19.2 % — SIGNIFICANT CHANGE UP (ref 13–44)
MCHC RBC-ENTMCNC: 32.5 PG — SIGNIFICANT CHANGE UP (ref 27–34)
MCHC RBC-ENTMCNC: 34.2 G/DL — SIGNIFICANT CHANGE UP (ref 32–36)
MCV RBC AUTO: 95.1 FL — SIGNIFICANT CHANGE UP (ref 80–100)
MONOCYTES # BLD AUTO: 0.85 K/UL — SIGNIFICANT CHANGE UP (ref 0–0.9)
MONOCYTES NFR BLD AUTO: 10.7 % — SIGNIFICANT CHANGE UP (ref 2–14)
NEUTROPHILS # BLD AUTO: 5.22 K/UL — SIGNIFICANT CHANGE UP (ref 1.8–7.4)
NEUTROPHILS NFR BLD AUTO: 65.9 % — SIGNIFICANT CHANGE UP (ref 43–77)
NRBC # BLD: 0 /100 WBCS — SIGNIFICANT CHANGE UP (ref 0–0)
PLATELET # BLD AUTO: 188 K/UL — SIGNIFICANT CHANGE UP (ref 150–400)
RBC # BLD: 2.86 M/UL — LOW (ref 4.2–5.8)
RBC # FLD: 12.6 % — SIGNIFICANT CHANGE UP (ref 10.3–14.5)
WBC # BLD: 7.92 K/UL — SIGNIFICANT CHANGE UP (ref 3.8–10.5)
WBC # FLD AUTO: 7.92 K/UL — SIGNIFICANT CHANGE UP (ref 3.8–10.5)

## 2021-05-27 ENCOUNTER — OUTPATIENT (OUTPATIENT)
Dept: OUTPATIENT SERVICES | Facility: HOSPITAL | Age: 71
LOS: 1 days | Discharge: HOME | End: 2021-05-27

## 2021-05-27 DIAGNOSIS — C90.00 MULTIPLE MYELOMA NOT HAVING ACHIEVED REMISSION: ICD-10-CM

## 2021-05-28 ENCOUNTER — RESULT REVIEW (OUTPATIENT)
Age: 71
End: 2021-05-28

## 2021-05-28 ENCOUNTER — APPOINTMENT (OUTPATIENT)
Dept: INFUSION THERAPY | Facility: HOSPITAL | Age: 71
End: 2021-05-28

## 2021-05-28 LAB
BASOPHILS # BLD AUTO: 0.07 K/UL — SIGNIFICANT CHANGE UP (ref 0–0.2)
BASOPHILS NFR BLD AUTO: 1 % — SIGNIFICANT CHANGE UP (ref 0–2)
EOSINOPHIL # BLD AUTO: 0.17 K/UL — SIGNIFICANT CHANGE UP (ref 0–0.5)
EOSINOPHIL NFR BLD AUTO: 2.3 % — SIGNIFICANT CHANGE UP (ref 0–6)
HCT VFR BLD CALC: 28 % — LOW (ref 39–50)
HGB BLD-MCNC: 9.5 G/DL — LOW (ref 13–17)
IMM GRANULOCYTES NFR BLD AUTO: 0.3 % — SIGNIFICANT CHANGE UP (ref 0–1.5)
LYMPHOCYTES # BLD AUTO: 1.47 K/UL — SIGNIFICANT CHANGE UP (ref 1–3.3)
LYMPHOCYTES # BLD AUTO: 20 % — SIGNIFICANT CHANGE UP (ref 13–44)
MCHC RBC-ENTMCNC: 32.4 PG — SIGNIFICANT CHANGE UP (ref 27–34)
MCHC RBC-ENTMCNC: 33.9 G/DL — SIGNIFICANT CHANGE UP (ref 32–36)
MCV RBC AUTO: 95.6 FL — SIGNIFICANT CHANGE UP (ref 80–100)
MONOCYTES # BLD AUTO: 0.71 K/UL — SIGNIFICANT CHANGE UP (ref 0–0.9)
MONOCYTES NFR BLD AUTO: 9.6 % — SIGNIFICANT CHANGE UP (ref 2–14)
NEUTROPHILS # BLD AUTO: 4.92 K/UL — SIGNIFICANT CHANGE UP (ref 1.8–7.4)
NEUTROPHILS NFR BLD AUTO: 66.8 % — SIGNIFICANT CHANGE UP (ref 43–77)
NRBC # BLD: 0 /100 WBCS — SIGNIFICANT CHANGE UP (ref 0–0)
PLATELET # BLD AUTO: 168 K/UL — SIGNIFICANT CHANGE UP (ref 150–400)
RBC # BLD: 2.93 M/UL — LOW (ref 4.2–5.8)
RBC # FLD: 12.6 % — SIGNIFICANT CHANGE UP (ref 10.3–14.5)
WBC # BLD: 7.36 K/UL — SIGNIFICANT CHANGE UP (ref 3.8–10.5)
WBC # FLD AUTO: 7.36 K/UL — SIGNIFICANT CHANGE UP (ref 3.8–10.5)

## 2021-06-01 DIAGNOSIS — R11.2 NAUSEA WITH VOMITING, UNSPECIFIED: ICD-10-CM

## 2021-06-01 DIAGNOSIS — Z51.11 ENCOUNTER FOR ANTINEOPLASTIC CHEMOTHERAPY: ICD-10-CM

## 2021-06-10 ENCOUNTER — APPOINTMENT (OUTPATIENT)
Dept: HEMATOLOGY ONCOLOGY | Facility: CLINIC | Age: 71
End: 2021-06-10
Payer: MEDICARE

## 2021-06-10 ENCOUNTER — RESULT REVIEW (OUTPATIENT)
Age: 71
End: 2021-06-10

## 2021-06-10 VITALS
HEIGHT: 63.62 IN | HEART RATE: 68 BPM | RESPIRATION RATE: 14 BRPM | DIASTOLIC BLOOD PRESSURE: 73 MMHG | BODY MASS INDEX: 21.53 KG/M2 | TEMPERATURE: 97.2 F | WEIGHT: 124.56 LBS | OXYGEN SATURATION: 99 % | SYSTOLIC BLOOD PRESSURE: 163 MMHG

## 2021-06-10 LAB
BASOPHILS # BLD AUTO: 0.06 K/UL — SIGNIFICANT CHANGE UP (ref 0–0.2)
BASOPHILS NFR BLD AUTO: 0.8 % — SIGNIFICANT CHANGE UP (ref 0–2)
EOSINOPHIL # BLD AUTO: 0.15 K/UL — SIGNIFICANT CHANGE UP (ref 0–0.5)
EOSINOPHIL NFR BLD AUTO: 2 % — SIGNIFICANT CHANGE UP (ref 0–6)
HCT VFR BLD CALC: 27.3 % — LOW (ref 39–50)
HGB BLD-MCNC: 9.1 G/DL — LOW (ref 13–17)
IMM GRANULOCYTES NFR BLD AUTO: 0.3 % — SIGNIFICANT CHANGE UP (ref 0–1.5)
LYMPHOCYTES # BLD AUTO: 1.29 K/UL — SIGNIFICANT CHANGE UP (ref 1–3.3)
LYMPHOCYTES # BLD AUTO: 17.5 % — SIGNIFICANT CHANGE UP (ref 13–44)
MCHC RBC-ENTMCNC: 32.6 PG — SIGNIFICANT CHANGE UP (ref 27–34)
MCHC RBC-ENTMCNC: 33.3 G/DL — SIGNIFICANT CHANGE UP (ref 32–36)
MCV RBC AUTO: 97.8 FL — SIGNIFICANT CHANGE UP (ref 80–100)
MONOCYTES # BLD AUTO: 0.67 K/UL — SIGNIFICANT CHANGE UP (ref 0–0.9)
MONOCYTES NFR BLD AUTO: 9.1 % — SIGNIFICANT CHANGE UP (ref 2–14)
NEUTROPHILS # BLD AUTO: 5.2 K/UL — SIGNIFICANT CHANGE UP (ref 1.8–7.4)
NEUTROPHILS NFR BLD AUTO: 70.3 % — SIGNIFICANT CHANGE UP (ref 43–77)
NRBC # BLD: 0 /100 WBCS — SIGNIFICANT CHANGE UP (ref 0–0)
PLATELET # BLD AUTO: 193 K/UL — SIGNIFICANT CHANGE UP (ref 150–400)
RBC # BLD: 2.79 M/UL — LOW (ref 4.2–5.8)
RBC # FLD: 12.4 % — SIGNIFICANT CHANGE UP (ref 10.3–14.5)
WBC # BLD: 7.39 K/UL — SIGNIFICANT CHANGE UP (ref 3.8–10.5)
WBC # FLD AUTO: 7.39 K/UL — SIGNIFICANT CHANGE UP (ref 3.8–10.5)

## 2021-06-10 PROCEDURE — 99213 OFFICE O/P EST LOW 20 MIN: CPT

## 2021-06-10 NOTE — RESULTS/DATA
[FreeTextEntry1] : \par 6/9/2021: Last Myeloma labs in 03/2021 showed no M-spike, M-band, Normal FLCR. \par \par 1/25/2021: Mot recent SPEP / MICAH (1/8/2021) No monoclonal band \par \par ------------------------------------------------------------------------------------------\par   PET/CT Whole Body Oncology FDG             Final\par \par No Documents Attached\par \par \par \par \par   EXAM:  PETCT WB ONC FDG SUBS\par \par \par PROCEDURE DATE:  05/07/2020\par \par \par \par INTERPRETATION:  PROCEDURE:  PET/CT WHOLE BODY\par \par RADIOPHARMACEUTICAL:  9.97 mCi F-18, FDG, I.V.\par \par CLINICAL INFORMATION:  70-year-old male referred for follow-up of multiple myeloma on Zometa. Bone marrow biopsy done 3/4/2020 showed less than 5% plasma cells and repeat myeloma labs done on 3/9/2020, showed CT are. PET/CT is done as part of the subsequent treatment strategy evaluation.\par \par TECHNIQUE:  Fasting blood sugar measured prior to injection of radiopharmaceutical was 116 mg/dl. Following intravenous injection of the radiopharmaceutical and an uptake period of approximately 60 minutes, FDG-PET/CT was obtained on a  [employer] 710 from the vertex of the skull to the toes. Oral contrast was given during the uptake period. CT was performed during shallow respiration. The CT protocol was optimized for PET attenuation correction and to provide anatomic detail for localization of PET abnormalities. The CT protocol was not designed to produce and cannot replace state-of-the-art diagnostic CT images with specific imaging protocols for different body parts and indications. Images were reviewed on a dedicated workstation using multiplanar reconstruction.\par \par The standardized uptake values (SUV) are normalized to patient body weight and indicate the highest activity concentration (SUVmax) in a given disease site. All image numbers refer to the axial image number.\par \par COMPARISON:  FDG PET/CT dated 9/7/2019.\par \par OTHER STUDIES USED FOR CORRELATION: MRI of cervical spine dated 9/9/2019.\par \par FINDINGS:\par \par HEAD/NECK: Physiologic FDG activity in the brain.\par \par Mild hypermetabolism in left masseter muscle, decreased as compared to prior study, may be secondary to bruxism. Elongated focus of increased FDG activity in left lower cervical region may reflect inflammation or muscle spasm (image 77).\par \par New hypermetabolic lucency in left maxillary alveolus is nonspecific (SUV 8.0; image 53). Recommend correlation with dental exam. A new small hypermetabolic focus is seen in the right maxilla (SUV 3.7; image 55).\par \par CHEST: Few new small hypermetabolic foci in bilateral hilar region likely correspond to small lymph nodes that are difficult to delineate on CT. For reference, left perihilar region demonstrates SUV 3.6 (image 117). Resolution of FDG-avid subcentimeter left posterior paraesophageal lymph node.\par \par LUNGS: No abnormal FDG activity. Calcified right lower lobe granuloma, unchanged (image 128).\par \par PLEURA/PERICARDIUM: No abnormal FDG activity. No effusion.\par \par HEPATOBILIARY/PANCREAS: Physiologic FDG activity. Liver SUV mean is 2.1; previous 2.3.\par \par SPLEEN: No abnormal FDG activity. Normal in size.\par \par ADRENAL GLANDS: No abnormal FDG activity. No nodule.\par \par KIDNEYS/URINARY BLADDER: Physiologic FDG activity. Multiple bilateral renal cysts, measuring up to 4.6 cm on the right, unchanged.\par \par PELVIC ORGANS: No abnormal FDG activity.\par \par ABDOMINOPELVIC NODES: No enlarged or FDG avid lymph node\par \par BOWEL/PERITONEUM/MESENTERY: Physiologic FDG activity. Resolution of hypermetabolism in distal esophagus/GE junction.\par \par BONES: Near total resolution of previously seen hypermetabolic foci in the axial skeleton, right humerus, and proximal bilateral femurs. Previously seen lytic lesions demonstrate new areas of sclerosis on CT. For reference, one of the most FDG-avid residual lesions is a mixed lytic and sclerotic lesion in the left scapula which demonstrates SUV 3.0 (image 98); previously lytic with SUV 5.7. Previously seen hypermetabolic lesion in right sphenoid bone has resolved. Previously seen large destructive lesion in the manubrium the sternum demonstrates new sclerosis with SUV 3.0 (image 103); previous SUV 5.3. Resolution of hypermetabolic intramedullary soft tissue in proximal right humerus. Near total resolution of hypermetabolic intramedullary soft tissue in proximal left femur (SUV 1.3; image 273; previous SUV 5.3).\par \par FDG-avid fracture in left inferior pubic ramus demonstrates increased callus formation and is similar in metabolism (SUV 3.8; image 250; previous SUV 3.1). Few new mildly FDG-avid bilateral rib fractures. For reference, fracture in the anterior aspect of the left second rib demonstrates SUV 3.4 (image 105).\par \par Resolution of hypermetabolism in left palmar musculature.\par \par IMPRESSION:  Abnormal whole body FDG-PET/CT scan.\par \par 1. Near total resolution of hypermetabolism associated with lytic and intramedullary lesions in the axial and appendicular skeleton as compared to prior study dated 9/7/2019. Previously seen lytic lesions demonstrate new sclerosis. Findings are compatible with response to interval therapy.\par \par 2. Few new mildly FDG-avid bilateral rib fractures. An FDG-avid fracture in the left inferior pubic ramus demonstrates increased callus formation and is similar in metabolism.\par \par 3. New hypermetabolic lucency in left maxillary alveolus and new small hypermetabolic focus in right maxilla are nonspecific. Recommend correlation with dental exam.\par \par 4. Few new mildly FDG-avid nonspecific bilateral hilar lymph nodes.\par \par 5. Resolution of many specific hypermetabolism in distal esophagus/GE junction.\par \par \par \par \par \par \par \par \par \par \par \par \par \par \par \par LUANA RAMOS M.D., NUCLEAR MEDICINE ATTENDING\par This document has been electronically signed. May  7 2020  4:08PM\par \par  \par \par  Ordered by: OZZY ALMEIDA       Collected/Examined: 07May2020 12:57PM       \par Verified by: OZZY ALMEIDA 11May2020 09:27AM       \par  Result Communication: Call patient with results,Discussed results with patient;\par Stage: Final       \par  Performed at: [facility]       Resulted: 07May2020 04:11PM       Last Updated: 11May2020 09:27AM       Accession: [id number]       \par \par \par  Pathology             Final\par \par No Documents Attached\par \par \par \par \par   [facility] Accession Number : [id number]\par \par HARDEEP, SUBASH                          2\par \par \par \par Cytopathology Report\par \par \par \par \par \par Final Diagnosis\par SOFT TISSUE, SACRUM, CT GUIDED CORE BIOPSY\par Plasma cell neoplasm\par See note and description.\par \par Diagnostic note:  Overall the morphologic and immunophenotypic\par findings are consistent with plasma cell neoplasm. The\par differential diagnosis includes plasmacytoma vs plasma cells\par myeloma. Correlation with laboratory, radiologic and clinical\par findings is required for further classification.\par \par Microscopic description:\par The touch prep slides are composed of numerous enlarged plasma\par cells with prominent nucleoli, occasional binucleated forms are\par seen.\par \par Histologic sections consist of multiple needle shaped cores of\par soft tissue showing proliferation of enlarged plasma cells with\par prominent nucleoli, forming sheets.\par \par Immunohistochemistry:  , kappaISH, lambdaISH, cyclinD1, p53\par stains performed on paraffin embedded section of block 1C.\par \par Neoplastic cells are:\par Positive:  , KappaISH, p53\par Negative:  LambdaISH, cyclinD1\par \par Flow cytometry analysis (12-GR-): Monotypic plasma cells\par (24.1% of total analyzed cells) are present. There are two\par aberrant monoclonal plasma cell populations:\par Population #1 (16.5% of total analyzed cells, 68.6% of plasma\par cell population) positive for cytoplasmic kappa, CD38 dimmer,\par CD45 dimmer, CD56, ; negative for , CD19, CD20.\par Population # 2 (7.6% of total analyzed cells, 31.4% of plasma\par cell population) positive for cytoplasmic kappa\par \par \par \par \par \par \par HARDEEP, SUBASH                          2\par \par \par \par Cytopathology Report\par \par \par \par \par (brighter), , CD38 brighter, CD45 dim, CD56 (brighter),\par ; negative for CD19, CD20.\par \par Screened by: Jose Juan Smith CT(ASCP)\par Verified by: Orlando Spicer MD\par (Electronic Signature)\par Reported on: 08/22/19 13:22 EDT, [facility], [place], [place]\par [address]\par Cytology technical processing performed at [address] [place] [id number]\par _________________________________________________________________\par \par \par Specimen(s) Submitted\par SOFT TISSUE, SACRUM, CT GUIDED CORE BIOPSY\par \par \par Statement of Adequacy\par Immediate cytologic study for adequacy of specimen using a Diff-\par Quik stain was performed at [facility], [address] [place] [address]. Touch\par imprints acceptable for further evaluation by Jose Juan Smith\par CT(ASCP).\par \par \par Clinical Information\par Sacral mass.\par \par \par Gross Description\par Core Biopsy:\par Tissue collected: Specimen container has been inspected to\par confirm patient?s name and date of birth, contains 3  tan cores\par measuring 1.0, 1.0, 0.8 cm in length (and multiple fragments).\par Entire specimen submitted in 2 cassette(s) labeled 1B and 1 C.\par \par 4 Touch prep slides ( 2 air dried + 2 fixed)\par \par FNA:\par No material submitted for cell block (No block 1A)\par \par Prepared:\par 4 Touch Prep,\par 1 core biopsy labeled 1B\par 1 core biopsy labeled 1C\par 6 Total slides\par \par  \par \par  Ordered by: DELORES MOSS       Collected/Examined: [id number] 03:14PM       \par Verified by: OZZY ALMEIDA [id number] 03:07PM       \par  Result Communication: No patient communication needed at this time;\par Stage: Final       \par  Performed at: [facility]       Resulted: [id number] 01:22PM       Last Updated: [id number] 03:07PM       Accession: [id number]       \par \par \par  Pathology             Final\par \par No Documents Attached\par \par \par \par \par   [facility] Accession Number : [id number]\par \par HARDEEP, SUBASH                          4\par \par \par \par Addendum Report\par \par \par \par \par Hematopathology Addendum\par Comprehensive Hematopathology Report\par \par Final Diagnosis:\par 1, 2. Bone marrow biopsy and bone marrow aspirate\par - Plasma cell myeloma (10% with focal 25% by  stain)\par - Slight erythroid predominant trilineage hematopoiesis\par with maturation\par \par Diagnostic Note:\par Please note findings of a normal male karyotype and a negative\par multiple myeloma FISH panel. Based on the additional findings,\par the diagnosis has not changed. Correlation with studies for\par myeloma-related organ dysfunction is necessary.\par \par Morphology:\par Microscopic description:\par 1. Biopsy: Sections of bone marrow biopsy show normocellularity\par (60 to 70% cellularity), mild plasmacytosis with foci of small\par clusters, slight erythroid predominant trilineage hematopoiesis\par with maturation, mild megakaryocytosis with normal morphology,\par and increased iron stores.\par 2. Aspirate: Cellular spicules are not present, precluding\par differential count. Review of the smear shows some maturing and\par predominant mature myeloid cells, a few erythroid elements, and\par rare megakaryocytes.\par \par Ancillary Studies:\par Bone marrow aspirate iron stain: No spicules are present to\par evaluate for iron stores and there are insufficient nRBC to\par evaluate for ring sideroblasts.\par Congo red stain is negative for amyloidosis.\par Flow cytometry:  Monotypic plasma cells (1% of cells), positive\par for cytoplasmic kappa, CD38, , dimmer CD45, partial CD56;\par negative CD19, CD20, .\par CD45/side scatter shows no significant blast population. There is\par no increase in CD34,  or CD14 positive cells.\par Lymphocytes (8% of cells) show a heterogeneous population of T-\par cells (with normal CD4 to CD8 ratio), and polytypic B-cells.\par Immunohistochemical stains ( performed on the block 1A and\par 1B, cyclin-D1 performed on block 1A):   stains show overall\par approximately 10% plasma cells, with foci of small clusters,\par focal up to 25%. Plasma cells are negative for cyclin-D1.\par \par Cytogenetics:\par Result: Normal male karyotype\par Karyotype: 46,XY[20]\par \par \par \par \par \par \par HARDEEP, SUBASH                          4\par \par \par \par Addendum Report\par \par \par \par \par FISH:\par Result: NORMAL FISH -\par - MULTIPLE MYELOMA PANEL\par Probe(s) and Location(s): FGFR3(4p16), CCND1(11q13), RB1(13q14),\par IGH(14q32), MAF(16q23), TP53(17p13.1)\par \par Clinical History/Data:\par New diagnosis multiple myeloma\par CBC on 08/24/2019: WBC: 8.09 K/uL, HGB: 9.5 g/dL, MCV: 90 fl,\par Plt: 227 K/uL\par Serum Immunofixation: IgG kappa\par \par Verified by: Nancy Farris M.D.\par (Electronic Signature)\par Reported on: 09/07/19 10:37 EDT, [address] [place], [place]\par [address]\par _________________________________________________________________\par \par \par Hematopathology Report\par \par \par \par \par Final Diagnosis\par 1, 2. Bone marrow biopsy and bone marrow aspirate\par - Plasma cell myeloma (10% with focal 25% by  stain)\par - Slight erythroid predominant trilineage hematopoiesis\par with maturation\par \par See note and description.\par \par Diagnostic note:\par Correlation with studies for myeloma-related organ dysfunction is\par necessary.\par Comprehensive report with results of pending ancillary studies to\par follow.\par \par Ancillary studies\par Bone marrow aspirate iron stain: No spicules are present to\par evaluate for iron stores and there are insufficient nRBC to\par evaluate for ring sideroblasts.\par \par Flow cytometry:  Monotypic plasma cells (1% of cells), positive\par for cytoplasmic kappa, CD38, , dimmer CD45, partial CD56;\par negative CD19, CD20, .\par CD45/side scatter shows no significant blast population. There is\par no increase in CD34,  or CD14 positive cells.\par \par \par \par \par \par \par HARDEEP, SUBASH                          4\par \par \par \par Hematopathology Report\par \par \par \par \par Lymphocytes (8% of cells) show a heterogeneous population of T-\par cells (with normal CD4 to CD8 ratio), and polytypic B-cells.\par \par Immunohistochemical stains ( performed on the block 1A and\par 1B, cyclin-D1 performed on block 1A):   stains show overall\par approximately 10% plasma cells, with foci of small clusters,\par focal up to 25%. Plasma cells are negative for cyclin-D1.\par \par Microscopic description:\par 1. Biopsy: Sections of bone marrow biopsy show normocellularity\par (60 to 70% cellularity), mild plasmacytosis with foci of small\par clusters, slight erythroid predominant trilineage hematopoiesis\par with maturation, mild megakaryocytosis with normal morphology,\par and increased iron stores.\par \par 2. Aspirate: Cellular spicules are not present, precluding\par differential count. Review of the smear shows some maturing and\par predominant mature myeloid cells, a few erythroid elements, and\par rare megakaryocytes.\par \par Comment:  Iron stain (examined to evaluate for iron stores; see\par microscopic description) and Giemsa stain (shows appropriate\par staining pattern) are performed and evaluated on block(s): 1A,\par 1B.\par \par Slide(s) with built in immunohistochemical study control(s)\par associated and negative control with this case has/have been\par verified by the sign out pathologist.\par For slide(s) without built in controls positive control slides\par has/have been reviewed and approved by immunohistochemistry lab\par These immunohistochemical/ in-situ hybridization tests have been\par developed and their performance characteristics determined by\par [facility], Department of\par Pathology, Division of Immunopathology, [address] [place] [address]\[place] [address].  It has not been cleared or approved by the\par U.S. Food and Drug Administration.  The FDA has determined that\par such clearance or approval is not necessary.  This test is used\par for clinical purposes.  The laboratory is certified under the\par CLIA-88 as qualified to perform high\par complexity clinical testing.\par \par Verified by: Nancy Farris M.D.\par (Electronic Signature)\par Reported on: 08/27/19 10:00 EDT, [address] [place] [address]\par [id number]\par _________________________________________________________________\par \par \par \par \par \par \par \par HARDEEP, SUBASH                          4\par \par \par \par Hematopathology Report\par \par \par \par \par Clinical History\par New diagnosis multiple myeloma\par CBC on 08/24/2019: WBC: 8.09 K/uL, HGB: 9.5 g/dL, MCV: 90 fl,\par Plt: 227 K/uL\par Serum Immunofixation: IgG kappa\par \par Specimen(s) Submitted\par 1     Bone marrow biopsy left\par 2     Bone marrow aspirate\par \par Gross Description\par 1. The specimen is received in bouin's fixative and the specimen\par container is labeled: Left bone marrow biopsy.  It consists of a\par 0.5 x 0.2 cm bone marrow core with a 1.5 x 1.3 x 0.5 cm blood\par clot.  Entirely submitted.  Two cassettes: A = bone marrow core;\par B = blood clot.\par 2. The specimen is received labeled with patient's name and\par consists of 3 air dried slide(s). 2 slide(s) stained with Monzon\par Giemsa stain. 1 stained for iron stain.\par In addition to other data that may appear on the specimen\par container, the label has been inspected to confirm the presence\par of the patient's name and date of birth.\par Catarina ANAYA Brian 08/22/2019 17:12\par \par  \par \par  Ordered by: BECKY BILSL       Collected/Examined: [id number] 03:50PM       \par Verified by: BECKY BILLS [id number] 05:42PM       \par  Result Communication: No patient communication needed at this time;\par Stage: Final       \par  Performed at: [facility]       Resulted: [id number] 10:37AM       Last Updated: [id number] 05:42PM       Accession: [id number]

## 2021-06-10 NOTE — ASSESSMENT
[FreeTextEntry1] : 71 year-old Mr. MEIER is seen  in follow up for IgG Kappa Multiple Myeloma presented with lytic percy lesions. He received XRT to percy lesions,was started on RVD induction,  achieved VGPR.  Last PET 5/2020 - markedly improved and reduced FDG avidity throughout. His most recent SPEP/MICAH (1/8/2021) was normal.  He is now on maintenance Velcade and Zometa.\par \par Of note, last Myeloma labs on 03/2021 showed no M-spike, no M-band, normal range FLCR. Anemia of multifactorial origin including CKD noted. WIll do an anemia w/u for further management. May benefit from GABRIEL. 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 Discussed skipping a dose vs switching to a oral formulation (Nilaro 3 mg) on days 1, 8 and 15 of 28 days cycle. Since he is in CR, skipping a dose (chemo holiday) would not be unreasonable.   \par \par

## 2021-06-10 NOTE — HISTORY OF PRESENT ILLNESS
[Disease:__________________________] : Disease: [unfilled] [Treatment Protocol] : Treatment Protocol [de-identified] : As per Dr Kilgore's note on 8/18/2020\par \par "Mr Rand was referred to my office for newly diagnosed IgG kappa multiple myeloma. The patient's primary language is Romansh, he has his daughter Mirlande as the , per his wishes. He was seen by Dr. Andrés Valle (hematology) in Feb 2019 and had a BM bx showing 10-15% plasma cells, concerning for smoldering myeloma. According to the daughter, he was awaiting insurance approval for imaging studies. He was admitted from 8/18/19 at [facility] where he presented with pain in the L leg/lower back, worsening for the past 2-3 months. On labs, he was found to have a total protein of 10, Ca level 12 until 8/29/19. Dental consult was called and patient needs to have 10 teeth extracted -thus, IV bisphosphonates were not given, pt improved with hydration. He also had a slightly inc'ed creatinine -improved after IVF. CT C/A/P 8/18/19 showed a lytic expansile soft tissue lesion centered in the manubrium measuring approximately 7.3 x 3.4 x 4.3 cm, invading both 1st ribs. There was also a A lytic expansile soft tissue lesion in the T8 vertebral body causing mass effect on spinal canal and obliterating left neural foramina at T7-8 and T8-9. ON 8/22/19 an MRI thoracic spine was done showing multiple areas of tumor involvement within the thoracic spine in keeping with the patient's history of multiple myeloma. Prominent lesion within C7 on the right incompletely seen.\par Large lesion within T8 on the left producing epidural tumor extension and moderate narrowing of the spinal canal with mild flattening of the spinal cord. He was evaluated on 8/22/19 by Dr. Foley (Rad Onc) and was given 5 fractions of XRT from 8/23/19 to 8/29/19 to C7 and T8. He was discharged home on 8/29/19 with follow up in the outpatient office, and on Dexamethasone 4mg po daily. \par \par  \par \par \par \par Interval History: The patient is being followed for IgG kappa MM with multiple bone lytic lesions, hypercalcemia (resolved) and mild anemia. He started Velcade/Dexa weekly on 9/919. The patient got the Revlimid and started it C1 day 1 on 10/21/19 and hed been tolerating it well. \par \par Starting in February, his blood counts became low -plt count 54, Hb 8.3 wbc 5.8. Revlimid was held -after 2/3/20. He got 1 shot Velcade/Dexa on 2/3/20, NO chemo since then. \par BM done on 3/4/20 to eval for residual disease (MM) vs. MDS. BM showed recovering marrow elements, plasma cells <5%. SPEP/MICAH from 3/9/20 showed NO monoclonal proteins, c/w CR. \par \par The PET scan from 5/7/20 showed L uptake maxillary -pt reports that he had dentures done in Jan 2020 and has discomfort from them. He did not go to the dentist during COVID Rib fractures -pt denied falls/trauma. Patient has dental appointment for next week. He has some discomfort in the upper teeth b/l. He also missed his Velcade SQ last week 'i did not know if i am supposed to continue it.' He has no neuropathy from it, tolerating well. "\par  [de-identified] : RISS- II [FreeTextEntry1] : s/p RT to C7/T8 spine\par RVD 9/2019 -6/2020 transitioned to maintenance velcade q2wk started 6/19/2020 [de-identified] : Oct 6, 2020\par Pt is here for initial visit with me. He has his dtr on the phone, prefers her included in conversation and declined an . Pt is feeling well. He is tolerating monthly zometa and maintenance velcade w/o any adverse effects. Last set of myeloma labs done in August showed stable remission. He is requesting refill of several meds today.\par ---------------------------------\par \par 1/25/2021\par Patient presents for a follow up. He has no complaints except infrequent short duration chills with no fever. He is on biweekly Velcade and monthly Zometa. His last treatment of Zometa was on 1/8/2021 and last Velcade on 1/22/2021. His most recent SPEP/MICAH was normal.   \par \par 6/10/2021\par Patient seen in follow up. He did not go to his home country as he planned. He does not endorse any big change in his health status. She saw his Kidney doctor who told him he is iron deficient (labs are not supportive). His anemia is multifactorial including CKD. His most recent myeloma labs (03/2021) show continued CR.

## 2021-06-11 ENCOUNTER — APPOINTMENT (OUTPATIENT)
Dept: INFUSION THERAPY | Facility: HOSPITAL | Age: 71
End: 2021-06-11

## 2021-06-11 LAB
ALBUMIN SERPL ELPH-MCNC: 5 G/DL
ALP BLD-CCNC: 54 U/L
ALT SERPL-CCNC: 13 U/L
ANION GAP SERPL CALC-SCNC: 15 MMOL/L
AST SERPL-CCNC: 22 U/L
BILIRUB SERPL-MCNC: 0.2 MG/DL
BUN SERPL-MCNC: 24 MG/DL
CALCIUM SERPL-MCNC: 9.6 MG/DL
CHLORIDE SERPL-SCNC: 104 MMOL/L
CO2 SERPL-SCNC: 20 MMOL/L
CREAT SERPL-MCNC: 1.75 MG/DL
DEPRECATED KAPPA LC FREE/LAMBDA SER: 1.59 RATIO
FERRITIN SERPL-MCNC: 243 NG/ML
FOLATE SERPL-MCNC: 14.8 NG/ML
GLUCOSE SERPL-MCNC: 124 MG/DL
IRON SATN MFR SERPL: 14 %
IRON SERPL-MCNC: 44 UG/DL
KAPPA LC CSF-MCNC: 2.27 MG/DL
KAPPA LC SERPL-MCNC: 3.61 MG/DL
POTASSIUM SERPL-SCNC: 4.3 MMOL/L
PROT SERPL-MCNC: 7.5 G/DL
SODIUM SERPL-SCNC: 139 MMOL/L
TIBC SERPL-MCNC: 316 UG/DL
UIBC SERPL-MCNC: 272 UG/DL
VIT B12 SERPL-MCNC: 644 PG/ML

## 2021-06-21 LAB
ALBUMIN MFR SERPL ELPH: 57.5 %
ALBUMIN SERPL-MCNC: 4.3 G/DL
ALBUMIN/GLOB SERPL: 1.3 RATIO
ALPHA1 GLOB MFR SERPL ELPH: 5 %
ALPHA1 GLOB SERPL ELPH-MCNC: 0.4 G/DL
ALPHA2 GLOB MFR SERPL ELPH: 13.5 %
ALPHA2 GLOB SERPL ELPH-MCNC: 1 G/DL
B-GLOBULIN MFR SERPL ELPH: 11.8 %
B-GLOBULIN SERPL ELPH-MCNC: 0.9 G/DL
DEPRECATED KAPPA LC FREE/LAMBDA SER: 1.59 RATIO
EPO SERPL-MCNC: 18 MIU/ML
GAMMA GLOB FLD ELPH-MCNC: 0.9 G/DL
GAMMA GLOB MFR SERPL ELPH: 12.2 %
IGA SER QL IEP: 180 MG/DL
IGG SER QL IEP: 1037 MG/DL
IGM SER QL IEP: 24 MG/DL
INTERPRETATION SERPL IEP-IMP: NORMAL
KAPPA LC CSF-MCNC: 2.27 MG/DL
KAPPA LC SERPL-MCNC: 3.61 MG/DL
M PROTEIN SPEC IFE-MCNC: NORMAL
PROT SERPL-MCNC: 7.5 G/DL
PROT SERPL-MCNC: 7.5 G/DL

## 2021-06-25 ENCOUNTER — RESULT REVIEW (OUTPATIENT)
Age: 71
End: 2021-06-25

## 2021-06-25 ENCOUNTER — APPOINTMENT (OUTPATIENT)
Dept: INFUSION THERAPY | Facility: HOSPITAL | Age: 71
End: 2021-06-25

## 2021-06-25 LAB
BASOPHILS # BLD AUTO: 0.06 K/UL — SIGNIFICANT CHANGE UP (ref 0–0.2)
BASOPHILS NFR BLD AUTO: 0.8 % — SIGNIFICANT CHANGE UP (ref 0–2)
EOSINOPHIL # BLD AUTO: 0.19 K/UL — SIGNIFICANT CHANGE UP (ref 0–0.5)
EOSINOPHIL NFR BLD AUTO: 2.7 % — SIGNIFICANT CHANGE UP (ref 0–6)
HCT VFR BLD CALC: 27.2 % — LOW (ref 39–50)
HGB BLD-MCNC: 9.2 G/DL — LOW (ref 13–17)
IMM GRANULOCYTES NFR BLD AUTO: 1.7 % — HIGH (ref 0–1.5)
LYMPHOCYTES # BLD AUTO: 1.52 K/UL — SIGNIFICANT CHANGE UP (ref 1–3.3)
LYMPHOCYTES # BLD AUTO: 21.3 % — SIGNIFICANT CHANGE UP (ref 13–44)
MCHC RBC-ENTMCNC: 32.2 PG — SIGNIFICANT CHANGE UP (ref 27–34)
MCHC RBC-ENTMCNC: 33.8 G/DL — SIGNIFICANT CHANGE UP (ref 32–36)
MCV RBC AUTO: 95.1 FL — SIGNIFICANT CHANGE UP (ref 80–100)
MONOCYTES # BLD AUTO: 0.65 K/UL — SIGNIFICANT CHANGE UP (ref 0–0.9)
MONOCYTES NFR BLD AUTO: 9.1 % — SIGNIFICANT CHANGE UP (ref 2–14)
NEUTROPHILS # BLD AUTO: 4.59 K/UL — SIGNIFICANT CHANGE UP (ref 1.8–7.4)
NEUTROPHILS NFR BLD AUTO: 64.4 % — SIGNIFICANT CHANGE UP (ref 43–77)
NRBC # BLD: 0 /100 WBCS — SIGNIFICANT CHANGE UP (ref 0–0)
PLATELET # BLD AUTO: 145 K/UL — LOW (ref 150–400)
RBC # BLD: 2.86 M/UL — LOW (ref 4.2–5.8)
RBC # FLD: 12.8 % — SIGNIFICANT CHANGE UP (ref 10.3–14.5)
WBC # BLD: 7.13 K/UL — SIGNIFICANT CHANGE UP (ref 3.8–10.5)
WBC # FLD AUTO: 7.13 K/UL — SIGNIFICANT CHANGE UP (ref 3.8–10.5)

## 2021-07-07 ENCOUNTER — OUTPATIENT (OUTPATIENT)
Dept: OUTPATIENT SERVICES | Facility: HOSPITAL | Age: 71
LOS: 1 days | Discharge: HOME | End: 2021-07-07

## 2021-07-07 DIAGNOSIS — C90.00 MULTIPLE MYELOMA NOT HAVING ACHIEVED REMISSION: ICD-10-CM

## 2021-07-09 ENCOUNTER — RESULT REVIEW (OUTPATIENT)
Age: 71
End: 2021-07-09

## 2021-07-09 ENCOUNTER — APPOINTMENT (OUTPATIENT)
Dept: INFUSION THERAPY | Facility: HOSPITAL | Age: 71
End: 2021-07-09

## 2021-07-09 DIAGNOSIS — Z51.11 ENCOUNTER FOR ANTINEOPLASTIC CHEMOTHERAPY: ICD-10-CM

## 2021-07-09 LAB
BASOPHILS # BLD AUTO: 0.05 K/UL — SIGNIFICANT CHANGE UP (ref 0–0.2)
BASOPHILS NFR BLD AUTO: 0.7 % — SIGNIFICANT CHANGE UP (ref 0–2)
EOSINOPHIL # BLD AUTO: 0.16 K/UL — SIGNIFICANT CHANGE UP (ref 0–0.5)
EOSINOPHIL NFR BLD AUTO: 2.3 % — SIGNIFICANT CHANGE UP (ref 0–6)
HCT VFR BLD CALC: 27 % — LOW (ref 39–50)
HGB BLD-MCNC: 9.3 G/DL — LOW (ref 13–17)
IMM GRANULOCYTES NFR BLD AUTO: 1.3 % — SIGNIFICANT CHANGE UP (ref 0–1.5)
LYMPHOCYTES # BLD AUTO: 1.4 K/UL — SIGNIFICANT CHANGE UP (ref 1–3.3)
LYMPHOCYTES # BLD AUTO: 19.8 % — SIGNIFICANT CHANGE UP (ref 13–44)
MCHC RBC-ENTMCNC: 33.1 PG — SIGNIFICANT CHANGE UP (ref 27–34)
MCHC RBC-ENTMCNC: 34.4 G/DL — SIGNIFICANT CHANGE UP (ref 32–36)
MCV RBC AUTO: 96.1 FL — SIGNIFICANT CHANGE UP (ref 80–100)
MONOCYTES # BLD AUTO: 0.63 K/UL — SIGNIFICANT CHANGE UP (ref 0–0.9)
MONOCYTES NFR BLD AUTO: 8.9 % — SIGNIFICANT CHANGE UP (ref 2–14)
NEUTROPHILS # BLD AUTO: 4.74 K/UL — SIGNIFICANT CHANGE UP (ref 1.8–7.4)
NEUTROPHILS NFR BLD AUTO: 67 % — SIGNIFICANT CHANGE UP (ref 43–77)
NRBC # BLD: 0 /100 WBCS — SIGNIFICANT CHANGE UP (ref 0–0)
PLATELET # BLD AUTO: 178 K/UL — SIGNIFICANT CHANGE UP (ref 150–400)
RBC # BLD: 2.81 M/UL — LOW (ref 4.2–5.8)
RBC # FLD: 12.4 % — SIGNIFICANT CHANGE UP (ref 10.3–14.5)
WBC # BLD: 7.07 K/UL — SIGNIFICANT CHANGE UP (ref 3.8–10.5)
WBC # FLD AUTO: 7.07 K/UL — SIGNIFICANT CHANGE UP (ref 3.8–10.5)

## 2021-07-23 ENCOUNTER — RESULT REVIEW (OUTPATIENT)
Age: 71
End: 2021-07-23

## 2021-07-23 ENCOUNTER — APPOINTMENT (OUTPATIENT)
Dept: INFUSION THERAPY | Facility: HOSPITAL | Age: 71
End: 2021-07-23

## 2021-07-23 LAB
BASOPHILS # BLD AUTO: 0.07 K/UL — SIGNIFICANT CHANGE UP (ref 0–0.2)
BASOPHILS NFR BLD AUTO: 0.9 % — SIGNIFICANT CHANGE UP (ref 0–2)
EOSINOPHIL # BLD AUTO: 0.21 K/UL — SIGNIFICANT CHANGE UP (ref 0–0.5)
EOSINOPHIL NFR BLD AUTO: 2.8 % — SIGNIFICANT CHANGE UP (ref 0–6)
HCT VFR BLD CALC: 28.5 % — LOW (ref 39–50)
HGB BLD-MCNC: 9.5 G/DL — LOW (ref 13–17)
IMM GRANULOCYTES NFR BLD AUTO: 0.5 % — SIGNIFICANT CHANGE UP (ref 0–1.5)
LYMPHOCYTES # BLD AUTO: 1.64 K/UL — SIGNIFICANT CHANGE UP (ref 1–3.3)
LYMPHOCYTES # BLD AUTO: 21.6 % — SIGNIFICANT CHANGE UP (ref 13–44)
MCHC RBC-ENTMCNC: 33 PG — SIGNIFICANT CHANGE UP (ref 27–34)
MCHC RBC-ENTMCNC: 33.3 G/DL — SIGNIFICANT CHANGE UP (ref 32–36)
MCV RBC AUTO: 99 FL — SIGNIFICANT CHANGE UP (ref 80–100)
MONOCYTES # BLD AUTO: 0.74 K/UL — SIGNIFICANT CHANGE UP (ref 0–0.9)
MONOCYTES NFR BLD AUTO: 9.8 % — SIGNIFICANT CHANGE UP (ref 2–14)
NEUTROPHILS # BLD AUTO: 4.88 K/UL — SIGNIFICANT CHANGE UP (ref 1.8–7.4)
NEUTROPHILS NFR BLD AUTO: 64.4 % — SIGNIFICANT CHANGE UP (ref 43–77)
NRBC # BLD: 0 /100 WBCS — SIGNIFICANT CHANGE UP (ref 0–0)
PLATELET # BLD AUTO: 145 K/UL — LOW (ref 150–400)
RBC # BLD: 2.88 M/UL — LOW (ref 4.2–5.8)
RBC # FLD: 12.9 % — SIGNIFICANT CHANGE UP (ref 10.3–14.5)
WBC # BLD: 7.58 K/UL — SIGNIFICANT CHANGE UP (ref 3.8–10.5)
WBC # FLD AUTO: 7.58 K/UL — SIGNIFICANT CHANGE UP (ref 3.8–10.5)

## 2021-08-05 ENCOUNTER — APPOINTMENT (OUTPATIENT)
Dept: CARDIOLOGY | Facility: CLINIC | Age: 71
End: 2021-08-05
Payer: MEDICARE

## 2021-08-05 ENCOUNTER — NON-APPOINTMENT (OUTPATIENT)
Age: 71
End: 2021-08-05

## 2021-08-05 VITALS
WEIGHT: 122 LBS | BODY MASS INDEX: 21.09 KG/M2 | DIASTOLIC BLOOD PRESSURE: 60 MMHG | OXYGEN SATURATION: 100 % | HEART RATE: 51 BPM | SYSTOLIC BLOOD PRESSURE: 150 MMHG | TEMPERATURE: 97.6 F | HEIGHT: 63.62 IN

## 2021-08-05 PROCEDURE — 99214 OFFICE O/P EST MOD 30 MIN: CPT

## 2021-08-05 PROCEDURE — 93000 ELECTROCARDIOGRAM COMPLETE: CPT

## 2021-08-05 NOTE — DISCUSSION/SUMMARY
[FreeTextEntry1] : In a summary Yoly López is an elderly male with CAD s/p stent, stable. Continue Aspirin. Hypertension, continue current medications and cut down on salt intake. Hypercholesterolemia, on statins and low cholesterol diet. LDL goal less than 70 g/dL. Received COVID 19 vaccine. Follow up in 6 months.

## 2021-08-05 NOTE — HISTORY OF PRESENT ILLNESS
[FreeTextEntry1] : Yoly López is a 71 year old male with history of CAD s/p stent, hypertension and hypercholesterolemia comes for follow up visit. Denies any chest pain or palpitations. No shortness of breath on exertion. Compliant to medications. Physically active. Follows up with PCP. On and off fatigue when gets chemotherapy.

## 2021-08-05 NOTE — PHYSICAL EXAM
[Normal Conjunctiva] : normal conjunctiva [No Carotid Bruit] : no carotid bruit [Normal S1, S2] : normal S1, S2 [Soft] : abdomen soft [Non Tender] : non-tender [Normal Bowel Sounds] : normal bowel sounds [No Edema] : no edema [No Cyanosis] : no cyanosis [Normal] : alert and oriented, normal memory

## 2021-08-05 NOTE — REVIEW OF SYSTEMS
[Joint Pain] : joint pain [Negative] : Respiratory [Fever] : no fever [Headache] : no headache [Chills] : no chills [Blurry Vision] : no blurred vision [Dyspnea on exertion] : not dyspnea during exertion [Chest Discomfort] : no chest discomfort [Lower Ext Edema] : no extremity edema [Palpitations] : no palpitations [Orthopnea] : no orthopnea [PND] : no PND [Abdominal Pain] : no abdominal pain [Nausea] : no nausea [Vomiting] : no vomiting [Heartburn] : no heartburn [Rash] : no rash [Itching] : no itching [Dizziness] : no dizziness [Tremor] : no tremor was seen [Numbness (Hypoesthesia)] : no numbness [Tingling (Paresthesia)] : no tingling [Confusion] : no confusion was observed [Under Stress] : not under stress [Easy Bleeding] : no tendency for easy bleeding [Easy Bruising] : no tendency for easy bruising [FreeTextEntry2] : on and off fatigue.

## 2021-08-06 ENCOUNTER — RESULT REVIEW (OUTPATIENT)
Age: 71
End: 2021-08-06

## 2021-08-06 ENCOUNTER — APPOINTMENT (OUTPATIENT)
Dept: INFUSION THERAPY | Facility: HOSPITAL | Age: 71
End: 2021-08-06

## 2021-08-06 LAB
BASOPHILS # BLD AUTO: 0.05 K/UL — SIGNIFICANT CHANGE UP (ref 0–0.2)
BASOPHILS NFR BLD AUTO: 0.7 % — SIGNIFICANT CHANGE UP (ref 0–2)
EOSINOPHIL # BLD AUTO: 0.17 K/UL — SIGNIFICANT CHANGE UP (ref 0–0.5)
EOSINOPHIL NFR BLD AUTO: 2.4 % — SIGNIFICANT CHANGE UP (ref 0–6)
HCT VFR BLD CALC: 26.8 % — LOW (ref 39–50)
HGB BLD-MCNC: 9.2 G/DL — LOW (ref 13–17)
IMM GRANULOCYTES NFR BLD AUTO: 1.1 % — SIGNIFICANT CHANGE UP (ref 0–1.5)
LYMPHOCYTES # BLD AUTO: 1.55 K/UL — SIGNIFICANT CHANGE UP (ref 1–3.3)
LYMPHOCYTES # BLD AUTO: 21.5 % — SIGNIFICANT CHANGE UP (ref 13–44)
MCHC RBC-ENTMCNC: 32.6 PG — SIGNIFICANT CHANGE UP (ref 27–34)
MCHC RBC-ENTMCNC: 34.3 G/DL — SIGNIFICANT CHANGE UP (ref 32–36)
MCV RBC AUTO: 95 FL — SIGNIFICANT CHANGE UP (ref 80–100)
MONOCYTES # BLD AUTO: 0.65 K/UL — SIGNIFICANT CHANGE UP (ref 0–0.9)
MONOCYTES NFR BLD AUTO: 9 % — SIGNIFICANT CHANGE UP (ref 2–14)
NEUTROPHILS # BLD AUTO: 4.72 K/UL — SIGNIFICANT CHANGE UP (ref 1.8–7.4)
NEUTROPHILS NFR BLD AUTO: 65.3 % — SIGNIFICANT CHANGE UP (ref 43–77)
NRBC # BLD: 0 /100 WBCS — SIGNIFICANT CHANGE UP (ref 0–0)
PLATELET # BLD AUTO: 164 K/UL — SIGNIFICANT CHANGE UP (ref 150–400)
RBC # BLD: 2.82 M/UL — LOW (ref 4.2–5.8)
RBC # FLD: 12.8 % — SIGNIFICANT CHANGE UP (ref 10.3–14.5)
WBC # BLD: 7.22 K/UL — SIGNIFICANT CHANGE UP (ref 3.8–10.5)
WBC # FLD AUTO: 7.22 K/UL — SIGNIFICANT CHANGE UP (ref 3.8–10.5)

## 2021-08-18 ENCOUNTER — OUTPATIENT (OUTPATIENT)
Dept: OUTPATIENT SERVICES | Facility: HOSPITAL | Age: 71
LOS: 1 days | Discharge: HOME | End: 2021-08-18

## 2021-08-18 DIAGNOSIS — C90.00 MULTIPLE MYELOMA NOT HAVING ACHIEVED REMISSION: ICD-10-CM

## 2021-08-20 ENCOUNTER — APPOINTMENT (OUTPATIENT)
Dept: INFUSION THERAPY | Facility: HOSPITAL | Age: 71
End: 2021-08-20

## 2021-08-20 ENCOUNTER — RESULT REVIEW (OUTPATIENT)
Age: 71
End: 2021-08-20

## 2021-08-20 DIAGNOSIS — Z51.11 ENCOUNTER FOR ANTINEOPLASTIC CHEMOTHERAPY: ICD-10-CM

## 2021-08-20 DIAGNOSIS — R11.2 NAUSEA WITH VOMITING, UNSPECIFIED: ICD-10-CM

## 2021-08-20 LAB
BASOPHILS # BLD AUTO: 0.08 K/UL — SIGNIFICANT CHANGE UP (ref 0–0.2)
BASOPHILS NFR BLD AUTO: 1.1 % — SIGNIFICANT CHANGE UP (ref 0–2)
EOSINOPHIL # BLD AUTO: 0.17 K/UL — SIGNIFICANT CHANGE UP (ref 0–0.5)
EOSINOPHIL NFR BLD AUTO: 2.4 % — SIGNIFICANT CHANGE UP (ref 0–6)
HCT VFR BLD CALC: 27.1 % — LOW (ref 39–50)
HGB BLD-MCNC: 9.3 G/DL — LOW (ref 13–17)
IMM GRANULOCYTES NFR BLD AUTO: 1.8 % — HIGH (ref 0–1.5)
LYMPHOCYTES # BLD AUTO: 1.37 K/UL — SIGNIFICANT CHANGE UP (ref 1–3.3)
LYMPHOCYTES # BLD AUTO: 19.4 % — SIGNIFICANT CHANGE UP (ref 13–44)
MCHC RBC-ENTMCNC: 32.9 PG — SIGNIFICANT CHANGE UP (ref 27–34)
MCHC RBC-ENTMCNC: 34.3 G/DL — SIGNIFICANT CHANGE UP (ref 32–36)
MCV RBC AUTO: 95.8 FL — SIGNIFICANT CHANGE UP (ref 80–100)
MONOCYTES # BLD AUTO: 0.62 K/UL — SIGNIFICANT CHANGE UP (ref 0–0.9)
MONOCYTES NFR BLD AUTO: 8.8 % — SIGNIFICANT CHANGE UP (ref 2–14)
NEUTROPHILS # BLD AUTO: 4.7 K/UL — SIGNIFICANT CHANGE UP (ref 1.8–7.4)
NEUTROPHILS NFR BLD AUTO: 66.5 % — SIGNIFICANT CHANGE UP (ref 43–77)
NRBC # BLD: 0 /100 WBCS — SIGNIFICANT CHANGE UP (ref 0–0)
PLATELET # BLD AUTO: 145 K/UL — LOW (ref 150–400)
RBC # BLD: 2.83 M/UL — LOW (ref 4.2–5.8)
RBC # FLD: 13 % — SIGNIFICANT CHANGE UP (ref 10.3–14.5)
WBC # BLD: 7.07 K/UL — SIGNIFICANT CHANGE UP (ref 3.8–10.5)
WBC # FLD AUTO: 7.07 K/UL — SIGNIFICANT CHANGE UP (ref 3.8–10.5)

## 2021-09-01 ENCOUNTER — RESULT REVIEW (OUTPATIENT)
Age: 71
End: 2021-09-01

## 2021-09-01 ENCOUNTER — APPOINTMENT (OUTPATIENT)
Dept: HEMATOLOGY ONCOLOGY | Facility: CLINIC | Age: 71
End: 2021-09-01
Payer: MEDICARE

## 2021-09-01 VITALS
HEART RATE: 70 BPM | HEIGHT: 64.29 IN | OXYGEN SATURATION: 99 % | SYSTOLIC BLOOD PRESSURE: 137 MMHG | DIASTOLIC BLOOD PRESSURE: 71 MMHG | RESPIRATION RATE: 18 BRPM | BODY MASS INDEX: 21.42 KG/M2 | TEMPERATURE: 98 F | WEIGHT: 125.44 LBS

## 2021-09-01 LAB
BASOPHILS # BLD AUTO: 0.06 K/UL — SIGNIFICANT CHANGE UP (ref 0–0.2)
BASOPHILS NFR BLD AUTO: 0.9 % — SIGNIFICANT CHANGE UP (ref 0–2)
EOSINOPHIL # BLD AUTO: 0.13 K/UL — SIGNIFICANT CHANGE UP (ref 0–0.5)
EOSINOPHIL NFR BLD AUTO: 1.9 % — SIGNIFICANT CHANGE UP (ref 0–6)
HCT VFR BLD CALC: 27.7 % — LOW (ref 39–50)
HGB BLD-MCNC: 9.1 G/DL — LOW (ref 13–17)
IMM GRANULOCYTES NFR BLD AUTO: 0.6 % — SIGNIFICANT CHANGE UP (ref 0–1.5)
LYMPHOCYTES # BLD AUTO: 1.11 K/UL — SIGNIFICANT CHANGE UP (ref 1–3.3)
LYMPHOCYTES # BLD AUTO: 16.4 % — SIGNIFICANT CHANGE UP (ref 13–44)
MCHC RBC-ENTMCNC: 32.6 PG — SIGNIFICANT CHANGE UP (ref 27–34)
MCHC RBC-ENTMCNC: 32.9 G/DL — SIGNIFICANT CHANGE UP (ref 32–36)
MCV RBC AUTO: 99.3 FL — SIGNIFICANT CHANGE UP (ref 80–100)
MONOCYTES # BLD AUTO: 0.63 K/UL — SIGNIFICANT CHANGE UP (ref 0–0.9)
MONOCYTES NFR BLD AUTO: 9.3 % — SIGNIFICANT CHANGE UP (ref 2–14)
NEUTROPHILS # BLD AUTO: 4.78 K/UL — SIGNIFICANT CHANGE UP (ref 1.8–7.4)
NEUTROPHILS NFR BLD AUTO: 70.9 % — SIGNIFICANT CHANGE UP (ref 43–77)
NRBC # BLD: 0 /100 WBCS — SIGNIFICANT CHANGE UP (ref 0–0)
PLATELET # BLD AUTO: 152 K/UL — SIGNIFICANT CHANGE UP (ref 150–400)
RBC # BLD: 2.79 M/UL — LOW (ref 4.2–5.8)
RBC # FLD: 13.2 % — SIGNIFICANT CHANGE UP (ref 10.3–14.5)
WBC # BLD: 6.75 K/UL — SIGNIFICANT CHANGE UP (ref 3.8–10.5)
WBC # FLD AUTO: 6.75 K/UL — SIGNIFICANT CHANGE UP (ref 3.8–10.5)

## 2021-09-01 PROCEDURE — 99213 OFFICE O/P EST LOW 20 MIN: CPT

## 2021-09-01 NOTE — ASSESSMENT
[FreeTextEntry1] : 71 year-old Mr. MEIER is seen  in follow up for IgG Kappa Multiple Myeloma presented with lytic percy lesions. He received XRT to percy lesions,was started on RVD induction,  achieved VGPR.  Last PET 5/2020 - markedly improved and reduced FDG avidity throughout. His most recent SPEP/MICAH and FLCR (6/10/2021) was normal.  He is now on maintenance Velcade and Zometa.\par \par Of note, last Myeloma labs on 06/2021 showed no M-spike, no M-band, normal range FLCR. Anemia of multifactorial origin including CKD noted. WIll do an anemia w/u for further management. May benefit from GABRIEL. 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 This time he is determined to make it happen. Discussed skipping a dose vs switching to a oral formulation (Nilaro 3 mg) on days 1, 8 and 15 of 28 days cycle. Since he is in CR, skipping a dose (chemo holiday) would not be unreasonable.   \par \par

## 2021-09-01 NOTE — HISTORY OF PRESENT ILLNESS
[Disease:__________________________] : Disease: [unfilled] [Treatment Protocol] : Treatment Protocol [de-identified] : As per Dr Kilgore's note on 8/18/2020\par \par "Mr Rand was referred to my office for newly diagnosed IgG kappa multiple myeloma. The patient's primary language is Telugu, he has his daughter Mirlande as the , per his wishes. He was seen by Dr. Andrés Valle (hematology) in Feb 2019 and had a BM bx showing 10-15% plasma cells, concerning for smoldering myeloma. According to the daughter, he was awaiting insurance approval for imaging studies. He was admitted from 8/18/19 at [facility] where he presented with pain in the L leg/lower back, worsening for the past 2-3 months. On labs, he was found to have a total protein of 10, Ca level 12 until 8/29/19. Dental consult was called and patient needs to have 10 teeth extracted -thus, IV bisphosphonates were not given, pt improved with hydration. He also had a slightly inc'ed creatinine -improved after IVF. CT C/A/P 8/18/19 showed a lytic expansile soft tissue lesion centered in the manubrium measuring approximately 7.3 x 3.4 x 4.3 cm, invading both 1st ribs. There was also a A lytic expansile soft tissue lesion in the T8 vertebral body causing mass effect on spinal canal and obliterating left neural foramina at T7-8 and T8-9. ON 8/22/19 an MRI thoracic spine was done showing multiple areas of tumor involvement within the thoracic spine in keeping with the patient's history of multiple myeloma. Prominent lesion within C7 on the right incompletely seen.\par Large lesion within T8 on the left producing epidural tumor extension and moderate narrowing of the spinal canal with mild flattening of the spinal cord. He was evaluated on 8/22/19 by Dr. Foley (Rad Onc) and was given 5 fractions of XRT from 8/23/19 to 8/29/19 to C7 and T8. He was discharged home on 8/29/19 with follow up in the outpatient office, and on Dexamethasone 4mg po daily. \par \par  \par \par \par \par Interval History: The patient is being followed for IgG kappa MM with multiple bone lytic lesions, hypercalcemia (resolved) and mild anemia. He started Velcade/Dexa weekly on 9/919. The patient got the Revlimid and started it C1 day 1 on 10/21/19 and hed been tolerating it well. \par \par Starting in February, his blood counts became low -plt count 54, Hb 8.3 wbc 5.8. Revlimid was held -after 2/3/20. He got 1 shot Velcade/Dexa on 2/3/20, NO chemo since then. \par BM done on 3/4/20 to eval for residual disease (MM) vs. MDS. BM showed recovering marrow elements, plasma cells <5%. SPEP/MICAH from 3/9/20 showed NO monoclonal proteins, c/w CR. \par \par The PET scan from 5/7/20 showed L uptake maxillary -pt reports that he had dentures done in Jan 2020 and has discomfort from them. He did not go to the dentist during COVID Rib fractures -pt denied falls/trauma. Patient has dental appointment for next week. He has some discomfort in the upper teeth b/l. He also missed his Velcade SQ last week 'i did not know if i am supposed to continue it.' He has no neuropathy from it, tolerating well. "\par  [de-identified] : RISS- II [FreeTextEntry1] : s/p RT to C7/T8 spine\par RVD 9/2019 -6/2020 transitioned to maintenance velcade q2wk started 6/19/2020 [de-identified] : Oct 6, 2020\par Pt is here for initial visit with me. He has his dtr on the phone, prefers her included in conversation and declined an . Pt is feeling well. He is tolerating monthly zometa and maintenance velcade w/o any adverse effects. Last set of myeloma labs done in August showed stable remission. He is requesting refill of several meds today.\par ---------------------------------\par \par 1/25/2021\par Patient presents for a follow up. He has no complaints except infrequent short duration chills with no fever. He is on biweekly Velcade and monthly Zometa. His last treatment of Zometa was on 1/8/2021 and last Velcade on 1/22/2021. His most recent SPEP/MICAH was normal.   \par \par 6/10/2021\par Patient seen in follow up. He did not go to his home country as he planned. He does not endorse any big change in his health status. She saw his Kidney doctor who told him he is iron deficient (labs are not supportive). His anemia is multifactorial including CKD. His most recent myeloma labs (03/2021) show continued CR. \par \par 9/1/2021\par Mr Rand returns for a follow up. His daughter is accompanying him. He has no complaints. He reports that he develops diarrhea (one motion) the day after his Velcade treatment. He plans to visit his home town soon. He has some unavoidable things to take care of. Of note he has been in sCR. \par \par \par \par

## 2021-09-01 NOTE — RESULTS/DATA
[FreeTextEntry1] : 9/1/2021\par Last PCN labs in 06/2021 showed no M-spike, M-band, Normal FLCR.\par \par 6/9/2021\par Last Myeloma labs in 03/2021 showed no M-spike, M-band, Normal FLCR. \par \par 1/25/2021\par Most recent SPEP / MICAH (1/8/2021) No monoclonal band \par \par ------------------------------------------------------------------------------------------\par   PET/CT Whole Body Oncology FDG             Final\par \par No Documents Attached\par \par \par \par \par   EXAM:  PETCT WB ONC FDG SUBS\par \par \par PROCEDURE DATE:  05/07/2020\par \par \par \par INTERPRETATION:  PROCEDURE:  PET/CT WHOLE BODY\par \par RADIOPHARMACEUTICAL:  9.97 mCi F-18, FDG, I.V.\par \par CLINICAL INFORMATION:  70-year-old male referred for follow-up of multiple myeloma on Zometa. Bone marrow biopsy done 3/4/2020 showed less than 5% plasma cells and repeat myeloma labs done on 3/9/2020, showed CT are. PET/CT is done as part of the subsequent treatment strategy evaluation.\par \par TECHNIQUE:  Fasting blood sugar measured prior to injection of radiopharmaceutical was 116 mg/dl. Following intravenous injection of the radiopharmaceutical and an uptake period of approximately 60 minutes, FDG-PET/CT was obtained on a  [employer] 710 from the vertex of the skull to the toes. Oral contrast was given during the uptake period. CT was performed during shallow respiration. The CT protocol was optimized for PET attenuation correction and to provide anatomic detail for localization of PET abnormalities. The CT protocol was not designed to produce and cannot replace state-of-the-art diagnostic CT images with specific imaging protocols for different body parts and indications. Images were reviewed on a dedicated workstation using multiplanar reconstruction.\par \par The standardized uptake values (SUV) are normalized to patient body weight and indicate the highest activity concentration (SUVmax) in a given disease site. All image numbers refer to the axial image number.\par \par COMPARISON:  FDG PET/CT dated 9/7/2019.\par \par OTHER STUDIES USED FOR CORRELATION: MRI of cervical spine dated 9/9/2019.\par \par FINDINGS:\par \par HEAD/NECK: Physiologic FDG activity in the brain.\par \par Mild hypermetabolism in left masseter muscle, decreased as compared to prior study, may be secondary to bruxism. Elongated focus of increased FDG activity in left lower cervical region may reflect inflammation or muscle spasm (image 77).\par \par New hypermetabolic lucency in left maxillary alveolus is nonspecific (SUV 8.0; image 53). Recommend correlation with dental exam. A new small hypermetabolic focus is seen in the right maxilla (SUV 3.7; image 55).\par \par CHEST: Few new small hypermetabolic foci in bilateral hilar region likely correspond to small lymph nodes that are difficult to delineate on CT. For reference, left perihilar region demonstrates SUV 3.6 (image 117). Resolution of FDG-avid subcentimeter left posterior paraesophageal lymph node.\par \par LUNGS: No abnormal FDG activity. Calcified right lower lobe granuloma, unchanged (image 128).\par \par PLEURA/PERICARDIUM: No abnormal FDG activity. No effusion.\par \par HEPATOBILIARY/PANCREAS: Physiologic FDG activity. Liver SUV mean is 2.1; previous 2.3.\par \par SPLEEN: No abnormal FDG activity. Normal in size.\par \par ADRENAL GLANDS: No abnormal FDG activity. No nodule.\par \par KIDNEYS/URINARY BLADDER: Physiologic FDG activity. Multiple bilateral renal cysts, measuring up to 4.6 cm on the right, unchanged.\par \par PELVIC ORGANS: No abnormal FDG activity.\par \par ABDOMINOPELVIC NODES: No enlarged or FDG avid lymph node\par \par BOWEL/PERITONEUM/MESENTERY: Physiologic FDG activity. Resolution of hypermetabolism in distal esophagus/GE junction.\par \par BONES: Near total resolution of previously seen hypermetabolic foci in the axial skeleton, right humerus, and proximal bilateral femurs. Previously seen lytic lesions demonstrate new areas of sclerosis on CT. For reference, one of the most FDG-avid residual lesions is a mixed lytic and sclerotic lesion in the left scapula which demonstrates SUV 3.0 (image 98); previously lytic with SUV 5.7. Previously seen hypermetabolic lesion in right sphenoid bone has resolved. Previously seen large destructive lesion in the manubrium the sternum demonstrates new sclerosis with SUV 3.0 (image 103); previous SUV 5.3. Resolution of hypermetabolic intramedullary soft tissue in proximal right humerus. Near total resolution of hypermetabolic intramedullary soft tissue in proximal left femur (SUV 1.3; image 273; previous SUV 5.3).\par \par FDG-avid fracture in left inferior pubic ramus demonstrates increased callus formation and is similar in metabolism (SUV 3.8; image 250; previous SUV 3.1). Few new mildly FDG-avid bilateral rib fractures. For reference, fracture in the anterior aspect of the left second rib demonstrates SUV 3.4 (image 105).\par \par Resolution of hypermetabolism in left palmar musculature.\par \par IMPRESSION:  Abnormal whole body FDG-PET/CT scan.\par \par 1. Near total resolution of hypermetabolism associated with lytic and intramedullary lesions in the axial and appendicular skeleton as compared to prior study dated 9/7/2019. Previously seen lytic lesions demonstrate new sclerosis. Findings are compatible with response to interval therapy.\par \par 2. Few new mildly FDG-avid bilateral rib fractures. An FDG-avid fracture in the left inferior pubic ramus demonstrates increased callus formation and is similar in metabolism.\par \par 3. New hypermetabolic lucency in left maxillary alveolus and new small hypermetabolic focus in right maxilla are nonspecific. Recommend correlation with dental exam.\par \par 4. Few new mildly FDG-avid nonspecific bilateral hilar lymph nodes.\par \par 5. Resolution of many specific hypermetabolism in distal esophagus/GE junction.\par \par \par \par \par \par \par \par \par \par \par \par \par \par \par \par LUANA RAMOS M.D., NUCLEAR MEDICINE ATTENDING\par This document has been electronically signed. May  7 2020  4:08PM\par \par  \par \par  Ordered by: OZZY ALMEIDA       Collected/Examined: 07May2020 12:57PM       \par Verified by: OZZY ALMEIDA 11May2020 09:27AM       \par  Result Communication: Call patient with results,Discussed results with patient;\par Stage: Final       \par  Performed at: [facility]       Resulted: 07May2020 04:11PM       Last Updated: 11May2020 09:27AM       Accession: [id number]       \par \par \par  Pathology             Final\par \par No Documents Attached\par \par \par \par \par   [facility] Accession Number : [id number]\par \par HARDEEP, SUBASH                          2\par \par \par \par Cytopathology Report\par \par \par \par \par \par Final Diagnosis\par SOFT TISSUE, SACRUM, CT GUIDED CORE BIOPSY\par Plasma cell neoplasm\par See note and description.\par \par Diagnostic note:  Overall the morphologic and immunophenotypic\par findings are consistent with plasma cell neoplasm. The\par differential diagnosis includes plasmacytoma vs plasma cells\par myeloma. Correlation with laboratory, radiologic and clinical\par findings is required for further classification.\par \par Microscopic description:\par The touch prep slides are composed of numerous enlarged plasma\par cells with prominent nucleoli, occasional binucleated forms are\par seen.\par \par Histologic sections consist of multiple needle shaped cores of\par soft tissue showing proliferation of enlarged plasma cells with\par prominent nucleoli, forming sheets.\par \par Immunohistochemistry:  , kappaISH, lambdaISH, cyclinD1, p53\par stains performed on paraffin embedded section of block 1C.\par \par Neoplastic cells are:\par Positive:  , KappaISH, p53\par Negative:  LambdaISH, cyclinD1\par \par Flow cytometry analysis (63-ZY-): Monotypic plasma cells\par (24.1% of total analyzed cells) are present. There are two\par aberrant monoclonal plasma cell populations:\par Population #1 (16.5% of total analyzed cells, 68.6% of plasma\par cell population) positive for cytoplasmic kappa, CD38 dimmer,\par CD45 dimmer, CD56, ; negative for , CD19, CD20.\par Population # 2 (7.6% of total analyzed cells, 31.4% of plasma\par cell population) positive for cytoplasmic kappa\par \par \par \par \par \par \par HARDEEP, SUBASH                          2\par \par \par \par Cytopathology Report\par \par \par \par \par (brighter), , CD38 brighter, CD45 dim, CD56 (brighter),\par ; negative for CD19, CD20.\par \par Screened by: Jose Juan Smith CT(ASCP)\par Verified by: Orlando Spicer MD\par (Electronic Signature)\par Reported on: 08/22/19 13:22 EDT, [address] [place], [place]\par [address]\par Cytology technical processing performed at [address] [place] [id number]\par _________________________________________________________________\par \par \par Specimen(s) Submitted\par SOFT TISSUE, SACRUM, CT GUIDED CORE BIOPSY\par \par \par Statement of Adequacy\par Immediate cytologic study for adequacy of specimen using a Diff-\par Quik stain was performed at [facility], [address] [place] [address]. Touch\par imprints acceptable for further evaluation by Jose Juan Smith\par CT(ASCP).\par \par \par Clinical Information\par Sacral mass.\par \par \par Gross Description\par Core Biopsy:\par Tissue collected: Specimen container has been inspected to\par confirm patient?s name and date of birth, contains 3  tan cores\par measuring 1.0, 1.0, 0.8 cm in length (and multiple fragments).\par Entire specimen submitted in 2 cassette(s) labeled 1B and 1 C.\par \par 4 Touch prep slides ( 2 air dried + 2 fixed)\par \par FNA:\par No material submitted for cell block (No block 1A)\par \par Prepared:\par 4 Touch Prep,\par 1 core biopsy labeled 1B\par 1 core biopsy labeled 1C\par 6 Total slides\par \par  \par \par  Ordered by: DELORES MOSS       Collected/Examined: [id number] 03:14PM       \par Verified by: OZZY ALMEIDA [id number] 03:07PM       \par  Result Communication: No patient communication needed at this time;\par Stage: Final       \par  Performed at: [facility]       Resulted: 22Aug2019 01:22PM       Last Updated: 18Jun2020 03:07PM       Accession: [id number]       \par \par \par  Pathology             Final\par \par No Documents Attached\par \par \par \par \par   [facility] Accession Number : [id number]\par \par HARDEEP, SUBASH                          4\par \par \par \par Addendum Report\par \par \par \par \par Hematopathology Addendum\par Comprehensive Hematopathology Report\par \par Final Diagnosis:\par 1, 2. Bone marrow biopsy and bone marrow aspirate\par - Plasma cell myeloma (10% with focal 25% by  stain)\par - Slight erythroid predominant trilineage hematopoiesis\par with maturation\par \par Diagnostic Note:\par Please note findings of a normal male karyotype and a negative\par multiple myeloma FISH panel. Based on the additional findings,\par the diagnosis has not changed. Correlation with studies for\par myeloma-related organ dysfunction is necessary.\par \par Morphology:\par Microscopic description:\par 1. Biopsy: Sections of bone marrow biopsy show normocellularity\par (60 to 70% cellularity), mild plasmacytosis with foci of small\par clusters, slight erythroid predominant trilineage hematopoiesis\par with maturation, mild megakaryocytosis with normal morphology,\par and increased iron stores.\par 2. Aspirate: Cellular spicules are not present, precluding\par differential count. Review of the smear shows some maturing and\par predominant mature myeloid cells, a few erythroid elements, and\par rare megakaryocytes.\par \par Ancillary Studies:\par Bone marrow aspirate iron stain: No spicules are present to\par evaluate for iron stores and there are insufficient nRBC to\par evaluate for ring sideroblasts.\par Congo red stain is negative for amyloidosis.\par Flow cytometry:  Monotypic plasma cells (1% of cells), positive\par for cytoplasmic kappa, CD38, , dimmer CD45, partial CD56;\par negative CD19, CD20, .\par CD45/side scatter shows no significant blast population. There is\par no increase in CD34,  or CD14 positive cells.\par Lymphocytes (8% of cells) show a heterogeneous population of T-\par cells (with normal CD4 to CD8 ratio), and polytypic B-cells.\par Immunohistochemical stains ( performed on the block 1A and\par 1B, cyclin-D1 performed on block 1A):   stains show overall\par approximately 10% plasma cells, with foci of small clusters,\par focal up to 25%. Plasma cells are negative for cyclin-D1.\par \par Cytogenetics:\par Result: Normal male karyotype\par Karyotype: 46,XY[20]\par \par \par \par \par \par \par HARDEEP, SUBASH                          4\par \par \par \par Addendum Report\par \par \par \par \par FISH:\par Result: NORMAL FISH -\par - MULTIPLE MYELOMA PANEL\par Probe(s) and Location(s): FGFR3(4p16), CCND1(11q13), RB1(13q14),\par IGH(14q32), MAF(16q23), TP53(17p13.1)\par \par Clinical History/Data:\par New diagnosis multiple myeloma\par CBC on 08/24/2019: WBC: 8.09 K/uL, HGB: 9.5 g/dL, MCV: 90 fl,\par Plt: 227 K/uL\par Serum Immunofixation: IgG kappa\par \par Verified by: Nancy Farris M.D.\par (Electronic Signature)\par Reported on: 09/07/19 10:37 EDT, 6 [facility], [place], [place]\par [address]\par _________________________________________________________________\par \par \par Hematopathology Report\par \par \par \par \par Final Diagnosis\par 1, 2. Bone marrow biopsy and bone marrow aspirate\par - Plasma cell myeloma (10% with focal 25% by  stain)\par - Slight erythroid predominant trilineage hematopoiesis\par with maturation\par \par See note and description.\par \par Diagnostic note:\par Correlation with studies for myeloma-related organ dysfunction is\par necessary.\par Comprehensive report with results of pending ancillary studies to\par follow.\par \par Ancillary studies\par Bone marrow aspirate iron stain: No spicules are present to\par evaluate for iron stores and there are insufficient nRBC to\par evaluate for ring sideroblasts.\par \par Flow cytometry:  Monotypic plasma cells (1% of cells), positive\par for cytoplasmic kappa, CD38, , dimmer CD45, partial CD56;\par negative CD19, CD20, .\par CD45/side scatter shows no significant blast population. There is\par no increase in CD34,  or CD14 positive cells.\par \par \par \par \par \par \par HARDEEP, SUBASH                          4\par \par \par \par Hematopathology Report\par \par \par \par \par Lymphocytes (8% of cells) show a heterogeneous population of T-\par cells (with normal CD4 to CD8 ratio), and polytypic B-cells.\par \par Immunohistochemical stains ( performed on the block 1A and\par 1B, cyclin-D1 performed on block 1A):   stains show overall\par approximately 10% plasma cells, with foci of small clusters,\par focal up to 25%. Plasma cells are negative for cyclin-D1.\par \par Microscopic description:\par 1. Biopsy: Sections of bone marrow biopsy show normocellularity\par (60 to 70% cellularity), mild plasmacytosis with foci of small\par clusters, slight erythroid predominant trilineage hematopoiesis\par with maturation, mild megakaryocytosis with normal morphology,\par and increased iron stores.\par \par 2. Aspirate: Cellular spicules are not present, precluding\par differential count. Review of the smear shows some maturing and\par predominant mature myeloid cells, a few erythroid elements, and\par rare megakaryocytes.\par \par Comment:  Iron stain (examined to evaluate for iron stores; see\par microscopic description) and Giemsa stain (shows appropriate\par staining pattern) are performed and evaluated on block(s): 1A,\par 1B.\par \par Slide(s) with built in immunohistochemical study control(s)\par associated and negative control with this case has/have been\par verified by the sign out pathologist.\par For slide(s) without built in controls positive control slides\par has/have been reviewed and approved by immunohistochemistry lab\par These immunohistochemical/ in-situ hybridization tests have been\par developed and their performance characteristics determined by\par [facility], Department of\par Pathology, Division of Immunopathology, [phone] [place] [address]\[place] [address].  It has not been cleared or approved by the\par U.S. Food and Drug Administration.  The FDA has determined that\par such clearance or approval is not necessary.  This test is used\par for clinical purposes.  The laboratory is certified under the\par CLIA-88 as qualified to perform high\par complexity clinical testing.\par \par Verified by: Nancy Farris M.D.\par (Electronic Signature)\par Reported on: 08/27/19 10:00 EDT, [address] [place] [address], [place], [place]\par [address]\par _________________________________________________________________\par \par \par \par \par \par \par \par HARDEEP, SUBASH                          4\par \par \par \par Hematopathology Report\par \par \par \par \par Clinical History\par New diagnosis multiple myeloma\par CBC on 08/24/2019: WBC: 8.09 K/uL, HGB: 9.5 g/dL, MCV: 90 fl,\par Plt: 227 K/uL\par Serum Immunofixation: IgG kappa\par \par Specimen(s) Submitted\par 1     Bone marrow biopsy left\par 2     Bone marrow aspirate\par \par Gross Description\par 1. The specimen is received in bouin's fixative and the specimen\par container is labeled: Left bone marrow biopsy.  It consists of a\par 0.5 x 0.2 cm bone marrow core with a 1.5 x 1.3 x 0.5 cm blood\par clot.  Entirely submitted.  Two cassettes: A = bone marrow core;\par B = blood clot.\par 2. The specimen is received labeled with patient's name and\par consists of 3 air dried slide(s). 2 slide(s) stained with Monzon\par Giemsa stain. 1 stained for iron stain.\par In addition to other data that may appear on the specimen\par container, the label has been inspected to confirm the presence\par of the patient's name and date of birth.\par Catarina Torres 08/22/2019 17:12\par \par  \par \par  Ordered by: BECKY BILLS       Collected/Examined: [id number] 03:50PM       \par Verified by: BECKY BILLS [id number] 05:42PM       \par  Result Communication: No patient communication needed at this time;\par Stage: Final       \par  Performed at: [facility]       Resulted: [id number] 10:37AM       Last Updated: [id number] 05:42PM       Accession: [id number]

## 2021-09-02 LAB
ALBUMIN SERPL ELPH-MCNC: 4.8 G/DL
ALP BLD-CCNC: 52 U/L
ALT SERPL-CCNC: 13 U/L
ANION GAP SERPL CALC-SCNC: 9 MMOL/L
AST SERPL-CCNC: 19 U/L
B2 MICROGLOB SERPL-MCNC: 3.7 MG/L
BILIRUB SERPL-MCNC: 0.3 MG/DL
BUN SERPL-MCNC: 22 MG/DL
CALCIUM SERPL-MCNC: 9.3 MG/DL
CHLORIDE SERPL-SCNC: 109 MMOL/L
CO2 SERPL-SCNC: 24 MMOL/L
CREAT SERPL-MCNC: 1.9 MG/DL
DEPRECATED KAPPA LC FREE/LAMBDA SER: 1.13 RATIO
GLUCOSE SERPL-MCNC: 106 MG/DL
KAPPA LC CSF-MCNC: 2.52 MG/DL
KAPPA LC SERPL-MCNC: 2.86 MG/DL
LDH SERPL-CCNC: 179 U/L
POTASSIUM SERPL-SCNC: 4.3 MMOL/L
PROT SERPL-MCNC: 7.5 G/DL
SODIUM SERPL-SCNC: 142 MMOL/L

## 2021-09-03 ENCOUNTER — APPOINTMENT (OUTPATIENT)
Dept: INFUSION THERAPY | Facility: HOSPITAL | Age: 71
End: 2021-09-03

## 2021-09-13 LAB
ALBUMIN MFR SERPL ELPH: 61.3 %
ALBUMIN SERPL-MCNC: 4.6 G/DL
ALBUMIN/GLOB SERPL: 1.6 RATIO
ALPHA1 GLOB MFR SERPL ELPH: 4.5 %
ALPHA1 GLOB SERPL ELPH-MCNC: 0.3 G/DL
ALPHA2 GLOB MFR SERPL ELPH: 11.7 %
ALPHA2 GLOB SERPL ELPH-MCNC: 0.9 G/DL
B-GLOBULIN MFR SERPL ELPH: 10.6 %
B-GLOBULIN SERPL ELPH-MCNC: 0.8 G/DL
DEPRECATED KAPPA LC FREE/LAMBDA SER: 1.13 RATIO
GAMMA GLOB FLD ELPH-MCNC: 0.9 G/DL
GAMMA GLOB MFR SERPL ELPH: 11.9 %
IGA SER QL IEP: 172 MG/DL
IGG SER QL IEP: 1062 MG/DL
IGM SER QL IEP: 22 MG/DL
INTERPRETATION SERPL IEP-IMP: NORMAL
KAPPA LC CSF-MCNC: 2.52 MG/DL
KAPPA LC SERPL-MCNC: 2.86 MG/DL
M PROTEIN SPEC IFE-MCNC: NORMAL
PROT SERPL-MCNC: 7.5 G/DL
PROT SERPL-MCNC: 7.5 G/DL

## 2021-09-17 ENCOUNTER — RESULT REVIEW (OUTPATIENT)
Age: 71
End: 2021-09-17

## 2021-09-17 ENCOUNTER — APPOINTMENT (OUTPATIENT)
Dept: INFUSION THERAPY | Facility: HOSPITAL | Age: 71
End: 2021-09-17

## 2021-09-17 LAB
BASOPHILS # BLD AUTO: 0.06 K/UL — SIGNIFICANT CHANGE UP (ref 0–0.2)
BASOPHILS NFR BLD AUTO: 0.7 % — SIGNIFICANT CHANGE UP (ref 0–2)
EOSINOPHIL # BLD AUTO: 0.21 K/UL — SIGNIFICANT CHANGE UP (ref 0–0.5)
EOSINOPHIL NFR BLD AUTO: 2.6 % — SIGNIFICANT CHANGE UP (ref 0–6)
HCT VFR BLD CALC: 27.8 % — LOW (ref 39–50)
HGB BLD-MCNC: 9.4 G/DL — LOW (ref 13–17)
IMM GRANULOCYTES NFR BLD AUTO: 0.2 % — SIGNIFICANT CHANGE UP (ref 0–1.5)
LYMPHOCYTES # BLD AUTO: 1.53 K/UL — SIGNIFICANT CHANGE UP (ref 1–3.3)
LYMPHOCYTES # BLD AUTO: 19 % — SIGNIFICANT CHANGE UP (ref 13–44)
MCHC RBC-ENTMCNC: 32.5 PG — SIGNIFICANT CHANGE UP (ref 27–34)
MCHC RBC-ENTMCNC: 33.8 G/DL — SIGNIFICANT CHANGE UP (ref 32–36)
MCV RBC AUTO: 96.2 FL — SIGNIFICANT CHANGE UP (ref 80–100)
MONOCYTES # BLD AUTO: 0.73 K/UL — SIGNIFICANT CHANGE UP (ref 0–0.9)
MONOCYTES NFR BLD AUTO: 9.1 % — SIGNIFICANT CHANGE UP (ref 2–14)
NEUTROPHILS # BLD AUTO: 5.51 K/UL — SIGNIFICANT CHANGE UP (ref 1.8–7.4)
NEUTROPHILS NFR BLD AUTO: 68.4 % — SIGNIFICANT CHANGE UP (ref 43–77)
NRBC # BLD: 0 /100 WBCS — SIGNIFICANT CHANGE UP (ref 0–0)
PLATELET # BLD AUTO: 174 K/UL — SIGNIFICANT CHANGE UP (ref 150–400)
RBC # BLD: 2.89 M/UL — LOW (ref 4.2–5.8)
RBC # FLD: 13.2 % — SIGNIFICANT CHANGE UP (ref 10.3–14.5)
WBC # BLD: 8.06 K/UL — SIGNIFICANT CHANGE UP (ref 3.8–10.5)
WBC # FLD AUTO: 8.06 K/UL — SIGNIFICANT CHANGE UP (ref 3.8–10.5)

## 2021-09-29 ENCOUNTER — OUTPATIENT (OUTPATIENT)
Dept: OUTPATIENT SERVICES | Facility: HOSPITAL | Age: 71
LOS: 1 days | Discharge: HOME | End: 2021-09-29

## 2021-09-29 DIAGNOSIS — C90.00 MULTIPLE MYELOMA NOT HAVING ACHIEVED REMISSION: ICD-10-CM

## 2021-10-01 ENCOUNTER — APPOINTMENT (OUTPATIENT)
Dept: INFUSION THERAPY | Facility: HOSPITAL | Age: 71
End: 2021-10-01

## 2021-10-01 ENCOUNTER — RESULT REVIEW (OUTPATIENT)
Age: 71
End: 2021-10-01

## 2021-10-01 DIAGNOSIS — Z51.11 ENCOUNTER FOR ANTINEOPLASTIC CHEMOTHERAPY: ICD-10-CM

## 2021-10-01 LAB
BASOPHILS # BLD AUTO: 0.05 K/UL — SIGNIFICANT CHANGE UP (ref 0–0.2)
BASOPHILS NFR BLD AUTO: 0.7 % — SIGNIFICANT CHANGE UP (ref 0–2)
EOSINOPHIL # BLD AUTO: 0.21 K/UL — SIGNIFICANT CHANGE UP (ref 0–0.5)
EOSINOPHIL NFR BLD AUTO: 3.1 % — SIGNIFICANT CHANGE UP (ref 0–6)
HCT VFR BLD CALC: 26.2 % — LOW (ref 39–50)
HGB BLD-MCNC: 8.7 G/DL — LOW (ref 13–17)
IMM GRANULOCYTES NFR BLD AUTO: 0.3 % — SIGNIFICANT CHANGE UP (ref 0–1.5)
LYMPHOCYTES # BLD AUTO: 1.5 K/UL — SIGNIFICANT CHANGE UP (ref 1–3.3)
LYMPHOCYTES # BLD AUTO: 22 % — SIGNIFICANT CHANGE UP (ref 13–44)
MCHC RBC-ENTMCNC: 32.2 PG — SIGNIFICANT CHANGE UP (ref 27–34)
MCHC RBC-ENTMCNC: 33.2 G/DL — SIGNIFICANT CHANGE UP (ref 32–36)
MCV RBC AUTO: 97 FL — SIGNIFICANT CHANGE UP (ref 80–100)
MONOCYTES # BLD AUTO: 0.69 K/UL — SIGNIFICANT CHANGE UP (ref 0–0.9)
MONOCYTES NFR BLD AUTO: 10.1 % — SIGNIFICANT CHANGE UP (ref 2–14)
NEUTROPHILS # BLD AUTO: 4.34 K/UL — SIGNIFICANT CHANGE UP (ref 1.8–7.4)
NEUTROPHILS NFR BLD AUTO: 63.8 % — SIGNIFICANT CHANGE UP (ref 43–77)
NRBC # BLD: 0 /100 WBCS — SIGNIFICANT CHANGE UP (ref 0–0)
PLATELET # BLD AUTO: 150 K/UL — SIGNIFICANT CHANGE UP (ref 150–400)
RBC # BLD: 2.7 M/UL — LOW (ref 4.2–5.8)
RBC # FLD: 13.4 % — SIGNIFICANT CHANGE UP (ref 10.3–14.5)
WBC # BLD: 6.81 K/UL — SIGNIFICANT CHANGE UP (ref 3.8–10.5)
WBC # FLD AUTO: 6.81 K/UL — SIGNIFICANT CHANGE UP (ref 3.8–10.5)

## 2021-10-06 PROBLEM — K31.A0 INTESTINAL METAPLASIA OF GASTRIC MUCOSA: Status: ACTIVE | Noted: 2019-12-10

## 2021-10-15 ENCOUNTER — NON-APPOINTMENT (OUTPATIENT)
Age: 71
End: 2021-10-15

## 2021-10-15 ENCOUNTER — APPOINTMENT (OUTPATIENT)
Dept: INFUSION THERAPY | Facility: HOSPITAL | Age: 71
End: 2021-10-15

## 2021-10-29 ENCOUNTER — APPOINTMENT (OUTPATIENT)
Dept: INFUSION THERAPY | Facility: HOSPITAL | Age: 71
End: 2021-10-29

## 2021-11-12 ENCOUNTER — APPOINTMENT (OUTPATIENT)
Dept: INFUSION THERAPY | Facility: HOSPITAL | Age: 71
End: 2021-11-12

## 2021-11-22 ENCOUNTER — OUTPATIENT (OUTPATIENT)
Dept: OUTPATIENT SERVICES | Facility: HOSPITAL | Age: 71
LOS: 1 days | Discharge: HOME | End: 2021-11-22

## 2021-11-22 DIAGNOSIS — C90.00 MULTIPLE MYELOMA NOT HAVING ACHIEVED REMISSION: ICD-10-CM

## 2021-11-26 ENCOUNTER — APPOINTMENT (OUTPATIENT)
Dept: INFUSION THERAPY | Facility: HOSPITAL | Age: 71
End: 2021-11-26

## 2021-11-26 ENCOUNTER — RESULT REVIEW (OUTPATIENT)
Age: 71
End: 2021-11-26

## 2021-11-26 LAB
BASOPHILS # BLD AUTO: 0.05 K/UL — SIGNIFICANT CHANGE UP (ref 0–0.2)
BASOPHILS NFR BLD AUTO: 0.6 % — SIGNIFICANT CHANGE UP (ref 0–2)
EOSINOPHIL # BLD AUTO: 0.5 K/UL — SIGNIFICANT CHANGE UP (ref 0–0.5)
EOSINOPHIL NFR BLD AUTO: 6.1 % — HIGH (ref 0–6)
HCT VFR BLD CALC: 27.9 % — LOW (ref 39–50)
HGB BLD-MCNC: 9.4 G/DL — LOW (ref 13–17)
IMM GRANULOCYTES NFR BLD AUTO: 1.1 % — SIGNIFICANT CHANGE UP (ref 0–1.5)
LYMPHOCYTES # BLD AUTO: 1.63 K/UL — SIGNIFICANT CHANGE UP (ref 1–3.3)
LYMPHOCYTES # BLD AUTO: 19.7 % — SIGNIFICANT CHANGE UP (ref 13–44)
MCHC RBC-ENTMCNC: 32.2 PG — SIGNIFICANT CHANGE UP (ref 27–34)
MCHC RBC-ENTMCNC: 33.7 G/DL — SIGNIFICANT CHANGE UP (ref 32–36)
MCV RBC AUTO: 95.5 FL — SIGNIFICANT CHANGE UP (ref 80–100)
MONOCYTES # BLD AUTO: 0.72 K/UL — SIGNIFICANT CHANGE UP (ref 0–0.9)
MONOCYTES NFR BLD AUTO: 8.7 % — SIGNIFICANT CHANGE UP (ref 2–14)
NEUTROPHILS # BLD AUTO: 5.27 K/UL — SIGNIFICANT CHANGE UP (ref 1.8–7.4)
NEUTROPHILS NFR BLD AUTO: 63.8 % — SIGNIFICANT CHANGE UP (ref 43–77)
NRBC # BLD: 0 /100 WBCS — SIGNIFICANT CHANGE UP (ref 0–0)
PLATELET # BLD AUTO: 266 K/UL — SIGNIFICANT CHANGE UP (ref 150–400)
RBC # BLD: 2.92 M/UL — LOW (ref 4.2–5.8)
RBC # FLD: 11.9 % — SIGNIFICANT CHANGE UP (ref 10.3–14.5)
WBC # BLD: 8.26 K/UL — SIGNIFICANT CHANGE UP (ref 3.8–10.5)
WBC # FLD AUTO: 8.26 K/UL — SIGNIFICANT CHANGE UP (ref 3.8–10.5)

## 2021-12-07 ENCOUNTER — APPOINTMENT (OUTPATIENT)
Dept: HEMATOLOGY ONCOLOGY | Facility: CLINIC | Age: 71
End: 2021-12-07
Payer: MEDICARE

## 2021-12-07 ENCOUNTER — LABORATORY RESULT (OUTPATIENT)
Age: 71
End: 2021-12-07

## 2021-12-07 ENCOUNTER — RESULT REVIEW (OUTPATIENT)
Age: 71
End: 2021-12-07

## 2021-12-07 VITALS
SYSTOLIC BLOOD PRESSURE: 145 MMHG | HEART RATE: 64 BPM | TEMPERATURE: 96.7 F | DIASTOLIC BLOOD PRESSURE: 74 MMHG | WEIGHT: 124.56 LBS | BODY MASS INDEX: 21.19 KG/M2 | RESPIRATION RATE: 16 BRPM | OXYGEN SATURATION: 96 %

## 2021-12-07 LAB
BASOPHILS # BLD AUTO: 0.07 K/UL — SIGNIFICANT CHANGE UP (ref 0–0.2)
BASOPHILS NFR BLD AUTO: 1 % — SIGNIFICANT CHANGE UP (ref 0–2)
EOSINOPHIL # BLD AUTO: 0.23 K/UL — SIGNIFICANT CHANGE UP (ref 0–0.5)
EOSINOPHIL NFR BLD AUTO: 3.2 % — SIGNIFICANT CHANGE UP (ref 0–6)
HCT VFR BLD CALC: 30 % — LOW (ref 39–50)
HGB BLD-MCNC: 9.8 G/DL — LOW (ref 13–17)
IMM GRANULOCYTES NFR BLD AUTO: 0.4 % — SIGNIFICANT CHANGE UP (ref 0–1.5)
LYMPHOCYTES # BLD AUTO: 1.75 K/UL — SIGNIFICANT CHANGE UP (ref 1–3.3)
LYMPHOCYTES # BLD AUTO: 24 % — SIGNIFICANT CHANGE UP (ref 13–44)
MCHC RBC-ENTMCNC: 32.2 PG — SIGNIFICANT CHANGE UP (ref 27–34)
MCHC RBC-ENTMCNC: 32.7 G/DL — SIGNIFICANT CHANGE UP (ref 32–36)
MCV RBC AUTO: 98.7 FL — SIGNIFICANT CHANGE UP (ref 80–100)
MONOCYTES # BLD AUTO: 0.67 K/UL — SIGNIFICANT CHANGE UP (ref 0–0.9)
MONOCYTES NFR BLD AUTO: 9.2 % — SIGNIFICANT CHANGE UP (ref 2–14)
NEUTROPHILS # BLD AUTO: 4.55 K/UL — SIGNIFICANT CHANGE UP (ref 1.8–7.4)
NEUTROPHILS NFR BLD AUTO: 62.2 % — SIGNIFICANT CHANGE UP (ref 43–77)
NRBC # BLD: 0 /100 WBCS — SIGNIFICANT CHANGE UP (ref 0–0)
PLATELET # BLD AUTO: 245 K/UL — SIGNIFICANT CHANGE UP (ref 150–400)
RBC # BLD: 3.04 M/UL — LOW (ref 4.2–5.8)
RBC # FLD: 12.2 % — SIGNIFICANT CHANGE UP (ref 10.3–14.5)
WBC # BLD: 7.3 K/UL — SIGNIFICANT CHANGE UP (ref 3.8–10.5)
WBC # FLD AUTO: 7.3 K/UL — SIGNIFICANT CHANGE UP (ref 3.8–10.5)

## 2021-12-07 PROCEDURE — 99213 OFFICE O/P EST LOW 20 MIN: CPT

## 2021-12-08 LAB
ALBUMIN SERPL ELPH-MCNC: 5 G/DL
ALP BLD-CCNC: 52 U/L
ALT SERPL-CCNC: 13 U/L
ANION GAP SERPL CALC-SCNC: 10 MMOL/L
AST SERPL-CCNC: 19 U/L
BILIRUB SERPL-MCNC: 0.2 MG/DL
BUN SERPL-MCNC: 17 MG/DL
CALCIUM SERPL-MCNC: 9.8 MG/DL
CHLORIDE SERPL-SCNC: 106 MMOL/L
CO2 SERPL-SCNC: 24 MMOL/L
CREAT SERPL-MCNC: 1.71 MG/DL
DEPRECATED KAPPA LC FREE/LAMBDA SER: 1.45 RATIO
DEPRECATED KAPPA LC FREE/LAMBDA SER: 1.45 RATIO
GLUCOSE SERPL-MCNC: 102 MG/DL
IGA SER QL IEP: 204 MG/DL
IGG SER QL IEP: 1252 MG/DL
IGM SER QL IEP: 32 MG/DL
KAPPA LC CSF-MCNC: 3.02 MG/DL
KAPPA LC CSF-MCNC: 3.02 MG/DL
KAPPA LC SERPL-MCNC: 4.39 MG/DL
KAPPA LC SERPL-MCNC: 4.39 MG/DL
LDH SERPL-CCNC: 178 U/L
POTASSIUM SERPL-SCNC: 4.3 MMOL/L
PROT SERPL-MCNC: 7.8 G/DL
SODIUM SERPL-SCNC: 141 MMOL/L

## 2021-12-09 NOTE — HISTORY OF PRESENT ILLNESS
[Disease:__________________________] : Disease: [unfilled] [Treatment Protocol] : Treatment Protocol [de-identified] : As per Dr Kilgore's note on 8/18/2020\par \par "Mr Rand was referred to my office for newly diagnosed IgG kappa multiple myeloma. The patient's primary language is [place]ik, he has his daughter Mirlande as the , per his wishes. He was seen by Dr. Andrés Valle (hematology) in Feb 2019 and had a BM bx showing 10-15% plasma cells, concerning for smoldering myeloma. According to the daughter, he was awaiting insurance approval for imaging studies. He was admitted from 8/18/19 at [facility] where he presented with pain in the L leg/lower back, worsening for the past 2-3 months. On labs, he was found to have a total protein of 10, Ca level 12 until 8/29/19. Dental consult was called and patient needs to have 10 teeth extracted -thus, IV bisphosphonates were not given, pt improved with hydration. He also had a slightly inc'ed creatinine -improved after IVF. CT C/A/P 8/18/19 showed a lytic expansile soft tissue lesion centered in the manubrium measuring approximately 7.3 x 3.4 x 4.3 cm, invading both 1st ribs. There was also a A lytic expansile soft tissue lesion in the T8 vertebral body causing mass effect on spinal canal and obliterating left neural foramina at T7-8 and T8-9. ON 8/22/19 an MRI thoracic spine was done showing multiple areas of tumor involvement within the thoracic spine in keeping with the patient's history of multiple myeloma. Prominent lesion within C7 on the right incompletely seen.\par Large lesion within T8 on the left producing epidural tumor extension and moderate narrowing of the spinal canal with mild flattening of the spinal cord. He was evaluated on 8/22/19 by Dr. Foley (Rad Onc) and was given 5 fractions of XRT from 8/23/19 to 8/29/19 to C7 and T8. He was discharged home on 8/29/19 with follow up in the outpatient office, and on Dexamethasone 4mg po daily. \par \par  \par \par \par \par Interval History: The patient is being followed for IgG kappa MM with multiple bone lytic lesions, hypercalcemia (resolved) and mild anemia. He started Velcade/Dexa weekly on 9/919. The patient got the Revlimid and started it C1 day 1 on 10/21/19 and hed been tolerating it well. \par \par Starting in February, his blood counts became low -plt count 54, Hb 8.3 wbc 5.8. Revlimid was held -after 2/3/20. He got 1 shot Velcade/Dexa on 2/3/20, NO chemo since then. \par BM done on 3/4/20 to eval for residual disease (MM) vs. MDS. BM showed recovering marrow elements, plasma cells <5%. SPEP/MICAH from 3/9/20 showed NO monoclonal proteins, c/w CR. \par \par The PET scan from 5/7/20 showed L uptake maxillary -pt reports that he had dentures done in Jan 2020 and has discomfort from them. He did not go to the dentist during COVID Rib fractures -pt denied falls/trauma. Patient has dental appointment for next week. He has some discomfort in the upper teeth b/l. He also missed his Velcade SQ last week 'i did not know if i am supposed to continue it.' He has no neuropathy from it, tolerating well. "\par  [de-identified] : RISS- II [FreeTextEntry1] : s/p RT to C7/T8 spine\par RVD 9/2019 -6/2020 transitioned to maintenance velcade q2wk started 6/19/2020 [de-identified] : Oct 6, 2020\par Pt is here for initial visit with me. He has his dtr on the phone, prefers her included in conversation and declined an . Pt is feeling well. He is tolerating monthly zometa and maintenance velcade w/o any adverse effects. Last set of myeloma labs done in August showed stable remission. He is requesting refill of several meds today.\par ---------------------------------\par \par 1/25/2021\par Patient presents for a follow up. He has no complaints except infrequent short duration chills with no fever. He is on biweekly Velcade and monthly Zometa. His last treatment of Zometa was on 1/8/2021 and last Velcade on 1/22/2021. His most recent SPEP/MICAH was normal.   \par \par 6/10/2021\par Patient seen in follow up. He did not go to his home country as he planned. He does not endorse any big change in his health status. She saw his Kidney doctor who told him he is iron deficient (labs are not supportive). His anemia is multifactorial including CKD. His most recent myeloma labs (03/2021) show continued CR. \par \par 9/1/2021\par Mr Rand returns for a follow up. His daughter is accompanying him. He has no complaints. He reports that he develops diarrhea (one motion) the day after his Velcade treatment. He plans to visit his home town soon. He has some unavoidable things to take care of. Of note he has been in sCR. \par \par 12/7/21\par Pt here for follow up visit today.  Pt reports feeling well-nothing to report.     \par \par \par \par \par \par

## 2021-12-09 NOTE — ASSESSMENT
[FreeTextEntry1] : 71 year-old Mr. MEIER is seen  in follow up for IgG Kappa Multiple Myeloma presented with lytic percy lesions. He received XRT to percy lesions,was started on RVD induction,  achieved VGPR.  Last PET 5/2020 - markedly improved and reduced FDG avidity throughout. His most recent SPEP/MICAH and FLCR (6/10/2021) was normal.  He is now on maintenance Velcade and Zometa.\par \par Of note, last Myeloma labs on 09/2021 showed no M-spike, no M-band, normal range FLCR. Anemia of multifactorial origin including CKD noted. WIll do an anemia w/u for further management. May benefit from GABRIEL. 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 This time he is determined to make it happen. Discussed skipping a dose vs switching to a oral formulation (Nilaro 3 mg) on days 1, 8 and 15 of 28 days cycle. Since he is in CR, skipping a dose (chemo holiday) would not be unreasonable.   \par \par

## 2021-12-09 NOTE — RESULTS/DATA
[FreeTextEntry1] : 9/1/2021\par Last PCN labs in 06/2021 showed no M-spike, M-band, Normal FLCR.\par \par 6/9/2021\par Last Myeloma labs in 03/2021 showed no M-spike, M-band, Normal FLCR. \par \par 1/25/2021\par Most recent SPEP / MICAH (1/8/2021) No monoclonal band \par \par ------------------------------------------------------------------------------------------\par   PET/CT Whole Body Oncology FDG             Final\par \par No Documents Attached\par \par \par \par \par   EXAM:  PETCT WB ONC FDG SUBS\par \par \par PROCEDURE DATE:  05/07/2020\par \par \par \par INTERPRETATION:  PROCEDURE:  PET/CT WHOLE BODY\par \par RADIOPHARMACEUTICAL:  9.97 mCi F-18, FDG, I.V.\par \par CLINICAL INFORMATION:  70-year-old male referred for follow-up of multiple myeloma on Zometa. Bone marrow biopsy done 3/4/2020 showed less than 5% plasma cells and repeat myeloma labs done on 3/9/2020, showed CT are. PET/CT is done as part of the subsequent treatment strategy evaluation.\par \par TECHNIQUE:  Fasting blood sugar measured prior to injection of radiopharmaceutical was 116 mg/dl. Following intravenous injection of the radiopharmaceutical and an uptake period of approximately 60 minutes, FDG-PET/CT was obtained on a  [employer] Discovery 710 from the vertex of the skull to the toes. Oral contrast was given during the uptake period. CT was performed during shallow respiration. The CT protocol was optimized for PET attenuation correction and to provide anatomic detail for localization of PET abnormalities. The CT protocol was not designed to produce and cannot replace state-of-the-art diagnostic CT images with specific imaging protocols for different body parts and indications. Images were reviewed on a dedicated workstation using multiplanar reconstruction.\par \par The standardized uptake values (SUV) are normalized to patient body weight and indicate the highest activity concentration (SUVmax) in a given disease site. All image numbers refer to the axial image number.\par \par COMPARISON:  FDG PET/CT dated 9/7/2019.\par \par OTHER STUDIES USED FOR CORRELATION: MRI of cervical spine dated 9/9/2019.\par \par FINDINGS:\par \par HEAD/NECK: Physiologic FDG activity in the brain.\par \par Mild hypermetabolism in left masseter muscle, decreased as compared to prior study, may be secondary to bruxism. Elongated focus of increased FDG activity in left lower cervical region may reflect inflammation or muscle spasm (image 77).\par \par New hypermetabolic lucency in left maxillary alveolus is nonspecific (SUV 8.0; image 53). Recommend correlation with dental exam. A new small hypermetabolic focus is seen in the right maxilla (SUV 3.7; image 55).\par \par CHEST: Few new small hypermetabolic foci in bilateral hilar region likely correspond to small lymph nodes that are difficult to delineate on CT. For reference, left perihilar region demonstrates SUV 3.6 (image 117). Resolution of FDG-avid subcentimeter left posterior paraesophageal lymph node.\par \par LUNGS: No abnormal FDG activity. Calcified right lower lobe granuloma, unchanged (image 128).\par \par PLEURA/PERICARDIUM: No abnormal FDG activity. No effusion.\par \par HEPATOBILIARY/PANCREAS: Physiologic FDG activity. Liver SUV mean is 2.1; previous 2.3.\par \par SPLEEN: No abnormal FDG activity. Normal in size.\par \par ADRENAL GLANDS: No abnormal FDG activity. No nodule.\par \par KIDNEYS/URINARY BLADDER: Physiologic FDG activity. Multiple bilateral renal cysts, measuring up to 4.6 cm on the right, unchanged.\par \par PELVIC ORGANS: No abnormal FDG activity.\par \par ABDOMINOPELVIC NODES: No enlarged or FDG avid lymph node\par \par BOWEL/PERITONEUM/MESENTERY: Physiologic FDG activity. Resolution of hypermetabolism in distal esophagus/GE junction.\par \par BONES: Near total resolution of previously seen hypermetabolic foci in the axial skeleton, right humerus, and proximal bilateral femurs. Previously seen lytic lesions demonstrate new areas of sclerosis on CT. For reference, one of the most FDG-avid residual lesions is a mixed lytic and sclerotic lesion in the left scapula which demonstrates SUV 3.0 (image 98); previously lytic with SUV 5.7. Previously seen hypermetabolic lesion in right sphenoid bone has resolved. Previously seen large destructive lesion in the manubrium the sternum demonstrates new sclerosis with SUV 3.0 (image 103); previous SUV 5.3. Resolution of hypermetabolic intramedullary soft tissue in proximal right humerus. Near total resolution of hypermetabolic intramedullary soft tissue in proximal left femur (SUV 1.3; image 273; previous SUV 5.3).\par \par FDG-avid fracture in left inferior pubic ramus demonstrates increased callus formation and is similar in metabolism (SUV 3.8; image 250; previous SUV 3.1). Few new mildly FDG-avid bilateral rib fractures. For reference, fracture in the anterior aspect of the left second rib demonstrates SUV 3.4 (image 105).\par \par Resolution of hypermetabolism in left palmar musculature.\par \par IMPRESSION:  Abnormal whole body FDG-PET/CT scan.\par \par 1. Near total resolution of hypermetabolism associated with lytic and intramedullary lesions in the axial and appendicular skeleton as compared to prior study dated 9/7/2019. Previously seen lytic lesions demonstrate new sclerosis. Findings are compatible with response to interval therapy.\par \par 2. Few new mildly FDG-avid bilateral rib fractures. An FDG-avid fracture in the left inferior pubic ramus demonstrates increased callus formation and is similar in metabolism.\par \par 3. New hypermetabolic lucency in left maxillary alveolus and new small hypermetabolic focus in right maxilla are nonspecific. Recommend correlation with dental exam.\par \par 4. Few new mildly FDG-avid nonspecific bilateral hilar lymph nodes.\par \par 5. Resolution of many specific hypermetabolism in distal esophagus/GE junction.\par \par \par \par \par \par \par \par \par \par \par \par \par \par \par \par LUANA RAMOS M.D., NUCLEAR MEDICINE ATTENDING\par This document has been electronically signed. May  7 2020  4:08PM\par \par  \par \par  Ordered by: OZZY ALMEIDA       Collected/Examined: 07May2020 12:57PM       \par Verified by: OZZY ALMEIDA 11May2020 09:27AM       \par  Result Communication: Call patient with results,Discussed results with patient;\par Stage: Final       \par  Performed at: [facility]       Resulted: 07May2020 04:11PM       Last Updated: 11May2020 09:27AM       Accession: [id number]       \par \par \par  Pathology             Final\par \par No Documents Attached\par \par \par \par \par   [facility] Accession Number : [id number]\par \par HARDEEP, SUBASH                          2\par \par \par \par Cytopathology Report\par \par \par \par \par \par Final Diagnosis\par SOFT TISSUE, SACRUM, CT GUIDED CORE BIOPSY\par Plasma cell neoplasm\par See note and description.\par \par Diagnostic note:  Overall the morphologic and immunophenotypic\par findings are consistent with plasma cell neoplasm. The\par differential diagnosis includes plasmacytoma vs plasma cells\par myeloma. Correlation with laboratory, radiologic and clinical\par findings is required for further classification.\par \par Microscopic description:\par The touch prep slides are composed of numerous enlarged plasma\par cells with prominent nucleoli, occasional binucleated forms are\par seen.\par \par Histologic sections consist of multiple needle shaped cores of\par soft tissue showing proliferation of enlarged plasma cells with\par prominent nucleoli, forming sheets.\par \par Immunohistochemistry:  , kappaISH, lambdaISH, cyclinD1, p53\par stains performed on paraffin embedded section of block 1C.\par \par Neoplastic cells are:\par Positive:  , KappaISH, p53\par Negative:  LambdaISH, cyclinD1\par \par Flow cytometry analysis (90-FM-): Monotypic plasma cells\par (24.1% of total analyzed cells) are present. There are two\par aberrant monoclonal plasma cell populations:\par Population #1 (16.5% of total analyzed cells, 68.6% of plasma\par cell population) positive for cytoplasmic kappa, CD38 dimmer,\par CD45 dimmer, CD56, ; negative for , CD19, CD20.\par Population # 2 (7.6% of total analyzed cells, 31.4% of plasma\par cell population) positive for cytoplasmic kappa\par \par \par \par \par \par \par HARDEEP, SUBASH                          2\par \par \par \par Cytopathology Report\par \par \par \par \par (brighter), , CD38 brighter, CD45 dim, CD56 (brighter),\par ; negative for CD19, CD20.\par \par Screened by: Jose Juan Smith CT(ASCP)\par Verified by: Orlando Spicer MD\par (Electronic Signature)\par Reported on: 08/22/19 13:22 EDT, [address] [place], [place]\par [address]\par Cytology technical processing performed at [address] [place] [address]\par _________________________________________________________________\par \par \par Specimen(s) Submitted\par SOFT TISSUE, SACRUM, CT GUIDED CORE BIOPSY\par \par \par Statement of Adequacy\par Immediate cytologic study for adequacy of specimen using a Diff-\par Quik stain was performed at [facility], [address] [place] [address]. Touch\par imprints acceptable for further evaluation by Jose Juan Smith\par CT(ASCP).\par \par \par Clinical Information\par Sacral mass.\par \par \par Gross Description\par Core Biopsy:\par Tissue collected: Specimen container has been inspected to\par confirm patient?s name and date of birth, contains 3  tan cores\par measuring 1.0, 1.0, 0.8 cm in length (and multiple fragments).\par Entire specimen submitted in 2 cassette(s) labeled 1B and 1 C.\par \par 4 Touch prep slides ( 2 air dried + 2 fixed)\par \par FNA:\par No material submitted for cell block (No block 1A)\par \par Prepared:\par 4 Touch Prep,\par 1 core biopsy labeled 1B\par 1 core biopsy labeled 1C\par 6 Total slides\par \par  \par \par  Ordered by: DELORES MOSS       Collected/Examined: [id number] 03:14PM       \par Verified by: OZZY ALMEIDA [id number] 03:07PM       \par  Result Communication: No patient communication needed at this time;\par Stage: Final       \par  Performed at: [facility]       Resulted: 22Aug2019 01:22PM       Last Updated: 18Jun2020 03:07PM       Accession: [id number]       \par \par \par  Pathology             Final\par \par No Documents Attached\par \par \par \par \par   [facility] Accession Number : [id number]\par \par HARDEEP, SUBASH                          4\par \par \par \par Addendum Report\par \par \par \par \par Hematopathology Addendum\par Comprehensive Hematopathology Report\par \par Final Diagnosis:\par 1, 2. Bone marrow biopsy and bone marrow aspirate\par - Plasma cell myeloma (10% with focal 25% by  stain)\par - Slight erythroid predominant trilineage hematopoiesis\par with maturation\par \par Diagnostic Note:\par Please note findings of a normal male karyotype and a negative\par multiple myeloma FISH panel. Based on the additional findings,\par the diagnosis has not changed. Correlation with studies for\par myeloma-related organ dysfunction is necessary.\par \par Morphology:\par Microscopic description:\par 1. Biopsy: Sections of bone marrow biopsy show normocellularity\par (60 to 70% cellularity), mild plasmacytosis with foci of small\par clusters, slight erythroid predominant trilineage hematopoiesis\par with maturation, mild megakaryocytosis with normal morphology,\par and increased iron stores.\par 2. Aspirate: Cellular spicules are not present, precluding\par differential count. Review of the smear shows some maturing and\par predominant mature myeloid cells, a few erythroid elements, and\par rare megakaryocytes.\par \par Ancillary Studies:\par Bone marrow aspirate iron stain: No spicules are present to\par evaluate for iron stores and there are insufficient nRBC to\par evaluate for ring sideroblasts.\par Congo red stain is negative for amyloidosis.\par Flow cytometry:  Monotypic plasma cells (1% of cells), positive\par for cytoplasmic kappa, CD38, , dimmer CD45, partial CD56;\par negative CD19, CD20, .\par CD45/side scatter shows no significant blast population. There is\par no increase in CD34,  or CD14 positive cells.\par Lymphocytes (8% of cells) show a heterogeneous population of T-\par cells (with normal CD4 to CD8 ratio), and polytypic B-cells.\par Immunohistochemical stains ( performed on the block 1A and\par 1B, cyclin-D1 performed on block 1A):   stains show overall\par approximately 10% plasma cells, with foci of small clusters,\par focal up to 25%. Plasma cells are negative for cyclin-D1.\par \par Cytogenetics:\par Result: Normal male karyotype\par Karyotype: 46,XY[20]\par \par \par \par \par \par \par HARDEEP, SUBASH                          4\par \par \par \par Addendum Report\par \par \par \par \par FISH:\par Result: NORMAL FISH -\par - MULTIPLE MYELOMA PANEL\par Probe(s) and Location(s): FGFR3(4p16), CCND1(11q13), RB1(13q14),\par IGH(14q32), MAF(16q23), TP53(17p13.1)\par \par Clinical History/Data:\par New diagnosis multiple myeloma\par CBC on 08/24/2019: WBC: 8.09 K/uL, HGB: 9.5 g/dL, MCV: 90 fl,\par Plt: 227 K/uL\par Serum Immunofixation: IgG kappa\par \par Verified by: Nancy Farris M.D.\par (Electronic Signature)\par Reported on: 09/07/19 10:37 EDT, [address] [facility], [place], [place]\par [address]\par _________________________________________________________________\par \par \par Hematopathology Report\par \par \par \par \par Final Diagnosis\par 1, 2. Bone marrow biopsy and bone marrow aspirate\par - Plasma cell myeloma (10% with focal 25% by  stain)\par - Slight erythroid predominant trilineage hematopoiesis\par with maturation\par \par See note and description.\par \par Diagnostic note:\par Correlation with studies for myeloma-related organ dysfunction is\par necessary.\par Comprehensive report with results of pending ancillary studies to\par follow.\par \par Ancillary studies\par Bone marrow aspirate iron stain: No spicules are present to\par evaluate for iron stores and there are insufficient nRBC to\par evaluate for ring sideroblasts.\par \par Flow cytometry:  Monotypic plasma cells (1% of cells), positive\par for cytoplasmic kappa, CD38, , dimmer CD45, partial CD56;\par negative CD19, CD20, .\par CD45/side scatter shows no significant blast population. There is\par no increase in CD34,  or CD14 positive cells.\par \par \par \par \par \par \par HARDEEP, SUBASH                          4\par \par \par \par Hematopathology Report\par \par \par \par \par Lymphocytes (8% of cells) show a heterogeneous population of T-\par cells (with normal CD4 to CD8 ratio), and polytypic B-cells.\par \par Immunohistochemical stains ( performed on the block 1A and\par 1B, cyclin-D1 performed on block 1A):   stains show overall\par approximately 10% plasma cells, with foci of small clusters,\par focal up to 25%. Plasma cells are negative for cyclin-D1.\par \par Microscopic description:\par 1. Biopsy: Sections of bone marrow biopsy show normocellularity\par (60 to 70% cellularity), mild plasmacytosis with foci of small\par clusters, slight erythroid predominant trilineage hematopoiesis\par with maturation, mild megakaryocytosis with normal morphology,\par and increased iron stores.\par \par 2. Aspirate: Cellular spicules are not present, precluding\par differential count. Review of the smear shows some maturing and\par predominant mature myeloid cells, a few erythroid elements, and\par rare megakaryocytes.\par \par Comment:  Iron stain (examined to evaluate for iron stores; see\par microscopic description) and Giemsa stain (shows appropriate\par staining pattern) are performed and evaluated on block(s): 1A,\par 1B.\par \par Slide(s) with built in immunohistochemical study control(s)\par associated and negative control with this case has/have been\par verified by the sign out pathologist.\par For slide(s) without built in controls positive control slides\par has/have been reviewed and approved by immunohistochemistry lab\par These immunohistochemical/ in-situ hybridization tests have been\par developed and their performance characteristics determined by\par [facility], Department of\par Pathology, Division of Immunopathology, [phone] [place] [address]\[place] [address].  It has not been cleared or approved by the\par U.S. Food and Drug Administration.  The FDA has determined that\par such clearance or approval is not necessary.  This test is used\par for clinical purposes.  The laboratory is certified under the\par CLIA-88 as qualified to perform high\par complexity clinical testing.\par \par Verified by: Nancy Farris M.D.\par (Electronic Signature)\par Reported on: 08/27/19 10:00 EDT, [address] [place] [address], [place], [place]\par [address]\par _________________________________________________________________\par \par \par \par \par \par \par \par HARDEEP, SUBASH                          4\par \par \par \par Hematopathology Report\par \par \par \par \par Clinical History\par New diagnosis multiple myeloma\par CBC on 08/24/2019: WBC: 8.09 K/uL, HGB: 9.5 g/dL, MCV: 90 fl,\par Plt: 227 K/uL\par Serum Immunofixation: IgG kappa\par \par Specimen(s) Submitted\par 1     Bone marrow biopsy left\par 2     Bone marrow aspirate\par \par Gross Description\par 1. The specimen is received in bouin's fixative and the specimen\par container is labeled: Left bone marrow biopsy.  It consists of a\par 0.5 x 0.2 cm bone marrow core with a 1.5 x 1.3 x 0.5 cm blood\par clot.  Entirely submitted.  Two cassettes: A = bone marrow core;\par B = blood clot.\par 2. The specimen is received labeled with patient's name and\par consists of 3 air dried slide(s). 2 slide(s) stained with Monzon\par Giemsa stain. 1 stained for iron stain.\par In addition to other data that may appear on the specimen\par container, the label has been inspected to confirm the presence\par of the patient's name and date of birth.\par Catarina Torres 08/22/2019 17:12\par \par  \par \par  Ordered by: BECKY BILLS       Collected/Examined: [id number] 03:50PM       \par Verified by: BECKY BILLS [id number] 05:42PM       \par  Result Communication: No patient communication needed at this time;\par Stage: Final       \par  Performed at: [facility]       Resulted: [id number] 10:37AM       Last Updated: [id number] 05:42PM       Accession: [id number]

## 2021-12-10 ENCOUNTER — APPOINTMENT (OUTPATIENT)
Dept: INFUSION THERAPY | Facility: HOSPITAL | Age: 71
End: 2021-12-10

## 2021-12-11 DIAGNOSIS — Z51.11 ENCOUNTER FOR ANTINEOPLASTIC CHEMOTHERAPY: ICD-10-CM

## 2021-12-13 ENCOUNTER — NON-APPOINTMENT (OUTPATIENT)
Age: 71
End: 2021-12-13

## 2021-12-15 LAB
ALBUMIN MFR SERPL ELPH: 56.2 %
ALBUMIN SERPL-MCNC: 4.4 G/DL
ALBUMIN/GLOB SERPL: 1.3 RATIO
ALPHA1 GLOB MFR SERPL ELPH: 4.9 %
ALPHA1 GLOB SERPL ELPH-MCNC: 0.4 G/DL
ALPHA2 GLOB MFR SERPL ELPH: 13.6 %
ALPHA2 GLOB SERPL ELPH-MCNC: 1.1 G/DL
B-GLOBULIN MFR SERPL ELPH: 11.3 %
B-GLOBULIN SERPL ELPH-MCNC: 0.9 G/DL
GAMMA GLOB FLD ELPH-MCNC: 1.1 G/DL
GAMMA GLOB MFR SERPL ELPH: 14 %
INTERPRETATION SERPL IEP-IMP: NORMAL
PROT SERPL-MCNC: 7.8 G/DL
PROT SERPL-MCNC: 7.8 G/DL

## 2021-12-22 ENCOUNTER — OUTPATIENT (OUTPATIENT)
Dept: OUTPATIENT SERVICES | Facility: HOSPITAL | Age: 71
LOS: 1 days | Discharge: HOME | End: 2021-12-22

## 2021-12-22 DIAGNOSIS — C90.00 MULTIPLE MYELOMA NOT HAVING ACHIEVED REMISSION: ICD-10-CM

## 2021-12-23 ENCOUNTER — RESULT REVIEW (OUTPATIENT)
Age: 71
End: 2021-12-23

## 2021-12-23 ENCOUNTER — APPOINTMENT (OUTPATIENT)
Dept: INFUSION THERAPY | Facility: HOSPITAL | Age: 71
End: 2021-12-23

## 2021-12-23 LAB
BASOPHILS # BLD AUTO: 0.08 K/UL — SIGNIFICANT CHANGE UP (ref 0–0.2)
BASOPHILS NFR BLD AUTO: 1 % — SIGNIFICANT CHANGE UP (ref 0–2)
EOSINOPHIL # BLD AUTO: 0.28 K/UL — SIGNIFICANT CHANGE UP (ref 0–0.5)
EOSINOPHIL NFR BLD AUTO: 3.5 % — SIGNIFICANT CHANGE UP (ref 0–6)
HCT VFR BLD CALC: 30.2 % — LOW (ref 39–50)
HGB BLD-MCNC: 10.1 G/DL — LOW (ref 13–17)
IMM GRANULOCYTES NFR BLD AUTO: 0.5 % — SIGNIFICANT CHANGE UP (ref 0–1.5)
LYMPHOCYTES # BLD AUTO: 1.46 K/UL — SIGNIFICANT CHANGE UP (ref 1–3.3)
LYMPHOCYTES # BLD AUTO: 18.2 % — SIGNIFICANT CHANGE UP (ref 13–44)
MCHC RBC-ENTMCNC: 32 PG — SIGNIFICANT CHANGE UP (ref 27–34)
MCHC RBC-ENTMCNC: 33.4 G/DL — SIGNIFICANT CHANGE UP (ref 32–36)
MCV RBC AUTO: 95.6 FL — SIGNIFICANT CHANGE UP (ref 80–100)
MONOCYTES # BLD AUTO: 0.56 K/UL — SIGNIFICANT CHANGE UP (ref 0–0.9)
MONOCYTES NFR BLD AUTO: 7 % — SIGNIFICANT CHANGE UP (ref 2–14)
NEUTROPHILS # BLD AUTO: 5.59 K/UL — SIGNIFICANT CHANGE UP (ref 1.8–7.4)
NEUTROPHILS NFR BLD AUTO: 69.8 % — SIGNIFICANT CHANGE UP (ref 43–77)
NRBC # BLD: 0 /100 WBCS — SIGNIFICANT CHANGE UP (ref 0–0)
PLATELET # BLD AUTO: 182 K/UL — SIGNIFICANT CHANGE UP (ref 150–400)
RBC # BLD: 3.16 M/UL — LOW (ref 4.2–5.8)
RBC # FLD: 12.6 % — SIGNIFICANT CHANGE UP (ref 10.3–14.5)
WBC # BLD: 8.01 K/UL — SIGNIFICANT CHANGE UP (ref 3.8–10.5)
WBC # FLD AUTO: 8.01 K/UL — SIGNIFICANT CHANGE UP (ref 3.8–10.5)

## 2021-12-24 DIAGNOSIS — Z51.11 ENCOUNTER FOR ANTINEOPLASTIC CHEMOTHERAPY: ICD-10-CM

## 2022-01-06 ENCOUNTER — RESULT REVIEW (OUTPATIENT)
Age: 72
End: 2022-01-06

## 2022-01-06 ENCOUNTER — APPOINTMENT (OUTPATIENT)
Dept: INFUSION THERAPY | Facility: HOSPITAL | Age: 72
End: 2022-01-06

## 2022-01-06 LAB
BASOPHILS # BLD AUTO: 0.06 K/UL — SIGNIFICANT CHANGE UP (ref 0–0.2)
BASOPHILS NFR BLD AUTO: 0.9 % — SIGNIFICANT CHANGE UP (ref 0–2)
EOSINOPHIL # BLD AUTO: 0.18 K/UL — SIGNIFICANT CHANGE UP (ref 0–0.5)
EOSINOPHIL NFR BLD AUTO: 2.7 % — SIGNIFICANT CHANGE UP (ref 0–6)
HCT VFR BLD CALC: 28.9 % — LOW (ref 39–50)
HGB BLD-MCNC: 9.8 G/DL — LOW (ref 13–17)
IMM GRANULOCYTES NFR BLD AUTO: 0.3 % — SIGNIFICANT CHANGE UP (ref 0–1.5)
LYMPHOCYTES # BLD AUTO: 1.27 K/UL — SIGNIFICANT CHANGE UP (ref 1–3.3)
LYMPHOCYTES # BLD AUTO: 18.8 % — SIGNIFICANT CHANGE UP (ref 13–44)
MCHC RBC-ENTMCNC: 32.2 PG — SIGNIFICANT CHANGE UP (ref 27–34)
MCHC RBC-ENTMCNC: 33.9 G/DL — SIGNIFICANT CHANGE UP (ref 32–36)
MCV RBC AUTO: 95.1 FL — SIGNIFICANT CHANGE UP (ref 80–100)
MONOCYTES # BLD AUTO: 0.63 K/UL — SIGNIFICANT CHANGE UP (ref 0–0.9)
MONOCYTES NFR BLD AUTO: 9.3 % — SIGNIFICANT CHANGE UP (ref 2–14)
NEUTROPHILS # BLD AUTO: 4.6 K/UL — SIGNIFICANT CHANGE UP (ref 1.8–7.4)
NEUTROPHILS NFR BLD AUTO: 68 % — SIGNIFICANT CHANGE UP (ref 43–77)
NRBC # BLD: 0 /100 WBCS — SIGNIFICANT CHANGE UP (ref 0–0)
PLATELET # BLD AUTO: 162 K/UL — SIGNIFICANT CHANGE UP (ref 150–400)
RBC # BLD: 3.04 M/UL — LOW (ref 4.2–5.8)
RBC # FLD: 12.9 % — SIGNIFICANT CHANGE UP (ref 10.3–14.5)
WBC # BLD: 6.76 K/UL — SIGNIFICANT CHANGE UP (ref 3.8–10.5)
WBC # FLD AUTO: 6.76 K/UL — SIGNIFICANT CHANGE UP (ref 3.8–10.5)

## 2022-01-19 ENCOUNTER — APPOINTMENT (OUTPATIENT)
Dept: HEMATOLOGY ONCOLOGY | Facility: CLINIC | Age: 72
End: 2022-01-19
Payer: MEDICARE

## 2022-01-19 VITALS
HEART RATE: 59 BPM | SYSTOLIC BLOOD PRESSURE: 123 MMHG | DIASTOLIC BLOOD PRESSURE: 68 MMHG | OXYGEN SATURATION: 99 % | WEIGHT: 125 LBS | BODY MASS INDEX: 21.26 KG/M2 | RESPIRATION RATE: 16 BRPM | TEMPERATURE: 96.6 F

## 2022-01-19 PROCEDURE — 99214 OFFICE O/P EST MOD 30 MIN: CPT

## 2022-01-19 NOTE — RESULTS/DATA
[FreeTextEntry1] : 1/19/2022\par Stable myeloma labs with normal range values.Mild high Lappa lambda with normal ratio is likely due to his renal failure. \par MIld to moderate anemia is stable. \par \par 9/1/2021\par Last PCN labs in 06/2021 showed no M-spike, M-band, Normal FLCR.\par \par 6/9/2021\par Last Myeloma labs in 03/2021 showed no M-spike, M-band, Normal FLCR. \par \par 1/25/2021\par Most recent SPEP / MICAH (1/8/2021) No monoclonal band \par \par ------------------------------------------------------------------------------------------\par   PET/CT Whole Body Oncology FDG             Final\par \par No Documents Attached\par \par \par \par \par   EXAM:  PETCT WB ONC FDG SUBS\par \par \par PROCEDURE DATE:  05/07/2020\par \par \par \par INTERPRETATION:  PROCEDURE:  PET/CT WHOLE BODY\par \par RADIOPHARMACEUTICAL:  9.97 mCi F-18, FDG, I.V.\par \par CLINICAL INFORMATION:  70-year-old male referred for follow-up of multiple myeloma on Zometa. Bone marrow biopsy done 3/4/2020 showed less than 5% plasma cells and repeat myeloma labs done on 3/9/2020, showed CT are. PET/CT is done as part of the subsequent treatment strategy evaluation.\par \par TECHNIQUE:  Fasting blood sugar measured prior to injection of radiopharmaceutical was 116 mg/dl. Following intravenous injection of the radiopharmaceutical and an uptake period of approximately 60 minutes, FDG-PET/CT was obtained on a  [employer] from the vertex of the skull to the toes. Oral contrast was given during the uptake period. CT was performed during shallow respiration. The CT protocol was optimized for PET attenuation correction and to provide anatomic detail for localization of PET abnormalities. The CT protocol was not designed to produce and cannot replace state-of-the-art diagnostic CT images with specific imaging protocols for different body parts and indications. Images were reviewed on a dedicated workstation using multiplanar reconstruction.\par \par The standardized uptake values (SUV) are normalized to patient body weight and indicate the highest activity concentration (SUVmax) in a given disease site. All image numbers refer to the axial image number.\par \par COMPARISON:  FDG PET/CT dated 9/7/2019.\par \par OTHER STUDIES USED FOR CORRELATION: MRI of cervical spine dated 9/9/2019.\par \par FINDINGS:\par \par HEAD/NECK: Physiologic FDG activity in the brain.\par \par Mild hypermetabolism in left masseter muscle, decreased as compared to prior study, may be secondary to bruxism. Elongated focus of increased FDG activity in left lower cervical region may reflect inflammation or muscle spasm (image 77).\par \par New hypermetabolic lucency in left maxillary alveolus is nonspecific (SUV 8.0; image 53). Recommend correlation with dental exam. A new small hypermetabolic focus is seen in the right maxilla (SUV 3.7; image 55).\par \par CHEST: Few new small hypermetabolic foci in bilateral hilar region likely correspond to small lymph nodes that are difficult to delineate on CT. For reference, left perihilar region demonstrates SUV 3.6 (image 117). Resolution of FDG-avid subcentimeter left posterior paraesophageal lymph node.\par \par LUNGS: No abnormal FDG activity. Calcified right lower lobe granuloma, unchanged (image 128).\par \par PLEURA/PERICARDIUM: No abnormal FDG activity. No effusion.\par \par HEPATOBILIARY/PANCREAS: Physiologic FDG activity. Liver SUV mean is 2.1; previous 2.3.\par \par SPLEEN: No abnormal FDG activity. Normal in size.\par \par ADRENAL GLANDS: No abnormal FDG activity. No nodule.\par \par KIDNEYS/URINARY BLADDER: Physiologic FDG activity. Multiple bilateral renal cysts, measuring up to 4.6 cm on the right, unchanged.\par \par PELVIC ORGANS: No abnormal FDG activity.\par \par ABDOMINOPELVIC NODES: No enlarged or FDG avid lymph node\par \par BOWEL/PERITONEUM/MESENTERY: Physiologic FDG activity. Resolution of hypermetabolism in distal esophagus/GE junction.\par \par BONES: Near total resolution of previously seen hypermetabolic foci in the axial skeleton, right humerus, and proximal bilateral femurs. Previously seen lytic lesions demonstrate new areas of sclerosis on CT. For reference, one of the most FDG-avid residual lesions is a mixed lytic and sclerotic lesion in the left scapula which demonstrates SUV 3.0 (image 98); previously lytic with SUV 5.7. Previously seen hypermetabolic lesion in right sphenoid bone has resolved. Previously seen large destructive lesion in the manubrium the sternum demonstrates new sclerosis with SUV 3.0 (image 103); previous SUV 5.3. Resolution of hypermetabolic intramedullary soft tissue in proximal right humerus. Near total resolution of hypermetabolic intramedullary soft tissue in proximal left femur (SUV 1.3; image 273; previous SUV 5.3).\par \par FDG-avid fracture in left inferior pubic ramus demonstrates increased callus formation and is similar in metabolism (SUV 3.8; image 250; previous SUV 3.1). Few new mildly FDG-avid bilateral rib fractures. For reference, fracture in the anterior aspect of the left second rib demonstrates SUV 3.4 (image 105).\par \par Resolution of hypermetabolism in left palmar musculature.\par \par IMPRESSION:  Abnormal whole body FDG-PET/CT scan.\par \par 1. Near total resolution of hypermetabolism associated with lytic and intramedullary lesions in the axial and appendicular skeleton as compared to prior study dated 9/7/2019. Previously seen lytic lesions demonstrate new sclerosis. Findings are compatible with response to interval therapy.\par \par 2. Few new mildly FDG-avid bilateral rib fractures. An FDG-avid fracture in the left inferior pubic ramus demonstrates increased callus formation and is similar in metabolism.\par \par 3. New hypermetabolic lucency in left maxillary alveolus and new small hypermetabolic focus in right maxilla are nonspecific. Recommend correlation with dental exam.\par \par 4. Few new mildly FDG-avid nonspecific bilateral hilar lymph nodes.\par \par 5. Resolution of many specific hypermetabolism in distal esophagus/GE junction.\par \par \par \par \par \par \par \par \par \par \par \par \par \par \par \par LUANA RAMOS M.D., NUCLEAR MEDICINE ATTENDING\par This document has been electronically signed. May  7 2020  4:08PM\par \par  \par \par  Ordered by: OZZY ALMEIDA       Collected/Examined: 07May2020 12:57PM       \par Verified by: OZZY ALMEIDA 11May2020 09:27AM       \par  Result Communication: Call patient with results,Discussed results with patient;\par Stage: Final       \par  Performed at: [facility] at the [facility]       Resulted: 07May2020 04:11PM       Last Updated: 11May2020 09:27AM       Accession: [id number]       \par \par \par  Pathology             Final\par \par No Documents Attached\par \par \par \par \par   [facility] Accession Number : [id number]\par \par HARDEEP, SUBASH                          2\par \par \par \par Cytopathology Report\par \par \par \par \par \par Final Diagnosis\par SOFT TISSUE, SACRUM, CT GUIDED CORE BIOPSY\par Plasma cell neoplasm\par See note and description.\par \par Diagnostic note:  Overall the morphologic and immunophenotypic\par findings are consistent with plasma cell neoplasm. The\par differential diagnosis includes plasmacytoma vs plasma cells\par myeloma. Correlation with laboratory, radiologic and clinical\par findings is required for further classification.\par \par Microscopic description:\par The touch prep slides are composed of numerous enlarged plasma\par cells with prominent nucleoli, occasional binucleated forms are\par seen.\par \par Histologic sections consist of multiple needle shaped cores of\par soft tissue showing proliferation of enlarged plasma cells with\par prominent nucleoli, forming sheets.\par \par Immunohistochemistry:  , kappaISH, lambdaISH, cyclinD1, p53\par stains performed on paraffin embedded section of block 1C.\par \par Neoplastic cells are:\par Positive:  , KappaISH, p53\par Negative:  LambdaISH, cyclinD1\par \par Flow cytometry analysis (51-PF-): Monotypic plasma cells\par (24.1% of total analyzed cells) are present. There are two\par aberrant monoclonal plasma cell populations:\par Population #1 (16.5% of total analyzed cells, 68.6% of plasma\par cell population) positive for cytoplasmic kappa, CD38 dimmer,\par CD45 dimmer, CD56, ; negative for , CD19, CD20.\par Population # 2 (7.6% of total analyzed cells, 31.4% of plasma\par cell population) positive for cytoplasmic kappa\par \par \par \par \par \par \par HARDEEP, SUBASH                          2\par \par \par \par Cytopathology Report\par \par \par \par \par (brighter), , CD38 brighter, CD45 dim, CD56 (brighter),\par ; negative for CD19, CD20.\par \par Screened by: Jose Juan Smith CT(ASCP)\par Verified by: Orlando Spicer MD\par (Electronic Signature)\par Reported on: 08/22/19 13:22 EDT, 6 [facility] [address]\par Cytology technical processing performed at [address] [place] [id number]\par _________________________________________________________________\par \par \par Specimen(s) Submitted\par SOFT TISSUE, SACRUM, CT GUIDED CORE BIOPSY\par \par \par Statement of Adequacy\par Immediate cytologic study for adequacy of specimen using a Diff-\par Quik stain was performed at [facility], [address] [place] [address]. Touch\par imprints acceptable for further evaluation by Jose Juan Smith\par CT(ASCP).\par \par \par Clinical Information\par Sacral mass.\par \par \par Gross Description\par Core Biopsy:\par Tissue collected: Specimen container has been inspected to\par confirm patient?s name and date of birth, contains 3  tan cores\par measuring 1.0, 1.0, 0.8 cm in length (and multiple fragments).\par Entire specimen submitted in 2 cassette(s) labeled 1B and 1 C.\par \par 4 Touch prep slides ( 2 air dried + 2 fixed)\par \par FNA:\par No material submitted for cell block (No block 1A)\par \par Prepared:\par 4 Touch Prep,\par 1 core biopsy labeled 1B\par 1 core biopsy labeled 1C\par 6 Total slides\par \par  \par \par  Ordered by: DELORES MOSS       Collected/Examined: [id number] 03:14PM       \par Verified by: OZZY ALMEIDA [id number] 03:07PM       \par  Result Communication: No patient communication needed at this time;\par Stage: Final       \par  Performed at: [facility]       Resulted: [id number] 01:22PM       Last Updated: 18Jun2020 03:07PM       Accession: [id number]       \par \par \par  Pathology             Final\par \par No Documents Attached\par \par \par \par \par   [facility] Accession Number : [id number]\par \par HARDEEP, SUBASH                          4\par \par \par \par Addendum Report\par \par \par \par \par Hematopathology Addendum\par Comprehensive Hematopathology Report\par \par Final Diagnosis:\par 1, 2. Bone marrow biopsy and bone marrow aspirate\par - Plasma cell myeloma (10% with focal 25% by  stain)\par - Slight erythroid predominant trilineage hematopoiesis\par with maturation\par \par Diagnostic Note:\par Please note findings of a normal male karyotype and a negative\par multiple myeloma FISH panel. Based on the additional findings,\par the diagnosis has not changed. Correlation with studies for\par myeloma-related organ dysfunction is necessary.\par \par Morphology:\par Microscopic description:\par 1. Biopsy: Sections of bone marrow biopsy show normocellularity\par (60 to 70% cellularity), mild plasmacytosis with foci of small\par clusters, slight erythroid predominant trilineage hematopoiesis\par with maturation, mild megakaryocytosis with normal morphology,\par and increased iron stores.\par 2. Aspirate: Cellular spicules are not present, precluding\par differential count. Review of the smear shows some maturing and\par predominant mature myeloid cells, a few erythroid elements, and\par rare megakaryocytes.\par \par Ancillary Studies:\par Bone marrow aspirate iron stain: No spicules are present to\par evaluate for iron stores and there are insufficient nRBC to\par evaluate for ring sideroblasts.\par Congo red stain is negative for amyloidosis.\par Flow cytometry:  Monotypic plasma cells (1% of cells), positive\par for cytoplasmic kappa, CD38, , dimmer CD45, partial CD56;\par negative CD19, CD20, .\par CD45/side scatter shows no significant blast population. There is\par no increase in CD34,  or CD14 positive cells.\par Lymphocytes (8% of cells) show a heterogeneous population of T-\par cells (with normal CD4 to CD8 ratio), and polytypic B-cells.\par Immunohistochemical stains ( performed on the block 1A and\par 1B, cyclin-D1 performed on block 1A):   stains show overall\par approximately 10% plasma cells, with foci of small clusters,\par focal up to 25%. Plasma cells are negative for cyclin-D1.\par \par Cytogenetics:\par Result: Normal male karyotype\par Karyotype: 46,XY[20]\par \par \par \par \par \par \par HARDEEP, SUBASH                          4\par \par \par \par Addendum Report\par \par \par \par \par FISH:\par Result: NORMAL FISH -\par - MULTIPLE MYELOMA PANEL\par Probe(s) and Location(s): FGFR3(4p16), CCND1(11q13), RB1(13q14),\par IGH(14q32), MAF(16q23), TP53(17p13.1)\par \par Clinical History/Data:\par New diagnosis multiple myeloma\par CBC on 08/24/2019: WBC: 8.09 K/uL, HGB: 9.5 g/dL, MCV: 90 fl,\par Plt: 227 K/uL\par Serum Immunofixation: IgG kappa\par \par Verified by: Nancy Farris M.D.\par (Electronic Signature)\par Reported on: 09/07/19 10:37 EDT, 6 [place], [place]\par [address]\par _________________________________________________________________\par \par \par Hematopathology Report\par \par \par \par \par Final Diagnosis\par 1, 2. Bone marrow biopsy and bone marrow aspirate\par - Plasma cell myeloma (10% with focal 25% by  stain)\par - Slight erythroid predominant trilineage hematopoiesis\par with maturation\par \par See note and description.\par \par Diagnostic note:\par Correlation with studies for myeloma-related organ dysfunction is\par necessary.\par Comprehensive report with results of pending ancillary studies to\par follow.\par \par Ancillary studies\par Bone marrow aspirate iron stain: No spicules are present to\par evaluate for iron stores and there are insufficient nRBC to\par evaluate for ring sideroblasts.\par \par Flow cytometry:  Monotypic plasma cells (1% of cells), positive\par for cytoplasmic kappa, CD38, , dimmer CD45, partial CD56;\par negative CD19, CD20, .\par CD45/side scatter shows no significant blast population. There is\par no increase in CD34,  or CD14 positive cells.\par \par \par \par \par \par \par HARDEEP, SUBASH                          4\par \par \par \par Hematopathology Report\par \par \par \par \par Lymphocytes (8% of cells) show a heterogeneous population of T-\par cells (with normal CD4 to CD8 ratio), and polytypic B-cells.\par \par Immunohistochemical stains ( performed on the block 1A and\par 1B, cyclin-D1 performed on block 1A):   stains show overall\par approximately 10% plasma cells, with foci of small clusters,\par focal up to 25%. Plasma cells are negative for cyclin-D1.\par \par Microscopic description:\par 1. Biopsy: Sections of bone marrow biopsy show normocellularity\par (60 to 70% cellularity), mild plasmacytosis with foci of small\par clusters, slight erythroid predominant trilineage hematopoiesis\par with maturation, mild megakaryocytosis with normal morphology,\par and increased iron stores.\par \par 2. Aspirate: Cellular spicules are not present, precluding\par differential count. Review of the smear shows some maturing and\par predominant mature myeloid cells, a few erythroid elements, and\par rare megakaryocytes.\par \par Comment:  Iron stain (examined to evaluate for iron stores; see\par microscopic description) and Giemsa stain (shows appropriate\par staining pattern) are performed and evaluated on block(s): 1A,\par 1B.\par \par Slide(s) with built in immunohistochemical study control(s)\par associated and negative control with this case has/have been\par verified by the sign out pathologist.\par For slide(s) without built in controls positive control slides\par has/have been reviewed and approved by immunohistochemistry lab\par These immunohistochemical/ in-situ hybridization tests have been\par developed and their performance characteristics determined by\par [facility], Department of\par Pathology, Division of Immunopathology, [address] [place] [address]\[place] [address].  It has not been cleared or approved by the\par U.S. Food and Drug Administration.  The FDA has determined that\par such clearance or approval is not necessary.  This test is used\par for clinical purposes.  The laboratory is certified under the\par CLIA-88 as qualified to perform high\par complexity clinical testing.\par \par Verified by: Nancy Farris M.D.\par (Electronic Signature)\par Reported on: 08/27/19 10:00 EDT, [address] [place] [address]\par [id number]\par _________________________________________________________________\par \par \par \par \par \par \par \par HARDEEP, SUBASH                          4\par \par \par \par Hematopathology Report\par \par \par \par \par Clinical History\par New diagnosis multiple myeloma\par CBC on 08/24/2019: WBC: 8.09 K/uL, HGB: 9.5 g/dL, MCV: 90 fl,\par Plt: 227 K/uL\par Serum Immunofixation: IgG kappa\par \par Specimen(s) Submitted\par 1     Bone marrow biopsy left\par 2     Bone marrow aspirate\par \par Gross Description\par 1. The specimen is received in bouin's fixative and the specimen\par container is labeled: Left bone marrow biopsy.  It consists of a\par 0.5 x 0.2 cm bone marrow core with a 1.5 x 1.3 x 0.5 cm blood\par clot.  Entirely submitted.  Two cassettes: A = bone marrow core;\par B = blood clot.\par 2. The specimen is received labeled with patient's name and\par consists of 3 air dried slide(s). 2 slide(s) stained with Monzon\par Giemsa stain. 1 stained for iron stain.\par In addition to other data that may appear on the specimen\par container, the label has been inspected to confirm the presence\par of the patient's name and date of birth.\par Catarina ANAYA Brian 08/22/2019 17:12\par \par  \par \par  Ordered by: BECKY BILLS       Collected/Examined: [id number] 03:50PM       \par Verified by: BECKY BILLS [id number] 05:42PM       \par  Result Communication: No patient communication needed at this time;\par Stage: Final       \par  Performed at: [facility]       Resulted: [id number] 10:37AM       Last Updated: [id number] 05:42PM       Accession: [id number]

## 2022-01-19 NOTE — HISTORY OF PRESENT ILLNESS
[Disease:__________________________] : Disease: [unfilled] [Treatment Protocol] : Treatment Protocol [de-identified] : As per Dr Kilgore's note on 8/18/2020\par \par "Mr Rand was referred to my office for newly diagnosed IgG kappa multiple myeloma. The patient's primary language is Wolof, he has his daughter Mirlande as the , per his wishes. He was seen by Dr. Andrés Valle (hematology) in Feb 2019 and had a BM bx showing 10-15% plasma cells, concerning for smoldering myeloma. According to the daughter, he was awaiting insurance approval for imaging studies. He was admitted from 8/18/19 at [facility] where he presented with pain in the L leg/lower back, worsening for the past 2-3 months. On labs, he was found to have a total protein of 10, Ca level 12 until 8/29/19. Dental consult was called and patient needs to have 10 teeth extracted -thus, IV bisphosphonates were not given, pt improved with hydration. He also had a slightly inc'ed creatinine -improved after IVF. CT C/A/P 8/18/19 showed a lytic expansile soft tissue lesion centered in the manubrium measuring approximately 7.3 x 3.4 x 4.3 cm, invading both 1st ribs. There was also a A lytic expansile soft tissue lesion in the T8 vertebral body causing mass effect on spinal canal and obliterating left neural foramina at T7-8 and T8-9. ON 8/22/19 an MRI thoracic spine was done showing multiple areas of tumor involvement within the thoracic spine in keeping with the patient's history of multiple myeloma. Prominent lesion within C7 on the right incompletely seen.\par Large lesion within T8 on the left producing epidural tumor extension and moderate narrowing of the spinal canal with mild flattening of the spinal cord. He was evaluated on 8/22/19 by Dr. Foley (Rad Onc) and was given 5 fractions of XRT from 8/23/19 to 8/29/19 to C7 and T8. He was discharged home on 8/29/19 with follow up in the outpatient office, and on Dexamethasone 4mg po daily. \par \par  \par \par \par \par Interval History: The patient is being followed for IgG kappa MM with multiple bone lytic lesions, hypercalcemia (resolved) and mild anemia. He started Velcade/Dexa weekly on 9/919. The patient got the Revlimid and started it C1 day 1 on 10/21/19 and hed been tolerating it well. \par \par Starting in February, his blood counts became low -plt count 54, Hb 8.3 wbc 5.8. Revlimid was held -after 2/3/20. He got 1 shot Velcade/Dexa on 2/3/20, NO chemo since then. \par BM done on 3/4/20 to eval for residual disease (MM) vs. MDS. BM showed recovering marrow elements, plasma cells <5%. SPEP/MICAH from 3/9/20 showed NO monoclonal proteins, c/w CR. \par \par The PET scan from 5/7/20 showed L uptake maxillary -pt reports that he had dentures done in Jan 2020 and has discomfort from them. He did not go to the dentist during COVID Rib fractures -pt denied falls/trauma. Patient has dental appointment for next week. He has some discomfort in the upper teeth b/l. He also missed his Velcade SQ last week 'i did not know if i am supposed to continue it.' He has no neuropathy from it, tolerating well. "\par  [de-identified] : RISS- II [FreeTextEntry1] : s/p RT to C7/T8 spine\par RVD 9/2019 -6/2020 transitioned to maintenance velcade q2wk started 6/19/2020 [de-identified] : Oct 6, 2020\par Pt is here for initial visit with me. He has his dtr on the phone, prefers her included in conversation and declined an . Pt is feeling well. He is tolerating monthly zometa and maintenance velcade w/o any adverse effects. Last set of myeloma labs done in August showed stable remission. He is requesting refill of several meds today.\par ---------------------------------\par \par 1/25/2021\par Patient presents for a follow up. He has no complaints except infrequent short duration chills with no fever. He is on biweekly Velcade and monthly Zometa. His last treatment of Zometa was on 1/8/2021 and last Velcade on 1/22/2021. His most recent SPEP/MICAH was normal.   \par \par 6/10/2021\par Patient seen in follow up. He did not go to his home country as he planned. He does not endorse any big change in his health status. She saw his Kidney doctor who told him he is iron deficient (labs are not supportive). His anemia is multifactorial including CKD. His most recent myeloma labs (03/2021) show continued CR. \par \par 9/1/2021\par Mr Rand returns for a follow up. His daughter is accompanying him. He has no complaints. He reports that he develops diarrhea (one motion) the day after his Velcade treatment. He plans to visit his home town soon. He has some unavoidable things to take care of. Of note he has been in sCR. \par \par 1/19/2022\par Patient seen in follow up. He has done well in the interim. He also visited his home country in this interval. He was safe all along.  As regards to his disease, he is stable and appears to be in remission. He has no complaints.  \par \par \par

## 2022-01-19 NOTE — ASSESSMENT
[FreeTextEntry1] : 71 year-old Mr. MEIER is seen  in follow up for IgG Kappa Multiple Myeloma presented with lytic percy lesions. He received XRT to percy lesions,was started on RVD induction,  achieved VGPR.  Last PET 5/2020 - markedly improved and reduced FDG avidity throughout. His most recent SPEP/MICAH and FLCR (6/10/2021) was normal.  He is now on maintenance Velcade and Zometa.\par \par Of note, last Myeloma labs on 06/2021 showed no M-spike, no M-band, normal range FLCR. Anemia of multifactorial origin including CKD noted. WIll do an anemia w/u for further management. May benefit from GABRIEL. 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

## 2022-01-20 ENCOUNTER — RESULT REVIEW (OUTPATIENT)
Age: 72
End: 2022-01-20

## 2022-01-20 ENCOUNTER — APPOINTMENT (OUTPATIENT)
Dept: INFUSION THERAPY | Facility: HOSPITAL | Age: 72
End: 2022-01-20

## 2022-01-20 LAB
BASOPHILS # BLD AUTO: 0.06 K/UL — SIGNIFICANT CHANGE UP (ref 0–0.2)
BASOPHILS NFR BLD AUTO: 0.8 % — SIGNIFICANT CHANGE UP (ref 0–2)
EOSINOPHIL # BLD AUTO: 0.19 K/UL — SIGNIFICANT CHANGE UP (ref 0–0.5)
EOSINOPHIL NFR BLD AUTO: 2.7 % — SIGNIFICANT CHANGE UP (ref 0–6)
HCT VFR BLD CALC: 30.8 % — LOW (ref 39–50)
HGB BLD-MCNC: 10.4 G/DL — LOW (ref 13–17)
IMM GRANULOCYTES NFR BLD AUTO: 1 % — SIGNIFICANT CHANGE UP (ref 0–1.5)
LYMPHOCYTES # BLD AUTO: 1.44 K/UL — SIGNIFICANT CHANGE UP (ref 1–3.3)
LYMPHOCYTES # BLD AUTO: 20.3 % — SIGNIFICANT CHANGE UP (ref 13–44)
MCHC RBC-ENTMCNC: 32.1 PG — SIGNIFICANT CHANGE UP (ref 27–34)
MCHC RBC-ENTMCNC: 33.8 G/DL — SIGNIFICANT CHANGE UP (ref 32–36)
MCV RBC AUTO: 95.1 FL — SIGNIFICANT CHANGE UP (ref 80–100)
MONOCYTES # BLD AUTO: 0.67 K/UL — SIGNIFICANT CHANGE UP (ref 0–0.9)
MONOCYTES NFR BLD AUTO: 9.5 % — SIGNIFICANT CHANGE UP (ref 2–14)
NEUTROPHILS # BLD AUTO: 4.65 K/UL — SIGNIFICANT CHANGE UP (ref 1.8–7.4)
NEUTROPHILS NFR BLD AUTO: 65.7 % — SIGNIFICANT CHANGE UP (ref 43–77)
NRBC # BLD: 0 /100 WBCS — SIGNIFICANT CHANGE UP (ref 0–0)
PLATELET # BLD AUTO: 160 K/UL — SIGNIFICANT CHANGE UP (ref 150–400)
RBC # BLD: 3.24 M/UL — LOW (ref 4.2–5.8)
RBC # FLD: 12.7 % — SIGNIFICANT CHANGE UP (ref 10.3–14.5)
WBC # BLD: 7.08 K/UL — SIGNIFICANT CHANGE UP (ref 3.8–10.5)
WBC # FLD AUTO: 7.08 K/UL — SIGNIFICANT CHANGE UP (ref 3.8–10.5)

## 2022-01-31 ENCOUNTER — OUTPATIENT (OUTPATIENT)
Dept: OUTPATIENT SERVICES | Facility: HOSPITAL | Age: 72
LOS: 1 days | Discharge: HOME | End: 2022-01-31

## 2022-01-31 DIAGNOSIS — C90.00 MULTIPLE MYELOMA NOT HAVING ACHIEVED REMISSION: ICD-10-CM

## 2022-02-03 ENCOUNTER — APPOINTMENT (OUTPATIENT)
Dept: INFUSION THERAPY | Facility: HOSPITAL | Age: 72
End: 2022-02-03

## 2022-02-03 ENCOUNTER — RESULT REVIEW (OUTPATIENT)
Age: 72
End: 2022-02-03

## 2022-02-03 DIAGNOSIS — Z51.11 ENCOUNTER FOR ANTINEOPLASTIC CHEMOTHERAPY: ICD-10-CM

## 2022-02-03 LAB
ALBUMIN SERPL ELPH-MCNC: 4.8 G/DL — SIGNIFICANT CHANGE UP (ref 3.3–5)
ALP SERPL-CCNC: 52 U/L — SIGNIFICANT CHANGE UP (ref 40–120)
ALT FLD-CCNC: 13 U/L — SIGNIFICANT CHANGE UP (ref 10–45)
ANION GAP SERPL CALC-SCNC: 15 MMOL/L — SIGNIFICANT CHANGE UP (ref 5–17)
AST SERPL-CCNC: 18 U/L — SIGNIFICANT CHANGE UP (ref 10–40)
BASOPHILS # BLD AUTO: 0.05 K/UL — SIGNIFICANT CHANGE UP (ref 0–0.2)
BASOPHILS NFR BLD AUTO: 0.8 % — SIGNIFICANT CHANGE UP (ref 0–2)
BILIRUB SERPL-MCNC: 0.2 MG/DL — SIGNIFICANT CHANGE UP (ref 0.2–1.2)
BUN SERPL-MCNC: 28 MG/DL — HIGH (ref 7–23)
CALCIUM SERPL-MCNC: 9.6 MG/DL — SIGNIFICANT CHANGE UP (ref 8.4–10.5)
CHLORIDE SERPL-SCNC: 107 MMOL/L — SIGNIFICANT CHANGE UP (ref 96–108)
CO2 SERPL-SCNC: 20 MMOL/L — LOW (ref 22–31)
CREAT SERPL-MCNC: 1.62 MG/DL — HIGH (ref 0.5–1.3)
EOSINOPHIL # BLD AUTO: 0.21 K/UL — SIGNIFICANT CHANGE UP (ref 0–0.5)
EOSINOPHIL NFR BLD AUTO: 3.2 % — SIGNIFICANT CHANGE UP (ref 0–6)
GLUCOSE SERPL-MCNC: 106 MG/DL — HIGH (ref 70–99)
HCT VFR BLD CALC: 29.1 % — LOW (ref 39–50)
HGB BLD-MCNC: 9.8 G/DL — LOW (ref 13–17)
IMM GRANULOCYTES NFR BLD AUTO: 0.8 % — SIGNIFICANT CHANGE UP (ref 0–1.5)
LYMPHOCYTES # BLD AUTO: 1.45 K/UL — SIGNIFICANT CHANGE UP (ref 1–3.3)
LYMPHOCYTES # BLD AUTO: 22.4 % — SIGNIFICANT CHANGE UP (ref 13–44)
MCHC RBC-ENTMCNC: 32 PG — SIGNIFICANT CHANGE UP (ref 27–34)
MCHC RBC-ENTMCNC: 33.7 G/DL — SIGNIFICANT CHANGE UP (ref 32–36)
MCV RBC AUTO: 95.1 FL — SIGNIFICANT CHANGE UP (ref 80–100)
MONOCYTES # BLD AUTO: 0.59 K/UL — SIGNIFICANT CHANGE UP (ref 0–0.9)
MONOCYTES NFR BLD AUTO: 9.1 % — SIGNIFICANT CHANGE UP (ref 2–14)
NEUTROPHILS # BLD AUTO: 4.13 K/UL — SIGNIFICANT CHANGE UP (ref 1.8–7.4)
NEUTROPHILS NFR BLD AUTO: 63.7 % — SIGNIFICANT CHANGE UP (ref 43–77)
NRBC # BLD: 0 /100 WBCS — SIGNIFICANT CHANGE UP (ref 0–0)
PLATELET # BLD AUTO: 138 K/UL — LOW (ref 150–400)
POTASSIUM SERPL-MCNC: 4.6 MMOL/L — SIGNIFICANT CHANGE UP (ref 3.5–5.3)
POTASSIUM SERPL-SCNC: 4.6 MMOL/L — SIGNIFICANT CHANGE UP (ref 3.5–5.3)
PROT SERPL-MCNC: 7.5 G/DL — SIGNIFICANT CHANGE UP (ref 6–8.3)
RBC # BLD: 3.06 M/UL — LOW (ref 4.2–5.8)
RBC # FLD: 13.2 % — SIGNIFICANT CHANGE UP (ref 10.3–14.5)
SODIUM SERPL-SCNC: 142 MMOL/L — SIGNIFICANT CHANGE UP (ref 135–145)
WBC # BLD: 6.48 K/UL — SIGNIFICANT CHANGE UP (ref 3.8–10.5)
WBC # FLD AUTO: 6.48 K/UL — SIGNIFICANT CHANGE UP (ref 3.8–10.5)

## 2022-02-10 ENCOUNTER — APPOINTMENT (OUTPATIENT)
Dept: CARDIOLOGY | Facility: CLINIC | Age: 72
End: 2022-02-10
Payer: MEDICARE

## 2022-02-10 ENCOUNTER — NON-APPOINTMENT (OUTPATIENT)
Age: 72
End: 2022-02-10

## 2022-02-10 VITALS
DIASTOLIC BLOOD PRESSURE: 66 MMHG | HEART RATE: 55 BPM | WEIGHT: 127 LBS | OXYGEN SATURATION: 100 % | SYSTOLIC BLOOD PRESSURE: 158 MMHG | HEIGHT: 64.29 IN | BODY MASS INDEX: 21.68 KG/M2 | TEMPERATURE: 98.4 F

## 2022-02-10 VITALS — DIASTOLIC BLOOD PRESSURE: 70 MMHG | SYSTOLIC BLOOD PRESSURE: 138 MMHG

## 2022-02-10 PROCEDURE — 93000 ELECTROCARDIOGRAM COMPLETE: CPT

## 2022-02-10 PROCEDURE — 93306 TTE W/DOPPLER COMPLETE: CPT

## 2022-02-10 PROCEDURE — 99214 OFFICE O/P EST MOD 30 MIN: CPT

## 2022-02-10 NOTE — HISTORY OF PRESENT ILLNESS
[FreeTextEntry1] : Yoly López is a 72 year old male with history of CAD s/p stent, hypertension and hypercholesterolemia comes for follow up visit. Denies any chest pain or palpitations. No shortness of breath on exertion. Compliant to medications. Physically active. Follows up with PCP. Getting treatment for Multiple myeloma.

## 2022-02-10 NOTE — DISCUSSION/SUMMARY
[FreeTextEntry1] : In a summary Yoly López is an elderly male with CAD s/p stent, stable. Continue Aspirin. Hypertension, controlled. Continue Amlodipine, Losartan and Metoprolol.  Cut down on salt intake. Hypercholesterolemia, continue Atorvastatin  and low cholesterol diet. LDL goal less than 70 g/dL. Echo done showed normal LV systolic function and mild - moderate calcific Aortic stenosis. Echo results explained. Follow up in 6 months.

## 2022-02-10 NOTE — REVIEW OF SYSTEMS
[Joint Pain] : joint pain [Negative] : Respiratory [Fever] : no fever [Headache] : no headache [Chills] : no chills [Feeling Fatigued] : not feeling fatigued [Blurry Vision] : no blurred vision [Dyspnea on exertion] : not dyspnea during exertion [Chest Discomfort] : no chest discomfort [Lower Ext Edema] : no extremity edema [Palpitations] : no palpitations [Orthopnea] : no orthopnea [PND] : no PND [Abdominal Pain] : no abdominal pain [Nausea] : no nausea [Vomiting] : no vomiting [Heartburn] : no heartburn [Rash] : no rash [Itching] : no itching [Dizziness] : no dizziness [Tremor] : no tremor was seen [Numbness (Hypoesthesia)] : no numbness [Tingling (Paresthesia)] : no tingling [Confusion] : no confusion was observed [Under Stress] : not under stress [Easy Bleeding] : no tendency for easy bleeding [Easy Bruising] : no tendency for easy bruising [FreeTextEntry2] : on and off fatigue.

## 2022-02-10 NOTE — PHYSICAL EXAM
[Normal Conjunctiva] : normal conjunctiva [No Carotid Bruit] : no carotid bruit [Normal S1, S2] : normal S1, S2 [Soft] : abdomen soft [Non Tender] : non-tender [Normal Bowel Sounds] : normal bowel sounds [No Edema] : no edema [No Cyanosis] : no cyanosis [Normal] : alert and oriented, normal memory [de-identified] : Ejection systolic murmur in 2 nd ICS.

## 2022-02-17 ENCOUNTER — RESULT REVIEW (OUTPATIENT)
Age: 72
End: 2022-02-17

## 2022-02-17 ENCOUNTER — APPOINTMENT (OUTPATIENT)
Dept: INFUSION THERAPY | Facility: HOSPITAL | Age: 72
End: 2022-02-17

## 2022-02-17 LAB
BASOPHILS # BLD AUTO: 0.06 K/UL — SIGNIFICANT CHANGE UP (ref 0–0.2)
BASOPHILS NFR BLD AUTO: 0.9 % — SIGNIFICANT CHANGE UP (ref 0–2)
EOSINOPHIL # BLD AUTO: 0.26 K/UL — SIGNIFICANT CHANGE UP (ref 0–0.5)
EOSINOPHIL NFR BLD AUTO: 4 % — SIGNIFICANT CHANGE UP (ref 0–6)
HCT VFR BLD CALC: 30 % — LOW (ref 39–50)
HGB BLD-MCNC: 10.3 G/DL — LOW (ref 13–17)
IMM GRANULOCYTES NFR BLD AUTO: 0.9 % — SIGNIFICANT CHANGE UP (ref 0–1.5)
LYMPHOCYTES # BLD AUTO: 1.37 K/UL — SIGNIFICANT CHANGE UP (ref 1–3.3)
LYMPHOCYTES # BLD AUTO: 20.9 % — SIGNIFICANT CHANGE UP (ref 13–44)
MCHC RBC-ENTMCNC: 32.2 PG — SIGNIFICANT CHANGE UP (ref 27–34)
MCHC RBC-ENTMCNC: 34.3 G/DL — SIGNIFICANT CHANGE UP (ref 32–36)
MCV RBC AUTO: 93.8 FL — SIGNIFICANT CHANGE UP (ref 80–100)
MONOCYTES # BLD AUTO: 0.75 K/UL — SIGNIFICANT CHANGE UP (ref 0–0.9)
MONOCYTES NFR BLD AUTO: 11.4 % — SIGNIFICANT CHANGE UP (ref 2–14)
NEUTROPHILS # BLD AUTO: 4.06 K/UL — SIGNIFICANT CHANGE UP (ref 1.8–7.4)
NEUTROPHILS NFR BLD AUTO: 61.9 % — SIGNIFICANT CHANGE UP (ref 43–77)
NRBC # BLD: 0 /100 WBCS — SIGNIFICANT CHANGE UP (ref 0–0)
PLATELET # BLD AUTO: 141 K/UL — LOW (ref 150–400)
RBC # BLD: 3.2 M/UL — LOW (ref 4.2–5.8)
RBC # FLD: 13.3 % — SIGNIFICANT CHANGE UP (ref 10.3–14.5)
WBC # BLD: 6.56 K/UL — SIGNIFICANT CHANGE UP (ref 3.8–10.5)
WBC # FLD AUTO: 6.56 K/UL — SIGNIFICANT CHANGE UP (ref 3.8–10.5)

## 2022-03-02 ENCOUNTER — OUTPATIENT (OUTPATIENT)
Dept: OUTPATIENT SERVICES | Facility: HOSPITAL | Age: 72
LOS: 1 days | Discharge: HOME | End: 2022-03-02

## 2022-03-02 DIAGNOSIS — C90.00 MULTIPLE MYELOMA NOT HAVING ACHIEVED REMISSION: ICD-10-CM

## 2022-03-03 ENCOUNTER — RESULT REVIEW (OUTPATIENT)
Age: 72
End: 2022-03-03

## 2022-03-03 ENCOUNTER — APPOINTMENT (OUTPATIENT)
Dept: INFUSION THERAPY | Facility: HOSPITAL | Age: 72
End: 2022-03-03

## 2022-03-03 DIAGNOSIS — Z51.11 ENCOUNTER FOR ANTINEOPLASTIC CHEMOTHERAPY: ICD-10-CM

## 2022-03-03 LAB
BASOPHILS # BLD AUTO: 0.07 K/UL — SIGNIFICANT CHANGE UP (ref 0–0.2)
BASOPHILS NFR BLD AUTO: 1 % — SIGNIFICANT CHANGE UP (ref 0–2)
EOSINOPHIL # BLD AUTO: 0.2 K/UL — SIGNIFICANT CHANGE UP (ref 0–0.5)
EOSINOPHIL NFR BLD AUTO: 2.9 % — SIGNIFICANT CHANGE UP (ref 0–6)
HCT VFR BLD CALC: 30.1 % — LOW (ref 39–50)
HGB BLD-MCNC: 10.3 G/DL — LOW (ref 13–17)
IMM GRANULOCYTES NFR BLD AUTO: 1 % — SIGNIFICANT CHANGE UP (ref 0–1.5)
LYMPHOCYTES # BLD AUTO: 1.55 K/UL — SIGNIFICANT CHANGE UP (ref 1–3.3)
LYMPHOCYTES # BLD AUTO: 22.2 % — SIGNIFICANT CHANGE UP (ref 13–44)
MCHC RBC-ENTMCNC: 31.6 PG — SIGNIFICANT CHANGE UP (ref 27–34)
MCHC RBC-ENTMCNC: 34.2 G/DL — SIGNIFICANT CHANGE UP (ref 32–36)
MCV RBC AUTO: 92.3 FL — SIGNIFICANT CHANGE UP (ref 80–100)
MONOCYTES # BLD AUTO: 0.51 K/UL — SIGNIFICANT CHANGE UP (ref 0–0.9)
MONOCYTES NFR BLD AUTO: 7.3 % — SIGNIFICANT CHANGE UP (ref 2–14)
NEUTROPHILS # BLD AUTO: 4.57 K/UL — SIGNIFICANT CHANGE UP (ref 1.8–7.4)
NEUTROPHILS NFR BLD AUTO: 65.6 % — SIGNIFICANT CHANGE UP (ref 43–77)
NRBC # BLD: 0 /100 WBCS — SIGNIFICANT CHANGE UP (ref 0–0)
PLATELET # BLD AUTO: 172 K/UL — SIGNIFICANT CHANGE UP (ref 150–400)
RBC # BLD: 3.26 M/UL — LOW (ref 4.2–5.8)
RBC # FLD: 13.5 % — SIGNIFICANT CHANGE UP (ref 10.3–14.5)
WBC # BLD: 6.97 K/UL — SIGNIFICANT CHANGE UP (ref 3.8–10.5)
WBC # FLD AUTO: 6.97 K/UL — SIGNIFICANT CHANGE UP (ref 3.8–10.5)

## 2022-03-16 ENCOUNTER — APPOINTMENT (OUTPATIENT)
Dept: HEMATOLOGY ONCOLOGY | Facility: CLINIC | Age: 72
End: 2022-03-16

## 2022-03-17 ENCOUNTER — APPOINTMENT (OUTPATIENT)
Dept: INFUSION THERAPY | Facility: HOSPITAL | Age: 72
End: 2022-03-17

## 2022-03-23 ENCOUNTER — APPOINTMENT (OUTPATIENT)
Dept: HEMATOLOGY ONCOLOGY | Facility: CLINIC | Age: 72
End: 2022-03-23

## 2022-03-31 ENCOUNTER — APPOINTMENT (OUTPATIENT)
Dept: INFUSION THERAPY | Facility: HOSPITAL | Age: 72
End: 2022-03-31

## 2022-05-11 ENCOUNTER — OUTPATIENT (OUTPATIENT)
Dept: OUTPATIENT SERVICES | Facility: HOSPITAL | Age: 72
LOS: 1 days | Discharge: HOME | End: 2022-05-11

## 2022-05-11 DIAGNOSIS — C90.00 MULTIPLE MYELOMA NOT HAVING ACHIEVED REMISSION: ICD-10-CM

## 2022-05-16 ENCOUNTER — APPOINTMENT (OUTPATIENT)
Dept: HEMATOLOGY ONCOLOGY | Facility: CLINIC | Age: 72
End: 2022-05-16

## 2022-08-01 NOTE — DIETITIAN INITIAL EVALUATION ADULT. - COPY OF WEIGHT IN KG
Airway  Performed by: You Lee MD  Authorized by: You Lee MD     Final Airway Type:  Endotracheal airway  Final Endotracheal Airway*:  Reinforced tube  ETT Size (mm)*:  8.0  Cuff*:  Regular  Technique Used for Successful ETT Placement:  Direct laryngoscopy  Intubation Procedure*:  ETCO2, Preoxygenation, Atraumatic, Dentition Unchanged and Pharynx Clear  Insertion Site:  Oral  Blade Type*:  MAC  Blade Size*:  4  Measured from*:  Teeth  Secured at (cm)*:  23  Placement Verified by: auscultation, capnometry and equal breath sounds    Glottic View*:  1 - full view of glottis  Attempts*:  1  Ventilation Between Attempts:  Bag valve and 2 hand mask  Number of Other Approaches Attempted:  0   Patient Identified, Procedure confirmed, Emergency equipment available and Safety protocols followed  Location:  OR  Indications for Airway Management:  Anesthesia  Mask Difficulty Assessment:  1 - vent by mask  Performed By:  Anesthesiologist  Anesthesiologist:  You Lee MD  Start Time: 8/1/2022 1:02 PM     ambulatory 58.9

## 2022-08-10 ENCOUNTER — APPOINTMENT (OUTPATIENT)
Dept: CARDIOLOGY | Facility: CLINIC | Age: 72
End: 2022-08-10

## 2022-09-14 ENCOUNTER — NON-APPOINTMENT (OUTPATIENT)
Age: 72
End: 2022-09-14

## 2022-09-14 ENCOUNTER — APPOINTMENT (OUTPATIENT)
Dept: CARDIOLOGY | Facility: CLINIC | Age: 72
End: 2022-09-14

## 2022-09-14 VITALS
DIASTOLIC BLOOD PRESSURE: 56 MMHG | OXYGEN SATURATION: 99 % | HEART RATE: 60 BPM | HEIGHT: 64.29 IN | WEIGHT: 116 LBS | BODY MASS INDEX: 19.81 KG/M2 | SYSTOLIC BLOOD PRESSURE: 152 MMHG

## 2022-09-14 VITALS — SYSTOLIC BLOOD PRESSURE: 136 MMHG | DIASTOLIC BLOOD PRESSURE: 60 MMHG

## 2022-09-14 PROCEDURE — 93000 ELECTROCARDIOGRAM COMPLETE: CPT

## 2022-09-14 PROCEDURE — 99214 OFFICE O/P EST MOD 30 MIN: CPT

## 2022-09-14 NOTE — PHYSICAL EXAM
[Normal Conjunctiva] : normal conjunctiva [No Carotid Bruit] : no carotid bruit [Normal S1, S2] : normal S1, S2 [Soft] : abdomen soft [Non Tender] : non-tender [Normal Bowel Sounds] : normal bowel sounds [No Edema] : no edema [No Cyanosis] : no cyanosis [Normal] : alert and oriented, normal memory [de-identified] : Ejection systolic murmur in 2 nd ICS.

## 2022-09-14 NOTE — DISCUSSION/SUMMARY
[FreeTextEntry1] : In a summary Yoly López is an elderly male with CAD s/p stent, stable. Continue Aspirin. Hypertension, controlled. Continue Amlodipine, Losartan and Metoprolol.  Cut down on salt intake. Hypercholesterolemia, continue Atorvastatin  and low cholesterol diet. LDL goal less than 70 g/dL.  Follow up in 6 months.

## 2022-09-22 ENCOUNTER — OUTPATIENT (OUTPATIENT)
Dept: OUTPATIENT SERVICES | Facility: HOSPITAL | Age: 72
LOS: 1 days | Discharge: HOME | End: 2022-09-22

## 2022-09-22 ENCOUNTER — EMERGENCY (EMERGENCY)
Facility: HOSPITAL | Age: 72
LOS: 1 days | Discharge: HOME | End: 2022-09-22
Attending: EMERGENCY MEDICINE | Admitting: EMERGENCY MEDICINE

## 2022-09-22 VITALS
OXYGEN SATURATION: 100 % | SYSTOLIC BLOOD PRESSURE: 150 MMHG | RESPIRATION RATE: 16 BRPM | DIASTOLIC BLOOD PRESSURE: 68 MMHG | HEART RATE: 68 BPM | TEMPERATURE: 98 F

## 2022-09-22 VITALS
OXYGEN SATURATION: 100 % | DIASTOLIC BLOOD PRESSURE: 64 MMHG | HEIGHT: 64 IN | SYSTOLIC BLOOD PRESSURE: 154 MMHG | HEART RATE: 67 BPM | TEMPERATURE: 98 F | RESPIRATION RATE: 16 BRPM

## 2022-09-22 DIAGNOSIS — C90.00 MULTIPLE MYELOMA NOT HAVING ACHIEVED REMISSION: ICD-10-CM

## 2022-09-22 LAB
ALBUMIN SERPL ELPH-MCNC: 4.9 G/DL — SIGNIFICANT CHANGE UP (ref 3.3–5)
ALP SERPL-CCNC: 51 U/L — SIGNIFICANT CHANGE UP (ref 40–120)
ALT FLD-CCNC: 16 U/L — SIGNIFICANT CHANGE UP (ref 4–41)
ANION GAP SERPL CALC-SCNC: 16 MMOL/L — HIGH (ref 7–14)
APPEARANCE UR: CLEAR — SIGNIFICANT CHANGE UP
AST SERPL-CCNC: 22 U/L — SIGNIFICANT CHANGE UP (ref 4–40)
BACTERIA # UR AUTO: NEGATIVE — SIGNIFICANT CHANGE UP
BASOPHILS # BLD AUTO: 0.02 K/UL — SIGNIFICANT CHANGE UP (ref 0–0.2)
BASOPHILS NFR BLD AUTO: 0.3 % — SIGNIFICANT CHANGE UP (ref 0–2)
BILIRUB SERPL-MCNC: 0.4 MG/DL — SIGNIFICANT CHANGE UP (ref 0.2–1.2)
BILIRUB UR-MCNC: NEGATIVE — SIGNIFICANT CHANGE UP
BUN SERPL-MCNC: 15 MG/DL — SIGNIFICANT CHANGE UP (ref 7–23)
CALCIUM SERPL-MCNC: 10.7 MG/DL — HIGH (ref 8.4–10.5)
CHLORIDE SERPL-SCNC: 97 MMOL/L — LOW (ref 98–107)
CO2 SERPL-SCNC: 22 MMOL/L — SIGNIFICANT CHANGE UP (ref 22–31)
COLOR SPEC: SIGNIFICANT CHANGE UP
CREAT SERPL-MCNC: 1.37 MG/DL — HIGH (ref 0.5–1.3)
DIFF PNL FLD: NEGATIVE — SIGNIFICANT CHANGE UP
EGFR: 54 ML/MIN/1.73M2 — LOW
EOSINOPHIL # BLD AUTO: 0.02 K/UL — SIGNIFICANT CHANGE UP (ref 0–0.5)
EOSINOPHIL NFR BLD AUTO: 0.3 % — SIGNIFICANT CHANGE UP (ref 0–6)
EPI CELLS # UR: 1 /HPF — SIGNIFICANT CHANGE UP (ref 0–5)
FLUAV AG NPH QL: SIGNIFICANT CHANGE UP
FLUBV AG NPH QL: SIGNIFICANT CHANGE UP
GLUCOSE SERPL-MCNC: 108 MG/DL — HIGH (ref 70–99)
GLUCOSE UR QL: NEGATIVE — SIGNIFICANT CHANGE UP
HCT VFR BLD CALC: 34.6 % — LOW (ref 39–50)
HGB BLD-MCNC: 11.6 G/DL — LOW (ref 13–17)
HYALINE CASTS # UR AUTO: 2 /LPF — SIGNIFICANT CHANGE UP (ref 0–7)
IANC: 5.56 K/UL — SIGNIFICANT CHANGE UP (ref 1.8–7.4)
IMM GRANULOCYTES NFR BLD AUTO: 0.4 % — SIGNIFICANT CHANGE UP (ref 0–0.9)
KETONES UR-MCNC: NEGATIVE — SIGNIFICANT CHANGE UP
LEUKOCYTE ESTERASE UR-ACNC: NEGATIVE — SIGNIFICANT CHANGE UP
LIDOCAIN IGE QN: 35 U/L — SIGNIFICANT CHANGE UP (ref 7–60)
LYMPHOCYTES # BLD AUTO: 1.76 K/UL — SIGNIFICANT CHANGE UP (ref 1–3.3)
LYMPHOCYTES # BLD AUTO: 22.2 % — SIGNIFICANT CHANGE UP (ref 13–44)
MCHC RBC-ENTMCNC: 29.1 PG — SIGNIFICANT CHANGE UP (ref 27–34)
MCHC RBC-ENTMCNC: 33.5 GM/DL — SIGNIFICANT CHANGE UP (ref 32–36)
MCV RBC AUTO: 86.9 FL — SIGNIFICANT CHANGE UP (ref 80–100)
MONOCYTES # BLD AUTO: 0.54 K/UL — SIGNIFICANT CHANGE UP (ref 0–0.9)
MONOCYTES NFR BLD AUTO: 6.8 % — SIGNIFICANT CHANGE UP (ref 2–14)
NEUTROPHILS # BLD AUTO: 5.56 K/UL — SIGNIFICANT CHANGE UP (ref 1.8–7.4)
NEUTROPHILS NFR BLD AUTO: 70 % — SIGNIFICANT CHANGE UP (ref 43–77)
NITRITE UR-MCNC: NEGATIVE — SIGNIFICANT CHANGE UP
NRBC # BLD: 0 /100 WBCS — SIGNIFICANT CHANGE UP (ref 0–0)
NRBC # FLD: 0 K/UL — SIGNIFICANT CHANGE UP (ref 0–0)
PH UR: 7.5 — SIGNIFICANT CHANGE UP (ref 5–8)
PLATELET # BLD AUTO: 238 K/UL — SIGNIFICANT CHANGE UP (ref 150–400)
POTASSIUM SERPL-MCNC: 3.7 MMOL/L — SIGNIFICANT CHANGE UP (ref 3.5–5.3)
POTASSIUM SERPL-SCNC: 3.7 MMOL/L — SIGNIFICANT CHANGE UP (ref 3.5–5.3)
PROT SERPL-MCNC: 8.5 G/DL — HIGH (ref 6–8.3)
PROT UR-MCNC: ABNORMAL
RBC # BLD: 3.98 M/UL — LOW (ref 4.2–5.8)
RBC # FLD: 13.7 % — SIGNIFICANT CHANGE UP (ref 10.3–14.5)
RBC CASTS # UR COMP ASSIST: 1 /HPF — SIGNIFICANT CHANGE UP (ref 0–4)
RSV RNA NPH QL NAA+NON-PROBE: SIGNIFICANT CHANGE UP
SARS-COV-2 RNA SPEC QL NAA+PROBE: SIGNIFICANT CHANGE UP
SODIUM SERPL-SCNC: 135 MMOL/L — SIGNIFICANT CHANGE UP (ref 135–145)
SP GR SPEC: 1.01 — SIGNIFICANT CHANGE UP (ref 1.01–1.05)
TROPONIN T, HIGH SENSITIVITY RESULT: 21 NG/L — SIGNIFICANT CHANGE UP
UROBILINOGEN FLD QL: SIGNIFICANT CHANGE UP
WBC # BLD: 7.93 K/UL — SIGNIFICANT CHANGE UP (ref 3.8–10.5)
WBC # FLD AUTO: 7.93 K/UL — SIGNIFICANT CHANGE UP (ref 3.8–10.5)
WBC UR QL: 0 /HPF — SIGNIFICANT CHANGE UP (ref 0–5)

## 2022-09-22 PROCEDURE — 74177 CT ABD & PELVIS W/CONTRAST: CPT | Mod: 26,MA

## 2022-09-22 PROCEDURE — 99284 EMERGENCY DEPT VISIT MOD MDM: CPT | Mod: GC

## 2022-09-22 RX ORDER — FAMOTIDINE 10 MG/ML
20 INJECTION INTRAVENOUS ONCE
Refills: 0 | Status: COMPLETED | OUTPATIENT
Start: 2022-09-22 | End: 2022-09-22

## 2022-09-22 RX ORDER — SODIUM CHLORIDE 9 MG/ML
1000 INJECTION, SOLUTION INTRAVENOUS ONCE
Refills: 0 | Status: COMPLETED | OUTPATIENT
Start: 2022-09-22 | End: 2022-09-22

## 2022-09-22 RX ORDER — ONDANSETRON 8 MG/1
4 TABLET, FILM COATED ORAL ONCE
Refills: 0 | Status: COMPLETED | OUTPATIENT
Start: 2022-09-22 | End: 2022-09-22

## 2022-09-22 RX ADMIN — SODIUM CHLORIDE 1000 MILLILITER(S): 9 INJECTION, SOLUTION INTRAVENOUS at 18:10

## 2022-09-22 RX ADMIN — SODIUM CHLORIDE 1000 MILLILITER(S): 9 INJECTION, SOLUTION INTRAVENOUS at 13:45

## 2022-09-22 RX ADMIN — ONDANSETRON 4 MILLIGRAM(S): 8 TABLET, FILM COATED ORAL at 13:45

## 2022-09-22 RX ADMIN — FAMOTIDINE 20 MILLIGRAM(S): 10 INJECTION INTRAVENOUS at 20:28

## 2022-09-22 NOTE — ED PROVIDER NOTE - PHYSICAL EXAMINATION
GENERAL: Awake, alert, NAD  HEAD: NC/AT, neck supple, moist mucous membranes  EYES: PERRL, EOM grossly intact, sclera anicteric  LUNGS: CTAB, no wheezes or crackles   CARDIAC: RRR, no m/r/g  ABDOMEN: Soft, minimally tender to palpation in suprapubic region, non distended, no rebound, no guarding  BACK: No midline spinal tenderness, no CVA tenderness  EXT: No edema, no calf tenderness, no deformities.  NEURO: Strength 5/5 in bilaterally upper and lower extremities. CN II-XII grossly intact A&Ox3. Moving all extremities.  SKIN: Warm and dry. No rash.  PSYCH: Normal affect.

## 2022-09-22 NOTE — ED PROVIDER NOTE - NSFOLLOWUPINSTRUCTIONS_ED_ALL_ED_FT
Acute Nausea and Vomiting    WHAT YOU NEED TO KNOW:    Acute means the nausea and vomiting starts suddenly, gets worse quickly, and lasts a short time. There are many possible causes of acute nausea and vomiting.    DISCHARGE INSTRUCTIONS:    Call your local emergency number (911 in the [place]) if:   •You have chest pain.    •You have severe pain or cramping in your abdomen.    •Your vision is blurred.    •You are confused, have a high fever, or a stiff neck.    •You have bright red blood coming from your rectum.    •Your vomit smells like bowel movement.      Return to the emergency department if:   •You have a severe headache or pain.    •You are dizzy, cold, and thirsty, and your eyes and mouth are dry.    •You are urinating very little or not at all.    •You are dizzy or lightheaded when you stand up.    •You see blood or material that looks like coffee grounds in your vomit.      Call your doctor if:   •You continue to vomit for more than 48 hours.    •Your nausea and vomiting does not get better or go away after you use medicine.    •You have questions or concerns about your condition or care.      Medicines: You may need any of the following:   •Medicines may be given to calm your stomach and stop your vomiting. You may also need medicines to help empty your stomach and bowels.    •Take your medicine as directed. Contact your healthcare provider if you think your medicine is not helping or if you have side effects. Tell your provider if you are allergic to any medicine. Keep a list of the medicines, vitamins, and herbs you take. Include the amounts, and when and why you take them. Bring the list or the pill bottles to follow-up visits. Carry your medicine list with you in case of an emergency.      Manage your symptoms:   •Rest as much as you can. Too much activity can make your nausea worse.    •Drink more liquids to prevent dehydration. Take small sips. Try drinks such as ginger ale, lemonade, water, or tea. Your provider may recommend that you drink an oral rehydration solution (ORS). ORS contains water, salts, and sugar that are needed to replace the lost body fluids.    •Eat smaller meals, more often. Try bland foods and avoid spices or strong flavors    •Do not drink alcohol. Alcohol may upset or irritate your stomach.    Follow up with your doctor as directed: Write down your questions so you remember to ask them during your visits. You were seen and evaluated today for abdominal pain. Our work-up today included blood work, urine studies, and CT scan of the abdomen, the results of which have been explained to you and provided separately. Please keep a copy of these records to present to your doctor in future visits.     - Follow up with your primary care physician within 7-10 days  - Please continue taking all of your home meds  - Stay well hydrated, avoid fatty, greasy, and spicy foods as these may worsen your abdominal pain    Please return to the Emergency Department should you experience any of the following:  - New or worsening abdominal pain  - Fever, unexplained weight loss, night sweats  - Blood in stool or in urine  - New weakness, fatigue, or fainting  - Chest pain, SOB, palpitations

## 2022-09-22 NOTE — ED ADULT TRIAGE NOTE - CHIEF COMPLAINT QUOTE
Pt h/o multiple myeloma, htn, hld, presents for generalized weakness for the past 3 days, constant numbness/tingling to all 4 extremities, +n/v, pt now having difficulty ambulating, denies any present pain, no headache/dizziness, afebrile.

## 2022-09-22 NOTE — ED ADULT NURSE NOTE - DISCHARGE DATE/TIME
22-Sep-2022 22:20
Principal Discharge DX:	Elevated CK  Secondary Diagnosis:	Flank pain  Secondary Diagnosis:	Hypertension

## 2022-09-22 NOTE — CONSULT NOTE ADULT - ASSESSMENT
72Y M PMH multiple myeloma, HTN, HLD, CAD s/p 1 stent, here for evaluation of weakness. nephrology seen for ckd    ckd stage 3  baseline ~ 1.4-1.5  stable  avoid nephrotoxic agents    hypercalcemia  hx of MM   possible dehydration  agree with IVF  check pth, pthrp, vit d 1,25, vit d 25  monitor    proteinuria  mild  known  outpatient follow up    abd pain  pending ct  work up per team

## 2022-09-22 NOTE — ED PROVIDER NOTE - PATIENT PORTAL LINK FT
You can access the FollowMyHealth Patient Portal offered by [facility] by registering at the following website: [email or web address]. By joining [facility]’s FollowMyHealth portal, you will also be able to view your health information using other applications (apps) compatible with our system.

## 2022-09-22 NOTE — ED PROVIDER NOTE - OBJECTIVE STATEMENT
72Y M PMH multiple myeloma, HTN, HLD, CAD s/p 1 stent, here for evaluation of weakness. Patient reports onset of nausea and vomiting yesterday morning, along with increased urinary frequency. Has had several episodes of nonbloody vomiting, most recently around 11am today. This morning patient was so weak that he was unable to stand and collapsed twice per daughter. Unable to tolerate PO today. Patient denies fevers, chills, dysuria, hematuria, bladder/bowel incontinence, diarrhea, chest pain, shortness of breath, leg swelling.

## 2022-09-22 NOTE — ED ADULT NURSE NOTE - OBJECTIVE STATEMENT
Receive pt. in ER 10a alert and oriented x 4, presenting to the ER with complaints of weakness. Pt. speaks [place] daughter Pavithra at bedside provided translation. Pavithra stated " my father has not been eating well for the past week, he has difficulty walking and he complained that he is weak". No c/o pain, labs sent medicated as ordered.

## 2022-09-22 NOTE — CONSULT NOTE ADULT - SUBJECTIVE AND OBJECTIVE BOX
[facility]   MD DERRICK MOSS MD KRISTINE SOLTANPOUR, DO ANGELA WONG, PA        TEL:  OFFICE: [phone]  From 5pm-7am answering service [phone]    --- INITIAL RENAL CONSULT NOTE ---date of service 22 @ 15:30    HPI:  72Y M PMH multiple myeloma, HTN, HLD, CAD s/p 1 stent, here for evaluation of weakness. Patient reports onset of nausea and vomiting yesterday morning, along with increased urinary frequency. Has had several episodes of nonbloody vomiting, most recently around 11am today. This morning patient was so weak that he was unable to stand and collapsed twice per daughter. Unable to tolerate PO today. Patient denies fevers, chills, dysuria, hematuria, bladder/bowel incontinence, diarrhea, chest pain, shortness of breath, leg swelling  pt follows up with dr warren for ckd, had appointment yesterday however unable to go due to weakness      Allergies:  Beef (Unknown)  No Known Drug Allergies  pork (Unknown)      PAST MEDICAL & SURGICAL HISTORY:  HTN (hypertension)      HLD (hyperlipidemia)      Coronary artery disease      No significant past surgical history          Home Medications Reviewed    Hospital Medications:   MEDICATIONS  (STANDING):      SOCIAL HISTORY:  Denies ETOh, Smoking,     FAMILY HISTORY:  Maternal family history of hypertension  Mother        REVIEW OF SYSTEMS:  CONSTITUTIONAL: + weakness, no fevers or chills  EYES/ENT: No visual changes;  No vertigo or throat pain   NECK: No pain or stiffness  RESPIRATORY: No cough, wheezing, hemoptysis; No shortness of breath  CARDIOVASCULAR: No chest pain or palpitations.  GASTROINTESTINAL: see hpi  GENITOURINARY: No dysuria, frequency, foamy urine, urinary urgency, incontinence or hematuria  NEUROLOGICAL: No numbness or weakness  SKIN: No itching, burning, rashes, or lesions   VASCULAR: No bilateral lower extremity edema.   All other review of systems is negative unless indicated above.    VITALS:  T(F): 97.9 (22 @ 15:02), Max: 98.1 (22 @ 11:10)  HR: 64 (22 @ 15:02)  BP: 163/55 (22 @ 15:02)  RR: 20 (22 @ 15:02)  SpO2: 100% (22 @ 15:02)  Wt(kg): --    Height (cm): 162.6 ( @ 11:10)    PHYSICAL EXAM:  General: NAD  HEENT: anicteric sclera, oropharynx clear, MMM  Neck: No JVD  Respiratory: CTAB, no wheezes, rales or rhonchi  Cardiovascular: S1, S2, RRR  Gastrointestinal: BS+, soft, epigastric tenderness   Extremities: No cyanosis or clubbing. No peripheral edema  Neurological: A/O x 3, no focal deficits  Psychiatric: Normal mood, normal affect  : No CVA tenderness. No mosre.   Skin: No rashes      LABS:      135  |  97<L>  |  15  ----------------------------<  108<H>  3.7   |  22  |  1.37<H>    Ca    10.7<H>      22 Sep 2022 13:51    TPro  8.5<H>  /  Alb  4.9  /  TBili  0.4  /  DBili      /  AST  22  /  ALT  16  /  AlkPhos  51      Creatinine Trend: 1.37 <--                        11.6   7.93  )-----------( 238      ( 22 Sep 2022 13:51 )             34.6     Urine Studies:  Urinalysis Basic - ( 22 Sep 2022 14:38 )    Color: Light Yellow / Appearance: Clear / S.010 / pH:   Gluc:  / Ketone: Negative  / Bili: Negative / Urobili: <2 mg/dL   Blood:  / Protein: 30 mg/dL / Nitrite: Negative   Leuk Esterase: Negative / RBC: 1 /HPF / WBC 0 /HPF   Sq Epi:  / Non Sq Epi: 1 /HPF / Bacteria: Negative          RADIOLOGY & ADDITIONAL STUDIES:

## 2022-09-22 NOTE — ED PROVIDER NOTE - CLINICAL SUMMARY MEDICAL DECISION MAKING FREE TEXT BOX
72Y M history of multiple myeloma here with 2 days of nausea, vomiting, and urinary frequency. Having normal BMs, passing gas, no abd distension on exam - low concern for SBO. 72Y M history of multiple myeloma here with 2 days of nausea, vomiting, and urinary frequency. Abdomen minimally tender on exam over suprapubic region. Having normal BMs, passing gas, no abd distension on exam - low concern for SBO. Will check basic labs, urine; symptom management with IVF bolus, zofran. Given history of malignancy will also obtain CT abdomen pelvis to rule out any intraabdominal pathology. If CT nonactionable and symptomatically improved, likely can dispo to home with outpatient follow up

## 2022-09-23 LAB
CULTURE RESULTS: SIGNIFICANT CHANGE UP
SPECIMEN SOURCE: SIGNIFICANT CHANGE UP

## 2022-09-24 ENCOUNTER — INPATIENT (INPATIENT)
Facility: HOSPITAL | Age: 72
LOS: 16 days | Discharge: NURSING, REHAB OR LONG-TERM CARE | End: 2022-10-11
Attending: INTERNAL MEDICINE | Admitting: INTERNAL MEDICINE

## 2022-09-24 VITALS
HEIGHT: 64 IN | HEART RATE: 82 BPM | DIASTOLIC BLOOD PRESSURE: 76 MMHG | OXYGEN SATURATION: 100 % | SYSTOLIC BLOOD PRESSURE: 149 MMHG | TEMPERATURE: 98 F | RESPIRATION RATE: 16 BRPM

## 2022-09-24 DIAGNOSIS — R53.1 WEAKNESS: ICD-10-CM

## 2022-09-24 LAB
ALBUMIN SERPL ELPH-MCNC: 5 G/DL — SIGNIFICANT CHANGE UP (ref 3.3–5)
ALP SERPL-CCNC: 56 U/L — SIGNIFICANT CHANGE UP (ref 40–120)
ALT FLD-CCNC: 15 U/L — SIGNIFICANT CHANGE UP (ref 4–41)
ANION GAP SERPL CALC-SCNC: 18 MMOL/L — HIGH (ref 7–14)
AST SERPL-CCNC: 25 U/L — SIGNIFICANT CHANGE UP (ref 4–40)
BASOPHILS # BLD AUTO: 0.04 K/UL — SIGNIFICANT CHANGE UP (ref 0–0.2)
BASOPHILS NFR BLD AUTO: 0.4 % — SIGNIFICANT CHANGE UP (ref 0–2)
BILIRUB SERPL-MCNC: 0.8 MG/DL — SIGNIFICANT CHANGE UP (ref 0.2–1.2)
BUN SERPL-MCNC: 18 MG/DL — SIGNIFICANT CHANGE UP (ref 7–23)
CALCIUM SERPL-MCNC: 9.3 MG/DL — SIGNIFICANT CHANGE UP (ref 8.4–10.5)
CHLORIDE SERPL-SCNC: 88 MMOL/L — LOW (ref 98–107)
CK SERPL-CCNC: 251 U/L — HIGH (ref 30–200)
CO2 SERPL-SCNC: 18 MMOL/L — LOW (ref 22–31)
CREAT SERPL-MCNC: 1.06 MG/DL — SIGNIFICANT CHANGE UP (ref 0.5–1.3)
CRP SERPL-MCNC: <3 MG/L — SIGNIFICANT CHANGE UP
EGFR: 74 ML/MIN/1.73M2 — SIGNIFICANT CHANGE UP
EOSINOPHIL # BLD AUTO: 0.09 K/UL — SIGNIFICANT CHANGE UP (ref 0–0.5)
EOSINOPHIL NFR BLD AUTO: 0.8 % — SIGNIFICANT CHANGE UP (ref 0–6)
ERYTHROCYTE [SEDIMENTATION RATE] IN BLOOD: 42 MM/HR — HIGH (ref 1–15)
GLUCOSE SERPL-MCNC: 123 MG/DL — HIGH (ref 70–99)
HCT VFR BLD CALC: 36.4 % — LOW (ref 39–50)
HGB BLD-MCNC: 12.6 G/DL — LOW (ref 13–17)
IANC: 8.17 K/UL — HIGH (ref 1.8–7.4)
IMM GRANULOCYTES NFR BLD AUTO: 0.4 % — SIGNIFICANT CHANGE UP (ref 0–0.9)
LYMPHOCYTES # BLD AUTO: 1.81 K/UL — SIGNIFICANT CHANGE UP (ref 1–3.3)
LYMPHOCYTES # BLD AUTO: 16.1 % — SIGNIFICANT CHANGE UP (ref 13–44)
MCHC RBC-ENTMCNC: 28.8 PG — SIGNIFICANT CHANGE UP (ref 27–34)
MCHC RBC-ENTMCNC: 34.6 GM/DL — SIGNIFICANT CHANGE UP (ref 32–36)
MCV RBC AUTO: 83.1 FL — SIGNIFICANT CHANGE UP (ref 80–100)
MONOCYTES # BLD AUTO: 1.1 K/UL — HIGH (ref 0–0.9)
MONOCYTES NFR BLD AUTO: 9.8 % — SIGNIFICANT CHANGE UP (ref 2–14)
NEUTROPHILS # BLD AUTO: 8.17 K/UL — HIGH (ref 1.8–7.4)
NEUTROPHILS NFR BLD AUTO: 72.5 % — SIGNIFICANT CHANGE UP (ref 43–77)
NRBC # BLD: 0 /100 WBCS — SIGNIFICANT CHANGE UP (ref 0–0)
NRBC # FLD: 0 K/UL — SIGNIFICANT CHANGE UP (ref 0–0)
PLATELET # BLD AUTO: 253 K/UL — SIGNIFICANT CHANGE UP (ref 150–400)
POTASSIUM SERPL-MCNC: 3.2 MMOL/L — LOW (ref 3.5–5.3)
POTASSIUM SERPL-SCNC: 3.2 MMOL/L — LOW (ref 3.5–5.3)
PROT SERPL-MCNC: 8.4 G/DL — HIGH (ref 6–8.3)
RBC # BLD: 4.38 M/UL — SIGNIFICANT CHANGE UP (ref 4.2–5.8)
RBC # FLD: 13.4 % — SIGNIFICANT CHANGE UP (ref 10.3–14.5)
SARS-COV-2 RNA SPEC QL NAA+PROBE: SIGNIFICANT CHANGE UP
SODIUM SERPL-SCNC: 124 MMOL/L — LOW (ref 135–145)
TROPONIN T, HIGH SENSITIVITY RESULT: 17 NG/L — SIGNIFICANT CHANGE UP
WBC # BLD: 11.25 K/UL — HIGH (ref 3.8–10.5)
WBC # FLD AUTO: 11.25 K/UL — HIGH (ref 3.8–10.5)

## 2022-09-24 PROCEDURE — 99285 EMERGENCY DEPT VISIT HI MDM: CPT | Mod: FS,25

## 2022-09-24 PROCEDURE — 71045 X-RAY EXAM CHEST 1 VIEW: CPT | Mod: 26

## 2022-09-24 PROCEDURE — 99223 1ST HOSP IP/OBS HIGH 75: CPT | Mod: 25

## 2022-09-24 PROCEDURE — 70450 CT HEAD/BRAIN W/O DYE: CPT | Mod: 26,MA

## 2022-09-24 PROCEDURE — 99497 ADVNCD CARE PLAN 30 MIN: CPT | Mod: 25

## 2022-09-24 PROCEDURE — 93010 ELECTROCARDIOGRAM REPORT: CPT

## 2022-09-24 RX ORDER — ALBUTEROL 90 UG/1
2 AEROSOL, METERED ORAL ONCE
Refills: 0 | Status: COMPLETED | OUTPATIENT
Start: 2022-09-24 | End: 2022-09-24

## 2022-09-24 RX ORDER — ACETAMINOPHEN 500 MG
1000 TABLET ORAL ONCE
Refills: 0 | Status: COMPLETED | OUTPATIENT
Start: 2022-09-24 | End: 2022-09-24

## 2022-09-24 RX ADMIN — ALBUTEROL 2 PUFF(S): 90 AEROSOL, METERED ORAL at 22:16

## 2022-09-24 RX ADMIN — Medication 400 MILLIGRAM(S): at 18:34

## 2022-09-24 RX ADMIN — Medication 1000 MILLIGRAM(S): at 19:18

## 2022-09-24 NOTE — ED PROVIDER NOTE - PROGRESS NOTE DETAILS
Neuro consult - discussed with resident about findings on exam. Discussed with Dr Hdz. Patient failed dysphagia screen. For now NPO.

## 2022-09-24 NOTE — ED PROVIDER NOTE - CLINICAL SUMMARY MEDICAL DECISION MAKING FREE TEXT BOX
72 year-old male, presents with worsening proximal weakness in all limbs x 2-3 days. Non-toxic appearance, vitals stable, afebrile. Plan: Labs, CT, neuro consult, admission.

## 2022-09-24 NOTE — CONSULT NOTE ADULT - ASSESSMENT
72y (1950) man with a PMHx significant for  HTN, HLD, CAD, cardiac stent, MM, presents with cc generalized weakness x 2-3 days associated with some tingling sensation. Pt was evaluated in the ED on 9/21 for vomiting but was discharged home. That evening, daughter noticed that patient was unable to walk on his own and needed assistance getting up the stairs. the next day patient was unable to get out of bed and unable to lift his arms from the bed. Pt also complains of mild changes in his voice and difficulty swallowing, and b/l numbness/tingling in both his feet and hands. Pt denied any urine and fecal incontinence, HA, dizziness, changes in vision or hearing, neck pain.   Of note, pt last chemo was 3/22 for MM and is supposed to be getting bi-weekly doses. Per daughter, pt was traveling for one month and never followed up afterwords with his oncologist.   Per ED, pt failed dysphagia study and is currently npo    Impression:   Focal weakness could be 2/2 to pathology in the neck vs given decreased reflexes may be GB    Recommendation:   [] MRI neck with and without contrast as soon as able  [] IF MRI negative would rec LP  [] Would empirically start decadron 4mg q6 until results are back       Rest of work up per primary team     Case discussed with attending Dr. Schoenberg and to be rounded with resident during AM rounds

## 2022-09-24 NOTE — ED ADULT TRIAGE NOTE - CHIEF COMPLAINT QUOTE
Daughter to translate, c/o generalized weakness and tingling body sensation since Wednesday, speaking clear, denies chest pain, SOB hx of HTN, stent on aspirin

## 2022-09-24 NOTE — ED ADULT NURSE NOTE - OBJECTIVE STATEMENT
float RN  received pt in bed A and OX 3 in NAD resting comfortably, c/.o generalized weakness,  PERRLA extremities are weak but equal B/l, pt unable to move upper or lower extremities against gravity, skin color appropriate for ethnicity,  lung sounds with rhonchi B/L, breathing is even and unlabored, however pt notes SOB.  capillary refill under 2 seconds in arms B/L. IV initiated albs drawn and sent.

## 2022-09-24 NOTE — ED PROVIDER NOTE - ATTENDING APP SHARED VISIT CONTRIBUTION OF CARE
DR. GONSALEZ, ATTENDING MD-  I performed a face to face bedside interview with patient regarding history of present illness, review of symptoms and past medical history. I completed an independent physical exam.  I have discussed patient's plan of care with the NP.   Documentation as above in the note.    71 y/o male h/o hld htn cardiac stent mm here with diffuse weakness, progressively worsening since Wednesday.  In context of preceding viral sx.  Concern for gbs, botulism, brain mass, lyte abn.  Obtain cbc cmp covid swab ct head consult neuro will need admission for further care and evaluation.

## 2022-09-24 NOTE — CONSULT NOTE ADULT - SUBJECTIVE AND OBJECTIVE BOX
Dr. Gonzalez  Office [phone] (9 am to 5 pm)  Service : [phone] ( 5pm to 9 am)    [place] GONZALEZ      RENAL INITIAL CONSULT NOTE: DATE OF SERVICE 22 @ 23:37    HPI:  72 year-old male, history of HTN, HLD, CAD, cardiac stent, MM, presents with cc generalized weakness x 2-3 days associated with some tingling sensation. Was evaluated in the ER 3 days ago for vomiting and was discharged home. Daughter noticed that he had mild weakness but the next day he was unable to get out of bed and now unable to lift arms/legs. Also has mild hoarse voice. Able to control bladder/bowel and urinate voluntarily. Denies any headache or recent injury. Earlier in September had cold where he had cough and runny nose. Also cough x 2 days now. Has chronic lower back pain but not worsening. Denies any neck or upper back pain.    Allergies:  Beef (Unknown)  No Known Drug Allergies  pork (Unknown)      PAST MEDICAL & SURGICAL HISTORY:  HTN (hypertension)      HLD (hyperlipidemia)      Coronary artery disease      No significant past surgical history          Home Medications Reviewed    Hospital Medications:   MEDICATIONS  (STANDING):      SOCIAL HISTORY:  Denies ETOh, Smoking,     FAMILY HISTORY:  Maternal family history of hypertension  Mother        REVIEW OF SYSTEMS:  CONSTITUTIONAL: has weakness, no fevers or chills  EYES/ENT: No visual changes;  No vertigo or throat pain   NECK: No pain or stiffness  RESPIRATORY: No cough, wheezing, hemoptysis; No shortness of breath  CARDIOVASCULAR: No chest pain or palpitations.  GASTROINTESTINAL: No abdominal or epigastric pain. No nausea, vomiting, or hematemesis; No diarrhea or constipation. No melena or hematochezia.  GENITOURINARY: No dysuria, frequency, foamy urine, urinary urgency, incontinence or hematuria  NEUROLOGICAL: as per HPI  SKIN: No itching, burning, rashes, or lesions   VASCULAR: No bilateral lower extremity edema.   All other review of systems is negative unless indicated above.    VITALS:  T(F): 97 (22 @ 19:01), Max: 97.6 (22 @ 12:52)  HR: 87 (22 @ 20:26)  BP: 156/79 (22 @ 20:26)  RR: 26 (22 @ 20:26)  SpO2: 99% (22 @ 20:26)  Wt(kg): --    Height (cm): 162.6 ( @ 12:52)    PHYSICAL EXAM:  Constitutional: NAD  HEENT: anicteric sclera, oropharynx clear, MMM  Neck: No JVD  Respiratory: CTAB, no wheezes, rales or rhonchi  Cardiovascular: S1, S2, RRR  Gastrointestinal: BS+, soft, NT/ND  Extremities: No cyanosis or clubbing. No peripheral edema  Neurological: generalized weakness  Psychiatric: Normal mood, normal affect  : No CVA tenderness. No moser.   Skin: No rashes       LABS:      124<L>  |  88<L>  |  18  ----------------------------<  123<H>  3.2<L>   |  18<L>  |  1.06    Ca    9.3      24 Sep 2022 14:58    TPro  8.4<H>  /  Alb  5.0  /  TBili  0.8  /  DBili      /  AST  25  /  ALT  15  /  AlkPhos  56      Creatinine Trend: 1.06 <--, 1.37 <--                        12.6   11.25 )-----------( 253      ( 24 Sep 2022 14:58 )             36.4     Urine Studies:  Urinalysis Basic - ( 22 Sep 2022 14:38 )    Color: Light Yellow / Appearance: Clear / S.010 / pH:   Gluc:  / Ketone: Negative  / Bili: Negative / Urobili: <2 mg/dL   Blood:  / Protein: 30 mg/dL / Nitrite: Negative   Leuk Esterase: Negative / RBC: 1 /HPF / WBC 0 /HPF   Sq Epi:  / Non Sq Epi: 1 /HPF / Bacteria: Negative          RADIOLOGY & ADDITIONAL STUDIES:

## 2022-09-24 NOTE — ED PROVIDER NOTE - OBJECTIVE STATEMENT
Understand English but prefers for daughter to translate from Thai: 72 year-old male, history of HTN, HLD, CAD, cardiac stent, MM, presents with cc generalized weakness x 2-3 days associated with some tingling sensation. Was evaluated in the ER 3 days ago for vomiting and was discharged home. Daughter noticed that he had mild weakness but the next day he was unable to get out of bed and now unable to lift arms/legs. Also has mild hoarse voice. Able to control bladder/bowel and urinate voluntarily. Denies any headache or recent injury. Earlier in September had cold where he had cough and runny nose. Also cough x 2 days now. Has chronic lower back pain but not worsening. Denies any neck or upper back pain.

## 2022-09-24 NOTE — ED PROVIDER NOTE - PHYSICAL EXAMINATION
Able to turn head side to side. No facial asymmetry. Pupils 5 mm with PERRL and EOMI.  Unable to left arms/legs.   Difficulty with flexion/extension at the elbows/knees.  Able to flex/extend wrists/ankles and fingers/toes.

## 2022-09-24 NOTE — CONSULT NOTE ADULT - SUBJECTIVE AND OBJECTIVE BOX
Neurology - Consult Note    -  Spectra: [phone] ([facility]), [phone] ([facility])  -    HPI: Patient RASHID MEIER is a 72y (1950) man with a PMHx significant for  HTN, HLD, CAD, cardiac stent, MM, presents with cc generalized weakness x 2-3 days associated with some tingling sensation. Pt was evaluated in the ED on 9/21 for vomiting but was discharged home. That evening, daughter noticed that patient was unable to walk on his own and needed assistance getting up the stairs. The next day patient was unable to get out of bed and unable to lift his arms from the bed. Pt also complains of mild changes in his voice and difficulty swallowing, and b/l numbness/tingling in both his feet and hands. Pt denied any urine and fecal incontinence, HA, dizziness, changes in vision or hearing, neck pain.   Of note, pt last chemo was 3/22 for MM and is supposed to be getting bi-weekly doses velcave. Per daughter, pt was traveling for one month and never followed up afterwords with his oncologist.   Per ED, pt failed dysphagia study and is currently npo    Review of Systems:    CONSTITUTIONAL: No fevers or chills  EYES AND ENT: No visual changes or no throat pain   NECK: No pain or stiffness  RESPIRATORY: No hemoptysis or shortness of breath  CARDIOVASCULAR: No chest pain or palpitations  GASTROINTESTINAL: No melena or hematochezia  GENITOURINARY: No dysuria or hematuria  NEUROLOGICAL: +As stated in HPI above  SKIN: No itching, burning, rashes, or lesions   All other review of systems is negative unless indicated above.    Allergies:  Beef (Unknown)  pork (Unknown)      PMHx/PSHx/Family Hx: As above, otherwise see below   HTN (hypertension)    HLD (hyperlipidemia)    Coronary artery disease    Social Hx:  No current use of tobacco, alcohol, or illicit drugs      Medications:  MEDICATIONS  (STANDING):    MEDICATIONS  (PRN):      Vitals:  T(C): 36.1 (09-24-22 @ 19:01), Max: 36.4 (09-24-22 @ 12:52)  HR: 77 (09-24-22 @ 19:01) (77 - 82)  BP: 143/64 (09-24-22 @ 19:01) (143/64 - 149/76)  RR: 18 (09-24-22 @ 19:01) (16 - 18)  SpO2: 100% (09-24-22 @ 19:01) (100% - 100%)    Physical Examination:   General - NAD  Cardiovascular - Peripheral pulses palpable, no edema  Eyes -  Fundoscopy not performed due to safety precautions in the setting of the COVID-19 pandemic    Neurologic Exam:  Mental status - Awake, Alert, Oriented to person, place, and time. Speech fluent, repetition and naming intact. Follows simple and complex commands. Attention/concentration, recent and remote memory (including registration and recall), and fund of knowledge intact    Cranial nerves - PERRL, VFF, EOMI, face sensation (V1-V3) intact b/l, facial strength intact without asymmetry b/l, hearing intact b/l, palate with symmetric elevation,b/l, tongue midline on protrusion    Motor - Normal bulk throughout. decrease tone in b/l UE and LE (L>R)  Strength testing. Neck extension 5/5            Deltoid      Biceps      Triceps     Wrist Extension    Wrist Flexion       R            2                 2              2                     4-                              4-         3  L             2                 2              2                     4-                              4-         3              Hip Flexion    Hip Extension    Knee Flexion    Knee Extension    Dorsiflexion    Plantar Flexion  R              2                           2                       2                           2                            3                          3  L              2                           2                        2                           2                            3                          3    Sensation - Light touch intact throughout    DTR's -             Biceps      Triceps     Brachioradialis      Patellar    Ankle    Toes/plantar response  R             1+             1+                  1+                       0            0                 Mute  L              1+             1+                 1+                        0           0                 Mute    Coordination - No tremors appreciated    Gait and station - unable to perform due to clinical condition     Labs:                        12.6   11.25 )-----------( 253      ( 24 Sep 2022 14:58 )             36.4     09-24    124<L>  |  88<L>  |  18  ----------------------------<  123<H>  3.2<L>   |  18<L>  |  1.06    Ca    9.3      24 Sep 2022 14:58    TPro  8.4<H>  /  Alb  5.0  /  TBili  0.8  /  DBili  x   /  AST  25  /  ALT  15  /  AlkPhos  56  09-24    CAPILLARY BLOOD GLUCOSE      LIVER FUNCTIONS - ( 24 Sep 2022 14:58 )  Alb: 5.0 g/dL / Pro: 8.4 g/dL / ALK PHOS: 56 U/L / ALT: 15 U/L / AST: 25 U/L / GGT: x             Culture - Urine (collected 22 Sep 2022 16:41)  Source: Clean Catch Clean Catch (Midstream)  Final Report (23 Sep 2022 14:49):    <10,000 CFU/mL Normal Urogenital Sarika    Radiology:  CT Head No Cont:  (24 Sep 2022 17:08)  < from: CT Head No Cont (09.24.22 @ 17:08) >    FINDINGS:    No hydrocephalus, mass effect, midline shift, acute intracranial   hemorrhage, or brain edema.    Visualizedparanasal sinuses and mastoid air cells are clear.    IMPRESSION:    No hydrocephalus, acute intracranial hemorrhage, mass effect, or brain   edema.    < end of copied text >

## 2022-09-24 NOTE — ED ADULT NURSE NOTE - NSIMPLEMENTINTERV_GEN_ALL_ED
Implemented All Fall with Harm Risk Interventions:  [place] to call system. Call bell, personal items and telephone within reach. Instruct patient to call for assistance. Room bathroom lighting operational. Non-slip footwear when patient is off stretcher. Physically safe environment: no spills, clutter or unnecessary equipment. Stretcher in lowest position, wheels locked, appropriate side rails in place. Provide visual cue, wrist band, yellow gown, etc. Monitor gait and stability. Monitor for mental status changes and reorient to person, place, and time. Review medications for side effects contributing to fall risk. Reinforce activity limits and safety measures with patient and family. Provide visual clues: red socks.

## 2022-09-24 NOTE — CONSULT NOTE ADULT - ASSESSMENT
72 year-old male, history of HTN, HLD, CAD, cardiac stent, MM, presents with cc generalized weakness x 2-3 days associated with some tingling sensation.    A/P:  Hyponatremia:  Etiology?  Clinically euvolemic  Possible Polydipsia Vs SIADH  Repeat chemistry, If Na is still low  Use 1.5% NaCl 50cc/hr for 5 hrs  Get Urine Na, osmolality, serum osmolality, TSH, serum cortisol  MOnitor Na level closely  Na level should not change more than 6-8meq in 24 hrs    CKD:  Baseline Scr 1.3-1.4  Better than baseline, ? lab error   repeat chemistry    Hypokalemia:  Supplement kcl 40meq PO one dose  Monitor K level    HTN:  suboptimal  Resume home meds  MOnitor BP    Wekness:  Etiology?  h/o MM  Follow up Neurology, consider hematology evaluation as well  Correct electrolytes

## 2022-09-24 NOTE — ED PROVIDER NOTE - IV ALTEPLASE EXCL ABS HIDDEN
Detail Level: Zone
Render Post-Care Instructions In Note?: no
Number Of Freeze-Thaw Cycles: 2 freeze-thaw cycles
show
Post-Care Instructions: I reviewed with the patient in detail post-care instructions. Patient is to wear sunprotection, and avoid picking at any of the treated lesions. Pt may apply Vaseline to crusted or scabbing areas.
Total Number Of Aks Treated: 6
Duration Of Freeze Thaw-Cycle (Seconds): 2
Consent: The patient's consent was obtained including but not limited to risks of crusting, scabbing, blistering, scarring, darker or lighter pigmentary change, recurrence, incomplete removal and infection.

## 2022-09-25 DIAGNOSIS — J96.01 ACUTE RESPIRATORY FAILURE WITH HYPOXIA: ICD-10-CM

## 2022-09-25 DIAGNOSIS — I10 ESSENTIAL (PRIMARY) HYPERTENSION: ICD-10-CM

## 2022-09-25 DIAGNOSIS — R53.1 WEAKNESS: ICD-10-CM

## 2022-09-25 DIAGNOSIS — Z95.5 PRESENCE OF CORONARY ANGIOPLASTY IMPLANT AND GRAFT: Chronic | ICD-10-CM

## 2022-09-25 DIAGNOSIS — C90.00 MULTIPLE MYELOMA NOT HAVING ACHIEVED REMISSION: ICD-10-CM

## 2022-09-25 DIAGNOSIS — I25.10 ATHEROSCLEROTIC HEART DISEASE OF NATIVE CORONARY ARTERY WITHOUT ANGINA PECTORIS: ICD-10-CM

## 2022-09-25 DIAGNOSIS — Z29.9 ENCOUNTER FOR PROPHYLACTIC MEASURES, UNSPECIFIED: ICD-10-CM

## 2022-09-25 DIAGNOSIS — E87.1 HYPO-OSMOLALITY AND HYPONATREMIA: ICD-10-CM

## 2022-09-25 LAB
A1C WITH ESTIMATED AVERAGE GLUCOSE RESULT: 5.7 % — HIGH (ref 4–5.6)
ALBUMIN SERPL ELPH-MCNC: 4.7 G/DL — SIGNIFICANT CHANGE UP (ref 3.3–5)
ALBUMIN SERPL ELPH-MCNC: 4.8 G/DL — SIGNIFICANT CHANGE UP (ref 3.3–5)
ALP SERPL-CCNC: 50 U/L — SIGNIFICANT CHANGE UP (ref 40–120)
ALP SERPL-CCNC: 51 U/L — SIGNIFICANT CHANGE UP (ref 40–120)
ALP SERPL-CCNC: 52 U/L — SIGNIFICANT CHANGE UP (ref 40–120)
ALP SERPL-CCNC: 61 U/L — SIGNIFICANT CHANGE UP (ref 40–120)
ALT FLD-CCNC: 11 U/L — SIGNIFICANT CHANGE UP (ref 4–41)
ALT FLD-CCNC: 12 U/L — SIGNIFICANT CHANGE UP (ref 4–41)
ALT FLD-CCNC: 15 U/L — SIGNIFICANT CHANGE UP (ref 4–41)
ALT FLD-CCNC: 17 U/L — SIGNIFICANT CHANGE UP (ref 4–41)
ANION GAP SERPL CALC-SCNC: 14 MMOL/L — SIGNIFICANT CHANGE UP (ref 7–14)
ANION GAP SERPL CALC-SCNC: 15 MMOL/L — HIGH (ref 7–14)
ANION GAP SERPL CALC-SCNC: 16 MMOL/L — HIGH (ref 7–14)
APPEARANCE UR: CLEAR — SIGNIFICANT CHANGE UP
APTT BLD: 26.3 SEC — LOW (ref 27–36.3)
AST SERPL-CCNC: 18 U/L — SIGNIFICANT CHANGE UP (ref 4–40)
AST SERPL-CCNC: 19 U/L — SIGNIFICANT CHANGE UP (ref 4–40)
AST SERPL-CCNC: 20 U/L — SIGNIFICANT CHANGE UP (ref 4–40)
AST SERPL-CCNC: 22 U/L — SIGNIFICANT CHANGE UP (ref 4–40)
B2 MICROGLOB SERPL-MCNC: 2.3 MG/L — HIGH (ref 0.8–2.2)
BACTERIA # UR AUTO: NEGATIVE — SIGNIFICANT CHANGE UP
BASE EXCESS BLDV CALC-SCNC: -3.9 MMOL/L — LOW (ref -2–3)
BASOPHILS # BLD AUTO: 0 K/UL — SIGNIFICANT CHANGE UP (ref 0–0.2)
BASOPHILS # BLD AUTO: 0.03 K/UL — SIGNIFICANT CHANGE UP (ref 0–0.2)
BASOPHILS NFR BLD AUTO: 0 % — SIGNIFICANT CHANGE UP (ref 0–2)
BASOPHILS NFR BLD AUTO: 0.3 % — SIGNIFICANT CHANGE UP (ref 0–2)
BILIRUB SERPL-MCNC: 0.6 MG/DL — SIGNIFICANT CHANGE UP (ref 0.2–1.2)
BILIRUB SERPL-MCNC: 0.7 MG/DL — SIGNIFICANT CHANGE UP (ref 0.2–1.2)
BILIRUB SERPL-MCNC: 0.7 MG/DL — SIGNIFICANT CHANGE UP (ref 0.2–1.2)
BILIRUB SERPL-MCNC: 0.9 MG/DL — SIGNIFICANT CHANGE UP (ref 0.2–1.2)
BILIRUB UR-MCNC: NEGATIVE — SIGNIFICANT CHANGE UP
BLOOD GAS ARTERIAL - LYTES,HGB,ICA,LACT RESULT: SIGNIFICANT CHANGE UP
BLOOD GAS ARTERIAL - LYTES,HGB,ICA,LACT RESULT: SIGNIFICANT CHANGE UP
BLOOD GAS VENOUS COMPREHENSIVE RESULT: SIGNIFICANT CHANGE UP
BLOOD GAS VENOUS COMPREHENSIVE RESULT: SIGNIFICANT CHANGE UP
BUN SERPL-MCNC: 21 MG/DL — SIGNIFICANT CHANGE UP (ref 7–23)
BUN SERPL-MCNC: 22 MG/DL — SIGNIFICANT CHANGE UP (ref 7–23)
BUN SERPL-MCNC: 24 MG/DL — HIGH (ref 7–23)
BUN SERPL-MCNC: 26 MG/DL — HIGH (ref 7–23)
BUN SERPL-MCNC: 27 MG/DL — HIGH (ref 7–23)
CALCIUM SERPL-MCNC: 8.7 MG/DL — SIGNIFICANT CHANGE UP (ref 8.4–10.5)
CALCIUM SERPL-MCNC: 8.9 MG/DL — SIGNIFICANT CHANGE UP (ref 8.4–10.5)
CALCIUM SERPL-MCNC: 9 MG/DL — SIGNIFICANT CHANGE UP (ref 8.4–10.5)
CALCIUM SERPL-MCNC: 9.2 MG/DL — SIGNIFICANT CHANGE UP (ref 8.4–10.5)
CALCIUM SERPL-MCNC: 9.3 MG/DL — SIGNIFICANT CHANGE UP (ref 8.4–10.5)
CHLORIDE BLDV-SCNC: 93 MMOL/L — LOW (ref 96–108)
CHLORIDE SERPL-SCNC: 89 MMOL/L — LOW (ref 98–107)
CHLORIDE SERPL-SCNC: 90 MMOL/L — LOW (ref 98–107)
CHLORIDE SERPL-SCNC: 91 MMOL/L — LOW (ref 98–107)
CHLORIDE SERPL-SCNC: 95 MMOL/L — LOW (ref 98–107)
CHLORIDE SERPL-SCNC: 96 MMOL/L — LOW (ref 98–107)
CO2 BLDV-SCNC: 21.9 MMOL/L — LOW (ref 22–26)
CO2 SERPL-SCNC: 15 MMOL/L — LOW (ref 22–31)
CO2 SERPL-SCNC: 18 MMOL/L — LOW (ref 22–31)
CO2 SERPL-SCNC: 18 MMOL/L — LOW (ref 22–31)
CO2 SERPL-SCNC: 19 MMOL/L — LOW (ref 22–31)
CO2 SERPL-SCNC: 19 MMOL/L — LOW (ref 22–31)
COLOR SPEC: SIGNIFICANT CHANGE UP
CORTIS AM PEAK SERPL-MCNC: 31.9 UG/DL — HIGH (ref 6–18.4)
CREAT ?TM UR-MCNC: 71 MG/DL — SIGNIFICANT CHANGE UP
CREAT SERPL-MCNC: 1.08 MG/DL — SIGNIFICANT CHANGE UP (ref 0.5–1.3)
CREAT SERPL-MCNC: 1.12 MG/DL — SIGNIFICANT CHANGE UP (ref 0.5–1.3)
CREAT SERPL-MCNC: 1.15 MG/DL — SIGNIFICANT CHANGE UP (ref 0.5–1.3)
CREAT SERPL-MCNC: 1.17 MG/DL — SIGNIFICANT CHANGE UP (ref 0.5–1.3)
CREAT SERPL-MCNC: 1.19 MG/DL — SIGNIFICANT CHANGE UP (ref 0.5–1.3)
DIFF PNL FLD: ABNORMAL
EGFR: 64 ML/MIN/1.73M2 — SIGNIFICANT CHANGE UP
EGFR: 66 ML/MIN/1.73M2 — SIGNIFICANT CHANGE UP
EGFR: 67 ML/MIN/1.73M2 — SIGNIFICANT CHANGE UP
EGFR: 69 ML/MIN/1.73M2 — SIGNIFICANT CHANGE UP
EGFR: 72 ML/MIN/1.73M2 — SIGNIFICANT CHANGE UP
EOSINOPHIL # BLD AUTO: 0 K/UL — SIGNIFICANT CHANGE UP (ref 0–0.5)
EOSINOPHIL # BLD AUTO: 0.05 K/UL — SIGNIFICANT CHANGE UP (ref 0–0.5)
EOSINOPHIL NFR BLD AUTO: 0 % — SIGNIFICANT CHANGE UP (ref 0–6)
EOSINOPHIL NFR BLD AUTO: 0.5 % — SIGNIFICANT CHANGE UP (ref 0–6)
EPI CELLS # UR: 2 /HPF — SIGNIFICANT CHANGE UP (ref 0–5)
ESTIMATED AVERAGE GLUCOSE: 117 — SIGNIFICANT CHANGE UP
FERRITIN SERPL-MCNC: 287 NG/ML — SIGNIFICANT CHANGE UP (ref 30–400)
FERRITIN SERPL-MCNC: 293 NG/ML — SIGNIFICANT CHANGE UP (ref 30–400)
FOLATE SERPL-MCNC: 9.2 NG/ML — SIGNIFICANT CHANGE UP (ref 3.1–17.5)
GAS PNL BLDV: 122 MMOL/L — LOW (ref 136–145)
GLUCOSE BLDC GLUCOMTR-MCNC: 173 MG/DL — HIGH (ref 70–99)
GLUCOSE BLDV-MCNC: 151 MG/DL — HIGH (ref 70–99)
GLUCOSE SERPL-MCNC: 135 MG/DL — HIGH (ref 70–99)
GLUCOSE SERPL-MCNC: 160 MG/DL — HIGH (ref 70–99)
GLUCOSE SERPL-MCNC: 173 MG/DL — HIGH (ref 70–99)
GLUCOSE SERPL-MCNC: 188 MG/DL — HIGH (ref 70–99)
GLUCOSE SERPL-MCNC: 209 MG/DL — HIGH (ref 70–99)
GLUCOSE UR QL: ABNORMAL
HCO3 BLDV-SCNC: 21 MMOL/L — LOW (ref 22–29)
HCT VFR BLD CALC: 31.8 % — LOW (ref 39–50)
HCT VFR BLD CALC: 34.7 % — LOW (ref 39–50)
HCT VFR BLD CALC: 44.5 % — SIGNIFICANT CHANGE UP (ref 39–50)
HCT VFR BLDA CALC: 38 % — LOW (ref 39–51)
HGB BLD CALC-MCNC: 12.5 G/DL — LOW (ref 13–17)
HGB BLD-MCNC: 11.4 G/DL — LOW (ref 13–17)
HGB BLD-MCNC: 12 G/DL — LOW (ref 13–17)
HGB BLD-MCNC: 15.7 G/DL — SIGNIFICANT CHANGE UP (ref 13–17)
IANC: 6.59 K/UL — SIGNIFICANT CHANGE UP (ref 1.8–7.4)
IANC: 7.61 K/UL — HIGH (ref 1.8–7.4)
IMM GRANULOCYTES NFR BLD AUTO: 0.3 % — SIGNIFICANT CHANGE UP (ref 0–0.9)
IMM GRANULOCYTES NFR BLD AUTO: 0.4 % — SIGNIFICANT CHANGE UP (ref 0–0.9)
INR BLD: 1.01 RATIO — SIGNIFICANT CHANGE UP (ref 0.88–1.16)
IRON SATN MFR SERPL: 12 % — LOW (ref 14–50)
IRON SATN MFR SERPL: 13 % — LOW (ref 14–50)
IRON SATN MFR SERPL: 36 UG/DL — LOW (ref 45–165)
IRON SATN MFR SERPL: 42 UG/DL — LOW (ref 45–165)
KETONES UR-MCNC: ABNORMAL
LACTATE BLDV-MCNC: 1.7 MMOL/L — SIGNIFICANT CHANGE UP (ref 0.5–2)
LDH SERPL L TO P-CCNC: 192 U/L — SIGNIFICANT CHANGE UP (ref 135–225)
LEUKOCYTE ESTERASE UR-ACNC: NEGATIVE — SIGNIFICANT CHANGE UP
LYMPHOCYTES # BLD AUTO: 0.34 K/UL — LOW (ref 1–3.3)
LYMPHOCYTES # BLD AUTO: 1.53 K/UL — SIGNIFICANT CHANGE UP (ref 1–3.3)
LYMPHOCYTES # BLD AUTO: 14.9 % — SIGNIFICANT CHANGE UP (ref 13–44)
LYMPHOCYTES # BLD AUTO: 4.7 % — LOW (ref 13–44)
MAGNESIUM SERPL-MCNC: 2.1 MG/DL — SIGNIFICANT CHANGE UP (ref 1.6–2.6)
MAGNESIUM SERPL-MCNC: 2.2 MG/DL — SIGNIFICANT CHANGE UP (ref 1.6–2.6)
MAGNESIUM SERPL-MCNC: 2.2 MG/DL — SIGNIFICANT CHANGE UP (ref 1.6–2.6)
MAGNESIUM SERPL-MCNC: 2.4 MG/DL — SIGNIFICANT CHANGE UP (ref 1.6–2.6)
MAGNESIUM SERPL-MCNC: 2.5 MG/DL — SIGNIFICANT CHANGE UP (ref 1.6–2.6)
MCHC RBC-ENTMCNC: 29.3 PG — SIGNIFICANT CHANGE UP (ref 27–34)
MCHC RBC-ENTMCNC: 29.5 PG — SIGNIFICANT CHANGE UP (ref 27–34)
MCHC RBC-ENTMCNC: 29.6 PG — SIGNIFICANT CHANGE UP (ref 27–34)
MCHC RBC-ENTMCNC: 34.6 GM/DL — SIGNIFICANT CHANGE UP (ref 32–36)
MCHC RBC-ENTMCNC: 35.3 GM/DL — SIGNIFICANT CHANGE UP (ref 32–36)
MCHC RBC-ENTMCNC: 35.8 GM/DL — SIGNIFICANT CHANGE UP (ref 32–36)
MCV RBC AUTO: 82.6 FL — SIGNIFICANT CHANGE UP (ref 80–100)
MCV RBC AUTO: 83.6 FL — SIGNIFICANT CHANGE UP (ref 80–100)
MCV RBC AUTO: 84.6 FL — SIGNIFICANT CHANGE UP (ref 80–100)
MONOCYTES # BLD AUTO: 0.31 K/UL — SIGNIFICANT CHANGE UP (ref 0–0.9)
MONOCYTES # BLD AUTO: 1.02 K/UL — HIGH (ref 0–0.9)
MONOCYTES NFR BLD AUTO: 4.3 % — SIGNIFICANT CHANGE UP (ref 2–14)
MONOCYTES NFR BLD AUTO: 9.9 % — SIGNIFICANT CHANGE UP (ref 2–14)
NEUTROPHILS # BLD AUTO: 6.59 K/UL — SIGNIFICANT CHANGE UP (ref 1.8–7.4)
NEUTROPHILS # BLD AUTO: 7.61 K/UL — HIGH (ref 1.8–7.4)
NEUTROPHILS NFR BLD AUTO: 74 % — SIGNIFICANT CHANGE UP (ref 43–77)
NEUTROPHILS NFR BLD AUTO: 90.7 % — HIGH (ref 43–77)
NITRITE UR-MCNC: NEGATIVE — SIGNIFICANT CHANGE UP
NRBC # BLD: 0 /100 WBCS — SIGNIFICANT CHANGE UP (ref 0–0)
NRBC # FLD: 0 K/UL — SIGNIFICANT CHANGE UP (ref 0–0)
NT-PROBNP SERPL-SCNC: 1461 PG/ML — HIGH
OSMOLALITY UR: 528 MOSM/KG — SIGNIFICANT CHANGE UP (ref 50–1200)
PCO2 BLDV: 36 MMHG — LOW (ref 42–55)
PH BLDV: 7.37 — SIGNIFICANT CHANGE UP (ref 7.32–7.43)
PH UR: 6.5 — SIGNIFICANT CHANGE UP (ref 5–8)
PHOSPHATE SERPL-MCNC: 3.6 MG/DL — SIGNIFICANT CHANGE UP (ref 2.5–4.5)
PHOSPHATE SERPL-MCNC: 3.6 MG/DL — SIGNIFICANT CHANGE UP (ref 2.5–4.5)
PHOSPHATE SERPL-MCNC: 4 MG/DL — SIGNIFICANT CHANGE UP (ref 2.5–4.5)
PHOSPHATE SERPL-MCNC: 4.2 MG/DL — SIGNIFICANT CHANGE UP (ref 2.5–4.5)
PHOSPHATE SERPL-MCNC: 4.4 MG/DL — SIGNIFICANT CHANGE UP (ref 2.5–4.5)
PLATELET # BLD AUTO: 150 K/UL — SIGNIFICANT CHANGE UP (ref 150–400)
PLATELET # BLD AUTO: 251 K/UL — SIGNIFICANT CHANGE UP (ref 150–400)
PLATELET # BLD AUTO: 264 K/UL — SIGNIFICANT CHANGE UP (ref 150–400)
PO2 BLDV: 47 MMHG — SIGNIFICANT CHANGE UP
POTASSIUM BLDV-SCNC: 3.3 MMOL/L — LOW (ref 3.5–5.1)
POTASSIUM SERPL-MCNC: 3.2 MMOL/L — LOW (ref 3.5–5.3)
POTASSIUM SERPL-MCNC: 3.3 MMOL/L — LOW (ref 3.5–5.3)
POTASSIUM SERPL-MCNC: 3.3 MMOL/L — LOW (ref 3.5–5.3)
POTASSIUM SERPL-MCNC: 3.4 MMOL/L — LOW (ref 3.5–5.3)
POTASSIUM SERPL-MCNC: 3.5 MMOL/L — SIGNIFICANT CHANGE UP (ref 3.5–5.3)
POTASSIUM SERPL-SCNC: 3.2 MMOL/L — LOW (ref 3.5–5.3)
POTASSIUM SERPL-SCNC: 3.3 MMOL/L — LOW (ref 3.5–5.3)
POTASSIUM SERPL-SCNC: 3.3 MMOL/L — LOW (ref 3.5–5.3)
POTASSIUM SERPL-SCNC: 3.4 MMOL/L — LOW (ref 3.5–5.3)
POTASSIUM SERPL-SCNC: 3.5 MMOL/L — SIGNIFICANT CHANGE UP (ref 3.5–5.3)
PROCALCITONIN SERPL-MCNC: 0.06 NG/ML — SIGNIFICANT CHANGE UP (ref 0.02–0.1)
PROT ?TM UR-MCNC: 68 MG/DL — SIGNIFICANT CHANGE UP
PROT SERPL-MCNC: 7.5 G/DL — SIGNIFICANT CHANGE UP (ref 6–8.3)
PROT SERPL-MCNC: 7.5 G/DL — SIGNIFICANT CHANGE UP (ref 6–8.3)
PROT SERPL-MCNC: 7.7 G/DL — SIGNIFICANT CHANGE UP (ref 6–8.3)
PROT SERPL-MCNC: 8 G/DL — SIGNIFICANT CHANGE UP (ref 6–8.3)
PROT SERPL-MCNC: 8.6 G/DL — HIGH (ref 6–8.3)
PROT UR-MCNC: ABNORMAL
PROTHROM AB SERPL-ACNC: 11.7 SEC — SIGNIFICANT CHANGE UP (ref 10.5–13.4)
RBC # BLD: 3.85 M/UL — LOW (ref 4.2–5.8)
RBC # BLD: 4.1 M/UL — LOW (ref 4.2–5.8)
RBC # BLD: 5.32 M/UL — SIGNIFICANT CHANGE UP (ref 4.2–5.8)
RBC # FLD: 13.5 % — SIGNIFICANT CHANGE UP (ref 10.3–14.5)
RBC # FLD: 13.7 % — SIGNIFICANT CHANGE UP (ref 10.3–14.5)
RBC # FLD: 13.8 % — SIGNIFICANT CHANGE UP (ref 10.3–14.5)
RBC CASTS # UR COMP ASSIST: 1 /HPF — SIGNIFICANT CHANGE UP (ref 0–4)
SAO2 % BLDV: 74.5 % — SIGNIFICANT CHANGE UP
SODIUM SERPL-SCNC: 121 MMOL/L — LOW (ref 135–145)
SODIUM SERPL-SCNC: 123 MMOL/L — LOW (ref 135–145)
SODIUM SERPL-SCNC: 126 MMOL/L — LOW (ref 135–145)
SODIUM SERPL-SCNC: 128 MMOL/L — LOW (ref 135–145)
SODIUM SERPL-SCNC: 129 MMOL/L — LOW (ref 135–145)
SODIUM UR-SCNC: 78 MMOL/L — SIGNIFICANT CHANGE UP
SP GR SPEC: 1.03 — SIGNIFICANT CHANGE UP (ref 1.01–1.05)
T4 FREE SERPL-MCNC: 1.6 NG/DL — SIGNIFICANT CHANGE UP (ref 0.9–1.8)
TIBC SERPL-MCNC: 290 UG/DL — SIGNIFICANT CHANGE UP (ref 220–430)
TIBC SERPL-MCNC: 328 UG/DL — SIGNIFICANT CHANGE UP (ref 220–430)
TSH SERPL-MCNC: 1.57 UIU/ML — SIGNIFICANT CHANGE UP (ref 0.27–4.2)
TSH SERPL-MCNC: 2.68 UIU/ML — SIGNIFICANT CHANGE UP (ref 0.27–4.2)
UIBC SERPL-MCNC: 254 UG/DL — SIGNIFICANT CHANGE UP (ref 110–370)
UIBC SERPL-MCNC: 286 UG/DL — SIGNIFICANT CHANGE UP (ref 110–370)
UROBILINOGEN FLD QL: SIGNIFICANT CHANGE UP
VIT B12 SERPL-MCNC: 863 PG/ML — SIGNIFICANT CHANGE UP (ref 200–900)
WBC # BLD: 10.28 K/UL — SIGNIFICANT CHANGE UP (ref 3.8–10.5)
WBC # BLD: 15.86 K/UL — HIGH (ref 3.8–10.5)
WBC # BLD: 7.26 K/UL — SIGNIFICANT CHANGE UP (ref 3.8–10.5)
WBC # FLD AUTO: 10.28 K/UL — SIGNIFICANT CHANGE UP (ref 3.8–10.5)
WBC # FLD AUTO: 15.86 K/UL — HIGH (ref 3.8–10.5)
WBC # FLD AUTO: 7.26 K/UL — SIGNIFICANT CHANGE UP (ref 3.8–10.5)
WBC UR QL: 1 /HPF — SIGNIFICANT CHANGE UP (ref 0–5)

## 2022-09-25 PROCEDURE — 71045 X-RAY EXAM CHEST 1 VIEW: CPT | Mod: 26,76

## 2022-09-25 PROCEDURE — 86335 IMMUNFIX E-PHORSIS/URINE/CSF: CPT | Mod: 26

## 2022-09-25 PROCEDURE — 36556 INSERT NON-TUNNEL CV CATH: CPT

## 2022-09-25 PROCEDURE — 36000 PLACE NEEDLE IN VEIN: CPT | Mod: 59

## 2022-09-25 PROCEDURE — 99233 SBSQ HOSP IP/OBS HIGH 50: CPT | Mod: GC

## 2022-09-25 PROCEDURE — 72132 CT LUMBAR SPINE W/DYE: CPT | Mod: 26

## 2022-09-25 PROCEDURE — 72129 CT CHEST SPINE W/DYE: CPT | Mod: 26

## 2022-09-25 PROCEDURE — 84165 PROTEIN E-PHORESIS SERUM: CPT | Mod: 26

## 2022-09-25 PROCEDURE — 76937 US GUIDE VASCULAR ACCESS: CPT | Mod: 26,59

## 2022-09-25 PROCEDURE — 99291 CRITICAL CARE FIRST HOUR: CPT

## 2022-09-25 PROCEDURE — 84166 PROTEIN E-PHORESIS/URINE/CSF: CPT | Mod: 26

## 2022-09-25 PROCEDURE — 86334 IMMUNOFIX E-PHORESIS SERUM: CPT | Mod: 26

## 2022-09-25 PROCEDURE — 71275 CT ANGIOGRAPHY CHEST: CPT | Mod: 26

## 2022-09-25 PROCEDURE — 72126 CT NECK SPINE W/DYE: CPT | Mod: 26

## 2022-09-25 RX ORDER — PANTOPRAZOLE SODIUM 20 MG/1
1 TABLET, DELAYED RELEASE ORAL
Qty: 0 | Refills: 0 | DISCHARGE

## 2022-09-25 RX ORDER — POTASSIUM CHLORIDE 20 MEQ
10 PACKET (EA) ORAL
Refills: 0 | Status: DISCONTINUED | OUTPATIENT
Start: 2022-09-25 | End: 2022-09-29

## 2022-09-25 RX ORDER — SODIUM CHLORIDE 9 MG/ML
1000 INJECTION, SOLUTION INTRAVENOUS
Refills: 0 | Status: DISCONTINUED | OUTPATIENT
Start: 2022-09-25 | End: 2022-09-26

## 2022-09-25 RX ORDER — DEXTROSE 50 % IN WATER 50 %
12.5 SYRINGE (ML) INTRAVENOUS ONCE
Refills: 0 | Status: DISCONTINUED | OUTPATIENT
Start: 2022-09-25 | End: 2022-09-26

## 2022-09-25 RX ORDER — ENOXAPARIN SODIUM 100 MG/ML
40 INJECTION SUBCUTANEOUS EVERY 24 HOURS
Refills: 0 | Status: DISCONTINUED | OUTPATIENT
Start: 2022-09-25 | End: 2022-09-25

## 2022-09-25 RX ORDER — SODIUM CHLORIDE 5 G/100ML
500 INJECTION, SOLUTION INTRAVENOUS
Refills: 0 | Status: DISCONTINUED | OUTPATIENT
Start: 2022-09-25 | End: 2022-09-25

## 2022-09-25 RX ORDER — IPRATROPIUM/ALBUTEROL SULFATE 18-103MCG
3 AEROSOL WITH ADAPTER (GRAM) INHALATION EVERY 6 HOURS
Refills: 0 | Status: DISCONTINUED | OUTPATIENT
Start: 2022-09-25 | End: 2022-09-25

## 2022-09-25 RX ORDER — CHLORHEXIDINE GLUCONATE 213 G/1000ML
1 SOLUTION TOPICAL
Refills: 0 | Status: DISCONTINUED | OUTPATIENT
Start: 2022-09-25 | End: 2022-09-29

## 2022-09-25 RX ORDER — HEPARIN SODIUM 5000 [USP'U]/ML
2000 INJECTION INTRAVENOUS; SUBCUTANEOUS EVERY 6 HOURS
Refills: 0 | Status: DISCONTINUED | OUTPATIENT
Start: 2022-09-25 | End: 2022-09-25

## 2022-09-25 RX ORDER — ALBUTEROL 90 UG/1
1 AEROSOL, METERED ORAL ONCE
Refills: 0 | Status: COMPLETED | OUTPATIENT
Start: 2022-09-25 | End: 2022-09-25

## 2022-09-25 RX ORDER — CHLORHEXIDINE GLUCONATE 213 G/1000ML
15 SOLUTION TOPICAL EVERY 12 HOURS
Refills: 0 | Status: DISCONTINUED | OUTPATIENT
Start: 2022-09-25 | End: 2022-10-03

## 2022-09-25 RX ORDER — LEVALBUTEROL 1.25 MG/.5ML
1 SOLUTION, CONCENTRATE RESPIRATORY (INHALATION) ONCE
Refills: 0 | Status: DISCONTINUED | OUTPATIENT
Start: 2022-09-25 | End: 2022-09-25

## 2022-09-25 RX ORDER — VALACYCLOVIR 500 MG/1
500 TABLET, FILM COATED ORAL DAILY
Refills: 0 | Status: DISCONTINUED | OUTPATIENT
Start: 2022-09-25 | End: 2022-10-05

## 2022-09-25 RX ORDER — POTASSIUM CHLORIDE 20 MEQ
10 PACKET (EA) ORAL
Refills: 0 | Status: DISCONTINUED | OUTPATIENT
Start: 2022-09-25 | End: 2022-09-25

## 2022-09-25 RX ORDER — HEPARIN SODIUM 5000 [USP'U]/ML
4000 INJECTION INTRAVENOUS; SUBCUTANEOUS ONCE
Refills: 0 | Status: DISCONTINUED | OUTPATIENT
Start: 2022-09-25 | End: 2022-09-25

## 2022-09-25 RX ORDER — IPRATROPIUM BROMIDE 0.2 MG/ML
2 SOLUTION, NON-ORAL INHALATION EVERY 6 HOURS
Refills: 0 | Status: DISCONTINUED | OUTPATIENT
Start: 2022-09-25 | End: 2022-09-26

## 2022-09-25 RX ORDER — DEXTROSE 50 % IN WATER 50 %
25 SYRINGE (ML) INTRAVENOUS ONCE
Refills: 0 | Status: DISCONTINUED | OUTPATIENT
Start: 2022-09-25 | End: 2022-09-26

## 2022-09-25 RX ORDER — DEXAMETHASONE 0.5 MG/5ML
4 ELIXIR ORAL EVERY 6 HOURS
Refills: 0 | Status: DISCONTINUED | OUTPATIENT
Start: 2022-09-25 | End: 2022-09-28

## 2022-09-25 RX ORDER — ALBUTEROL 90 UG/1
1 AEROSOL, METERED ORAL ONCE
Refills: 0 | Status: DISCONTINUED | OUTPATIENT
Start: 2022-09-25 | End: 2022-09-25

## 2022-09-25 RX ORDER — METOPROLOL TARTRATE 50 MG
1 TABLET ORAL
Qty: 0 | Refills: 0 | DISCHARGE

## 2022-09-25 RX ORDER — FENTANYL CITRATE 50 UG/ML
100 INJECTION INTRAVENOUS ONCE
Refills: 0 | Status: DISCONTINUED | OUTPATIENT
Start: 2022-09-25 | End: 2022-09-25

## 2022-09-25 RX ORDER — HEPARIN SODIUM 5000 [USP'U]/ML
INJECTION INTRAVENOUS; SUBCUTANEOUS
Qty: 25000 | Refills: 0 | Status: DISCONTINUED | OUTPATIENT
Start: 2022-09-25 | End: 2022-09-25

## 2022-09-25 RX ORDER — PROPOFOL 10 MG/ML
40 INJECTION, EMULSION INTRAVENOUS
Qty: 1000 | Refills: 0 | Status: DISCONTINUED | OUTPATIENT
Start: 2022-09-25 | End: 2022-09-25

## 2022-09-25 RX ORDER — ALBUTEROL 90 UG/1
2 AEROSOL, METERED ORAL EVERY 6 HOURS
Refills: 0 | Status: DISCONTINUED | OUTPATIENT
Start: 2022-09-25 | End: 2022-09-28

## 2022-09-25 RX ORDER — AMLODIPINE BESYLATE 2.5 MG/1
10 TABLET ORAL DAILY
Refills: 0 | Status: DISCONTINUED | OUTPATIENT
Start: 2022-09-25 | End: 2022-09-25

## 2022-09-25 RX ORDER — GLUCAGON INJECTION, SOLUTION 0.5 MG/.1ML
1 INJECTION, SOLUTION SUBCUTANEOUS ONCE
Refills: 0 | Status: DISCONTINUED | OUTPATIENT
Start: 2022-09-25 | End: 2022-10-11

## 2022-09-25 RX ORDER — NOREPINEPHRINE BITARTRATE/D5W 8 MG/250ML
0.3 PLASTIC BAG, INJECTION (ML) INTRAVENOUS
Qty: 8 | Refills: 0 | Status: DISCONTINUED | OUTPATIENT
Start: 2022-09-25 | End: 2022-09-29

## 2022-09-25 RX ORDER — DEXTROSE 50 % IN WATER 50 %
15 SYRINGE (ML) INTRAVENOUS ONCE
Refills: 0 | Status: DISCONTINUED | OUTPATIENT
Start: 2022-09-25 | End: 2022-09-26

## 2022-09-25 RX ORDER — METOPROLOL TARTRATE 50 MG
50 TABLET ORAL EVERY 12 HOURS
Refills: 0 | Status: DISCONTINUED | OUTPATIENT
Start: 2022-09-25 | End: 2022-09-25

## 2022-09-25 RX ORDER — ATORVASTATIN CALCIUM 80 MG/1
40 TABLET, FILM COATED ORAL AT BEDTIME
Refills: 0 | Status: DISCONTINUED | OUTPATIENT
Start: 2022-09-25 | End: 2022-10-11

## 2022-09-25 RX ORDER — HEPARIN SODIUM 5000 [USP'U]/ML
5000 INJECTION INTRAVENOUS; SUBCUTANEOUS EVERY 12 HOURS
Refills: 0 | Status: DISCONTINUED | OUTPATIENT
Start: 2022-09-25 | End: 2022-09-25

## 2022-09-25 RX ORDER — PROPOFOL 10 MG/ML
40.04 INJECTION, EMULSION INTRAVENOUS
Qty: 1000 | Refills: 0 | Status: DISCONTINUED | OUTPATIENT
Start: 2022-09-25 | End: 2022-10-03

## 2022-09-25 RX ORDER — HEPARIN SODIUM 5000 [USP'U]/ML
4000 INJECTION INTRAVENOUS; SUBCUTANEOUS EVERY 6 HOURS
Refills: 0 | Status: DISCONTINUED | OUTPATIENT
Start: 2022-09-25 | End: 2022-09-25

## 2022-09-25 RX ORDER — ONDANSETRON 8 MG/1
4 TABLET, FILM COATED ORAL EVERY 8 HOURS
Refills: 0 | Status: DISCONTINUED | OUTPATIENT
Start: 2022-09-25 | End: 2022-09-25

## 2022-09-25 RX ADMIN — CHLORHEXIDINE GLUCONATE 15 MILLILITER(S): 213 SOLUTION TOPICAL at 19:32

## 2022-09-25 RX ADMIN — ALBUTEROL 2 PUFF(S): 90 AEROSOL, METERED ORAL at 16:03

## 2022-09-25 RX ADMIN — ALBUTEROL 2 PUFF(S): 90 AEROSOL, METERED ORAL at 21:28

## 2022-09-25 RX ADMIN — CHLORHEXIDINE GLUCONATE 1 APPLICATION(S): 213 SOLUTION TOPICAL at 13:24

## 2022-09-25 RX ADMIN — ATORVASTATIN CALCIUM 40 MILLIGRAM(S): 80 TABLET, FILM COATED ORAL at 22:03

## 2022-09-25 RX ADMIN — FENTANYL CITRATE 100 MICROGRAM(S): 50 INJECTION INTRAVENOUS at 23:23

## 2022-09-25 RX ADMIN — Medication 4 MILLIGRAM(S): at 19:32

## 2022-09-25 RX ADMIN — PROPOFOL 12.3 MICROGRAM(S)/KG/MIN: 10 INJECTION, EMULSION INTRAVENOUS at 13:19

## 2022-09-25 RX ADMIN — SODIUM CHLORIDE 50 MILLILITER(S): 5 INJECTION, SOLUTION INTRAVENOUS at 04:32

## 2022-09-25 RX ADMIN — Medication 2 PUFF(S): at 16:03

## 2022-09-25 RX ADMIN — Medication 2 PUFF(S): at 21:28

## 2022-09-25 RX ADMIN — Medication 4 MILLIGRAM(S): at 00:31

## 2022-09-25 RX ADMIN — Medication 4 MILLIGRAM(S): at 05:29

## 2022-09-25 RX ADMIN — Medication 28.8 MICROGRAM(S)/KG/MIN: at 13:20

## 2022-09-25 RX ADMIN — PROPOFOL 12.3 MICROGRAM(S)/KG/MIN: 10 INJECTION, EMULSION INTRAVENOUS at 19:37

## 2022-09-25 RX ADMIN — ALBUTEROL 1 PUFF(S): 90 AEROSOL, METERED ORAL at 04:06

## 2022-09-25 RX ADMIN — Medication 4 MILLIGRAM(S): at 13:21

## 2022-09-25 NOTE — H&P ADULT - PROBLEM SELECTOR PLAN 2
-pt intermittently tachypneic on exam and with wheezing that appears to be positional  -repeat CXR obtained, no pulm edema or focal consolidations appreciated to my eye  -CT cervical spine without obstructing masses to explain wheezing  -check full RVP  -treat with albuterol x1, reassess response  -NIF obtained and abnormal. VBG with mild resp alkalosis in response to mild met acidosis, no evidence of hypercarbia at this time. Trend q8h. MICU consulted, f/u recs. Continue to check NIFs q4h  -CT angio ordered to r/o PE. As per [facility] resident, okay to order as still within daily contrast load limit  -monitor on continuous pulse ox  -wean off oxygen as tolerated

## 2022-09-25 NOTE — H&P ADULT - PROBLEM SELECTOR PLAN 5
-denies chest pain  -EKG stable  -monitor on tele  -restart aspirin, statin, Metoprolol and losartan when able to take po

## 2022-09-25 NOTE — CONSULT NOTE ADULT - SUBJECTIVE AND OBJECTIVE BOX
CHIEF COMPLAINT: weakness     HPI:    Patient is a 71 yo M with PMH of HTN, HLD, CAD, cardiac stent, Multiple Myeloma who presented to the ED for 3 days of generalized weakness. Patient's daughter provided translation as per her father's request. Daughter reports that her father came to the ED 3 days ago for vomiting and was sent home. She also reports that for the past 2-3 days he has had upper and lower extremity weakness with associated tingling. She reports that he is unable to lift his arms and legs. She also reports that he his voice has become more raspier and hoarse. He also complains of fatigue and cough. He has difficulty swallowing. He also complains of chronic low back pain. He denies any episode of incontinence and is able control his bladder and bowel. He denies blurry vision, dizziness. Of note daughter reports that the patient is supposed to get chemo twice a month, however due to recent travel he has not received chemo since 3/22.       Patient  failed dysphagia study and is currently npo.       Neuro was consulted and was concerned that the weakness could be due to pathology in the neck vs given decreased reflexes may be GB. Neuro recommended MRI neck w and without contrast and to start decadron 4mg.    Nephrology was consulted for hyponatremia management.     MICU was consulted for weakness and possible GBS.       PAST MEDICAL & SURGICAL HISTORY:  HTN (hypertension)      HLD (hyperlipidemia)      Coronary artery disease      Multiple myeloma      S/P coronary artery stent placement          FAMILY HISTORY:  Maternal family history of hypertension  Mother        SOCIAL HISTORY:      Allergies    Beef (Unknown)  No Known Drug Allergies  pork (Unknown)    Intolerances        HOME MEDICATIONS:    REVIEW OF SYSTEMS:  General: +fatigue, Denies dizziness  Eyes: Denies blurry vision  ENMT: Denies rhinorrhea  Respiratory: Denies cough, SOB  Cardiovascular: Denies palpitations, CP  Gastrointestinal: Denies abd pain, N/V/D/C, hematochezia, melena  : Denies dysuria, increased freq  Musculoskeletal: Denies edema, joint pain  Endocrine: Denies increased thirst, increased frequency  Allergic/Immunologic: Denies rashes or hives  Neuro: +weakness, +tingling   Psych: Denies anxiety, depression  All ROS negative unless indicated above     OBJECTIVE:  ICU Vital Signs Last 24 Hrs  T(C): 36.5 (24 Sep 2022 21:15), Max: 36.5 (24 Sep 2022 21:15)  T(F): 97.7 (24 Sep 2022 21:15), Max: 97.7 (24 Sep 2022 21:15)  HR: 100 (24 Sep 2022 21:15) (77 - 100)  BP: 157/89 (24 Sep 2022 21:15) (143/64 - 157/89)  BP(mean): --  ABP: --  ABP(mean): --  RR: 20 (24 Sep 2022 21:15) (16 - 26)  SpO2: 100% (24 Sep 2022 21:15) (99% - 100%)    O2 Parameters below as of 24 Sep 2022 21:15  Patient On (Oxygen Delivery Method): nasal cannula  O2 Flow (L/min): 2            CAPILLARY BLOOD GLUCOSE          PHYSICAL EXAM:  CONSTITUTIONAL: NAD; well-developed on 4L NC   HEENT: PERRL, clear conjunctiva  RESPIRATORY: Normal respiratory effort; lungs are clear to auscultation bilaterally; No Crackles/Rhonchi/Wheezing  CARDIOVASCULAR: Regular rate and rhythm, normal S1 and S2, no murmur/rub/gallop; No lower extremity edema; Peripheral pulses are 2+ bilaterally  ABDOMEN: Nontender to palpation, normoactive bowel sounds, no rebound/guarding; No hepatosplenomegaly  MUSCULOSKELETAL: no clubbing or cyanosis of digits; no joint swelling or tenderness to palpation  EXTREMITY: Lower extremities Non-tender to palpation; non-erythematous B/L  NEURO: A&Ox3; sensation grossly intact, 2/5 strength b/l UE & LE   PSYCH: normal mood; Affect appropriate    HOSPITAL MEDICATIONS:  MEDICATIONS  (STANDING):  albuterol/ipratropium for Nebulization 3 milliLiter(s) Nebulizer every 6 hours  dexAMETHasone  Injectable 4 milliGRAM(s) IV Push every 6 hours  enoxaparin Injectable 40 milliGRAM(s) SubCutaneous every 24 hours  levalbuterol 45 MICROgram(s) HFA Inhaler 1 Puff(s) Inhalation once  potassium chloride  10 mEq/100 mL IVPB 10 milliEquivalent(s) IV Intermittent every 1 hour  sodium chloride 1.5%. 500 milliLiter(s) (50 mL/Hr) IV Continuous <Continuous>    MEDICATIONS  (PRN):  ondansetron Injectable 4 milliGRAM(s) IV Push every 8 hours PRN Nausea and/or Vomiting      LABS:                        12.0   10.28 )-----------( 251      ( 25 Sep 2022 00:35 )             34.7     09-25    123<L>  |  89<L>  |  21  ----------------------------<  135<H>  3.3<L>   |  19<L>  |  1.17    Ca    9.3      25 Sep 2022 00:35  Phos  4.2     09-25  Mg     2.10     09-25    TPro  8.0  /  Alb  4.7  /  TBili  0.7  /  DBili  x   /  AST  22  /  ALT  11  /  AlkPhos  52  09-25          Venous Blood Gas:  09-25 @ 00:35  7.37/36/47/21/74.5  VBG Lactate: 1.7      MICROBIOLOGY:     RADIOLOGY:  [ ] Reviewed and interpreted by me    EKG: Reviewed    CHIEF COMPLAINT: weakness     HPI:    Patient is a 71 yo M with PMH of HTN, HLD, CAD, cardiac stent, Multiple Myeloma who presented to the ED for 3 days of generalized weakness. Patient's daughter provided translation as per her father's request. Daughter reports that her father came to the ED 3 days ago for vomiting and was sent home. She also reports that for the past 2-3 days he has had upper and lower extremity weakness with associated tingling. She reports that he is unable to lift his arms and legs. She also reports that he his voice has become more raspier and hoarse. He also complains of fatigue and cough. The daughter reports that about 2 days ago she noticed that he had difficulty  standing up, however by the next day his weakness has progressed and he is unable to get out of bed.  He has difficulty swallowing. He also complains of chronic low back pain. He denies any episode of incontinence and is able control his bladder and bowel. He denies blurry vision, dizziness. Of note daughter reports that the patient is supposed to get chemo twice a month, however due to recent travel he has not received chemo since 3/22.       Patient  failed dysphagia study and is currently npo.       Neuro was consulted and was concerned that the weakness could be due to pathology in the neck vs given decreased reflexes may be GB. Neuro recommended MRI neck w and without contrast and to start decadron 4mg.    Nephrology was consulted for hyponatremia management.     MICU was consulted for weakness and possible GBS.       PAST MEDICAL & SURGICAL HISTORY:  HTN (hypertension)      HLD (hyperlipidemia)      Coronary artery disease      Multiple myeloma      S/P coronary artery stent placement          FAMILY HISTORY:  Maternal family history of hypertension  Mother        SOCIAL HISTORY:      Allergies    Beef (Unknown)  No Known Drug Allergies  pork (Unknown)    Intolerances        HOME MEDICATIONS:    REVIEW OF SYSTEMS:  General: +fatigue, Denies dizziness  Eyes: Denies blurry vision  ENMT: Denies rhinorrhea  Respiratory: Denies cough, SOB  Cardiovascular: Denies palpitations, CP  Gastrointestinal: Denies abd pain, N/V/D/C, hematochezia, melena  : Denies dysuria, increased freq  Musculoskeletal: Denies edema, joint pain  Endocrine: Denies increased thirst, increased frequency  Allergic/Immunologic: Denies rashes or hives  Neuro: +weakness, +tingling   Psych: Denies anxiety, depression  All ROS negative unless indicated above     OBJECTIVE:  ICU Vital Signs Last 24 Hrs  T(C): 36.5 (24 Sep 2022 21:15), Max: 36.5 (24 Sep 2022 21:15)  T(F): 97.7 (24 Sep 2022 21:15), Max: 97.7 (24 Sep 2022 21:15)  HR: 100 (24 Sep 2022 21:15) (77 - 100)  BP: 157/89 (24 Sep 2022 21:15) (143/64 - 157/89)  BP(mean): --  ABP: --  ABP(mean): --  RR: 20 (24 Sep 2022 21:15) (16 - 26)  SpO2: 100% (24 Sep 2022 21:15) (99% - 100%)    O2 Parameters below as of 24 Sep 2022 21:15  Patient On (Oxygen Delivery Method): nasal cannula  O2 Flow (L/min): 2            CAPILLARY BLOOD GLUCOSE          PHYSICAL EXAM:  CONSTITUTIONAL: NAD; well-developed on 4L NC   HEENT: PERRL, clear conjunctiva  RESPIRATORY: Normal respiratory effort; lungs are clear to auscultation bilaterally; No Crackles/Rhonchi/Wheezing  CARDIOVASCULAR: Regular rate and rhythm, normal S1 and S2, no murmur/rub/gallop; No lower extremity edema; Peripheral pulses are 2+ bilaterally  ABDOMEN: Nontender to palpation, normoactive bowel sounds, no rebound/guarding; No hepatosplenomegaly  MUSCULOSKELETAL: no clubbing or cyanosis of digits; no joint swelling or tenderness to palpation  EXTREMITY: Lower extremities Non-tender to palpation; non-erythematous B/L  NEURO: A&Ox3; sensation grossly intact, 2/5 strength b/l UE & LE   PSYCH: normal mood; Affect appropriate    HOSPITAL MEDICATIONS:  MEDICATIONS  (STANDING):  albuterol/ipratropium for Nebulization 3 milliLiter(s) Nebulizer every 6 hours  dexAMETHasone  Injectable 4 milliGRAM(s) IV Push every 6 hours  enoxaparin Injectable 40 milliGRAM(s) SubCutaneous every 24 hours  levalbuterol 45 MICROgram(s) HFA Inhaler 1 Puff(s) Inhalation once  potassium chloride  10 mEq/100 mL IVPB 10 milliEquivalent(s) IV Intermittent every 1 hour  sodium chloride 1.5%. 500 milliLiter(s) (50 mL/Hr) IV Continuous <Continuous>    MEDICATIONS  (PRN):  ondansetron Injectable 4 milliGRAM(s) IV Push every 8 hours PRN Nausea and/or Vomiting      LABS:                        12.0   10.28 )-----------( 251      ( 25 Sep 2022 00:35 )             34.7     09-25    123<L>  |  89<L>  |  21  ----------------------------<  135<H>  3.3<L>   |  19<L>  |  1.17    Ca    9.3      25 Sep 2022 00:35  Phos  4.2     09-25  Mg     2.10     09-25    TPro  8.0  /  Alb  4.7  /  TBili  0.7  /  DBili  x   /  AST  22  /  ALT  11  /  AlkPhos  52  09-25          Venous Blood Gas:  09-25 @ 00:35  7.37/36/47/21/74.5  VBG Lactate: 1.7      MICROBIOLOGY:     RADIOLOGY:  [ ] Reviewed and interpreted by me    EKG: Reviewed

## 2022-09-25 NOTE — H&P ADULT - NSHPADDITIONALINFOADULT_GEN_ALL_CORE
addendum: CT angio reviewed. Likely PE. Will start on hep gtt. Also with increased secretions in trachea. Needs frequent suctioning and aggressive chest PT to assist with mobilization of secretion. Pt with reported desat to 70s and now on NRB, will re-consult MICU for further recs

## 2022-09-25 NOTE — PATIENT PROFILE ADULT - FALL HARM RISK - RISK INTERVENTIONS
pt intubated/sedated pt intubated/sedated/Physically safe environment - no spills, clutter or unnecessary equipment/Purposeful Proactive Rounding

## 2022-09-25 NOTE — H&P ADULT - NSICDXPASTMEDICALHX_GEN_ALL_CORE_FT
PAST MEDICAL HISTORY:  Coronary artery disease     HLD (hyperlipidemia)     HTN (hypertension)     Multiple myeloma

## 2022-09-25 NOTE — CONSULT NOTE ADULT - ASSESSMENT
# Multiple myeloma  - Send SPEP, UPEP, serum and urine immunofixations, serum free light chains, quantitative immunoglobulins, beta-2 microglobulin and LDH  Patient notably anemic- send iron studies, ferritin, B12, folate       73yo gentleman with PMH of htn, hld, CAD s/p stent, IgG kappa MM, who presents with progressive weakness over the last 4 days, and new acute hypoxic respiratory failure, now intubated and sedated in MICU.  Hematology consulted due to known MM.     #Multiple myeloma.     p/w generalized weakness for 2-3 days with tingling sensation. Patient had frequent urination on 9/20/22, thereafter had persistent N/V. He had no fever or cough. He had progressive weakness of upper then lower extremities. He was evaluated in the ED on 9/22, but discharged home. His weakness progressed at home, and EMS was called on 9/24/22 when patient developed wheezing and voice changes.  CTH was WNL.  CT Cspine found lytic lesions at C spine, no soft tissue mass, epidiural tumor extension or abnormal enhancement. No fracture or traumatic malalignment. 1cm nodule at L thyroid lobe. C spine degenerative changes in mild-moderate spinal canal stenosis at C3-C4 and C5-C6. Multiple mixed lytic slcerotic lesions in thoracic spine, sternum and ribs. Chronic pathologic fractures noted.   CT lumbar spine found multiple mixed lytic sclerotic lesions. No lumbar spine fracture or traumatic malalignment. Mild spinal canal stenosis at L3-L4 and L4-L5.   MRI spine ordered.  MICU consulted. Patient desaturated to 70s on NC, placed on 15L high flow to 100%.  CTPA obtained which found on prelim scan - filling defect in RML segmental artery, favored to represent PE over artifact. Extensive secretion layering within the trachea.   He was started on heparin gtt.   RRT thereafter called for tachycardia, tachypnea to 30s with accessory muscle use. Patient was intubated and transferred to MICU.   Patient is now on dexamethasone 4mg IV q6h, levophed, propofol.   MRI imaging ordered.     Multiple myeloma Hx:  Patient sees Dr. Bailey.  He has IgG kappa MM.  He had BMBx in Feb 2019 with Dr. 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   He was admitted in 8/2019 for lower back pain and leg pain, found to have Tprotein of 10, Ca 12. Bisphosphonate not given due to dental problems. Patient improved with hydration.   He had elevated SCr which also improved with IVF.   CT C/A/P 8/18/19 showed a lytic expansile soft tissue lesion centered in the manubrium measuring approximately 7.3 x 3.4 x 4.3 cm, invading both 1st ribs. There was also a A lytic expansile soft tissue lesion in the T8 vertebral body causing mass effect on spinal canal and obliterating left neural foramina at T7-8 and T8-9. ON 8/22/19 an MRI thoracic spine was done showing multiple areas of tumor involvement within the thoracic spine in keeping with the patient's history of multiple myeloma. Prominent lesion within C7 on the right incompletely seen.  Large lesion within T8 on the left producing epidural tumor extension and moderate narrowing of the spinal canal with mild flattening of the spinal cord. He was evaluated on 8/22/19 by Dr. Foley (Rad Onc) and was given 5 fractions of XRT from 8/23/19 to 8/29/19 to C7 and T8. He was discharged home on 8/29/19 with follow up in the outpatient office, and on Dexamethasone 4mg po daily.     Disease: Multiple Myeloma   Stage:. RISS- II.  PET- 5/2020-   Near total resolution of hypermetabolism associated w/ lytic and intramedullary lesions in axial and appendicular skeleton compared to prior study in 9/7/2019. Previously seen lytic lesions demonstrate new sclerosis. Findings c/w response to interval therapy.  Few new mildly avid bilateral rib fractures. New hypermetabolic lucency in L maxillary alveolus and new small hypermetabolic focus in R maxilla, nonspecific. Few new mildly avid nonspecific bilateral hilar lymph nodes. Resolution of hypermetabolism in esophagus/ GE junction.      Treatment: RT to C7/T8 spine 5 fractions from 8/23/19-8/29/19  RVD 9/2019-6/2020  Then maintenance velcade [id number] starting 6/19/2020. His last dose of velcade was on 3/3/22.   He was on montly zometa as well since 11/2019, with plan to treat for 2 years. His last dose of zometa was on 2/17/2022.   Last visit at [facility] was 1/19/2022.  He was allowed a short break to visit [facility], however patient extended his visit. He was in [facility] from 3/10-9/8/2022.   Most recent outpatient labs: 2021    Kappa FLC 4.39, lambda FLC 3.02. K/L FLC ratio 1.45.   IgG 1252 (rising)  SPEP- normal electrophoresis pattern. No M spike noted.   B2 microglobulin 3.7       # Multiple myeloma  - Send SPEP, UPEP, serum and urine immunofixations, serum free light chains, quantitative immunoglobulins, beta-2 microglobulin and LDH  Patient notably anemic- send iron studies, ferritin, B12, folate       71yo gentleman with PMH of htn, hld, CAD s/p stent, IgG kappa MM, who presents with progressive weakness over the last 4 days, and new acute hypoxic respiratory failure, now intubated and sedated in MICU.  Hematology consulted due to known MM.     #IgG kappa multiple myeloma.   - Diagnosed in 8/2019, wiht bony lesions, hypercalcemia, CRISTI   - RISS - II,  - Treatment: RT to C7/T8 spine 5 fractions from 8/23/19-8/29/19  RVD 9/2019-6/2020  Then maintenance velcade q7esatb starting 6/19/2020. His last dose of velcade was on 3/3/22.   He was on monthly zometa as well since 11/2019, with plan to treat for 2 years. His last dose of zometa was on 2/17/2022.   Last visit at [facility] was 1/19/2022.  He was allowed a short break to visit [facility], however patient extended his visit. He was in [facility] from 3/10-9/8/2022.   Last labs from 2021 found normal FLC ratio, normal SPEP pattern, B2 microglobulin 3.7, , IgG 1252 (rising)  - Patient had known bony lesions.  Last PET/CT was on 6/2020 which found near total resolution of hypermetabolism associated with lytic and intramedullary lesions in the axial and appendicular skeleton as compared to prior study dated 9/7/2019. Previously seen lytic lesions demonstrate new sclerosis. Findings are compatible with response to interval therapy.  Few new mildly FDG-avid bilateral rib fractures. An FDG-avid fracture in the left inferior pubic ramus demonstrates increased callus formation and is similar in metabolism.  New hypermetabolic lucency in left maxillary alveolus and new small hypermetabolic focus in right maxilla are nonspecific. Recommend correlation with dental exam.  Few new mildly FDG-avid nonspecific bilateral hilar lymph nodes.  Resolution of many specific hypermetabolism in distal esophagus/GE junction.    - Send SPEP, UPEP, serum and urine immunofixation, serum free light chains, quantitative immunoglobulins, beta-2 microglobulin and LDH  - MRI neck pending  - would recommend further workup by neurology team, with possible LP    #Anemia:  - Folate 9.2, B12 863 WNL.  - Ferritin 287 WNL, TIBC 290 WNL, 12% iron sat.       Note not finalized until signed by attending.   Please do not hesitate to page with questions.     Sabine Sunshine MD PGY4   [phone]  Hematology-Oncology Fellow  WEEKENDS- Please call  to page on-call fellow

## 2022-09-25 NOTE — CONSULT NOTE ADULT - ASSESSMENT
Patient is a 71 yo M with PMH of HTN, HLD, CAD, cardiac stent, Multiple Myeloma who presented to the ED for 3 days of generalized weakness.    EKG: ST PVCs  Tele: NSR PVCs    1. Weakness  -rapidly progressed. was unable to move extremities.   -s/p RRT for hypoxia now intubated in MICU   -neuro on board, possible GBS vs pathology in neck  -MRI spine pending     2. CAD s/p PCI  -in 2015 in [facility] as per daughter patient had heart attack  -no reported CP prior to hospitalization    3. MM  -heme/onc on board

## 2022-09-25 NOTE — PROGRESS NOTE ADULT - ASSESSMENT
71 y/o M with a PMHx significant for HTN, HLD, CAD s/p stent, MM, presents with cc generalized weakness x 2-3 days associated with some tingling sensation. Pt was evaluated in the ED on 9/21 for vomiting but was discharged home. That evening, daughter noticed that patient was unable to walk on his own and needed assistance getting up the stairs. The next day patient was unable to get out of bed and unable to lift his arms from the bed. Pt reports weakness started in the arms and spread down to the legs. Pt also complains of hoarse voice and difficulty swallowing as well as b/l numbness/tingling in both his feet and hands. In the last few hours, also had onset of shortness of breath with occasional cough productive of white sputum. Pt denied any urinary or fecal incontinence, HA, dizziness, chest pain, changes in vision or hearing, neck pain. Reports chronic lower back pain, unchanged in severity. Daughter notes pt with some URI symptoms earlier in the week.  Of note, pt last chemo was 3/22 for MM and he is supposed to get bi-weekly doses Velcade Per daughter, pt was traveling for one month and never followed up afterwards with his oncologist.     MRI c spine    consider LP given respiratotu issues

## 2022-09-25 NOTE — CHART NOTE - NSCHARTNOTEFT_GEN_A_CORE
Responded to RRT called by RN for hypoxia and tachypnea.    Patient is a   72 year old male with PMH of HTN, HLD, CAD, cardiac stent, Multiple Myeloma who presented to the ED for 3 days of progressive generalized weakness with associated voice and difficulty swallowing, and b/l numbness/tingling in both his feet and hands. Concern for cervical spine lesion vs GBS While in ED symptoms were progressive with weakness x4 limbs and difficulty breathing and pooling of secretions.     Upon arrival patient tachycardic to 130s and tachypneic to 30s w/accessory muscle use noted, patient currently unable to speak.  MICU recalled and decision was made to intubate. Dr. Prabhakar @ bedside.    Daughter at bedside in agreement w/intubation, updated on POC,    Patient accepted to MICU.    Attending Dr. Hdz updated

## 2022-09-25 NOTE — H&P ADULT - NSHPLABSRESULTS_GEN_ALL_CORE
(09-25 @ 00:35)                      12.0  10.28 )-----------( 251                 34.7    Neutrophils = 7.61 (74.0%)  Lymphocytes = 1.53 (14.9%)  Eosinophils = 0.05 (0.5%)  Basophils = 0.03 (0.3%)  Monocytes = 1.02 (9.9%)  Bands = --%    09-25    123<L>  |  89<L>  |  21  ----------------------------<  135<H>  3.3<L>   |  19<L>  |  1.17    Ca    9.3      25 Sep 2022 00:35  Phos  4.2     09-25  Mg     2.10     09-25    TPro  8.0  /  Alb  4.7  /  TBili  0.7  /  DBili  x   /  AST  22  /  ALT  11  /  AlkPhos  52  09-25      CARDIAC MARKERS ( 24 Sep 2022 14:58 )  Trop x     /  U/L<H> / CKMB x           Venous Blood Gas:  09-25 @ 00:35  7.37/36/47/21/74.5  VBG Lactate: 1.7    Labs and imaging reviewed  EKG: NSR, incomplete LBBB, no acute st changes

## 2022-09-25 NOTE — RAPID RESPONSE TEAM SUMMARY - NSADDTLFINDINGSRRT_GEN_ALL_CORE
On arrival patient in obvious distress working hard to breathe but weak. -130s, -180, RR 30s, SpO2 100% on NRB. Patient not protecting airway. IV placed in LUE. Due to concern for unstable c-spine a c-collar was placed prior to intubation. Patient intubated by anesthesia with etomidate and succ. Afterwards started on propofol. NGT also placed. On arrival patient in obvious distress working hard to breathe but weak. -130s, -180, RR 30s, SpO2 100% on NRB. Patient not protecting airway. IV placed in LUE. Due to concern for unstable c-spine a c-collar was placed prior to intubation. Patient intubated by anesthesia with etomidate and succ. Afterwards started on propofol. NGT also placed.      ADDENDUM:  After NGT placed patient started to slowly desaturate. NGT removed but patient continued to desat, FiO2 inc from 30 to 100 and PEEP inc from 5 to 8 without effect. Patient desatted to 60s and was developing hypotension. Taken off vent and bagged up to 95%. Still with b/l breath sounds and POCUS showed lung sliding bilaterally. Due to hypotension propofol was stopped and levophed increased. Once bagging brought patient back up the BP improved and levo was decreased and propofol was restarted. Patient was reconnected to ventilator and continued to saturate at 100% but could not tolerate decreasing FIO2 or PEEP. Xray performed and showed ETT in appropriate location. Further POCUS showed normal appearing RV/LV function without signs of hypovolemia, decreased squeeze, or RV overload. Patient then remained stable for 30+ minutes while waiting to be brought up to ICU but prior to moving had another episode of desaturation but this time only to high 80s before self correcting to mid 90s (all with good waveform). Patient brought to MICU.

## 2022-09-25 NOTE — H&P ADULT - PROBLEM SELECTOR PLAN 1
-Pt p/w weakness beginning in the arms and spreading to legs. With significant proximal muscle weakness on exam. Also with associated hoarse voice and dysphagia  -CT head wnl  -appreciate neuro recs   -given neck pain on exam, c/f pathology in the neck causing cord compression. CT cervical spine limited but no obvious masses noted. MRI of the spine ordered. Called several numbers to expedite study, no response   -ddx includes GBS, myasthenia gravis less likely botulism   -treating empirically with decadron  -if MRI neg, will need LP to further eval

## 2022-09-25 NOTE — PROGRESS NOTE ADULT - SUBJECTIVE AND OBJECTIVE BOX
Anesthesia STAT called overhead at 751am. Upon arrival in ED, RRT at bedside along with ED resident and nursing staff. As per ED resident concern with cervical fracture with diaphragmatic involvement. Upon arrival to ED patient in ED stretcher sitting at approximately 45 degrees with no cervical collar in place. As per ED team, CT spine performed but not read, neurosurgery or ortho spine not consulted. Patient's VSS with SpO2 100% on non rebreather with labored breathing. As per daughter at bedside patient in ED since saturday am after patient was unable to move upper and lower extremities from approximately friday afternoon. After arrival in ED it was requested patient be placed in c-collar is true concern for cervical spine fracture. After patient in c-spine patient intubated in in-line stabilization with 2 providers. RSI Atraumatic intubation with  Glidescope with 7.0 ETT secured at 19 at the lips. + Color change with successive breaths. + B/l breath sounds. Respiratory at bedside and secured ETT tube. VSS stable throughout intubation.

## 2022-09-25 NOTE — CONSULT NOTE ADULT - ASSESSMENT
Reccomendations:   - Patient mentating well, hemodynamically stable, and protecting their airway           Not a MICU Candidate at this time. Please Call MICU back with any questions/Concerns.    D/w   71 yo M with PMH of HTN, HLD, CAD, cardiac stent, Multiple Myeloma who presented to the ED for 3 days of progressive generalized weakness with associated voice and difficulty swallowing, and b/l numbness/tingling in both his feet and hands.    #weakness   The patient's generalized weakness appears to be affecting both upper and lower limbs in a descending pattern.      Reccomendations:   - Patient mentating well, hemodynamically stable, and protecting their airway           Not a MICU Candidate at this time. Please Call MICU back with any questions/Concerns.    D/w Dr. Chew   73 yo M with PMH of HTN, HLD, CAD, cardiac stent, Multiple Myeloma who presented to the ED for 3 days of progressive generalized weakness with associated voice and difficulty swallowing, and b/l numbness/tingling in both his feet and hands.    #weakness   The patient's generalized weakness appears to be affecting both upper and lower limbs. Unsure of the etiology of the weakness as it is not in the usual pattern seen in GBS.  Patient is currently protecting his airway and is able to freely move his head. Preliminary read of CT neck showed diffuse lytic lesions consistent with history of MM.  Findings most advanced at the T8. Patient is pending an MRI. MM may be a contributing factor for the weakness as the patient missed multiple chemo treatments       Reccomendations:   - Patient mentating well, hemodynamically stable, and protecting their airway   - Patient is satting well on NC  - Continue to monitor respiratory status   - Neuro recs appreciated, f/u final CT reading and f/u MRI  - consider consulting Spine     Not a MICU Candidate at this time. Please Call MICU back with any questions/Concerns.    D/w Dr. Chew   71 yo M with PMH of HTN, HLD, CAD, cardiac stent, Multiple Myeloma who presented to the ED for 3 days of progressive generalized weakness with associated voice and difficulty swallowing, and b/l numbness/tingling in both his feet and hands.    #weakness   The patient's generalized weakness appears to be affecting both upper and lower limbs. Unsure of the etiology of the weakness as it is not in the usual pattern seen in GBS.  Patient is currently protecting his airway and is able to freely move his head. Preliminary read of CT neck showed diffuse lytic lesions consistent with history of MM.  Findings most advanced at the T8. Patient is pending an MRI. MM may be a contributing factor for the weakness as the patient missed multiple chemo treatments       Recommendations:   - Patient mentating well, hemodynamically stable, and protecting their airway   - Patient is satting well on NC  - Continue to monitor respiratory status   - Neuro recs appreciated, f/u final CT reading and f/u MRI  - consider consulting Spine     Not a MICU Candidate at this time. Please Call MICU back with any questions/Concerns.    D/w Dr. Chew

## 2022-09-25 NOTE — H&P ADULT - CONVERSATION DETAILS
Given concern for worsening respiratory status, brought up goals of care regarding intubation. Daughter reports she would want everything done, he remains full code.

## 2022-09-25 NOTE — H&P ADULT - NSHPPHYSICALEXAM_GEN_ALL_CORE
GENERAL APPEARANCE: cachectic, alert and cooperative. in mod resp distress    HEENT:  PERRL, EOMI. External auditory canals normal, hearing grossly intact.  NECK: Neck supple, non-tender. No stridor appreciated   CARDIAC: Normal S1 and S2. No S3, S4 or murmurs. Rhythm is regular.  LUNGS: Tachypneic however without increased work of breathing. Upper airway transmitted breath sounds   ABDOMEN: Positive bowel sounds. Soft, nondistended, nontender. No guarding or rebound.   MUSCULOSKELETAL: No joint erythema or tenderness.   BACK: cervical spine tenderness on palpation  EXTREMITIES: No significant deformity or joint abnormality. No edema. Peripheral pulses intact. No varicosities.  NEUROLOGICAL: Hoarse voice on exam. 0/5 proximal muscle strength in all extremities.  strength 3/5. 3/5 dorsi and plantarflexion   SKIN: Skin normal color, texture and turgor with no lesions or eruptions.  PSYCHIATRIC: AOx3.Normal affect and behavior.

## 2022-09-25 NOTE — H&P ADULT - ASSESSMENT
71 y/o M with a PMHx significant for HTN, HLD, CAD s/p stent, MM, presents with descending paralysis associated with some tingling sensation c/b hypoxic respiratory failure

## 2022-09-25 NOTE — H&P ADULT - NSHPREVIEWOFSYSTEMS_GEN_ALL_CORE
CONSTITUTIONAL:  +fatigue No weight loss, fever, chills  HEENT:  Eyes:  No visual loss, blurred vision, double vision or yellow sclerae. Ears, Nose, Throat:  No hearing loss, sneezing, congestion, runny nose or sore throat.  SKIN:  No rash or itching.  CARDIOVASCULAR:  No chest pain, chest pressure or chest discomfort. No palpitations or edema.  RESPIRATORY:  +SOB +cough  GASTROINTESTINAL:  No anorexia, nausea, vomiting or diarrhea. No abdominal pain or blood.  GENITOURINARY:  Denies hematuria, dysuria.   NEUROLOGICAL:  +paralysis +numbness or tingling in the extremities No headache, dizziness, syncope. No change in bowel or bladder control.  MUSCULOSKELETAL:  +neck pain +Chronic back pain No joint pain or stiffness.  PSYCHIATRIC:  No history of depression or anxiety.  ENDOCRINOLOGIC:  No reports of sweating, cold or heat intolerance. No polyuria or polydipsia.  ALLERGIES:  No history of asthma, hives, eczema or rhinitis.

## 2022-09-25 NOTE — CONSULT NOTE ADULT - SUBJECTIVE AND OBJECTIVE BOX
HPI:  73yo gentleman with PMH of htn, hld, CAD s/p stent, MM p/w generalized weakness for 2-3 days with tingling sensation. Patient has weakness in arms, spreading to legs, also with hoarseness, difficulty swallowing, tingling in hands and feet. He had some URI symptoms earlier in the week.     CTH was WNL.  CT Cspine found.   MRI spine found  Patient was notably tachypneic. CTPA obtained   He was started on heparin gtt.   Patient had desatted to 70s, thus transferred to MICU.     PAST MEDICAL & SURGICAL HISTORY:  HTN (hypertension)  HLD (hyperlipidemia)  coronary artery disease  Multiple myeloma  S/P coronary artery stent placement    FAMILY HISTORY:  Maternal family history of hypertension  Mother      Social History:  Lives with family  Normally independent of ADLs and ambulates independently (25 Sep 2022 03:08)        REVIEW OF SYSTEMS  CONSTITUTIONAL: No fever, no chills, no fatigue  EYES: No eye pain, no vision changes  ENMT:  No difficulty hearing, no throat pain  RESPIRATORY: No cough,  No shortness of breath  CARDIOVASCULAR: No chest pain, no palpitations  GASTROINTESTINAL: No abdominal pain, no nausea, no vomiting, no diarrhea, no constipation,  GENITOURINARY: No dysuria, no hematuria  NEUROLOGICAL: No numbness , no loss of strength  SKIN: No itching, no rashes,  HEME/LYMPH: No easy bruising, bleeding      >>> <<<>>> <<<  >>> <<<  Allergies  Allergies    Beef (Unknown)  No Known Drug Allergies  pork (Unknown)  Intolerances      Medications  MEDICATIONS  (STANDING):  albuterol/ipratropium for Nebulization 3 milliLiter(s) Nebulizer every 6 hours  chlorhexidine 0.12% Liquid 15 milliLiter(s) Oral Mucosa every 12 hours  chlorhexidine 2% Cloths 1 Application(s) Topical <User Schedule>  dexAMETHasone  Injectable 4 milliGRAM(s) IV Push every 6 hours  norepinephrine Infusion 0.3 MICROgram(s)/kG/Min (28.8 mL/Hr) IV Continuous <Continuous>  potassium chloride  10 mEq/100 mL IVPB 10 milliEquivalent(s) IV Intermittent every 1 hour  propofol Infusion 40 MICROgram(s)/kG/Min (12.3 mL/Hr) IV Continuous <Continuous>  sodium chloride 1.5%. 500 milliLiter(s) (50 mL/Hr) IV Continuous <Continuous>    MEDICATIONS  (PRN):      PHYSICAL EXAM:  GENERAL: NAD, well-groomed  HEAD:  Atraumatic, Normocephalic  EYES: EOMI, PERRLA, conjunctiva and sclera clear  ENMT: No oropharyngeal exudates, Moist mucous membranes  NECK: Supple, no cervical lymphadenopathy  NERVOUS SYSTEM:  alert and conversant, moving all extremities spontaneously   CHEST/LUNG: Clear to auscultation bilaterally; no rhonchi  HEART: Regular rate and rhythm; No murmurs  ABDOMEN: Soft, Nontender, Nondistended  EXTREMITIES:  2+ radial Pulses, No cyanosis or edema  SKIN: warm, dry    LABS:                        12.0   10.28 )-----------( 251      ( 25 Sep 2022 00:35 )             34.7     09-25    121<L>  |  90<L>  |  22  ----------------------------<  209<H>  3.5   |  15<L>  |  1.08    Ca    9.2      25 Sep 2022 07:30  Phos  4.4     09-25  Mg     2.20     09-25    TPro  8.6<H>  /  Alb  4.8  /  TBili  0.9  /  DBili  x   /  AST  19  /  ALT  12  /  AlkPhos  61  09-25    PT/INR - ( 25 Sep 2022 07:30 )   PT: 11.7 sec;   INR: 1.01 ratio         PTT - ( 25 Sep 2022 07:30 )  PTT:26.3 sec      RADIOLOGY & ADDITIONAL STUDIES:  PATHOLOGY:     HPI:  71yo gentleman with PMH of htn, hld, CAD s/p stent, MM p/w generalized weakness for 2-3 days with tingling sensation. Patient has weakness in arms, spreading to legs, also with hoarseness, difficulty swallowing, tingling in hands and feet. He had some URI symptoms earlier in the week.   NIF checked to evaluate for possible GBS.  CTH was WNL.  CT Cspine found lytic lesions at C spine, no soft tissue mass, epidiural tumor extension or abnormal enhancement. No fracture or traumatic malalignment. 1cm nodule at L thyroid lobe. C spine degenerative changes in mild-moderate spinal canal stenosis at C3-C4 and C5-C6. Multiple mixed lytic slcerotic lesions in thoracic spine, sternum and ribs. Chronic pathologic fractures noted.   CT lumbar spine found multiple mixed lytic sclerotic lesions. No lumbar spine fracture or traumatic malalignment. Mild spinal canal stenosis at L3-L4 and L4-L5.   MRI spine found  MICU consulted. Patient desaturated to 70s on NC, placed on 15L high flow to 100%.  CTPA obtained which found on prelim scan - filling defect in RML segmental artery, favored to represent PE over artifact. Extensive secretion layering within the trachea.   He was started on heparin gtt.   RRT thereafter called for tachycardia, tachypnea to 30s with accessory muscle use. Patient was intubated and transferred to MICU.   Patient is now on dexamethasone 4mg IV q6h, levophed, propofol.   MRI imaging ordered.     Multiple myeloma Hx:  Patient sees Dr. Bailey.  He has IgG kappa MM.  He had BMBx in Feb 2019 with Dr. 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   He was admitted in 8/2019 for lower back pain and leg pain, found to have Tprotein of 10, Ca 12. Bisphosphonate not given due to dental problems.  He was given IVF.  He was admitted from 8/18/19 at [facility] where he presented with pain in the L leg/lower back, worsening for the past 2-3 months. On labs, he was found to have a total protein of 10, Ca level 12 until 8/29/19. Dental consult was called and patient needs to have 10 teeth extracted -thus, IV bisphosphonates were not given, pt improved with hydration. He also had a slightly inc'ed creatinine -improved after IVF. CT C/A/P 8/18/19 showed a lytic expansile soft tissue lesion centered in the manubrium measuring approximately 7.3 x 3.4 x 4.3 cm, invading both 1st ribs. There was also a A lytic expansile soft tissue lesion in the T8 vertebral body causing mass effect on spinal canal and obliterating left neural foramina at T7-8 and T8-9. ON 8/22/19 an MRI thoracic spine was done showing multiple areas of tumor involvement within the thoracic spine in keeping with the patient's history of multiple myeloma. Prominent lesion within C7 on the right incompletely seen.  Large lesion within T8 on the left producing epidural tumor extension and moderate narrowing of the spinal canal with mild flattening of the spinal cord. He was evaluated on 8/22/19 by Dr. oFley (Rad Onc) and was given 5 fractions of XRT from 8/23/19 to 8/29/19 to C7 and T8. He was discharged home on 8/29/19 with follow up in the outpatient office, and on Dexamethasone 4mg po daily.     Disease: Multiple Myeloma   Stage:. RISS- II.  PET- 5/2020-   Near total resolution of hypermetabolism associated w/ lytic and intramedullary lesions in axial and appendicular skeleton compared to prior study in 9/7/2019. Previously seen lytic lesions demonstrate new sclerosis. Findings c/w response to interval therapy.  Few new mildly avid bilateral rib fractures. New hypermetabolic lucency in L maxillary alveolus and new small hypermetabolic focus in R maxilla, nonspecific. Few new mildly avid nonspecific bilateral hilar lymph nodes. Resolution of hypermetabolism in esophagus/ GE junction.      Treatment: RT to C7/T8 spine 5 fractions from 8/23/19-8/29/19  RVD 9/2019-6/2020  Then maintenance velcade [id number] starting 6/19/2020  He was on montly zometa as well since 11/2019, with plan to treat for 2 years.   Last visit at [facility] was 1/19/2022.   Most recent outpatient labs: 2021    Kappa FLC 4.39, lambda FLC 3.02. K/L FLC ratio 1.45.   IgG 1252 (rising)  SPEP- normal electrophoresis pattern. No M spike noted.   B2 microglobulin 3.7       PAST MEDICAL & SURGICAL HISTORY:  HTN (hypertension)  HLD (hyperlipidemia)  coronary artery disease  Multiple myeloma  S/P coronary artery stent placement    FAMILY HISTORY:  Maternal family history of hypertension  Mother      Social History:  Lives with family  Normally independent of ADLs and ambulates independently (25 Sep 2022 03:08)    REVIEW OF SYSTEMS  CONSTITUTIONAL: No fever, no chills, no fatigue  EYES: No eye pain, no vision changes  ENMT:  No difficulty hearing, no throat pain  RESPIRATORY: No cough,  No shortness of breath  CARDIOVASCULAR: No chest pain, no palpitations  GASTROINTESTINAL: No abdominal pain, no nausea, no vomiting, no diarrhea, no constipation,  GENITOURINARY: No dysuria, no hematuria  NEUROLOGICAL: No numbness , no loss of strength  SKIN: No itching, no rashes,  HEME/LYMPH: No easy bruising, bleeding      >>> <<<>>> <<<  >>> <<<  Allergies  Allergies    Beef (Unknown)  No Known Drug Allergies  pork (Unknown)  Intolerances      Medications  MEDICATIONS  (STANDING):  albuterol/ipratropium for Nebulization 3 milliLiter(s) Nebulizer every 6 hours  chlorhexidine 0.12% Liquid 15 milliLiter(s) Oral Mucosa every 12 hours  chlorhexidine 2% Cloths 1 Application(s) Topical <User Schedule>  dexAMETHasone  Injectable 4 milliGRAM(s) IV Push every 6 hours  norepinephrine Infusion 0.3 MICROgram(s)/kG/Min (28.8 mL/Hr) IV Continuous <Continuous>  potassium chloride  10 mEq/100 mL IVPB 10 milliEquivalent(s) IV Intermittent every 1 hour  propofol Infusion 40 MICROgram(s)/kG/Min (12.3 mL/Hr) IV Continuous <Continuous>  sodium chloride 1.5%. 500 milliLiter(s) (50 mL/Hr) IV Continuous <Continuous>    MEDICATIONS  (PRN):      PHYSICAL EXAM:  GENERAL: NAD, well-groomed  HEAD:  Atraumatic, Normocephalic  EYES: EOMI, PERRLA, conjunctiva and sclera clear  ENMT: No oropharyngeal exudates, Moist mucous membranes  NECK: Supple, no cervical lymphadenopathy  NERVOUS SYSTEM:  alert and conversant, moving all extremities spontaneously   CHEST/LUNG: Clear to auscultation bilaterally; no rhonchi  HEART: Regular rate and rhythm; No murmurs  ABDOMEN: Soft, Nontender, Nondistended  EXTREMITIES:  2+ radial Pulses, No cyanosis or edema  SKIN: warm, dry    LABS:                        12.0   10.28 )-----------( 251      ( 25 Sep 2022 00:35 )             34.7     09-25    121<L>  |  90<L>  |  22  ----------------------------<  209<H>  3.5   |  15<L>  |  1.08    Ca    9.2      25 Sep 2022 07:30  Phos  4.4     09-25  Mg     2.20     09-25    TPro  8.6<H>  /  Alb  4.8  /  TBili  0.9  /  DBili  x   /  AST  19  /  ALT  12  /  AlkPhos  61  09-25    PT/INR - ( 25 Sep 2022 07:30 )   PT: 11.7 sec;   INR: 1.01 ratio         PTT - ( 25 Sep 2022 07:30 )  PTT:26.3 sec      RADIOLOGY & ADDITIONAL STUDIES:  PATHOLOGY:     HPI:  71yo gentleman with PMH of htn, hld, CAD s/p stent, MM p/w generalized weakness for 4 days with tingling sensation. Patient had frequent urination on 9/20/22, thereafter had persistent N/V. He had no fever or cough. He had progressive weakness of upper then lower extremities. He was evaluated in the ED on 9/22, but discharged home. His weakness progressed at home, and EMS was called on 9/24/22 when patient developed wheezing and voice changes.  CTH was WNL.  CT Cspine found lytic lesions at C spine, no soft tissue mass, epidiural tumor extension or abnormal enhancement. No fracture or traumatic malalignment. 1cm nodule at L thyroid lobe. C spine degenerative changes in mild-moderate spinal canal stenosis at C3-C4 and C5-C6. Multiple mixed lytic slcerotic lesions in thoracic spine, sternum and ribs. Chronic pathologic fractures noted.   CT lumbar spine found multiple mixed lytic sclerotic lesions. No lumbar spine fracture or traumatic malalignment. Mild spinal canal stenosis at L3-L4 and L4-L5.   MRI spine ordered.  MICU consulted. Patient desaturated to 70s on NC, placed on 15L high flow to 100%.  CTPA obtained which found on prelim scan - filling defect in RML segmental artery, favored to represent PE over artifact. Extensive secretion layering within the trachea.   He was started on heparin gtt.   RRT thereafter called for tachycardia, tachypnea to 30s with accessory muscle use. Patient was intubated and transferred to MICU.   Patient is now on dexamethasone 4mg IV q6h, levophed, propofol.   MRI imaging ordered.     Multiple myeloma Hx:  Patient sees Dr. Bailey.  He has IgG kappa MM.  He had BMBx in Feb 2019 with Dr. 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   He was admitted in 8/2019 for lower back pain and leg pain, found to have Tprotein of 10, Ca 12. Bisphosphonate not given due to dental problems. Patient improved with hydration.   He had elevated SCr which also improved with IVF.   CT C/A/P 8/18/19 showed a lytic expansile soft tissue lesion centered in the manubrium measuring approximately 7.3 x 3.4 x 4.3 cm, invading both 1st ribs. There was also a A lytic expansile soft tissue lesion in the T8 vertebral body causing mass effect on spinal canal and obliterating left neural foramina at T7-8 and T8-9. ON 8/22/19 an MRI thoracic spine was done showing multiple areas of tumor involvement within the thoracic spine in keeping with the patient's history of multiple myeloma. Prominent lesion within C7 on the right incompletely seen.  Large lesion within T8 on the left producing epidural tumor extension and moderate narrowing of the spinal canal with mild flattening of the spinal cord. He was evaluated on 8/22/19 by Dr. Foley (Rad Onc) and was given 5 fractions of XRT from 8/23/19 to 8/29/19 to C7 and T8. He was discharged home on 8/29/19 with follow up in the outpatient office, and on Dexamethasone 4mg po daily.     Disease: Multiple Myeloma   Stage:. RISS- II.  PET- 5/2020-   Near total resolution of hypermetabolism associated w/ lytic and intramedullary lesions in axial and appendicular skeleton compared to prior study in 9/7/2019. Previously seen lytic lesions demonstrate new sclerosis. Findings c/w response to interval therapy.  Few new mildly avid bilateral rib fractures. New hypermetabolic lucency in L maxillary alveolus and new small hypermetabolic focus in R maxilla, nonspecific. Few new mildly avid nonspecific bilateral hilar lymph nodes. Resolution of hypermetabolism in esophagus/ GE junction.      Treatment: RT to C7/T8 spine 5 fractions from 8/23/19-8/29/19  RVD 9/2019-6/2020  Then maintenance velcade [id number] starting 6/19/2020. His last dose of velcade was on 3/3/22.   He was on montly zometa as well since 11/2019, with plan to treat for 2 years. His last dose of zometa was on 2/17/2022.   Last visit at [facility] was 1/19/2022.  He was allowed a short break to visit [facility], however patient extended his visit. He was in [facility] from 3/10-9/8/2022.   Most recent outpatient labs: 2021    Kappa FLC 4.39, lambda FLC 3.02. K/L FLC ratio 1.45.   IgG 1252 (rising)  SPEP- normal electrophoresis pattern. No M spike noted.   B2 microglobulin 3.7       PAST MEDICAL & SURGICAL HISTORY:  HTN (hypertension)  HLD (hyperlipidemia)  coronary artery disease  Multiple myeloma  S/P coronary artery stent placement    FAMILY HISTORY:  Maternal family history of hypertension  Mother      Social History:  Lives with family  Normally independent of ADLs and ambulates independently (25 Sep 2022 03:08)    REVIEW OF SYSTEMS  unable to provide- intubated and sedated       >>> <<<>>> <<<  >>> <<<  Allergies  Allergies    Beef (Unknown)  No Known Drug Allergies  pork (Unknown)  Intolerances      Medications  MEDICATIONS  (STANDING):  albuterol/ipratropium for Nebulization 3 milliLiter(s) Nebulizer every 6 hours  chlorhexidine 0.12% Liquid 15 milliLiter(s) Oral Mucosa every 12 hours  chlorhexidine 2% Cloths 1 Application(s) Topical <User Schedule>  dexAMETHasone  Injectable 4 milliGRAM(s) IV Push every 6 hours  norepinephrine Infusion 0.3 MICROgram(s)/kG/Min (28.8 mL/Hr) IV Continuous <Continuous>  potassium chloride  10 mEq/100 mL IVPB 10 milliEquivalent(s) IV Intermittent every 1 hour  propofol Infusion 40 MICROgram(s)/kG/Min (12.3 mL/Hr) IV Continuous <Continuous>  sodium chloride 1.5%. 500 milliLiter(s) (50 mL/Hr) IV Continuous <Continuous>    MEDICATIONS  (PRN):      PHYSICAL EXAM:  GENERAL: sedated,   HEAD:  Atraumatic, Normocephalic  EYES: closed  ENMT: No oropharyngeal exudates, ETT in place, no nasal discharge, mucous membranes moist  NECK: Supple, no cervical lymphadenopathy  NERVOUS SYSTEM:  alert and conversant, moving all extremities spontaneously   CHEST/LUNG: Clear to auscultation bilaterally; no rhonchi  HEART: Regular rate and rhythm; No murmurs  ABDOMEN: Soft, Nontender, Nondistended  EXTREMITIES:  2+ radial Pulses, No cyanosis or edema  SKIN: warm, dry    LABS:                        12.0   10.28 )-----------( 251      ( 25 Sep 2022 00:35 )             34.7     09-25    121<L>  |  90<L>  |  22  ----------------------------<  209<H>  3.5   |  15<L>  |  1.08    Ca    9.2      25 Sep 2022 07:30  Phos  4.4     09-25  Mg     2.20     09-25    TPro  8.6<H>  /  Alb  4.8  /  TBili  0.9  /  DBili  x   /  AST  19  /  ALT  12  /  AlkPhos  61  09-25    PT/INR - ( 25 Sep 2022 07:30 )   PT: 11.7 sec;   INR: 1.01 ratio         PTT - ( 25 Sep 2022 07:30 )  PTT:26.3 sec      RADIOLOGY & ADDITIONAL STUDIES:  PATHOLOGY:

## 2022-09-25 NOTE — CHART NOTE - NSCHARTNOTEFT_GEN_A_CORE
Informed by nurse that patient desturated to 70s on NC. Patient placed on 15L High flow with saturation in 100%. CT showing There is a filling defect in a right middle lobe segmental artery (3:255-257) which is favored to represent a pulmonary embolism over artifact related to respiratory motion and streak artifact from the upper extremities. Discussed with Dr. Dela Cruz. Awaiting weight. Signout given to start heparin drip. MICU reconsulted.

## 2022-09-25 NOTE — H&P ADULT - PROBLEM SELECTOR PLAN 3
-likely contributing to weakness  -appreciate renal recs  -possibly SIADH  -will start on hypertonic saline 1.5% @ 50cc for 5 hours. Monitor BMP q6h.  -avoid correcting more than 6-8meq in 24 hrs  -check urine lytes

## 2022-09-25 NOTE — RAPID RESPONSE TEAM SUMMARY - NSSITUATIONBACKGROUNDRRT_GEN_ALL_CORE
71 yo M with PMH of HTN, HLD, CAD, cardiac stent, Multiple Myeloma who presented to the ED for 3 days of progressive generalized weakness with associated voice and difficulty swallowing, and b/l numbness/tingling in both his feet and hands. Concern for cervical spine lesion vs GBS While in ED symptoms were progressive with weakness x4 limbs and difficulty breathing and pooling of secretions. Per admitting MD also with low NIFs. RRT called for hypoxemia to 70s on RA.

## 2022-09-25 NOTE — PROGRESS NOTE ADULT - SUBJECTIVE AND OBJECTIVE BOX
Neurology Progress    RASHID [id number]    HPI:  71 y/o M with a PMHx significant for HTN, HLD, CAD s/p stent, MM, presents with cc generalized weakness x 2-3 days associated with some tingling sensation. Pt was evaluated in the ED on  for vomiting but was discharged home. That evening, daughter noticed that patient was unable to walk on his own and needed assistance getting up the stairs. The next day patient was unable to get out of bed and unable to lift his arms from the bed. Pt reports weakness started in the arms and spread down to the legs. Pt also complains of hoarse voice and difficulty swallowing as well as b/l numbness/tingling in both his feet and hands. In the last few hours, also had onset of shortness of breath with occasional cough productive of white sputum. Pt denied any urinary or fecal incontinence, HA, dizziness, chest pain, changes in vision or hearing, neck pain. Reports chronic lower back pain, unchanged in severity. Daughter notes pt with some URI symptoms earlier in the week.  Of note, pt last chemo was 3/22 for MM and he is supposed to get bi-weekly doses Velcade Per daughter, pt was traveling for one month and never followed up afterwards with his oncologist.   Per ED, pt failed dysphagia study and is currently npo (25 Sep 2022 03:08)      Past Medical History  HTN (hypertension)    HLD (hyperlipidemia)    Coronary artery disease    Multiple myeloma        Past Surgical History  No significant past surgical history    S/P coronary artery stent placement        MEDICATIONS    ALBUTerol    90 MICROgram(s) HFA Inhaler 2 Puff(s) Inhalation every 6 hours  chlorhexidine 0.12% Liquid 15 milliLiter(s) Oral Mucosa every 12 hours  chlorhexidine 2% Cloths 1 Application(s) Topical <User Schedule>  dexAMETHasone  Injectable 4 milliGRAM(s) IV Push every 6 hours  dextrose 5%. 1000 milliLiter(s) IV Continuous <Continuous>  dextrose 5%. 1000 milliLiter(s) IV Continuous <Continuous>  dextrose 50% Injectable 25 Gram(s) IV Push once  dextrose 50% Injectable 12.5 Gram(s) IV Push once  dextrose 50% Injectable 25 Gram(s) IV Push once  dextrose Oral Gel 15 Gram(s) Oral once PRN  glucagon  Injectable 1 milliGRAM(s) IntraMuscular once  heparin   Injectable 4000 Unit(s) IV Push once  heparin   Injectable 4000 Unit(s) IV Push every 6 hours PRN  heparin   Injectable 2000 Unit(s) IV Push every 6 hours PRN  heparin  Infusion.  Unit(s)/Hr IV Continuous <Continuous>  ipratropium 17 MICROgram(s) HFA Inhaler 2 Puff(s) Inhalation every 6 hours  norepinephrine Infusion 0.3 MICROgram(s)/kG/Min IV Continuous <Continuous>  potassium chloride  10 mEq/100 mL IVPB 10 milliEquivalent(s) IV Intermittent every 1 hour  propofol Infusion 40 MICROgram(s)/kG/Min IV Continuous <Continuous>  sodium chloride 1.5%. 500 milliLiter(s) IV Continuous <Continuous>         Family history: No history of dementia, strokes, or seizures   FAMILY HISTORY:  Maternal family history of hypertension  Mother      SOCIAL HISTORY -- No history of tobacco or alcohol use     Allergies    Beef (Unknown)  No Known Drug Allergies  pork (Unknown)    Intolerances        Height (cm): 162.6 ( @ 12:52)  Weight (kg): 51.2 ( @ 09:34)  BMI (kg/m2): 19.4 ( @ 09:34)    Vital Signs Last 24 Hrs  T(C): 36.3 (25 Sep 2022 09:30), Max: 36.7 (25 Sep 2022 03:58)  T(F): 97.3 (25 Sep 2022 09:30), Max: 98.1 (25 Sep 2022 03:58)  HR: 94 (25 Sep 2022 12:30) (77 - 118)  BP: 166/83 (25 Sep 2022 12:30) (76/52 - 191/83)  BP(mean): 106 (25 Sep 2022 12:30) (61 - 109)  RR: 19 (25 Sep 2022 12:30) (16 - 26)  SpO2: 99% (25 Sep 2022 12:30) (87% - 100%)    Parameters below as of 25 Sep 2022 11:45  Patient On (Oxygen Delivery Method): ventilator            On Neurological Examination:    Mental Status - Patient is intubated                              Cranial Nerves - Extraocular muscle intact  NEYMAR Facial symmetry Tongue midline, CnV1to V3 intact gross hearing intact      Motor Exam - no movement     Sensory    withdraws     GENERAL Exam:     Nontoxic , No Acute Distress   	  HEENT:  normocephalic, atraumatic  		  LUNGS:	Clear bilaterally  No Wheeze      VASCULAR: no carotid brui  	  HEART:	 Normal S1S2   No murmur RRR        	  MUSCULOSKELETAL: Normal Range of Motion  	   SKIN:      Normal   No Ecchymosis               LABS:  CBC Full  -  ( 25 Sep 2022 10:30 )  WBC Count : 7.26 K/uL  RBC Count : 5.32 M/uL  Hemoglobin : 15.7 g/dL  Hematocrit : 44.5 %  Platelet Count - Automated : 150 K/uL  Mean Cell Volume : 83.6 fL  Mean Cell Hemoglobin : 29.5 pg  Mean Cell Hemoglobin Concentration : 35.3 gm/dL  Auto Neutrophil # : 6.59 K/uL  Auto Lymphocyte # : 0.34 K/uL  Auto Monocyte # : 0.31 K/uL  Auto Eosinophil # : 0.00 K/uL  Auto Basophil # : 0.00 K/uL  Auto Neutrophil % : 90.7 %  Auto Lymphocyte % : 4.7 %  Auto Monocyte % : 4.3 %  Auto Eosinophil % : 0.0 %  Auto Basophil % : 0.0 %    Urinalysis Basic - ( 25 Sep 2022 12:01 )    Color: Light Yellow / Appearance: Clear / S.035 / pH: x  Gluc: x / Ketone: Trace  / Bili: Negative / Urobili: <2 mg/dL   Blood: x / Protein: 30 mg/dL / Nitrite: Negative   Leuk Esterase: Negative / RBC: 1 /HPF / WBC 1 /HPF   Sq Epi: x / Non Sq Epi: 2 /HPF / Bacteria: Negative          121<L>  |  90<L>  |  22  ----------------------------<  209<H>  3.5   |  15<L>  |  1.08    Ca    9.2      25 Sep 2022 07:30  Phos  4.4       Mg     2.20         TPro  7.5  /  Alb  x   /  TBili  x   /  DBili  x   /  AST  x   /  ALT  x   /  AlkPhos  x       Hemoglobin A1C:     LIVER FUNCTIONS - ( 25 Sep 2022 11:15 )  Alb: x     / Pro: 7.5 g/dL / ALK PHOS: x     / ALT: x     / AST: x     / GGT: x           Vitamin B12 Vitamin B12, Serum: 863 pg/mL ( @ 11:15)    PT/INR - ( 25 Sep 2022 07:30 )   PT: 11.7 sec;   INR: 1.01 ratio         PTT - ( 25 Sep 2022 07:30 )  PTT:26.3 sec      RADIOLOGY    EKG                [facility]

## 2022-09-25 NOTE — H&P ADULT - HISTORY OF PRESENT ILLNESS
73 y/o M with a PMHx significant for HTN, HLD, CAD s/p stent, MM, presents with cc generalized weakness x 2-3 days associated with some tingling sensation. Pt was evaluated in the ED on 9/21 for vomiting but was discharged home. That evening, daughter noticed that patient was unable to walk on his own and needed assistance getting up the stairs. The next day patient was unable to get out of bed and unable to lift his arms from the bed. Pt reports weakness started in the arms and spread down to the legs. Pt also complains of hoarse voice and difficulty swallowing as well as b/l numbness/tingling in both his feet and hands. In the last few hours, also had onset of shortness of breath with occasional cough productive of white sputum. Pt denied any urinary or fecal incontinence, HA, dizziness, chest pain, changes in vision or hearing, neck pain. Reports chronic lower back pain, unchanged in severity. Daughter notes pt with some URI symptoms earlier in the week.  Of note, pt last chemo was 3/22 for MM and he is supposed to get bi-weekly doses Velcade Per daughter, pt was traveling for one month and never followed up afterwards with his oncologist.   Per ED, pt failed dysphagia study and is currently npo

## 2022-09-25 NOTE — CHART NOTE - NSCHARTNOTEFT_GEN_A_CORE
MICU Transfer Accept Note    Transfer from: Floors  Transfer to:  MICU  Accepting Physician: [facility] COURSE:        OBJECTIVE:    Vital Signs Last 24 Hrs  T(C): 36.6 (25 Sep 2022 05:27), Max: 36.7 (25 Sep 2022 03:58)  T(F): 97.8 (25 Sep 2022 05:27), Max: 98.1 (25 Sep 2022 03:58)  HR: 105 (25 Sep 2022 05:27) (77 - 105)  BP: 135/76 (25 Sep 2022 05:27) (135/76 - 157/89)  BP(mean): --  RR: 22 (25 Sep 2022 05:27) (16 - 26)  SpO2: 100% (25 Sep 2022 05:27) (99% - 100%)    Parameters below as of 25 Sep 2022 05:27  Patient On (Oxygen Delivery Method): nasal cannula  O2 Flow (L/min): 3    I&O's Summary      PHYSICAL EXAM:  GEN: Awake, AOx3, NAD.  HEENT: NCAT  ---EYES: no scleral icterus, EOMI, PERRLA  CARDIO: RRR. Normal S1/S2, no m/r/g. No JVD.  RESP: CTAB  ABD: Soft, NTND. BS+. No masses, no hepatosplenomegaly.  : No CVAT.   MSK: No obvious deformity or ROM deficit. 2+ pulses x4. No edema.  SKIN: Warm, dry. No rashes. Nail beds without cyanosis or clubbing.  NEURO: Moves all four extremities spontaneously  PSYCH: Appropriate mood & affect.     MEDICATIONS  (STANDING):  albuterol/ipratropium for Nebulization 3 milliLiter(s) Nebulizer every 6 hours  dexAMETHasone  Injectable 4 milliGRAM(s) IV Push every 6 hours  enoxaparin Injectable 40 milliGRAM(s) SubCutaneous every 24 hours  potassium chloride  10 mEq/100 mL IVPB 10 milliEquivalent(s) IV Intermittent every 1 hour  sodium chloride 1.5%. 500 milliLiter(s) (50 mL/Hr) IV Continuous <Continuous>    MEDICATIONS  (PRN):  ondansetron Injectable 4 milliGRAM(s) IV Push every 8 hours PRN Nausea and/or Vomiting        LABS                                            12.0                  Neurophils% (auto):   74.0   (09-25 @ 00:35):    10.28)-----------(251          Lymphocytes% (auto):  14.9                                          34.7                   Eosinphils% (auto):   0.5      Manual%: Neutrophils x    ; Lymphocytes x    ; Eosinophils x    ; Bands%: x    ; Blasts x          SYNTHESIS    ASSESSMENT/PLAN  =====Neuro/Psych=====  #Sedation/Analgesia    =====Respiratory=====  #Acute Respiratory Failure    #Segmental Pulmonary Embolism  =====Cardiovascular=====  #    =====Gastrointestinal=====    =====Renal/=====    =====MSK/Skin=====    =====Infectious Disease=====    =====Endocrine=====    =====Heme/Onc=====    =====Hospital Bundle=====  Hospital Bundle  Fluids: ***  Electrolytes: Replete K > 4, Mg > 2, Phos > 3  Nutrition: Diet ***  PPX  ---VTE: ***  ---GI: ***  ---Resp: ***  Access: ***  Code Status: ***  Dispo: ***                                    123    |  89     |  21                  Calcium: 9.3   / iCa: x      (09-25 @ 00:35)    ----------------------------<  135       Magnesium: 2.10                             3.3     |  19     |  1.17             Phosphorous: 4.2      TPro  8.0    /  Alb  4.7    /  TBili  0.7    /  DBili  x      /  AST  22     /  ALT  11     /  AlkPhos  52     25 Sep 2022 00:35          ASSESSMENT & PLAN: MICU Transfer Accept Note    Transfer from: Floors  Transfer to:  MICU  Accepting Physician: [facility] COURSE:        OBJECTIVE:    Vital Signs Last 24 Hrs  T(C): 36.6 (25 Sep 2022 05:27), Max: 36.7 (25 Sep 2022 03:58)  T(F): 97.8 (25 Sep 2022 05:27), Max: 98.1 (25 Sep 2022 03:58)  HR: 105 (25 Sep 2022 05:27) (77 - 105)  BP: 135/76 (25 Sep 2022 05:27) (135/76 - 157/89)  BP(mean): --  RR: 22 (25 Sep 2022 05:27) (16 - 26)  SpO2: 100% (25 Sep 2022 05:27) (99% - 100%)    Parameters below as of 25 Sep 2022 05:27  Patient On (Oxygen Delivery Method): nasal cannula  O2 Flow (L/min): 3    I&O's Summary      PHYSICAL EXAM:  GEN: Awake, AOx3, NAD.  HEENT: NCAT  ---EYES: no scleral icterus, EOMI, PERRLA  CARDIO: RRR. Normal S1/S2, no m/r/g. No JVD.  RESP: CTAB  ABD: Soft, NTND. BS+. No masses, no hepatosplenomegaly.  : No CVAT.   MSK: No obvious deformity or ROM deficit. 2+ pulses x4. No edema.  SKIN: Warm, dry. No rashes. Nail beds without cyanosis or clubbing.  NEURO: Moves all four extremities spontaneously  PSYCH: Appropriate mood & affect.     MEDICATIONS  (STANDING):  albuterol/ipratropium for Nebulization 3 milliLiter(s) Nebulizer every 6 hours  dexAMETHasone  Injectable 4 milliGRAM(s) IV Push every 6 hours  enoxaparin Injectable 40 milliGRAM(s) SubCutaneous every 24 hours  potassium chloride  10 mEq/100 mL IVPB 10 milliEquivalent(s) IV Intermittent every 1 hour  sodium chloride 1.5%. 500 milliLiter(s) (50 mL/Hr) IV Continuous <Continuous>    MEDICATIONS  (PRN):  ondansetron Injectable 4 milliGRAM(s) IV Push every 8 hours PRN Nausea and/or Vomiting        LABS                                            12.0                  Neurophils% (auto):   74.0   (09-25 @ 00:35):    10.28)-----------(251          Lymphocytes% (auto):  14.9                                          34.7                   Eosinphils% (auto):   0.5      Manual%: Neutrophils x    ; Lymphocytes x    ; Eosinophils x    ; Bands%: x    ; Blasts x                                      123    |  89     |  21                  Calcium: 9.3   / iCa: x      (09-25 @ 00:35)    ----------------------------<  135       Magnesium: 2.10                             3.3     |  19     |  1.17             Phosphorous: 4.2      TPro  8.0    /  Alb  4.7    /  TBili  0.7    /  DBili  x      /  AST  22     /  ALT  11     /  AlkPhos  52     25 Sep 2022 00:35    SYNTHESIS        ASSESSMENT/PLAN  =====Neuro/Psych=====  #Generalized Weakness  Pt family reporting a descending weakness that ultimately started to involve his speaking ability as well.  Low c/f botulism given not having flaccid, but some suspicion for paraneoplastic process (e.g. LES/MG).  - Neuro C/S, Appreciate Recs  - Lambert Eaton / MG Eval Serologies  - MR Pending  - Likely will need LP (last ASA dose ~2 days ago)    #Sedation/Analgesia  - C/W Propofol for now    =====Respiratory=====  #Acute Respiratory Failure  Pt with increased WOB, reportedly wheezing as well. No h/o COPD  - C/W Albuterol/Ipratropium MDI's while intubated    #Hemoptysis  Pt reportedly with hemoptysis in [facility]. Pt with RLL calcified granuloma & scattered pulm nodules.   - considering quant gold   - Sputum AFB    =====Cardiovascular=====  #Vasoplegic Shock  #HTN #HLD #CAD  Pt with minimal rpessor requirement on sedation for intubation. Likely vasoplegic in that context. CTM  - HOLDing Home ASA 81mg PO QD (anticipating LP)  - HOLDing Home Metoprolol tartrate 50mg PO BID  - HOLDing Home Amlodipine 10mg PO QD    #BNP elevated  Pt with BNP elevation, respiratory distress.  - TTE pending    =====Gastrointestinal=====  #RAUL    =====Renal/=====  #Hyponatremia  Pt with persistently low sodium. 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 Should have fluid restriction when not intubated.  - Trend BMP q4-6h depending on trend today  - May need to transition IVF into NS solvents    =====MSK/Skin=====  #T8 Compression Fx  Eventually, will need MR spine and spine consult to assess for possible interventions. Diffuse spinal lesions.   - MR pending    =====Infectious Disease=====  #RAUL    =====Endocrine=====  #Hyperglycemia  Pt with mild hyperglycemia on admission. A1c 5.7% (?pre-DM)  - FS q6h    =====Heme/Onc=====  #Multiple Myeloma  Pt had last received Velcade 3/2022. Had been given opportunity for chemo vacation and travelled to [facility], but pt went for longer than anticipated. Now with diffuse lytic lesions involving most of spine. Had previously had XRT as well.   Diagnostics:  - LDH, B2-Microglobulin  - SPEP, UPEP, IFX (S/U), Immunoglobulins, FLCs    Therapeutics: pending evaluation as above    =====Hospital Bundle=====  Hospital Bundle  Fluids: with gtts  Electrolytes: Replete K > 4, Mg > 2, Phos > 3  Nutrition: Diet NPO with TF (Nutrition C/S)  PPX  ---VTE: SCD  ---GI: TFs  ---Resp: Vented  ---: Dahl Placed 9/25  Access: PIVs  Code Status: FULL CODE  Dispo: Pending Medical Optimization

## 2022-09-25 NOTE — H&P ADULT - PROBLEM SELECTOR PLAN 4
-Lost to follow up, last treatment in March  -with diffuse lytic lesions noted in spine  -will email heme regarding admission

## 2022-09-25 NOTE — CONSULT NOTE ADULT - SUBJECTIVE AND OBJECTIVE BOX
Payam Longoria MD  Interventional Cardiology / Advance Heart Failure and Cardiac Transplant Specialist  [place] Office : [address] [place] [address] [place] [address]  Tel:   [place] Office : [address] [facility] [place] [address]  Tel: [phone]      HISTORY OF PRESENTING ILLNESS:  Patient is a 71 yo M with PMH of HTN, HLD, CAD, cardiac stent, Multiple Myeloma who presented to the ED for 3 days of generalized weakness. Patient's daughter provided translation as per her father's request. Daughter reports that her father came to the ED 3 days ago for vomiting and was sent home. She also reports that for the past 2-3 days he has had upper and lower extremity weakness with associated tingling. She reports that he is unable to lift his arms and legs. She also reports that he his voice has become more raspier and hoarse. He also complains of fatigue and cough. The daughter reports that about 2 days ago she noticed that he had difficulty  standing up, however by the next day his weakness has progressed and he is unable to get out of bed.  He has difficulty swallowing. He also complains of chronic low back pain. He denies any episode of incontinence and is able control his bladder and bowel. He denies blurry vision, dizziness. Of note daughter reports that the patient is supposed to get chemo twice a month, however due to recent travel he has not received chemo since 3/22. MICU was consulted for weakness and possible GBS. Patient with progressive weakness and also became hypoxic now intubated in MICU. Spoke to daughter at bedside. Patient with history of stent in 2015 in [facility] 2/2 heart attack. Has had stress test since although unable to recall when. Patient had no complaints of CP prior to hospitalization   	  MEDICATIONS:  heparin   Injectable 4000 Unit(s) IV Push once  heparin   Injectable 4000 Unit(s) IV Push every 6 hours PRN  heparin   Injectable 2000 Unit(s) IV Push every 6 hours PRN  heparin  Infusion.  Unit(s)/Hr IV Continuous <Continuous>  norepinephrine Infusion 0.3 MICROgram(s)/kG/Min IV Continuous <Continuous>      ALBUTerol    90 MICROgram(s) HFA Inhaler 2 Puff(s) Inhalation every 6 hours  ipratropium 17 MICROgram(s) HFA Inhaler 2 Puff(s) Inhalation every 6 hours    propofol Infusion 40 MICROgram(s)/kG/Min IV Continuous <Continuous>      dexAMETHasone  Injectable 4 milliGRAM(s) IV Push every 6 hours  dextrose 50% Injectable 25 Gram(s) IV Push once  dextrose 50% Injectable 12.5 Gram(s) IV Push once  dextrose 50% Injectable 25 Gram(s) IV Push once  dextrose Oral Gel 15 Gram(s) Oral once PRN  glucagon  Injectable 1 milliGRAM(s) IntraMuscular once    chlorhexidine 0.12% Liquid 15 milliLiter(s) Oral Mucosa every 12 hours  chlorhexidine 2% Cloths 1 Application(s) Topical <User Schedule>  dextrose 5%. 1000 milliLiter(s) IV Continuous <Continuous>  dextrose 5%. 1000 milliLiter(s) IV Continuous <Continuous>  potassium chloride  10 mEq/100 mL IVPB 10 milliEquivalent(s) IV Intermittent every 1 hour  sodium chloride 1.5%. 500 milliLiter(s) IV Continuous <Continuous>      PAST MEDICAL/SURGICAL HISTORY  PAST MEDICAL & SURGICAL HISTORY:  HTN (hypertension)      HLD (hyperlipidemia)      Coronary artery disease      Multiple myeloma      S/P coronary artery stent placement          SOCIAL HISTORY: Substance Use (street drugs): ( x ) never used  (  ) other:    FAMILY HISTORY:  Maternal family history of hypertension  Mother        REVIEW OF SYSTEMS:  unable to obtain     PHYSICAL EXAM:  T(C): 36.3 (09-25-22 @ 09:30), Max: 36.7 (09-25-22 @ 03:58)  HR: 94 (09-25-22 @ 12:30) (77 - 118)  BP: 166/83 (09-25-22 @ 12:30) (76/52 - 191/83)  RR: 19 (09-25-22 @ 12:30) (16 - 26)  SpO2: 99% (09-25-22 @ 12:30) (87% - 100%)  Wt(kg): --  I&O's Summary    Height (cm): 162.6 (09-24 @ 12:52)  Weight (kg): 51.2 (09-25 @ 09:34)  BMI (kg/m2): 19.4 (09-25 @ 09:34)  BSA (m2): 1.53 (09-25 @ 09:34)    GENERAL: NAD  EYES: EOMI, PERRLA, conjunctiva and sclera clear  ENMT: No tonsillar erythema, exudates, or enlargement; Moist mucous membranes, Good dentition, No lesions  Cardiovascular: Normal S1 S2, No JVD, No murmurs, No edema  Respiratory: Lungs clear to auscultation	  Gastrointestinal:  Soft, Non-tender, + BS	  Extremities: Normal range of motion, No clubbing, cyanosis or edema  LYMPH: No lymphadenopathy noted  NERVOUS SYSTEM:  Alert & Oriented X3, Good concentration; Motor Strength 5/5 B/L upper and lower extremities; DTRs 2+ intact and symmetric                                    15.7   7.26  )-----------( 150      ( 25 Sep 2022 10:30 )             44.5     09-25    121<L>  |  90<L>  |  22  ----------------------------<  209<H>  3.5   |  15<L>  |  1.08    Ca    9.2      25 Sep 2022 07:30  Phos  4.4     09-25  Mg     2.20     09-25    TPro  7.5  /  Alb  x   /  TBili  x   /  DBili  x   /  AST  x   /  ALT  x   /  AlkPhos  x   09-25    proBNP: Serum Pro-Brain Natriuretic Peptide: 1461 pg/mL (09-25 @ 00:35)    Lipid Profile:   HgA1c:   TSH: Thyroid Stimulating Hormone, Serum: 1.57 uIU/mL (09-25 @ 07:30)  Thyroid Stimulating Hormone, Serum: 2.68 uIU/mL (09-25 @ 00:35)      Consultant(s) Notes Reviewed:  [x ] YES  [ ] NO    Care Discussed with Consultants/Other Providers [ x] YES  [ ] NO    Imaging Personally Reviewed independently:  [x] YES  [ ] NO    All labs, radiologic studies, vitals, orders and medications list reviewed. Patient is seen and examined at bedside. Case discussed with medical team.         Payam Longoria MD  Interventional Cardiology / Advance Heart Failure and Cardiac Transplant Specialist  [place] Office : [phone] [place] [address] [place] [address]  Tel:   [place] Office : [address] [facility] [place] [address]  Tel: [phone]      HISTORY OF PRESENTING ILLNESS:  Patient is a 73 yo M with PMH of HTN, HLD, CAD, cardiac stent, Multiple Myeloma who presented to the ED for 3 days of generalized weakness. Patient's daughter provided translation as per her father's request. Daughter reports that her father came to the ED 3 days ago for vomiting and was sent home. She also reports that for the past 2-3 days he has had upper and lower extremity weakness with associated tingling. She reports that he is unable to lift his arms and legs. She also reports that he his voice has become more raspier and hoarse. He also complains of fatigue and cough. The daughter reports that about 2 days ago she noticed that he had difficulty  standing up, however by the next day his weakness has progressed and he is unable to get out of bed.  He has difficulty swallowing. He also complains of chronic low back pain. He denies any episode of incontinence and is able control his bladder and bowel. He denies blurry vision, dizziness. Of note daughter reports that the patient is supposed to get chemo twice a month, however due to recent travel he has not received chemo since 3/22. MICU was consulted for weakness and possible GBS. Patient with progressive weakness and also became hypoxic now intubated in MICU. Spoke to daughter at bedside. Patient with history of stent in 2015 in [facility] 2/2 heart attack. Has had stress test since although unable to recall when. Patient had no complaints of CP prior to hospitalization   	  MEDICATIONS:  heparin   Injectable 4000 Unit(s) IV Push once  heparin   Injectable 4000 Unit(s) IV Push every 6 hours PRN  heparin   Injectable 2000 Unit(s) IV Push every 6 hours PRN  heparin  Infusion.  Unit(s)/Hr IV Continuous <Continuous>  norepinephrine Infusion 0.3 MICROgram(s)/kG/Min IV Continuous <Continuous>      ALBUTerol    90 MICROgram(s) HFA Inhaler 2 Puff(s) Inhalation every 6 hours  ipratropium 17 MICROgram(s) HFA Inhaler 2 Puff(s) Inhalation every 6 hours    propofol Infusion 40 MICROgram(s)/kG/Min IV Continuous <Continuous>      dexAMETHasone  Injectable 4 milliGRAM(s) IV Push every 6 hours  dextrose 50% Injectable 25 Gram(s) IV Push once  dextrose 50% Injectable 12.5 Gram(s) IV Push once  dextrose 50% Injectable 25 Gram(s) IV Push once  dextrose Oral Gel 15 Gram(s) Oral once PRN  glucagon  Injectable 1 milliGRAM(s) IntraMuscular once    chlorhexidine 0.12% Liquid 15 milliLiter(s) Oral Mucosa every 12 hours  chlorhexidine 2% Cloths 1 Application(s) Topical <User Schedule>  dextrose 5%. 1000 milliLiter(s) IV Continuous <Continuous>  dextrose 5%. 1000 milliLiter(s) IV Continuous <Continuous>  potassium chloride  10 mEq/100 mL IVPB 10 milliEquivalent(s) IV Intermittent every 1 hour  sodium chloride 1.5%. 500 milliLiter(s) IV Continuous <Continuous>      PAST MEDICAL/SURGICAL HISTORY  PAST MEDICAL & SURGICAL HISTORY:  HTN (hypertension)      HLD (hyperlipidemia)      Coronary artery disease      Multiple myeloma      S/P coronary artery stent placement          SOCIAL HISTORY: Substance Use (street drugs): ( x ) never used  (  ) other:    FAMILY HISTORY:  Maternal family history of hypertension  Mother        REVIEW OF SYSTEMS:  unable to obtain     PHYSICAL EXAM:  T(C): 36.3 (09-25-22 @ 09:30), Max: 36.7 (09-25-22 @ 03:58)  HR: 94 (09-25-22 @ 12:30) (77 - 118)  BP: 166/83 (09-25-22 @ 12:30) (76/52 - 191/83)  RR: 19 (09-25-22 @ 12:30) (16 - 26)  SpO2: 99% (09-25-22 @ 12:30) (87% - 100%)  Wt(kg): --  I&O's Summary    Height (cm): 162.6 (09-24 @ 12:52)  Weight (kg): 51.2 (09-25 @ 09:34)  BMI (kg/m2): 19.4 (09-25 @ 09:34)  BSA (m2): 1.53 (09-25 @ 09:34)    GENERAL: intubated  EYES:  conjunctiva and sclera clear  Cardiovascular: Normal S1 S2, No JVD, No murmurs, No edema  Respiratory: intubated	  Gastrointestinal:  Soft,   Extremities: No edema                                   15.7   7.26  )-----------( 150      ( 25 Sep 2022 10:30 )             44.5     09-25    121<L>  |  90<L>  |  22  ----------------------------<  209<H>  3.5   |  15<L>  |  1.08    Ca    9.2      25 Sep 2022 07:30  Phos  4.4     09-25  Mg     2.20     09-25    TPro  7.5  /  Alb  x   /  TBili  x   /  DBili  x   /  AST  x   /  ALT  x   /  AlkPhos  x   09-25    proBNP: Serum Pro-Brain Natriuretic Peptide: 1461 pg/mL (09-25 @ 00:35)    Lipid Profile:   HgA1c:   TSH: Thyroid Stimulating Hormone, Serum: 1.57 uIU/mL (09-25 @ 07:30)  Thyroid Stimulating Hormone, Serum: 2.68 uIU/mL (09-25 @ 00:35)      Consultant(s) Notes Reviewed:  [x ] YES  [ ] NO    Care Discussed with Consultants/Other Providers [ x] YES  [ ] NO    Imaging Personally Reviewed independently:  [x] YES  [ ] NO    All labs, radiologic studies, vitals, orders and medications list reviewed. Patient is seen and examined at bedside. Case discussed with medical team.

## 2022-09-26 ENCOUNTER — RESULT REVIEW (OUTPATIENT)
Age: 72
End: 2022-09-26

## 2022-09-26 DIAGNOSIS — A41.9 SEPSIS, UNSPECIFIED ORGANISM: ICD-10-CM

## 2022-09-26 LAB
ALBUMIN CSF-MCNC: 126.9 MG/DL — HIGH (ref 14–25)
ALBUMIN SERPL ELPH-MCNC: 3856 MG/DL — SIGNIFICANT CHANGE UP (ref 3500–5200)
ALBUMIN SERPL ELPH-MCNC: 4.2 G/DL — SIGNIFICANT CHANGE UP (ref 3.3–5)
ALBUMIN SERPL ELPH-MCNC: 4.3 G/DL — SIGNIFICANT CHANGE UP (ref 3.3–5)
ALP SERPL-CCNC: 42 U/L — SIGNIFICANT CHANGE UP (ref 40–120)
ALP SERPL-CCNC: 44 U/L — SIGNIFICANT CHANGE UP (ref 40–120)
ALT FLD-CCNC: 11 U/L — SIGNIFICANT CHANGE UP (ref 4–41)
ALT FLD-CCNC: 37 U/L — SIGNIFICANT CHANGE UP (ref 4–41)
ANION GAP SERPL CALC-SCNC: 14 MMOL/L — SIGNIFICANT CHANGE UP (ref 7–14)
ANION GAP SERPL CALC-SCNC: 16 MMOL/L — HIGH (ref 7–14)
ANION GAP SERPL CALC-SCNC: 17 MMOL/L — HIGH (ref 7–14)
APPEARANCE CSF: CLEAR — SIGNIFICANT CHANGE UP
APPEARANCE SPUN FLD: COLORLESS — SIGNIFICANT CHANGE UP
APTT BLD: 25.5 SEC — LOW (ref 27–36.3)
AST SERPL-CCNC: 18 U/L — SIGNIFICANT CHANGE UP (ref 4–40)
AST SERPL-CCNC: 37 U/L — SIGNIFICANT CHANGE UP (ref 4–40)
B CEREUS GROUP DNA BLD POS QL NAA+PROBE: SIGNIFICANT CHANGE UP
BASOPHILS # BLD AUTO: 0.02 K/UL — SIGNIFICANT CHANGE UP (ref 0–0.2)
BASOPHILS NFR BLD AUTO: 0.1 % — SIGNIFICANT CHANGE UP (ref 0–2)
BILIRUB SERPL-MCNC: 0.3 MG/DL — SIGNIFICANT CHANGE UP (ref 0.2–1.2)
BILIRUB SERPL-MCNC: 0.5 MG/DL — SIGNIFICANT CHANGE UP (ref 0.2–1.2)
BUN SERPL-MCNC: 31 MG/DL — HIGH (ref 7–23)
BUN SERPL-MCNC: 47 MG/DL — HIGH (ref 7–23)
BUN SERPL-MCNC: 56 MG/DL — HIGH (ref 7–23)
CALCIUM SERPL-MCNC: 8.4 MG/DL — SIGNIFICANT CHANGE UP (ref 8.4–10.5)
CALCIUM SERPL-MCNC: 8.5 MG/DL — SIGNIFICANT CHANGE UP (ref 8.4–10.5)
CALCIUM SERPL-MCNC: 8.8 MG/DL — SIGNIFICANT CHANGE UP (ref 8.4–10.5)
CHLORIDE SERPL-SCNC: 94 MMOL/L — LOW (ref 98–107)
CHLORIDE SERPL-SCNC: 94 MMOL/L — LOW (ref 98–107)
CHLORIDE SERPL-SCNC: 97 MMOL/L — LOW (ref 98–107)
CO2 SERPL-SCNC: 15 MMOL/L — LOW (ref 22–31)
CO2 SERPL-SCNC: 15 MMOL/L — LOW (ref 22–31)
CO2 SERPL-SCNC: 17 MMOL/L — LOW (ref 22–31)
COLOR CSF: COLORLESS — SIGNIFICANT CHANGE UP
CREAT SERPL-MCNC: 1.33 MG/DL — HIGH (ref 0.5–1.3)
CREAT SERPL-MCNC: 1.49 MG/DL — HIGH (ref 0.5–1.3)
CREAT SERPL-MCNC: 1.63 MG/DL — HIGH (ref 0.5–1.3)
CSF PCR RESULT: SIGNIFICANT CHANGE UP
CULTURE RESULTS: SIGNIFICANT CHANGE UP
EGFR: 44 ML/MIN/1.73M2 — LOW
EGFR: 49 ML/MIN/1.73M2 — LOW
EGFR: 56 ML/MIN/1.73M2 — LOW
EOSINOPHIL # BLD AUTO: 0 K/UL — SIGNIFICANT CHANGE UP (ref 0–0.5)
EOSINOPHIL NFR BLD AUTO: 0 % — SIGNIFICANT CHANGE UP (ref 0–6)
GAS PNL BLDA: SIGNIFICANT CHANGE UP
GLUCOSE BLDC GLUCOMTR-MCNC: 173 MG/DL — HIGH (ref 70–99)
GLUCOSE BLDC GLUCOMTR-MCNC: 182 MG/DL — HIGH (ref 70–99)
GLUCOSE BLDC GLUCOMTR-MCNC: 192 MG/DL — HIGH (ref 70–99)
GLUCOSE BLDC GLUCOMTR-MCNC: 213 MG/DL — HIGH (ref 70–99)
GLUCOSE CSF-MCNC: 91 MG/DL — HIGH (ref 40–70)
GLUCOSE SERPL-MCNC: 172 MG/DL — HIGH (ref 70–99)
GLUCOSE SERPL-MCNC: 183 MG/DL — HIGH (ref 70–99)
GLUCOSE SERPL-MCNC: 190 MG/DL — HIGH (ref 70–99)
GRAM STN FLD: SIGNIFICANT CHANGE UP
GRAM STN FLD: SIGNIFICANT CHANGE UP
HCT VFR BLD CALC: 30.6 % — LOW (ref 39–50)
HGB BLD-MCNC: 10.9 G/DL — LOW (ref 13–17)
IANC: 13.81 K/UL — HIGH (ref 1.8–7.4)
IGA FLD-MCNC: 203 MG/DL — SIGNIFICANT CHANGE UP (ref 84–499)
IGG CSF-MCNC: 20.1 MG/DL — HIGH
IGG FLD-MCNC: 1069 MG/DL — SIGNIFICANT CHANGE UP (ref 610–1660)
IGG FLD-MCNC: 1176 MG/DL — SIGNIFICANT CHANGE UP (ref 610–1660)
IGG SYNTH RATE SER+CSF CALC-MRATE: 20.4 MG/DAY — HIGH
IGG/ALB CLEAR SER+CSF-RTO: 0.6 — SIGNIFICANT CHANGE UP
IGG/ALB CSF: 0.16 RATIO — SIGNIFICANT CHANGE UP
IGG/ALB SER: 0.28 RATIO — SIGNIFICANT CHANGE UP
IGM SERPL-MCNC: 36 MG/DL — SIGNIFICANT CHANGE UP (ref 35–242)
IMM GRANULOCYTES NFR BLD AUTO: 0.3 % — SIGNIFICANT CHANGE UP (ref 0–0.9)
INR BLD: 1.01 RATIO — SIGNIFICANT CHANGE UP (ref 0.88–1.16)
KAPPA LC SER QL IFE: 2.58 MG/DL — HIGH (ref 0.33–1.94)
KAPPA LC SER QL IFE: 2.58 MG/DL — HIGH (ref 0.33–1.94)
KAPPA/LAMBDA FREE LIGHT CHAIN RATIO, SERUM: 1.22 RATIO — SIGNIFICANT CHANGE UP (ref 0.26–1.65)
KAPPA/LAMBDA FREE LIGHT CHAIN RATIO, SERUM: 1.22 RATIO — SIGNIFICANT CHANGE UP (ref 0.26–1.65)
LAMBDA LC SER QL IFE: 2.12 MG/DL — SIGNIFICANT CHANGE UP (ref 0.57–2.63)
LAMBDA LC SER QL IFE: 2.12 MG/DL — SIGNIFICANT CHANGE UP (ref 0.57–2.63)
LDH CSF L TO P-CCNC: 23 U/L — SIGNIFICANT CHANGE UP
LDH FLD-CCNC: 23 U/L — SIGNIFICANT CHANGE UP
LYMPHOCYTES # BLD AUTO: 0.87 K/UL — LOW (ref 1–3.3)
LYMPHOCYTES # BLD AUTO: 5.5 % — LOW (ref 13–44)
LYMPHOCYTES # CSF: 40 % — SIGNIFICANT CHANGE UP
MAGNESIUM SERPL-MCNC: 2.4 MG/DL — SIGNIFICANT CHANGE UP (ref 1.6–2.6)
MAGNESIUM SERPL-MCNC: 2.5 MG/DL — SIGNIFICANT CHANGE UP (ref 1.6–2.6)
MCHC RBC-ENTMCNC: 29.8 PG — SIGNIFICANT CHANGE UP (ref 27–34)
MCHC RBC-ENTMCNC: 35.6 GM/DL — SIGNIFICANT CHANGE UP (ref 32–36)
MCV RBC AUTO: 83.6 FL — SIGNIFICANT CHANGE UP (ref 80–100)
METHOD TYPE: SIGNIFICANT CHANGE UP
MONOCYTES # BLD AUTO: 1.13 K/UL — HIGH (ref 0–0.9)
MONOCYTES NFR BLD AUTO: 7.1 % — SIGNIFICANT CHANGE UP (ref 2–14)
MONOS+MACROS NFR CSF: 35 % — SIGNIFICANT CHANGE UP
NEUTROPHILS # BLD AUTO: 13.81 K/UL — HIGH (ref 1.8–7.4)
NEUTROPHILS # CSF: 25 % — SIGNIFICANT CHANGE UP
NEUTROPHILS NFR BLD AUTO: 87 % — HIGH (ref 43–77)
NIGHT BLUE STAIN TISS: SIGNIFICANT CHANGE UP
NRBC # BLD: 0 /100 WBCS — SIGNIFICANT CHANGE UP (ref 0–0)
NRBC # FLD: 0 K/UL — SIGNIFICANT CHANGE UP (ref 0–0)
NRBC NFR CSF: 1 CELLS/UL — SIGNIFICANT CHANGE UP (ref 0–5)
ORGANISM # SPEC MICROSCOPIC CNT: SIGNIFICANT CHANGE UP
ORGANISM # SPEC MICROSCOPIC CNT: SIGNIFICANT CHANGE UP
PHOSPHATE SERPL-MCNC: 3 MG/DL — SIGNIFICANT CHANGE UP (ref 2.5–4.5)
PHOSPHATE SERPL-MCNC: 3.2 MG/DL — SIGNIFICANT CHANGE UP (ref 2.5–4.5)
PHOSPHATE SERPL-MCNC: 4.6 MG/DL — HIGH (ref 2.5–4.5)
PLATELET # BLD AUTO: 235 K/UL — SIGNIFICANT CHANGE UP (ref 150–400)
POTASSIUM SERPL-MCNC: 3.4 MMOL/L — LOW (ref 3.5–5.3)
POTASSIUM SERPL-MCNC: 4.7 MMOL/L — SIGNIFICANT CHANGE UP (ref 3.5–5.3)
POTASSIUM SERPL-MCNC: 4.9 MMOL/L — SIGNIFICANT CHANGE UP (ref 3.5–5.3)
POTASSIUM SERPL-SCNC: 3.4 MMOL/L — LOW (ref 3.5–5.3)
POTASSIUM SERPL-SCNC: 4.7 MMOL/L — SIGNIFICANT CHANGE UP (ref 3.5–5.3)
POTASSIUM SERPL-SCNC: 4.9 MMOL/L — SIGNIFICANT CHANGE UP (ref 3.5–5.3)
PROT CSF-MCNC: 166 MG/DL — HIGH (ref 15–45)
PROT SERPL-MCNC: 6.8 G/DL — SIGNIFICANT CHANGE UP (ref 6–8.3)
PROT SERPL-MCNC: 7.4 G/DL — SIGNIFICANT CHANGE UP (ref 6–8.3)
PROTHROM AB SERPL-ACNC: 11.7 SEC — SIGNIFICANT CHANGE UP (ref 10.5–13.4)
RBC # BLD: 3.66 M/UL — LOW (ref 4.2–5.8)
RBC # CSF: 1 CELLS/UL — HIGH (ref 0–0)
RBC # FLD: 14.2 % — SIGNIFICANT CHANGE UP (ref 10.3–14.5)
SODIUM SERPL-SCNC: 123 MMOL/L — LOW (ref 135–145)
SODIUM SERPL-SCNC: 128 MMOL/L — LOW (ref 135–145)
SODIUM SERPL-SCNC: 128 MMOL/L — LOW (ref 135–145)
SPECIMEN SOURCE: SIGNIFICANT CHANGE UP
TOTAL CELLS COUNTED, SPINAL FLUID: 20 CELLS — SIGNIFICANT CHANGE UP
TUBE TYPE: SIGNIFICANT CHANGE UP
WBC # BLD: 15.88 K/UL — HIGH (ref 3.8–10.5)
WBC # FLD AUTO: 15.88 K/UL — HIGH (ref 3.8–10.5)

## 2022-09-26 PROCEDURE — 88108 CYTOPATH CONCENTRATE TECH: CPT | Mod: 26

## 2022-09-26 PROCEDURE — 62270 DX LMBR SPI PNXR: CPT

## 2022-09-26 PROCEDURE — 93308 TTE F-UP OR LMTD: CPT | Mod: 26

## 2022-09-26 PROCEDURE — 76604 US EXAM CHEST: CPT | Mod: 26

## 2022-09-26 PROCEDURE — 99291 CRITICAL CARE FIRST HOUR: CPT

## 2022-09-26 PROCEDURE — 99223 1ST HOSP IP/OBS HIGH 75: CPT

## 2022-09-26 PROCEDURE — 71045 X-RAY EXAM CHEST 1 VIEW: CPT | Mod: 26

## 2022-09-26 RX ORDER — PIPERACILLIN AND TAZOBACTAM 4; .5 G/20ML; G/20ML
3.38 INJECTION, POWDER, LYOPHILIZED, FOR SOLUTION INTRAVENOUS ONCE
Refills: 0 | Status: DISCONTINUED | OUTPATIENT
Start: 2022-09-26 | End: 2022-09-26

## 2022-09-26 RX ORDER — POTASSIUM CHLORIDE 20 MEQ
40 PACKET (EA) ORAL ONCE
Refills: 0 | Status: COMPLETED | OUTPATIENT
Start: 2022-09-26 | End: 2022-09-26

## 2022-09-26 RX ORDER — DIPHENHYDRAMINE HCL 50 MG
25 CAPSULE ORAL ONCE
Refills: 0 | Status: COMPLETED | OUTPATIENT
Start: 2022-09-26 | End: 2022-09-26

## 2022-09-26 RX ORDER — DIPHENHYDRAMINE HCL 50 MG
25 CAPSULE ORAL ONCE
Refills: 0 | Status: DISCONTINUED | OUTPATIENT
Start: 2022-09-26 | End: 2022-09-29

## 2022-09-26 RX ORDER — DEXMEDETOMIDINE HYDROCHLORIDE IN 0.9% SODIUM CHLORIDE 4 UG/ML
0.2 INJECTION INTRAVENOUS
Qty: 400 | Refills: 0 | Status: DISCONTINUED | OUTPATIENT
Start: 2022-09-26 | End: 2022-09-27

## 2022-09-26 RX ORDER — SODIUM CHLORIDE 5 G/100ML
500 INJECTION, SOLUTION INTRAVENOUS
Refills: 0 | Status: DISCONTINUED | OUTPATIENT
Start: 2022-09-26 | End: 2022-09-26

## 2022-09-26 RX ORDER — PIPERACILLIN AND TAZOBACTAM 4; .5 G/20ML; G/20ML
3.38 INJECTION, POWDER, LYOPHILIZED, FOR SOLUTION INTRAVENOUS ONCE
Refills: 0 | Status: COMPLETED | OUTPATIENT
Start: 2022-09-26 | End: 2022-09-26

## 2022-09-26 RX ORDER — POTASSIUM CHLORIDE 20 MEQ
10 PACKET (EA) ORAL
Refills: 0 | Status: COMPLETED | OUTPATIENT
Start: 2022-09-26 | End: 2022-09-26

## 2022-09-26 RX ORDER — IPRATROPIUM BROMIDE 0.2 MG/ML
500 SOLUTION, NON-ORAL INHALATION EVERY 6 HOURS
Refills: 0 | Status: DISCONTINUED | OUTPATIENT
Start: 2022-09-26 | End: 2022-09-26

## 2022-09-26 RX ORDER — SODIUM BICARBONATE 1 MEQ/ML
1300 SYRINGE (ML) INTRAVENOUS
Refills: 0 | Status: DISCONTINUED | OUTPATIENT
Start: 2022-09-26 | End: 2022-09-30

## 2022-09-26 RX ORDER — ACETAMINOPHEN 500 MG
650 TABLET ORAL ONCE
Refills: 0 | Status: DISCONTINUED | OUTPATIENT
Start: 2022-09-26 | End: 2022-09-29

## 2022-09-26 RX ORDER — POLYETHYLENE GLYCOL 3350 17 G/17G
17 POWDER, FOR SOLUTION ORAL DAILY
Refills: 0 | Status: DISCONTINUED | OUTPATIENT
Start: 2022-09-26 | End: 2022-09-28

## 2022-09-26 RX ORDER — PIPERACILLIN AND TAZOBACTAM 4; .5 G/20ML; G/20ML
3.38 INJECTION, POWDER, LYOPHILIZED, FOR SOLUTION INTRAVENOUS EVERY 8 HOURS
Refills: 0 | Status: DISCONTINUED | OUTPATIENT
Start: 2022-09-26 | End: 2022-09-26

## 2022-09-26 RX ORDER — PIPERACILLIN AND TAZOBACTAM 4; .5 G/20ML; G/20ML
3.38 INJECTION, POWDER, LYOPHILIZED, FOR SOLUTION INTRAVENOUS EVERY 8 HOURS
Refills: 0 | Status: DISCONTINUED | OUTPATIENT
Start: 2022-09-26 | End: 2022-09-28

## 2022-09-26 RX ORDER — SODIUM CHLORIDE 9 MG/ML
4 INJECTION INTRAMUSCULAR; INTRAVENOUS; SUBCUTANEOUS EVERY 6 HOURS
Refills: 0 | Status: DISCONTINUED | OUTPATIENT
Start: 2022-09-26 | End: 2022-09-28

## 2022-09-26 RX ORDER — HEPARIN SODIUM 5000 [USP'U]/ML
5000 INJECTION INTRAVENOUS; SUBCUTANEOUS EVERY 8 HOURS
Refills: 0 | Status: DISCONTINUED | OUTPATIENT
Start: 2022-09-26 | End: 2022-09-29

## 2022-09-26 RX ORDER — FENTANYL CITRATE 50 UG/ML
100 INJECTION INTRAVENOUS ONCE
Refills: 0 | Status: DISCONTINUED | OUTPATIENT
Start: 2022-09-26 | End: 2022-09-26

## 2022-09-26 RX ORDER — IMMUNE GLOBULIN (HUMAN) 10 G/100ML
35 INJECTION INTRAVENOUS; SUBCUTANEOUS DAILY
Refills: 0 | Status: DISCONTINUED | OUTPATIENT
Start: 2022-09-26 | End: 2022-09-29

## 2022-09-26 RX ORDER — IPRATROPIUM BROMIDE 0.2 MG/ML
2 SOLUTION, NON-ORAL INHALATION EVERY 6 HOURS
Refills: 0 | Status: DISCONTINUED | OUTPATIENT
Start: 2022-09-26 | End: 2022-09-29

## 2022-09-26 RX ADMIN — SODIUM CHLORIDE 50 MILLILITER(S): 5 INJECTION, SOLUTION INTRAVENOUS at 13:55

## 2022-09-26 RX ADMIN — Medication 4 MILLIGRAM(S): at 11:46

## 2022-09-26 RX ADMIN — VALACYCLOVIR 500 MILLIGRAM(S): 500 TABLET, FILM COATED ORAL at 11:46

## 2022-09-26 RX ADMIN — HEPARIN SODIUM 5000 UNIT(S): 5000 INJECTION INTRAVENOUS; SUBCUTANEOUS at 13:55

## 2022-09-26 RX ADMIN — Medication 4 MILLIGRAM(S): at 00:38

## 2022-09-26 RX ADMIN — IMMUNE GLOBULIN (HUMAN) 58.33 GRAM(S): 10 INJECTION INTRAVENOUS; SUBCUTANEOUS at 21:39

## 2022-09-26 RX ADMIN — Medication 2 PUFF(S): at 04:30

## 2022-09-26 RX ADMIN — FENTANYL CITRATE 100 MICROGRAM(S): 50 INJECTION INTRAVENOUS at 16:37

## 2022-09-26 RX ADMIN — CHLORHEXIDINE GLUCONATE 1 APPLICATION(S): 213 SOLUTION TOPICAL at 05:12

## 2022-09-26 RX ADMIN — DEXMEDETOMIDINE HYDROCHLORIDE IN 0.9% SODIUM CHLORIDE 2.56 MICROGRAM(S)/KG/HR: 4 INJECTION INTRAVENOUS at 21:41

## 2022-09-26 RX ADMIN — Medication 28.8 MICROGRAM(S)/KG/MIN: at 09:50

## 2022-09-26 RX ADMIN — Medication 40 MILLIEQUIVALENT(S): at 04:47

## 2022-09-26 RX ADMIN — Medication 4 MILLIGRAM(S): at 18:10

## 2022-09-26 RX ADMIN — Medication 2 PUFF(S): at 15:49

## 2022-09-26 RX ADMIN — Medication 100 MILLIEQUIVALENT(S): at 05:45

## 2022-09-26 RX ADMIN — HEPARIN SODIUM 5000 UNIT(S): 5000 INJECTION INTRAVENOUS; SUBCUTANEOUS at 22:10

## 2022-09-26 RX ADMIN — Medication 100 MILLIEQUIVALENT(S): at 04:47

## 2022-09-26 RX ADMIN — PIPERACILLIN AND TAZOBACTAM 25 GRAM(S): 4; .5 INJECTION, POWDER, LYOPHILIZED, FOR SOLUTION INTRAVENOUS at 15:38

## 2022-09-26 RX ADMIN — FENTANYL CITRATE 100 MICROGRAM(S): 50 INJECTION INTRAVENOUS at 16:12

## 2022-09-26 RX ADMIN — ALBUTEROL 2 PUFF(S): 90 AEROSOL, METERED ORAL at 21:05

## 2022-09-26 RX ADMIN — Medication 1300 MILLIGRAM(S): at 18:10

## 2022-09-26 RX ADMIN — PIPERACILLIN AND TAZOBACTAM 200 GRAM(S): 4; .5 INJECTION, POWDER, LYOPHILIZED, FOR SOLUTION INTRAVENOUS at 12:51

## 2022-09-26 RX ADMIN — POLYETHYLENE GLYCOL 3350 17 GRAM(S): 17 POWDER, FOR SOLUTION ORAL at 12:29

## 2022-09-26 RX ADMIN — Medication 100 MILLIEQUIVALENT(S): at 06:50

## 2022-09-26 RX ADMIN — Medication 25 MILLIGRAM(S): at 21:37

## 2022-09-26 RX ADMIN — Medication 4 MILLIGRAM(S): at 23:39

## 2022-09-26 RX ADMIN — DEXMEDETOMIDINE HYDROCHLORIDE IN 0.9% SODIUM CHLORIDE 2.56 MICROGRAM(S)/KG/HR: 4 INJECTION INTRAVENOUS at 11:46

## 2022-09-26 RX ADMIN — CHLORHEXIDINE GLUCONATE 15 MILLILITER(S): 213 SOLUTION TOPICAL at 05:11

## 2022-09-26 RX ADMIN — ATORVASTATIN CALCIUM 40 MILLIGRAM(S): 80 TABLET, FILM COATED ORAL at 22:09

## 2022-09-26 RX ADMIN — CHLORHEXIDINE GLUCONATE 15 MILLILITER(S): 213 SOLUTION TOPICAL at 18:10

## 2022-09-26 RX ADMIN — PROPOFOL 12.3 MICROGRAM(S)/KG/MIN: 10 INJECTION, EMULSION INTRAVENOUS at 21:40

## 2022-09-26 RX ADMIN — Medication 2 PUFF(S): at 21:05

## 2022-09-26 RX ADMIN — ALBUTEROL 2 PUFF(S): 90 AEROSOL, METERED ORAL at 04:30

## 2022-09-26 RX ADMIN — Medication 4 MILLIGRAM(S): at 05:11

## 2022-09-26 RX ADMIN — ALBUTEROL 2 PUFF(S): 90 AEROSOL, METERED ORAL at 09:16

## 2022-09-26 RX ADMIN — Medication 2 PUFF(S): at 09:17

## 2022-09-26 RX ADMIN — Medication 28.8 MICROGRAM(S)/KG/MIN: at 21:40

## 2022-09-26 RX ADMIN — ALBUTEROL 2 PUFF(S): 90 AEROSOL, METERED ORAL at 15:49

## 2022-09-26 RX ADMIN — PIPERACILLIN AND TAZOBACTAM 25 GRAM(S): 4; .5 INJECTION, POWDER, LYOPHILIZED, FOR SOLUTION INTRAVENOUS at 21:42

## 2022-09-26 RX ADMIN — PROPOFOL 12.3 MICROGRAM(S)/KG/MIN: 10 INJECTION, EMULSION INTRAVENOUS at 09:49

## 2022-09-26 NOTE — PROCEDURE NOTE - NSPROCDETAILS_GEN_ALL_CORE
location identified, draped/prepped, sterile technique used/blood seen on insertion/dressing applied/flushes easily/secured in place/sterile technique, catheter placed
location identified, draped/prepped, sterile technique used, needle inserted/introduced/area cleaned in sterile fashion

## 2022-09-26 NOTE — PROGRESS NOTE ADULT - SUBJECTIVE AND OBJECTIVE BOX
MRN-[id number]    Subjective: INCOMPLETE NOTE.    Spoke with daughter (    PAST MEDICAL & SURGICAL HISTORY:  HTN (hypertension)    HLD (hyperlipidemia)    Coronary artery disease    Multiple myeloma    S/P coronary artery stent placement    FAMILY HISTORY:  Maternal family history of hypertension  Mother    Social Hx:  Nonsmoker, no drug or alcohol use    Home Medications:  amLODIPine 10 mg oral tablet: 1 tab(s) orally once a day (25 Sep 2022 03:01)  Aspirin Enteric Coated 81 mg oral delayed release tablet: 1 tab(s) orally once a day (25 Sep 2022 03:)  atorvastatin 40 mg oral tablet: 1 tab(s) orally once a day (at bedtime) (25 Sep 2022 03:)  calcium carbonate 500 mg (200 mg elemental calcium) oral tablet, chewable: 1 tab(s) orally once a day (25 Sep 2022 03:)  losartan 100 mg oral tablet: 1 tab(s) orally once a day (25 Sep 2022 03:)  Metoprolol Tartrate 50 mg oral tablet: 1 tab(s) orally 2 times a day (25 Sep 2022 03:)  valACYclovir 500 mg oral tablet: 1 tab(s) orally once a day (25 Sep 2022 03:)    MEDICATIONS  (STANDING):  ALBUTerol    90 MICROgram(s) HFA Inhaler 2 Puff(s) Inhalation every 6 hours  atorvastatin 40 milliGRAM(s) Oral at bedtime  chlorhexidine 0.12% Liquid 15 milliLiter(s) Oral Mucosa every 12 hours  chlorhexidine 2% Cloths 1 Application(s) Topical <User Schedule>  dexAMETHasone  Injectable 4 milliGRAM(s) IV Push every 6 hours  dexMEDEtomidine Infusion 0.2 MICROgram(s)/kG/Hr (2.56 mL/Hr) IV Continuous <Continuous>  dextrose 5%. 1000 milliLiter(s) (100 mL/Hr) IV Continuous <Continuous>  dextrose 5%. 1000 milliLiter(s) (50 mL/Hr) IV Continuous <Continuous>  dextrose 50% Injectable 25 Gram(s) IV Push once  dextrose 50% Injectable 12.5 Gram(s) IV Push once  dextrose 50% Injectable 25 Gram(s) IV Push once  glucagon  Injectable 1 milliGRAM(s) IntraMuscular once  heparin   Injectable 5000 Unit(s) SubCutaneous every 8 hours  ipratropium    for Nebulization 500 MICROGram(s) Nebulizer every 6 hours  norepinephrine Infusion 0.3 MICROgram(s)/kG/Min (28.8 mL/Hr) IV Continuous <Continuous>  piperacillin/tazobactam IVPB.- 3.375 Gram(s) IV Intermittent once  piperacillin/tazobactam IVPB.. 3.375 Gram(s) IV Intermittent every 8 hours  polyethylene glycol 3350 17 Gram(s) Oral daily  potassium chloride  10 mEq/100 mL IVPB 10 milliEquivalent(s) IV Intermittent every 1 hour  propofol Infusion 40.039 MICROgram(s)/kG/Min (12.3 mL/Hr) IV Continuous <Continuous>  sodium bicarbonate 1300 milliGRAM(s) Oral two times a day  sodium chloride 1.5%. 500 milliLiter(s) (50 mL/Hr) IV Continuous <Continuous>  sodium chloride 3%  Inhalation 4 milliLiter(s) Inhalation every 6 hours  valACYclovir 500 milliGRAM(s) Oral daily    MEDICATIONS  (PRN):  dextrose Oral Gel 15 Gram(s) Oral once PRN Blood Glucose LESS THAN 70 milliGRAM(s)/deciliter    Allergies  Beef (Unknown)  No Known Drug Allergies  pork (Unknown)    Intolerances      REVIEW OF SYSTEMS  General:	  Skin/Breast:	  Ophthalmologic:  ENMT:	  Respiratory and Thorax:	  Cardiovascular:	  Gastrointestinal:	  Genitourinary:	  Musculoskeletal:	  Neurological:	  Psychiatric:	  Hematology/Lymphatics:	  Endocrine:	  Allergic/Immunologic:	    ROS: Pertinent positives above, all other ROS were reviewed and are negative.      Vital Signs Last 24 Hrs  T(C): 36.3 (26 Sep 2022 12:00), Max: 36.3 (25 Sep 2022 20:00)  T(F): 97.4 (26 Sep 2022 12:00), Max: 97.4 (25 Sep 2022 20:00)  HR: 68 (26 Sep 2022 14:00) (68 - 124)  BP: 91/56 (26 Sep 2022 14:00) (63/42 - 174/83)  BP(mean): 68 (26 Sep 2022 14:00) (50 - 144)  RR: 19 (26 Sep 2022 14:00) (14 - 25)  SpO2: 95% (26 Sep 2022 14:00) (83% - 100%)    Parameters below as of 26 Sep 2022 13:40      O2 Concentration (%): 50    GENERAL EXAM:  Constitutional: awake and alert. NAD  HEENT: PERRLA, EOMI  Neck: Supple  Respiratory: Breath sounds are clear bilaterally  Cardiovascular: S1 and S2, regular / irregular rhythm  Gastrointestinal: soft, nontender  Extremities: no edema, no cyanosis  Vascular: no carotid bruits  Musculoskeletal: no joint swelling/tenderness, no abnormal movements  Skin: no rashes    NEUROLOGICAL EXAM:  MS: AAOX3, fluent, attends b/l; recent and remote memory intact; normal attention, language and fund of knowledge.   CN: VFF, EOMI, PERRL, no ZO, no APD,  V1-3 intact, no facial asymmetry, t/p midline, SCM/trap intact.  Eyes-Fundi: no papilledema.  Motor: Strength: 5/5 4x. Tone: normal. Bulk: normal. DTR 2+ symm.  Plantar flex b/l. Sensation: intact to LT/PP/Vibration/Position/Temperature 4x.   Coordination: intact 4x.   Gait:  Romberg negative, pull test negative; walks with narrow base, pivots in 2 steps.    NIHSS  mRS    Labs:   cbc                      10.9   15.88 )-----------( 235      ( 26 Sep 2022 01:40 )             30.6     [id number]    123<L>  |  94<L>  |  47<H>  ----------------------------<  190<H>  4.9   |  15<L>  |  1.49<H>    Ca    8.5      26 Sep 2022 10:30  Phos  3.0     09-  Mg     2.50     -    TPro  7.4  /  Alb  4.3  /  TBili  0.5  /  DBili  x   /  AST  18  /  ALT  11  /  AlkPhos  44  09-26    CoagsPT/INR - ( 26 Sep 2022 01:40 )   PT: 11.7 sec;   INR: 1.01 ratio         PTT - ( 26 Sep 2022 01:40 )  PTT:25.5 sec  Lipids  A1C  Cardiac MarkersCARDIAC MARKERS ( 24 Sep 2022 14:58 )  x     / x     / 251 U/L / x     / x          LIVER FUNCTIONS - ( 26 Sep 2022 01:40 )  Alb: 4.3 g/dL / Pro: 7.4 g/dL / ALK PHOS: 44 U/L / ALT: 11 U/L / AST: 18 U/L / GGT: x           UAUrinalysis Basic - ( 25 Sep 2022 12:01 )    Color: Light Yellow / Appearance: Clear / S.035 / pH: x  Gluc: x / Ketone: Trace  / Bili: Negative / Urobili: <2 mg/dL   Blood: x / Protein: 30 mg/dL / Nitrite: Negative   Leuk Esterase: Negative / RBC: 1 /HPF / WBC 1 /HPF   Sq Epi: x / Non Sq Epi: 2 /HPF / Bacteria: Negative      CSF  Immunological Labs    Radiology: [id number]    Subjective: 73 yo male seen and examined at bedside. Intubated, but off sedation for ~1 hour prior to neurology assessment.    Spoke with daughter (Mirlande, [phone]) over the phone and at bedside. She states the patient's first symptom was urinary frequency (every 10-15 minutes), which began on  PM. On  AM, the patient began vomiting (NBNB) which then became dry heaving as the day progressed since he did not have any food on his stomach. He never had diarrhea. On  PM, the patient was noticeably weak and he started to have difficulty with ambulation. On  the patient was so weak he was unable to get out of bed. The daughter then noted he began to have SOB, and was taking in breaths but becoming easily breathless while trying to speak. This started on  and became worse into . She also reports the patient was complaining of significant numbness/tingling in his hands and feet, worse than anything he experiencing while on chemo. This began at roughly the same time as the weakness began.    PAST MEDICAL & SURGICAL HISTORY:  HTN (hypertension)    HLD (hyperlipidemia)    Coronary artery disease    Multiple myeloma    S/P coronary artery stent placement    FAMILY HISTORY:  Maternal family history of hypertension  Mother    Social Hx:  Nonsmoker, no drug or alcohol use    Home Medications:  amLODIPine 10 mg oral tablet: 1 tab(s) orally once a day (25 Sep 2022 03:01)  Aspirin Enteric Coated 81 mg oral delayed release tablet: 1 tab(s) orally once a day (25 Sep 2022 03:)  atorvastatin 40 mg oral tablet: 1 tab(s) orally once a day (at bedtime) (25 Sep 2022 03:01)  calcium carbonate 500 mg (200 mg elemental calcium) oral tablet, chewable: 1 tab(s) orally once a day (25 Sep 2022 03:)  losartan 100 mg oral tablet: 1 tab(s) orally once a day (25 Sep 2022 03:)  Metoprolol Tartrate 50 mg oral tablet: 1 tab(s) orally 2 times a day (25 Sep 2022 03:)  valACYclovir 500 mg oral tablet: 1 tab(s) orally once a day (25 Sep 2022 03:)    MEDICATIONS  (STANDING):  ALBUTerol    90 MICROgram(s) HFA Inhaler 2 Puff(s) Inhalation every 6 hours  atorvastatin 40 milliGRAM(s) Oral at bedtime  chlorhexidine 0.12% Liquid 15 milliLiter(s) Oral Mucosa every 12 hours  chlorhexidine 2% Cloths 1 Application(s) Topical <User Schedule>  dexAMETHasone  Injectable 4 milliGRAM(s) IV Push every 6 hours  dexMEDEtomidine Infusion 0.2 MICROgram(s)/kG/Hr (2.56 mL/Hr) IV Continuous <Continuous>  dextrose 5%. 1000 milliLiter(s) (100 mL/Hr) IV Continuous <Continuous>  dextrose 5%. 1000 milliLiter(s) (50 mL/Hr) IV Continuous <Continuous>  dextrose 50% Injectable 25 Gram(s) IV Push once  dextrose 50% Injectable 12.5 Gram(s) IV Push once  dextrose 50% Injectable 25 Gram(s) IV Push once  glucagon  Injectable 1 milliGRAM(s) IntraMuscular once  heparin   Injectable 5000 Unit(s) SubCutaneous every 8 hours  ipratropium    for Nebulization 500 MICROGram(s) Nebulizer every 6 hours  norepinephrine Infusion 0.3 MICROgram(s)/kG/Min (28.8 mL/Hr) IV Continuous <Continuous>  piperacillin/tazobactam IVPB.- 3.375 Gram(s) IV Intermittent once  piperacillin/tazobactam IVPB.. 3.375 Gram(s) IV Intermittent every 8 hours  polyethylene glycol 3350 17 Gram(s) Oral daily  potassium chloride  10 mEq/100 mL IVPB 10 milliEquivalent(s) IV Intermittent every 1 hour  propofol Infusion 40.039 MICROgram(s)/kG/Min (12.3 mL/Hr) IV Continuous <Continuous>  sodium bicarbonate 1300 milliGRAM(s) Oral two times a day  sodium chloride 1.5%. 500 milliLiter(s) (50 mL/Hr) IV Continuous <Continuous>  sodium chloride 3%  Inhalation 4 milliLiter(s) Inhalation every 6 hours  valACYclovir 500 milliGRAM(s) Oral daily    MEDICATIONS  (PRN):  dextrose Oral Gel 15 Gram(s) Oral once PRN Blood Glucose LESS THAN 70 milliGRAM(s)/deciliter    Allergies  Beef (Unknown)  No Known Drug Allergies  pork (Unknown)	    ROS: Unable to obtain, patient intubated.    ICU Vital Signs Last 24 Hrs  T(C): 36.3 (26 Sep 2022 12:00), Max: 36.3 (25 Sep 2022 20:00)  T(F): 97.4 (26 Sep 2022 12:00), Max: 97.4 (25 Sep 2022 20:00)  HR: 108 (26 Sep 2022 15:07) (68 - 124)  BP: 91/56 (26 Sep 2022 14:00) (63/42 - 174/83)  BP(mean): 68 (26 Sep 2022 14:00) (50 - 144)  RR: 20 (26 Sep 2022 15:00) (14 - 25)  SpO2: 100% (26 Sep 2022 15:07) (83% - 100%)    O2 Parameters below as of 26 Sep 2022 13:40    O2 Concentration (%): 50    GENERAL EXAM:  Constitutional: Lying in bed, NAD.  Head: Normocephalic, atraumatic.  Extremities: No edema, no cyanosis.    NEUROLOGICAL EXAM: Intubated, not on any sedation for ~1 hour.  MS: Alert, eyes open spontaneously. Intubated, no verbal output. Follows commands.  CN: EOMI. Face grossly symmetric.  Motor:             Deltoid	Biceps	Triceps	Wrist Ext   Wrist Flex  R	0	2	3	3	    3	  L	0	0	3	3	    4-    	H-Flex	K-Ext	D-Flex	P-Flex   Toes  R	2	4-	3	4	Trace movement	   L	2	4-	3	4-	Trace movement  Sensory: Intact to LT throughout. Intact to PP throughout, expect for reduced sensation to PP on dorsum of L foot.  Reflexes: Absent throughout.   Coordination/Gait: Unable to assess.    Labs:   cbc                      10.9   15.88 )-----------( 235      ( 26 Sep 2022 01:40 )             30.6     [id number]    123<L>  |  94<L>  |  47<H>  ----------------------------<  190<H>  4.9   |  15<L>  |  1.49<H>    Ca    8.5      26 Sep 2022 10:30  Phos  3.0       Mg     2.50         TPro  7.4  /  Alb  4.3  /  TBili  0.5  /  DBili  x   /  AST  18  /  ALT  11  /  AlkPhos  44      Coags: PT/INR - ( 26 Sep 2022 01:40 )   PT: 11.7 sec;   INR: 1.01 ratio         PTT - ( 26 Sep 2022 01:40 )  PTT:25.5 sec  Lipids  A1C  Cardiac Markers: CARDIAC MARKERS ( 24 Sep 2022 14:58 )  x     / x     / 251 U/L / x     / x        LIVER FUNCTIONS - ( 26 Sep 2022 01:40 )  Alb: 4.3 g/dL / Pro: 7.4 g/dL / ALK PHOS: 44 U/L / ALT: 11 U/L / AST: 18 U/L / GGT: x           UA: Urinalysis Basic - ( 25 Sep 2022 12:01 )    Color: Light Yellow / Appearance: Clear / S.035 / pH: x  Gluc: x / Ketone: Trace  / Bili: Negative / Urobili: <2 mg/dL   Blood: x / Protein: 30 mg/dL / Nitrite: Negative   Leuk Esterase: Negative / RBC: 1 /HPF / WBC 1 /HPF   Sq Epi: x / Non Sq Epi: 2 /HPF / Bacteria: Negative    Radiology:  - CTH: No hydrocephalus, acute intracranial hemorrhage, mass effect, or brain edema.  - CT CERVICAL SPINE, w/ contrast: Multiple mixed lytic sclerotic lesions in the cervical spine may be sequela of previously treated multiple myeloma. No soft tissue mass, epidural tumor extension or abnormal enhancement is visualized by CT technique.  Follow-up MRI may be obtained for more sensitive evaluation. No fracture or traumatic malalignment. Cervical spine degenerative changes with mild to moderate spinal canal stenosis at C3-C4 through C5-C6.  Moderate to severe neural foraminal narrowing at C4-C5 and C5-C6 on the right side. Approximately 1 cm nodule in the left thyroid lobe.  - CT THORACIC SPINE, w/ contrast: Multiple mixed lytic sclerotic lesions in the thoracic spine, sternum and ribs may be sequela of previously treated multiple myeloma.  No soft tissue mass, epidural tumor extension or abnormal enhancement is visualized by CT technique. Chronic pathologic compression fracture at T8 demonstrating up to 50% loss of vertebral body height.  Chronic displaced fracture of the left superior facet of T8; the displaced fracture fragment is fused to the left inferior facet of T7. Chronic appearing mild central endplate fracture and Schmorl's node in the superior endplate of T12. No significant spinal canal stenosis is visualized by CT technique.  -09/25 CT LUMBAR SPINE, w/ contrast: There are small rudimentary ribs at T12.  There is partial sacralization of L5, with a rudimentary disc space at L5-S1. The last completely formed intervertebral disc space corresponds L4-L5 level. Multiple mixed lytic sclerotic lesions in the lumbar spine, sacrum and visualized pelvis may be sequela of previously treated multiple myeloma. No soft tissue mass, epidural tumor extension or abnormal enhancement is visualized by CT technique. No lumbar spine fracture or traumatic malalignment. Mild lumbar spine degenerative changes.  Mild spinal canal stenosis at L3-L4 and L4-L5. The bladder is distended without wall thickening or surrounding inflammatory changes.  If the patient is unable to urinate Dahl catheter may be placed. MRN-[id number]    Subjective: 73 yo male seen and examined at bedside. Intubated, but off sedation for ~1 hour prior to neurology assessment.    Spoke with daughter (Mirlande, [phone]) over the phone and at bedside. She states the patient's first symptom was urinary frequency (every 10-15 minutes), which began on  PM. On  AM, the patient began vomiting (NBNB) which then became dry heaving as the day progressed since he did not have any food on his stomach. He never had diarrhea. On  PM, the patient was noticeably weak and he started to have difficulty with ambulation. On  the patient was so weak he was unable to get out of bed. The daughter then noted he began to have SOB, and was taking in breaths but becoming easily breathless while trying to speak. This started on  and became worse into . She also reports the patient was complaining of significant numbness/tingling in his hands and feet, worse than anything he experiencing while on chemo. This began at roughly the same time as the weakness began.    PAST MEDICAL & SURGICAL HISTORY:  HTN (hypertension)    HLD (hyperlipidemia)    Coronary artery disease    Multiple myeloma    S/P coronary artery stent placement    FAMILY HISTORY:  Maternal family history of hypertension  Mother    Social Hx:  Nonsmoker, no drug or alcohol use    Home Medications:  amLODIPine 10 mg oral tablet: 1 tab(s) orally once a day (25 Sep 2022 03:01)  Aspirin Enteric Coated 81 mg oral delayed release tablet: 1 tab(s) orally once a day (25 Sep 2022 03:)  atorvastatin 40 mg oral tablet: 1 tab(s) orally once a day (at bedtime) (25 Sep 2022 03:01)  calcium carbonate 500 mg (200 mg elemental calcium) oral tablet, chewable: 1 tab(s) orally once a day (25 Sep 2022 03:)  losartan 100 mg oral tablet: 1 tab(s) orally once a day (25 Sep 2022 03:)  Metoprolol Tartrate 50 mg oral tablet: 1 tab(s) orally 2 times a day (25 Sep 2022 03:)  valACYclovir 500 mg oral tablet: 1 tab(s) orally once a day (25 Sep 2022 03:)    MEDICATIONS  (STANDING):  ALBUTerol    90 MICROgram(s) HFA Inhaler 2 Puff(s) Inhalation every 6 hours  atorvastatin 40 milliGRAM(s) Oral at bedtime  chlorhexidine 0.12% Liquid 15 milliLiter(s) Oral Mucosa every 12 hours  chlorhexidine 2% Cloths 1 Application(s) Topical <User Schedule>  dexAMETHasone  Injectable 4 milliGRAM(s) IV Push every 6 hours  dexMEDEtomidine Infusion 0.2 MICROgram(s)/kG/Hr (2.56 mL/Hr) IV Continuous <Continuous>  dextrose 5%. 1000 milliLiter(s) (100 mL/Hr) IV Continuous <Continuous>  dextrose 5%. 1000 milliLiter(s) (50 mL/Hr) IV Continuous <Continuous>  dextrose 50% Injectable 25 Gram(s) IV Push once  dextrose 50% Injectable 12.5 Gram(s) IV Push once  dextrose 50% Injectable 25 Gram(s) IV Push once  glucagon  Injectable 1 milliGRAM(s) IntraMuscular once  heparin   Injectable 5000 Unit(s) SubCutaneous every 8 hours  ipratropium    for Nebulization 500 MICROGram(s) Nebulizer every 6 hours  norepinephrine Infusion 0.3 MICROgram(s)/kG/Min (28.8 mL/Hr) IV Continuous <Continuous>  piperacillin/tazobactam IVPB.- 3.375 Gram(s) IV Intermittent once  piperacillin/tazobactam IVPB.. 3.375 Gram(s) IV Intermittent every 8 hours  polyethylene glycol 3350 17 Gram(s) Oral daily  potassium chloride  10 mEq/100 mL IVPB 10 milliEquivalent(s) IV Intermittent every 1 hour  propofol Infusion 40.039 MICROgram(s)/kG/Min (12.3 mL/Hr) IV Continuous <Continuous>  sodium bicarbonate 1300 milliGRAM(s) Oral two times a day  sodium chloride 1.5%. 500 milliLiter(s) (50 mL/Hr) IV Continuous <Continuous>  sodium chloride 3%  Inhalation 4 milliLiter(s) Inhalation every 6 hours  valACYclovir 500 milliGRAM(s) Oral daily    MEDICATIONS  (PRN):  dextrose Oral Gel 15 Gram(s) Oral once PRN Blood Glucose LESS THAN 70 milliGRAM(s)/deciliter    Allergies  Beef (Unknown)  No Known Drug Allergies  pork (Unknown)	    ROS: Unable to obtain, patient intubated.    ICU Vital Signs Last 24 Hrs  T(C): 36.3 (26 Sep 2022 12:00), Max: 36.3 (25 Sep 2022 20:00)  T(F): 97.4 (26 Sep 2022 12:00), Max: 97.4 (25 Sep 2022 20:00)  HR: 108 (26 Sep 2022 15:07) (68 - 124)  BP: 91/56 (26 Sep 2022 14:00) (63/42 - 174/83)  BP(mean): 68 (26 Sep 2022 14:00) (50 - 144)  RR: 20 (26 Sep 2022 15:00) (14 - 25)  SpO2: 100% (26 Sep 2022 15:07) (83% - 100%)    O2 Parameters below as of 26 Sep 2022 13:40    O2 Concentration (%): 50    GENERAL EXAM:  Constitutional: Lying in bed, NAD.  Head: Normocephalic, atraumatic.  Extremities: No edema, no cyanosis.    NEUROLOGICAL EXAM: Intubated, not on any sedation for ~1 hour.  MS: Alert, eyes open spontaneously. Intubated, no verbal output. Follows commands.    CN: EOMI. Face grossly symmetric.  Motor:             Deltoid	Biceps	Triceps	Wrist Ext   Wrist Flex  R	0	2	3	3	    3	  L	0	0	3	3	    4-    	H-Flex	K-Ext	D-Flex	P-Flex   Toes  R	2	4-	3	4	Trace movement	   L	2	4-	3	4-	Trace movement  Sensory: Intact to LT throughout. Intact to PP throughout, expect for reduced sensation to PP on dorsum of L foot.  Reflexes: Absent throughout.   Coordination/Gait: Unable to assess.    Labs:   cbc                      10.9   15.88 )-----------( 235      ( 26 Sep 2022 01:40 )             30.6     [id number]    123<L>  |  94<L>  |  47<H>  ----------------------------<  190<H>  4.9   |  15<L>  |  1.49<H>    Ca    8.5      26 Sep 2022 10:30  Phos  3.0       Mg     2.50         TPro  7.4  /  Alb  4.3  /  TBili  0.5  /  DBili  x   /  AST  18  /  ALT  11  /  AlkPhos  44      Coags: PT/INR - ( 26 Sep 2022 01:40 )   PT: 11.7 sec;   INR: 1.01 ratio         PTT - ( 26 Sep 2022 01:40 )  PTT:25.5 sec  Lipids  A1C  Cardiac Markers: CARDIAC MARKERS ( 24 Sep 2022 14:58 )  x     / x     / 251 U/L / x     / x        LIVER FUNCTIONS - ( 26 Sep 2022 01:40 )  Alb: 4.3 g/dL / Pro: 7.4 g/dL / ALK PHOS: 44 U/L / ALT: 11 U/L / AST: 18 U/L / GGT: x           UA: Urinalysis Basic - ( 25 Sep 2022 12:01 )    Color: Light Yellow / Appearance: Clear / S.035 / pH: x  Gluc: x / Ketone: Trace  / Bili: Negative / Urobili: <2 mg/dL   Blood: x / Protein: 30 mg/dL / Nitrite: Negative   Leuk Esterase: Negative / RBC: 1 /HPF / WBC 1 /HPF   Sq Epi: x / Non Sq Epi: 2 /HPF / Bacteria: Negative    Radiology:  - CTH: No hydrocephalus, acute intracranial hemorrhage, mass effect, or brain edema.  - CT CERVICAL SPINE, w/ contrast: Multiple mixed lytic sclerotic lesions in the cervical spine may be sequela of previously treated multiple myeloma. No soft tissue mass, epidural tumor extension or abnormal enhancement is visualized by CT technique.  Follow-up MRI may be obtained for more sensitive evaluation. No fracture or traumatic malalignment. Cervical spine degenerative changes with mild to moderate spinal canal stenosis at C3-C4 through C5-C6.  Moderate to severe neural foraminal narrowing at C4-C5 and C5-C6 on the right side. Approximately 1 cm nodule in the left thyroid lobe.  - CT THORACIC SPINE, w/ contrast: Multiple mixed lytic sclerotic lesions in the thoracic spine, sternum and ribs may be sequela of previously treated multiple myeloma.  No soft tissue mass, epidural tumor extension or abnormal enhancement is visualized by CT technique. Chronic pathologic compression fracture at T8 demonstrating up to 50% loss of vertebral body height.  Chronic displaced fracture of the left superior facet of T8; the displaced fracture fragment is fused to the left inferior facet of T7. Chronic appearing mild central endplate fracture and Schmorl's node in the superior endplate of T12. No significant spinal canal stenosis is visualized by CT technique.  -09/25 CT LUMBAR SPINE, w/ contrast: There are small rudimentary ribs at T12.  There is partial sacralization of L5, with a rudimentary disc space at L5-S1. The last completely formed intervertebral disc space corresponds L4-L5 level. Multiple mixed lytic sclerotic lesions in the lumbar spine, sacrum and visualized pelvis may be sequela of previously treated multiple myeloma. No soft tissue mass, epidural tumor extension or abnormal enhancement is visualized by CT technique. No lumbar spine fracture or traumatic malalignment. Mild lumbar spine degenerative changes.  Mild spinal canal stenosis at L3-L4 and L4-L5. The bladder is distended without wall thickening or surrounding inflammatory changes.  If the patient is unable to urinate Dahl catheter may be placed.

## 2022-09-26 NOTE — PROGRESS NOTE ADULT - SUBJECTIVE AND OBJECTIVE BOX
[facility]   MD DERRICK MOSS MD KRISTINE SOLTANPOUR, DO ANGELA WONG, PA    TEL:  OFFICE: [phone]  From 5pm-7am Answering Service [phone]    -- RENAL FOLLOW UP NOTE ---Date of Service 09-26-22 @ 12:03    Patient is a 72y old  Male who presents with a chief complaint of 73 y/o M with a PMHx significant for HTN, HLD, CAD s/p stent, MM, presents with descending paralysis complicated by acute hypoxic respiratory failure and interval vasoplegic shock suspect underlying neuromuscular illness of undetermined etiology. Suspect possible paraneoplastic process.    (26 Sep 2022 08:44)      Patient seen and examined at bedside. No chest pain/sob    VITALS:  T(F): 97.1 (09-26-22 @ 04:00), Max: 97.4 (09-25-22 @ 20:00)  HR: 103 (09-26-22 @ 10:31)  BP: 133/70 (09-26-22 @ 10:00)  RR: 19 (09-26-22 @ 10:00)  SpO2: 100% (09-26-22 @ 10:31)  Wt(kg): --    09-25 @ 07:01  -  09-26 @ 07:00  --------------------------------------------------------  IN: 945.8 mL / OUT: 1330 mL / NET: -384.2 mL    09-26 @ 07:01  -  09-26 @ 12:03  --------------------------------------------------------  IN: 161.6 mL / OUT: 115 mL / NET: 46.6 mL          PHYSICAL EXAM:  General: NAD, intubated but awake  Neck: No JVD  Respiratory: CTAB, no wheezes, rales or rhonchi  Cardiovascular: S1, S2, RRR  Gastrointestinal: BS+, soft, NT/ND  Extremities: No peripheral edema    Hospital Medications:   MEDICATIONS  (STANDING):  ALBUTerol    90 MICROgram(s) HFA Inhaler 2 Puff(s) Inhalation every 6 hours  atorvastatin 40 milliGRAM(s) Oral at bedtime  chlorhexidine 0.12% Liquid 15 milliLiter(s) Oral Mucosa every 12 hours  chlorhexidine 2% Cloths 1 Application(s) Topical <User Schedule>  dexAMETHasone  Injectable 4 milliGRAM(s) IV Push every 6 hours  dexMEDEtomidine Infusion 0.2 MICROgram(s)/kG/Hr (2.56 mL/Hr) IV Continuous <Continuous>  dextrose 5%. 1000 milliLiter(s) (100 mL/Hr) IV Continuous <Continuous>  dextrose 5%. 1000 milliLiter(s) (50 mL/Hr) IV Continuous <Continuous>  dextrose 50% Injectable 25 Gram(s) IV Push once  dextrose 50% Injectable 12.5 Gram(s) IV Push once  dextrose 50% Injectable 25 Gram(s) IV Push once  glucagon  Injectable 1 milliGRAM(s) IntraMuscular once  heparin   Injectable 5000 Unit(s) SubCutaneous every 8 hours  ipratropium 17 MICROgram(s) HFA Inhaler 2 Puff(s) Inhalation every 6 hours  norepinephrine Infusion 0.3 MICROgram(s)/kG/Min (28.8 mL/Hr) IV Continuous <Continuous>  piperacillin/tazobactam IVPB. 3.375 Gram(s) IV Intermittent once  piperacillin/tazobactam IVPB.- 3.375 Gram(s) IV Intermittent once  piperacillin/tazobactam IVPB.. 3.375 Gram(s) IV Intermittent every 8 hours  polyethylene glycol 3350 17 Gram(s) Oral daily  potassium chloride  10 mEq/100 mL IVPB 10 milliEquivalent(s) IV Intermittent every 1 hour  propofol Infusion 40.039 MICROgram(s)/kG/Min (12.3 mL/Hr) IV Continuous <Continuous>  valACYclovir 500 milliGRAM(s) Oral daily      LABS:  09-26    123<L>  |  94<L>  |  47<H>  ----------------------------<  190<H>  4.9   |  15<L>  |  1.49<H>    Ca    8.5      26 Sep 2022 10:30  Phos  3.0     09-26  Mg     2.50     09-26    TPro  7.4  /  Alb  4.3  /  TBili  0.5  /  DBili      /  AST  18  /  ALT  11  /  AlkPhos  44  09-26    Creatinine Trend: 1.49 <--, 1.33 <--, 1.12 <--, 1.15 <--, 1.19 <--, 1.08 <--, 1.17 <--, 1.06 <--, 1.37 <--    Phosphorus Level, Serum: 3.0 mg/dL (09-26 @ 10:30)  Albumin, Serum: 4.3 g/dL (09-26 @ 01:40)  Phosphorus Level, Serum: 4.6 mg/dL (09-26 @ 01:40)  Albumin, Serum: 3856 mg/dL (09-26 @ 00:30)  Albumin, Serum: 4.7 g/dL (09-25 @ 19:20)  Phosphorus Level, Serum: 3.6 mg/dL (09-25 @ 19:20)  Phosphorus Level, Serum: 3.6 mg/dL (09-25 @ 18:43)                              10.9   15.88 )-----------( 235      ( 26 Sep 2022 01:40 )             30.6     Urine Studies:  Urinalysis - [09-25-22 @ 12:01]      Color Light Yellow / Appearance Clear / SG 1.035 / pH 6.5      Gluc 200 mg/dL / Ketone Trace  / Bili Negative / Urobili <2 mg/dL       Blood Trace / Protein 30 mg/dL / Leuk Est Negative / Nitrite Negative      RBC 1 / WBC 1 / Hyaline  / Gran  / Sq Epi  / Non Sq Epi 2 / Bacteria Negative    Urine Creatinine 71      [09-25-22 @ 12:01]  Urine Protein 68      [09-25-22 @ 12:01]  Urine Sodium 78      [09-25-22 @ 12:01]  Urine Osmolality 528      [09-25-22 @ 12:01]    Iron 36, TIBC 290, %sat 12      [09-25-22 @ 11:15]  Ferritin 287      [09-25-22 @ 11:15]  TSH 1.57      [09-25-22 @ 07:30]        RADIOLOGY & ADDITIONAL STUDIES:

## 2022-09-26 NOTE — DIETITIAN INITIAL EVALUATION ADULT - [id number]_GEN_A_CORE
09-25-22 @ 07:01  -  09-26-22 @ 07:00  --------------------------------------------------------  OUT:  Total OUT: 0 mL    Total NET: 70 mL

## 2022-09-26 NOTE — DIETITIAN INITIAL EVALUATION ADULT - REASON FOR ADMISSION
73 y/o M with a PMHx significant for HTN, HLD, CAD s/p stent, MM, presents with descending paralysis complicated by acute hypoxic respiratory failure and interval vasoplegic shock suspect underlying neuromuscular illness of undetermined etiology. Suspect possible paraneoplastic process.

## 2022-09-26 NOTE — CONSULT NOTE ADULT - SUBJECTIVE AND OBJECTIVE BOX
HARDEEP, SUBASH 72y ,Male  HPI:  71 y/o M with a PMHx significant for HTN, HLD, CAD s/p stent, MM, presents with cc generalized weakness x 2-3 days associated with some tingling sensation. Pt was evaluated in the ED on  for vomiting but was discharged home. That evening, daughter noticed that patient was unable to walk on his own and needed assistance getting up the stairs. The next day patient was unable to get out of bed and unable to lift his arms from the bed. Pt reports weakness started in the arms and spread down to the legs. Pt also complains of hoarse voice and difficulty swallowing as well as b/l numbness/tingling in both his feet and hands. In the last few hours, also had onset of shortness of breath with occasional cough productive of white sputum. Pt denied any urinary or fecal incontinence, HA, dizziness, chest pain, changes in vision or hearing, neck pain. Reports chronic lower back pain, unchanged in severity. Daughter notes pt with some URI symptoms earlier in the week.  Of note, pt last chemo was 3/22 for MM and he is supposed to get bi-weekly doses Velcade Per daughter, pt was traveling for one month and never followed up afterwards with his oncologist.   Per ED, pt failed dysphagia study and is currently npo (25 Sep 2022 03:08)    CT of the spinal axis shows 1.  Multiple mixed lytic sclerotic lesions in the cervical spine may be sequela of previously treated multiple myeloma..  No soft tissue mass, epidural tumor extension or abnormal enhancement is visualized by CT technique.  Follow-up MRI may be obtained for more sensitive evaluation.2.  No fracture or traumatic malalignment.3.  Cervical spine degenerative changes with mild to moderate spinal   canal stenosis at C3-C4 through C5-C6.  Moderate to severe neural foraminal narrowing at C4-C5 and C5-C6 on the right side.4.  Approximately 1 cm nodule in the left thyroid lobe.    CT THORACIC:  1.  Multiple mixed lytic sclerotic lesions in the thoracic spine, sternum and ribs may be sequela of previously treated multiple myeloma.  No soft tissue mass, epidural tumor extension or abnormal enhancement is   visualized by CT technique.  Follow-up MRI may be obtained for more sensitive evaluation.2.  Chronic pathologic compression fracture at T8 demonstrating up to 50% loss of vertebral body height.  Chronic displaced fracture of the left   superior facet of T8; the displaced fracture fragment is fused to the left inferior facet of T7.3.  Chronic appearing appearing mild central endplate fracture and Schmorl's node in the superior endplate of T12.  4.  No significant spinal canal stenosis is visualized by CT technique.    CT LUMBAR:  1.  There are small rudimentary ribs at T12.  There is partial sacralization of L5, with a rudimentary disc space at L5-S1.  The last completely formed intervertebral disc space corresponds L4-L5 level.  2.  Multiple mixed lytic sclerotic lesions in the lumbar spine, sacrum and visualized pelvis may be sequela of previously treated multiple myeloma.  No soft tissue mass, epidural tumor extension or abnormal   enhancement is visualized by CT technique.  Follow-up MRI may be obtained for more sensitive evaluation.3.  No lumbar spine fracture or traumatic malalignment.4.  Mild lumbar spine degenerative changes.  Mild spinal canal stenosis   at L3-L4 and L4-L5.5.  The bladder is distended without wall thickening or surrounding inflammatory changes.  If the patient is unable to urinate Dahl catheter may be placed.    PAST MEDICAL & SURGICAL HISTORY:  HTN (hypertension)  HLD (hyperlipidemia)  Coronary artery disease  Multiple myeloma  S/P coronary artery stent placement    Allergies  Beef (Unknown)  No Known Drug Allergies  pork (Unknown)    MEDICATIONS  (STANDING):  ALBUTerol    90 MICROgram(s) HFA Inhaler 2 Puff(s) Inhalation every 6 hours  atorvastatin 40 milliGRAM(s) Oral at bedtime  chlorhexidine 0.12% Liquid 15 milliLiter(s) Oral Mucosa every 12 hours  chlorhexidine 2% Cloths 1 Application(s) Topical <User Schedule>  dexAMETHasone  Injectable 4 milliGRAM(s) IV Push every 6 hours  dexMEDEtomidine Infusion 0.2 MICROgram(s)/kG/Hr (2.56 mL/Hr) IV Continuous <Continuous>  dextrose 5%. 1000 milliLiter(s) (100 mL/Hr) IV Continuous <Continuous>  dextrose 5%. 1000 milliLiter(s) (50 mL/Hr) IV Continuous <Continuous>  dextrose 50% Injectable 25 Gram(s) IV Push once  dextrose 50% Injectable 12.5 Gram(s) IV Push once  dextrose 50% Injectable 25 Gram(s) IV Push once  glucagon  Injectable 1 milliGRAM(s) IntraMuscular once  heparin   Injectable 5000 Unit(s) SubCutaneous every 8 hours  ipratropium    for Nebulization 500 MICROGram(s) Nebulizer every 6 hours  norepinephrine Infusion 0.3 MICROgram(s)/kG/Min (28.8 mL/Hr) IV Continuous <Continuous>  piperacillin/tazobactam IVPB.- 3.375 Gram(s) IV Intermittent once  piperacillin/tazobactam IVPB.. 3.375 Gram(s) IV Intermittent every 8 hours  polyethylene glycol 3350 17 Gram(s) Oral daily  potassium chloride  10 mEq/100 mL IVPB 10 milliEquivalent(s) IV Intermittent every 1 hour  propofol Infusion 40.039 MICROgram(s)/kG/Min (12.3 mL/Hr) IV Continuous <Continuous>  sodium bicarbonate 1300 milliGRAM(s) Oral two times a day  sodium chloride 1.5%. 500 milliLiter(s) (50 mL/Hr) IV Continuous <Continuous>  sodium chloride 3%  Inhalation 4 milliLiter(s) Inhalation every 6 hours  valACYclovir 500 milliGRAM(s) Oral daily    MEDICATIONS  (PRN):  dextrose Oral Gel 15 Gram(s) Oral once PRN Blood Glucose LESS THAN 70 milliGRAM(s)/deciliter    Vital Signs Last 24 Hrs  T(C): 36.3 (26 Sep 2022 12:00), Max: 36.3 (25 Sep 2022 20:00)  T(F): 97.4 (26 Sep 2022 12:00), Max: 97.4 (25 Sep 2022 20:00)  HR: 76 (26 Sep 2022 13:40) (71 - 124)  BP: 130/68 (26 Sep 2022 13:40) (63/42 - 174/83)  BP(mean): 88 (26 Sep 2022 13:40) (50 - 144)  RR: 19 (26 Sep 2022 13:40) (14 - 25)  SpO2: 100% (26 Sep 2022 13:40) (83% - 100%)    Parameters below as of 26 Sep 2022 13:40      O2 Concentration (%): 50    PE:  AA&0 x  Motor:  Sensory:      LABS:                        10.9   15.88 )-----------( 235      ( 26 Sep 2022 01:40 )             30.6     09-    123<L>  |  94<L>  |  47<H>  ----------------------------<  190<H>  4.9   |  15<L>  |  1.49<H>    Ca    8.5      26 Sep 2022 10:30  Phos  3.0       Mg     2.50         TPro  7.4  /  Alb  4.3  /  TBili  0.5  /  DBili  x   /  AST  18  /  ALT  11  /  AlkPhos  44      PT/INR - ( 26 Sep 2022 01:40 )   PT: 11.7 sec;   INR: 1.01 ratio         PTT - ( 26 Sep 2022 01:40 )  PTT:25.5 sec  Urinalysis Basic - ( 25 Sep 2022 12:01 )    Color: Light Yellow / Appearance: Clear / S.035 / pH: x  Gluc: x / Ketone: Trace  / Bili: Negative / Urobili: <2 mg/dL   Blood: x / Protein: 30 mg/dL / Nitrite: Negative   Leuk Esterase: Negative / RBC: 1 /HPF / WBC 1 /HPF   Sq Epi: x / Non Sq Epi: 2 /HPF / Bacteria: Negative        22 @ 07:01  -  22 @ 07:00  --------------------------------------------------------  IN: 945.8 mL / OUT: 1330 mL / NET: -384.2 mL    22 @ 07:01  -  22 @ 13:45  --------------------------------------------------------  IN: 259.3 mL / OUT: 140 mL / NET: 119.3 mL           HARDEEP, SUBASH 72y ,Male  HPI:  73 y/o M with a PMHx significant for HTN, HLD, CAD s/p stent, MM, presents with cc generalized weakness x 2-3 days associated with some tingling sensation. Pt was evaluated in the ED on  for vomiting but was discharged home. That evening, daughter noticed that patient was unable to walk on his own and needed assistance getting up the stairs. The next day patient was unable to get out of bed and unable to lift his arms from the bed. Pt reports weakness started in the arms and spread down to the legs. Pt also complains of hoarse voice and difficulty swallowing as well as b/l numbness/tingling in both his feet and hands. In the last few hours, also had onset of shortness of breath with occasional cough productive of white sputum. Pt denied any urinary or fecal incontinence, HA, dizziness, chest pain, changes in vision or hearing, neck pain. Reports chronic lower back pain, unchanged in severity. Daughter notes pt with some URI symptoms earlier in the week.  Of note, pt last chemo was 3/22 for MM and he is supposed to get bi-weekly doses Velcade Per daughter, pt was traveling for one month and never followed up afterwards with his oncologist.   Per ED, pt failed dysphagia study and is currently npo (25 Sep 2022 03:08)    CT of the spinal axis shows 1.  Multiple mixed lytic sclerotic lesions in the cervical spine may be sequela of previously treated multiple myeloma..  No soft tissue mass, epidural tumor extension or abnormal enhancement is visualized by CT technique.  Follow-up MRI may be obtained for more sensitive evaluation.2.  No fracture or traumatic malalignment.3.  Cervical spine degenerative changes with mild to moderate spinal   canal stenosis at C3-C4 through C5-C6.  Moderate to severe neural foraminal narrowing at C4-C5 and C5-C6 on the right side.4.  Approximately 1 cm nodule in the left thyroid lobe.    CT THORACIC:  1.  Multiple mixed lytic sclerotic lesions in the thoracic spine, sternum and ribs may be sequela of previously treated multiple myeloma.  No soft tissue mass, epidural tumor extension or abnormal enhancement is   visualized by CT technique.  Follow-up MRI may be obtained for more sensitive evaluation.2.  Chronic pathologic compression fracture at T8 demonstrating up to 50% loss of vertebral body height.  Chronic displaced fracture of the left   superior facet of T8; the displaced fracture fragment is fused to the left inferior facet of T7.3.  Chronic appearing appearing mild central endplate fracture and Schmorl's node in the superior endplate of T12.  4.  No significant spinal canal stenosis is visualized by CT technique.    CT LUMBAR:  1.  There are small rudimentary ribs at T12.  There is partial sacralization of L5, with a rudimentary disc space at L5-S1.  The last completely formed intervertebral disc space corresponds L4-L5 level.  2.  Multiple mixed lytic sclerotic lesions in the lumbar spine, sacrum and visualized pelvis may be sequela of previously treated multiple myeloma.  No soft tissue mass, epidural tumor extension or abnormal   enhancement is visualized by CT technique.  Follow-up MRI may be obtained for more sensitive evaluation.3.  No lumbar spine fracture or traumatic malalignment.4.  Mild lumbar spine degenerative changes.  Mild spinal canal stenosis   at L3-L4 and L4-L5.5.  The bladder is distended without wall thickening or surrounding inflammatory changes.  If the patient is unable to urinate Dahl catheter may be placed.    PAST MEDICAL & SURGICAL HISTORY:  HTN (hypertension)  HLD (hyperlipidemia)  Coronary artery disease  Multiple myeloma  S/P coronary artery stent placement    Allergies  Beef (Unknown)  No Known Drug Allergies  pork (Unknown)    MEDICATIONS  (STANDING):  ALBUTerol    90 MICROgram(s) HFA Inhaler 2 Puff(s) Inhalation every 6 hours  atorvastatin 40 milliGRAM(s) Oral at bedtime  chlorhexidine 0.12% Liquid 15 milliLiter(s) Oral Mucosa every 12 hours  chlorhexidine 2% Cloths 1 Application(s) Topical <User Schedule>  dexAMETHasone  Injectable 4 milliGRAM(s) IV Push every 6 hours  dexMEDEtomidine Infusion 0.2 MICROgram(s)/kG/Hr (2.56 mL/Hr) IV Continuous <Continuous>  dextrose 5%. 1000 milliLiter(s) (100 mL/Hr) IV Continuous <Continuous>  dextrose 5%. 1000 milliLiter(s) (50 mL/Hr) IV Continuous <Continuous>  dextrose 50% Injectable 25 Gram(s) IV Push once  dextrose 50% Injectable 12.5 Gram(s) IV Push once  dextrose 50% Injectable 25 Gram(s) IV Push once  glucagon  Injectable 1 milliGRAM(s) IntraMuscular once  heparin   Injectable 5000 Unit(s) SubCutaneous every 8 hours  ipratropium    for Nebulization 500 MICROGram(s) Nebulizer every 6 hours  norepinephrine Infusion 0.3 MICROgram(s)/kG/Min (28.8 mL/Hr) IV Continuous <Continuous>  piperacillin/tazobactam IVPB.- 3.375 Gram(s) IV Intermittent once  piperacillin/tazobactam IVPB.. 3.375 Gram(s) IV Intermittent every 8 hours  polyethylene glycol 3350 17 Gram(s) Oral daily  potassium chloride  10 mEq/100 mL IVPB 10 milliEquivalent(s) IV Intermittent every 1 hour  propofol Infusion 40.039 MICROgram(s)/kG/Min (12.3 mL/Hr) IV Continuous <Continuous>  sodium bicarbonate 1300 milliGRAM(s) Oral two times a day  sodium chloride 1.5%. 500 milliLiter(s) (50 mL/Hr) IV Continuous <Continuous>  sodium chloride 3%  Inhalation 4 milliLiter(s) Inhalation every 6 hours  valACYclovir 500 milliGRAM(s) Oral daily    MEDICATIONS  (PRN):  dextrose Oral Gel 15 Gram(s) Oral once PRN Blood Glucose LESS THAN 70 milliGRAM(s)/deciliter    Vital Signs Last 24 Hrs  T(C): 36.3 (26 Sep 2022 12:00), Max: 36.3 (25 Sep 2022 20:00)  T(F): 97.4 (26 Sep 2022 12:00), Max: 97.4 (25 Sep 2022 20:00)  HR: 76 (26 Sep 2022 13:40) (71 - 124)  BP: 130/68 (26 Sep 2022 13:40) (63/42 - 174/83)  BP(mean): 88 (26 Sep 2022 13:40) (50 - 144)  RR: 19 (26 Sep 2022 13:40) (14 - 25)  SpO2: 100% (26 Sep 2022 13:40) (83% - 100%)    Parameters below as of 26 Sep 2022 13:40    Intubated, sedation held for exam, awake/alert, easily following commands   Motor:          Upper extremity                           Delt      Bicep     Tricep     HG                                                 L            5/5        4/5          4/5        3/5                                               R           5/5        4/5          4/5        3/5            Lower extremity                           HF          KF         KE         DF        PF                                                  L           4-/5         4/5        4/5       4/5        4/5                                               R           4+/5        4/5        4/5       4/5        4/5    Sensation intact to light touch however hard to tell as patient is intubated yes vs no   No clonus    LABS:                        10.9   15.88 )-----------( 235      ( 26 Sep 2022 01:40 )             30.6         123<L>  |  94<L>  |  47<H>  ----------------------------<  190<H>  4.9   |  15<L>  |  1.49<H>    Ca    8.5      26 Sep 2022 10:30  Phos  3.0       Mg     2.50         TPro  7.4  /  Alb  4.3  /  TBili  0.5  /  DBili  x   /  AST  18  /  ALT  11  /  AlkPhos  44      PT/INR - ( 26 Sep 2022 01:40 )   PT: 11.7 sec;   INR: 1.01 ratio         PTT - ( 26 Sep 2022 01:40 )  PTT:25.5 sec  Urinalysis Basic - ( 25 Sep 2022 12:01 )    Color: Light Yellow / Appearance: Clear / S.035 / pH: x  Gluc: x / Ketone: Trace  / Bili: Negative / Urobili: <2 mg/dL   Blood: x / Protein: 30 mg/dL / Nitrite: Negative   Leuk Esterase: Negative / RBC: 1 /HPF / WBC 1 /HPF   Sq Epi: x / Non Sq Epi: 2 /HPF / Bacteria: Negative        22 @ 07:01  -  22 @ 07:00  --------------------------------------------------------  IN: 945.8 mL / OUT: 1330 mL / NET: -384.2 mL    22 @ 07:01  -  22 @ 13:45  --------------------------------------------------------  IN: 259.3 mL / OUT: 140 mL / NET: 119.3 mL

## 2022-09-26 NOTE — CONSULT NOTE ADULT - PROBLEM SELECTOR RECOMMENDATION 9
1. no acute neurosurgical intervention  2. case to be d/w attending. - MRI C/T/L spine w/wo contrast   - dex4q6     Case discussed with attending neurosurgeon Dr. Rick - MRI Brain/C/T/L spine w/wo contrast   - dex4q6     Case discussed with attending neurosurgeon Dr. Rick - MRI Brain/C/T/L spine w/wo contrast STAT to r/o cord compression  - dex4q6     Case discussed with attending neurosurgeon Dr. Rick

## 2022-09-26 NOTE — PROGRESS NOTE ADULT - ASSESSMENT
Patient is a 71 yo M with PMH of HTN, HLD, CAD, cardiac stent, Multiple Myeloma who presented to the ED for 3 days of generalized weakness.    EKG: ST PVCs  Tele: NSR PVCs    1. Weakness  -rapidly progressed. was unable to move extremities.   -s/p RRT for hypoxia now intubated in MICU   -neuro on board, possible GBS vs pathology in neck  -MRI spine pending     2. CAD s/p PCI  -in 2015 in [facility] as per daughter patient had heart attack  -no reported CP prior to hospitalization    3. MM  -heme/onc on board

## 2022-09-26 NOTE — CONSULT NOTE ADULT - ASSESSMENT
73 y/o M, with multiple myeloma, with multiple lytic lesions throughout spine and a stable T8 compression  fx since 2019, p/w progressive weakness 71 y/o M, with multiple myeloma, with multiple lytic lesions throughout spine and a stable T8 compression fx since 2019, p/w progressive weakness since 9/23, unable to stand or complete daily activities, with urinary frequency, no incontinence some loss of sensation per daughter

## 2022-09-26 NOTE — PROGRESS NOTE ADULT - ASSESSMENT
Assessment: 71 yo male with a PMHx of HTN, HLD, CAD (s/p stent, not on AP/AC), and  multiple myeloma who presented to [facility] on 9/24 for worsening generalized weakness since 9/21. On 9/25 AM, patient developed worsening respiratory distress and was intubated. LP was performed on 9/26. CSF glucose 91 <H>, protein 166 <H>, TNC 1. Neuro exam (while off sedation) notable for absent reflexes throughout.    Impression: Worsening weakness which affected respiratory status in patient with albuminocytologic dissociation on CSF studies and absent reflexes on exam, concerning for GBS.    Recommendations:  [] Neuro checks Q1HR.  [] Telemetry.  [] Fall/Aspiration precautions.  [] MR Spine imaging per neurosx.  [] Start IVIG 35G QD x3 days.  [] Premedicate patient with Tylenol 650MG PO via NGT + Benadryl 25MG IVP ~30 minutes prior to each IVIG infusion.  [] Send ganglioside GQ1B ab.  [] PT/OT/SLP when patient able.  [] Rest of care per MICU team.    Case seen and discussed with neurology attending Dr. Avendano.

## 2022-09-26 NOTE — PROGRESS NOTE ADULT - ASSESSMENT
71 y/o M with a PMHx significant for HTN, HLD, CAD s/p stent, MM, presents with descending paralysis associated with some tingling sensation c/b acute hypoxic respiratory failure

## 2022-09-26 NOTE — DIETITIAN INITIAL EVALUATION ADULT - NS FNS DIET ORDER
Diet, NPO with Tube Feed:   Tube Feeding Modality: Nasogastric  Jevity 1.2 Niraj (JEVITY1.2RTH)  Total Volume for 24 Hours (mL): 840  Continuous  Starting Tube Feed Rate {mL per Hour}: 10  Increase Tube Feed Rate by (mL): 10     Every 4 hours  Until Goal Tube Feed Rate (mL per Hour): 35  Tube Feed Duration (in Hours): 24  Tube Feed Start Time: 13:30  No Carb Prosource TF     Qty per Day:  1 (09-25-22 @ 13:19)    840mL total volume  1008 kcals  47g protein (+15g additional protein via NoCarb Prosource)= 62 total protein   678mL free water

## 2022-09-26 NOTE — PROGRESS NOTE ADULT - SUBJECTIVE AND OBJECTIVE BOX
Deo Oropeza MD  Internal Medicine  Pager #[phone]    CHIEF COMPLAINT:Patient is a 72y old  Male who presents with a chief complaint of weakness (25 Sep 2022 12:50)        Interval Events:    REVIEW OF SYSTEMS:      OBJECTIVE:  ICU Vital Signs Last 24 Hrs  T(C): 36.2 (26 Sep 2022 04:00), Max: 36.3 (25 Sep 2022 09:30)  T(F): 97.1 (26 Sep 2022 04:00), Max: 97.4 (25 Sep 2022 20:00)  HR: 72 (26 Sep 2022 06:00) (72 - 123)  BP: 129/73 (26 Sep 2022 06:00) (63/42 - 191/83)  BP(mean): 88 (26 Sep 2022 06:00) (50 - 144)  ABP: --  ABP(mean): --  RR: 18 (26 Sep 2022 06:00) (14 - 25)  SpO2: 100% (26 Sep 2022 06:00) (87% - 100%)    O2 Parameters below as of 26 Sep 2022 06:00  Patient On (Oxygen Delivery Method): ventilator    O2 Concentration (%): 40      Mode: AC/ CMV (Assist Control/ Continuous Mandatory Ventilation), RR (machine): 18, TV (machine): 370, FiO2: 50, PEEP: 8, ITime: 0.76, MAP: 12, PIP: 22    09- @ 07:01  -  09- @ 07:00  --------------------------------------------------------  IN: 910.4 mL / OUT: 1330 mL / NET: -419.6 mL      CAPILLARY BLOOD GLUCOSE      POCT Blood Glucose.: 192 mg/dL (26 Sep 2022 05:36)      PHYSICAL EXAM:      LINES:    HOSPITAL MEDICATIONS:  Standing Meds:  ALBUTerol    90 MICROgram(s) HFA Inhaler 2 Puff(s) Inhalation every 6 hours  atorvastatin 40 milliGRAM(s) Oral at bedtime  chlorhexidine 0.12% Liquid 15 milliLiter(s) Oral Mucosa every 12 hours  chlorhexidine 2% Cloths 1 Application(s) Topical <User Schedule>  dexAMETHasone  Injectable 4 milliGRAM(s) IV Push every 6 hours  dextrose 5%. 1000 milliLiter(s) IV Continuous <Continuous>  dextrose 5%. 1000 milliLiter(s) IV Continuous <Continuous>  dextrose 50% Injectable 25 Gram(s) IV Push once  dextrose 50% Injectable 12.5 Gram(s) IV Push once  dextrose 50% Injectable 25 Gram(s) IV Push once  glucagon  Injectable 1 milliGRAM(s) IntraMuscular once  ipratropium 17 MICROgram(s) HFA Inhaler 2 Puff(s) Inhalation every 6 hours  norepinephrine Infusion 0.3 MICROgram(s)/kG/Min IV Continuous <Continuous>  potassium chloride  10 mEq/100 mL IVPB 10 milliEquivalent(s) IV Intermittent every 1 hour  propofol Infusion 40.039 MICROgram(s)/kG/Min IV Continuous <Continuous>  valACYclovir 500 milliGRAM(s) Oral daily      PRN Meds:  dextrose Oral Gel 15 Gram(s) Oral once PRN      LABS:                        10.9   15.88 )-----------( 235      ( 26 Sep 2022 01:40 )             30.6     Hgb Trend: 10.9<--, 11.4<--, 15.7<--, 12.0<--, 12.6<--  09    128<L>  |  94<L>  |  31<H>  ----------------------------<  172<H>  3.4<L>   |  17<L>  |  1.33<H>    Ca    8.8      26 Sep 2022 01:40  Phos  4.6       Mg     2.40         TPro  7.4  /  Alb  4.3  /  TBili  0.5  /  DBili  x   /  AST  18  /  ALT  11  /  AlkPhos  44      Creatinine Trend: 1.33<--, 1.12<--, 1.15<--, 1.19<--, 1.08<--, 1.17<--  PT/INR - ( 26 Sep 2022 01:40 )   PT: 11.7 sec;   INR: 1.01 ratio         PTT - ( 26 Sep 2022 01:40 )  PTT:25.5 sec  Urinalysis Basic - ( 25 Sep 2022 12:01 )    Color: Light Yellow / Appearance: Clear / S.035 / pH: x  Gluc: x / Ketone: Trace  / Bili: Negative / Urobili: <2 mg/dL   Blood: x / Protein: 30 mg/dL / Nitrite: Negative   Leuk Esterase: Negative / RBC: 1 /HPF / WBC 1 /HPF   Sq Epi: x / Non Sq Epi: 2 /HPF / Bacteria: Negative      Arterial Blood Gas:   @ 01:55  7.40/31/114/19/97.3/-4.7  ABG lactate: --  Arterial Blood Gas:   @ 19:31  7.39/29/115/18/97.3/-6.2  ABG lactate: --  Arterial Blood Gas:   @ 10:00  7.29/35/122/17/97.7/-8.9  ABG lactate: --    Venous Blood Gas:   @ 05:30  7.33/37/42/20/69.7  VBG Lactate: 2.2  Venous Blood Gas:   @ 00:35  7.37/36/47/21/74.5  VBG Lactate: 1.7      MICROBIOLOGY:     RADIOLOGY:  [ ] Reviewed and interpreted by me    EKG:     Deo Oropeza MD  Internal Medicine  Pager #[phone]    CHIEF COMPLAINT:Patient is a 72y old  Male who presents with a chief complaint of weakness (25 Sep 2022 12:50)        Interval Events:    No acute interval events    REVIEW OF SYSTEMS:    Unable to obtain due to acute encephalopathy      OBJECTIVE:  ICU Vital Signs Last 24 Hrs  T(C): 36.2 (26 Sep 2022 04:00), Max: 36.3 (25 Sep 2022 09:30)  T(F): 97.1 (26 Sep 2022 04:00), Max: 97.4 (25 Sep 2022 20:00)  HR: 72 (26 Sep 2022 06:00) (72 - 123)  BP: 129/73 (26 Sep 2022 06:00) (63/42 - 191/83)  BP(mean): 88 (26 Sep 2022 06:00) (50 - 144)  ABP: --  ABP(mean): --  RR: 18 (26 Sep 2022 06:00) (14 - 25)  SpO2: 100% (26 Sep 2022 06:00) (87% - 100%)    O2 Parameters below as of 26 Sep 2022 06:00  Patient On (Oxygen Delivery Method): ventilator    O2 Concentration (%): 40      Mode: AC/ CMV (Assist Control/ Continuous Mandatory Ventilation), RR (machine): 18, TV (machine): 370, FiO2: 50, PEEP: 8, ITime: 0.76, MAP: 12, PIP: 22    - @ 07:01  -  09- @ 07:00  --------------------------------------------------------  IN: 910.4 mL / OUT: 1330 mL / NET: -419.6 mL      CAPILLARY BLOOD GLUCOSE      POCT Blood Glucose.: 192 mg/dL (26 Sep 2022 05:36)      PHYSICAL EXAM:    GENERAL: intubated and sedated  LUNGS: good air entry bilaterally,  HEART: soft S1/S2, regular rate and rhythm, no murmurs noted, no lower extremity edema  GASTROINTESTINAL: abdomen is soft, nontender, nondistended, normoactive bowel sounds, no palpable masses  Neuro AOx0 at time of AM exam. Withdrawing on LLE        LINES:    HOSPITAL MEDICATIONS:  Standing Meds:  ALBUTerol    90 MICROgram(s) HFA Inhaler 2 Puff(s) Inhalation every 6 hours  atorvastatin 40 milliGRAM(s) Oral at bedtime  chlorhexidine 0.12% Liquid 15 milliLiter(s) Oral Mucosa every 12 hours  chlorhexidine 2% Cloths 1 Application(s) Topical <User Schedule>  dexAMETHasone  Injectable 4 milliGRAM(s) IV Push every 6 hours  dextrose 5%. 1000 milliLiter(s) IV Continuous <Continuous>  dextrose 5%. 1000 milliLiter(s) IV Continuous <Continuous>  dextrose 50% Injectable 25 Gram(s) IV Push once  dextrose 50% Injectable 12.5 Gram(s) IV Push once  dextrose 50% Injectable 25 Gram(s) IV Push once  glucagon  Injectable 1 milliGRAM(s) IntraMuscular once  ipratropium 17 MICROgram(s) HFA Inhaler 2 Puff(s) Inhalation every 6 hours  norepinephrine Infusion 0.3 MICROgram(s)/kG/Min IV Continuous <Continuous>  potassium chloride  10 mEq/100 mL IVPB 10 milliEquivalent(s) IV Intermittent every 1 hour  propofol Infusion 40.039 MICROgram(s)/kG/Min IV Continuous <Continuous>  valACYclovir 500 milliGRAM(s) Oral daily      PRN Meds:  dextrose Oral Gel 15 Gram(s) Oral once PRN      LABS:                        10.9   15.88 )-----------( 235      ( 26 Sep 2022 01:40 )             30.6     Hgb Trend: 10.9<--, 11.4<--, 15.7<--, 12.0<--, 12.6<--      128<L>  |  94<L>  |  31<H>  ----------------------------<  172<H>  3.4<L>   |  17<L>  |  1.33<H>    Ca    8.8      26 Sep 2022 01:40  Phos  4.6       Mg     2.40         TPro  7.4  /  Alb  4.3  /  TBili  0.5  /  DBili  x   /  AST  18  /  ALT  11  /  AlkPhos  44      Creatinine Trend: 1.33<--, 1.12<--, 1.15<--, 1.19<--, 1.08<--, 1.17<--  PT/INR - ( 26 Sep 2022 01:40 )   PT: 11.7 sec;   INR: 1.01 ratio         PTT - ( 26 Sep 2022 01:40 )  PTT:25.5 sec  Urinalysis Basic - ( 25 Sep 2022 12:01 )    Color: Light Yellow / Appearance: Clear / S.035 / pH: x  Gluc: x / Ketone: Trace  / Bili: Negative / Urobili: <2 mg/dL   Blood: x / Protein: 30 mg/dL / Nitrite: Negative   Leuk Esterase: Negative / RBC: 1 /HPF / WBC 1 /HPF   Sq Epi: x / Non Sq Epi: 2 /HPF / Bacteria: Negative      Arterial Blood Gas:   @ 01:55  7.40/31/114/19/97.3/-4.7  ABG lactate: --  Arterial Blood Gas:   @ 19:31  7.39/29/115/18/97.3/-6.2  ABG lactate: --  Arterial Blood Gas:   @ 10:00  7.29/35/122/17/97.7/-8.9  ABG lactate: --    Venous Blood Gas:   @ 05:30  7.33/37/42/20/69.7  VBG Lactate: 2.2  Venous Blood Gas:   @ 00:35  7.37/36/47/21/74.5  VBG Lactate: 1.7      MICROBIOLOGY:     RADIOLOGY:  [ ] Reviewed and interpreted by me    EKG:

## 2022-09-26 NOTE — DIETITIAN INITIAL EVALUATION ADULT - ENTERAL
--D/C Jevity 1.2 & Prosource  --Initiate Vital @ 30mL/hr then increase by 15mL in 4hrs, as tolerated, to goal of 45mL/hr continuously    provides:     1080mL total volume  1080 kcals  94g protein  902mL free water

## 2022-09-26 NOTE — DIETITIAN INITIAL EVALUATION ADULT - OTHER INFO
Spoke with RN.  Pt. without any noted/reported intolerance to TF @ this time (no nausea/vomiting/diarrhea/constipation or abdominal distention).  Pt. intubated/sedated.  ~329 non-nutritive lipid calories provided by Propofol over past 24hrs.

## 2022-09-26 NOTE — PROGRESS NOTE ADULT - SUBJECTIVE AND OBJECTIVE BOX
On AC//18/40%/8 PEEP  Desatted to 83% earlier this afternoon, suspected mucous plugging  S/p LP this morning    Vital Signs Last 24 Hrs  T(C): 36.3 (26 Sep 2022 12:00), Max: 36.3 (25 Sep 2022 20:00)  T(F): 97.4 (26 Sep 2022 12:00), Max: 97.4 (25 Sep 2022 20:00)  HR: 68 (26 Sep 2022 14:00) (68 - 124)  BP: 91/56 (26 Sep 2022 14:00) (63/42 - 174/83)  BP(mean): 68 (26 Sep 2022 14:00) (50 - 144)  RR: 19 (26 Sep 2022 14:00) (14 - 25)  SpO2: 95% (26 Sep 2022 14:00) (83% - 100%)    I&O's Summary    22 @ 07:  -  22 @ 07:00  --------------------------------------------------------  IN: 945.8 mL / OUT: 1330 mL / NET: -384.2 mL    22 @ 07:01  -  22 @ 14:21  --------------------------------------------------------  IN: 259.3 mL / OUT: 140 mL / NET: 119.3 mL    GENERAL: intubated, sedated  HEAD:  Atraumatic, Normocephalic, (+)rt nares NGT in place  EYES: EOMI, PERRLA, conjunctiva and sclera clear  NECK: Supple, No JVD, No LAD, No carotid bruits, No thyromegaly  CHEST/LUNG: Vented BS with decreased BS lt lower field  HEART: Regular rate and rhythm; nl S1/S2; No murmurs, rubs, or gallops  ABDOMEN: Soft, Nondistended; Bowel sounds present; No hepatosplenomegaly  EXTREMITIES:  2+ Peripheral Pulses; No clubbing, cyanosis, or edema b/l; Nl ROM  SKIN: No rashes or lesions; No jaundice; Normal turgor, texture  NEURO: unable to follow commands; no focal posturing  : moser in place    LABS:                        10.9   15.88 )-----------( 235      ( 26 Sep 2022 01:40 )             30.6         123<L>  |  94<L>  |  47<H>  ----------------------------<  190<H>  4.9   |  15<L>  |  1.49<H>    Ca    8.5      26 Sep 2022 10:30  Phos  3.0       Mg     2.50         TPro  7.4  /  Alb  4.3  /  TBili  0.5  /  DBili  x   /  AST  18  /  ALT  11  /  AlkPhos  44      PT/INR - ( 26 Sep 2022 01:40 )   PT: 11.7 sec;   INR: 1.01 ratio         PTT - ( 26 Sep 2022 01:40 )  PTT:25.5 sec  CAPILLARY BLOOD GLUCOSE      POCT Blood Glucose.: 213 mg/dL (26 Sep 2022 12:13)  POCT Blood Glucose.: 192 mg/dL (26 Sep 2022 05:36)  POCT Blood Glucose.: 173 mg/dL (25 Sep 2022 23:25)    CARDIAC MARKERS ( 24 Sep 2022 14:58 )  x     / x     / 251 U/L / x     / x          Urinalysis Basic - ( 25 Sep 2022 12:01 )    Color: Light Yellow / Appearance: Clear / S.035 / pH: x  Gluc: x / Ketone: Trace  / Bili: Negative / Urobili: <2 mg/dL   Blood: x / Protein: 30 mg/dL / Nitrite: Negative   Leuk Esterase: Negative / RBC: 1 /HPF / WBC 1 /HPF   Sq Epi: x / Non Sq Epi: 2 /HPF / Bacteria: Negative        RADIOLOGY & ADDITIONAL TESTS:    Imaging Personally Reviewed:  [x] YES  [ ] NO    Will obtain old records:  [ ] YES  [x] NO

## 2022-09-26 NOTE — DIETITIAN INITIAL EVALUATION ADULT - PERTINENT LABORATORY DATA
09-26    128<L>  |  94<L>  |  31<H>  ----------------------------<  172<H>  3.4<L>   |  17<L>  |  1.33<H>    Ca    8.8      26 Sep 2022 01:40  Phos  4.6     09-26  Mg     2.40     09-26    TPro  7.4  /  Alb  4.3  /  TBili  0.5  /  DBili  x   /  AST  18  /  ALT  11  /  AlkPhos  44  09-26    CAPILLARY BLOOD GLUCOSE    POCT Blood Glucose.: 192 mg/dL (26 Sep 2022 05:36)  POCT Blood Glucose.: 173 mg/dL (25 Sep 2022 23:25)    A1C with Estimated Average Glucose Result: 5.7 % (09-25-22 @ 00:35)

## 2022-09-26 NOTE — CHART NOTE - NSCHARTNOTEFT_GEN_A_CORE
: Raquel/ Nadine    INDICATION: Hypoxemic respiratory failure    PROCEDURE:  [X ] LIMITED ECHO  [X ] LIMITED CHEST  [ ] LIMITED RETROPERITONEAL  [ ] LIMITED ABDOMINAL  [ ] LIMITED DVT  [ ] NEEDLE GUIDANCE VASCULAR  [ ] NEEDLE GUIDANCE THORACENTESIS  [ ] NEEDLE GUIDANCE PARACENTESIS  [ ] NEEDLE GUIDANCE PERICARDIOCENTESIS  [ ] OTHER    FINDINGS:  A line predominant anteriorly bilaterally. A line predominant at the mid axillary region on the right, no pleural effusion. Confluent B lines with consolidation at the left base, no pleural effusion.  Grossly normal LVSF. RV appears smaller than LV. No pericardial effusion. IVC 1.5 cm with marked respiratory variation.    INTERPRETATION:  Left lower lobe consolidation. Grossly normal LVSF.    Images stored on [employer]. : Ashley Mccoy    INDICATION: Hypoxemic respiratory failure    PROCEDURE:  [X ] LIMITED ECHO  [X ] LIMITED CHEST  [ ] LIMITED RETROPERITONEAL  [ ] LIMITED ABDOMINAL  [ ] LIMITED DVT  [ ] NEEDLE GUIDANCE VASCULAR  [ ] NEEDLE GUIDANCE THORACENTESIS  [ ] NEEDLE GUIDANCE PARACENTESIS  [ ] NEEDLE GUIDANCE PERICARDIOCENTESIS  [ ] OTHER    FINDINGS:  A line predominant anteriorly bilaterally. A line predominant at the mid axillary region on the right, no pleural effusion. Confluent B lines with consolidation at the left base, no pleural effusion.  Grossly normal LVSF. RV appears smaller than LV. No pericardial effusion. IVC 1.5 cm with marked respiratory variation.    INTERPRETATION:  Left lower lobe consolidation. Grossly normal LVSF.    Images uploaded on [employer]    Attending Attestation:  I was present during the key portions of the procedure and immediately available during the entire procedure.  Conner Mccoy DO  Attending  Pulmonary & Critical Care Medicine

## 2022-09-26 NOTE — PROGRESS NOTE ADULT - SUBJECTIVE AND OBJECTIVE BOX
Payam Longoria MD  Interventional Cardiology / Endovascular Specialist  [place] Office : [phone] [place] [address] [place] [address]  Tel:   [place] Office : [phone] [facility] [place] [address]  Tel: [phone]      Subjective/Overnight events: Patient lying in bed in MICU, remains intubated, off sedation awake and able to follow some commands  	  MEDICATIONS:  heparin   Injectable 5000 Unit(s) SubCutaneous every 8 hours  norepinephrine Infusion 0.3 MICROgram(s)/kG/Min IV Continuous <Continuous>    piperacillin/tazobactam IVPB.- 3.375 Gram(s) IV Intermittent once  piperacillin/tazobactam IVPB.. 3.375 Gram(s) IV Intermittent every 8 hours  valACYclovir 500 milliGRAM(s) Oral daily    ALBUTerol    90 MICROgram(s) HFA Inhaler 2 Puff(s) Inhalation every 6 hours  ipratropium 17 MICROgram(s) HFA Inhaler 2 Puff(s) Inhalation every 6 hours  sodium chloride 3%  Inhalation 4 milliLiter(s) Inhalation every 6 hours    dexMEDEtomidine Infusion 0.2 MICROgram(s)/kG/Hr IV Continuous <Continuous>  propofol Infusion 40.039 MICROgram(s)/kG/Min IV Continuous <Continuous>    polyethylene glycol 3350 17 Gram(s) Oral daily    atorvastatin 40 milliGRAM(s) Oral at bedtime  dexAMETHasone  Injectable 4 milliGRAM(s) IV Push every 6 hours  dextrose 50% Injectable 25 Gram(s) IV Push once  dextrose 50% Injectable 12.5 Gram(s) IV Push once  dextrose 50% Injectable 25 Gram(s) IV Push once  dextrose Oral Gel 15 Gram(s) Oral once PRN  glucagon  Injectable 1 milliGRAM(s) IntraMuscular once    chlorhexidine 0.12% Liquid 15 milliLiter(s) Oral Mucosa every 12 hours  chlorhexidine 2% Cloths 1 Application(s) Topical <User Schedule>  dextrose 5%. 1000 milliLiter(s) IV Continuous <Continuous>  dextrose 5%. 1000 milliLiter(s) IV Continuous <Continuous>  potassium chloride  10 mEq/100 mL IVPB 10 milliEquivalent(s) IV Intermittent every 1 hour  sodium bicarbonate 1300 milliGRAM(s) Oral two times a day  sodium chloride 1.5%. 500 milliLiter(s) IV Continuous <Continuous>      PAST MEDICAL/SURGICAL HISTORY  PAST MEDICAL & SURGICAL HISTORY:  HTN (hypertension)      HLD (hyperlipidemia)      Coronary artery disease      Multiple myeloma      S/P coronary artery stent placement          SOCIAL HISTORY: Substance Use (street drugs): ( x ) never used  (  ) other:    FAMILY HISTORY:  Maternal family history of hypertension  Mother          PHYSICAL EXAM:  T(C): 36.3 (09-26-22 @ 12:00), Max: 36.3 (09-25-22 @ 20:00)  HR: 108 (09-26-22 @ 15:07) (68 - 124)  BP: 91/56 (09-26-22 @ 14:00) (63/42 - 174/83)  RR: 20 (09-26-22 @ 15:00) (14 - 25)  SpO2: 100% (09-26-22 @ 15:07) (83% - 100%)  Wt(kg): --  I&O's Summary    25 Sep 2022 07:01  -  26 Sep 2022 07:00  --------------------------------------------------------  IN: 945.8 mL / OUT: 1330 mL / NET: -384.2 mL    26 Sep 2022 07:01  -  26 Sep 2022 15:16  --------------------------------------------------------  IN: 457 mL / OUT: 140 mL / NET: 317 mL          GENERAL: intubated  EYES:  conjunctiva and sclera clear  Cardiovascular: Normal S1 S2, No JVD, No murmurs, No edema  Respiratory: intubated	  Gastrointestinal:  Soft,   Extremities: No edema                                 10.9   15.88 )-----------( 235      ( 26 Sep 2022 01:40 )             30.6     09-26    123<L>  |  94<L>  |  47<H>  ----------------------------<  190<H>  4.9   |  15<L>  |  1.49<H>    Ca    8.5      26 Sep 2022 10:30  Phos  3.0     09-26  Mg     2.50     09-26    TPro  7.4  /  Alb  4.3  /  TBili  0.5  /  DBili  x   /  AST  18  /  ALT  11  /  AlkPhos  44  09-26    proBNP:   Lipid Profile:   HgA1c:   TSH:     Consultant(s) Notes Reviewed:  [x ] YES  [ ] NO    Care Discussed with Consultants/Other Providers [ x] YES  [ ] NO    Imaging Personally Reviewed independently:  [x] YES  [ ] NO    All labs, radiologic studies, vitals, orders and medications list reviewed. Patient is seen and examined at bedside. Case discussed with medical team.

## 2022-09-26 NOTE — PROGRESS NOTE ADULT - ASSESSMENT
72 year-old male, history of HTN, HLD, CAD, cardiac stent, MM, presents with cc generalized weakness x 2-3 days associated with some tingling sensation.    A/P:  Hyponatremia:  Clinically euvolemic  work up suggested SIADH  Na again dropped today  Use 1.5% NaCl 50cc/hr for 5 hrs  MOnitor Na level closely. monitor bmp Q 6hrs  avoid hypotonic solution. please call all antibiotic to be given with NS instead  Na level should not change more than 6-8meq in 24 hrs    CKD:  Baseline Scr 1.3-1.4  stable    Hypokalemia:  acceptable  Monitor K level    acidosis  Non AG  supplement oral bicarb  monitor    HTN:  controlled  MOnitor BP    Weakness:  Etiology?  h/o MM  s/p LP  workup per team         72 year-old male, history of HTN, HLD, CAD, cardiac stent, MM, presents with cc generalized weakness x 2-3 days associated with some tingling sensation.    A/P:  Hyponatremia:  Clinically euvolemic  work up suggested SIADH  Na again dropped today  Use 1.5% NaCl 50cc/hr for 5 hrs  MOnitor Na level closely. monitor bmp Q 6hrs  avoid hypotonic solution. please call all antibiotic to be given with NS instead  Na level should not change more than 6-8meq in 24 hrs    CKD stage 3  Baseline Scr 1.3-1.4  stable    Hypokalemia:  acceptable  Monitor K level    acidosis  Non AG  supplement oral bicarb  monitor    HTN:  controlled  MOnitor BP    Weakness:  Etiology?  h/o MM  s/p LP  workup per team

## 2022-09-26 NOTE — PROGRESS NOTE ADULT - ASSESSMENT
ASSESSMENT/PLAN    71 y/o M with a PMHx significant for HTN, HLD, CAD s/p stent, MM, presents with descending paralysis complicated by acute hypoxic respiratory failure and interval vasoplegic shock suspect underlying neuromuscular illness of undetermined etiology.     =====Neuro/Psych=====  #Generalized Weakness  Pt family reporting a descending weakness that ultimately started to involve his speaking ability as well.  Low c/f botulism given not having flaccid, but some suspicion for paraneoplastic process (e.g. LES/MG).  - Neuro C/S, Appreciate Recs  - Lambert Eaton / MG Eval Serologies  - MR Pending  - Likely will need LP (last ASA dose ~2 days ago)    #Sedation/Analgesia  - C/W Propofol for now    =====Respiratory=====  #Acute hypoxic Respiratory Failure  Pt with increased WOB, reportedly wheezing as well. No h/o COPD  - C/W Albuterol/Ipratropium MDI's while intubated  -wean vent as tolerated    #Hemoptysis  Pt reportedly with hemoptysis in [facility]. Pt with RLL calcified granuloma & scattered pulm nodules.   - considering quant gold   - Sputum AFB    =====Cardiovascular=====  #Vasoplegic Shock  #HTN #HLD #CAD  Pt with minimal rpessor requirement on sedation for intubation. Likely vasoplegic in that context. CTM  - HOLDing Home ASA 81mg PO QD (anticipating LP)  - HOLDing Home Metoprolol tartrate 50mg PO BID  - HOLDing Home Amlodipine 10mg PO QD    #BNP elevated  Pt with BNP elevation, respiratory distress.  - TTE pending    =====Gastrointestinal=====  #RAUL    =====Renal/=====  #Hyponatremia  Pt with persistently low sodium. 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 Should have fluid restriction when not intubated.  - Trend BMP q4-6h depending on trend today  - Monitor Na can consider IVF/meds in NS rather than other crystalloids    =====MSK/Skin=====  #T8 Compression Fx  Eventually, will need MR spine and spine consult to assess for possible interventions. Diffuse spinal lesions.   - MR pending    =====Infectious Disease=====  #RAUL    =====Endocrine=====  #Hyperglycemia  Pt with mild hyperglycemia on admission. A1c 5.7% (?pre-DM)  - FS q6h    =====Heme/Onc=====  #Multiple Myeloma  Pt had last received Velcade 3/2022. Had been given opportunity for chemo vacation and travelled to [facility], but pt went for longer than anticipated. Now with diffuse lytic lesions involving most of spine. Had previously had XRT as well.   Diagnostics:  - LDH, B2-Microglobulin  - SPEP, UPEP, IFX (S/U), Immunoglobulins, FLCs    Therapeutics: pending evaluation as above    =====Hospital Bundle=====  Hospital Bundle  Fluids: with gtts  Electrolytes: Replete K > 4, Mg > 2, Phos > 3  Nutrition: Diet NPO with TF (Nutrition C/S)  PPX  ---VTE: SCD  ---GI: TFs  ---Resp: Vented  ---: Dahl Placed 9/25  Access: PIVs  Code Status: FULL CODE  Dispo: Pending Medical Optimization. 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     =====Neuro/Psych=====  #Generalized Weakness  Pt family reporting a descending weakness that ultimately started to involve his speaking ability as well.  Low c/f botulism given not having flaccid, but some suspicion for paraneoplastic process (e.g. LES/MG).  - c/w decadron  -IVIG 35g qd (1/3) as per neuro  -anti ganglioside ab ordered  -appreciate neurology recommendations     #Sedation/Analgesia  - C/W Propofol for now    =====Respiratory=====  #Acute hypoxic Respiratory Failure  Pt with increased WOB, reportedly wheezing as well. No h/o COPD  - C/W Albuterol/Ipratropium MDI's while intubated  -wean vent as tolerated    #Hemoptysis  Pt reportedly with hemoptysis in [facility]. Pt with RLL calcified granuloma & scattered pulm nodules.   - considering quant gold   - Sputum AFB    =====Cardiovascular=====  #Vasoplegic Shock  #HTN #HLD #CAD  Pt with minimal rpessor requirement on sedation for intubation. Likely vasoplegic in that context. CTM  - HOLDing Home ASA 81mg PO QD (anticipating LP)  - HOLDing Home Metoprolol tartrate 50mg PO BID  - HOLDing Home Amlodipine 10mg PO QD    #BNP elevated  Pt with BNP elevation, respiratory distress.  - TTE pending    =====Gastrointestinal=====  #RAUL    =====Renal/=====  #Hyponatremia  Pt with persistently low sodium. 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 Should have fluid restriction when not intubated.  - Trend BMP q6h depending on trend  - Monitor Na can consider IVF/meds in NS rather than other crystalloids  -monitor sodium given GBS and patient to receive IVIG    =====MSK/Skin=====  #T8 Compression Fx  Eventually, will need MR spine and spine consult to assess for possible interventions. Diffuse spinal lesions.   - Neurosx consulted     =====Infectious Disease=====  #RAUL    =====Endocrine=====  #Hyperglycemia  Pt with mild hyperglycemia on admission. A1c 5.7% (?pre-DM)  - FS q6h    =====Heme/Onc=====  #Multiple Myeloma  Pt had last received Velcade 3/2022. Had been given opportunity for chemo vacation and travelled to [facility], but pt went for longer than anticipated. Now with diffuse lytic lesions involving most of spine. Had previously had XRT as well.   Diagnostics:  - LDH, B2-Microglobulin  - SPEP, UPEP, IFX (S/U), Immunoglobulins, FLCs    Therapeutics: pending evaluation as above    =====Hospital Bundle=====  Hospital Bundle  Fluids: with gtts  Electrolytes: Replete K > 4, Mg > 2, Phos > 3  Nutrition: Diet NPO with TF (Nutrition C/S)  PPX  ---VTE: SCD  ---GI: TFs  ---Resp: Vented  ---: Dahl Placed 9/25  Access: PIVs  Code Status: FULL CODE  Dispo: Pending Medical Optimization.

## 2022-09-26 NOTE — DIETITIAN INITIAL EVALUATION ADULT - PERTINENT MEDS FT
MEDICATIONS  (STANDING):  ALBUTerol    90 MICROgram(s) HFA Inhaler 2 Puff(s) Inhalation every 6 hours  atorvastatin 40 milliGRAM(s) Oral at bedtime  chlorhexidine 0.12% Liquid 15 milliLiter(s) Oral Mucosa every 12 hours  chlorhexidine 2% Cloths 1 Application(s) Topical <User Schedule>  dexAMETHasone  Injectable 4 milliGRAM(s) IV Push every 6 hours  dextrose 5%. 1000 milliLiter(s) (50 mL/Hr) IV Continuous <Continuous>  dextrose 5%. 1000 milliLiter(s) (100 mL/Hr) IV Continuous <Continuous>  dextrose 50% Injectable 25 Gram(s) IV Push once  dextrose 50% Injectable 12.5 Gram(s) IV Push once  dextrose 50% Injectable 25 Gram(s) IV Push once  glucagon  Injectable 1 milliGRAM(s) IntraMuscular once  ipratropium 17 MICROgram(s) HFA Inhaler 2 Puff(s) Inhalation every 6 hours  norepinephrine Infusion 0.3 MICROgram(s)/kG/Min (28.8 mL/Hr) IV Continuous <Continuous>  potassium chloride  10 mEq/100 mL IVPB 10 milliEquivalent(s) IV Intermittent every 1 hour  propofol Infusion 40.039 MICROgram(s)/kG/Min (12.3 mL/Hr) IV Continuous <Continuous>  valACYclovir 500 milliGRAM(s) Oral daily    MEDICATIONS  (PRN):  dextrose Oral Gel 15 Gram(s) Oral once PRN Blood Glucose LESS THAN 70 milliGRAM(s)/deciliter

## 2022-09-27 ENCOUNTER — APPOINTMENT (OUTPATIENT)
Dept: HEMATOLOGY ONCOLOGY | Facility: CLINIC | Age: 72
End: 2022-09-27

## 2022-09-27 LAB
ALBUMIN SERPL ELPH-MCNC: 3.4 G/DL — SIGNIFICANT CHANGE UP (ref 3.3–5)
ALBUMIN SERPL ELPH-MCNC: 3.6 G/DL — SIGNIFICANT CHANGE UP (ref 3.3–5)
ALP SERPL-CCNC: 34 U/L — LOW (ref 40–120)
ALP SERPL-CCNC: 39 U/L — LOW (ref 40–120)
ALT FLD-CCNC: 38 U/L — SIGNIFICANT CHANGE UP (ref 4–41)
ALT FLD-CCNC: 55 U/L — HIGH (ref 4–41)
ANION GAP SERPL CALC-SCNC: 12 MMOL/L — SIGNIFICANT CHANGE UP (ref 7–14)
ANION GAP SERPL CALC-SCNC: 14 MMOL/L — SIGNIFICANT CHANGE UP (ref 7–14)
APTT BLD: 27.4 SEC — SIGNIFICANT CHANGE UP (ref 27–36.3)
AST SERPL-CCNC: 31 U/L — SIGNIFICANT CHANGE UP (ref 4–40)
AST SERPL-CCNC: 33 U/L — SIGNIFICANT CHANGE UP (ref 4–40)
BASOPHILS # BLD AUTO: 0 K/UL — SIGNIFICANT CHANGE UP (ref 0–0.2)
BASOPHILS # BLD AUTO: 0.02 K/UL — SIGNIFICANT CHANGE UP (ref 0–0.2)
BASOPHILS NFR BLD AUTO: 0 % — SIGNIFICANT CHANGE UP (ref 0–2)
BASOPHILS NFR BLD AUTO: 0.1 % — SIGNIFICANT CHANGE UP (ref 0–2)
BILIRUB SERPL-MCNC: 0.2 MG/DL — SIGNIFICANT CHANGE UP (ref 0.2–1.2)
BILIRUB SERPL-MCNC: 0.3 MG/DL — SIGNIFICANT CHANGE UP (ref 0.2–1.2)
BLD GP AB SCN SERPL QL: NEGATIVE — SIGNIFICANT CHANGE UP
BUN SERPL-MCNC: 69 MG/DL — HIGH (ref 7–23)
BUN SERPL-MCNC: 84 MG/DL — HIGH (ref 7–23)
CALCIUM SERPL-MCNC: 8 MG/DL — LOW (ref 8.4–10.5)
CALCIUM SERPL-MCNC: 8.4 MG/DL — SIGNIFICANT CHANGE UP (ref 8.4–10.5)
CHLORIDE SERPL-SCNC: 105 MMOL/L — SIGNIFICANT CHANGE UP (ref 98–107)
CHLORIDE SERPL-SCNC: 98 MMOL/L — SIGNIFICANT CHANGE UP (ref 98–107)
CO2 SERPL-SCNC: 15 MMOL/L — LOW (ref 22–31)
CO2 SERPL-SCNC: 16 MMOL/L — LOW (ref 22–31)
CREAT ?TM UR-MCNC: 33 MG/DL — SIGNIFICANT CHANGE UP
CREAT SERPL-MCNC: 1.65 MG/DL — HIGH (ref 0.5–1.3)
CREAT SERPL-MCNC: 1.65 MG/DL — HIGH (ref 0.5–1.3)
CRYPTOC AG CSF-ACNC: NEGATIVE — SIGNIFICANT CHANGE UP
EBV PCR: SIGNIFICANT CHANGE UP IU/ML
EGFR: 44 ML/MIN/1.73M2 — LOW
EGFR: 44 ML/MIN/1.73M2 — LOW
EOSINOPHIL # BLD AUTO: 0 K/UL — SIGNIFICANT CHANGE UP (ref 0–0.5)
EOSINOPHIL # BLD AUTO: 0 K/UL — SIGNIFICANT CHANGE UP (ref 0–0.5)
EOSINOPHIL NFR BLD AUTO: 0 % — SIGNIFICANT CHANGE UP (ref 0–6)
EOSINOPHIL NFR BLD AUTO: 0 % — SIGNIFICANT CHANGE UP (ref 0–6)
GAMMA INTERFERON BACKGROUND BLD IA-ACNC: 0.02 IU/ML — SIGNIFICANT CHANGE UP
GLUCOSE BLDC GLUCOMTR-MCNC: 184 MG/DL — HIGH (ref 70–99)
GLUCOSE BLDC GLUCOMTR-MCNC: 213 MG/DL — HIGH (ref 70–99)
GLUCOSE BLDC GLUCOMTR-MCNC: 220 MG/DL — HIGH (ref 70–99)
GLUCOSE SERPL-MCNC: 175 MG/DL — HIGH (ref 70–99)
GLUCOSE SERPL-MCNC: 194 MG/DL — HIGH (ref 70–99)
HCT VFR BLD CALC: 23.6 % — LOW (ref 39–50)
HCT VFR BLD CALC: 29.3 % — LOW (ref 39–50)
HGB BLD-MCNC: 8 G/DL — LOW (ref 13–17)
HGB BLD-MCNC: 9.8 G/DL — LOW (ref 13–17)
IANC: 11.29 K/UL — HIGH (ref 1.8–7.4)
IANC: 8.38 K/UL — HIGH (ref 1.8–7.4)
IMM GRANULOCYTES NFR BLD AUTO: 0.9 % — SIGNIFICANT CHANGE UP (ref 0–0.9)
IMM GRANULOCYTES NFR BLD AUTO: 1.2 % — HIGH (ref 0–0.9)
LYMPHOCYTES # BLD AUTO: 0.76 K/UL — LOW (ref 1–3.3)
LYMPHOCYTES # BLD AUTO: 0.84 K/UL — LOW (ref 1–3.3)
LYMPHOCYTES # BLD AUTO: 6.2 % — LOW (ref 13–44)
LYMPHOCYTES # BLD AUTO: 7.7 % — LOW (ref 13–44)
M TB IFN-G BLD-IMP: ABNORMAL
M TB IFN-G CD4+ BCKGRND COR BLD-ACNC: 0 IU/ML — SIGNIFICANT CHANGE UP
M TB IFN-G CD4+CD8+ BCKGRND COR BLD-ACNC: 0 IU/ML — SIGNIFICANT CHANGE UP
MAGNESIUM SERPL-MCNC: 2.5 MG/DL — SIGNIFICANT CHANGE UP (ref 1.6–2.6)
MAGNESIUM SERPL-MCNC: 2.5 MG/DL — SIGNIFICANT CHANGE UP (ref 1.6–2.6)
MCHC RBC-ENTMCNC: 29.5 PG — SIGNIFICANT CHANGE UP (ref 27–34)
MCHC RBC-ENTMCNC: 31.3 PG — SIGNIFICANT CHANGE UP (ref 27–34)
MCHC RBC-ENTMCNC: 33.4 GM/DL — SIGNIFICANT CHANGE UP (ref 32–36)
MCHC RBC-ENTMCNC: 33.9 GM/DL — SIGNIFICANT CHANGE UP (ref 32–36)
MCV RBC AUTO: 88.3 FL — SIGNIFICANT CHANGE UP (ref 80–100)
MCV RBC AUTO: 92.2 FL — SIGNIFICANT CHANGE UP (ref 80–100)
MONOCYTES # BLD AUTO: 0.6 K/UL — SIGNIFICANT CHANGE UP (ref 0–0.9)
MONOCYTES # BLD AUTO: 1.21 K/UL — HIGH (ref 0–0.9)
MONOCYTES NFR BLD AUTO: 6.1 % — SIGNIFICANT CHANGE UP (ref 2–14)
MONOCYTES NFR BLD AUTO: 8.9 % — SIGNIFICANT CHANGE UP (ref 2–14)
MRSA PCR RESULT.: SIGNIFICANT CHANGE UP
NEUTROPHILS # BLD AUTO: 11.29 K/UL — HIGH (ref 1.8–7.4)
NEUTROPHILS # BLD AUTO: 8.38 K/UL — HIGH (ref 1.8–7.4)
NEUTROPHILS NFR BLD AUTO: 83.6 % — HIGH (ref 43–77)
NEUTROPHILS NFR BLD AUTO: 85.3 % — HIGH (ref 43–77)
NIGHT BLUE STAIN TISS: SIGNIFICANT CHANGE UP
NON-GYNECOLOGICAL CYTOLOGY STUDY: SIGNIFICANT CHANGE UP
NRBC # BLD: 0 /100 WBCS — SIGNIFICANT CHANGE UP (ref 0–0)
NRBC # BLD: 0 /100 WBCS — SIGNIFICANT CHANGE UP (ref 0–0)
NRBC # FLD: 0 K/UL — SIGNIFICANT CHANGE UP (ref 0–0)
NRBC # FLD: 0 K/UL — SIGNIFICANT CHANGE UP (ref 0–0)
OSMOLALITY UR: 428 MOSM/KG — SIGNIFICANT CHANGE UP (ref 50–1200)
PHOSPHATE SERPL-MCNC: 3.2 MG/DL — SIGNIFICANT CHANGE UP (ref 2.5–4.5)
PHOSPHATE SERPL-MCNC: 3.2 MG/DL — SIGNIFICANT CHANGE UP (ref 2.5–4.5)
PLATELET # BLD AUTO: 197 K/UL — SIGNIFICANT CHANGE UP (ref 150–400)
PLATELET # BLD AUTO: 213 K/UL — SIGNIFICANT CHANGE UP (ref 150–400)
POTASSIUM SERPL-MCNC: 4.1 MMOL/L — SIGNIFICANT CHANGE UP (ref 3.5–5.3)
POTASSIUM SERPL-MCNC: 4.5 MMOL/L — SIGNIFICANT CHANGE UP (ref 3.5–5.3)
POTASSIUM SERPL-SCNC: 4.1 MMOL/L — SIGNIFICANT CHANGE UP (ref 3.5–5.3)
POTASSIUM SERPL-SCNC: 4.5 MMOL/L — SIGNIFICANT CHANGE UP (ref 3.5–5.3)
PROT SERPL-MCNC: 7.9 G/DL — SIGNIFICANT CHANGE UP (ref 6–8.3)
PROT SERPL-MCNC: 8.1 G/DL — SIGNIFICANT CHANGE UP (ref 6–8.3)
QUANT TB PLUS MITOGEN MINUS NIL: 0.09 IU/ML — SIGNIFICANT CHANGE UP
RBC # BLD: 2.56 M/UL — LOW (ref 4.2–5.8)
RBC # BLD: 3.32 M/UL — LOW (ref 4.2–5.8)
RBC # FLD: 14.6 % — HIGH (ref 10.3–14.5)
RBC # FLD: 15.1 % — HIGH (ref 10.3–14.5)
RH IG SCN BLD-IMP: POSITIVE — SIGNIFICANT CHANGE UP
S AUREUS DNA NOSE QL NAA+PROBE: SIGNIFICANT CHANGE UP
SODIUM SERPL-SCNC: 127 MMOL/L — LOW (ref 135–145)
SODIUM SERPL-SCNC: 133 MMOL/L — LOW (ref 135–145)
SODIUM UR-SCNC: <20 MMOL/L — SIGNIFICANT CHANGE UP
SPECIMEN SOURCE: SIGNIFICANT CHANGE UP
UUN UR-MCNC: 888.4 MG/DL — SIGNIFICANT CHANGE UP
WBC # BLD: 13.52 K/UL — HIGH (ref 3.8–10.5)
WBC # BLD: 9.83 K/UL — SIGNIFICANT CHANGE UP (ref 3.8–10.5)
WBC # FLD AUTO: 13.52 K/UL — HIGH (ref 3.8–10.5)
WBC # FLD AUTO: 9.83 K/UL — SIGNIFICANT CHANGE UP (ref 3.8–10.5)

## 2022-09-27 PROCEDURE — 72158 MRI LUMBAR SPINE W/O & W/DYE: CPT | Mod: 26

## 2022-09-27 PROCEDURE — 99232 SBSQ HOSP IP/OBS MODERATE 35: CPT

## 2022-09-27 PROCEDURE — 99291 CRITICAL CARE FIRST HOUR: CPT

## 2022-09-27 PROCEDURE — 70553 MRI BRAIN STEM W/O & W/DYE: CPT | Mod: 26

## 2022-09-27 PROCEDURE — 72156 MRI NECK SPINE W/O & W/DYE: CPT | Mod: 26

## 2022-09-27 PROCEDURE — 93306 TTE W/DOPPLER COMPLETE: CPT | Mod: 26

## 2022-09-27 PROCEDURE — 72157 MRI CHEST SPINE W/O & W/DYE: CPT | Mod: 26

## 2022-09-27 RX ORDER — FENTANYL CITRATE 50 UG/ML
100 INJECTION INTRAVENOUS ONCE
Refills: 0 | Status: DISCONTINUED | OUTPATIENT
Start: 2022-09-27 | End: 2022-09-27

## 2022-09-27 RX ORDER — MIDAZOLAM HYDROCHLORIDE 1 MG/ML
4 INJECTION, SOLUTION INTRAMUSCULAR; INTRAVENOUS ONCE
Refills: 0 | Status: DISCONTINUED | OUTPATIENT
Start: 2022-09-27 | End: 2022-09-27

## 2022-09-27 RX ORDER — DEXMEDETOMIDINE HYDROCHLORIDE IN 0.9% SODIUM CHLORIDE 4 UG/ML
0.2 INJECTION INTRAVENOUS
Qty: 400 | Refills: 0 | Status: DISCONTINUED | OUTPATIENT
Start: 2022-09-27 | End: 2022-10-03

## 2022-09-27 RX ORDER — FUROSEMIDE 40 MG
20 TABLET ORAL ONCE
Refills: 0 | Status: DISCONTINUED | OUTPATIENT
Start: 2022-09-27 | End: 2022-09-27

## 2022-09-27 RX ADMIN — CHLORHEXIDINE GLUCONATE 15 MILLILITER(S): 213 SOLUTION TOPICAL at 17:08

## 2022-09-27 RX ADMIN — DEXMEDETOMIDINE HYDROCHLORIDE IN 0.9% SODIUM CHLORIDE 2.56 MICROGRAM(S)/KG/HR: 4 INJECTION INTRAVENOUS at 08:01

## 2022-09-27 RX ADMIN — PROPOFOL 12.3 MICROGRAM(S)/KG/MIN: 10 INJECTION, EMULSION INTRAVENOUS at 08:01

## 2022-09-27 RX ADMIN — Medication 4 MILLIGRAM(S): at 12:10

## 2022-09-27 RX ADMIN — FENTANYL CITRATE 100 MICROGRAM(S): 50 INJECTION INTRAVENOUS at 19:15

## 2022-09-27 RX ADMIN — Medication 2 PUFF(S): at 09:13

## 2022-09-27 RX ADMIN — PIPERACILLIN AND TAZOBACTAM 25 GRAM(S): 4; .5 INJECTION, POWDER, LYOPHILIZED, FOR SOLUTION INTRAVENOUS at 05:59

## 2022-09-27 RX ADMIN — ALBUTEROL 2 PUFF(S): 90 AEROSOL, METERED ORAL at 03:29

## 2022-09-27 RX ADMIN — HEPARIN SODIUM 5000 UNIT(S): 5000 INJECTION INTRAVENOUS; SUBCUTANEOUS at 22:21

## 2022-09-27 RX ADMIN — Medication 4 MILLIGRAM(S): at 05:08

## 2022-09-27 RX ADMIN — Medication 2 PUFF(S): at 15:19

## 2022-09-27 RX ADMIN — CHLORHEXIDINE GLUCONATE 15 MILLILITER(S): 213 SOLUTION TOPICAL at 05:08

## 2022-09-27 RX ADMIN — CHLORHEXIDINE GLUCONATE 1 APPLICATION(S): 213 SOLUTION TOPICAL at 05:08

## 2022-09-27 RX ADMIN — Medication 1300 MILLIGRAM(S): at 17:08

## 2022-09-27 RX ADMIN — ALBUTEROL 2 PUFF(S): 90 AEROSOL, METERED ORAL at 15:19

## 2022-09-27 RX ADMIN — IMMUNE GLOBULIN (HUMAN) 58.33 GRAM(S): 10 INJECTION INTRAVENOUS; SUBCUTANEOUS at 12:11

## 2022-09-27 RX ADMIN — Medication 4 MILLIGRAM(S): at 17:07

## 2022-09-27 RX ADMIN — Medication 1300 MILLIGRAM(S): at 05:09

## 2022-09-27 RX ADMIN — VALACYCLOVIR 500 MILLIGRAM(S): 500 TABLET, FILM COATED ORAL at 12:10

## 2022-09-27 RX ADMIN — PIPERACILLIN AND TAZOBACTAM 25 GRAM(S): 4; .5 INJECTION, POWDER, LYOPHILIZED, FOR SOLUTION INTRAVENOUS at 22:20

## 2022-09-27 RX ADMIN — ATORVASTATIN CALCIUM 40 MILLIGRAM(S): 80 TABLET, FILM COATED ORAL at 22:20

## 2022-09-27 RX ADMIN — PIPERACILLIN AND TAZOBACTAM 25 GRAM(S): 4; .5 INJECTION, POWDER, LYOPHILIZED, FOR SOLUTION INTRAVENOUS at 13:48

## 2022-09-27 RX ADMIN — Medication 1 APPLICATION(S): at 18:00

## 2022-09-27 RX ADMIN — Medication 2 PUFF(S): at 03:29

## 2022-09-27 RX ADMIN — MIDAZOLAM HYDROCHLORIDE 4 MILLIGRAM(S): 1 INJECTION, SOLUTION INTRAMUSCULAR; INTRAVENOUS at 20:09

## 2022-09-27 RX ADMIN — HEPARIN SODIUM 5000 UNIT(S): 5000 INJECTION INTRAVENOUS; SUBCUTANEOUS at 05:09

## 2022-09-27 RX ADMIN — POLYETHYLENE GLYCOL 3350 17 GRAM(S): 17 POWDER, FOR SOLUTION ORAL at 12:11

## 2022-09-27 RX ADMIN — Medication 2 PUFF(S): at 22:22

## 2022-09-27 RX ADMIN — ALBUTEROL 2 PUFF(S): 90 AEROSOL, METERED ORAL at 09:12

## 2022-09-27 RX ADMIN — HEPARIN SODIUM 5000 UNIT(S): 5000 INJECTION INTRAVENOUS; SUBCUTANEOUS at 13:47

## 2022-09-27 RX ADMIN — ALBUTEROL 2 PUFF(S): 90 AEROSOL, METERED ORAL at 22:21

## 2022-09-27 RX ADMIN — FENTANYL CITRATE 100 MICROGRAM(S): 50 INJECTION INTRAVENOUS at 18:45

## 2022-09-27 RX ADMIN — Medication 28.8 MICROGRAM(S)/KG/MIN: at 08:02

## 2022-09-27 NOTE — PROGRESS NOTE ADULT - SUBJECTIVE AND OBJECTIVE BOX
OVERNIGHT EVENTS / SUBJECTIVE: Patient seen and examined at bedside.     OBJECTIVE:    VITAL SIGNS:  ICU Vital Signs Last 24 Hrs  T(C): 35.8 (27 Sep 2022 04:00), Max: 36.6 (26 Sep 2022 16:00)  T(F): 96.5 (27 Sep 2022 04:00), Max: 97.8 (26 Sep 2022 16:00)  HR: 61 (27 Sep 2022 07:00) (50 - 124)  BP: 106/59 (27 Sep 2022 07:00) (77/46 - 163/68)  BP(mean): 73 (27 Sep 2022 07:00) (57 - 99)  ABP: --  ABP(mean): --  RR: 16 (27 Sep 2022 07:00) (16 - 27)  SpO2: 100% (27 Sep 2022 07:00) (83% - 100%)    O2 Parameters below as of 27 Sep 2022 07:00  Patient On (Oxygen Delivery Method): ventilator    O2 Concentration (%): 50      Mode: AC/ CMV (Assist Control/ Continuous Mandatory Ventilation), RR (machine): 18, TV (machine): 370, FiO2: 50, PEEP: 8, ITime: 0.66, MAP: 11, PIP: 22    09-26 @ 07:01  -  - @ 07:00  --------------------------------------------------------  IN: 2131.1 mL / OUT: 1140 mL / NET: 991.1 mL      CAPILLARY BLOOD GLUCOSE      POCT Blood Glucose.: 184 mg/dL (27 Sep 2022 06:06)      PHYSICAL EXAM:    GENERAL: intubated and sedated  LUNGS: good air entry bilaterally,  HEART: soft S1/S2, regular rate and rhythm, no murmurs noted, no lower extremity edema  GASTROINTESTINAL: abdomen is soft, nontender, nondistended, normoactive bowel sounds, no palpable masses  Neuro AOx0 at time of AM exam. Withdrawing on LLE    MEDICATIONS:  MEDICATIONS  (STANDING):  acetaminophen     Tablet .. 650 milliGRAM(s) Oral once  ALBUTerol    90 MICROgram(s) HFA Inhaler 2 Puff(s) Inhalation every 6 hours  atorvastatin 40 milliGRAM(s) Oral at bedtime  chlorhexidine 0.12% Liquid 15 milliLiter(s) Oral Mucosa every 12 hours  chlorhexidine 2% Cloths 1 Application(s) Topical <User Schedule>  dexAMETHasone  Injectable 4 milliGRAM(s) IV Push every 6 hours  dexMEDEtomidine Infusion 0.2 MICROgram(s)/kG/Hr (2.56 mL/Hr) IV Continuous <Continuous>  diphenhydrAMINE Injectable 25 milliGRAM(s) IV Push once  glucagon  Injectable 1 milliGRAM(s) IntraMuscular once  heparin   Injectable 5000 Unit(s) SubCutaneous every 8 hours  immune   globulin 10% (GAMMAGARD) IVPB 35 Gram(s) IV Intermittent daily  ipratropium 17 MICROgram(s) HFA Inhaler 2 Puff(s) Inhalation every 6 hours  norepinephrine Infusion 0.3 MICROgram(s)/kG/Min (28.8 mL/Hr) IV Continuous <Continuous>  piperacillin/tazobactam IVPB.. 3.375 Gram(s) IV Intermittent every 8 hours  polyethylene glycol 3350 17 Gram(s) Oral daily  potassium chloride  10 mEq/100 mL IVPB 10 milliEquivalent(s) IV Intermittent every 1 hour  propofol Infusion 40.039 MICROgram(s)/kG/Min (12.3 mL/Hr) IV Continuous <Continuous>  sodium bicarbonate 1300 milliGRAM(s) Oral two times a day  sodium chloride 3%  Inhalation 4 milliLiter(s) Inhalation every 6 hours  valACYclovir 500 milliGRAM(s) Oral daily    MEDICATIONS  (PRN):      ALLERGIES:  Allergies    Beef (Unknown)  No Known Drug Allergies  pork (Unknown)    Intolerances        LABS:                        9.8    13.52 )-----------( 213      ( 27 Sep 2022 04:00 )             29.3     Hemoglobin: 9.8 g/dL ( @ 04:00)  Hemoglobin: 10.9 g/dL ( @ 01:40)  Hemoglobin: 11.4 g/dL ( @ 19:20)  Hemoglobin: 15.7 g/dL ( @ 10:30)  Hemoglobin: 12.0 g/dL ( @ 00:35)    CBC Full  -  ( 27 Sep 2022 04:00 )  WBC Count : 13.52 K/uL  RBC Count : 3.32 M/uL  Hemoglobin : 9.8 g/dL  Hematocrit : 29.3 %  Platelet Count - Automated : 213 K/uL  Mean Cell Volume : 88.3 fL  Mean Cell Hemoglobin : 29.5 pg  Mean Cell Hemoglobin Concentration : 33.4 gm/dL  Auto Neutrophil # : 11.29 K/uL  Auto Lymphocyte # : 0.84 K/uL  Auto Monocyte # : 1.21 K/uL  Auto Eosinophil # : 0.00 K/uL  Auto Basophil # : 0.02 K/uL  Auto Neutrophil % : 83.6 %  Auto Lymphocyte % : 6.2 %  Auto Monocyte % : 8.9 %  Auto Eosinophil % : 0.0 %  Auto Basophil % : 0.1 %        127<L>  |  98  |  69<H>  ----------------------------<  175<H>  4.1   |  15<L>  |  1.65<H>    Ca    8.4      27 Sep 2022 04:00  Phos  3.2       Mg     2.50         TPro  8.1  /  Alb  3.6  /  TBili  0.3  /  DBili  x   /  AST  31  /  ALT  38  /  AlkPhos  39<L>      Creatinine Trend: 1.65<--, 1.63<--, 1.49<--, 1.33<--, 1.12<--, 1.15<--  LIVER FUNCTIONS - ( 27 Sep 2022 04:00 )  Alb: 3.6 g/dL / Pro: 8.1 g/dL / ALK PHOS: 39 U/L / ALT: 38 U/L / AST: 31 U/L / GGT: x           PT/INR - ( 26 Sep 2022 01:40 )   PT: 11.7 sec;   INR: 1.01 ratio         PTT - ( 27 Sep 2022 04:00 )  PTT:27.4 sec    hs Troponin:    ABG - ( 26 Sep 2022 01:55 )  pH, Arterial: 7.40  pH, Blood: x     /  pCO2: 31    /  pO2: 114   / HCO3: 19    / Base Excess: -4.7  /  SaO2: 97.3                    Urinalysis Basic - ( 25 Sep 2022 12:01 )    Color: Light Yellow / Appearance: Clear / S.035 / pH: x  Gluc: x / Ketone: Trace  / Bili: Negative / Urobili: <2 mg/dL   Blood: x / Protein: 30 mg/dL / Nitrite: Negative   Leuk Esterase: Negative / RBC: 1 /HPF / WBC 1 /HPF   Sq Epi: x / Non Sq Epi: 2 /HPF / Bacteria: Negative      CSF:    Total Nucleated Cell Count, CSF: 1 cells/uL (22 @ 00:30)  RBC Count - Spinal Fluid: 1 cells/uL (22 @ 00:30)          Glucose, CSF: 91 mg/dL (22 @ 00:30)  Protein, CSF: 166 mg/dL (22 @ 00:30)            EKG:   MICROBIOLOGY:    Culture - CSF with Gram Stain (collected 26 Sep 2022 00:30)  Source: .CSF CSF  Gram Stain (26 Sep 2022 07:08):    polymorphonuclear leukocytes seen    No organisms seen    by cytocentrifuge  Preliminary Report (27 Sep 2022 07:33):    No growth to date.    Culture - Acid Fast - CSF (collected 26 Sep 2022 00:30)  Source: .CSF CSF    Culture - Blood (collected 25 Sep 2022 18:25)  Source: .Blood Blood-Peripheral  Gram Stain (26 Sep 2022 10:59):    Growth in anaerobic bottle: Gram Positive Rods  Final Report (26 Sep 2022 23:44):    Growth in anaerobic bottle: Bacillus species not anthracis    "Susceptibilities not performed"    ***Blood Panel PCR results on this specimen are available    approximately 3 hours after the Gram stain result.***    Gram stain, PCR, and/or culture results may not always    correspond due to difference in methodologies.    ************************************************************    This PCR assay was performed by multiplex PCR. This    Assay tests for 66 bacterial and resistance gene targets.    Please refer to the [facility] test directory    at [email or web address] for details.  Organism: Blood Culture PCR (26 Sep 2022 23:44)  Organism: Blood Culture PCR (26 Sep 2022 23:44)    Culture - Blood (collected 25 Sep 2022 18:25)  Source: .Blood Blood-Peripheral  Preliminary Report (27 Sep 2022 01:01):    No growth to date.      IMAGING:      Labs, imaging, EKG personally reviewed    RADIOLOGY & ADDITIONAL TESTS: Reviewed. OVERNIGHT EVENTS / SUBJECTIVE: Patient seen and examined at bedside.     OBJECTIVE: No acute events overnight.    VITAL SIGNS:  ICU Vital Signs Last 24 Hrs  T(C): 35.8 (27 Sep 2022 04:00), Max: 36.6 (26 Sep 2022 16:00)  T(F): 96.5 (27 Sep 2022 04:00), Max: 97.8 (26 Sep 2022 16:00)  HR: 61 (27 Sep 2022 07:00) (50 - 124)  BP: 106/59 (27 Sep 2022 07:00) (77/46 - 163/68)  BP(mean): 73 (27 Sep 2022 07:00) (57 - 99)  ABP: --  ABP(mean): --  RR: 16 (27 Sep 2022 07:00) (16 - 27)  SpO2: 100% (27 Sep 2022 07:00) (83% - 100%)    O2 Parameters below as of 27 Sep 2022 07:00  Patient On (Oxygen Delivery Method): ventilator    O2 Concentration (%): 50      Mode: AC/ CMV (Assist Control/ Continuous Mandatory Ventilation), RR (machine): 18, TV (machine): 370, FiO2: 50, PEEP: 8, ITime: 0.66, MAP: 11, PIP: 22    - @ 07:01  -  - @ 07:00  --------------------------------------------------------  IN: 2131.1 mL / OUT: 1140 mL / NET: 991.1 mL      CAPILLARY BLOOD GLUCOSE      POCT Blood Glucose.: 184 mg/dL (27 Sep 2022 06:06)      PHYSICAL EXAM:    GENERAL: intubated and sedated  LUNGS: good air entry bilaterally,  HEART: soft S1/S2, regular rate and rhythm, no murmurs noted, no lower extremity edema  GASTROINTESTINAL: abdomen is soft, nontender, nondistended, normoactive bowel sounds, no palpable masses  Neuro AOx0 at time of AM exam. Withdrawing on LLE    MEDICATIONS:  MEDICATIONS  (STANDING):  acetaminophen     Tablet .. 650 milliGRAM(s) Oral once  ALBUTerol    90 MICROgram(s) HFA Inhaler 2 Puff(s) Inhalation every 6 hours  atorvastatin 40 milliGRAM(s) Oral at bedtime  chlorhexidine 0.12% Liquid 15 milliLiter(s) Oral Mucosa every 12 hours  chlorhexidine 2% Cloths 1 Application(s) Topical <User Schedule>  dexAMETHasone  Injectable 4 milliGRAM(s) IV Push every 6 hours  dexMEDEtomidine Infusion 0.2 MICROgram(s)/kG/Hr (2.56 mL/Hr) IV Continuous <Continuous>  diphenhydrAMINE Injectable 25 milliGRAM(s) IV Push once  glucagon  Injectable 1 milliGRAM(s) IntraMuscular once  heparin   Injectable 5000 Unit(s) SubCutaneous every 8 hours  immune   globulin 10% (GAMMAGARD) IVPB 35 Gram(s) IV Intermittent daily  ipratropium 17 MICROgram(s) HFA Inhaler 2 Puff(s) Inhalation every 6 hours  norepinephrine Infusion 0.3 MICROgram(s)/kG/Min (28.8 mL/Hr) IV Continuous <Continuous>  piperacillin/tazobactam IVPB.. 3.375 Gram(s) IV Intermittent every 8 hours  polyethylene glycol 3350 17 Gram(s) Oral daily  potassium chloride  10 mEq/100 mL IVPB 10 milliEquivalent(s) IV Intermittent every 1 hour  propofol Infusion 40.039 MICROgram(s)/kG/Min (12.3 mL/Hr) IV Continuous <Continuous>  sodium bicarbonate 1300 milliGRAM(s) Oral two times a day  sodium chloride 3%  Inhalation 4 milliLiter(s) Inhalation every 6 hours  valACYclovir 500 milliGRAM(s) Oral daily    MEDICATIONS  (PRN):      ALLERGIES:  Allergies    Beef (Unknown)  No Known Drug Allergies  pork (Unknown)    Intolerances        LABS:                        9.8    13.52 )-----------( 213      ( 27 Sep 2022 04:00 )             29.3     Hemoglobin: 9.8 g/dL ( @ 04:00)  Hemoglobin: 10.9 g/dL ( @ 01:40)  Hemoglobin: 11.4 g/dL ( @ 19:20)  Hemoglobin: 15.7 g/dL ( @ 10:30)  Hemoglobin: 12.0 g/dL ( @ 00:35)    CBC Full  -  ( 27 Sep 2022 04:00 )  WBC Count : 13.52 K/uL  RBC Count : 3.32 M/uL  Hemoglobin : 9.8 g/dL  Hematocrit : 29.3 %  Platelet Count - Automated : 213 K/uL  Mean Cell Volume : 88.3 fL  Mean Cell Hemoglobin : 29.5 pg  Mean Cell Hemoglobin Concentration : 33.4 gm/dL  Auto Neutrophil # : 11.29 K/uL  Auto Lymphocyte # : 0.84 K/uL  Auto Monocyte # : 1.21 K/uL  Auto Eosinophil # : 0.00 K/uL  Auto Basophil # : 0.02 K/uL  Auto Neutrophil % : 83.6 %  Auto Lymphocyte % : 6.2 %  Auto Monocyte % : 8.9 %  Auto Eosinophil % : 0.0 %  Auto Basophil % : 0.1 %        127<L>  |  98  |  69<H>  ----------------------------<  175<H>  4.1   |  15<L>  |  1.65<H>    Ca    8.4      27 Sep 2022 04:00  Phos  3.2       Mg     2.50         TPro  8.1  /  Alb  3.6  /  TBili  0.3  /  DBili  x   /  AST  31  /  ALT  38  /  AlkPhos  39<L>      Creatinine Trend: 1.65<--, 1.63<--, 1.49<--, 1.33<--, 1.12<--, 1.15<--  LIVER FUNCTIONS - ( 27 Sep 2022 04:00 )  Alb: 3.6 g/dL / Pro: 8.1 g/dL / ALK PHOS: 39 U/L / ALT: 38 U/L / AST: 31 U/L / GGT: x           PT/INR - ( 26 Sep 2022 01:40 )   PT: 11.7 sec;   INR: 1.01 ratio         PTT - ( 27 Sep 2022 04:00 )  PTT:27.4 sec    hs Troponin:    ABG - ( 26 Sep 2022 01:55 )  pH, Arterial: 7.40  pH, Blood: x     /  pCO2: 31    /  pO2: 114   / HCO3: 19    / Base Excess: -4.7  /  SaO2: 97.3                    Urinalysis Basic - ( 25 Sep 2022 12:01 )    Color: Light Yellow / Appearance: Clear / S.035 / pH: x  Gluc: x / Ketone: Trace  / Bili: Negative / Urobili: <2 mg/dL   Blood: x / Protein: 30 mg/dL / Nitrite: Negative   Leuk Esterase: Negative / RBC: 1 /HPF / WBC 1 /HPF   Sq Epi: x / Non Sq Epi: 2 /HPF / Bacteria: Negative      CSF:    Total Nucleated Cell Count, CSF: 1 cells/uL (22 @ 00:30)  RBC Count - Spinal Fluid: 1 cells/uL (22 @ 00:30)          Glucose, CSF: 91 mg/dL (22 @ 00:30)  Protein, CSF: 166 mg/dL (22 @ 00:30)            EKG:   MICROBIOLOGY:    Culture - CSF with Gram Stain (collected 26 Sep 2022 00:30)  Source: .CSF CSF  Gram Stain (26 Sep 2022 07:08):    polymorphonuclear leukocytes seen    No organisms seen    by cytocentrifuge  Preliminary Report (27 Sep 2022 07:33):    No growth to date.    Culture - Acid Fast - CSF (collected 26 Sep 2022 00:30)  Source: .CSF CSF    Culture - Blood (collected 25 Sep 2022 18:25)  Source: .Blood Blood-Peripheral  Gram Stain (26 Sep 2022 10:59):    Growth in anaerobic bottle: Gram Positive Rods  Final Report (26 Sep 2022 23:44):    Growth in anaerobic bottle: Bacillus species not anthracis    "Susceptibilities not performed"    ***Blood Panel PCR results on this specimen are available    approximately 3 hours after the Gram stain result.***    Gram stain, PCR, and/or culture results may not always    correspond due to difference in methodologies.    ************************************************************    This PCR assay was performed by multiplex PCR. This    Assay tests for 66 bacterial and resistance gene targets.    Please refer to the [facility] test directory    at [email or web address] for details.  Organism: Blood Culture PCR (26 Sep 2022 23:44)  Organism: Blood Culture PCR (26 Sep 2022 23:44)    Culture - Blood (collected 25 Sep 2022 18:25)  Source: .Blood Blood-Peripheral  Preliminary Report (27 Sep 2022 01:01):    No growth to date.      IMAGING:      Labs, imaging, EKG personally reviewed    RADIOLOGY & ADDITIONAL TESTS: Reviewed.

## 2022-09-27 NOTE — PROGRESS NOTE ADULT - SUBJECTIVE AND OBJECTIVE BOX
MRN-[id number]    Subjective: 73 yo male seen and examined at bedside. No events overnight.    PAST MEDICAL & SURGICAL HISTORY:  HTN (hypertension)    HLD (hyperlipidemia)    Coronary artery disease    Multiple myeloma    S/P coronary artery stent placement    FAMILY HISTORY:  Maternal family history of hypertension  Mother    Social Hx:  Nonsmoker, no drug or alcohol use    Home Medications:  amLODIPine 10 mg oral tablet: 1 tab(s) orally once a day (25 Sep 2022 03:01)  Aspirin Enteric Coated 81 mg oral delayed release tablet: 1 tab(s) orally once a day (25 Sep 2022 03:01)  atorvastatin 40 mg oral tablet: 1 tab(s) orally once a day (at bedtime) (25 Sep 2022 03:01)  calcium carbonate 500 mg (200 mg elemental calcium) oral tablet, chewable: 1 tab(s) orally once a day (25 Sep 2022 03:01)  losartan 100 mg oral tablet: 1 tab(s) orally once a day (25 Sep 2022 03:01)  Metoprolol Tartrate 50 mg oral tablet: 1 tab(s) orally 2 times a day (25 Sep 2022 03:01)  valACYclovir 500 mg oral tablet: 1 tab(s) orally once a day (25 Sep 2022 03:01)    MEDICATIONS  (STANDING):  acetaminophen     Tablet .. 650 milliGRAM(s) Oral once  ALBUTerol    90 MICROgram(s) HFA Inhaler 2 Puff(s) Inhalation every 6 hours  atorvastatin 40 milliGRAM(s) Oral at bedtime  chlorhexidine 0.12% Liquid 15 milliLiter(s) Oral Mucosa every 12 hours  chlorhexidine 2% Cloths 1 Application(s) Topical <User Schedule>  dexAMETHasone  Injectable 4 milliGRAM(s) IV Push every 6 hours  dexMEDEtomidine Infusion 0.2 MICROgram(s)/kG/Hr (2.56 mL/Hr) IV Continuous <Continuous>  diphenhydrAMINE Injectable 25 milliGRAM(s) IV Push once  glucagon  Injectable 1 milliGRAM(s) IntraMuscular once  heparin   Injectable 5000 Unit(s) SubCutaneous every 8 hours  immune   globulin 10% (GAMMAGARD) IVPB 35 Gram(s) IV Intermittent daily  ipratropium 17 MICROgram(s) HFA Inhaler 2 Puff(s) Inhalation every 6 hours  norepinephrine Infusion 0.3 MICROgram(s)/kG/Min (28.8 mL/Hr) IV Continuous <Continuous>  piperacillin/tazobactam IVPB.. 3.375 Gram(s) IV Intermittent every 8 hours  polyethylene glycol 3350 17 Gram(s) Oral daily  potassium chloride  10 mEq/100 mL IVPB 10 milliEquivalent(s) IV Intermittent every 1 hour  propofol Infusion 40.039 MICROgram(s)/kG/Min (12.3 mL/Hr) IV Continuous <Continuous>  sodium bicarbonate 1300 milliGRAM(s) Oral two times a day  sodium chloride 3%  Inhalation 4 milliLiter(s) Inhalation every 6 hours  valACYclovir 500 milliGRAM(s) Oral daily    Allergies  Beef (Unknown)  No Known Drug Allergies  pork (Unknown)    ROS: Unable to obtain, patient intubated.     ICU Vital Signs Last 24 Hrs  T(C): 35.7 (27 Sep 2022 08:00), Max: 36.6 (26 Sep 2022 16:00)  T(F): 96.2 (27 Sep 2022 08:00), Max: 97.8 (26 Sep 2022 16:00)  HR: 62 (27 Sep 2022 11:00) (50 - 113)  BP: 125/60 (27 Sep 2022 11:00) (77/46 - 163/68)  BP(mean): 78 (27 Sep 2022 11:00) (57 - 95)  RR: 18 (27 Sep 2022 11:00) (16 - 27)  SpO2: 100% (27 Sep 2022 11:00) (83% - 100%)    O2 Parameters below as of 27 Sep 2022 08:00  Patient On (Oxygen Delivery Method): AC 18  PEEP 8    O2 Concentration (%): 50    GENERAL EXAM:  Constitutional: Lying in bed, NAD.  Head: Normocephalic, atraumatic.  Extremities: No edema, no cyanosis.    NEUROLOGICAL EXAM: Intubated, sedation paused for ~15-20 minutes prior to neuro exam.  MS: Alert, eyes open spontaneously. Intubated, no verbal output. Follows commands.  CN: EOMI. Face grossly symmetric.  Motor:             Deltoid	Biceps	Triceps	Wrist Ext   Wrist Flex  R	1	3	4-	3	    3	  L	2	2	3	3	    3    	H-Flex	K-Ext	D-Flex	P-Flex   R	2	4-	3	5	   L	2	4-	3	5  Sensory: Intact to LT throughout.  Reflexes: Absent throughout.   Coordination/Gait: Unable to assess.    Labs:   cbc                      9.8    13.52 )-----------( 213      ( 27 Sep 2022 04:00 )             29.3     [id number]    127<L>  |  98  |  69<H>  ----------------------------<  175<H>  4.1   |  15<L>  |  1.65<H>    Ca    8.4      27 Sep 2022 04:00  Phos  3.2     09-27  Mg     2.50     09-27    TPro  8.1  /  Alb  3.6  /  TBili  0.3  /  DBili  x   /  AST  31  /  ALT  38  /  AlkPhos  39<L>  09-27    Coags: PT/INR - ( 26 Sep 2022 01:40 )   PT: 11.7 sec;   INR: 1.01 ratio      PTT - ( 27 Sep 2022 04:00 )  PTT:27.4 sec    LIVER FUNCTIONS - ( 27 Sep 2022 04:00 )  Alb: 3.6 g/dL / Pro: 8.1 g/dL / ALK PHOS: 39 U/L / ALT: 38 U/L / AST: 31 U/L / GGT: x           Radiology:  -09/24 CTH: No hydrocephalus, acute intracranial hemorrhage, mass effect, or brain edema.  -09/25 CT CERVICAL SPINE, w/ contrast: Multiple mixed lytic sclerotic lesions in the cervical spine may be sequela of previously treated multiple myeloma. No soft tissue mass, epidural tumor extension or abnormal enhancement is visualized by CT technique.  Follow-up MRI may be obtained for more sensitive evaluation. No fracture or traumatic malalignment. Cervical spine degenerative changes with mild to moderate spinal canal stenosis at C3-C4 through C5-C6.  Moderate to severe neural foraminal narrowing at C4-C5 and C5-C6 on the right side. Approximately 1 cm nodule in the left thyroid lobe.  -09/25 CT THORACIC SPINE, w/ contrast: Multiple mixed lytic sclerotic lesions in the thoracic spine, sternum and ribs may be sequela of previously treated multiple myeloma.  No soft tissue mass, epidural tumor extension or abnormal enhancement is visualized by CT technique. Chronic pathologic compression fracture at T8 demonstrating up to 50% loss of vertebral body height.  Chronic displaced fracture of the left superior facet of T8; the displaced fracture fragment is fused to the left inferior facet of T7. Chronic appearing mild central endplate fracture and Schmorl's node in the superior endplate of T12. No significant spinal canal stenosis is visualized by CT technique.  -09/25 CT LUMBAR SPINE, w/ contrast: There are small rudimentary ribs at T12.  There is partial sacralization of L5, with a rudimentary disc space at L5-S1. The last completely formed intervertebral disc space corresponds L4-L5 level. Multiple mixed lytic sclerotic lesions in the lumbar spine, sacrum and visualized pelvis may be sequela of previously treated multiple myeloma. No soft tissue mass, epidural tumor extension or abnormal enhancement is visualized by CT technique. No lumbar spine fracture or traumatic malalignment. Mild lumbar spine degenerative changes.  Mild spinal canal stenosis at L3-L4 and L4-L5. The bladder is distended without wall thickening or surrounding inflammatory changes.  If the patient is unable to urinate Dahl catheter may be placed.

## 2022-09-27 NOTE — PROGRESS NOTE ADULT - ASSESSMENT
Patient is a 71 yo M with PMH of HTN, HLD, CAD, cardiac stent, Multiple Myeloma who presented to the ED for 3 days of generalized weakness.    EKG: ST PVCs  Tele: NSR PVCs    1. Weakness  -rapidly progressed. was unable to move extremities.   -s/p RRT for hypoxia now intubated in MICU   -neuro on board, possible GBS vs pathology in neck  -MRI spine pending   -echo shows mild AR/AS, normal LV systolic function and mild diastolic dysfunction     2. CAD s/p PCI  -in 2015 in [facility] as per daughter patient had heart attack  -no reported CP prior to hospitalization    3. MM  -heme/onc on board

## 2022-09-27 NOTE — PROGRESS NOTE ADULT - ASSESSMENT
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     =====Neuro/Psych=====  #Generalized Weakness  Pt family reporting a descending weakness that ultimately started to involve his speaking ability as well.  Low c/f botulism given not having flaccid, but some suspicion for paraneoplastic process (e.g. LES/MG).  - c/w decadron  -IVIG 35g qd (1/3) as per neuro  -anti ganglioside ab ordered  -appreciate neurology recommendations     #Sedation/Analgesia  - C/W Propofol for now    =====Respiratory=====  #Acute hypoxic Respiratory Failure  Pt with increased WOB, reportedly wheezing as well. No h/o COPD  - C/W Albuterol/Ipratropium MDI's while intubated  -wean vent as tolerated    #Hemoptysis  Pt reportedly with hemoptysis in [facility]. Pt with RLL calcified granuloma & scattered pulm nodules.   - considering quant gold   - Sputum AFB    =====Cardiovascular=====  #Vasoplegic Shock  #HTN #HLD #CAD  Pt with minimal rpessor requirement on sedation for intubation. Likely vasoplegic in that context. CTM  - HOLDing Home ASA 81mg PO QD (anticipating LP)  - HOLDing Home Metoprolol tartrate 50mg PO BID  - HOLDing Home Amlodipine 10mg PO QD    #BNP elevated  Pt with BNP elevation, respiratory distress.  - TTE pending    =====Gastrointestinal=====  #RAUL    =====Renal/=====  #Hyponatremia  Pt with persistently low sodium. 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 Should have fluid restriction when not intubated.  - Trend BMP q6h depending on trend  - Monitor Na can consider IVF/meds in NS rather than other crystalloids  -monitor sodium given GBS and patient to receive IVIG    =====MSK/Skin=====  #T8 Compression Fx  Eventually, will need MR spine and spine consult to assess for possible interventions. Diffuse spinal lesions.   - Neurosx consulted     =====Infectious Disease=====  #RAUL    =====Endocrine=====  #Hyperglycemia  Pt with mild hyperglycemia on admission. A1c 5.7% (?pre-DM)  - FS q6h    =====Heme/Onc=====  #Multiple Myeloma  Pt had last received Velcade 3/2022. Had been given opportunity for chemo vacation and travelled to [facility], but pt went for longer than anticipated. Now with diffuse lytic lesions involving most of spine. Had previously had XRT as well.   Diagnostics:  - LDH, B2-Microglobulin  - SPEP, UPEP, IFX (S/U), Immunoglobulins, FLCs    Therapeutics: pending evaluation as above    =====Hospital Bundle=====  Hospital Bundle  Fluids: with gtts  Electrolytes: Replete K > 4, Mg > 2, Phos > 3  Nutrition: Diet NPO with TF (Nutrition C/S)  PPX  ---VTE: SCD  ---GI: TFs  ---Resp: Vented  ---: Dahl Placed 9/25  Access: PIVs  Code Status: FULL CODE  Dispo: Pending Medical Optimization 71 y/o M with a PMHx significant for HTN, HLD, CAD s/p stent, MM, presents with descending paralysis complicated by acute hypoxic respiratory failure and interval vasoplegic shock resolved found to have 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     =====Neuro/Psych=====  #Generalized Weakness  Pt family reporting a descending weakness that ultimately started to involve his speaking ability as well.  Low c/f botulism given not having flaccid, but some suspicion for paraneoplastic process (e.g. LES/MG).  - f/u MRI brain and C/T/L spine  - c/w decadron 4mg q6h per neurosx recs for spinal lytic lesions pending MRI  -IVIG 35g qd (today 2/3) as per neuro for possible GBS  -f/u anti ganglioside ab  -appreciate neurology recommendations     #Sedation/Analgesia  - C/W Propofol for now    =====Respiratory=====  #Acute hypoxic Respiratory Failure  Pt with increased WOB, reportedly wheezing as well. No h/o COPD  - C/W Albuterol/Ipratropium MDI's while intubated  -wean vent as tolerated (370/18/8/50%)    #Hemoptysis  Pt reportedly with hemoptysis in [facility]. Pt with RLL calcified granuloma & scattered pulm nodules.   - f/u quant gold   - f/u sputum AFB    =====Cardiovascular=====  #Vasoplegic Shock  #HTN #HLD #CAD  Pt with minimal rpessor requirement on sedation for intubation. Likely vasoplegic in that context. CTM  - HOLDing Home ASA 81mg PO QD (anticipating LP)  - HOLDing Home Metoprolol tartrate 50mg PO BID  - HOLDing Home Amlodipine 10mg PO QD    #BNP elevated  Pt with BNP elevation, respiratory distress.  - TTE pending    =====Gastrointestinal=====  #RAUL  - NPO w/ TF    =====Renal/=====  #Hyponatremia, CRISTI  Pt with persistently low sodium. 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 Should have fluid restriction when not intubated.  - Na improving, cont to trend BMP qD  - Trend Cr, f/u urine lytes  - Strict Is/Os  -monitor sodium given GBS and patient to receive IVIG    =====MSK/Skin=====  #T8 Compression Fx  Eventually, will need MR spine and spine consult to assess for possible interventions. Diffuse spinal lesions.   - Neurosx consulted     =====Infectious Disease=====  #Bacillus cereus in blood cxs x1 bottle on 9/25, ?contaminate  - f/u repeat blood cxs 9/27  - afebrile, WBC count improving, monitor off abx for now pending repeat cxs  - f/u sputum cxs    =====Endocrine=====  #Hyperglycemia  Pt with mild hyperglycemia on admission. A1c 5.7% (?pre-DM)  - FS q6h    =====Heme/Onc=====  #Multiple Myeloma  Pt had last received Velcade 3/2022. Had been given opportunity for chemo vacation and travelled to [facility], but pt went for longer than anticipated. Now with diffuse lytic lesions involving most of spine. Had previously had XRT as well.   Diagnostics:  - LDH, B2-Microglobulin  - SPEP, UPEP, IFX (S/U), Immunoglobulins, FLCs    Therapeutics: pending evaluation as above    =====Hospital Bundle=====  Hospital Bundle  Fluids: with gtts  Electrolytes: Replete K > 4, Mg > 2, Phos > 3  Nutrition: Diet NPO with TF (Nutrition C/S)  PPX  ---VTE: SCD  ---GI: TFs  ---Resp: Vented  ---: Dahl Placed 9/25  Access: PIVs  Code Status: FULL CODE  Dispo: Pending Medical Optimization

## 2022-09-27 NOTE — PROGRESS NOTE ADULT - SUBJECTIVE AND OBJECTIVE BOX
[facility]   MD DERRICK MOSS MD KRISTINE SOLTANPOUR, DO ANGELA WONG, PA    TEL:  OFFICE: [phone]  From 5pm-7am Answering Service [phone]    -- RENAL FOLLOW UP NOTE ---Date of Service 09-27-22 @ 14:37    Patient is a 72y old  Male who presents with a chief complaint of weakness (27 Sep 2022 13:23)      Patient seen and examined at bedside.     VITALS:  T(F): 96.5 (09-27-22 @ 12:00), Max: 97.8 (09-26-22 @ 16:00)  HR: 62 (09-27-22 @ 14:00)  BP: 108/55 (09-27-22 @ 14:00)  RR: 19 (09-27-22 @ 14:00)  SpO2: 100% (09-27-22 @ 14:00)  Wt(kg): --    09-26 @ 07:01  -  09-27 @ 07:00  --------------------------------------------------------  IN: 2131.1 mL / OUT: 1140 mL / NET: 991.1 mL    09-27 @ 07:01  -  09-27 @ 14:37  --------------------------------------------------------  IN: 1051.4 mL / OUT: 175 mL / NET: 876.4 mL          PHYSICAL EXAM:  General: intubated/sedate   Neck: No JVD  Respiratory: CTAB, no wheezes, rales or rhonchi  Cardiovascular: S1, S2, RRR  Gastrointestinal: BS+, soft, NT/ND  Extremities: No peripheral edema    Hospital Medications:   MEDICATIONS  (STANDING):  acetaminophen     Tablet .. 650 milliGRAM(s) Oral once  ALBUTerol    90 MICROgram(s) HFA Inhaler 2 Puff(s) Inhalation every 6 hours  atorvastatin 40 milliGRAM(s) Oral at bedtime  chlorhexidine 0.12% Liquid 15 milliLiter(s) Oral Mucosa every 12 hours  chlorhexidine 2% Cloths 1 Application(s) Topical <User Schedule>  dexAMETHasone  Injectable 4 milliGRAM(s) IV Push every 6 hours  dexMEDEtomidine Infusion 0.2 MICROgram(s)/kG/Hr (2.56 mL/Hr) IV Continuous <Continuous>  diphenhydrAMINE Injectable 25 milliGRAM(s) IV Push once  glucagon  Injectable 1 milliGRAM(s) IntraMuscular once  heparin   Injectable 5000 Unit(s) SubCutaneous every 8 hours  immune   globulin 10% (GAMMAGARD) IVPB 35 Gram(s) IV Intermittent daily  ipratropium 17 MICROgram(s) HFA Inhaler 2 Puff(s) Inhalation every 6 hours  norepinephrine Infusion 0.3 MICROgram(s)/kG/Min (28.8 mL/Hr) IV Continuous <Continuous>  piperacillin/tazobactam IVPB.. 3.375 Gram(s) IV Intermittent every 8 hours  polyethylene glycol 3350 17 Gram(s) Oral daily  potassium chloride  10 mEq/100 mL IVPB 10 milliEquivalent(s) IV Intermittent every 1 hour  propofol Infusion 40.039 MICROgram(s)/kG/Min (12.3 mL/Hr) IV Continuous <Continuous>  sodium bicarbonate 1300 milliGRAM(s) Oral two times a day  sodium chloride 3%  Inhalation 4 milliLiter(s) Inhalation every 6 hours  valACYclovir 500 milliGRAM(s) Oral daily      LABS:  09-27    127<L>  |  98  |  69<H>  ----------------------------<  175<H>  4.1   |  15<L>  |  1.65<H>    Ca    8.4      27 Sep 2022 04:00  Phos  3.2     09-27  Mg     2.50     09-27    TPro  8.1  /  Alb  3.6  /  TBili  0.3  /  DBili      /  AST  31  /  ALT  38  /  AlkPhos  39<L>  09-27    Creatinine Trend: 1.65 <--, 1.63 <--, 1.49 <--, 1.33 <--, 1.12 <--, 1.15 <--, 1.19 <--, 1.08 <--, 1.17 <--, 1.06 <--, 1.37 <--    Phosphorus Level, Serum: 3.2 mg/dL (09-27 @ 04:00)  Albumin, Serum: 3.6 g/dL (09-27 @ 04:00)  Phosphorus Level, Serum: 3.2 mg/dL (09-26 @ 20:20)  Albumin, Serum: 4.2 g/dL (09-26 @ 20:20)                              9.8    13.52 )-----------( 213      ( 27 Sep 2022 04:00 )             29.3     Urine Studies:  Urinalysis - [09-25-22 @ 12:01]      Color Light Yellow / Appearance Clear / SG 1.035 / pH 6.5      Gluc 200 mg/dL / Ketone Trace  / Bili Negative / Urobili <2 mg/dL       Blood Trace / Protein 30 mg/dL / Leuk Est Negative / Nitrite Negative      RBC 1 / WBC 1 / Hyaline  / Gran  / Sq Epi  / Non Sq Epi 2 / Bacteria Negative    Urine Creatinine 71      [09-25-22 @ 12:01]  Urine Protein 68      [09-25-22 @ 12:01]  Urine Sodium 78      [09-25-22 @ 12:01]  Urine Osmolality 528      [09-25-22 @ 12:01]    Iron 36, TIBC 290, %sat 12      [09-25-22 @ 11:15]  Ferritin 287      [09-25-22 @ 11:15]  TSH 1.57      [09-25-22 @ 07:30]      Free Light Chains: kappa 2.58, lambda 2.12, ratio = 1.22      [09-25 @ 11:15]    RADIOLOGY & ADDITIONAL STUDIES:

## 2022-09-27 NOTE — PROGRESS NOTE ADULT - ASSESSMENT
Assessment: 73 yo male with a PMHx of HTN, HLD, CAD (s/p stent, not on AP/AC), and  multiple myeloma who presented to [facility] on 9/24 for worsening generalized weakness since 9/21. On 9/25 AM, patient developed worsening respiratory distress and was intubated. LP was performed on 9/26. CSF glucose 91 <H>, protein 166 <H>, TNC 1. Neuro exam (while off sedation) notable for absent reflexes throughout.    Impression: Worsening weakness which affected respiratory status in patient with albuminocytologic dissociation on CSF studies and absent reflexes on exam, concerning for GBS.    Recommendations:  [] Neuro checks Q1HR.  [] Telemetry.  [] Fall/Aspiration precautions.  [] MR Head/Spine imaging per neurosx.  [] c/w IVIG 35G QD x3 days (9/26-9/28).  [] Premedicate patient with Tylenol 650MG PO via NGT + Benadryl 25MG IVP ~30 minutes prior to each IVIG infusion.  [] f/u ganglioside GQ1B ab (pending).  [x] s/p LP, CSF glucose 91 <H>, protein 166 <H>, TNC 1.  [] PT/OT/SLP when patient able.  [] Rest of care per MICU team.    Case seen and discussed with neurology attending Dr. Avendano.

## 2022-09-27 NOTE — PROGRESS NOTE ADULT - SUBJECTIVE AND OBJECTIVE BOX
On AC//18/50%/8 PEEP      Vital Signs Last 24 Hrs  T(C): 35.8 (27 Sep 2022 12:00), Max: 36.6 (26 Sep 2022 16:00)  T(F): 96.5 (27 Sep 2022 12:00), Max: 97.8 (26 Sep 2022 16:00)  HR: 68 (27 Sep 2022 14:53) (50 - 108)  BP: 108/55 (27 Sep 2022 14:00) (77/46 - 163/68)  BP(mean): 72 (27 Sep 2022 14:00) (57 - 94)  RR: 19 (27 Sep 2022 14:00) (16 - 27)  SpO2: 100% (27 Sep 2022 14:53) (96% - 100%)    I&O's Summary    22 @ 07:  -  22 @ 07:00  --------------------------------------------------------  IN: 2131.1 mL / OUT: 1140 mL / NET: 991.1 mL    22 @ 07:  -  22 @ 15:06  --------------------------------------------------------  IN: 1051.4 mL / OUT: 175 mL / NET: 876.4 mL        GENERAL: intubated, sedated  HEAD:  Atraumatic, Normocephalic, (+)rt nares NGT in place  EYES: EOMI, PERRLA, conjunctiva and sclera clear  NECK: Supple, No JVD, No LAD, No carotid bruits, No thyromegaly  CHEST/LUNG: Vented BS with decreased BS lt lower field  HEART: Regular rate and rhythm; nl S1/S2; No murmurs, rubs, or gallops  ABDOMEN: Soft, Nondistended; Bowel sounds present; No hepatosplenomegaly  EXTREMITIES:  2+ Peripheral Pulses; No clubbing, cyanosis, or edema b/l; Nl ROM  SKIN: No rashes or lesions; No jaundice; Normal turgor, texture  NEURO: unable to follow commands; no focal posturing  : moser in place    LABS:                        10.9   15.88 )-----------( 235      ( 26 Sep 2022 01:40 )             30.6     -    123<L>  |  94<L>  |  47<H>  ----------------------------<  190<H>  4.9   |  15<L>  |  1.49<H>    Ca    8.5      26 Sep 2022 10:30  Phos  3.0       Mg     2.50         TPro  7.4  /  Alb  4.3  /  TBili  0.5  /  DBili  x   /  AST  18  /  ALT  11  /  AlkPhos  44      PT/INR - ( 26 Sep 2022 01:40 )   PT: 11.7 sec;   INR: 1.01 ratio         PTT - ( 26 Sep 2022 01:40 )  PTT:25.5 sec  CAPILLARY BLOOD GLUCOSE      POCT Blood Glucose.: 213 mg/dL (26 Sep 2022 12:13)  POCT Blood Glucose.: 192 mg/dL (26 Sep 2022 05:36)  POCT Blood Glucose.: 173 mg/dL (25 Sep 2022 23:25)    CARDIAC MARKERS ( 24 Sep 2022 14:58 )  x     / x     / 251 U/L / x     / x          Urinalysis Basic - ( 25 Sep 2022 12:01 )    Color: Light Yellow / Appearance: Clear / S.035 / pH: x  Gluc: x / Ketone: Trace  / Bili: Negative / Urobili: <2 mg/dL   Blood: x / Protein: 30 mg/dL / Nitrite: Negative   Leuk Esterase: Negative / RBC: 1 /HPF / WBC 1 /HPF   Sq Epi: x / Non Sq Epi: 2 /HPF / Bacteria: Negative        RADIOLOGY & ADDITIONAL TESTS:    Imaging Personally Reviewed:  [x] YES  [ ] NO    Will obtain old records:  [ ] YES  [x] NO

## 2022-09-27 NOTE — PROGRESS NOTE ADULT - ASSESSMENT
72 year-old male, history of HTN, HLD, CAD, cardiac stent, MM, presents with cc generalized weakness x 2-3 days associated with some tingling sensation.    A/P:  Hyponatremia:  Clinically euvolemic  work up suggested SIADH  Na again dropped  s/p 1.5% NaCl 50cc/hr for 5 hrs  Na acceptable range  MOnitor Na level closely. monitor bmp Q 6hrs  Na level should not change more than 6-8meq in 24 hrs    acute on CKD stage 3  Baseline Scr 1.3-1.4  scr worsen  likely sec to ATN  check urine urea, na and cr  monitor bmp, i/o    Hypokalemia:  better  Monitor K level    acidosis  Non AG  supplement oral bicarb  monitor    HTN:  controlled  MOnitor BP    Weakness:  Etiology?  h/o MM  s/p LP  workup per team

## 2022-09-27 NOTE — PROGRESS NOTE ADULT - SUBJECTIVE AND OBJECTIVE BOX
Payam Longoria MD  Interventional Cardiology / Endovascular Specialist  [place] Office : [phone] [place] [address] [place] [address]  Tel:   [place] Office : [address] [facility] [place] [address]  Tel: [phone]      Subjective/Overnight events: Patient lying in bed remains intubated in MICU   	  MEDICATIONS:  heparin   Injectable 5000 Unit(s) SubCutaneous every 8 hours  norepinephrine Infusion 0.3 MICROgram(s)/kG/Min IV Continuous <Continuous>    piperacillin/tazobactam IVPB.. 3.375 Gram(s) IV Intermittent every 8 hours  valACYclovir 500 milliGRAM(s) Oral daily    ALBUTerol    90 MICROgram(s) HFA Inhaler 2 Puff(s) Inhalation every 6 hours  diphenhydrAMINE Injectable 25 milliGRAM(s) IV Push once  ipratropium 17 MICROgram(s) HFA Inhaler 2 Puff(s) Inhalation every 6 hours  sodium chloride 3%  Inhalation 4 milliLiter(s) Inhalation every 6 hours    acetaminophen     Tablet .. 650 milliGRAM(s) Oral once  dexMEDEtomidine Infusion 0.2 MICROgram(s)/kG/Hr IV Continuous <Continuous>  propofol Infusion 40.039 MICROgram(s)/kG/Min IV Continuous <Continuous>    polyethylene glycol 3350 17 Gram(s) Oral daily    atorvastatin 40 milliGRAM(s) Oral at bedtime  dexAMETHasone  Injectable 4 milliGRAM(s) IV Push every 6 hours  glucagon  Injectable 1 milliGRAM(s) IntraMuscular once    chlorhexidine 0.12% Liquid 15 milliLiter(s) Oral Mucosa every 12 hours  chlorhexidine 2% Cloths 1 Application(s) Topical <User Schedule>  immune   globulin 10% (GAMMAGARD) IVPB 35 Gram(s) IV Intermittent daily  potassium chloride  10 mEq/100 mL IVPB 10 milliEquivalent(s) IV Intermittent every 1 hour  sodium bicarbonate 1300 milliGRAM(s) Oral two times a day      PAST MEDICAL/SURGICAL HISTORY  PAST MEDICAL & SURGICAL HISTORY:  HTN (hypertension)      HLD (hyperlipidemia)      Coronary artery disease      Multiple myeloma      S/P coronary artery stent placement          SOCIAL HISTORY: Substance Use (street drugs): ( x ) never used  (  ) other:    FAMILY HISTORY:  Maternal family history of hypertension  Mother          PHYSICAL EXAM:  T(C): 35.8 (09-27-22 @ 12:00), Max: 36.6 (09-26-22 @ 16:00)  HR: 61 (09-27-22 @ 12:00) (50 - 108)  BP: 95/56 (09-27-22 @ 12:00) (77/46 - 163/68)  RR: 18 (09-27-22 @ 12:00) (16 - 27)  SpO2: 100% (09-27-22 @ 12:00) (95% - 100%)  Wt(kg): --  I&O's Summary    26 Sep 2022 07:01  -  27 Sep 2022 07:00  --------------------------------------------------------  IN: 2131.1 mL / OUT: 1140 mL / NET: 991.1 mL    27 Sep 2022 07:01  -  27 Sep 2022 13:23  --------------------------------------------------------  IN: 936.8 mL / OUT: 0 mL / NET: 936.8 mL        GENERAL: intubated  EYES:  conjunctiva and sclera clear  Cardiovascular: Normal S1 S2, No JVD, No murmurs, No edema  Respiratory: intubated	  Gastrointestinal:  Soft,   Extremities: No edema                                     9.8    13.52 )-----------( 213      ( 27 Sep 2022 04:00 )             29.3     09-27    127<L>  |  98  |  69<H>  ----------------------------<  175<H>  4.1   |  15<L>  |  1.65<H>    Ca    8.4      27 Sep 2022 04:00  Phos  3.2     09-27  Mg     2.50     09-27    TPro  8.1  /  Alb  3.6  /  TBili  0.3  /  DBili  x   /  AST  31  /  ALT  38  /  AlkPhos  39<L>  09-27    proBNP:   Lipid Profile:   HgA1c:   TSH:     Consultant(s) Notes Reviewed:  [x ] YES  [ ] NO    Care Discussed with Consultants/Other Providers [ x] YES  [ ] NO    Imaging Personally Reviewed independently:  [x] YES  [ ] NO    All labs, radiologic studies, vitals, orders and medications list reviewed. Patient is seen and examined at bedside. Case discussed with medical team.

## 2022-09-28 LAB
% ALBUMIN: 46.3 % — SIGNIFICANT CHANGE UP
% ALPHA 1: 3.7 % — SIGNIFICANT CHANGE UP
% ALPHA 2: 16.1 % — SIGNIFICANT CHANGE UP
% BETA: 15.6 % — SIGNIFICANT CHANGE UP
% GAMMA, URINE: SIGNIFICANT CHANGE UP
% GAMMA: 18.2 % — SIGNIFICANT CHANGE UP
ACRM BINDING ANTIBODY: 0.04 NMOL/L — SIGNIFICANT CHANGE UP (ref 0–0.24)
ALBUMIN 24H MFR UR ELPH: PRESENT
ALBUMIN SERPL ELPH-MCNC: 3.4 G/DL — SIGNIFICANT CHANGE UP (ref 3.3–5)
ALBUMIN SERPL ELPH-MCNC: 3.47 G/DL — SIGNIFICANT CHANGE UP (ref 3.3–4.4)
ALBUMIN/GLOB SERPL ELPH: 0.9 RATIO — SIGNIFICANT CHANGE UP
ALP SERPL-CCNC: 36 U/L — LOW (ref 40–120)
ALPHA1 GLOB 24H MFR UR ELPH: SIGNIFICANT CHANGE UP
ALPHA1 GLOB SERPL ELPH-MCNC: 0.28 G/DL — SIGNIFICANT CHANGE UP (ref 0.1–0.3)
ALPHA2 GLOB 24H MFR UR ELPH: SIGNIFICANT CHANGE UP
ALPHA2 GLOB SERPL ELPH-MCNC: 1.2 G/DL — HIGH (ref 0.6–1)
ALT FLD-CCNC: 79 U/L — HIGH (ref 4–41)
ANION GAP SERPL CALC-SCNC: 13 MMOL/L — SIGNIFICANT CHANGE UP (ref 7–14)
APTT BLD: 35.2 SEC — SIGNIFICANT CHANGE UP (ref 27–36.3)
AST SERPL-CCNC: 52 U/L — HIGH (ref 4–40)
B-GLOBULIN 24H MFR UR ELPH: SIGNIFICANT CHANGE UP
B-GLOBULIN SERPL ELPH-MCNC: 1.17 G/DL — HIGH (ref 0.6–1.1)
BASOPHILS # BLD AUTO: 0.01 K/UL — SIGNIFICANT CHANGE UP (ref 0–0.2)
BASOPHILS NFR BLD AUTO: 0.1 % — SIGNIFICANT CHANGE UP (ref 0–2)
BILIRUB SERPL-MCNC: 0.3 MG/DL — SIGNIFICANT CHANGE UP (ref 0.2–1.2)
BUN SERPL-MCNC: 82 MG/DL — HIGH (ref 7–23)
CALCIUM SERPL-MCNC: 8.3 MG/DL — LOW (ref 8.4–10.5)
CHLORIDE SERPL-SCNC: 103 MMOL/L — SIGNIFICANT CHANGE UP (ref 98–107)
CO2 SERPL-SCNC: 18 MMOL/L — LOW (ref 22–31)
CREAT SERPL-MCNC: 1.6 MG/DL — HIGH (ref 0.5–1.3)
EGFR: 45 ML/MIN/1.73M2 — LOW
EOSINOPHIL # BLD AUTO: 0 K/UL — SIGNIFICANT CHANGE UP (ref 0–0.5)
EOSINOPHIL NFR BLD AUTO: 0 % — SIGNIFICANT CHANGE UP (ref 0–6)
GAMMA GLOBULIN: 1.36 G/DL — SIGNIFICANT CHANGE UP (ref 0.7–1.7)
GLUCOSE BLDC GLUCOMTR-MCNC: 178 MG/DL — HIGH (ref 70–99)
GLUCOSE BLDC GLUCOMTR-MCNC: 183 MG/DL — HIGH (ref 70–99)
GLUCOSE BLDC GLUCOMTR-MCNC: 187 MG/DL — HIGH (ref 70–99)
GLUCOSE BLDC GLUCOMTR-MCNC: 195 MG/DL — HIGH (ref 70–99)
GLUCOSE BLDC GLUCOMTR-MCNC: 216 MG/DL — HIGH (ref 70–99)
GLUCOSE SERPL-MCNC: 142 MG/DL — HIGH (ref 70–99)
HCT VFR BLD CALC: 25.1 % — LOW (ref 39–50)
HGB BLD-MCNC: 8.6 G/DL — LOW (ref 13–17)
IANC: 9.41 K/UL — HIGH (ref 1.8–7.4)
IMM GRANULOCYTES NFR BLD AUTO: 1.2 % — HIGH (ref 0–0.9)
INR BLD: 1.01 RATIO — SIGNIFICANT CHANGE UP (ref 0.88–1.16)
LYMPHOCYTES # BLD AUTO: 0.8 K/UL — LOW (ref 1–3.3)
LYMPHOCYTES # BLD AUTO: 7 % — LOW (ref 13–44)
M PROTEIN 24H UR ELPH-MRATE: SIGNIFICANT CHANGE UP
MAGNESIUM SERPL-MCNC: 2.6 MG/DL — SIGNIFICANT CHANGE UP (ref 1.6–2.6)
MCHC RBC-ENTMCNC: 31.3 PG — SIGNIFICANT CHANGE UP (ref 27–34)
MCHC RBC-ENTMCNC: 34.3 GM/DL — SIGNIFICANT CHANGE UP (ref 32–36)
MCV RBC AUTO: 91.3 FL — SIGNIFICANT CHANGE UP (ref 80–100)
MONOCYTES # BLD AUTO: 1.07 K/UL — HIGH (ref 0–0.9)
MONOCYTES NFR BLD AUTO: 9.4 % — SIGNIFICANT CHANGE UP (ref 2–14)
NEUTROPHILS # BLD AUTO: 9.41 K/UL — HIGH (ref 1.8–7.4)
NEUTROPHILS NFR BLD AUTO: 82.3 % — HIGH (ref 43–77)
NIGHT BLUE STAIN TISS: SIGNIFICANT CHANGE UP
NIGHT BLUE STAIN TISS: SIGNIFICANT CHANGE UP
NRBC # BLD: 0 /100 WBCS — SIGNIFICANT CHANGE UP (ref 0–0)
NRBC # FLD: 0.02 K/UL — HIGH (ref 0–0)
PHOSPHATE SERPL-MCNC: 3.6 MG/DL — SIGNIFICANT CHANGE UP (ref 2.5–4.5)
PLATELET # BLD AUTO: 191 K/UL — SIGNIFICANT CHANGE UP (ref 150–400)
POTASSIUM SERPL-MCNC: 4.4 MMOL/L — SIGNIFICANT CHANGE UP (ref 3.5–5.3)
POTASSIUM SERPL-SCNC: 4.4 MMOL/L — SIGNIFICANT CHANGE UP (ref 3.5–5.3)
PROT ?TM UR-MCNC: 68 MG/DL — SIGNIFICANT CHANGE UP
PROT PATTERN 24H UR ELPH-IMP: SIGNIFICANT CHANGE UP
PROT PATTERN SERPL ELPH-IMP: SIGNIFICANT CHANGE UP
PROT SERPL-MCNC: 7.5 G/DL — SIGNIFICANT CHANGE UP
PROT SERPL-MCNC: 8.3 G/DL — SIGNIFICANT CHANGE UP (ref 6–8.3)
PROTHROM AB SERPL-ACNC: 11.7 SEC — SIGNIFICANT CHANGE UP (ref 10.5–13.4)
RBC # BLD: 2.75 M/UL — LOW (ref 4.2–5.8)
RBC # FLD: 15 % — HIGH (ref 10.3–14.5)
SODIUM SERPL-SCNC: 134 MMOL/L — LOW (ref 135–145)
SPECIMEN SOURCE: SIGNIFICANT CHANGE UP
SPECIMEN SOURCE: SIGNIFICANT CHANGE UP
WBC # BLD: 11.43 K/UL — HIGH (ref 3.8–10.5)
WBC # FLD AUTO: 11.43 K/UL — HIGH (ref 3.8–10.5)

## 2022-09-28 PROCEDURE — 99291 CRITICAL CARE FIRST HOUR: CPT

## 2022-09-28 RX ORDER — ALBUTEROL 90 UG/1
2 AEROSOL, METERED ORAL EVERY 12 HOURS
Refills: 0 | Status: DISCONTINUED | OUTPATIENT
Start: 2022-09-28 | End: 2022-10-03

## 2022-09-28 RX ORDER — POLYETHYLENE GLYCOL 3350 17 G/17G
17 POWDER, FOR SOLUTION ORAL EVERY 12 HOURS
Refills: 0 | Status: DISCONTINUED | OUTPATIENT
Start: 2022-09-28 | End: 2022-09-29

## 2022-09-28 RX ORDER — SODIUM CHLORIDE 9 MG/ML
4 INJECTION INTRAMUSCULAR; INTRAVENOUS; SUBCUTANEOUS EVERY 12 HOURS
Refills: 0 | Status: DISCONTINUED | OUTPATIENT
Start: 2022-09-28 | End: 2022-10-04

## 2022-09-28 RX ADMIN — ALBUTEROL 2 PUFF(S): 90 AEROSOL, METERED ORAL at 21:10

## 2022-09-28 RX ADMIN — PROPOFOL 12.3 MICROGRAM(S)/KG/MIN: 10 INJECTION, EMULSION INTRAVENOUS at 13:02

## 2022-09-28 RX ADMIN — IMMUNE GLOBULIN (HUMAN) 58.33 GRAM(S): 10 INJECTION INTRAVENOUS; SUBCUTANEOUS at 16:44

## 2022-09-28 RX ADMIN — ATORVASTATIN CALCIUM 40 MILLIGRAM(S): 80 TABLET, FILM COATED ORAL at 21:58

## 2022-09-28 RX ADMIN — Medication 1300 MILLIGRAM(S): at 17:08

## 2022-09-28 RX ADMIN — CHLORHEXIDINE GLUCONATE 15 MILLILITER(S): 213 SOLUTION TOPICAL at 17:08

## 2022-09-28 RX ADMIN — DEXMEDETOMIDINE HYDROCHLORIDE IN 0.9% SODIUM CHLORIDE 2.56 MICROGRAM(S)/KG/HR: 4 INJECTION INTRAVENOUS at 19:10

## 2022-09-28 RX ADMIN — Medication 2 PUFF(S): at 09:25

## 2022-09-28 RX ADMIN — Medication 1 APPLICATION(S): at 05:30

## 2022-09-28 RX ADMIN — Medication 1 APPLICATION(S): at 17:09

## 2022-09-28 RX ADMIN — Medication 4 MILLIGRAM(S): at 05:29

## 2022-09-28 RX ADMIN — Medication 4 MILLIGRAM(S): at 00:43

## 2022-09-28 RX ADMIN — CHLORHEXIDINE GLUCONATE 15 MILLILITER(S): 213 SOLUTION TOPICAL at 05:29

## 2022-09-28 RX ADMIN — VALACYCLOVIR 500 MILLIGRAM(S): 500 TABLET, FILM COATED ORAL at 12:52

## 2022-09-28 RX ADMIN — PIPERACILLIN AND TAZOBACTAM 25 GRAM(S): 4; .5 INJECTION, POWDER, LYOPHILIZED, FOR SOLUTION INTRAVENOUS at 05:30

## 2022-09-28 RX ADMIN — ALBUTEROL 2 PUFF(S): 90 AEROSOL, METERED ORAL at 03:22

## 2022-09-28 RX ADMIN — Medication 2 PUFF(S): at 14:59

## 2022-09-28 RX ADMIN — ALBUTEROL 2 PUFF(S): 90 AEROSOL, METERED ORAL at 09:25

## 2022-09-28 RX ADMIN — DEXMEDETOMIDINE HYDROCHLORIDE IN 0.9% SODIUM CHLORIDE 2.56 MICROGRAM(S)/KG/HR: 4 INJECTION INTRAVENOUS at 16:45

## 2022-09-28 RX ADMIN — PROPOFOL 12.3 MICROGRAM(S)/KG/MIN: 10 INJECTION, EMULSION INTRAVENOUS at 19:10

## 2022-09-28 RX ADMIN — Medication 1300 MILLIGRAM(S): at 05:29

## 2022-09-28 RX ADMIN — HEPARIN SODIUM 5000 UNIT(S): 5000 INJECTION INTRAVENOUS; SUBCUTANEOUS at 05:29

## 2022-09-28 RX ADMIN — POLYETHYLENE GLYCOL 3350 17 GRAM(S): 17 POWDER, FOR SOLUTION ORAL at 17:08

## 2022-09-28 RX ADMIN — DEXMEDETOMIDINE HYDROCHLORIDE IN 0.9% SODIUM CHLORIDE 2.56 MICROGRAM(S)/KG/HR: 4 INJECTION INTRAVENOUS at 12:52

## 2022-09-28 RX ADMIN — HEPARIN SODIUM 5000 UNIT(S): 5000 INJECTION INTRAVENOUS; SUBCUTANEOUS at 17:59

## 2022-09-28 RX ADMIN — Medication 2 PUFF(S): at 03:22

## 2022-09-28 RX ADMIN — Medication 2 PUFF(S): at 21:10

## 2022-09-28 RX ADMIN — Medication 4 MILLIGRAM(S): at 12:52

## 2022-09-28 RX ADMIN — CHLORHEXIDINE GLUCONATE 1 APPLICATION(S): 213 SOLUTION TOPICAL at 05:40

## 2022-09-28 NOTE — PROGRESS NOTE ADULT - SUBJECTIVE AND OBJECTIVE BOX
Payam Longoria MD  Interventional Cardiology / Endovascular Specialist  [place] Office : [phone] [place] [address] [place] [address]  Tel:   [place] Office : [address] [facility] [place] [address]  Tel: [phone]      Subjective/Overnight events: Patient lying in bed remains intubated in ICU   	  MEDICATIONS:  heparin   Injectable 5000 Unit(s) SubCutaneous every 8 hours  norepinephrine Infusion 0.3 MICROgram(s)/kG/Min IV Continuous <Continuous>    valACYclovir 500 milliGRAM(s) Oral daily    ALBUTerol    90 MICROgram(s) HFA Inhaler 2 Puff(s) Inhalation every 12 hours  diphenhydrAMINE Injectable 25 milliGRAM(s) IV Push once  ipratropium 17 MICROgram(s) HFA Inhaler 2 Puff(s) Inhalation every 6 hours  sodium chloride 3%  Inhalation 4 milliLiter(s) Inhalation every 12 hours    acetaminophen     Tablet .. 650 milliGRAM(s) Oral once  dexMEDEtomidine Infusion 0.2 MICROgram(s)/kG/Hr IV Continuous <Continuous>  propofol Infusion 40.039 MICROgram(s)/kG/Min IV Continuous <Continuous>    polyethylene glycol 3350 17 Gram(s) Oral every 12 hours    atorvastatin 40 milliGRAM(s) Oral at bedtime  glucagon  Injectable 1 milliGRAM(s) IntraMuscular once    chlorhexidine 0.12% Liquid 15 milliLiter(s) Oral Mucosa every 12 hours  chlorhexidine 2% Cloths 1 Application(s) Topical <User Schedule>  immune   globulin 10% (GAMMAGARD) IVPB 35 Gram(s) IV Intermittent daily  petrolatum Ophthalmic Ointment 1 Application(s) Both EYES two times a day  potassium chloride  10 mEq/100 mL IVPB 10 milliEquivalent(s) IV Intermittent every 1 hour  sodium bicarbonate 1300 milliGRAM(s) Oral two times a day      PAST MEDICAL/SURGICAL HISTORY  PAST MEDICAL & SURGICAL HISTORY:  HTN (hypertension)      HLD (hyperlipidemia)      Coronary artery disease      Multiple myeloma      S/P coronary artery stent placement          SOCIAL HISTORY: Substance Use (street drugs): ( x ) never used  (  ) other:    FAMILY HISTORY:  Maternal family history of hypertension  Mother        PHYSICAL EXAM:  T(C): 35.6 (09-28-22 @ 12:00), Max: 36.6 (09-28-22 @ 04:00)  HR: 82 (09-28-22 @ 14:59) (57 - 144)  BP: 124/60 (09-28-22 @ 13:00) (83/55 - 200/92)  RR: 12 (09-28-22 @ 13:00) (12 - 28)  SpO2: 100% (09-28-22 @ 14:54) (96% - 100%)  Wt(kg): --  I&O's Summary    27 Sep 2022 07:01  -  28 Sep 2022 07:00  --------------------------------------------------------  IN: 2120.1 mL / OUT: 327 mL / NET: 1793.1 mL    28 Sep 2022 07:01  -  28 Sep 2022 16:30  --------------------------------------------------------  IN: 438 mL / OUT: 290 mL / NET: 148 mL              GENERAL: intubated  EYES:  conjunctiva and sclera clear  Cardiovascular: Normal S1 S2, No JVD, No murmurs, No edema  Respiratory: intubated	  Gastrointestinal:  Soft,   Extremities: No edema                           8.6    11.43 )-----------( 191      ( 28 Sep 2022 02:00 )             25.1     09-28    134<L>  |  103  |  82<H>  ----------------------------<  142<H>  4.4   |  18<L>  |  1.60<H>    Ca    8.3<L>      28 Sep 2022 02:00  Phos  3.6     09-28  Mg     2.60     09-28    TPro  8.3  /  Alb  3.4  /  TBili  0.3  /  DBili  x   /  AST  52<H>  /  ALT  79<H>  /  AlkPhos  36<L>  09-28    proBNP:   Lipid Profile:   HgA1c:   TSH:     Consultant(s) Notes Reviewed:  [x ] YES  [ ] NO    Care Discussed with Consultants/Other Providers [ x] YES  [ ] NO    Imaging Personally Reviewed independently:  [x] YES  [ ] NO    All labs, radiologic studies, vitals, orders and medications list reviewed. Patient is seen and examined at bedside. Case discussed with medical team.

## 2022-09-28 NOTE — PROGRESS NOTE ADULT - ASSESSMENT
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     =====Neuro/Psych=====  #Generalized Weakness  Pt family reporting a descending weakness that ultimately started to involve his speaking ability as well.  Low c/f botulism given not having flaccid, but some suspicion for paraneoplastic process (e.g. LES/MG).  - f/u MRI brain and C/T/L spine  - c/w decadron 4mg q6h per neurosx recs for spinal lytic lesions pending MRI  -IVIG 35g qd (today 2/3) as per neuro for possible GBS  -f/u anti ganglioside ab  -appreciate neurology recommendations     #Sedation/Analgesia  - C/W Propofol for now    =====Respiratory=====  #Acute hypoxic Respiratory Failure  Pt with increased WOB, reportedly wheezing as well. No h/o COPD  - C/W Albuterol/Ipratropium MDI's while intubated  -wean vent as tolerated (370/18/8/50%)    #Hemoptysis  Pt reportedly with hemoptysis in [facility]. Pt with RLL calcified granuloma & scattered pulm nodules.   - f/u quant gold   - f/u sputum AFB    =====Cardiovascular=====  #Vasoplegic Shock  #HTN #HLD #CAD  Pt with minimal rpessor requirement on sedation for intubation. Likely vasoplegic in that context. CTM  - HOLDing Home ASA 81mg PO QD (anticipating LP)  - HOLDing Home Metoprolol tartrate 50mg PO BID  - HOLDing Home Amlodipine 10mg PO QD    #BNP elevated  Pt with BNP elevation, respiratory distress.  - TTE pending    =====Gastrointestinal=====  #RAUL  - NPO w/ TF    =====Renal/=====  #Hyponatremia, CRISTI  Pt with persistently low sodium. 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 Should have fluid restriction when not intubated.  - Na improving, cont to trend BMP qD  - Trend Cr, f/u urine lytes  - Strict Is/Os  -monitor sodium given GBS and patient to receive IVIG    =====MSK/Skin=====  #T8 Compression Fx  Eventually, will need MR spine and spine consult to assess for possible interventions. Diffuse spinal lesions.   - Neurosx consulted     =====Infectious Disease=====  #Bacillus cereus in blood cxs x1 bottle on 9/25, ?contaminate  - f/u repeat blood cxs 9/27  - afebrile, WBC count improving, monitor off abx for now pending repeat cxs  - f/u sputum cxs    =====Endocrine=====  #Hyperglycemia  Pt with mild hyperglycemia on admission. A1c 5.7% (?pre-DM)  - FS q6h    =====Heme/Onc=====  #Multiple Myeloma  Pt had last received Velcade 3/2022. Had been given opportunity for chemo vacation and travelled to [facility], but pt went for longer than anticipated. Now with diffuse lytic lesions involving most of spine. Had previously had XRT as well.   Diagnostics:  - LDH, B2-Microglobulin  - SPEP, UPEP, IFX (S/U), Immunoglobulins, FLCs    Therapeutics: pending evaluation as above    =====Hospital Bundle=====  Hospital Bundle  Fluids: with gtts  Electrolytes: Replete K > 4, Mg > 2, Phos > 3  Nutrition: Diet NPO with TF (Nutrition C/S)  PPX  ---VTE: SCD  ---GI: TFs  ---Resp: Vented  ---: Dahl Placed 9/25, removed now condom cath   Access: PIVs  Code Status: FULL CODE  Dispo: Pending Medical Optimization 73 y/o M with a PMHx significant for HTN, HLD, CAD s/p stent, MM, presents with descending paralysis complicated by acute hypoxic respiratory failure and interval vasoplegic shock resolved found to have 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     Neuro   #Generalized Weakness  Pt family reporting a descending weakness that ultimately started to involve his speaking ability as well.  Low c/f botulism given not having flaccid, but some suspicion for paraneoplastic process (e.g. LES/MG).  - f/u MRI brain and C/T/L spine  -Discontinued decadron 9/28 discussed with neurosurgery no cord compression and neuro deferred further decision making regarding decadron.   -IVIG 35g qd (3/3) as per neuro for possible GBS  -f/u anti ganglioside ab  -appreciate neurology recommendations     #Sedation/Analgesia  - C/W Propofol for now    Pulmonary   #Acute hypoxic Respiratory Failure  Pt with increased WOB, reportedly wheezing as well. No h/o COPD  - C/W Albuterol/Ipratropium MDI's while intubated  -wean vent as tolerated (370/18/8/50%)  -with decreased secretions 9/28 decreased frequency of airway clearance.     #Hemoptysis  Pt reportedly with hemoptysis in [facility]. Pt with RLL calcified granuloma & scattered pulm nodules.   -quant gold indeterminate  - sputum afb x3     Cardiovascular   #Vasoplegic Shock   #HLD #CAD  Pt with minimal pressor requirement on sedation for intubation. Likely vasoplegic in that context. CTM resolved.   - HOLDing Home ASA 81mg PO QD (anticipating LP)  - HOLDing Home Metoprolol tartrate 50mg PO BID    #HTN  -restart amlodipine 10mg qd     #BNP elevated  Pt with BNP elevation, respiratory distress.  - TTE pending    GI  #RAUL  - NPO w/ TF    Renal   #Hyponatremia, CRISTI  Pt with persistently low sodium. 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 Should have fluid restriction when not intubated.  - Na improving, cont to trend BMP qD  - Trend Cr, f/u urine lytes  - Strict Is/Os  -monitor sodium given GBS and patient to receive IVIG    MSK  #T8 Compression Fx  Eventually, will need MR spine and spine consult to assess for possible interventions. Diffuse spinal lesions.   - Neurosx consulted     ID  #Bacillus cereus in blood cxs x1 bottle on 9/25, ?contaminate  - 9/27 BCx NGTD  - afebrile, WBC count improving, monitor off abx     r/o TB  - f/u sputum cxsc AFBs    Endocrine   #Hyperglycemia  Pt with mild hyperglycemia on admission. A1c 5.7% (?pre-DM)  - FS q6h    Heme/onc  #Multiple Myeloma  Pt had last received Velcade 3/2022. Had been given opportunity for chemo vacation and travelled to [facility], but pt went for longer than anticipated. Now with diffuse lytic lesions involving most of spine. Had previously had XRT as well.   Diagnostics:  - LDH, B2-Microglobulin  - SPEP, UPEP, IFX (S/U), Immunoglobulins, FLCs    Therapeutics: pending evaluation as above    =====Hospital Bundle=====  Hospital Bundle  Fluids: with gtts  Electrolytes: Replete K > 4, Mg > 2, Phos > 3  Nutrition: Diet NPO with TF (Nutrition C/S)  PPX  ---VTE: SCD  ---GI: TFs  ---Resp: Vented  ---: Dahl Placed 9/25, removed now condom cath   Access: PIVs  Code Status: FULL CODE  Dispo: Pending Medical Optimization

## 2022-09-28 NOTE — PROGRESS NOTE ADULT - SUBJECTIVE AND OBJECTIVE BOX
CPAP trials commenced this morning      Vital Signs Last 24 Hrs  T(C): 35.6 (28 Sep 2022 12:00), Max: 36.6 (28 Sep 2022 04:00)  T(F): 96 (28 Sep 2022 12:00), Max: 97.9 (28 Sep 2022 04:00)  HR: 82 (28 Sep 2022 14:59) (57 - 144)  BP: 124/60 (28 Sep 2022 13:00) (83/55 - 200/92)  BP(mean): 79 (28 Sep 2022 13:00) (57 - 119)  RR: 12 (28 Sep 2022 13:00) (12 - 28)  SpO2: 100% (28 Sep 2022 14:54) (96% - 100%)    I&O's Summary    09-27-22 @ 07:01  -  09-28-22 @ 07:00  --------------------------------------------------------  IN: 2120.1 mL / OUT: 327 mL / NET: 1793.1 mL    09-28-22 @ 07:01  -  09-28-22 @ 15:40  --------------------------------------------------------  IN: 438 mL / OUT: 290 mL / NET: 148 mL        GENERAL: intubated, sedated  HEAD:  Atraumatic, Normocephalic, (+)rt nares NGT in place  EYES: EOMI, PERRLA, conjunctiva and sclera clear  NECK: Supple, No JVD, No LAD, No carotid bruits, No thyromegaly  CHEST/LUNG: Vented BS with decreased BS lt lower field  HEART: Regular rate and rhythm; nl S1/S2; No murmurs, rubs, or gallops  ABDOMEN: Soft, Nondistended; Bowel sounds present; No hepatosplenomegaly  EXTREMITIES:  2+ Peripheral Pulses; No clubbing, cyanosis, or edema b/l; Nl ROM  SKIN: No rashes or lesions; No jaundice; Normal turgor, texture  NEURO: unable to follow commands; no focal posturing  : moser in place    LABS:                        8.6    11.43 )-----------( 191      ( 28 Sep 2022 02:00 )             25.1     09-28    134<L>  |  103  |  82<H>  ----------------------------<  142<H>  4.4   |  18<L>  |  1.60<H>    Ca    8.3<L>      28 Sep 2022 02:00  Phos  3.6     09-28  Mg     2.60     09-28    TPro  8.3  /  Alb  3.4  /  TBili  0.3  /  DBili  x   /  AST  52<H>  /  ALT  79<H>  /  AlkPhos  36<L>  09-28    PT/INR - ( 28 Sep 2022 02:00 )   PT: 11.7 sec;   INR: 1.01 ratio         PTT - ( 28 Sep 2022 02:00 )  PTT:35.2 sec  CAPILLARY BLOOD GLUCOSE      POCT Blood Glucose.: 187 mg/dL (28 Sep 2022 12:55)  POCT Blood Glucose.: 195 mg/dL (28 Sep 2022 05:42)  POCT Blood Glucose.: 183 mg/dL (28 Sep 2022 00:10)  POCT Blood Glucose.: 220 mg/dL (27 Sep 2022 17:06)            RADIOLOGY & ADDITIONAL TESTS:    Imaging Personally Reviewed:  [x] YES  [ ] NO    Will obtain old records:  [ ] YES  [x] NO

## 2022-09-28 NOTE — PROGRESS NOTE ADULT - SUBJECTIVE AND OBJECTIVE BOX
[facility] [place] [facility]   MD DERRICK MOSS MD KRISTINE SOLTANPOUR, DO ANGELA WONG, PA    TEL:  OFFICE: [phone]  From 5pm-7am Answering Service [phone]    -- RENAL FOLLOW UP NOTE ---Date of Service 09-28-22 @ 14:48    Patient is a 72y old  Male who presents with a chief complaint of weakness (28 Sep 2022 08:04)      Patient seen and examined at bedside.     VITALS:  T(F): 96 (09-28-22 @ 12:00), Max: 97.9 (09-28-22 @ 04:00)  HR: 60 (09-28-22 @ 13:00)  BP: 124/60 (09-28-22 @ 13:00)  RR: 12 (09-28-22 @ 13:00)  SpO2: 100% (09-28-22 @ 13:00)  Wt(kg): --    09-27 @ 07:01  -  09-28 @ 07:00  --------------------------------------------------------  IN: 2120.1 mL / OUT: 327 mL / NET: 1793.1 mL    09-28 @ 07:01  -  09-28 @ 14:48  --------------------------------------------------------  IN: 438 mL / OUT: 290 mL / NET: 148 mL          PHYSICAL EXAM:  General: intubated  Neck: No JVD  Respiratory: CTAB, no wheezes, rales or rhonchi  Cardiovascular: S1, S2, RRR  Gastrointestinal: BS+, soft, NT/ND  Extremities: No peripheral edema    Hospital Medications:   MEDICATIONS  (STANDING):  acetaminophen     Tablet .. 650 milliGRAM(s) Oral once  ALBUTerol    90 MICROgram(s) HFA Inhaler 2 Puff(s) Inhalation every 12 hours  atorvastatin 40 milliGRAM(s) Oral at bedtime  chlorhexidine 0.12% Liquid 15 milliLiter(s) Oral Mucosa every 12 hours  chlorhexidine 2% Cloths 1 Application(s) Topical <User Schedule>  dexMEDEtomidine Infusion 0.2 MICROgram(s)/kG/Hr (2.56 mL/Hr) IV Continuous <Continuous>  diphenhydrAMINE Injectable 25 milliGRAM(s) IV Push once  glucagon  Injectable 1 milliGRAM(s) IntraMuscular once  heparin   Injectable 5000 Unit(s) SubCutaneous every 8 hours  immune   globulin 10% (GAMMAGARD) IVPB 35 Gram(s) IV Intermittent daily  ipratropium 17 MICROgram(s) HFA Inhaler 2 Puff(s) Inhalation every 6 hours  norepinephrine Infusion 0.3 MICROgram(s)/kG/Min (28.8 mL/Hr) IV Continuous <Continuous>  petrolatum Ophthalmic Ointment 1 Application(s) Both EYES two times a day  polyethylene glycol 3350 17 Gram(s) Oral every 12 hours  potassium chloride  10 mEq/100 mL IVPB 10 milliEquivalent(s) IV Intermittent every 1 hour  propofol Infusion 40.039 MICROgram(s)/kG/Min (12.3 mL/Hr) IV Continuous <Continuous>  sodium bicarbonate 1300 milliGRAM(s) Oral two times a day  sodium chloride 3%  Inhalation 4 milliLiter(s) Inhalation every 12 hours  valACYclovir 500 milliGRAM(s) Oral daily      LABS:  09-28    134<L>  |  103  |  82<H>  ----------------------------<  142<H>  4.4   |  18<L>  |  1.60<H>    Ca    8.3<L>      28 Sep 2022 02:00  Phos  3.6     09-28  Mg     2.60     09-28    TPro  8.3  /  Alb  3.4  /  TBili  0.3  /  DBili      /  AST  52<H>  /  ALT  79<H>  /  AlkPhos  36<L>  09-28    Creatinine Trend: 1.60 <--, 1.65 <--, 1.65 <--, 1.63 <--, 1.49 <--, 1.33 <--, 1.12 <--, 1.15 <--, 1.19 <--, 1.08 <--, 1.17 <--, 1.06 <--, 1.37 <--    Albumin, Serum: 3.4 g/dL (09-28 @ 02:00)  Phosphorus Level, Serum: 3.6 mg/dL (09-28 @ 02:00)  Albumin, Serum: 3.4 g/dL (09-27 @ 17:15)  Phosphorus Level, Serum: 3.2 mg/dL (09-27 @ 17:15)                              8.6    11.43 )-----------( 191      ( 28 Sep 2022 02:00 )             25.1     Urine Studies:  Urinalysis - [09-25-22 @ 12:01]      Color Light Yellow / Appearance Clear / SG 1.035 / pH 6.5      Gluc 200 mg/dL / Ketone Trace  / Bili Negative / Urobili <2 mg/dL       Blood Trace / Protein 30 mg/dL / Leuk Est Negative / Nitrite Negative      RBC 1 / WBC 1 / Hyaline  / Gran  / Sq Epi  / Non Sq Epi 2 / Bacteria Negative    Urine Creatinine 33      [09-27-22 @ 13:50]  Urine Protein 68      [09-25-22 @ 12:01]  Urine Sodium <20      [09-27-22 @ 13:50]  Urine Urea Nitrogen 888.4      [09-27-22 @ 13:50]  Urine Osmolality 428      [09-27-22 @ 13:50]    Iron 36, TIBC 290, %sat 12      [09-25-22 @ 11:15]  Ferritin 287      [09-25-22 @ 11:15]  TSH 1.57      [09-25-22 @ 07:30]      Free Light Chains: kappa 2.58, lambda 2.12, ratio = 1.22      [09-25 @ 11:15]    RADIOLOGY & ADDITIONAL STUDIES:

## 2022-09-28 NOTE — PROGRESS NOTE ADULT - ASSESSMENT
Patient is a 71 yo M with PMH of HTN, HLD, CAD, cardiac stent, Multiple Myeloma who presented to the ED for 3 days of generalized weakness.    EKG: ST PVCs  Tele: NSR PVCs episodes of PAT    1. Weakness  -rapidly progressed. was unable to move extremities.   -s/p RRT for hypoxia now intubated in MICU   -neuro on board, possible GBS vs pathology in neck  -MRI spine pending   -echo shows mild AR/AS, normal LV systolic function and mild diastolic dysfunction     2. CAD s/p PCI  -in 2015 in [facility] as per daughter patient had heart attack  -no reported CP prior to hospitalization    3. MM  -heme/onc on board

## 2022-09-28 NOTE — PROGRESS NOTE ADULT - ASSESSMENT
73 y/o M with a PMHx significant for HTN, HLD, CAD s/p stent, MM, presents with descending paralysis associated with some tingling sensation c/b acute hypoxic respiratory failure

## 2022-09-28 NOTE — PROGRESS NOTE ADULT - ASSESSMENT
72 year-old male, history of HTN, HLD, CAD, cardiac stent, MM, presents with cc generalized weakness x 2-3 days associated with some tingling sensation.    A/P:  Hyponatremia:  Clinically euvolemic  work up suggested SIADH  s/p 1.5% NaCl 50cc/hr for 5 hrs x2  Na improved  MOnitor Na level closely. monitor bmp Q 6hrs  Na level should not change more than 6-8meq in 24 hrs    acute on CKD stage 3  Baseline Scr 1.3-1.4  CRISTI likely sec to ATN  better today  monitor bmp, i/o    Hypokalemia:  better  Monitor K level    acidosis  Non AG  supplement oral bicarb  monitor    HTN:  controlled  MOnitor BP    Weakness:  Etiology?  h/o MM  s/p LP  workup per team

## 2022-09-28 NOTE — PROGRESS NOTE ADULT - SUBJECTIVE AND OBJECTIVE BOX
Deo Oropeza MD  Internal Medicine  Pager #[phone]    CHIEF COMPLAINT:Patient is a 72y old  Male who presents with a chief complaint of weakness (27 Sep 2022 15:05)        Interval Events:    REVIEW OF SYSTEMS:      OBJECTIVE:  ICU Vital Signs Last 24 Hrs  T(C): 36.6 (28 Sep 2022 04:00), Max: 36.6 (28 Sep 2022 04:00)  T(F): 97.9 (28 Sep 2022 04:00), Max: 97.9 (28 Sep 2022 04:00)  HR: 59 (28 Sep 2022 06:22) (56 - 144)  BP: 94/55 (28 Sep 2022 06:00) (83/55 - 200/92)  BP(mean): 67 (28 Sep 2022 06:00) (57 - 119)  ABP: --  ABP(mean): --  RR: 17 (28 Sep 2022 06:00) (16 - 28)  SpO2: 100% (28 Sep 2022 06:22) (99% - 100%)    O2 Parameters below as of 28 Sep 2022 06:00  Patient On (Oxygen Delivery Method): ventilator    O2 Concentration (%): 50      Mode: AC/ CMV (Assist Control/ Continuous Mandatory Ventilation), RR (machine): 18, TV (machine): 370, FiO2: 50, PEEP: 8, MAP: 12, PIP: 23    09-27 @ 07:01  -  09-28 @ 07:00  --------------------------------------------------------  IN: 2120.1 mL / OUT: 327 mL / NET: 1793.1 mL      CAPILLARY BLOOD GLUCOSE      POCT Blood Glucose.: 195 mg/dL (28 Sep 2022 05:42)      PHYSICAL EXAM:      LINES:    HOSPITAL MEDICATIONS:  Standing Meds:  acetaminophen     Tablet .. 650 milliGRAM(s) Oral once  ALBUTerol    90 MICROgram(s) HFA Inhaler 2 Puff(s) Inhalation every 6 hours  atorvastatin 40 milliGRAM(s) Oral at bedtime  chlorhexidine 0.12% Liquid 15 milliLiter(s) Oral Mucosa every 12 hours  chlorhexidine 2% Cloths 1 Application(s) Topical <User Schedule>  dexAMETHasone  Injectable 4 milliGRAM(s) IV Push every 6 hours  dexMEDEtomidine Infusion 0.2 MICROgram(s)/kG/Hr IV Continuous <Continuous>  diphenhydrAMINE Injectable 25 milliGRAM(s) IV Push once  glucagon  Injectable 1 milliGRAM(s) IntraMuscular once  heparin   Injectable 5000 Unit(s) SubCutaneous every 8 hours  immune   globulin 10% (GAMMAGARD) IVPB 35 Gram(s) IV Intermittent daily  ipratropium 17 MICROgram(s) HFA Inhaler 2 Puff(s) Inhalation every 6 hours  norepinephrine Infusion 0.3 MICROgram(s)/kG/Min IV Continuous <Continuous>  petrolatum Ophthalmic Ointment 1 Application(s) Both EYES two times a day  piperacillin/tazobactam IVPB.. 3.375 Gram(s) IV Intermittent every 8 hours  polyethylene glycol 3350 17 Gram(s) Oral daily  potassium chloride  10 mEq/100 mL IVPB 10 milliEquivalent(s) IV Intermittent every 1 hour  propofol Infusion 40.039 MICROgram(s)/kG/Min IV Continuous <Continuous>  sodium bicarbonate 1300 milliGRAM(s) Oral two times a day  sodium chloride 3%  Inhalation 4 milliLiter(s) Inhalation every 6 hours  valACYclovir 500 milliGRAM(s) Oral daily      PRN Meds:      LABS:                        8.6    11.43 )-----------( 191      ( 28 Sep 2022 02:00 )             25.1     Hgb Trend: 8.6<--, 8.0<--, 9.8<--, 10.9<--, 11.4<--  09-28    134<L>  |  103  |  82<H>  ----------------------------<  142<H>  4.4   |  18<L>  |  1.60<H>    Ca    8.3<L>      28 Sep 2022 02:00  Phos  3.6     09-28  Mg     2.60     09-28    TPro  8.3  /  Alb  3.4  /  TBili  0.3  /  DBili  x   /  AST  52<H>  /  ALT  79<H>  /  AlkPhos  36<L>  09-28    Creatinine Trend: 1.60<--, 1.65<--, 1.65<--, 1.63<--, 1.49<--, 1.33<--  PT/INR - ( 28 Sep 2022 02:00 )   PT: 11.7 sec;   INR: 1.01 ratio         PTT - ( 28 Sep 2022 02:00 )  PTT:35.2 sec          MICROBIOLOGY:     Culture - Acid Fast - Sputum w/Smear (collected 27 Sep 2022 12:30)  Source: Trach Asp Tracheal Aspirate    Culture - Blood (collected 27 Sep 2022 03:16)  Source: .Blood Blood-Peripheral  Preliminary Report (28 Sep 2022 07:01):    No growth to date.    Culture - Fungal, CSF (collected 26 Sep 2022 00:30)  Source: .CSF CSF  Preliminary Report (27 Sep 2022 09:18):    Testing in progress    Culture - CSF with Gram Stain (collected 26 Sep 2022 00:30)  Source: .CSF CSF  Gram Stain (26 Sep 2022 07:08):    polymorphonuclear leukocytes seen    No organisms seen    by cytocentrifuge  Preliminary Report (27 Sep 2022 07:33):    No growth to date.    Culture - Acid Fast - CSF (collected 26 Sep 2022 00:30)  Source: .CSF CSF    Culture - Blood (collected 25 Sep 2022 18:25)  Source: .Blood Blood-Peripheral  Gram Stain (26 Sep 2022 10:59):    Growth in anaerobic bottle: Gram Positive Rods  Final Report (26 Sep 2022 23:44):    Growth in anaerobic bottle: Bacillus species not anthracis    "Susceptibilities not performed"    ***Blood Panel PCR results on this specimen are available    approximately 3 hours after the Gram stain result.***    Gram stain, PCR, and/or culture results may not always    correspond due to difference in methodologies.    ************************************************************    This PCR assay was performed by multiplex PCR. This    Assay tests for 66 bacterial and resistance gene targets.    Please refer to the [facility] test directory    at [email or web address] for details.  Organism: Blood Culture PCR (26 Sep 2022 23:44)  Organism: Blood Culture PCR (26 Sep 2022 23:44)    Culture - Blood (collected 25 Sep 2022 18:25)  Source: .Blood Blood-Peripheral  Preliminary Report (27 Sep 2022 01:01):    No growth to date.      RADIOLOGY:  [ ] Reviewed and interpreted by me    EKG:     Deo Oropeza MD  Internal Medicine  Pager #[phone]    CHIEF COMPLAINT:Patient is a 72y old  Male who presents with a chief complaint of weakness (27 Sep 2022 15:05)        Interval Events:    Continues to receive IVIG, no acute overnight events     REVIEW OF SYSTEMS:    ROS limited due to intubated and sedated       OBJECTIVE:  ICU Vital Signs Last 24 Hrs  T(C): 36.6 (28 Sep 2022 04:00), Max: 36.6 (28 Sep 2022 04:00)  T(F): 97.9 (28 Sep 2022 04:00), Max: 97.9 (28 Sep 2022 04:00)  HR: 59 (28 Sep 2022 06:22) (56 - 144)  BP: 94/55 (28 Sep 2022 06:00) (83/55 - 200/92)  BP(mean): 67 (28 Sep 2022 06:00) (57 - 119)  ABP: --  ABP(mean): --  RR: 17 (28 Sep 2022 06:00) (16 - 28)  SpO2: 100% (28 Sep 2022 06:22) (99% - 100%)    O2 Parameters below as of 28 Sep 2022 06:00  Patient On (Oxygen Delivery Method): ventilator    O2 Concentration (%): 50      Mode: AC/ CMV (Assist Control/ Continuous Mandatory Ventilation), RR (machine): 18, TV (machine): 370, FiO2: 50, PEEP: 8, MAP: 12, PIP: 23    09-27 @ 07:01  -  09-28 @ 07:00  --------------------------------------------------------  IN: 2120.1 mL / OUT: 327 mL / NET: 1793.1 mL      CAPILLARY BLOOD GLUCOSE      POCT Blood Glucose.: 195 mg/dL (28 Sep 2022 05:42)      PHYSICAL EXAM:  GENERAL: intubated, sedated  HEAD:   (+)rt nares NGT in place  NECK: Supple, No JVD  CHEST/LUNG: Vented BS with decreased BS lt lower field  HEART: Regular rate and rhythm; nl S1/S2; No murmurs, rubs, or gallops  ABDOMEN: Soft, Nondistended  : condom catheter in place   EXTREMITIES:  well perfused.   NEURO: following commands moving, moves all extremities AOx2  : moser in place      LINES:    HOSPITAL MEDICATIONS:  Standing Meds:  acetaminophen     Tablet .. 650 milliGRAM(s) Oral once  ALBUTerol    90 MICROgram(s) HFA Inhaler 2 Puff(s) Inhalation every 6 hours  atorvastatin 40 milliGRAM(s) Oral at bedtime  chlorhexidine 0.12% Liquid 15 milliLiter(s) Oral Mucosa every 12 hours  chlorhexidine 2% Cloths 1 Application(s) Topical <User Schedule>  dexAMETHasone  Injectable 4 milliGRAM(s) IV Push every 6 hours  dexMEDEtomidine Infusion 0.2 MICROgram(s)/kG/Hr IV Continuous <Continuous>  diphenhydrAMINE Injectable 25 milliGRAM(s) IV Push once  glucagon  Injectable 1 milliGRAM(s) IntraMuscular once  heparin   Injectable 5000 Unit(s) SubCutaneous every 8 hours  immune   globulin 10% (GAMMAGARD) IVPB 35 Gram(s) IV Intermittent daily  ipratropium 17 MICROgram(s) HFA Inhaler 2 Puff(s) Inhalation every 6 hours  norepinephrine Infusion 0.3 MICROgram(s)/kG/Min IV Continuous <Continuous>  petrolatum Ophthalmic Ointment 1 Application(s) Both EYES two times a day  piperacillin/tazobactam IVPB.. 3.375 Gram(s) IV Intermittent every 8 hours  polyethylene glycol 3350 17 Gram(s) Oral daily  potassium chloride  10 mEq/100 mL IVPB 10 milliEquivalent(s) IV Intermittent every 1 hour  propofol Infusion 40.039 MICROgram(s)/kG/Min IV Continuous <Continuous>  sodium bicarbonate 1300 milliGRAM(s) Oral two times a day  sodium chloride 3%  Inhalation 4 milliLiter(s) Inhalation every 6 hours  valACYclovir 500 milliGRAM(s) Oral daily      PRN Meds:      LABS:                        8.6    11.43 )-----------( 191      ( 28 Sep 2022 02:00 )             25.1     Hgb Trend: 8.6<--, 8.0<--, 9.8<--, 10.9<--, 11.4<--  09-28    134<L>  |  103  |  82<H>  ----------------------------<  142<H>  4.4   |  18<L>  |  1.60<H>    Ca    8.3<L>      28 Sep 2022 02:00  Phos  3.6     09-28  Mg     2.60     09-28    TPro  8.3  /  Alb  3.4  /  TBili  0.3  /  DBili  x   /  AST  52<H>  /  ALT  79<H>  /  AlkPhos  36<L>  09-28    Creatinine Trend: 1.60<--, 1.65<--, 1.65<--, 1.63<--, 1.49<--, 1.33<--  PT/INR - ( 28 Sep 2022 02:00 )   PT: 11.7 sec;   INR: 1.01 ratio         PTT - ( 28 Sep 2022 02:00 )  PTT:35.2 sec          MICROBIOLOGY:     Culture - Acid Fast - Sputum w/Smear (collected 27 Sep 2022 12:30)  Source: Trach Asp Tracheal Aspirate    Culture - Blood (collected 27 Sep 2022 03:16)  Source: .Blood Blood-Peripheral  Preliminary Report (28 Sep 2022 07:01):    No growth to date.    Culture - Fungal, CSF (collected 26 Sep 2022 00:30)  Source: .CSF CSF  Preliminary Report (27 Sep 2022 09:18):    Testing in progress    Culture - CSF with Gram Stain (collected 26 Sep 2022 00:30)  Source: .CSF CSF  Gram Stain (26 Sep 2022 07:08):    polymorphonuclear leukocytes seen    No organisms seen    by cytocentrifuge  Preliminary Report (27 Sep 2022 07:33):    No growth to date.    Culture - Acid Fast - CSF (collected 26 Sep 2022 00:30)  Source: .CSF CSF    Culture - Blood (collected 25 Sep 2022 18:25)  Source: .Blood Blood-Peripheral  Gram Stain (26 Sep 2022 10:59):    Growth in anaerobic bottle: Gram Positive Rods  Final Report (26 Sep 2022 23:44):    Growth in anaerobic bottle: Bacillus species not anthracis    "Susceptibilities not performed"    ***Blood Panel PCR results on this specimen are available    approximately 3 hours after the Gram stain result.***    Gram stain, PCR, and/or culture results may not always    correspond due to difference in methodologies.    ************************************************************    This PCR assay was performed by multiplex PCR. This    Assay tests for 66 bacterial and resistance gene targets.    Please refer to the [facility] test directory    at [email or web address] for details.  Organism: Blood Culture PCR (26 Sep 2022 23:44)  Organism: Blood Culture PCR (26 Sep 2022 23:44)    Culture - Blood (collected 25 Sep 2022 18:25)  Source: .Blood Blood-Peripheral  Preliminary Report (27 Sep 2022 01:01):    No growth to date.      RADIOLOGY:  [ ] Reviewed and interpreted by me    EKG:

## 2022-09-29 LAB
ALBUMIN SERPL ELPH-MCNC: 3.1 G/DL — LOW (ref 3.3–5)
ALBUMIN SERPL ELPH-MCNC: 3.2 G/DL — LOW (ref 3.3–5)
ALP SERPL-CCNC: 30 U/L — LOW (ref 40–120)
ALP SERPL-CCNC: 34 U/L — LOW (ref 40–120)
ALT FLD-CCNC: 60 U/L — HIGH (ref 4–41)
ALT FLD-CCNC: 64 U/L — HIGH (ref 4–41)
ANION GAP SERPL CALC-SCNC: 11 MMOL/L — SIGNIFICANT CHANGE UP (ref 7–14)
ANION GAP SERPL CALC-SCNC: 12 MMOL/L — SIGNIFICANT CHANGE UP (ref 7–14)
ANISOCYTOSIS BLD QL: SLIGHT — SIGNIFICANT CHANGE UP
APTT BLD: 62.1 SEC — HIGH (ref 27–36.3)
AST SERPL-CCNC: 37 U/L — SIGNIFICANT CHANGE UP (ref 4–40)
AST SERPL-CCNC: 37 U/L — SIGNIFICANT CHANGE UP (ref 4–40)
BASOPHILS # BLD AUTO: 0 K/UL — SIGNIFICANT CHANGE UP (ref 0–0.2)
BASOPHILS NFR BLD AUTO: 0 % — SIGNIFICANT CHANGE UP (ref 0–2)
BILIRUB SERPL-MCNC: 0.3 MG/DL — SIGNIFICANT CHANGE UP (ref 0.2–1.2)
BILIRUB SERPL-MCNC: 0.4 MG/DL — SIGNIFICANT CHANGE UP (ref 0.2–1.2)
BUN SERPL-MCNC: 69 MG/DL — HIGH (ref 7–23)
BUN SERPL-MCNC: 72 MG/DL — HIGH (ref 7–23)
CALCIUM SERPL-MCNC: 8.2 MG/DL — LOW (ref 8.4–10.5)
CALCIUM SERPL-MCNC: 8.2 MG/DL — LOW (ref 8.4–10.5)
CHLORIDE SERPL-SCNC: 108 MMOL/L — HIGH (ref 98–107)
CHLORIDE SERPL-SCNC: 110 MMOL/L — HIGH (ref 98–107)
CO2 SERPL-SCNC: 20 MMOL/L — LOW (ref 22–31)
CO2 SERPL-SCNC: 21 MMOL/L — LOW (ref 22–31)
CREAT SERPL-MCNC: 1.17 MG/DL — SIGNIFICANT CHANGE UP (ref 0.5–1.3)
CREAT SERPL-MCNC: 1.25 MG/DL — SIGNIFICANT CHANGE UP (ref 0.5–1.3)
CULTURE RESULTS: SIGNIFICANT CHANGE UP
EGFR: 61 ML/MIN/1.73M2 — SIGNIFICANT CHANGE UP
EGFR: 66 ML/MIN/1.73M2 — SIGNIFICANT CHANGE UP
EOSINOPHIL # BLD AUTO: 0 K/UL — SIGNIFICANT CHANGE UP (ref 0–0.5)
EOSINOPHIL NFR BLD AUTO: 0 % — SIGNIFICANT CHANGE UP (ref 0–6)
GIANT PLATELETS BLD QL SMEAR: PRESENT — SIGNIFICANT CHANGE UP
GIANT PLATELETS BLD QL SMEAR: PRESENT — SIGNIFICANT CHANGE UP
GLUCOSE BLDC GLUCOMTR-MCNC: 126 MG/DL — HIGH (ref 70–99)
GLUCOSE BLDC GLUCOMTR-MCNC: 147 MG/DL — HIGH (ref 70–99)
GLUCOSE SERPL-MCNC: 111 MG/DL — HIGH (ref 70–99)
GLUCOSE SERPL-MCNC: 166 MG/DL — HIGH (ref 70–99)
HAPTOGLOB SERPL-MCNC: 170 MG/DL — SIGNIFICANT CHANGE UP (ref 34–200)
HCT VFR BLD CALC: 19.3 % — CRITICAL LOW (ref 39–50)
HCT VFR BLD CALC: 19.4 % — CRITICAL LOW (ref 39–50)
HCT VFR BLD CALC: 23.5 % — LOW (ref 39–50)
HCT VFR BLD CALC: 27.3 % — LOW (ref 39–50)
HGB BLD-MCNC: 6.7 G/DL — CRITICAL LOW (ref 13–17)
HGB BLD-MCNC: 6.7 G/DL — CRITICAL LOW (ref 13–17)
HGB BLD-MCNC: 7.8 G/DL — LOW (ref 13–17)
HGB BLD-MCNC: 9.2 G/DL — LOW (ref 13–17)
IANC: 5.78 K/UL — SIGNIFICANT CHANGE UP (ref 1.8–7.4)
IANC: 6.09 K/UL — SIGNIFICANT CHANGE UP (ref 1.8–7.4)
IANC: 7.17 K/UL — SIGNIFICANT CHANGE UP (ref 1.8–7.4)
IMM GRANULOCYTES NFR BLD AUTO: 1.2 % — HIGH (ref 0–0.9)
INR BLD: 1.05 RATIO — SIGNIFICANT CHANGE UP (ref 0.88–1.16)
INTERPRETATION 24H UR IFE-IMP: SIGNIFICANT CHANGE UP
LDH SERPL L TO P-CCNC: 159 U/L — SIGNIFICANT CHANGE UP (ref 135–225)
LYMPHOCYTES # BLD AUTO: 0.6 K/UL — LOW (ref 1–3.3)
LYMPHOCYTES # BLD AUTO: 0.65 K/UL — LOW (ref 1–3.3)
LYMPHOCYTES # BLD AUTO: 1.01 K/UL — SIGNIFICANT CHANGE UP (ref 1–3.3)
LYMPHOCYTES # BLD AUTO: 12.9 % — LOW (ref 13–44)
LYMPHOCYTES # BLD AUTO: 6.1 % — LOW (ref 13–44)
LYMPHOCYTES # BLD AUTO: 8.8 % — LOW (ref 13–44)
MACROCYTES BLD QL: SIGNIFICANT CHANGE UP
MACROCYTES BLD QL: SLIGHT — SIGNIFICANT CHANGE UP
MAGNESIUM SERPL-MCNC: 2.6 MG/DL — SIGNIFICANT CHANGE UP (ref 1.6–2.6)
MANUAL SMEAR VERIFICATION: SIGNIFICANT CHANGE UP
MANUAL SMEAR VERIFICATION: SIGNIFICANT CHANGE UP
MCHC RBC-ENTMCNC: 30.5 PG — SIGNIFICANT CHANGE UP (ref 27–34)
MCHC RBC-ENTMCNC: 30.9 PG — SIGNIFICANT CHANGE UP (ref 27–34)
MCHC RBC-ENTMCNC: 31.2 PG — SIGNIFICANT CHANGE UP (ref 27–34)
MCHC RBC-ENTMCNC: 31.5 PG — SIGNIFICANT CHANGE UP (ref 27–34)
MCHC RBC-ENTMCNC: 33.2 GM/DL — SIGNIFICANT CHANGE UP (ref 32–36)
MCHC RBC-ENTMCNC: 33.7 GM/DL — SIGNIFICANT CHANGE UP (ref 32–36)
MCHC RBC-ENTMCNC: 34.5 GM/DL — SIGNIFICANT CHANGE UP (ref 32–36)
MCHC RBC-ENTMCNC: 34.7 GM/DL — SIGNIFICANT CHANGE UP (ref 32–36)
MCV RBC AUTO: 90.2 FL — SIGNIFICANT CHANGE UP (ref 80–100)
MCV RBC AUTO: 90.6 FL — SIGNIFICANT CHANGE UP (ref 80–100)
MCV RBC AUTO: 91.6 FL — SIGNIFICANT CHANGE UP (ref 80–100)
MCV RBC AUTO: 91.8 FL — SIGNIFICANT CHANGE UP (ref 80–100)
MICROCYTES BLD QL: SLIGHT — SIGNIFICANT CHANGE UP
MONOCYTES # BLD AUTO: 0.34 K/UL — SIGNIFICANT CHANGE UP (ref 0–0.9)
MONOCYTES # BLD AUTO: 0.45 K/UL — SIGNIFICANT CHANGE UP (ref 0–0.9)
MONOCYTES # BLD AUTO: 0.61 K/UL — SIGNIFICANT CHANGE UP (ref 0–0.9)
MONOCYTES NFR BLD AUTO: 3.5 % — SIGNIFICANT CHANGE UP (ref 2–14)
MONOCYTES NFR BLD AUTO: 6.1 % — SIGNIFICANT CHANGE UP (ref 2–14)
MONOCYTES NFR BLD AUTO: 7.8 % — SIGNIFICANT CHANGE UP (ref 2–14)
NEUTROPHILS # BLD AUTO: 6.09 K/UL — SIGNIFICANT CHANGE UP (ref 1.8–7.4)
NEUTROPHILS # BLD AUTO: 6.3 K/UL — SIGNIFICANT CHANGE UP (ref 1.8–7.4)
NEUTROPHILS # BLD AUTO: 8.64 K/UL — HIGH (ref 1.8–7.4)
NEUTROPHILS NFR BLD AUTO: 78.1 % — HIGH (ref 43–77)
NEUTROPHILS NFR BLD AUTO: 85.1 % — HIGH (ref 43–77)
NEUTROPHILS NFR BLD AUTO: 86.9 % — HIGH (ref 43–77)
NEUTS BAND # BLD: 0.9 % — SIGNIFICANT CHANGE UP (ref 0–6)
NIGHT BLUE STAIN TISS: SIGNIFICANT CHANGE UP
NRBC # BLD: 0 /100 WBCS — SIGNIFICANT CHANGE UP (ref 0–0)
NRBC # BLD: 0 /100 WBCS — SIGNIFICANT CHANGE UP (ref 0–0)
NRBC # FLD: 0 K/UL — SIGNIFICANT CHANGE UP (ref 0–0)
NRBC # FLD: 0.06 K/UL — HIGH (ref 0–0)
OVALOCYTES BLD QL SMEAR: SLIGHT — SIGNIFICANT CHANGE UP
PHOSPHATE SERPL-MCNC: 2.6 MG/DL — SIGNIFICANT CHANGE UP (ref 2.5–4.5)
PLAT MORPH BLD: NORMAL — SIGNIFICANT CHANGE UP
PLAT MORPH BLD: NORMAL — SIGNIFICANT CHANGE UP
PLATELET # BLD AUTO: 110 K/UL — LOW (ref 150–400)
PLATELET # BLD AUTO: 129 K/UL — LOW (ref 150–400)
PLATELET # BLD AUTO: 139 K/UL — LOW (ref 150–400)
PLATELET # BLD AUTO: 151 K/UL — SIGNIFICANT CHANGE UP (ref 150–400)
PLATELET COUNT - ESTIMATE: ABNORMAL
PLATELET COUNT - ESTIMATE: NORMAL — SIGNIFICANT CHANGE UP
POIKILOCYTOSIS BLD QL AUTO: SLIGHT — SIGNIFICANT CHANGE UP
POLYCHROMASIA BLD QL SMEAR: SLIGHT — SIGNIFICANT CHANGE UP
POTASSIUM SERPL-MCNC: 3.7 MMOL/L — SIGNIFICANT CHANGE UP (ref 3.5–5.3)
POTASSIUM SERPL-MCNC: 3.9 MMOL/L — SIGNIFICANT CHANGE UP (ref 3.5–5.3)
POTASSIUM SERPL-SCNC: 3.7 MMOL/L — SIGNIFICANT CHANGE UP (ref 3.5–5.3)
POTASSIUM SERPL-SCNC: 3.9 MMOL/L — SIGNIFICANT CHANGE UP (ref 3.5–5.3)
PROT SERPL-MCNC: 7.4 G/DL — SIGNIFICANT CHANGE UP (ref 6–8.3)
PROT SERPL-MCNC: 7.4 G/DL — SIGNIFICANT CHANGE UP (ref 6–8.3)
PROTHROM AB SERPL-ACNC: 12.2 SEC — SIGNIFICANT CHANGE UP (ref 10.5–13.4)
RBC # BLD: 2.13 M/UL — LOW (ref 4.2–5.8)
RBC # BLD: 2.13 M/UL — LOW (ref 4.2–5.8)
RBC # BLD: 2.15 M/UL — LOW (ref 4.2–5.8)
RBC # BLD: 2.56 M/UL — LOW (ref 4.2–5.8)
RBC # BLD: 2.98 M/UL — LOW (ref 4.2–5.8)
RBC # FLD: 14.7 % — HIGH (ref 10.3–14.5)
RBC # FLD: 14.9 % — HIGH (ref 10.3–14.5)
RBC # FLD: 14.9 % — HIGH (ref 10.3–14.5)
RBC # FLD: 15.1 % — HIGH (ref 10.3–14.5)
RBC BLD AUTO: ABNORMAL
RBC BLD AUTO: NORMAL — SIGNIFICANT CHANGE UP
RETICS #: 44.5 K/UL — SIGNIFICANT CHANGE UP (ref 25–125)
RETICS/RBC NFR: 2.1 % — SIGNIFICANT CHANGE UP (ref 0.5–2.5)
ROULEAUX BLD QL SMEAR: PRESENT
SMUDGE CELLS # BLD: PRESENT — SIGNIFICANT CHANGE UP
SMUDGE CELLS # BLD: PRESENT — SIGNIFICANT CHANGE UP
SODIUM SERPL-SCNC: 139 MMOL/L — SIGNIFICANT CHANGE UP (ref 135–145)
SODIUM SERPL-SCNC: 143 MMOL/L — SIGNIFICANT CHANGE UP (ref 135–145)
SPECIMEN SOURCE: SIGNIFICANT CHANGE UP
SPECIMEN SOURCE: SIGNIFICANT CHANGE UP
VARIANT LYMPHS # BLD: 2.6 % — SIGNIFICANT CHANGE UP (ref 0–6)
WBC # BLD: 7.4 K/UL — SIGNIFICANT CHANGE UP (ref 3.8–10.5)
WBC # BLD: 7.8 K/UL — SIGNIFICANT CHANGE UP (ref 3.8–10.5)
WBC # BLD: 7.99 K/UL — SIGNIFICANT CHANGE UP (ref 3.8–10.5)
WBC # BLD: 9.84 K/UL — SIGNIFICANT CHANGE UP (ref 3.8–10.5)
WBC # FLD AUTO: 7.4 K/UL — SIGNIFICANT CHANGE UP (ref 3.8–10.5)
WBC # FLD AUTO: 7.8 K/UL — SIGNIFICANT CHANGE UP (ref 3.8–10.5)
WBC # FLD AUTO: 7.99 K/UL — SIGNIFICANT CHANGE UP (ref 3.8–10.5)
WBC # FLD AUTO: 9.84 K/UL — SIGNIFICANT CHANGE UP (ref 3.8–10.5)

## 2022-09-29 PROCEDURE — 99291 CRITICAL CARE FIRST HOUR: CPT

## 2022-09-29 PROCEDURE — 99232 SBSQ HOSP IP/OBS MODERATE 35: CPT

## 2022-09-29 RX ORDER — POLYETHYLENE GLYCOL 3350 17 G/17G
17 POWDER, FOR SOLUTION ORAL DAILY
Refills: 0 | Status: DISCONTINUED | OUTPATIENT
Start: 2022-09-29 | End: 2022-10-11

## 2022-09-29 RX ORDER — ASPIRIN/CALCIUM CARB/MAGNESIUM 324 MG
81 TABLET ORAL DAILY
Refills: 0 | Status: DISCONTINUED | OUTPATIENT
Start: 2022-09-29 | End: 2022-09-29

## 2022-09-29 RX ORDER — IPRATROPIUM BROMIDE 0.2 MG/ML
1 SOLUTION, NON-ORAL INHALATION
Refills: 0 | Status: DISCONTINUED | OUTPATIENT
Start: 2022-09-29 | End: 2022-10-03

## 2022-09-29 RX ORDER — SODIUM,POTASSIUM PHOSPHATES 278-250MG
2 POWDER IN PACKET (EA) ORAL EVERY 8 HOURS
Refills: 0 | Status: COMPLETED | OUTPATIENT
Start: 2022-09-29 | End: 2022-09-29

## 2022-09-29 RX ORDER — CHLORHEXIDINE GLUCONATE 213 G/1000ML
1 SOLUTION TOPICAL DAILY
Refills: 0 | Status: DISCONTINUED | OUTPATIENT
Start: 2022-09-29 | End: 2022-10-11

## 2022-09-29 RX ORDER — PANTOPRAZOLE SODIUM 20 MG/1
40 TABLET, DELAYED RELEASE ORAL ONCE
Refills: 0 | Status: COMPLETED | OUTPATIENT
Start: 2022-09-29 | End: 2022-09-29

## 2022-09-29 RX ADMIN — PROPOFOL 12.3 MICROGRAM(S)/KG/MIN: 10 INJECTION, EMULSION INTRAVENOUS at 07:16

## 2022-09-29 RX ADMIN — SODIUM CHLORIDE 4 MILLILITER(S): 9 INJECTION INTRAMUSCULAR; INTRAVENOUS; SUBCUTANEOUS at 20:25

## 2022-09-29 RX ADMIN — Medication 1300 MILLIGRAM(S): at 05:57

## 2022-09-29 RX ADMIN — DEXMEDETOMIDINE HYDROCHLORIDE IN 0.9% SODIUM CHLORIDE 2.56 MICROGRAM(S)/KG/HR: 4 INJECTION INTRAVENOUS at 19:46

## 2022-09-29 RX ADMIN — Medication 2 PACKET(S): at 21:28

## 2022-09-29 RX ADMIN — Medication 1300 MILLIGRAM(S): at 18:07

## 2022-09-29 RX ADMIN — PANTOPRAZOLE SODIUM 40 MILLIGRAM(S): 20 TABLET, DELAYED RELEASE ORAL at 02:51

## 2022-09-29 RX ADMIN — Medication 2 PUFF(S): at 04:08

## 2022-09-29 RX ADMIN — DEXMEDETOMIDINE HYDROCHLORIDE IN 0.9% SODIUM CHLORIDE 2.56 MICROGRAM(S)/KG/HR: 4 INJECTION INTRAVENOUS at 12:24

## 2022-09-29 RX ADMIN — Medication 2 PACKET(S): at 02:51

## 2022-09-29 RX ADMIN — CHLORHEXIDINE GLUCONATE 15 MILLILITER(S): 213 SOLUTION TOPICAL at 05:57

## 2022-09-29 RX ADMIN — PROPOFOL 12.3 MICROGRAM(S)/KG/MIN: 10 INJECTION, EMULSION INTRAVENOUS at 19:46

## 2022-09-29 RX ADMIN — Medication 2 PACKET(S): at 13:19

## 2022-09-29 RX ADMIN — Medication 1 PUFF(S): at 20:25

## 2022-09-29 RX ADMIN — HEPARIN SODIUM 5000 UNIT(S): 5000 INJECTION INTRAVENOUS; SUBCUTANEOUS at 05:58

## 2022-09-29 RX ADMIN — HEPARIN SODIUM 5000 UNIT(S): 5000 INJECTION INTRAVENOUS; SUBCUTANEOUS at 00:13

## 2022-09-29 RX ADMIN — Medication 1 APPLICATION(S): at 05:58

## 2022-09-29 RX ADMIN — ALBUTEROL 2 PUFF(S): 90 AEROSOL, METERED ORAL at 20:26

## 2022-09-29 RX ADMIN — CHLORHEXIDINE GLUCONATE 1 APPLICATION(S): 213 SOLUTION TOPICAL at 05:59

## 2022-09-29 RX ADMIN — SODIUM CHLORIDE 4 MILLILITER(S): 9 INJECTION INTRAMUSCULAR; INTRAVENOUS; SUBCUTANEOUS at 09:07

## 2022-09-29 RX ADMIN — VALACYCLOVIR 500 MILLIGRAM(S): 500 TABLET, FILM COATED ORAL at 12:05

## 2022-09-29 RX ADMIN — ALBUTEROL 2 PUFF(S): 90 AEROSOL, METERED ORAL at 09:08

## 2022-09-29 RX ADMIN — Medication 2 PUFF(S): at 09:08

## 2022-09-29 RX ADMIN — ATORVASTATIN CALCIUM 40 MILLIGRAM(S): 80 TABLET, FILM COATED ORAL at 21:27

## 2022-09-29 RX ADMIN — Medication 1 APPLICATION(S): at 18:13

## 2022-09-29 RX ADMIN — CHLORHEXIDINE GLUCONATE 15 MILLILITER(S): 213 SOLUTION TOPICAL at 18:07

## 2022-09-29 NOTE — PROGRESS NOTE ADULT - ASSESSMENT
Patient is a 71 yo M with PMH of HTN, HLD, CAD, cardiac stent, Multiple Myeloma who presented to the ED for 3 days of generalized weakness.    EKG: ST PVCs  Tele: NSR PVCs episodes of PAT    1. Weakness  -rapidly progressed. was unable to move extremities.   -s/p RRT for hypoxia now intubated in MICU   -neuro on board, possible GBS vs pathology in neck  -echo shows mild AR/AS, normal LV systolic function and mild diastolic dysfunction     2. CAD s/p PCI  -in 2015 in [facility] as per daughter patient had heart attack  -no reported CP prior to hospitalization    3. MM  -heme/onc on board

## 2022-09-29 NOTE — PROGRESS NOTE ADULT - SUBJECTIVE AND OBJECTIVE BOX
Payam Longoria MD  Interventional Cardiology / Endovascular Specialist  [place] Office : [phone] [place] [address] [address] [address]  Tel:   [place] Office : [address] [place] [address]  Tel: [phone]    Subjective/Overnight events: Patient lying in bed remains intubated in ICU   	  MEDICATIONS:    valACYclovir 500 milliGRAM(s) Oral daily    ALBUTerol    90 MICROgram(s) HFA Inhaler 2 Puff(s) Inhalation every 12 hours  ipratropium 17 MICROgram(s) HFA Inhaler 1 Puff(s) Inhalation <User Schedule>  sodium chloride 3%  Inhalation 4 milliLiter(s) Inhalation every 12 hours    dexMEDEtomidine Infusion 0.2 MICROgram(s)/kG/Hr IV Continuous <Continuous>  propofol Infusion 40.039 MICROgram(s)/kG/Min IV Continuous <Continuous>    polyethylene glycol 3350 17 Gram(s) Oral daily    atorvastatin 40 milliGRAM(s) Oral at bedtime  glucagon  Injectable 1 milliGRAM(s) IntraMuscular once    chlorhexidine 0.12% Liquid 15 milliLiter(s) Oral Mucosa every 12 hours  chlorhexidine 2% Cloths 1 Application(s) Topical daily  petrolatum Ophthalmic Ointment 1 Application(s) Both EYES two times a day  sodium bicarbonate 1300 milliGRAM(s) Oral two times a day      PAST MEDICAL/SURGICAL HISTORY  PAST MEDICAL & SURGICAL HISTORY:  HTN (hypertension)      HLD (hyperlipidemia)      Coronary artery disease      Multiple myeloma      S/P coronary artery stent placement          SOCIAL HISTORY: Substance Use (street drugs): ( x ) never used  (  ) other:    FAMILY HISTORY:  Maternal family history of hypertension  Mother        REVIEW OF SYSTEMS:  pt intubated     PHYSICAL EXAM:  T(C): 36.7 (09-29-22 @ 20:00), Max: 37.2 (09-29-22 @ 12:00)  HR: 66 (09-29-22 @ 22:52) (49 - 76)  BP: 135/49 (09-29-22 @ 22:00) (104/47 - 176/61)  RR: 18 (09-29-22 @ 22:00) (17 - 21)  SpO2: 100% (09-29-22 @ 22:52) (100% - 100%)  Wt(kg): --  I&O's Summary    28 Sep 2022 07:01  -  29 Sep 2022 07:00  --------------------------------------------------------  IN: 1963.1 mL / OUT: 1590 mL / NET: 373.1 mL    29 Sep 2022 07:01  -  29 Sep 2022 23:11  --------------------------------------------------------  IN: 918.8 mL / OUT: 1410 mL / NET: -491.2 mL      GENERAL: intubated  EYES:  conjunctiva and sclera clear  Cardiovascular: Normal S1 S2, No JVD, No murmurs, No edema  Respiratory: intubated	  Gastrointestinal:  Soft,   Extremities: No edema                               7.8    7.99  )-----------( 110      ( 29 Sep 2022 17:30 )             23.5     09-29    143  |  110<H>  |  72<H>  ----------------------------<  111<H>  3.7   |  21<L>  |  1.17    Ca    8.2<L>      29 Sep 2022 10:55  Phos  2.6     09-29  Mg     2.60     09-29    TPro  7.4  /  Alb  3.2<L>  /  TBili  0.4  /  DBili  x   /  AST  37  /  ALT  60<H>  /  AlkPhos  34<L>  09-29    proBNP:   Lipid Profile:   HgA1c:   TSH:     Consultant(s) Notes Reviewed:  [x ] YES  [ ] NO    Care Discussed with Consultants/Other Providers [ x] YES  [ ] NO    Imaging Personally Reviewed independently:  [x] YES  [ ] NO    All labs, radiologic studies, vitals, orders and medications list reviewed. Patient is seen and examined at bedside. Case discussed with medical team.               mono/di twins

## 2022-09-29 NOTE — PROGRESS NOTE ADULT - ASSESSMENT
Assessment: 73 yo male with a PMHx of HTN, HLD, CAD (s/p stent, not on AP/AC), and  multiple myeloma who presented to [facility] on 9/24 for worsening generalized weakness since 9/21. On 9/25 AM, patient developed worsening respiratory distress and was intubated. LP was performed on 9/26. CSF glucose 91 <H>, protein 166 <H>, TNC 1. Neuro exam (while off sedation) notable for absent reflexes throughout.    Impression: Worsening weakness which affected respiratory status in patient with albuminocytologic dissociation on CSF studies and absent reflexes on exam, concerning for GBS. Neuro exam has significantly improved s/p course of IVIG.    Recommendations:  [] Neuro checks Q2HR.  [] Telemetry.  [] Fall/Aspiration precautions.  [x] MR Head/Spine imaging: results as above.  [x] s/p IVIG 35G QD x3 days (9/26-9/28).  [] Would not suggest pursing PLEX at this time as IVIG has the same efficacy for GBS, and PLEX would wash out the IVIG. GBS is typically treated with one course of either IVIG or PLEX, and then the remainder of the recovery is supportive care. We would expect the patient to continue improving even after completion of IVIG, especially as he has responded very well thus far.  [] f/u ganglioside GQ1B ab (pending).  [x] s/p LP, CSF glucose 91 <H>, protein 166 <H>, TNC 1.  [] PT/OT/SLP when patient able.  [] Rest of care per MICU team.    Case seen and discussed with neurology attending Dr. Avendano.

## 2022-09-29 NOTE — PROGRESS NOTE ADULT - SUBJECTIVE AND OBJECTIVE BOX
Deo Oropeza MD  Internal Medicine  Pager #[phone]    CHIEF COMPLAINT:Patient is a 72y old  Male who presents with a chief complaint of weakness (28 Sep 2022 16:30)        Interval Events:    REVIEW OF SYSTEMS:      OBJECTIVE:  ICU Vital Signs Last 24 Hrs  T(C): 36.2 (29 Sep 2022 04:00), Max: 36.5 (28 Sep 2022 16:00)  T(F): 97.1 (29 Sep 2022 04:00), Max: 97.7 (28 Sep 2022 16:00)  HR: 51 (29 Sep 2022 06:24) (49 - 87)  BP: 132/58 (29 Sep 2022 06:00) (93/47 - 170/77)  BP(mean): 79 (29 Sep 2022 06:00) (62 - 103)  ABP: --  ABP(mean): --  RR: 18 (29 Sep 2022 06:00) (12 - 24)  SpO2: 100% (29 Sep 2022 06:24) (94% - 100%)    O2 Parameters below as of 29 Sep 2022 06:00  Patient On (Oxygen Delivery Method): ventilator    O2 Concentration (%): 40      Mode: AC/ CMV (Assist Control/ Continuous Mandatory Ventilation), RR (machine): 18, TV (machine): 370, FiO2: 40, PEEP: 6, ITime: 0.76, MAP: 10, PIP: 18    09-28 @ 07:01  -  09-29 @ 07:00  --------------------------------------------------------  IN: 1963.1 mL / OUT: 1590 mL / NET: 373.1 mL      CAPILLARY BLOOD GLUCOSE      POCT Blood Glucose.: 147 mg/dL (29 Sep 2022 05:37)      PHYSICAL EXAM:      LINES:    HOSPITAL MEDICATIONS:  Standing Meds:  acetaminophen     Tablet .. 650 milliGRAM(s) Oral once  ALBUTerol    90 MICROgram(s) HFA Inhaler 2 Puff(s) Inhalation every 12 hours  atorvastatin 40 milliGRAM(s) Oral at bedtime  chlorhexidine 0.12% Liquid 15 milliLiter(s) Oral Mucosa every 12 hours  chlorhexidine 2% Cloths 1 Application(s) Topical <User Schedule>  dexMEDEtomidine Infusion 0.2 MICROgram(s)/kG/Hr IV Continuous <Continuous>  diphenhydrAMINE Injectable 25 milliGRAM(s) IV Push once  glucagon  Injectable 1 milliGRAM(s) IntraMuscular once  heparin   Injectable 5000 Unit(s) SubCutaneous every 8 hours  ipratropium 17 MICROgram(s) HFA Inhaler 2 Puff(s) Inhalation every 6 hours  petrolatum Ophthalmic Ointment 1 Application(s) Both EYES two times a day  polyethylene glycol 3350 17 Gram(s) Oral every 12 hours  potassium chloride  10 mEq/100 mL IVPB 10 milliEquivalent(s) IV Intermittent every 1 hour  potassium phosphate / sodium phosphate Powder (PHOS-NaK) 2 Packet(s) Oral every 8 hours  propofol Infusion 40.039 MICROgram(s)/kG/Min IV Continuous <Continuous>  sodium bicarbonate 1300 milliGRAM(s) Oral two times a day  sodium chloride 3%  Inhalation 4 milliLiter(s) Inhalation every 12 hours  valACYclovir 500 milliGRAM(s) Oral daily      PRN Meds:      LABS:                        6.7    7.80  )-----------( 139      ( 29 Sep 2022 02:45 )             19.3     Hgb Trend: 6.7<--, 6.7<--, 8.6<--, 8.0<--, 9.8<--  09-29    139  |  108<H>  |  69<H>  ----------------------------<  166<H>  3.9   |  20<L>  |  1.25    Ca    8.2<L>      29 Sep 2022 01:15  Phos  2.6     09-29  Mg     2.60     09-29    TPro  7.4  /  Alb  3.1<L>  /  TBili  0.3  /  DBili  x   /  AST  37  /  ALT  64<H>  /  AlkPhos  30<L>  09-29    Creatinine Trend: 1.25<--, 1.60<--, 1.65<--, 1.65<--, 1.63<--, 1.49<--  PT/INR - ( 29 Sep 2022 01:15 )   PT: 12.2 sec;   INR: 1.05 ratio         PTT - ( 29 Sep 2022 01:15 )  PTT:62.1 sec          MICROBIOLOGY:     Culture - Acid Fast - Sputum w/Smear (collected 28 Sep 2022 12:12)  Source: ET Tube ET Tube TRAP    Culture - Acid Fast - Sputum w/Smear (collected 28 Sep 2022 09:16)  Source: ET Tube ET Tube    Culture - Blood (collected 28 Sep 2022 02:00)  Source: .Blood Blood-Venous  Preliminary Report (29 Sep 2022 07:02):    No growth to date.    Culture - Acid Fast - Sputum w/Smear (collected 27 Sep 2022 12:30)  Source: Trach Asp Tracheal Aspirate    Culture - Blood (collected 27 Sep 2022 03:16)  Source: .Blood Blood-Peripheral  Preliminary Report (28 Sep 2022 07:01):    No growth to date.      RADIOLOGY:  [ ] Reviewed and interpreted by me    EKG:     Deo Oropeza MD  Internal Medicine  Pager #[phone]    CHIEF COMPLAINT:Patient is a 72y old  Male who presents with a chief complaint of weakness (28 Sep 2022 16:30)        Interval Events:    Completed course of IVIG. PSV conducted from rounds until 6pm.     REVIEW OF SYSTEMS:    Limited ROS due to Intubated and Sedated       OBJECTIVE:  ICU Vital Signs Last 24 Hrs  T(C): 36.2 (29 Sep 2022 04:00), Max: 36.5 (28 Sep 2022 16:00)  T(F): 97.1 (29 Sep 2022 04:00), Max: 97.7 (28 Sep 2022 16:00)  HR: 51 (29 Sep 2022 06:24) (49 - 87)  BP: 132/58 (29 Sep 2022 06:00) (93/47 - 170/77)  BP(mean): 79 (29 Sep 2022 06:00) (62 - 103)  ABP: --  ABP(mean): --  RR: 18 (29 Sep 2022 06:00) (12 - 24)  SpO2: 100% (29 Sep 2022 06:24) (94% - 100%)    O2 Parameters below as of 29 Sep 2022 06:00  Patient On (Oxygen Delivery Method): ventilator    O2 Concentration (%): 40      Mode: AC/ CMV (Assist Control/ Continuous Mandatory Ventilation), RR (machine): 18, TV (machine): 370, FiO2: 40, PEEP: 6, ITime: 0.76, MAP: 10, PIP: 18    09-28 @ 07:01  -  09-29 @ 07:00  --------------------------------------------------------  IN: 1963.1 mL / OUT: 1590 mL / NET: 373.1 mL      CAPILLARY BLOOD GLUCOSE      POCT Blood Glucose.: 147 mg/dL (29 Sep 2022 05:37)      PHYSICAL EXAM:  GENERAL: intubated, sedated  HEAD:   (+)rt nares NGT in place  NECK: Supple, No JVD  CHEST/LUNG: Vented BS with decreased BS ln lower lung field   HEART: Regular rate and rhythm; nl S1/S2; No murmurs, rubs, or gallops  ABDOMEN: Soft, Nondistended  : condom catheter in place   EXTREMITIES:  well perfused.   NEURO: following commands moving, moves all extremities AOx2      LINES:    HOSPITAL MEDICATIONS:  Standing Meds:  acetaminophen     Tablet .. 650 milliGRAM(s) Oral once  ALBUTerol    90 MICROgram(s) HFA Inhaler 2 Puff(s) Inhalation every 12 hours  atorvastatin 40 milliGRAM(s) Oral at bedtime  chlorhexidine 0.12% Liquid 15 milliLiter(s) Oral Mucosa every 12 hours  chlorhexidine 2% Cloths 1 Application(s) Topical <User Schedule>  dexMEDEtomidine Infusion 0.2 MICROgram(s)/kG/Hr IV Continuous <Continuous>  diphenhydrAMINE Injectable 25 milliGRAM(s) IV Push once  glucagon  Injectable 1 milliGRAM(s) IntraMuscular once  heparin   Injectable 5000 Unit(s) SubCutaneous every 8 hours  ipratropium 17 MICROgram(s) HFA Inhaler 2 Puff(s) Inhalation every 6 hours  petrolatum Ophthalmic Ointment 1 Application(s) Both EYES two times a day  polyethylene glycol 3350 17 Gram(s) Oral every 12 hours  potassium chloride  10 mEq/100 mL IVPB 10 milliEquivalent(s) IV Intermittent every 1 hour  potassium phosphate / sodium phosphate Powder (PHOS-NaK) 2 Packet(s) Oral every 8 hours  propofol Infusion 40.039 MICROgram(s)/kG/Min IV Continuous <Continuous>  sodium bicarbonate 1300 milliGRAM(s) Oral two times a day  sodium chloride 3%  Inhalation 4 milliLiter(s) Inhalation every 12 hours  valACYclovir 500 milliGRAM(s) Oral daily      PRN Meds:      LABS:                        6.7    7.80  )-----------( 139      ( 29 Sep 2022 02:45 )             19.3     Hgb Trend: 6.7<--, 6.7<--, 8.6<--, 8.0<--, 9.8<--  09-29    139  |  108<H>  |  69<H>  ----------------------------<  166<H>  3.9   |  20<L>  |  1.25    Ca    8.2<L>      29 Sep 2022 01:15  Phos  2.6     09-29  Mg     2.60     09-29    TPro  7.4  /  Alb  3.1<L>  /  TBili  0.3  /  DBili  x   /  AST  37  /  ALT  64<H>  /  AlkPhos  30<L>  09-29    Creatinine Trend: 1.25<--, 1.60<--, 1.65<--, 1.65<--, 1.63<--, 1.49<--  PT/INR - ( 29 Sep 2022 01:15 )   PT: 12.2 sec;   INR: 1.05 ratio         PTT - ( 29 Sep 2022 01:15 )  PTT:62.1 sec          MICROBIOLOGY:     Culture - Acid Fast - Sputum w/Smear (collected 28 Sep 2022 12:12)  Source: ET Tube ET Tube TRAP    Culture - Acid Fast - Sputum w/Smear (collected 28 Sep 2022 09:16)  Source: ET Tube ET Tube    Culture - Blood (collected 28 Sep 2022 02:00)  Source: .Blood Blood-Venous  Preliminary Report (29 Sep 2022 07:02):    No growth to date.    Culture - Acid Fast - Sputum w/Smear (collected 27 Sep 2022 12:30)  Source: Trach Asp Tracheal Aspirate    Culture - Blood (collected 27 Sep 2022 03:16)  Source: .Blood Blood-Peripheral  Preliminary Report (28 Sep 2022 07:01):    No growth to date.      RADIOLOGY:  [ ] Reviewed and interpreted by me    EKG:

## 2022-09-29 NOTE — PROGRESS NOTE ADULT - SUBJECTIVE AND OBJECTIVE BOX
Name of Patient : RASHID MEIER  MRN: [id number]  Date of visit: 09-29-22       Subjective: Patient seen and examined. No new events except as noted.     REVIEW OF SYSTEMS:  limited    MEDICATIONS:  MEDICATIONS  (STANDING):  ALBUTerol    90 MICROgram(s) HFA Inhaler 2 Puff(s) Inhalation every 12 hours  atorvastatin 40 milliGRAM(s) Oral at bedtime  chlorhexidine 0.12% Liquid 15 milliLiter(s) Oral Mucosa every 12 hours  chlorhexidine 2% Cloths 1 Application(s) Topical daily  dexMEDEtomidine Infusion 0.2 MICROgram(s)/kG/Hr (2.56 mL/Hr) IV Continuous <Continuous>  glucagon  Injectable 1 milliGRAM(s) IntraMuscular once  ipratropium 17 MICROgram(s) HFA Inhaler 1 Puff(s) Inhalation <User Schedule>  petrolatum Ophthalmic Ointment 1 Application(s) Both EYES two times a day  polyethylene glycol 3350 17 Gram(s) Oral daily  potassium phosphate / sodium phosphate Powder (PHOS-NaK) 2 Packet(s) Oral every 8 hours  propofol Infusion 40.039 MICROgram(s)/kG/Min (12.3 mL/Hr) IV Continuous <Continuous>  sodium bicarbonate 1300 milliGRAM(s) Oral two times a day  sodium chloride 3%  Inhalation 4 milliLiter(s) Inhalation every 12 hours  valACYclovir 500 milliGRAM(s) Oral daily      PHYSICAL EXAM:  T(C): 37 (09-29-22 @ 16:00), Max: 37.2 (09-29-22 @ 12:00)  HR: 57 (09-29-22 @ 20:26) (49 - 76)  BP: 105/45 (09-29-22 @ 20:00) (104/47 - 176/61)  RR: 18 (09-29-22 @ 20:00) (17 - 21)  SpO2: 100% (09-29-22 @ 20:26) (100% - 100%)  Wt(kg): --  I&O's Summary    28 Sep 2022 07:01  -  29 Sep 2022 07:00  --------------------------------------------------------  IN: 1963.1 mL / OUT: 1590 mL / NET: 373.1 mL    29 Sep 2022 07:01  -  29 Sep 2022 21:30  --------------------------------------------------------  IN: 800.2 mL / OUT: 1110 mL / NET: -309.8 mL          Appearance: intubated  HEENT:  PERRLA   Lymphatic: No lymphadenopathy   Cardiovascular: Normal S1 S2    All labs, Imaging and EKGs personally reviewed     Culture - Acid Fast - Sputum w/Smear (collected 09-28-22 @ 22:00)  Source: ET Tube ET Tube          09-28-22 @ 07:01  -  09-29-22 @ 07:00  --------------------------------------------------------  IN: 1963.1 mL / OUT: 1590 mL / NET: 373.1 mL    09-29-22 @ 07:01  -  09-29-22 @ 21:30  --------------------------------------------------------  IN: 800.2 mL / OUT: 1110 mL / NET: -309.8 mL                            7.8    7.99  )-----------( 110      ( 29 Sep 2022 17:30 )             23.5               09-29    143  |  110<H>  |  72<H>  ----------------------------<  111<H>  3.7   |  21<L>  |  1.17    Ca    8.2<L>      29 Sep 2022 10:55  Phos  2.6     09-29  Mg     2.60     09-29    TPro  7.4  /  Alb  3.2<L>  /  TBili  0.4  /  DBili  x   /  AST  37  /  ALT  60<H>  /  AlkPhos  34<L>  09-29    PT/INR - ( 29 Sep 2022 01:15 )   PT: 12.2 sec;   INR: 1.05 ratio         PTT - ( 29 Sep 2022 01:15 )  PTT:62.1 sec

## 2022-09-29 NOTE — PROGRESS NOTE ADULT - ASSESSMENT
72 year-old male, history of HTN, HLD, CAD, cardiac stent, MM, presents with cc generalized weakness x 2-3 days associated with some tingling sensation.    A/P:  Hyponatremia:  Clinically euvolemic  work up suggested SIADH  s/p 1.5% NaCl 50cc/hr for 5 hrs x2  Na improved  MOnitor Na level closely. monitor bmp Q 6hrs  Na level should not change more than 6-8meq in 24 hrs    acute on CKD stage 3  Baseline Scr 1.3-1.4  CRISTI likely sec to ATN  better today  monitor bmp, i/o    Hypokalemia:  better  Monitor K level    acidosis  Non AG  supplement oral bicarb  monitor    HTN:  controlled  MOnitor BP    Weakness:  Etiology?  h/o MM  s/p LP  possible GBS getting IVIG  f/u neuro

## 2022-09-29 NOTE — PROGRESS NOTE ADULT - ASSESSMENT
71 y/o M with a PMHx significant for HTN, HLD, CAD s/p stent, MM, presents with descending paralysis complicated by acute hypoxic respiratory failure due to neuromuscular weakness mediated by GBS now status post IVIG.     Neuro   #Generalized Weakness, likely GBS   Pt family reporting a descending weakness that ultimately started to involve his speaking ability as well.  Low c/f botulism given not having flaccid, but some suspicion for paraneoplastic process (e.g. LES/MG).  - MRI brain- No acute hemorrhage mass mass effect or acute territorial   infarcts seen. and C/T/L spine no cord compression   -Discontinued decadron 9/28 discussed with neurosurgery no cord compression and neuro deferred further decision making regarding decadron.   -IVIG 35g qd (3/3) as per neuro for possible GBS completed 9/28  -f/u anti ganglioside ab  -appreciate neurology recommendations     #Sedation/Analgesia  - C/W Propofol for now and precedex    Pulmonary   #Acute hypoxic respiratory failure secondary to neuromuscular weakness  Pt with increased WOB, reportedly wheezing as well. No h/o COPD  - C/W Albuterol/Ipratropium MDI's while intubated  -wean vent as tolerated (370/18/8/50%)  -with decreased secretions 9/28 decreased frequency of airway clearance.   -9/29 NIF performed prerounds with respiratory therapist -21.     #Hemoptysis  Pt reportedly with hemoptysis in [facility]. Pt with RLL calcified granuloma & scattered pulm nodules.   -quant gold indeterminate  - sputum afb x3 no acid fast  -taken off of TB precautions     Cardiovascular   #Vasoplegic Shock  Pt with minimal pressor requirement on sedation for intubation. Likely vasoplegic in that context. CTM resolved.     #HLD   -atorvastatin 40 qhs     #CAD  -Aspirin resumed  - HOLDing Home Metoprolol tartrate 50mg PO BID    #HTN  -with soft BPs 9/29 overnight, continuing to hold amlodipine.   -Losartan held given soft BP and resolving CRISTI     #BNP elevated  Pt with BNP elevation, respiratory distress.  - TTE mild AR and AS, Stage I diastolic dysfunction     GI  #RAUL  - NPO w/ TF    Renal   #Hyponatremia, CRISTI  Pt with persistently low sodium. aDe inappropriately elevated; UOsm > SOsm c/w SIADH. Should have fluid restriction when not intubated.  - Na improving, cont to trend BMP qD  - Trend Cr, f/u urine lytes  - Strict Is/Os  -monitor sodium given GBS and patient to receive IVIG    MSK  #T8 Compression Fx   MR spine and spine consult to assess for possible interventions. Diffuse spinal lesions.   - Neurosx consulted   -no acute intervention. Decadron discontinued 9/28 following discussion with neurosurgery as well as neurology.     ID  #Bacillus cereus in blood cxs x1 bottle on 9/25, ?contaminate  - 9/27 BCx NGTD  - afebrile, WBC count improving, monitor off abx     r/o TB  - AFB negative x3  -off TB precautions     Endocrine   #Hyperglycemia  Pt with mild hyperglycemia on admission. A1c 5.7% (?pre-DM)  - FS q6h    Heme/onc  #Multiple Myeloma  Pt had last received Velcade 3/2022. Had been given opportunity for chemo vacation and travelled to [facility], but pt went for longer than anticipated. Now with diffuse lytic lesions involving most of spine. Had previously had XRT as well.   Diagnostics:  - LDH, B2-Microglobulin  - SPEP, UPEP, IFX (S/U), Immunoglobulins, FLCs    Therapeutics: pending evaluation as above    =====Hospital Bundle=====  Hospital Bundle  Fluids: with gtts  Electrolytes: Replete K > 4, Mg > 2, Phos > 3  Nutrition: Diet NPO with TF (Nutrition C/S)  PPX  ---VTE: SCD  ---GI: TFs  ---Resp: Vented  ---: Dahl Placed 9/25, removed now condom cath   Access: PIVs  Code Status: FULL CODE  Dispo: Pending Medical Optimization 71 y/o M with a PMHx significant for HTN, HLD, CAD s/p stent, MM, presents with descending paralysis complicated by acute hypoxic respiratory failure due to neuromuscular weakness mediated by GBS now status post IVIG.     Neuro   #Generalized Weakness, likely GBS   Pt family reporting a descending weakness that ultimately started to involve his speaking ability as well.  Low c/f botulism given not having flaccid, but some suspicion for paraneoplastic process (e.g. LES/MG).  - MRI brain- No acute hemorrhage mass mass effect or acute territorial   infarcts seen. and C/T/L spine no cord compression   -Discontinued decadron 9/28 discussed with neurosurgery no cord compression and neuro deferred further decision making regarding decadron.   -IVIG 35g qd (3/3) as per neuro for possible GBS completed 9/28  -f/u anti ganglioside ab  -appreciate neurology recommendations     #Sedation/Analgesia  - C/W Propofol for now and precedex    Pulmonary   #Acute hypoxic respiratory failure secondary to neuromuscular weakness  Pt with increased WOB, reportedly wheezing as well. No h/o COPD  - C/W Albuterol/Ipratropium MDI's while intubated  -wean vent as tolerated (370/18/8/50%)  -with decreased secretions 9/28 decreased frequency of airway clearance.   -9/29 NIF performed prerounds with respiratory therapist -21.     #Hemoptysis  Pt reportedly with hemoptysis in [facility]. Pt with RLL calcified granuloma & scattered pulm nodules.   -quant gold indeterminate  - sputum afb x3 no acid fast  -taken off of TB precautions     Cardiovascular   #Vasoplegic Shock  Pt with minimal pressor requirement on sedation for intubation. Likely vasoplegic in that context. CTM resolved.     #HLD   -atorvastatin 40 qhs     #CAD  - Continue to hold aspirin given hgb drop requiring 1 u pRBC 9/29  - HOLDing Home Metoprolol tartrate 50mg PO BID    #HTN  -with soft BPs 9/29 overnight, continuing to hold amlodipine.   -Losartan held given soft BP and resolving CRISTI     #BNP elevated  Pt with BNP elevation, respiratory distress.  - TTE mild AR and AS, Stage I diastolic dysfunction     GI  #RAUL  - NPO w/ TF    Renal   #Hyponatremia, CRISTI  Pt with persistently low sodium. 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 Should have fluid restriction when not intubated.  - Na improving, cont to trend BMP qD  - Trend Cr, f/u urine lytes  - Strict Is/Os  -monitor sodium given GBS and patient to receive IVIG    MSK  #T8 Compression Fx   MR spine and spine consult to assess for possible interventions. Diffuse spinal lesions.   - Neurosx consulted   -no acute intervention. Decadron discontinued 9/28 following discussion with neurosurgery as well as neurology.     ID  #Bacillus cereus in blood cxs x1 bottle on 9/25, ?contaminate  - 9/27 BCx NGTD  - afebrile, WBC count improving, monitor off abx     r/o TB  - AFB negative x3  -off TB precautions     Endocrine   #Hyperglycemia  Pt with mild hyperglycemia on admission. A1c 5.7% (?pre-DM)  - FS q6h    Heme/onc  #Multiple Myeloma  Pt had last received Velcade 3/2022. Had been given opportunity for chemo vacation and travelled to [facility], but pt went for longer than anticipated. Now with diffuse lytic lesions involving most of spine. Had previously had XRT as well.   Diagnostics:  - LDH, B2-Microglobulin  - SPEP, UPEP, IFX (S/U), Immunoglobulins, FLCs    #Anemia  -hgb 6.7 9/29 transfused 1u prbc with response to hgb 9.2  -may be dilutional based upon decrease in all cell lines. IVIG may cause hemolytic anemia however bilirubin normal. To follow up LDH and haptoglobin and monitor H/H.         =====Hospital Bundle=====  Hospital Bundle  Fluids: with gtts  Electrolytes: Replete K > 4, Mg > 2, Phos > 3  Nutrition: Diet NPO with TF (Nutrition C/S)  PPX  ---VTE: SCD  ---GI: TFs  ---Resp: Vented  ---: Dahl Placed 9/25, removed now condom cath   Access: PIVs  Code Status: FULL CODE  Dispo: Pending Medical Optimization

## 2022-09-29 NOTE — PROGRESS NOTE ADULT - SUBJECTIVE AND OBJECTIVE BOX
MRN-[id number]    Subjective: 71 yo male seen and examined at bedside with daughter assisting with translation as needed. Now s/p IVIG x3 days with significant improvement in strength.    PAST MEDICAL & SURGICAL HISTORY:  HTN (hypertension)    HLD (hyperlipidemia)    Coronary artery disease    Multiple myeloma    S/P coronary artery stent placement    FAMILY HISTORY:  Maternal family history of hypertension  Mother    Social Hx:  Nonsmoker, no drug or alcohol use    Home Medications:  amLODIPine 10 mg oral tablet: 1 tab(s) orally once a day (25 Sep 2022 03:01)  Aspirin Enteric Coated 81 mg oral delayed release tablet: 1 tab(s) orally once a day (25 Sep 2022 03:01)  atorvastatin 40 mg oral tablet: 1 tab(s) orally once a day (at bedtime) (25 Sep 2022 03:01)  calcium carbonate 500 mg (200 mg elemental calcium) oral tablet, chewable: 1 tab(s) orally once a day (25 Sep 2022 03:01)  losartan 100 mg oral tablet: 1 tab(s) orally once a day (25 Sep 2022 03:01)  Metoprolol Tartrate 50 mg oral tablet: 1 tab(s) orally 2 times a day (25 Sep 2022 03:01)  valACYclovir 500 mg oral tablet: 1 tab(s) orally once a day (25 Sep 2022 03:01)    MEDICATIONS  (STANDING):  acetaminophen     Tablet .. 650 milliGRAM(s) Oral once  ALBUTerol    90 MICROgram(s) HFA Inhaler 2 Puff(s) Inhalation every 12 hours  atorvastatin 40 milliGRAM(s) Oral at bedtime  chlorhexidine 0.12% Liquid 15 milliLiter(s) Oral Mucosa every 12 hours  chlorhexidine 2% Cloths 1 Application(s) Topical <User Schedule>  chlorhexidine 2% Cloths 1 Application(s) Topical daily  dexMEDEtomidine Infusion 0.2 MICROgram(s)/kG/Hr (2.56 mL/Hr) IV Continuous <Continuous>  diphenhydrAMINE Injectable 25 milliGRAM(s) IV Push once  glucagon  Injectable 1 milliGRAM(s) IntraMuscular once  ipratropium 17 MICROgram(s) HFA Inhaler 1 Puff(s) Inhalation <User Schedule>  petrolatum Ophthalmic Ointment 1 Application(s) Both EYES two times a day  polyethylene glycol 3350 17 Gram(s) Oral daily  potassium chloride  10 mEq/100 mL IVPB 10 milliEquivalent(s) IV Intermittent every 1 hour  potassium phosphate / sodium phosphate Powder (PHOS-NaK) 2 Packet(s) Oral every 8 hours  propofol Infusion 40.039 MICROgram(s)/kG/Min (12.3 mL/Hr) IV Continuous <Continuous>  sodium bicarbonate 1300 milliGRAM(s) Oral two times a day  sodium chloride 3%  Inhalation 4 milliLiter(s) Inhalation every 12 hours  valACYclovir 500 milliGRAM(s) Oral daily    Allergies  Beef (Unknown)  No Known Drug Allergies  pork (Unknown)	    ROS: Unable to obtain, patient intubated.    ICU Vital Signs Last 24 Hrs  T(C): 36.8 (29 Sep 2022 08:00), Max: 36.8 (29 Sep 2022 08:00)  T(F): 98.2 (29 Sep 2022 08:00), Max: 98.2 (29 Sep 2022 08:00)  HR: 57 (29 Sep 2022 12:00) (49 - 87)  BP: 108/49 (29 Sep 2022 12:00) (93/47 - 176/61)  BP(mean): 65 (29 Sep 2022 12:00) (62 - 103)  RR: 18 (29 Sep 2022 12:00) (12 - 24)  SpO2: 100% (29 Sep 2022 12:00) (94% - 100%)    O2 Parameters below as of 29 Sep 2022 12:00  Patient On (Oxygen Delivery Method): AC 18  P 6    O2 Concentration (%): 40    GENERAL EXAM:  Constitutional: Lying in bed. Intubated and sedated.  Head: Normocephalic, atraumatic.  Extremities: No edema, no cyanosis.    NEUROLOGICAL EXAM: (intubated, propofol/precedex stopped for ~10-15 minutes prior to exam)  MS: Alert, eyes open spontaneously. Intubated, no verbal output. Follows commands.  CN: PERRL. EOMI. Face grossly symmetric.   Motor:             Deltoid	Biceps	Triceps	Wrist Ext   Wrist Flex	Finger ABd   R	3	4-	4-	4	    3		4 	  L	3	4+	4-	4-	    4-		5    	H-Flex	K-Flex	K-Ext	D-Flex	P-Flex  R	2	3	4-	4-	4+   L	2	3	4-	4	4+  Sensory: Intact to LT throughout.  Reflexes: Absent throughout.  Coordination/Gait: Unable to assess.    Labs:   cbc                      9.2    9.84  )-----------( 129      ( 29 Sep 2022 10:55 )             27.3     [id number]    143  |  110<H>  |  72<H>  ----------------------------<  111<H>  3.7   |  21<L>  |  1.17    Ca    8.2<L>      29 Sep 2022 10:55  Phos  2.6     09-29  Mg     2.60     09-29    TPro  7.4  /  Alb  3.2<L>  /  TBili  0.4  /  DBili  x   /  AST  37  /  ALT  60<H>  /  AlkPhos  34<L>  09-29    Coags: PT/INR - ( 29 Sep 2022 01:15 )   PT: 12.2 sec;   INR: 1.05 ratio      PTT - ( 29 Sep 2022 01:15 )  PTT:62.1 sec    LIVER FUNCTIONS - ( 29 Sep 2022 10:55 )  Alb: 3.2 g/dL / Pro: 7.4 g/dL / ALK PHOS: 34 U/L / ALT: 60 U/L / AST: 37 U/L / GGT: x           Radiology:  -09/24 CTH: No hydrocephalus, acute intracranial hemorrhage, mass effect, or brain edema.  -09/25 CT CERVICAL SPINE, w/ contrast: Multiple mixed lytic sclerotic lesions in the cervical spine may be sequela of previously treated multiple myeloma. No soft tissue mass, epidural tumor extension or abnormal enhancement is visualized by CT technique.  Follow-up MRI may be obtained for more sensitive evaluation. No fracture or traumatic malalignment. Cervical spine degenerative changes with mild to moderate spinal canal stenosis at C3-C4 through C5-C6.  Moderate to severe neural foraminal narrowing at C4-C5 and C5-C6 on the right side. Approximately 1 cm nodule in the left thyroid lobe.  -09/25 CT THORACIC SPINE, w/ contrast: Multiple mixed lytic sclerotic lesions in the thoracic spine, sternum and ribs may be sequela of previously treated multiple myeloma.  No soft tissue mass, epidural tumor extension or abnormal enhancement is visualized by CT technique. Chronic pathologic compression fracture at T8 demonstrating up to 50% loss of vertebral body height.  Chronic displaced fracture of the left superior facet of T8; the displaced fracture fragment is fused to the left inferior facet of T7. Chronic appearing mild central endplate fracture and Schmorl's node in the superior endplate of T12. No significant spinal canal stenosis is visualized by CT technique.  -09/25 CT LUMBAR SPINE, w/ contrast: There are small rudimentary ribs at T12.  There is partial sacralization of L5, with a rudimentary disc space at L5-S1. The last completely formed intervertebral disc space corresponds L4-L5 level. Multiple mixed lytic sclerotic lesions in the lumbar spine, sacrum and visualized pelvis may be sequela of previously treated multiple myeloma. No soft tissue mass, epidural tumor extension or abnormal enhancement is visualized by CT technique. No lumbar spine fracture or traumatic malalignment. Mild lumbar spine degenerative changes.  Mild spinal canal stenosis at L3-L4 and L4-L5. The bladder is distended without wall thickening or surrounding inflammatory changes.  If the patient is unable to urinate Dahl catheter may be placed.  -09/27 MRI Head w/wo: No acute hemorrhage mass mass effect or acute territorial infarcts seen.  -09/27 MRI C/T/L Spine, all w/wo: No evidence of cord or cauda equina compression. Abnormal signal scattered throughout the cervical spine as well as involving the T8 vertebral body which may be related to multiple myeloma. No abnormal intraspinal enhancement.

## 2022-09-29 NOTE — PROGRESS NOTE ADULT - SUBJECTIVE AND OBJECTIVE BOX
[facility]   MD DERRICK MOSS MD KRISTINE SOLTANPOUR, DO ANGELA WONG, PA    TEL:  OFFICE: [phone]  From 5pm-7am Answering Service [phone]    -- RENAL FOLLOW UP NOTE ---Date of Service 09-29-22 @ 11:30    Patient is a 72y old  Male who presents with a chief complaint of weakness (29 Sep 2022 07:40)      Patient seen and examined at bedside.     VITALS:  T(F): 98.2 (09-29-22 @ 08:00), Max: 98.2 (09-29-22 @ 08:00)  HR: 66 (09-29-22 @ 10:33)  BP: 151/58 (09-29-22 @ 10:00)  RR: 17 (09-29-22 @ 10:00)  SpO2: 100% (09-29-22 @ 10:33)  Wt(kg): --    09-28 @ 07:01  -  09-29 @ 07:00  --------------------------------------------------------  IN: 1963.1 mL / OUT: 1590 mL / NET: 373.1 mL    09-29 @ 07:01  -  09-29 @ 11:30  --------------------------------------------------------  IN: 222.7 mL / OUT: 410 mL / NET: -187.3 mL          PHYSICAL EXAM:  General: intubated  Neck: No JVD  Respiratory: CTAB, no wheezes, rales or rhonchi  Cardiovascular: S1, S2, RRR  Gastrointestinal: BS+, soft, NT/ND  Extremities: No peripheral edema    Hospital Medications:   MEDICATIONS  (STANDING):  acetaminophen     Tablet .. 650 milliGRAM(s) Oral once  ALBUTerol    90 MICROgram(s) HFA Inhaler 2 Puff(s) Inhalation every 12 hours  atorvastatin 40 milliGRAM(s) Oral at bedtime  chlorhexidine 0.12% Liquid 15 milliLiter(s) Oral Mucosa every 12 hours  chlorhexidine 2% Cloths 1 Application(s) Topical <User Schedule>  dexMEDEtomidine Infusion 0.2 MICROgram(s)/kG/Hr (2.56 mL/Hr) IV Continuous <Continuous>  diphenhydrAMINE Injectable 25 milliGRAM(s) IV Push once  glucagon  Injectable 1 milliGRAM(s) IntraMuscular once  ipratropium 17 MICROgram(s) HFA Inhaler 1 Puff(s) Inhalation <User Schedule>  petrolatum Ophthalmic Ointment 1 Application(s) Both EYES two times a day  polyethylene glycol 3350 17 Gram(s) Oral every 12 hours  potassium chloride  10 mEq/100 mL IVPB 10 milliEquivalent(s) IV Intermittent every 1 hour  potassium phosphate / sodium phosphate Powder (PHOS-NaK) 2 Packet(s) Oral every 8 hours  propofol Infusion 40.039 MICROgram(s)/kG/Min (12.3 mL/Hr) IV Continuous <Continuous>  sodium bicarbonate 1300 milliGRAM(s) Oral two times a day  sodium chloride 3%  Inhalation 4 milliLiter(s) Inhalation every 12 hours  valACYclovir 500 milliGRAM(s) Oral daily      LABS:  09-29    139  |  108<H>  |  69<H>  ----------------------------<  166<H>  3.9   |  20<L>  |  1.25    Ca    8.2<L>      29 Sep 2022 01:15  Phos  2.6     09-29  Mg     2.60     09-29    TPro  7.4  /  Alb  3.1<L>  /  TBili  0.3  /  DBili      /  AST  37  /  ALT  64<H>  /  AlkPhos  30<L>  09-29    Creatinine Trend: 1.25 <--, 1.60 <--, 1.65 <--, 1.65 <--, 1.63 <--, 1.49 <--, 1.33 <--, 1.12 <--, 1.15 <--, 1.19 <--, 1.08 <--, 1.17 <--, 1.06 <--, 1.37 <--    Albumin, Serum: 3.1 g/dL (09-29 @ 01:15)  Phosphorus Level, Serum: 2.6 mg/dL (09-29 @ 01:15)                              9.2    9.84  )-----------( 129      ( 29 Sep 2022 10:55 )             27.3     Urine Studies:  Urinalysis - [09-25-22 @ 12:01]      Color Light Yellow / Appearance Clear / SG 1.035 / pH 6.5      Gluc 200 mg/dL / Ketone Trace  / Bili Negative / Urobili <2 mg/dL       Blood Trace / Protein 30 mg/dL / Leuk Est Negative / Nitrite Negative      RBC 1 / WBC 1 / Hyaline  / Gran  / Sq Epi  / Non Sq Epi 2 / Bacteria Negative    Urine Creatinine 33      [09-27-22 @ 13:50]  Urine Protein 68      [09-25-22 @ 12:01]  Urine Sodium <20      [09-27-22 @ 13:50]  Urine Urea Nitrogen 888.4      [09-27-22 @ 13:50]  Urine Osmolality 428      [09-27-22 @ 13:50]    Iron 36, TIBC 290, %sat 12      [09-25-22 @ 11:15]  Ferritin 287      [09-25-22 @ 11:15]  TSH 1.57      [09-25-22 @ 07:30]      Free Light Chains: kappa 2.58, lambda 2.12, ratio = 1.22      [09-25 @ 11:15]  SPEP Interpretation: Increase in Alpha-2 fraction suggestive of acute inflammation.  JERONIMO Farris M.D.      [09-25-22 @ 11:15]  UPEP Interpretation: Mild Selective Proteinuria. No Monoclonal band seen.  JERONIMO Farris M.D.      [09-25-22 @ 12:01]    RADIOLOGY & ADDITIONAL STUDIES:

## 2022-09-30 LAB
ALBUMIN SERPL ELPH-MCNC: 2.9 G/DL — LOW (ref 3.3–5)
ALP SERPL-CCNC: 30 U/L — LOW (ref 40–120)
ALT FLD-CCNC: 55 U/L — HIGH (ref 4–41)
ANION GAP SERPL CALC-SCNC: 10 MMOL/L — SIGNIFICANT CHANGE UP (ref 7–14)
AST SERPL-CCNC: 40 U/L — SIGNIFICANT CHANGE UP (ref 4–40)
BASOPHILS # BLD AUTO: 0.02 K/UL — SIGNIFICANT CHANGE UP (ref 0–0.2)
BASOPHILS NFR BLD AUTO: 0.2 % — SIGNIFICANT CHANGE UP (ref 0–2)
BILIRUB SERPL-MCNC: 0.4 MG/DL — SIGNIFICANT CHANGE UP (ref 0.2–1.2)
BUN SERPL-MCNC: 68 MG/DL — HIGH (ref 7–23)
CALCIUM SERPL-MCNC: 8.1 MG/DL — LOW (ref 8.4–10.5)
CHLORIDE SERPL-SCNC: 119 MMOL/L — HIGH (ref 98–107)
CO2 SERPL-SCNC: 23 MMOL/L — SIGNIFICANT CHANGE UP (ref 22–31)
CREAT SERPL-MCNC: 1.09 MG/DL — SIGNIFICANT CHANGE UP (ref 0.5–1.3)
EGFR: 72 ML/MIN/1.73M2 — SIGNIFICANT CHANGE UP
EOSINOPHIL # BLD AUTO: 0.04 K/UL — SIGNIFICANT CHANGE UP (ref 0–0.5)
EOSINOPHIL NFR BLD AUTO: 0.5 % — SIGNIFICANT CHANGE UP (ref 0–6)
GAMMA INTERFERON BACKGROUND BLD IA-ACNC: 0.01 IU/ML — SIGNIFICANT CHANGE UP
GLUCOSE SERPL-MCNC: 109 MG/DL — HIGH (ref 70–99)
HAPTOGLOB SERPL-MCNC: 154 MG/DL — SIGNIFICANT CHANGE UP (ref 34–200)
HCT VFR BLD CALC: 23.1 % — LOW (ref 39–50)
HGB BLD-MCNC: 7.9 G/DL — LOW (ref 13–17)
IANC: 5.06 K/UL — SIGNIFICANT CHANGE UP (ref 1.8–7.4)
IMM GRANULOCYTES NFR BLD AUTO: 3.9 % — HIGH (ref 0–0.9)
INTERPRETATION 24H UR IFE-IMP: SIGNIFICANT CHANGE UP
INTERPRETATION SERPL IFE-IMP: SIGNIFICANT CHANGE UP
LDH SERPL L TO P-CCNC: 148 U/L — SIGNIFICANT CHANGE UP (ref 135–225)
LYMPHOCYTES # BLD AUTO: 1.78 K/UL — SIGNIFICANT CHANGE UP (ref 1–3.3)
LYMPHOCYTES # BLD AUTO: 21.5 % — SIGNIFICANT CHANGE UP (ref 13–44)
M TB IFN-G BLD-IMP: ABNORMAL
M TB IFN-G CD4+ BCKGRND COR BLD-ACNC: 0 IU/ML — SIGNIFICANT CHANGE UP
M TB IFN-G CD4+CD8+ BCKGRND COR BLD-ACNC: 0 IU/ML — SIGNIFICANT CHANGE UP
MAGNESIUM SERPL-MCNC: 2.5 MG/DL — SIGNIFICANT CHANGE UP (ref 1.6–2.6)
MCHC RBC-ENTMCNC: 30.7 PG — SIGNIFICANT CHANGE UP (ref 27–34)
MCHC RBC-ENTMCNC: 34.2 GM/DL — SIGNIFICANT CHANGE UP (ref 32–36)
MCV RBC AUTO: 89.9 FL — SIGNIFICANT CHANGE UP (ref 80–100)
MONOCYTES # BLD AUTO: 1.06 K/UL — HIGH (ref 0–0.9)
MONOCYTES NFR BLD AUTO: 12.8 % — SIGNIFICANT CHANGE UP (ref 2–14)
NEUTROPHILS # BLD AUTO: 5.06 K/UL — SIGNIFICANT CHANGE UP (ref 1.8–7.4)
NEUTROPHILS NFR BLD AUTO: 61.1 % — SIGNIFICANT CHANGE UP (ref 43–77)
NRBC # BLD: 1 /100 WBCS — HIGH (ref 0–0)
NRBC # FLD: 0.11 K/UL — HIGH (ref 0–0)
PHOSPHATE SERPL-MCNC: 4 MG/DL — SIGNIFICANT CHANGE UP (ref 2.5–4.5)
PLATELET # BLD AUTO: 109 K/UL — LOW (ref 150–400)
POTASSIUM SERPL-MCNC: 4 MMOL/L — SIGNIFICANT CHANGE UP (ref 3.5–5.3)
POTASSIUM SERPL-SCNC: 4 MMOL/L — SIGNIFICANT CHANGE UP (ref 3.5–5.3)
PROT SERPL-MCNC: 6.5 G/DL — SIGNIFICANT CHANGE UP (ref 6–8.3)
QUANT TB PLUS MITOGEN MINUS NIL: 0.02 IU/ML — SIGNIFICANT CHANGE UP
RBC # BLD: 2.57 M/UL — LOW (ref 4.2–5.8)
RBC # FLD: 15.2 % — HIGH (ref 10.3–14.5)
SODIUM SERPL-SCNC: 152 MMOL/L — HIGH (ref 135–145)
VISC SER: 1.7 — SIGNIFICANT CHANGE UP (ref 1.4–2.1)
WBC # BLD: 8.28 K/UL — SIGNIFICANT CHANGE UP (ref 3.8–10.5)
WBC # FLD AUTO: 8.28 K/UL — SIGNIFICANT CHANGE UP (ref 3.8–10.5)

## 2022-09-30 PROCEDURE — 99291 CRITICAL CARE FIRST HOUR: CPT

## 2022-09-30 RX ORDER — ACETAMINOPHEN 500 MG
650 TABLET ORAL ONCE
Refills: 0 | Status: COMPLETED | OUTPATIENT
Start: 2022-09-30 | End: 2022-10-01

## 2022-09-30 RX ADMIN — POLYETHYLENE GLYCOL 3350 17 GRAM(S): 17 POWDER, FOR SOLUTION ORAL at 11:49

## 2022-09-30 RX ADMIN — Medication 1 PUFF(S): at 10:09

## 2022-09-30 RX ADMIN — CHLORHEXIDINE GLUCONATE 1 APPLICATION(S): 213 SOLUTION TOPICAL at 11:56

## 2022-09-30 RX ADMIN — PROPOFOL 12.3 MICROGRAM(S)/KG/MIN: 10 INJECTION, EMULSION INTRAVENOUS at 08:19

## 2022-09-30 RX ADMIN — PROPOFOL 12.3 MICROGRAM(S)/KG/MIN: 10 INJECTION, EMULSION INTRAVENOUS at 22:30

## 2022-09-30 RX ADMIN — Medication 1 PUFF(S): at 21:27

## 2022-09-30 RX ADMIN — ATORVASTATIN CALCIUM 40 MILLIGRAM(S): 80 TABLET, FILM COATED ORAL at 22:30

## 2022-09-30 RX ADMIN — CHLORHEXIDINE GLUCONATE 15 MILLILITER(S): 213 SOLUTION TOPICAL at 17:31

## 2022-09-30 RX ADMIN — ALBUTEROL 2 PUFF(S): 90 AEROSOL, METERED ORAL at 21:27

## 2022-09-30 RX ADMIN — Medication 1 APPLICATION(S): at 17:31

## 2022-09-30 RX ADMIN — Medication 1300 MILLIGRAM(S): at 05:08

## 2022-09-30 RX ADMIN — VALACYCLOVIR 500 MILLIGRAM(S): 500 TABLET, FILM COATED ORAL at 11:49

## 2022-09-30 RX ADMIN — Medication 1 APPLICATION(S): at 05:09

## 2022-09-30 RX ADMIN — CHLORHEXIDINE GLUCONATE 15 MILLILITER(S): 213 SOLUTION TOPICAL at 05:08

## 2022-09-30 RX ADMIN — DEXMEDETOMIDINE HYDROCHLORIDE IN 0.9% SODIUM CHLORIDE 2.56 MICROGRAM(S)/KG/HR: 4 INJECTION INTRAVENOUS at 08:19

## 2022-09-30 RX ADMIN — SODIUM CHLORIDE 4 MILLILITER(S): 9 INJECTION INTRAMUSCULAR; INTRAVENOUS; SUBCUTANEOUS at 21:26

## 2022-09-30 RX ADMIN — DEXMEDETOMIDINE HYDROCHLORIDE IN 0.9% SODIUM CHLORIDE 2.56 MICROGRAM(S)/KG/HR: 4 INJECTION INTRAVENOUS at 22:31

## 2022-09-30 RX ADMIN — SODIUM CHLORIDE 4 MILLILITER(S): 9 INJECTION INTRAMUSCULAR; INTRAVENOUS; SUBCUTANEOUS at 10:10

## 2022-09-30 RX ADMIN — ALBUTEROL 2 PUFF(S): 90 AEROSOL, METERED ORAL at 10:09

## 2022-09-30 NOTE — PROGRESS NOTE ADULT - SUBJECTIVE AND OBJECTIVE BOX
Deo Oropeza MD  Internal Medicine  Pager #[phone]    CHIEF COMPLAINT:Patient is a 72y old  Male who presents with a chief complaint of weakness (29 Sep 2022 15:11)        Interval Events:    H/H 7.9 -> 7.8. no obvious overt bleeding. required 1 uPRBC 9/29 with response to 9.2 at that time    Hypernatremia ON with additional of free water flushes.     REVIEW OF SYSTEMS:    ROS limited by intubated and sedated    Const: no pain      OBJECTIVE:  ICU Vital Signs Last 24 Hrs  T(C): 36.8 (30 Sep 2022 04:00), Max: 37.2 (29 Sep 2022 12:00)  T(F): 98.2 (30 Sep 2022 04:00), Max: 99 (29 Sep 2022 12:00)  HR: 76 (30 Sep 2022 07:00) (53 - 76)  BP: 138/51 (30 Sep 2022 06:00) (104/47 - 176/61)  BP(mean): 76 (30 Sep 2022 06:00) (63 - 94)  ABP: --  ABP(mean): --  RR: 17 (30 Sep 2022 07:00) (17 - 20)  SpO2: 100% (30 Sep 2022 07:00) (100% - 100%)    O2 Parameters below as of 30 Sep 2022 07:00  Patient On (Oxygen Delivery Method): ventilator    O2 Concentration (%): 40      Mode: AC/ CMV (Assist Control/ Continuous Mandatory Ventilation), RR (machine): 18, TV (machine): 370, FiO2: 40, PEEP: 6, ITime: 0.69, MAP: 10, PIP: 20    09-29 @ 07:01  -  09-30 @ 07:00  --------------------------------------------------------  IN: 1401.4 mL / OUT: 2060 mL / NET: -658.6 mL      CAPILLARY BLOOD GLUCOSE      POCT Blood Glucose.: 126 mg/dL (29 Sep 2022 12:55)      PHYSICAL EXAM:  GENERAL: intubated, sedated  HEAD:   (+)rt nares NGT in place  NECK: Supple, No JVD  CHEST/LUNG: Vented BS with decreased BS ln lower lung field   HEART: Regular rate and rhythm; nl S1/S2; No murmurs, rubs, or gallops  ABDOMEN: Soft, Nondistended  : condom catheter in place   GI: Fecal management system in place.   EXTREMITIES:  well perfused.   NEURO: following commands moving, moves all extremities AOx2      LINES:    HOSPITAL MEDICATIONS:  Standing Meds:  ALBUTerol    90 MICROgram(s) HFA Inhaler 2 Puff(s) Inhalation every 12 hours  atorvastatin 40 milliGRAM(s) Oral at bedtime  chlorhexidine 0.12% Liquid 15 milliLiter(s) Oral Mucosa every 12 hours  chlorhexidine 2% Cloths 1 Application(s) Topical daily  dexMEDEtomidine Infusion 0.2 MICROgram(s)/kG/Hr IV Continuous <Continuous>  glucagon  Injectable 1 milliGRAM(s) IntraMuscular once  ipratropium 17 MICROgram(s) HFA Inhaler 1 Puff(s) Inhalation <User Schedule>  petrolatum Ophthalmic Ointment 1 Application(s) Both EYES two times a day  polyethylene glycol 3350 17 Gram(s) Oral daily  propofol Infusion 40.039 MICROgram(s)/kG/Min IV Continuous <Continuous>  sodium bicarbonate 1300 milliGRAM(s) Oral two times a day  sodium chloride 3%  Inhalation 4 milliLiter(s) Inhalation every 12 hours  valACYclovir 500 milliGRAM(s) Oral daily      PRN Meds:      LABS:                        7.9    8.28  )-----------( 109      ( 30 Sep 2022 01:15 )             23.1     Hgb Trend: 7.9<--, 7.8<--, 9.2<--, 6.7<--, 6.7<--  09-30    152<H>  |  119<H>  |  68<H>  ----------------------------<  109<H>  4.0   |  23  |  1.09    Ca    8.1<L>      30 Sep 2022 01:15  Phos  4.0     09-30  Mg     2.50     09-30    TPro  6.5  /  Alb  2.9<L>  /  TBili  0.4  /  DBili  x   /  AST  40  /  ALT  55<H>  /  AlkPhos  30<L>  09-30    Creatinine Trend: 1.09<--, 1.17<--, 1.25<--, 1.60<--, 1.65<--, 1.65<--  PT/INR - ( 29 Sep 2022 01:15 )   PT: 12.2 sec;   INR: 1.05 ratio         PTT - ( 29 Sep 2022 01:15 )  PTT:62.1 sec          MICROBIOLOGY:     Culture - Acid Fast - Sputum w/Smear (collected 28 Sep 2022 22:00)  Source: ET Tube ET Tube    Culture - Acid Fast - Sputum w/Smear (collected 28 Sep 2022 12:12)  Source: ET Tube ET Tube TRAP    Culture - Acid Fast - Sputum w/Smear (collected 28 Sep 2022 09:16)  Source: ET Tube ET Tube    Culture - Blood (collected 28 Sep 2022 02:00)  Source: .Blood Blood-Venous  Preliminary Report (29 Sep 2022 07:02):    No growth to date.    Culture - Acid Fast - Sputum w/Smear (collected 27 Sep 2022 12:30)  Source: Trach Asp Tracheal Aspirate      RADIOLOGY:  [ ] Reviewed and interpreted by me    EKG:

## 2022-09-30 NOTE — PROGRESS NOTE ADULT - ASSESSMENT
Patient is a 71 yo M with PMH of HTN, HLD, CAD, cardiac stent, Multiple Myeloma who presented to the ED for 3 days of generalized weakness.    EKG: ST PVCs  Tele: NSR PVCs episodes of PAT    1. Weakness  -rapidly progressed. was unable to move extremities.   -s/p RRT for hypoxia now intubated in MICU   -neuro on board, possible GBS vs pathology in neck  -echo shows mild AR/AS, normal LV systolic function and mild diastolic dysfunction     2. CAD s/p PCI  -in 2015 in [facility] as per daughter patient had heart attack  -no reported CP prior to hospitalization    3. MM  -heme/onc on board

## 2022-09-30 NOTE — PROGRESS NOTE ADULT - ASSESSMENT
73 y/o M with a PMHx significant for HTN, HLD, CAD s/p stent, MM, presents with descending paralysis complicated by acute hypoxic respiratory failure due to neuromuscular weakness mediated by GBS now status post IVIG.     Neuro   #Generalized Weakness, likely GBS   Pt family reporting a descending weakness that ultimately started to involve his speaking ability as well.  Low c/f botulism given not having flaccid, but some suspicion for paraneoplastic process (e.g. LES/MG).  - MRI brain- No acute hemorrhage mass mass effect or acute territorial   infarcts seen. and C/T/L spine no cord compression   -Discontinued decadron 9/28 discussed with neurosurgery no cord compression and neuro deferred further decision making regarding decadron.   -IVIG 35g qd (3/3) as per neuro for possible GBS completed 9/28  -f/u anti ganglioside ab  -appreciate neurology recommendations     #Sedation/Analgesia  - C/W Propofol for now and precedex    Pulmonary   #Acute hypoxic respiratory failure secondary to neuromuscular weakness  Pt with increased WOB, reportedly wheezing as well. No h/o COPD  - C/W Albuterol/Ipratropium MDI's while intubated  -wean vent as tolerated (370/18/8/50%)  -with decreased secretions 9/28 decreased frequency of airway clearance.   -9/29 NIF performed prerounds with respiratory therapist -21.     #Hemoptysis  Pt reportedly with hemoptysis in [facility]. Pt with RLL calcified granuloma & scattered pulm nodules.   -quant gold indeterminate  - sputum afb x3 no acid fast  -taken off of TB precautions     Cardiovascular   #Vasoplegic Shock  Pt with minimal pressor requirement on sedation for intubation. Likely vasoplegic in that context. CTM resolved.     #HLD   -atorvastatin 40 qhs     #CAD  - Continue to hold aspirin given hgb drop requiring 1 u pRBC 9/29  - HOLDing Home Metoprolol tartrate 50mg PO BID    #HTN  -with soft BPs 9/29 overnight, continuing to hold amlodipine.   -Losartan held given soft BP and resolving CRISTI     #BNP elevated  Pt with BNP elevation, respiratory distress.  - TTE mild AR and AS, Stage I diastolic dysfunction     GI  #RAUL  - NPO w/ TF    Renal   #Hyponatremia, CRISTI  Pt with persistently low sodium. 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 Should have fluid restriction when not intubated.  - Na improving, cont to trend BMP qD  - Trend Cr, f/u urine lytes  - Strict Is/Os  -monitor sodium given GBS and patient to receive IVIG    MSK  #T8 Compression Fx   MR spine and spine consult to assess for possible interventions. Diffuse spinal lesions.   - Neurosx consulted   -no acute intervention. Decadron discontinued 9/28 following discussion with neurosurgery as well as neurology.     ID  #Bacillus cereus in blood cxs x1 bottle on 9/25, ?contaminate  - 9/27 BCx NGTD  - afebrile, WBC count improving, monitor off abx     r/o TB  - AFB negative x3  -off TB precautions     Endocrine   #Hyperglycemia  Pt with mild hyperglycemia on admission. A1c 5.7% (?pre-DM)  - FS q6h    Heme/onc  #Multiple Myeloma  Pt had last received Velcade 3/2022. Had been given opportunity for chemo vacation and travelled to [facility], but pt went for longer than anticipated. Now with diffuse lytic lesions involving most of spine. Had previously had XRT as well.   Diagnostics:  - LDH, B2-Microglobulin  - SPEP, UPEP, IFX (S/U), Immunoglobulins, FLCs    #Anemia  -hgb 6.7 9/29 transfused 1u prbc with response to hgb 9.2  -may be dilutional based upon decrease in all cell lines. IVIG may cause hemolytic anemia however bilirubin normal. To follow up LDH and haptoglobin and monitor H/H.           =====Hospital Bundle=====  Hospital Bundle  Fluids: with gtts  Electrolytes: Replete K > 4, Mg > 2, Phos > 3  Nutrition: Diet NPO with TF (Nutrition C/S)  PPX  ---VTE: SCD  ---GI: TFs  ---Resp: Vented  ---: Dahl Placed 9/25, removed now condom cath   Access: PIVs  Code Status: FULL CODE  Dispo: Pending Medical Optimization

## 2022-09-30 NOTE — PROGRESS NOTE ADULT - SUBJECTIVE AND OBJECTIVE BOX
Payam Longoria MD  Interventional Cardiology / Endovascular Specialist  [place] Office : [phone] [place] [address] [place]. [address]  Tel:   [place] Office : [address] [facility] [place] [address]  Tel: [phone]    Subjective/Overnight events: Patient lying in bed remains intubated in ICU   	  MEDICATIONS:    valACYclovir 500 milliGRAM(s) Oral daily    ALBUTerol    90 MICROgram(s) HFA Inhaler 2 Puff(s) Inhalation every 12 hours  ipratropium 17 MICROgram(s) HFA Inhaler 1 Puff(s) Inhalation <User Schedule>  sodium chloride 3%  Inhalation 4 milliLiter(s) Inhalation every 12 hours    acetaminophen    Suspension .. 650 milliGRAM(s) Oral once  dexMEDEtomidine Infusion 0.2 MICROgram(s)/kG/Hr IV Continuous <Continuous>  propofol Infusion 40.039 MICROgram(s)/kG/Min IV Continuous <Continuous>    polyethylene glycol 3350 17 Gram(s) Oral daily    atorvastatin 40 milliGRAM(s) Oral at bedtime  glucagon  Injectable 1 milliGRAM(s) IntraMuscular once    chlorhexidine 0.12% Liquid 15 milliLiter(s) Oral Mucosa every 12 hours  chlorhexidine 2% Cloths 1 Application(s) Topical daily  petrolatum Ophthalmic Ointment 1 Application(s) Both EYES two times a day      PAST MEDICAL/SURGICAL HISTORY  PAST MEDICAL & SURGICAL HISTORY:  HTN (hypertension)      HLD (hyperlipidemia)      Coronary artery disease      Multiple myeloma      S/P coronary artery stent placement          SOCIAL HISTORY: Substance Use (street drugs): ( x ) never used  (  ) other:    FAMILY HISTORY:  Maternal family history of hypertension  Mother        REVIEW OF SYSTEMS:  intubated    PHYSICAL EXAM:  T(C): 36.7 (09-30-22 @ 20:00), Max: 37.3 (09-30-22 @ 08:00)  HR: 55 (09-30-22 @ 23:36) (55 - 85)  BP: 120/64 (09-30-22 @ 22:00) (106/52 - 171/64)  RR: 19 (09-30-22 @ 22:00) (16 - 23)  SpO2: 100% (09-30-22 @ 23:36) (95% - 100%)  Wt(kg): --  I&O's Summary    29 Sep 2022 07:01  -  30 Sep 2022 07:00  --------------------------------------------------------  IN: 1401.4 mL / OUT: 2060 mL / NET: -658.6 mL    30 Sep 2022 07:01  -  01 Oct 2022 00:07  --------------------------------------------------------  IN: 1666.2 mL / OUT: 1270 mL / NET: 396.2 mL        GENERAL: intubated  EYES:  conjunctiva and sclera clear  Cardiovascular: Normal S1 S2, No JVD, No murmurs, No edema  Respiratory: intubated	  Gastrointestinal:  Soft,   Extremities: No edema                                 7.9    8.28  )-----------( 109      ( 30 Sep 2022 01:15 )             23.1     09-30    152<H>  |  119<H>  |  68<H>  ----------------------------<  109<H>  4.0   |  23  |  1.09    Ca    8.1<L>      30 Sep 2022 01:15  Phos  4.0     09-30  Mg     2.50     09-30    TPro  6.5  /  Alb  2.9<L>  /  TBili  0.4  /  DBili  x   /  AST  40  /  ALT  55<H>  /  AlkPhos  30<L>  09-30    proBNP:   Lipid Profile:   HgA1c:   TSH:     Consultant(s) Notes Reviewed:  [x ] YES  [ ] NO    Care Discussed with Consultants/Other Providers [ x] YES  [ ] NO    Imaging Personally Reviewed independently:  [x] YES  [ ] NO    All labs, radiologic studies, vitals, orders and medications list reviewed. Patient is seen and examined at bedside. Case discussed with medical team.

## 2022-09-30 NOTE — PROGRESS NOTE ADULT - SUBJECTIVE AND OBJECTIVE BOX
[facility] – [place], [place] NEPHROLOGY PRACTICE   MD DERRICK MOSS MD KRISTINE SOLTANPOUR, DO ANGELA WONG, PA    TEL:  OFFICE: [phone]  From 5pm-7am Answering Service [phone]    -- RENAL FOLLOW UP NOTE ---Date of Service 09-30-22 @ 10:40    Patient is a 72y old  Male who presents with a chief complaint of weakness (30 Sep 2022 07:43)      Patient seen and examined at bedside.     VITALS:  T(F): 99.1 (09-30-22 @ 08:00), Max: 99.1 (09-30-22 @ 08:00)  HR: 82 (09-30-22 @ 10:09)  BP: 171/64 (09-30-22 @ 10:00)  RR: 18 (09-30-22 @ 10:00)  SpO2: 100% (09-30-22 @ 10:09)  Wt(kg): --    09-29 @ 07:01  -  09-30 @ 07:00  --------------------------------------------------------  IN: 1401.4 mL / OUT: 2060 mL / NET: -658.6 mL    09-30 @ 07:01  -  09-30 @ 10:40  --------------------------------------------------------  IN: 186 mL / OUT: 460 mL / NET: -274 mL          PHYSICAL EXAM:  General: intubated but awake  Neck: No JVD  Respiratory: CTAB, no wheezes, rales or rhonchi  Cardiovascular: S1, S2, RRR  Gastrointestinal: BS+, soft, NT/ND  Extremities: No peripheral edema    Hospital Medications:   MEDICATIONS  (STANDING):  ALBUTerol    90 MICROgram(s) HFA Inhaler 2 Puff(s) Inhalation every 12 hours  atorvastatin 40 milliGRAM(s) Oral at bedtime  chlorhexidine 0.12% Liquid 15 milliLiter(s) Oral Mucosa every 12 hours  chlorhexidine 2% Cloths 1 Application(s) Topical daily  dexMEDEtomidine Infusion 0.2 MICROgram(s)/kG/Hr (2.56 mL/Hr) IV Continuous <Continuous>  glucagon  Injectable 1 milliGRAM(s) IntraMuscular once  ipratropium 17 MICROgram(s) HFA Inhaler 1 Puff(s) Inhalation <User Schedule>  petrolatum Ophthalmic Ointment 1 Application(s) Both EYES two times a day  polyethylene glycol 3350 17 Gram(s) Oral daily  propofol Infusion 40.039 MICROgram(s)/kG/Min (12.3 mL/Hr) IV Continuous <Continuous>  sodium chloride 3%  Inhalation 4 milliLiter(s) Inhalation every 12 hours  valACYclovir 500 milliGRAM(s) Oral daily      LABS:  09-30    152<H>  |  119<H>  |  68<H>  ----------------------------<  109<H>  4.0   |  23  |  1.09    Ca    8.1<L>      30 Sep 2022 01:15  Phos  4.0     09-30  Mg     2.50     09-30    TPro  6.5  /  Alb  2.9<L>  /  TBili  0.4  /  DBili      /  AST  40  /  ALT  55<H>  /  AlkPhos  30<L>  09-30    Creatinine Trend: 1.09 <--, 1.17 <--, 1.25 <--, 1.60 <--, 1.65 <--, 1.65 <--, 1.63 <--, 1.49 <--, 1.33 <--, 1.12 <--, 1.15 <--, 1.19 <--, 1.08 <--, 1.17 <--, 1.06 <--    Albumin, Serum: 2.9 g/dL (09-30 @ 01:15)  Phosphorus Level, Serum: 4.0 mg/dL (09-30 @ 01:15)  Albumin, Serum: 3.2 g/dL (09-29 @ 10:55)                              7.9    8.28  )-----------( 109      ( 30 Sep 2022 01:15 )             23.1     Urine Studies:  Urinalysis - [09-25-22 @ 12:01]      Color Light Yellow / Appearance Clear / SG 1.035 / pH 6.5      Gluc 200 mg/dL / Ketone Trace  / Bili Negative / Urobili <2 mg/dL       Blood Trace / Protein 30 mg/dL / Leuk Est Negative / Nitrite Negative      RBC 1 / WBC 1 / Hyaline  / Gran  / Sq Epi  / Non Sq Epi 2 / Bacteria Negative    Urine Creatinine 33      [09-27-22 @ 13:50]  Urine Protein 68      [09-25-22 @ 12:01]  Urine Sodium <20      [09-27-22 @ 13:50]  Urine Urea Nitrogen 888.4      [09-27-22 @ 13:50]  Urine Osmolality 428      [09-27-22 @ 13:50]    Iron 36, TIBC 290, %sat 12      [09-25-22 @ 11:15]  Ferritin 287      [09-25-22 @ 11:15]  TSH 1.57      [09-25-22 @ 07:30]      Free Light Chains: kappa 2.58, lambda 2.12, ratio = 1.22      [09-25 @ 11:15]  SPEP Interpretation: Increase in Alpha-2 fraction suggestive of acute inflammation.  JERONIMO Farris M.D.      [09-25-22 @ 11:15]  Immunofixation Urine: DUPLICATE ORDER.      [09-25-22 @ 12:01]  UPEP Interpretation: Mild Selective Proteinuria. No Monoclonal band seen.  JERONIMO Farris M.D.      [09-25-22 @ 12:01]    RADIOLOGY & ADDITIONAL STUDIES:

## 2022-09-30 NOTE — PROGRESS NOTE ADULT - SUBJECTIVE AND OBJECTIVE BOX
Name of Patient : RASHID MEIER  MRN: [id number]  Date of visit: 09-30-22       Subjective: Patient seen and examined. No new events except as noted.       MEDICATIONS:  MEDICATIONS  (STANDING):  acetaminophen    Suspension .. 650 milliGRAM(s) Oral once  ALBUTerol    90 MICROgram(s) HFA Inhaler 2 Puff(s) Inhalation every 12 hours  atorvastatin 40 milliGRAM(s) Oral at bedtime  chlorhexidine 0.12% Liquid 15 milliLiter(s) Oral Mucosa every 12 hours  chlorhexidine 2% Cloths 1 Application(s) Topical daily  dexMEDEtomidine Infusion 0.2 MICROgram(s)/kG/Hr (2.56 mL/Hr) IV Continuous <Continuous>  glucagon  Injectable 1 milliGRAM(s) IntraMuscular once  ipratropium 17 MICROgram(s) HFA Inhaler 1 Puff(s) Inhalation <User Schedule>  petrolatum Ophthalmic Ointment 1 Application(s) Both EYES two times a day  polyethylene glycol 3350 17 Gram(s) Oral daily  propofol Infusion 40.039 MICROgram(s)/kG/Min (12.3 mL/Hr) IV Continuous <Continuous>  sodium chloride 3%  Inhalation 4 milliLiter(s) Inhalation every 12 hours  valACYclovir 500 milliGRAM(s) Oral daily      PHYSICAL EXAM:  T(C): 36.7 (09-30-22 @ 20:00), Max: 37.3 (09-30-22 @ 08:00)  HR: 61 (09-30-22 @ 22:00) (57 - 85)  BP: 120/64 (09-30-22 @ 22:00) (106/52 - 171/64)  RR: 19 (09-30-22 @ 22:00) (16 - 23)  SpO2: 100% (09-30-22 @ 22:00) (95% - 100%)  Wt(kg): --  I&O's Summary    29 Sep 2022 07:01  -  30 Sep 2022 07:00  --------------------------------------------------------  IN: 1401.4 mL / OUT: 2060 mL / NET: -658.6 mL    30 Sep 2022 07:01  -  30 Sep 2022 23:35  --------------------------------------------------------  IN: 1666.2 mL / OUT: 1270 mL / NET: 396.2 mL      All labs, Imaging and EKGs personally reviewed       09-29-22 @ 07:01  -  09-30-22 @ 07:00  --------------------------------------------------------  IN: 1401.4 mL / OUT: 2060 mL / NET: -658.6 mL    09-30-22 @ 07:01  -  09-30-22 @ 23:35  --------------------------------------------------------  IN: 1666.2 mL / OUT: 1270 mL / NET: 396.2 mL                          7.9    8.28  )-----------( 109      ( 30 Sep 2022 01:15 )             23.1               09-30    152<H>  |  119<H>  |  68<H>  ----------------------------<  109<H>  4.0   |  23  |  1.09    Ca    8.1<L>      30 Sep 2022 01:15  Phos  4.0     09-30  Mg     2.50     09-30    TPro  6.5  /  Alb  2.9<L>  /  TBili  0.4  /  DBili  x   /  AST  40  /  ALT  55<H>  /  AlkPhos  30<L>  09-30    PT/INR - ( 29 Sep 2022 01:15 )   PT: 12.2 sec;   INR: 1.05 ratio         PTT - ( 29 Sep 2022 01:15 )  PTT:62.1 sec

## 2022-09-30 NOTE — PROGRESS NOTE ADULT - ASSESSMENT
72 year-old male, history of HTN, HLD, CAD, cardiac stent, MM, presents with cc generalized weakness x 2-3 days associated with some tingling sensation.    A/P:  Hyponatremia/hypernatrmic:  work up suggested SIADH  s/p 1.5% NaCl 50cc/hr for 5 hrs x2  Na improved now hypernatremia likely sec to dehydration  agree with starting 350cc free water q6. monitor bmp q6  Na level should not change more than 6-8meq in 24 hrs    acute on CKD stage 3  Baseline Scr 1.3-1.4  CRISTI likely sec to ATN  better today  monitor bmp, i/o    Hypokalemia:  better  Monitor K level    acidosis  Non AG  supplement oral bicarb  monitor    HTN:  controlled  MOnitor BP    Weakness:  Etiology?  h/o MM  s/p LP  possible GBS getting IVIG  f/u neuro         72 year-old male, history of HTN, HLD, CAD, cardiac stent, MM, presents with cc generalized weakness x 2-3 days associated with some tingling sensation.    A/P:  Hyponatremia/hypernatrmic:  work up suggested SIADH  s/p 1.5% NaCl 50cc/hr for 5 hrs x2  Na improved now hypernatremia likely sec to dehydration  agree with starting 350cc free water q6. monitor bmp q6  Na level should not change more than 6-8meq in 24 hrs    acute on CKD stage 3  Baseline Scr 1.3-1.4  CRISTI likely sec to ATN  better today  monitor bmp, i/o    Hypokalemia:  better  Monitor K level    acidosis  Non AG  improved.   dc bicarb  monitor    HTN:  controlled  MOnitor BP    Weakness:  Etiology?  h/o MM  s/p LP  possible GBS getting IVIG  f/u neuro

## 2022-10-01 LAB
ALBUMIN SERPL ELPH-MCNC: 2.7 G/DL — LOW (ref 3.3–5)
ALP SERPL-CCNC: 27 U/L — LOW (ref 40–120)
ALT FLD-CCNC: 41 U/L — SIGNIFICANT CHANGE UP (ref 4–41)
ANION GAP SERPL CALC-SCNC: 8 MMOL/L — SIGNIFICANT CHANGE UP (ref 7–14)
ANION GAP SERPL CALC-SCNC: 9 MMOL/L — SIGNIFICANT CHANGE UP (ref 7–14)
ANISOCYTOSIS BLD QL: SLIGHT — SIGNIFICANT CHANGE UP
APTT BLD: 33.4 SEC — SIGNIFICANT CHANGE UP (ref 27–36.3)
AST SERPL-CCNC: 34 U/L — SIGNIFICANT CHANGE UP (ref 4–40)
BASOPHILS # BLD AUTO: 0 K/UL — SIGNIFICANT CHANGE UP (ref 0–0.2)
BASOPHILS # BLD AUTO: 0.01 K/UL — SIGNIFICANT CHANGE UP (ref 0–0.2)
BASOPHILS NFR BLD AUTO: 0 % — SIGNIFICANT CHANGE UP (ref 0–2)
BASOPHILS NFR BLD AUTO: 0.1 % — SIGNIFICANT CHANGE UP (ref 0–2)
BILIRUB SERPL-MCNC: 0.3 MG/DL — SIGNIFICANT CHANGE UP (ref 0.2–1.2)
BLD GP AB SCN SERPL QL: NEGATIVE — SIGNIFICANT CHANGE UP
BLOOD GAS ARTERIAL - LYTES,HGB,ICA,LACT RESULT: SIGNIFICANT CHANGE UP
BUN SERPL-MCNC: 61 MG/DL — HIGH (ref 7–23)
BUN SERPL-MCNC: 67 MG/DL — HIGH (ref 7–23)
CALCIUM SERPL-MCNC: 8.5 MG/DL — SIGNIFICANT CHANGE UP (ref 8.4–10.5)
CALCIUM SERPL-MCNC: 8.7 MG/DL — SIGNIFICANT CHANGE UP (ref 8.4–10.5)
CHLORIDE SERPL-SCNC: 114 MMOL/L — HIGH (ref 98–107)
CHLORIDE SERPL-SCNC: 116 MMOL/L — HIGH (ref 98–107)
CO2 SERPL-SCNC: 22 MMOL/L — SIGNIFICANT CHANGE UP (ref 22–31)
CO2 SERPL-SCNC: 23 MMOL/L — SIGNIFICANT CHANGE UP (ref 22–31)
CREAT SERPL-MCNC: 0.93 MG/DL — SIGNIFICANT CHANGE UP (ref 0.5–1.3)
CREAT SERPL-MCNC: 1.07 MG/DL — SIGNIFICANT CHANGE UP (ref 0.5–1.3)
CULTURE RESULTS: SIGNIFICANT CHANGE UP
EGFR: 73 ML/MIN/1.73M2 — SIGNIFICANT CHANGE UP
EGFR: 87 ML/MIN/1.73M2 — SIGNIFICANT CHANGE UP
EOSINOPHIL # BLD AUTO: 0.06 K/UL — SIGNIFICANT CHANGE UP (ref 0–0.5)
EOSINOPHIL # BLD AUTO: 0.08 K/UL — SIGNIFICANT CHANGE UP (ref 0–0.5)
EOSINOPHIL NFR BLD AUTO: 0.7 % — SIGNIFICANT CHANGE UP (ref 0–6)
EOSINOPHIL NFR BLD AUTO: 0.9 % — SIGNIFICANT CHANGE UP (ref 0–6)
GLUCOSE BLDC GLUCOMTR-MCNC: 115 MG/DL — HIGH (ref 70–99)
GLUCOSE BLDC GLUCOMTR-MCNC: 130 MG/DL — HIGH (ref 70–99)
GLUCOSE BLDC GLUCOMTR-MCNC: 132 MG/DL — HIGH (ref 70–99)
GLUCOSE SERPL-MCNC: 128 MG/DL — HIGH (ref 70–99)
GLUCOSE SERPL-MCNC: 157 MG/DL — HIGH (ref 70–99)
HCT VFR BLD CALC: 18.1 % — CRITICAL LOW (ref 39–50)
HCT VFR BLD CALC: 19 % — CRITICAL LOW (ref 39–50)
HCT VFR BLD CALC: 23 % — LOW (ref 39–50)
HGB BLD-MCNC: 6.1 G/DL — CRITICAL LOW (ref 13–17)
HGB BLD-MCNC: 6.2 G/DL — CRITICAL LOW (ref 13–17)
HGB BLD-MCNC: 7.8 G/DL — LOW (ref 13–17)
HYPOCHROMIA BLD QL: SLIGHT — SIGNIFICANT CHANGE UP
IANC: 5.59 K/UL — SIGNIFICANT CHANGE UP (ref 1.8–7.4)
IANC: 5.82 K/UL — SIGNIFICANT CHANGE UP (ref 1.8–7.4)
IMM GRANULOCYTES NFR BLD AUTO: 6.1 % — HIGH (ref 0–0.9)
INR BLD: 1.06 RATIO — SIGNIFICANT CHANGE UP (ref 0.88–1.16)
LYMPHOCYTES # BLD AUTO: 1.76 K/UL — SIGNIFICANT CHANGE UP (ref 1–3.3)
LYMPHOCYTES # BLD AUTO: 1.82 K/UL — SIGNIFICANT CHANGE UP (ref 1–3.3)
LYMPHOCYTES # BLD AUTO: 19.3 % — SIGNIFICANT CHANGE UP (ref 13–44)
LYMPHOCYTES # BLD AUTO: 20.2 % — SIGNIFICANT CHANGE UP (ref 13–44)
MACROCYTES BLD QL: SLIGHT — SIGNIFICANT CHANGE UP
MAGNESIUM SERPL-MCNC: 2.4 MG/DL — SIGNIFICANT CHANGE UP (ref 1.6–2.6)
MCHC RBC-ENTMCNC: 30.2 PG — SIGNIFICANT CHANGE UP (ref 27–34)
MCHC RBC-ENTMCNC: 30.5 PG — SIGNIFICANT CHANGE UP (ref 27–34)
MCHC RBC-ENTMCNC: 31.3 PG — SIGNIFICANT CHANGE UP (ref 27–34)
MCHC RBC-ENTMCNC: 32.1 GM/DL — SIGNIFICANT CHANGE UP (ref 32–36)
MCHC RBC-ENTMCNC: 33.9 GM/DL — SIGNIFICANT CHANGE UP (ref 32–36)
MCHC RBC-ENTMCNC: 34.3 GM/DL — SIGNIFICANT CHANGE UP (ref 32–36)
MCV RBC AUTO: 89.8 FL — SIGNIFICANT CHANGE UP (ref 80–100)
MCV RBC AUTO: 91.4 FL — SIGNIFICANT CHANGE UP (ref 80–100)
MCV RBC AUTO: 94.1 FL — SIGNIFICANT CHANGE UP (ref 80–100)
METAMYELOCYTES # FLD: 1.8 % — HIGH (ref 0–1)
MICROCYTES BLD QL: SLIGHT — SIGNIFICANT CHANGE UP
MONOCYTES # BLD AUTO: 0.63 K/UL — SIGNIFICANT CHANGE UP (ref 0–0.9)
MONOCYTES # BLD AUTO: 0.9 K/UL — SIGNIFICANT CHANGE UP (ref 0–0.9)
MONOCYTES NFR BLD AUTO: 7 % — SIGNIFICANT CHANGE UP (ref 2–14)
MONOCYTES NFR BLD AUTO: 9.9 % — SIGNIFICANT CHANGE UP (ref 2–14)
MYELOCYTES NFR BLD: 1.7 % — HIGH (ref 0–0)
NEUTROPHILS # BLD AUTO: 5.82 K/UL — SIGNIFICANT CHANGE UP (ref 1.8–7.4)
NEUTROPHILS # BLD AUTO: 6.15 K/UL — SIGNIFICANT CHANGE UP (ref 1.8–7.4)
NEUTROPHILS NFR BLD AUTO: 63.9 % — SIGNIFICANT CHANGE UP (ref 43–77)
NEUTROPHILS NFR BLD AUTO: 66.7 % — SIGNIFICANT CHANGE UP (ref 43–77)
NEUTS BAND # BLD: 1.7 % — SIGNIFICANT CHANGE UP (ref 0–6)
NRBC # BLD: 2 /100 WBCS — HIGH (ref 0–0)
NRBC # BLD: 2 /100 WBCS — HIGH (ref 0–0)
NRBC # BLD: 4 /100 — HIGH (ref 0–0)
NRBC # FLD: 0.15 K/UL — HIGH (ref 0–0)
NRBC # FLD: 0.16 K/UL — HIGH (ref 0–0)
OVALOCYTES BLD QL SMEAR: SLIGHT — SIGNIFICANT CHANGE UP
PLAT MORPH BLD: NORMAL — SIGNIFICANT CHANGE UP
PLATELET # BLD AUTO: 76 K/UL — LOW (ref 150–400)
PLATELET # BLD AUTO: 81 K/UL — LOW (ref 150–400)
PLATELET # BLD AUTO: 85 K/UL — LOW (ref 150–400)
PLATELET COUNT - ESTIMATE: ABNORMAL
POIKILOCYTOSIS BLD QL AUTO: SLIGHT — SIGNIFICANT CHANGE UP
POLYCHROMASIA BLD QL SMEAR: SLIGHT — SIGNIFICANT CHANGE UP
POTASSIUM SERPL-MCNC: 4 MMOL/L — SIGNIFICANT CHANGE UP (ref 3.5–5.3)
POTASSIUM SERPL-MCNC: 4.2 MMOL/L — SIGNIFICANT CHANGE UP (ref 3.5–5.3)
POTASSIUM SERPL-SCNC: 4 MMOL/L — SIGNIFICANT CHANGE UP (ref 3.5–5.3)
POTASSIUM SERPL-SCNC: 4.2 MMOL/L — SIGNIFICANT CHANGE UP (ref 3.5–5.3)
PROT SERPL-MCNC: 6.2 G/DL — SIGNIFICANT CHANGE UP (ref 6–8.3)
PROTHROM AB SERPL-ACNC: 12.3 SEC — SIGNIFICANT CHANGE UP (ref 10.5–13.4)
RBC # BLD: 1.98 M/UL — LOW (ref 4.2–5.8)
RBC # BLD: 2.02 M/UL — LOW (ref 4.2–5.8)
RBC # BLD: 2.56 M/UL — LOW (ref 4.2–5.8)
RBC # FLD: 14.5 % — SIGNIFICANT CHANGE UP (ref 10.3–14.5)
RBC # FLD: 15.2 % — HIGH (ref 10.3–14.5)
RBC # FLD: 15.2 % — HIGH (ref 10.3–14.5)
RBC BLD AUTO: ABNORMAL
RH IG SCN BLD-IMP: POSITIVE — SIGNIFICANT CHANGE UP
SARS-COV-2 RNA SPEC QL NAA+PROBE: SIGNIFICANT CHANGE UP
SODIUM SERPL-SCNC: 144 MMOL/L — SIGNIFICANT CHANGE UP (ref 135–145)
SODIUM SERPL-SCNC: 148 MMOL/L — HIGH (ref 135–145)
SPECIMEN SOURCE: SIGNIFICANT CHANGE UP
WBC # BLD: 10.77 K/UL — HIGH (ref 3.8–10.5)
WBC # BLD: 8.99 K/UL — SIGNIFICANT CHANGE UP (ref 3.8–10.5)
WBC # BLD: 9.11 K/UL — SIGNIFICANT CHANGE UP (ref 3.8–10.5)
WBC # FLD AUTO: 10.77 K/UL — HIGH (ref 3.8–10.5)
WBC # FLD AUTO: 8.99 K/UL — SIGNIFICANT CHANGE UP (ref 3.8–10.5)
WBC # FLD AUTO: 9.11 K/UL — SIGNIFICANT CHANGE UP (ref 3.8–10.5)

## 2022-10-01 PROCEDURE — 99232 SBSQ HOSP IP/OBS MODERATE 35: CPT

## 2022-10-01 PROCEDURE — 99291 CRITICAL CARE FIRST HOUR: CPT

## 2022-10-01 RX ORDER — PANTOPRAZOLE SODIUM 20 MG/1
80 TABLET, DELAYED RELEASE ORAL ONCE
Refills: 0 | Status: COMPLETED | OUTPATIENT
Start: 2022-10-01 | End: 2022-10-01

## 2022-10-01 RX ORDER — PANTOPRAZOLE SODIUM 20 MG/1
40 TABLET, DELAYED RELEASE ORAL EVERY 12 HOURS
Refills: 0 | Status: DISCONTINUED | OUTPATIENT
Start: 2022-10-01 | End: 2022-10-03

## 2022-10-01 RX ORDER — HEPARIN SODIUM 5000 [USP'U]/ML
5000 INJECTION INTRAVENOUS; SUBCUTANEOUS EVERY 8 HOURS
Refills: 0 | Status: DISCONTINUED | OUTPATIENT
Start: 2022-10-01 | End: 2022-10-01

## 2022-10-01 RX ADMIN — DEXMEDETOMIDINE HYDROCHLORIDE IN 0.9% SODIUM CHLORIDE 2.56 MICROGRAM(S)/KG/HR: 4 INJECTION INTRAVENOUS at 07:36

## 2022-10-01 RX ADMIN — PANTOPRAZOLE SODIUM 80 MILLIGRAM(S): 20 TABLET, DELAYED RELEASE ORAL at 11:50

## 2022-10-01 RX ADMIN — CHLORHEXIDINE GLUCONATE 15 MILLILITER(S): 213 SOLUTION TOPICAL at 06:00

## 2022-10-01 RX ADMIN — Medication 1 APPLICATION(S): at 06:34

## 2022-10-01 RX ADMIN — SODIUM CHLORIDE 4 MILLILITER(S): 9 INJECTION INTRAMUSCULAR; INTRAVENOUS; SUBCUTANEOUS at 09:40

## 2022-10-01 RX ADMIN — Medication 650 MILLIGRAM(S): at 00:00

## 2022-10-01 RX ADMIN — DEXMEDETOMIDINE HYDROCHLORIDE IN 0.9% SODIUM CHLORIDE 2.56 MICROGRAM(S)/KG/HR: 4 INJECTION INTRAVENOUS at 17:47

## 2022-10-01 RX ADMIN — DEXMEDETOMIDINE HYDROCHLORIDE IN 0.9% SODIUM CHLORIDE 2.56 MICROGRAM(S)/KG/HR: 4 INJECTION INTRAVENOUS at 22:33

## 2022-10-01 RX ADMIN — PROPOFOL 12.3 MICROGRAM(S)/KG/MIN: 10 INJECTION, EMULSION INTRAVENOUS at 22:33

## 2022-10-01 RX ADMIN — ALBUTEROL 2 PUFF(S): 90 AEROSOL, METERED ORAL at 09:40

## 2022-10-01 RX ADMIN — SODIUM CHLORIDE 4 MILLILITER(S): 9 INJECTION INTRAMUSCULAR; INTRAVENOUS; SUBCUTANEOUS at 21:40

## 2022-10-01 RX ADMIN — Medication 1 PUFF(S): at 21:39

## 2022-10-01 RX ADMIN — VALACYCLOVIR 500 MILLIGRAM(S): 500 TABLET, FILM COATED ORAL at 11:50

## 2022-10-01 RX ADMIN — CHLORHEXIDINE GLUCONATE 15 MILLILITER(S): 213 SOLUTION TOPICAL at 17:47

## 2022-10-01 RX ADMIN — PROPOFOL 12.3 MICROGRAM(S)/KG/MIN: 10 INJECTION, EMULSION INTRAVENOUS at 17:47

## 2022-10-01 RX ADMIN — PANTOPRAZOLE SODIUM 40 MILLIGRAM(S): 20 TABLET, DELAYED RELEASE ORAL at 22:32

## 2022-10-01 RX ADMIN — ALBUTEROL 2 PUFF(S): 90 AEROSOL, METERED ORAL at 21:40

## 2022-10-01 RX ADMIN — PROPOFOL 12.3 MICROGRAM(S)/KG/MIN: 10 INJECTION, EMULSION INTRAVENOUS at 07:36

## 2022-10-01 RX ADMIN — ATORVASTATIN CALCIUM 40 MILLIGRAM(S): 80 TABLET, FILM COATED ORAL at 22:32

## 2022-10-01 RX ADMIN — CHLORHEXIDINE GLUCONATE 1 APPLICATION(S): 213 SOLUTION TOPICAL at 11:50

## 2022-10-01 RX ADMIN — Medication 650 MILLIGRAM(S): at 00:19

## 2022-10-01 RX ADMIN — Medication 1 APPLICATION(S): at 18:09

## 2022-10-01 RX ADMIN — Medication 1 PUFF(S): at 09:40

## 2022-10-01 NOTE — PROGRESS NOTE ADULT - ASSESSMENT
Patient is a 73 yo M with PMH of HTN, HLD, CAD, cardiac stent, Multiple Myeloma who presented to the ED for 3 days of generalized weakness.    EKG: ST PVCs  Tele: NSR PVCs episodes of PAT    1. Weakness  -rapidly progressed. was unable to move extremities.   -s/p RRT for hypoxia now intubated in MICU   -neuro on board, possible GBS vs pathology in neck  -echo shows mild AR/AS, normal LV systolic function and mild diastolic dysfunction     2. CAD s/p PCI  -in 2015 in [facility] as per daughter patient had heart attack  -no reported CP prior to hospitalization    3. MM  -heme/onc on board    4. Afib:   Episodes of Afib on tele   start a/c if not contraindicated

## 2022-10-01 NOTE — PROGRESS NOTE ADULT - ASSESSMENT
73 y/o M with a PMHx significant for HTN, HLD, CAD s/p stent, MM, presents with descending paralysis complicated by acute hypoxic respiratory failure due to neuromuscular weakness mediated by GBS now status post IVIG.     Neuro   #Generalized Weakness, likely GBS   Pt family reporting a descending weakness that ultimately started to involve his speaking ability as well.  Low c/f botulism given not having flaccid, but some suspicion for paraneoplastic process (e.g. LES/MG).  - MRI brain- No acute hemorrhage mass mass effect or acute territorial   infarcts seen. and C/T/L spine no cord compression   -Discontinued decadron 9/28 discussed with neurosurgery no cord compression and neuro deferred further decision making regarding decadron.   -IVIG 35g qd (3/3) as per neuro for possible GBS completed 9/28  -f/u anti ganglioside ab  -appreciate neurology recommendations     #Sedation/Analgesia  - C/W Propofol for now and precedex    Pulmonary   #Acute hypoxic respiratory failure secondary to neuromuscular weakness  Pt with increased WOB, reportedly wheezing as well. No h/o COPD  - C/W Albuterol/Ipratropium MDI's while intubated  -wean vent as tolerated (370/18/8/50%)  -with decreased secretions 9/28 decreased frequency of airway clearance.   -9/29 NIF performed prerounds with respiratory therapist -21.     #Hemoptysis  Pt reportedly with hemoptysis in [facility]. Pt with RLL calcified granuloma & scattered pulm nodules.   -quant gold indeterminate  - sputum afb x3 no acid fast  -taken off of TB precautions     Cardiovascular   #Vasoplegic Shock  Pt with minimal pressor requirement on sedation for intubation. Likely vasoplegic in that context. CTM resolved.     #HLD   -atorvastatin 40 qhs     #CAD  - Continue to hold aspirin given hgb drop requiring 1 u pRBC 9/29  - HOLDing Home Metoprolol tartrate 50mg PO BID    #HTN  -with soft BPs 9/29 overnight, continuing to hold amlodipine.   -Losartan held given soft BP and resolving CRISTI     #BNP elevated  Pt with BNP elevation, respiratory distress.  - TTE mild AR and AS, Stage I diastolic dysfunction     GI  #RAUL  - NPO w/ TF    Renal   #Hyponatremia, CRISTI  Pt with persistently low sodium. 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 Should have fluid restriction when not intubated.  - Na improving, cont to trend BMP qD  - Trend Cr, f/u urine lytes  - Strict Is/Os  -monitor sodium given GBS and patient to receive IVIG    MSK  #T8 Compression Fx   MR spine and spine consult to assess for possible interventions. Diffuse spinal lesions.   - Neurosx consulted   -no acute intervention. Decadron discontinued 9/28 following discussion with neurosurgery as well as neurology.     ID  #Bacillus cereus in blood cxs x1 bottle on 9/25, ?contaminate  - 9/27 BCx NGTD  - afebrile, WBC count improving, monitor off abx     r/o TB  - AFB negative x3  -off TB precautions     Endocrine   #Hyperglycemia  Pt with mild hyperglycemia on admission. A1c 5.7% (?pre-DM)  - FS q6h    Heme/onc  #Multiple Myeloma  Pt had last received Velcade 3/2022. Had been given opportunity for chemo vacation and travelled to [facility], but pt went for longer than anticipated. Now with diffuse lytic lesions involving most of spine. Had previously had XRT as well.   Diagnostics:  - LDH, B2-Microglobulin  - SPEP, UPEP, IFX (S/U), Immunoglobulins, FLCs    #Anemia  -hgb 6.7 9/29 transfused 1u prbc with response to hgb 9.2  -may be dilutional based upon decrease in all cell lines. IVIG may cause hemolytic anemia however bilirubin normal. To follow up LDH and haptoglobin and monitor H/H.           =====Hospital Bundle=====  Hospital Bundle  Fluids: with gtts  Electrolytes: Replete K > 4, Mg > 2, Phos > 3  Nutrition: Diet NPO with TF (Nutrition C/S)  PPX  ---VTE: SCD  ---GI: TFs  ---Resp: Vented  ---: Dahl Placed 9/25, removed now condom cath   Access: PIVs  Code Status: FULL CODE  Dispo: Pending Medical Optimization 71 y/o M with a PMHx significant for HTN, HLD, CAD s/p stent, MM, presents with descending paralysis complicated by acute hypoxic respiratory failure due to neuromuscular weakness mediated by GBS now status post IVIG.     Neuro   #Generalized Weakness, likely GBS   Pt family reporting a descending weakness that ultimately started to involve his speaking ability as well.  Low c/f botulism given not having flaccid, but some suspicion for paraneoplastic process (e.g. LES/MG).  - MRI brain- No acute hemorrhage mass mass effect or acute territorial   infarcts seen. and C/T/L spine no cord compression   -Discontinued decadron 9/28 discussed with neurosurgery no cord compression and neuro deferred further decision making regarding decadron.   -IVIG 35g qd (3/3) as per neuro for possible GBS completed 9/28  -f/u anti ganglioside ab  -appreciate neurology recommendations   -improved strength on exam, ctm     #Sedation/Analgesia  - C/W Propofol and precedex    Pulmonary   #Acute hypoxic respiratory failure secondary to neuromuscular weakness  Pt with increased WOB, reportedly wheezing as well. No h/o COPD  - C/W Albuterol/Ipratropium MDI's while intubated  -with decreased secretions 9/28 decreased frequency of airway clearance.   -9/29 NIF performed prerounds with respiratory therapist -21.   - CPAP today, f/u ABG     #Hemoptysis  Pt reportedly with hemoptysis in [facility]. Pt with RLL calcified granuloma & scattered pulm nodules.   -quant gold indeterminate  - sputum afb x3 no acid fast  -taken off of TB precautions     Cardiovascular   #Vasoplegic Shock  Pt with minimal pressor requirement on sedation for intubation. Likely vasoplegic in that context. CTM resolved.     #HLD   -atorvastatin 40 qhs     #CAD  - Continue to hold aspirin given hgb drop requiring 1 u pRBC 9/29  - HOLDing Home Metoprolol tartrate 50mg PO BID    #HTN  -with soft BPs 9/29 overnight, continuing to hold amlodipine.   -Losartan held given soft BP and resolving CRISTI     #BNP elevated  Pt with BNP elevation, respiratory distress.  - TTE mild AR and AS, Stage I diastolic dysfunction     GI  #RAUL  - NPO w/ TF    Renal   #Hyponatremia, CRISTI  Pt with persistently low sodium. 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 Should have fluid restriction when not intubated.  - Na improving, cont to trend BMP q12  -c/w free water pushes   - Trend Cr, f/u urine lytes  - Strict Is/Os  -monitor sodium given GBS and patient to receive IVIG    MSK  #T8 Compression Fx   MR spine and spine consult to assess for possible interventions. Diffuse spinal lesions.   - Neurosx consulted   -no acute intervention. Decadron discontinued 9/28 following discussion with neurosurgery as well as neurology.     ID  #Bacillus cereus in blood cxs x1 bottle on 9/25, ?contaminate  - 9/27 BCx NGTD  - afebrile, WBC count improving, monitor off abx     r/o TB  - AFB negative x3  -off TB precautions     Endocrine   #Hyperglycemia  Pt with mild hyperglycemia on admission. A1c 5.7% (?pre-DM)  - FS q6h    Heme/onc  #Multiple Myeloma  Pt had last received Velcade 3/2022. Had been given opportunity for chemo vacation and travelled to [facility], but pt went for longer than anticipated. Now with diffuse lytic lesions involving most of spine. Had previously had XRT as well.   Diagnostics:  - LDH, B2-Microglobulin  - SPEP, UPEP, IFX (S/U), Immunoglobulins, FLCs    #Anemia  -hgb 6.7 9/29 transfused 1u prbc with response to hgb 9.2  -may be dilutional based upon decrease in all cell lines. IVIG may cause hemolytic anemia however bilirubin normal. To follow up LDH and haptoglobin and monitor H/H.           =====Hospital Bundle=====  Hospital Bundle  Fluids: with gtts  Electrolytes: Replete K > 4, Mg > 2, Phos > 3  Nutrition: Diet NPO with TF (Nutrition C/S)  PPX  ---VTE: SCD  ---GI: TFs  ---Resp: Vented  ---: Dahl Placed 9/25, removed now condom cath   Access: PIVs  Code Status: FULL CODE  Dispo: Pending Medical Optimization 73 y/o M with a PMHx significant for HTN, HLD, CAD s/p stent, MM, presents with descending paralysis complicated by acute hypoxic respiratory failure due to neuromuscular weakness mediated by GBS now status post IVIG.     Neuro   #Generalized Weakness, likely GBS   Pt family reporting a descending weakness that ultimately started to involve his speaking ability as well.  Low c/f botulism given not having flaccid, but some suspicion for paraneoplastic process (e.g. LES/MG).  - MRI brain- No acute hemorrhage mass mass effect or acute territorial   infarcts seen. and C/T/L spine no cord compression   -Discontinued decadron 9/28 discussed with neurosurgery no cord compression and neuro deferred further decision making regarding decadron.   -IVIG 35g qd (3/3) as per neuro for possible GBS completed 9/28  -f/u anti ganglioside ab  -appreciate neurology recommendations   -improved strength on exam 9/30 , ctm     #Sedation/Analgesia  - C/W Propofol and precedex  - wean sedation as tolerated     Pulmonary   #Acute hypoxic respiratory failure secondary to neuromuscular weakness  Pt with increased WOB, reportedly wheezing as well. No h/o COPD  - C/W Albuterol/Ipratropium MDI's while intubated  -with decreased secretions 9/28 decreased frequency of airway clearance.   -9/29 NIF performed prerounds with respiratory therapist -21.   - CPAP today, f/u ABG     #Hemoptysis  Pt reportedly with hemoptysis in [facility]. Pt with RLL calcified granuloma & scattered pulm nodules.   -quant gold indeterminate  - sputum afb x3 no acid fast  -taken off of TB precautions     Cardiovascular   #Vasoplegic Shock  Pt with minimal pressor requirement on sedation for intubation. Likely vasoplegic in that context. CTM resolved.     #HLD   -atorvastatin 40 qhs     #CAD  - Continue to hold aspirin given hgb drop requiring 1 u pRBC 9/29  - HOLDing Home Metoprolol tartrate 50mg PO BID    #HTN  -with soft BPs 9/29 overnight, continuing to hold amlodipine.   -Losartan held given soft BP and resolving CRISTI     #BNP elevated  Pt with BNP elevation, respiratory distress.  - TTE mild AR and AS, Stage I diastolic dysfunction     GI  - NPO w/ TF    #GI bleed  -dark stools   - start pantoprazole 40 BID   -Ctm     Renal   #Hyponatremia, CRISTI  Pt with persistently low sodium. 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 Should have fluid restriction when not intubated.  - Na improving, cont to trend BMP q12  -c/w free water pushes   - Trend Cr, f/u urine lytes  - Strict Is/Os  -monitor sodium given GBS and patient s/p IVIG    MSK  #T8 Compression Fx   MR spine and spine consult to assess for possible interventions. Diffuse spinal lesions.   - Neurosx consulted   -no acute intervention. Decadron discontinued 9/28 following discussion with neurosurgery as well as neurology.     ID  #Bacillus cereus in blood cxs x1 bottle on 9/25, ?contaminate  - 9/27 BCx NGTD  - afebrile, WBC count improving, monitor off abx     r/o TB  - AFB negative x3  -off TB precautions     Endocrine   #Hyperglycemia  Pt with mild hyperglycemia on admission. A1c 5.7% (?pre-DM)  - FS q6h    Heme/onc  #Multiple Myeloma  Pt had last received Velcade 3/2022. Had been given opportunity for chemo vacation and travelled to [facility], but pt went for longer than anticipated. Now with diffuse lytic lesions involving most of spine. Had previously had XRT as well.   Diagnostics:  - LDH, B2-Microglobulin  - SPEP, UPEP, IFX (S/U), Immunoglobulins, FLCs    #Anemia  -hgb 6.7 9/29 transfused 1u prbc with response to hgb 9.2  -10/1: Hgb: 6.2, 6.1 and transfused 1 unit of pRBCs with good response to 7.8  -may be dilutional based upon decrease in all cell lines. IVIG may cause hemolytic anemia however bilirubin normal.   -Unlikely hemolytic as LDH and Haptoglobin are wnl   -ctm H/H and look for overt signs of bleeding         =====Hospital Bundle=====  Hospital Bundle  Fluids: with gtts  Electrolytes: Replete K > 4, Mg > 2, Phos > 3  Nutrition: Diet NPO with TF (Nutrition C/S)  PPX  ---VTE: SCD  ---GI: TFs  ---Resp: Vented  ---: Dahl Placed 9/25, removed now condom cath   Access: PIVs  Code Status: FULL CODE  Dispo: Pending Medical Optimization 73 y/o M with a PMHx significant for HTN, HLD, CAD s/p stent, MM, presents with descending paralysis complicated by acute hypoxic respiratory failure due to neuromuscular weakness mediated by GBS now status post IVIG.     Neuro   #Generalized Weakness, likely GBS   Pt family reporting a descending weakness that ultimately started to involve his speaking ability as well.  Low c/f botulism given not having flaccid, but some suspicion for paraneoplastic process (e.g. LES/MG).  - MRI brain- No acute hemorrhage mass mass effect or acute territorial   infarcts seen. and C/T/L spine no cord compression   -Discontinued decadron 9/28 discussed with neurosurgery no cord compression and neuro deferred further decision making regarding decadron.   -IVIG 35g qd (3/3) as per neuro for possible GBS completed 9/28  -f/u anti ganglioside ab  -appreciate neurology recommendations   -improved strength on exam 9/30 , ctm     #Sedation/Analgesia  - C/W Propofol and precedex  - wean sedation as tolerated     Pulmonary   #Acute hypoxic respiratory failure secondary to neuromuscular weakness  Pt with increased WOB, reportedly wheezing as well. No h/o COPD  - C/W Albuterol/Ipratropium MDI's while intubated  -with decreased secretions 9/28 decreased frequency of airway clearance.   -9/29 NIF performed prerounds with respiratory therapist -21.   - CPAP today, f/u ABG     #Hemoptysis  Pt reportedly with hemoptysis in [facility]. Pt with RLL calcified granuloma & scattered pulm nodules.   -quant gold indeterminate  - sputum afb x3 no acid fast  -taken off of TB precautions     Cardiovascular   #Vasoplegic Shock  Pt with minimal pressor requirement on sedation for intubation. Likely vasoplegic in that context. CTM resolved.     #HLD   -atorvastatin 40 qhs     #CAD  - Continue to hold aspirin given hgb drop requiring 1 u pRBC 9/29  - HOLDing Home Metoprolol tartrate 50mg PO BID    #HTN  -with soft BPs 9/29 overnight, continuing to hold amlodipine.   -Losartan held given soft BP and resolving CRISTI     #BNP elevated  Pt with BNP elevation, respiratory distress.  - TTE mild AR and AS, Stage I diastolic dysfunction     GI  - NPO w/ TF    #GI bleed  -dark stools   - start pantoprazole 40 BID   -Ctm     Renal   #Hyponatremia, CRISTI  Pt with persistently low sodium. 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 Should have fluid restriction when not intubated.  - Na improving, cont to trend BMP q12  -c/w free water pushes   - Trend Cr, f/u urine lytes  - Strict Is/Os  -monitor sodium given GBS and patient s/p IVIG    MSK  #T8 Compression Fx   MR spine and spine consult to assess for possible interventions. Diffuse spinal lesions.   - Neurosx consulted   -no acute intervention. Decadron discontinued 9/28 following discussion with neurosurgery as well as neurology.     ID  #Bacillus cereus in blood cxs x1 bottle on 9/25, ?contaminate  - 9/27 BCx NGTD  - afebrile, WBC count improving, monitor off abx     r/o TB  - AFB negative x3  -off TB precautions     Endocrine   #Hyperglycemia  Pt with mild hyperglycemia on admission. A1c 5.7% (?pre-DM)  - FS q6h    Heme/onc  #Multiple Myeloma  Pt had last received Velcade 3/2022. Had been given opportunity for chemo vacation and travelled to [facility], but pt went for longer than anticipated. Now with diffuse lytic lesions involving most of spine. Had previously had XRT as well.   Diagnostics:  - LDH, B2-Microglobulin  - SPEP, UPEP, IFX (S/U), Immunoglobulins, FLCs    #Anemia  -hgb 6.7 9/29 transfused 1u prbc with response to hgb 9.2  -10/1: Hgb: 6.2, 6.1 and transfused 1 unit of pRBCs with good response to 7.8  -may be dilutional based upon decrease in all cell lines. IVIG may cause hemolytic anemia however bilirubin normal.   -Unlikely hemolytic as LDH and Haptoglobin are wnl   -ctm H/H and look for overt signs of bleeding   -heparin subq held         =====Hospital Bundle=====  Hospital Bundle  Fluids: with gtts  Electrolytes: Replete K > 4, Mg > 2, Phos > 3  Nutrition: Diet NPO with TF (Nutrition C/S)  PPX  ---VTE: SCD  ---GI: TFs  ---Resp: Vented  ---: Dahl Placed 9/25, removed now condom cath   Access: PIVs  Code Status: FULL CODE  Dispo: Pending Medical Optimization

## 2022-10-01 NOTE — PROGRESS NOTE ADULT - ASSESSMENT
Assessment: 71 yo male with a PMHx of HTN, HLD, CAD (s/p stent, not on AP/AC), and  multiple myeloma who presented to [facility] on 9/24 for worsening generalized weakness since 9/21. On 9/25 AM, patient developed worsening respiratory distress and was intubated. LP was performed on 9/26. CSF glucose 91 <H>, protein 166 <H>, TNC 1. Patient clinically improving     Impression: Worsening weakness which affected respiratory status in patient with albuminocytologic dissociation on CSF studies and absent reflexes on exam, concerning for GBS. Neuro exam has significantly improved s/p course of IVIG.    Recommendations:  [] Neuro checks Q2HR.  [] Telemetry.  [] Fall/Aspiration precautions.  [x] MR Head/Spine imaging: results as above.  [x] s/p IVIG 35G QD x3 days (9/26-9/28).  [] Would not suggest pursing PLEX at this time as IVIG has the same efficacy for GBS, and PLEX would wash out the IVIG.   [] GBS is typically treated with one course of either IVIG or PLEX, and then the remainder of the recovery is supportive care. We would expect the patient to continue improving even after completion of IVIG, especially as he has responded very well thus far.  [] f/u ganglioside GQ1B ab (pending).  [x] s/p LP, CSF glucose 91 <H>, protein 166 <H>, TNC 1.  [] PT/OT/SLP when patient able.  [] Rest of care per MICU team.    Case seen and discussed with neurology attending Dr. Avendano.

## 2022-10-01 NOTE — PROGRESS NOTE ADULT - SUBJECTIVE AND OBJECTIVE BOX
Neurology Progress Note    73 yo male with a PMHx of HTN, HLD, CAD (s/p stent, not on AP/AC), and  multiple myeloma who presented to [facility] on 9/24 for worsening generalized weakness since 9/21. On 9/25 AM, patient developed worsening respiratory distress and was intubated.      VITALS & EXAMINATION:  Vital Signs Last 24 Hrs  T(C): 36.8 (01 Oct 2022 10:00), Max: 37.2 (30 Sep 2022 12:00)  T(F): 98.2 (01 Oct 2022 10:00), Max: 99 (30 Sep 2022 12:00)  HR: 68 (01 Oct 2022 10:00) (44 - 84)  BP: 142/56 (01 Oct 2022 10:00) (99/48 - 155/47)  BP(mean): 77 (01 Oct 2022 10:00) (63 - 90)  RR: 13 (01 Oct 2022 10:00) (13 - 23)  SpO2: 100% (01 Oct 2022 10:00) (95% - 100%)    Parameters below as of 01 Oct 2022 10:00  Patient On (Oxygen Delivery Method): CPAP 10/6    O2 Concentration (%): 40    GENERAL EXAM:  Constitutional: Lying in bed. Intubated, off sedation     NEUROLOGICAL EXAM:  MS: Alert, Awake, Intubated, no verbal output. Follows commands.  CN: PERRL. EOMI. Face grossly symmetric.   Motor:             Deltoid	Biceps	Triceps	Wrist Ext   Wrist Flex	  R	4	4	4	3	    3		 	  L	4	4+	4+	4-	    4-		    	H-Flex   H-Ext                  	K-Ext      	D-Flex	     P-Flex  R	3	   4+                              4+          	4-	     4+   L	2	   4+          	       	4+                       4	     4+    Reflexes: Absent throughout.      LABORATORY:  CBC                       6.1    9.11  )-----------( 85       ( 01 Oct 2022 04:35 )             19.0     Chem 10-01    148<H>  |  116<H>  |  67<H>  ----------------------------<  157<H>  4.0   |  23  |  1.07    Ca    8.5      01 Oct 2022 03:40  Phos  4.0     09-30  Mg     2.40     10-01    TPro  6.2  /  Alb  2.7<L>  /  TBili  0.3  /  DBili  x   /  AST  34  /  ALT  41  /  AlkPhos  27<L>  10-01    LFTs LIVER FUNCTIONS - ( 01 Oct 2022 03:40 )  Alb: 2.7 g/dL / Pro: 6.2 g/dL / ALK PHOS: 27 U/L / ALT: 41 U/L / AST: 34 U/L / GGT: x           Coagulopathy PT/INR - ( 01 Oct 2022 03:40 )   PT: 12.3 sec;   INR: 1.06 ratio         PTT - ( 01 Oct 2022 03:40 )  PTT:33.4 sec  Lipid Panel   A1c   Cardiac enzymes     U/A   CSF  Immunological  Other    STUDIES & IMAGING: (EEG, CT, MR, U/S, TTE/LYUBOV): Neurology Progress Note    73 yo male with a PMHx of HTN, HLD, CAD (s/p stent, not on AP/AC), and  multiple myeloma who presented to [facility] on 9/24 for worsening generalized weakness since 9/21. On 9/25 AM, patient developed worsening respiratory distress and was intubated.  Patient today examined off sedation, awake, alert and following commands      VITALS & EXAMINATION:  Vital Signs Last 24 Hrs  T(C): 36.8 (01 Oct 2022 10:00), Max: 37.2 (30 Sep 2022 12:00)  T(F): 98.2 (01 Oct 2022 10:00), Max: 99 (30 Sep 2022 12:00)  HR: 68 (01 Oct 2022 10:00) (44 - 84)  BP: 142/56 (01 Oct 2022 10:00) (99/48 - 155/47)  BP(mean): 77 (01 Oct 2022 10:00) (63 - 90)  RR: 13 (01 Oct 2022 10:00) (13 - 23)  SpO2: 100% (01 Oct 2022 10:00) (95% - 100%)    Parameters below as of 01 Oct 2022 10:00  Patient On (Oxygen Delivery Method): CPAP 10/6    O2 Concentration (%): 40    GENERAL EXAM:  Constitutional: Lying in bed. Intubated, off sedation     NEUROLOGICAL EXAM:  MS: Alert, Awake, Intubated, no verbal output. Follows commands.  CN: PERRL. EOMI. Face grossly symmetric.   Motor:             Deltoid	Biceps	Triceps	Wrist Ext   Wrist Flex	  R	4	4	4	3	    3		 	  L	4	4+	4+	4-	    4-		    	H-Flex   H-Ext                  	K-Ext      	D-Flex	     P-Flex  R	3	   4+                              4+          	4-	     4+   L	2	   4+          	       	4+                       4	     4+    Reflexes: Absent throughout.      LABORATORY:  CBC                       6.1    9.11  )-----------( 85       ( 01 Oct 2022 04:35 )             19.0     Chem 10-01    148<H>  |  116<H>  |  67<H>  ----------------------------<  157<H>  4.0   |  23  |  1.07    Ca    8.5      01 Oct 2022 03:40  Phos  4.0     09-30  Mg     2.40     10-01    TPro  6.2  /  Alb  2.7<L>  /  TBili  0.3  /  DBili  x   /  AST  34  /  ALT  41  /  AlkPhos  27<L>  10-01    LFTs LIVER FUNCTIONS - ( 01 Oct 2022 03:40 )  Alb: 2.7 g/dL / Pro: 6.2 g/dL / ALK PHOS: 27 U/L / ALT: 41 U/L / AST: 34 U/L / GGT: x           Coagulopathy PT/INR - ( 01 Oct 2022 03:40 )   PT: 12.3 sec;   INR: 1.06 ratio         PTT - ( 01 Oct 2022 03:40 )  PTT:33.4 sec  Lipid Panel   A1c   Cardiac enzymes     U/A   CSF  Immunological  Other    STUDIES & IMAGING: (EEG, CT, MR, U/S, TTE/LYUBOV): Neurology Progress Note    73 yo male with a PMHx of HTN, HLD, CAD (s/p stent, not on AP/AC), and  multiple myeloma who presented to [facility] on 9/24 for worsening generalized weakness since 9/21. On 9/25 AM, patient developed worsening respiratory distress and was intubated.  Patient today examined off sedation, awake, alert and following commands      VITALS & EXAMINATION:  Vital Signs Last 24 Hrs  T(C): 36.8 (01 Oct 2022 10:00), Max: 37.2 (30 Sep 2022 12:00)  T(F): 98.2 (01 Oct 2022 10:00), Max: 99 (30 Sep 2022 12:00)  HR: 68 (01 Oct 2022 10:00) (44 - 84)  BP: 142/56 (01 Oct 2022 10:00) (99/48 - 155/47)  BP(mean): 77 (01 Oct 2022 10:00) (63 - 90)  RR: 13 (01 Oct 2022 10:00) (13 - 23)  SpO2: 100% (01 Oct 2022 10:00) (95% - 100%)    Parameters below as of 01 Oct 2022 10:00  Patient On (Oxygen Delivery Method): CPAP 10/6    O2 Concentration (%): 40    GENERAL EXAM:  Constitutional: Lying in bed. Intubated, off sedation     NEUROLOGICAL EXAM:  MS: Alert, Awake, Intubated, no verbal output. Follows commands.  CN: PERRL. EOMI. Face grossly symmetric.   Motor:             Deltoid	Biceps	Triceps	Wrist Ext   Wrist Flex	  R	4	4	4	3	    3		 	  L	4	4+	4+	4-	    4-		    	H-Flex   H-Ext                  	K-Ext      	D-Flex	     P-Flex  R	3	   4+                              4+          	    4-	       4+   L	2	   4+          	        	 4+                       4	       4+    Reflexes: Absent throughout.      LABORATORY:  CBC                       6.1    9.11  )-----------( 85       ( 01 Oct 2022 04:35 )             19.0     Chem 10-01    148<H>  |  116<H>  |  67<H>  ----------------------------<  157<H>  4.0   |  23  |  1.07    Ca    8.5      01 Oct 2022 03:40  Phos  4.0     09-30  Mg     2.40     10-01    TPro  6.2  /  Alb  2.7<L>  /  TBili  0.3  /  DBili  x   /  AST  34  /  ALT  41  /  AlkPhos  27<L>  10-01    LFTs LIVER FUNCTIONS - ( 01 Oct 2022 03:40 )  Alb: 2.7 g/dL / Pro: 6.2 g/dL / ALK PHOS: 27 U/L / ALT: 41 U/L / AST: 34 U/L / GGT: x           Coagulopathy PT/INR - ( 01 Oct 2022 03:40 )   PT: 12.3 sec;   INR: 1.06 ratio         PTT - ( 01 Oct 2022 03:40 )  PTT:33.4 sec  Lipid Panel   A1c   Cardiac enzymes     U/A   CSF  Immunological  Other    STUDIES & IMAGING: (EEG, CT, MR, U/S, TTE/LYUBOV): Neurology Progress Note    73 yo male with a PMHx of HTN, HLD, CAD (s/p stent, not on AP/AC), and  multiple myeloma who presented to [facility] on 9/24 for worsening generalized weakness since 9/21. On 9/25 AM, patient developed worsening respiratory distress and was intubated.  Patient today examined off sedation, awake, alert and following commands      VITALS & EXAMINATION:  Vital Signs Last 24 Hrs  T(C): 36.8 (01 Oct 2022 10:00), Max: 37.2 (30 Sep 2022 12:00)  T(F): 98.2 (01 Oct 2022 10:00), Max: 99 (30 Sep 2022 12:00)  HR: 68 (01 Oct 2022 10:00) (44 - 84)  BP: 142/56 (01 Oct 2022 10:00) (99/48 - 155/47)  BP(mean): 77 (01 Oct 2022 10:00) (63 - 90)  RR: 13 (01 Oct 2022 10:00) (13 - 23)  SpO2: 100% (01 Oct 2022 10:00) (95% - 100%)    Parameters below as of 01 Oct 2022 10:00  Patient On (Oxygen Delivery Method): CPAP 10/6    O2 Concentration (%): 40    GENERAL EXAM:  Constitutional: Lying in bed. Intubated, off sedation     NEUROLOGICAL EXAM:  MS: Alert, Awake, Intubated, no verbal output. Follows commands.  CN: PERRL. EOMI. Face grossly symmetric.   Motor:             Deltoid	Biceps	Triceps	Wrist Ext   	  R	4	4	4	3	   		 	  L	4	4+	4+	4-	    		    	H-Flex                    	K-Ext      	D-Flex	     P-Flex  R	3	                           4+          	    4-	       4+   L	2	          	        	 4+                       4	       4+    Reflexes: Absent throughout.      LABORATORY:  CBC                       6.1    9.11  )-----------( 85       ( 01 Oct 2022 04:35 )             19.0     Chem 10-01    148<H>  |  116<H>  |  67<H>  ----------------------------<  157<H>  4.0   |  23  |  1.07    Ca    8.5      01 Oct 2022 03:40  Phos  4.0     09-30  Mg     2.40     10-01    TPro  6.2  /  Alb  2.7<L>  /  TBili  0.3  /  DBili  x   /  AST  34  /  ALT  41  /  AlkPhos  27<L>  10-01    LFTs LIVER FUNCTIONS - ( 01 Oct 2022 03:40 )  Alb: 2.7 g/dL / Pro: 6.2 g/dL / ALK PHOS: 27 U/L / ALT: 41 U/L / AST: 34 U/L / GGT: x           Coagulopathy PT/INR - ( 01 Oct 2022 03:40 )   PT: 12.3 sec;   INR: 1.06 ratio         PTT - ( 01 Oct 2022 03:40 )  PTT:33.4 sec  Lipid Panel   A1c   Cardiac enzymes     U/A   CSF  Immunological  Other    STUDIES & IMAGING: (EEG, CT, MR, U/S, TTE/LYUBOV):

## 2022-10-01 NOTE — PROGRESS NOTE ADULT - SUBJECTIVE AND OBJECTIVE BOX
Shannan Ruby MD  Internal Medicine PGY-2      MICU PROGRESS NOTE     OVERNIGHT EVENTS:    SUBJECTIVE: Patient seen and examined at bedside. Patient denies fever, chills, SOB, chest pain, N/V/D.    OBJECTIVE:    VITAL SIGNS:  ICU Vital Signs Last 24 Hrs  T(C): 35.8 (01 Oct 2022 04:00), Max: 37.3 (30 Sep 2022 08:00)  T(F): 96.5 (01 Oct 2022 04:00), Max: 99.1 (30 Sep 2022 08:00)  HR: 50 (01 Oct 2022 07:17) (48 - 85)  BP: 114/41 (01 Oct 2022 07:00) (99/48 - 171/64)  BP(mean): 63 (01 Oct 2022 07:00) (63 - 94)  ABP: --  ABP(mean): --  RR: 18 (01 Oct 2022 07:00) (16 - 23)  SpO2: 100% (01 Oct 2022 07:17) (95% - 100%)    O2 Parameters below as of 01 Oct 2022 07:00  Patient On (Oxygen Delivery Method): ventilator    O2 Concentration (%): 40      Mode: AC/ CMV (Assist Control/ Continuous Mandatory Ventilation), RR (machine): 18, TV (machine): 370, FiO2: 40, PEEP: 6, ITime: 0.7, MAP: 10, PIP: 18    09-30 @ 07:01  -  10-01 @ 07:00  --------------------------------------------------------  IN: 2950.4 mL / OUT: 1920 mL / NET: 1030.4 mL        =================PHYSICAL EXAM=================    GENERAL: Laying comfortably, NAD  EYES: EOMI, PERRL, no scleral icterus  NECK: No JVD  LUNG: Clear to auscultation bilaterally; No wheeze, crackles or rhonci  HEART: Regular rate and rhythm; No murmurs, rubs, or gallops  ABDOMEN: Soft, Nontender, Nondistended  EXTREMITIES:  No LE edema, 2+ Peripheral Pulses, No clubbing, cyanosis, or edema  PSYCH: AAOx3  NEUROLOGY: non-focal, strength 5/5 in all extremities, sensation intact  SKIN: No rashes or lesions    =================================================    LABS:                        6.1    9.11  )-----------( 85       ( 01 Oct 2022 04:35 )             19.0     Auto Eosinophil # 0.06  / Auto Eosinophil % 0.7   / Auto Neutrophil # 5.82  / Auto Neutrophil % 63.9  / BANDS % x                            6.2    8.99  )-----------( 81       ( 01 Oct 2022 03:40 )             18.1     Auto Eosinophil # 0.08  / Auto Eosinophil % 0.9   / Auto Neutrophil # 6.15  / Auto Neutrophil % 66.7  / BANDS % 1.7                          7.9    8.28  )-----------( 109      ( 30 Sep 2022 01:15 )             23.1     Auto Eosinophil # 0.04  / Auto Eosinophil % 0.5   / Auto Neutrophil # 5.06  / Auto Neutrophil % 61.1  / BANDS % x        10-01    148<H>  |  116<H>  |  67<H>  ----------------------------<  157<H>  4.0   |  23  |  1.07  09-30    152<H>  |  119<H>  |  68<H>  ----------------------------<  109<H>  4.0   |  23  |  1.09  09-29    143  |  110<H>  |  72<H>  ----------------------------<  111<H>  3.7   |  21<L>  |  1.17    Ca    8.5      01 Oct 2022 03:40  Mg     2.40     10-01  Phos  4.0     09-30  TPro  6.2  /  Alb  2.7<L>  /  TBili  0.3  /  DBili  x   /  AST  34  /  ALT  41  /  AlkPhos  27<L>  10-01  TPro  6.5  /  Alb  2.9<L>  /  TBili  0.4  /  DBili  x   /  AST  40  /  ALT  55<H>  /  AlkPhos  30<L>  09-30  TPro  7.4  /  Alb  3.2<L>  /  TBili  0.4  /  DBili  x   /  AST  37  /  ALT  60<H>  /  AlkPhos  34<L>  09-29    PT/INR - ( 01 Oct 2022 03:40 )   PT: 12.3 sec;   INR: 1.06 ratio         PTT - ( 01 Oct 2022 03:40 )  PTT:33.4 sec        Mode: AC/ CMV (Assist Control/ Continuous Mandatory Ventilation), RR (machine): 18, TV (machine): 370, FiO2: 40, PEEP: 6, ITime: 0.7, MAP: 10, PIP: 18         MEDICATIONS:  MEDICATIONS  (STANDING):  ALBUTerol    90 MICROgram(s) HFA Inhaler 2 Puff(s) Inhalation every 12 hours  atorvastatin 40 milliGRAM(s) Oral at bedtime  chlorhexidine 0.12% Liquid 15 milliLiter(s) Oral Mucosa every 12 hours  chlorhexidine 2% Cloths 1 Application(s) Topical daily  dexMEDEtomidine Infusion 0.2 MICROgram(s)/kG/Hr (2.56 mL/Hr) IV Continuous <Continuous>  glucagon  Injectable 1 milliGRAM(s) IntraMuscular once  ipratropium 17 MICROgram(s) HFA Inhaler 1 Puff(s) Inhalation <User Schedule>  petrolatum Ophthalmic Ointment 1 Application(s) Both EYES two times a day  polyethylene glycol 3350 17 Gram(s) Oral daily  propofol Infusion 40.039 MICROgram(s)/kG/Min (12.3 mL/Hr) IV Continuous <Continuous>  sodium chloride 3%  Inhalation 4 milliLiter(s) Inhalation every 12 hours  valACYclovir 500 milliGRAM(s) Oral daily    MEDICATIONS  (PRN):      ALLERGIES:  Allergies    Beef (Unknown)  No Known Drug Allergies  pork (Unknown)    Intolerances        LABS:                        6.1    9.11  )-----------( 85       ( 01 Oct 2022 04:35 )             19.0     10-01    148<H>  |  116<H>  |  67<H>  ----------------------------<  157<H>  4.0   |  23  |  1.07    Ca    8.5      01 Oct 2022 03:40  Phos  4.0     09-30  Mg     2.40     10-01    TPro  6.2  /  Alb  2.7<L>  /  TBili  0.3  /  DBili  x   /  AST  34  /  ALT  41  /  AlkPhos  27<L>  10-01    PT/INR - ( 01 Oct 2022 03:40 )   PT: 12.3 sec;   INR: 1.06 ratio         PTT - ( 01 Oct 2022 03:40 )  PTT:33.4 sec      CAPILLARY BLOOD GLUCOSE      POCT Blood Glucose.: 126 mg/dL (29 Sep 2022 12:55)      RADIOLOGY & ADDITIONAL TESTS: Reviewed. Shannan Ruby MD  Internal Medicine PGY-2      MICU PROGRESS NOTE     OVERNIGHT EVENTS: Overnight hemoglobin dropped from 7.9 to 6.2. Repeat Hgb 6.1.  1 unit of packed  RBCs was ordered. Per nurse dark stools.       SUBJECTIVE: Patient seen and examined at bedside. ROS limited by intubated and sedated    OBJECTIVE:    VITAL SIGNS:  ICU Vital Signs Last 24 Hrs  T(C): 35.8 (01 Oct 2022 04:00), Max: 37.3 (30 Sep 2022 08:00)  T(F): 96.5 (01 Oct 2022 04:00), Max: 99.1 (30 Sep 2022 08:00)  HR: 50 (01 Oct 2022 07:17) (48 - 85)  BP: 114/41 (01 Oct 2022 07:00) (99/48 - 171/64)  BP(mean): 63 (01 Oct 2022 07:00) (63 - 94)  ABP: --  ABP(mean): --  RR: 18 (01 Oct 2022 07:00) (16 - 23)  SpO2: 100% (01 Oct 2022 07:17) (95% - 100%)    O2 Parameters below as of 01 Oct 2022 07:00  Patient On (Oxygen Delivery Method): ventilator    O2 Concentration (%): 40      Mode: AC/ CMV (Assist Control/ Continuous Mandatory Ventilation), RR (machine): 18, TV (machine): 370, FiO2: 40, PEEP: 6, ITime: 0.7, MAP: 10, PIP: 18    09-30 @ 07:01  -  10-01 @ 07:00  --------------------------------------------------------  IN: 2950.4 mL / OUT: 1920 mL / NET: 1030.4 mL        =================PHYSICAL EXAM=================    GENERAL: Laying comfortably intubated and sedated   ENMT: MMM, NGT in place   LUNG: decreased breath sounds at the bases   HEART: Regular rate and rhythm; No murmurs, rubs, or gallops  ABDOMEN: Soft, Nontender, Nondistended  EXTREMITIES:  No LE edema, 2+ Peripheral Pulses, No clubbing, cyanosis, or edema  NEUROLOGY: non-focal, follows commands     =================================================    LABS:                        6.1    9.11  )-----------( 85       ( 01 Oct 2022 04:35 )             19.0     Auto Eosinophil # 0.06  / Auto Eosinophil % 0.7   / Auto Neutrophil # 5.82  / Auto Neutrophil % 63.9  / BANDS % x                            6.2    8.99  )-----------( 81       ( 01 Oct 2022 03:40 )             18.1     Auto Eosinophil # 0.08  / Auto Eosinophil % 0.9   / Auto Neutrophil # 6.15  / Auto Neutrophil % 66.7  / BANDS % 1.7                          7.9    8.28  )-----------( 109      ( 30 Sep 2022 01:15 )             23.1     Auto Eosinophil # 0.04  / Auto Eosinophil % 0.5   / Auto Neutrophil # 5.06  / Auto Neutrophil % 61.1  / BANDS % x        10-01    148<H>  |  116<H>  |  67<H>  ----------------------------<  157<H>  4.0   |  23  |  1.07  09-30    152<H>  |  119<H>  |  68<H>  ----------------------------<  109<H>  4.0   |  23  |  1.09  09-29    143  |  110<H>  |  72<H>  ----------------------------<  111<H>  3.7   |  21<L>  |  1.17    Ca    8.5      01 Oct 2022 03:40  Mg     2.40     10-01  Phos  4.0     09-30  TPro  6.2  /  Alb  2.7<L>  /  TBili  0.3  /  DBili  x   /  AST  34  /  ALT  41  /  AlkPhos  27<L>  10-01  TPro  6.5  /  Alb  2.9<L>  /  TBili  0.4  /  DBili  x   /  AST  40  /  ALT  55<H>  /  AlkPhos  30<L>  09-30  TPro  7.4  /  Alb  3.2<L>  /  TBili  0.4  /  DBili  x   /  AST  37  /  ALT  60<H>  /  AlkPhos  34<L>  09-29    PT/INR - ( 01 Oct 2022 03:40 )   PT: 12.3 sec;   INR: 1.06 ratio         PTT - ( 01 Oct 2022 03:40 )  PTT:33.4 sec        Mode: AC/ CMV (Assist Control/ Continuous Mandatory Ventilation), RR (machine): 18, TV (machine): 370, FiO2: 40, PEEP: 6, ITime: 0.7, MAP: 10, PIP: 18         MEDICATIONS:  MEDICATIONS  (STANDING):  ALBUTerol    90 MICROgram(s) HFA Inhaler 2 Puff(s) Inhalation every 12 hours  atorvastatin 40 milliGRAM(s) Oral at bedtime  chlorhexidine 0.12% Liquid 15 milliLiter(s) Oral Mucosa every 12 hours  chlorhexidine 2% Cloths 1 Application(s) Topical daily  dexMEDEtomidine Infusion 0.2 MICROgram(s)/kG/Hr (2.56 mL/Hr) IV Continuous <Continuous>  glucagon  Injectable 1 milliGRAM(s) IntraMuscular once  ipratropium 17 MICROgram(s) HFA Inhaler 1 Puff(s) Inhalation <User Schedule>  petrolatum Ophthalmic Ointment 1 Application(s) Both EYES two times a day  polyethylene glycol 3350 17 Gram(s) Oral daily  propofol Infusion 40.039 MICROgram(s)/kG/Min (12.3 mL/Hr) IV Continuous <Continuous>  sodium chloride 3%  Inhalation 4 milliLiter(s) Inhalation every 12 hours  valACYclovir 500 milliGRAM(s) Oral daily    MEDICATIONS  (PRN):      ALLERGIES:  Allergies    Beef (Unknown)  No Known Drug Allergies  pork (Unknown)    Intolerances        LABS:                        6.1    9.11  )-----------( 85       ( 01 Oct 2022 04:35 )             19.0     10-01    148<H>  |  116<H>  |  67<H>  ----------------------------<  157<H>  4.0   |  23  |  1.07    Ca    8.5      01 Oct 2022 03:40  Phos  4.0     09-30  Mg     2.40     10-01    TPro  6.2  /  Alb  2.7<L>  /  TBili  0.3  /  DBili  x   /  AST  34  /  ALT  41  /  AlkPhos  27<L>  10-01    PT/INR - ( 01 Oct 2022 03:40 )   PT: 12.3 sec;   INR: 1.06 ratio         PTT - ( 01 Oct 2022 03:40 )  PTT:33.4 sec      CAPILLARY BLOOD GLUCOSE      POCT Blood Glucose.: 126 mg/dL (29 Sep 2022 12:55)      RADIOLOGY & ADDITIONAL TESTS: Reviewed. Shannan Ruby MD  Internal Medicine PGY-2      MICU PROGRESS NOTE     OVERNIGHT EVENTS: Overnight hemoglobin dropped from 7.9 to 6.2. Repeat Hgb 6.1.  1 unit of packed  RBCs was ordered. Per nurse dark stools.       SUBJECTIVE: Patient seen and examined at bedside. ROS limited by intubated and sedated    OBJECTIVE:    VITAL SIGNS:  ICU Vital Signs Last 24 Hrs  T(C): 35.8 (01 Oct 2022 04:00), Max: 37.3 (30 Sep 2022 08:00)  T(F): 96.5 (01 Oct 2022 04:00), Max: 99.1 (30 Sep 2022 08:00)  HR: 50 (01 Oct 2022 07:17) (48 - 85)  BP: 114/41 (01 Oct 2022 07:00) (99/48 - 171/64)  BP(mean): 63 (01 Oct 2022 07:00) (63 - 94)  ABP: --  ABP(mean): --  RR: 18 (01 Oct 2022 07:00) (16 - 23)  SpO2: 100% (01 Oct 2022 07:17) (95% - 100%)    O2 Parameters below as of 01 Oct 2022 07:00  Patient On (Oxygen Delivery Method): ventilator    O2 Concentration (%): 40      Mode: AC/ CMV (Assist Control/ Continuous Mandatory Ventilation), RR (machine): 18, TV (machine): 370, FiO2: 40, PEEP: 6, ITime: 0.7, MAP: 10, PIP: 18    09-30 @ 07:01  -  10-01 @ 07:00  --------------------------------------------------------  IN: 2950.4 mL / OUT: 1920 mL / NET: 1030.4 mL        =================PHYSICAL EXAM=================    GENERAL: Laying comfortably intubated and sedated   ENMT: MMM, NGT in place   LUNG: decreased breath sounds at the bases   HEART: Regular rate and rhythm; No murmurs, rubs, or gallops  ABDOMEN: Soft, Nontender, Nondistended  EXTREMITIES:  No LE edema, 2+ Peripheral Pulses, No clubbing, cyanosis, or edema  NEUROLOGY: non-focal, follows commands, 3/5 strength UE & LE     =================================================    LABS:                        6.1    9.11  )-----------( 85       ( 01 Oct 2022 04:35 )             19.0     Auto Eosinophil # 0.06  / Auto Eosinophil % 0.7   / Auto Neutrophil # 5.82  / Auto Neutrophil % 63.9  / BANDS % x                            6.2    8.99  )-----------( 81       ( 01 Oct 2022 03:40 )             18.1     Auto Eosinophil # 0.08  / Auto Eosinophil % 0.9   / Auto Neutrophil # 6.15  / Auto Neutrophil % 66.7  / BANDS % 1.7                          7.9    8.28  )-----------( 109      ( 30 Sep 2022 01:15 )             23.1     Auto Eosinophil # 0.04  / Auto Eosinophil % 0.5   / Auto Neutrophil # 5.06  / Auto Neutrophil % 61.1  / BANDS % x        10-01    148<H>  |  116<H>  |  67<H>  ----------------------------<  157<H>  4.0   |  23  |  1.07 09-30    152<H>  |  119<H>  |  68<H>  ----------------------------<  109<H>  4.0   |  23  |  1.09  09-29    143  |  110<H>  |  72<H>  ----------------------------<  111<H>  3.7   |  21<L>  |  1.17    Ca    8.5      01 Oct 2022 03:40  Mg     2.40     10-01  Phos  4.0     09-30  TPro  6.2  /  Alb  2.7<L>  /  TBili  0.3  /  DBili  x   /  AST  34  /  ALT  41  /  AlkPhos  27<L>  10-01  TPro  6.5  /  Alb  2.9<L>  /  TBili  0.4  /  DBili  x   /  AST  40  /  ALT  55<H>  /  AlkPhos  30<L>  09-30  TPro  7.4  /  Alb  3.2<L>  /  TBili  0.4  /  DBili  x   /  AST  37  /  ALT  60<H>  /  AlkPhos  34<L>  09-29    PT/INR - ( 01 Oct 2022 03:40 )   PT: 12.3 sec;   INR: 1.06 ratio         PTT - ( 01 Oct 2022 03:40 )  PTT:33.4 sec        Mode: AC/ CMV (Assist Control/ Continuous Mandatory Ventilation), RR (machine): 18, TV (machine): 370, FiO2: 40, PEEP: 6, ITime: 0.7, MAP: 10, PIP: 18         MEDICATIONS:  MEDICATIONS  (STANDING):  ALBUTerol    90 MICROgram(s) HFA Inhaler 2 Puff(s) Inhalation every 12 hours  atorvastatin 40 milliGRAM(s) Oral at bedtime  chlorhexidine 0.12% Liquid 15 milliLiter(s) Oral Mucosa every 12 hours  chlorhexidine 2% Cloths 1 Application(s) Topical daily  dexMEDEtomidine Infusion 0.2 MICROgram(s)/kG/Hr (2.56 mL/Hr) IV Continuous <Continuous>  glucagon  Injectable 1 milliGRAM(s) IntraMuscular once  ipratropium 17 MICROgram(s) HFA Inhaler 1 Puff(s) Inhalation <User Schedule>  petrolatum Ophthalmic Ointment 1 Application(s) Both EYES two times a day  polyethylene glycol 3350 17 Gram(s) Oral daily  propofol Infusion 40.039 MICROgram(s)/kG/Min (12.3 mL/Hr) IV Continuous <Continuous>  sodium chloride 3%  Inhalation 4 milliLiter(s) Inhalation every 12 hours  valACYclovir 500 milliGRAM(s) Oral daily    MEDICATIONS  (PRN):      ALLERGIES:  Allergies    Beef (Unknown)  No Known Drug Allergies  pork (Unknown)    Intolerances        LABS:                        6.1    9.11  )-----------( 85       ( 01 Oct 2022 04:35 )             19.0     10-01    148<H>  |  116<H>  |  67<H>  ----------------------------<  157<H>  4.0   |  23  |  1.07    Ca    8.5      01 Oct 2022 03:40  Phos  4.0     09-30  Mg     2.40     10-01    TPro  6.2  /  Alb  2.7<L>  /  TBili  0.3  /  DBili  x   /  AST  34  /  ALT  41  /  AlkPhos  27<L>  10-01    PT/INR - ( 01 Oct 2022 03:40 )   PT: 12.3 sec;   INR: 1.06 ratio         PTT - ( 01 Oct 2022 03:40 )  PTT:33.4 sec      CAPILLARY BLOOD GLUCOSE      POCT Blood Glucose.: 126 mg/dL (29 Sep 2022 12:55)      RADIOLOGY & ADDITIONAL TESTS: Reviewed. Shannan Ruby MD  Internal Medicine PGY-2      MICU PROGRESS NOTE     OVERNIGHT EVENTS: Overnight hemoglobin dropped from 7.9 to 6.2. Repeat Hgb 6.1.  1 unit of packed  RBCs was ordered. Per nurse dark stools. Heparin subq held.      SUBJECTIVE: Patient seen and examined at bedside. ROS limited by intubated and sedated    OBJECTIVE:    VITAL SIGNS:  ICU Vital Signs Last 24 Hrs  T(C): 35.8 (01 Oct 2022 04:00), Max: 37.3 (30 Sep 2022 08:00)  T(F): 96.5 (01 Oct 2022 04:00), Max: 99.1 (30 Sep 2022 08:00)  HR: 50 (01 Oct 2022 07:17) (48 - 85)  BP: 114/41 (01 Oct 2022 07:00) (99/48 - 171/64)  BP(mean): 63 (01 Oct 2022 07:00) (63 - 94)  ABP: --  ABP(mean): --  RR: 18 (01 Oct 2022 07:00) (16 - 23)  SpO2: 100% (01 Oct 2022 07:17) (95% - 100%)    O2 Parameters below as of 01 Oct 2022 07:00  Patient On (Oxygen Delivery Method): ventilator    O2 Concentration (%): 40      Mode: AC/ CMV (Assist Control/ Continuous Mandatory Ventilation), RR (machine): 18, TV (machine): 370, FiO2: 40, PEEP: 6, ITime: 0.7, MAP: 10, PIP: 18    09-30 @ 07:01  -  10-01 @ 07:00  --------------------------------------------------------  IN: 2950.4 mL / OUT: 1920 mL / NET: 1030.4 mL        =================PHYSICAL EXAM=================    GENERAL: Laying comfortably intubated and sedated   ENMT: MMM, NGT in place   LUNG: decreased breath sounds at the bases   HEART: Regular rate and rhythm; No murmurs, rubs, or gallops  ABDOMEN: Soft, Nontender, Nondistended  EXTREMITIES:  No LE edema, 2+ Peripheral Pulses, No clubbing, cyanosis, or edema  NEUROLOGY: non-focal, follows commands, 3/5 strength UE & LE     =================================================    LABS:                        6.1    9.11  )-----------( 85       ( 01 Oct 2022 04:35 )             19.0     Auto Eosinophil # 0.06  / Auto Eosinophil % 0.7   / Auto Neutrophil # 5.82  / Auto Neutrophil % 63.9  / BANDS % x                            6.2    8.99  )-----------( 81       ( 01 Oct 2022 03:40 )             18.1     Auto Eosinophil # 0.08  / Auto Eosinophil % 0.9   / Auto Neutrophil # 6.15  / Auto Neutrophil % 66.7  / BANDS % 1.7                          7.9    8.28  )-----------( 109      ( 30 Sep 2022 01:15 )             23.1     Auto Eosinophil # 0.04  / Auto Eosinophil % 0.5   / Auto Neutrophil # 5.06  / Auto Neutrophil % 61.1  / BANDS % x        10-01    148<H>  |  116<H>  |  67<H>  ----------------------------<  157<H>  4.0   |  23  |  1.07  09-30    152<H>  |  119<H>  |  68<H>  ----------------------------<  109<H>  4.0   |  23  |  1.09  09-29    143  |  110<H>  |  72<H>  ----------------------------<  111<H>  3.7   |  21<L>  |  1.17    Ca    8.5      01 Oct 2022 03:40  Mg     2.40     10-01  Phos  4.0     09-30  TPro  6.2  /  Alb  2.7<L>  /  TBili  0.3  /  DBili  x   /  AST  34  /  ALT  41  /  AlkPhos  27<L>  10-01  TPro  6.5  /  Alb  2.9<L>  /  TBili  0.4  /  DBili  x   /  AST  40  /  ALT  55<H>  /  AlkPhos  30<L>  09-30  TPro  7.4  /  Alb  3.2<L>  /  TBili  0.4  /  DBili  x   /  AST  37  /  ALT  60<H>  /  AlkPhos  34<L>  09-29    PT/INR - ( 01 Oct 2022 03:40 )   PT: 12.3 sec;   INR: 1.06 ratio         PTT - ( 01 Oct 2022 03:40 )  PTT:33.4 sec        Mode: AC/ CMV (Assist Control/ Continuous Mandatory Ventilation), RR (machine): 18, TV (machine): 370, FiO2: 40, PEEP: 6, ITime: 0.7, MAP: 10, PIP: 18         MEDICATIONS:  MEDICATIONS  (STANDING):  ALBUTerol    90 MICROgram(s) HFA Inhaler 2 Puff(s) Inhalation every 12 hours  atorvastatin 40 milliGRAM(s) Oral at bedtime  chlorhexidine 0.12% Liquid 15 milliLiter(s) Oral Mucosa every 12 hours  chlorhexidine 2% Cloths 1 Application(s) Topical daily  dexMEDEtomidine Infusion 0.2 MICROgram(s)/kG/Hr (2.56 mL/Hr) IV Continuous <Continuous>  glucagon  Injectable 1 milliGRAM(s) IntraMuscular once  ipratropium 17 MICROgram(s) HFA Inhaler 1 Puff(s) Inhalation <User Schedule>  petrolatum Ophthalmic Ointment 1 Application(s) Both EYES two times a day  polyethylene glycol 3350 17 Gram(s) Oral daily  propofol Infusion 40.039 MICROgram(s)/kG/Min (12.3 mL/Hr) IV Continuous <Continuous>  sodium chloride 3%  Inhalation 4 milliLiter(s) Inhalation every 12 hours  valACYclovir 500 milliGRAM(s) Oral daily    MEDICATIONS  (PRN):      ALLERGIES:  Allergies    Beef (Unknown)  No Known Drug Allergies  pork (Unknown)    Intolerances        LABS:                        6.1    9.11  )-----------( 85       ( 01 Oct 2022 04:35 )             19.0     10-01    148<H>  |  116<H>  |  67<H>  ----------------------------<  157<H>  4.0   |  23  |  1.07    Ca    8.5      01 Oct 2022 03:40  Phos  4.0     09-30  Mg     2.40     10-01    TPro  6.2  /  Alb  2.7<L>  /  TBili  0.3  /  DBili  x   /  AST  34  /  ALT  41  /  AlkPhos  27<L>  10-01    PT/INR - ( 01 Oct 2022 03:40 )   PT: 12.3 sec;   INR: 1.06 ratio         PTT - ( 01 Oct 2022 03:40 )  PTT:33.4 sec      CAPILLARY BLOOD GLUCOSE      POCT Blood Glucose.: 126 mg/dL (29 Sep 2022 12:55)      RADIOLOGY & ADDITIONAL TESTS: Reviewed.

## 2022-10-01 NOTE — PROGRESS NOTE ADULT - ASSESSMENT
72 year-old male, history of HTN, HLD, CAD, cardiac stent, MM, presents with cc generalized weakness x 2-3 days associated with some tingling sensation.    A/P:  Hyponatremia/hypernatrmic:  work up suggested SIADH  Na appropriately improved now   Na level should not change more than 6-8meq in 24 hrs  monitor Na closely     acute on CKD stage 3  Baseline Scr 1.3-1.4  CRISTI likely sec to ATN  improved   monitor bmp, i/o    Hypokalemia:  better  Monitor K level    acidosis  Non AG  improved.   dc bicarb  monitor    HTN:  suboptimal   manage per MICU   MOnitor BP    Weakness:  Etiology?  h/o MM  s/p LP  possible GBS getting IVIG  f/u neuro

## 2022-10-01 NOTE — PROGRESS NOTE ADULT - SUBJECTIVE AND OBJECTIVE BOX
Payam Longoria MD  Interventional Cardiology / Endovascular Specialist  [place] Office : [address] [place] [address] [place] [address]  Tel:   [place] Office : [address] [facility] [place] [address]  Tel: [phone]    Subjective/Overnight events: Patient lying in bed remains intubated in ICU   	  MEDICATIONS:    valACYclovir 500 milliGRAM(s) Oral daily    ALBUTerol    90 MICROgram(s) HFA Inhaler 2 Puff(s) Inhalation every 12 hours  ipratropium 17 MICROgram(s) HFA Inhaler 1 Puff(s) Inhalation <User Schedule>  sodium chloride 3%  Inhalation 4 milliLiter(s) Inhalation every 12 hours    dexMEDEtomidine Infusion 0.2 MICROgram(s)/kG/Hr IV Continuous <Continuous>  propofol Infusion 40.039 MICROgram(s)/kG/Min IV Continuous <Continuous>    pantoprazole  Injectable 40 milliGRAM(s) IV Push every 12 hours  polyethylene glycol 3350 17 Gram(s) Oral daily    atorvastatin 40 milliGRAM(s) Oral at bedtime  glucagon  Injectable 1 milliGRAM(s) IntraMuscular once    chlorhexidine 0.12% Liquid 15 milliLiter(s) Oral Mucosa every 12 hours  chlorhexidine 2% Cloths 1 Application(s) Topical daily  petrolatum Ophthalmic Ointment 1 Application(s) Both EYES two times a day      PAST MEDICAL/SURGICAL HISTORY  PAST MEDICAL & SURGICAL HISTORY:  HTN (hypertension)      HLD (hyperlipidemia)      Coronary artery disease      Multiple myeloma      S/P coronary artery stent placement          SOCIAL HISTORY: Substance Use (street drugs): ( x ) never used  (  ) other:    FAMILY HISTORY:  Maternal family history of hypertension  Mother        REVIEW OF SYSTEMS:  CONSTITUTIONAL: No fever, weight loss, or fatigue  EYES: No eye pain, visual disturbances, or discharge  ENMT:  No difficulty hearing, tinnitus, vertigo; No sinus or throat pain  BREASTS: No pain, masses, or nipple discharge  GASTROINTESTINAL: No abdominal or epigastric pain. No nausea, vomiting, or hematemesis; No diarrhea or constipation. No melena or hematochezia.  GENITOURINARY: No dysuria, frequency, hematuria, or incontinence  NEUROLOGICAL: No headaches, memory loss, loss of strength, numbness, or tremors  ENDOCRINE: No heat or cold intolerance; No hair loss  MUSCULOSKELETAL: No joint pain or swelling; No muscle, back, or extremity pain  PSYCHIATRIC: No depression, anxiety, mood swings, or difficulty sleeping  HEME/LYMPH: No easy bruising, or bleeding gums  All others negative    PHYSICAL EXAM:  T(C): 36.7 (10-01-22 @ 20:00), Max: 36.8 (10-01-22 @ 10:00)  HR: 52 (10-01-22 @ 23:02) (44 - 94)  BP: 91/50 (10-01-22 @ 22:00) (91/50 - 177/61)  RR: 18 (10-01-22 @ 22:00) (11 - 23)  SpO2: 100% (10-01-22 @ 23:02) (100% - 100%)  Wt(kg): --  I&O's Summary    30 Sep 2022 07:01  -  01 Oct 2022 07:00  --------------------------------------------------------  IN: 2950.4 mL / OUT: 1920 mL / NET: 1030.4 mL    01 Oct 2022 07:01  -  02 Oct 2022 00:38  --------------------------------------------------------  IN: 1170.2 mL / OUT: 1510 mL / NET: -339.8 mL              GENERAL: intubated  EYES:  conjunctiva and sclera clear  Cardiovascular: Normal S1 S2, No JVD, No murmurs, No edema  Respiratory: intubated	  Gastrointestinal:  Soft,   Extremities: No edema                           7.8    10.77 )-----------( 76       ( 01 Oct 2022 11:55 )             23.0     10-01    144  |  114<H>  |  61<H>  ----------------------------<  128<H>  4.2   |  22  |  0.93    Ca    8.7      01 Oct 2022 11:55  Phos  4.0     09-30  Mg     2.40     10-01    TPro  6.2  /  Alb  2.7<L>  /  TBili  0.3  /  DBili  x   /  AST  34  /  ALT  41  /  AlkPhos  27<L>  10-01    proBNP:   Lipid Profile:   HgA1c:   TSH:     Consultant(s) Notes Reviewed:  [x ] YES  [ ] NO    Care Discussed with Consultants/Other Providers [ x] YES  [ ] NO    Imaging Personally Reviewed independently:  [x] YES  [ ] NO    All labs, radiologic studies, vitals, orders and medications list reviewed. Patient is seen and examined at bedside. Case discussed with medical team.

## 2022-10-01 NOTE — PROGRESS NOTE ADULT - SUBJECTIVE AND OBJECTIVE BOX
[facility]   MD DERRICK MOSS MD, PA KRISTINE SOLTANPOUR, DO INJUNG KO, NP    TEL:  OFFICE: [phone]    From 5pm-7am Answering Service [phone]    -- RENAL FOLLOW UP NOTE ---Date of Service 10-01-22 @ 15:21    Patient is a 72y old  Male who presents with a chief complaint of weakness (01 Oct 2022 11:07)      Patient seen and examined in MICU.     VITALS:  T(F): 97.8 (10-01-22 @ 12:00), Max: 98.2 (10-01-22 @ 10:00)  HR: 85 (10-01-22 @ 15:00)  BP: 165/70 (10-01-22 @ 15:00)  RR: 15 (10-01-22 @ 15:00)  SpO2: 100% (10-01-22 @ 15:00)  Wt(kg): --    09-30 @ 07:01  -  10-01 @ 07:00  --------------------------------------------------------  IN: 2950.4 mL / OUT: 1920 mL / NET: 1030.4 mL    10-01 @ 07:01  -  10-01 @ 15:21  --------------------------------------------------------  IN: 698.3 mL / OUT: 1135 mL / NET: -436.7 mL          PHYSICAL EXAM:  Constitutional: NAD  Neck: No JVD  Respiratory: CTAB, no wheezes, rales or rhonchi  Cardiovascular: S1, S2, RRR  Gastrointestinal: BS+, soft, NT/ND  Extremities: No peripheral edema    Hospital Medications:   MEDICATIONS  (STANDING):  ALBUTerol    90 MICROgram(s) HFA Inhaler 2 Puff(s) Inhalation every 12 hours  atorvastatin 40 milliGRAM(s) Oral at bedtime  chlorhexidine 0.12% Liquid 15 milliLiter(s) Oral Mucosa every 12 hours  chlorhexidine 2% Cloths 1 Application(s) Topical daily  dexMEDEtomidine Infusion 0.2 MICROgram(s)/kG/Hr (2.56 mL/Hr) IV Continuous <Continuous>  glucagon  Injectable 1 milliGRAM(s) IntraMuscular once  ipratropium 17 MICROgram(s) HFA Inhaler 1 Puff(s) Inhalation <User Schedule>  pantoprazole  Injectable 40 milliGRAM(s) IV Push every 12 hours  petrolatum Ophthalmic Ointment 1 Application(s) Both EYES two times a day  polyethylene glycol 3350 17 Gram(s) Oral daily  propofol Infusion 40.039 MICROgram(s)/kG/Min (12.3 mL/Hr) IV Continuous <Continuous>  sodium chloride 3%  Inhalation 4 milliLiter(s) Inhalation every 12 hours  valACYclovir 500 milliGRAM(s) Oral daily      LABS:  10-01    144  |  114<H>  |  61<H>  ----------------------------<  128<H>  4.2   |  22  |  0.93    Ca    8.7      01 Oct 2022 11:55  Phos  4.0     09-30  Mg     2.40     10-01    TPro  6.2  /  Alb  2.7<L>  /  TBili  0.3  /  DBili      /  AST  34  /  ALT  41  /  AlkPhos  27<L>  10-01    Creatinine Trend: 0.93 <--, 1.07 <--, 1.09 <--, 1.17 <--, 1.25 <--, 1.60 <--, 1.65 <--, 1.65 <--, 1.63 <--, 1.49 <--, 1.33 <--, 1.12 <--, 1.15 <--, 1.19 <--, 1.08 <--, 1.17 <--    Albumin, Serum: 2.7 g/dL (10-01 @ 03:40)                              7.8    10.77 )-----------( 76       ( 01 Oct 2022 11:55 )             23.0     Urine Studies:  Urinalysis - [09-25-22 @ 12:01]      Color Light Yellow / Appearance Clear / SG 1.035 / pH 6.5      Gluc 200 mg/dL / Ketone Trace  / Bili Negative / Urobili <2 mg/dL       Blood Trace / Protein 30 mg/dL / Leuk Est Negative / Nitrite Negative      RBC 1 / WBC 1 / Hyaline  / Gran  / Sq Epi  / Non Sq Epi 2 / Bacteria Negative    Urine Creatinine 33      [09-27-22 @ 13:50]  Urine Protein 68      [09-25-22 @ 12:01]  Urine Sodium <20      [09-27-22 @ 13:50]  Urine Urea Nitrogen 888.4      [09-27-22 @ 13:50]  Urine Osmolality 428      [09-27-22 @ 13:50]    Iron 36, TIBC 290, %sat 12      [09-25-22 @ 11:15]  Ferritin 287      [09-25-22 @ 11:15]  TSH 1.57      [09-25-22 @ 07:30]      Free Light Chains: kappa 2.58, lambda 2.12, ratio = 1.22      [09-25 @ 11:15]  Immunofixation Serum:   No Monoclonal Band Identified. JERONIMO Farris M.D.  Reference Range: None Detected      [09-25-22 @ 11:15]  SPEP Interpretation: Increase in Alpha-2 fraction suggestive of acute inflammation.  JERONIMO Farris M.D.      [09-25-22 @ 11:15]  Immunofixation Urine: No Monoclonal Band Identified. JERONIMO Farris M.D.  Reference Range: None Detected      [09-25-22 @ 12:01]  UPEP Interpretation: Mild Selective Proteinuria. No Monoclonal band seen.  JERONIMO Farris M.D.      [09-25-22 @ 12:01]    RADIOLOGY & ADDITIONAL STUDIES:

## 2022-10-01 NOTE — PROGRESS NOTE ADULT - SUBJECTIVE AND OBJECTIVE BOX
Name of Patient : RASHID MEIER  MRN: [id number]  Date of visit: 10-01-22 @ 16:25      Subjective: Patient seen and examined. No new events except as noted.     MEDICATIONS:  MEDICATIONS  (STANDING):  ALBUTerol    90 MICROgram(s) HFA Inhaler 2 Puff(s) Inhalation every 12 hours  atorvastatin 40 milliGRAM(s) Oral at bedtime  chlorhexidine 0.12% Liquid 15 milliLiter(s) Oral Mucosa every 12 hours  chlorhexidine 2% Cloths 1 Application(s) Topical daily  dexMEDEtomidine Infusion 0.2 MICROgram(s)/kG/Hr (2.56 mL/Hr) IV Continuous <Continuous>  glucagon  Injectable 1 milliGRAM(s) IntraMuscular once  ipratropium 17 MICROgram(s) HFA Inhaler 1 Puff(s) Inhalation <User Schedule>  pantoprazole  Injectable 40 milliGRAM(s) IV Push every 12 hours  petrolatum Ophthalmic Ointment 1 Application(s) Both EYES two times a day  polyethylene glycol 3350 17 Gram(s) Oral daily  propofol Infusion 40.039 MICROgram(s)/kG/Min (12.3 mL/Hr) IV Continuous <Continuous>  sodium chloride 3%  Inhalation 4 milliLiter(s) Inhalation every 12 hours  valACYclovir 500 milliGRAM(s) Oral daily      PHYSICAL EXAM:  T(C): 36.6 (10-01-22 @ 12:00), Max: 36.8 (10-01-22 @ 10:00)  HR: 79 (10-01-22 @ 16:00) (44 - 94)  BP: 141/68 (10-01-22 @ 16:00) (99/48 - 173/76)  RR: 19 (10-01-22 @ 16:00) (13 - 23)  SpO2: 100% (10-01-22 @ 16:00) (95% - 100%)  Wt(kg): --  I&O's Summary    30 Sep 2022 07:01  -  01 Oct 2022 07:00  --------------------------------------------------------  IN: 2950.4 mL / OUT: 1920 mL / NET: 1030.4 mL    01 Oct 2022 07:01  -  01 Oct 2022 16:25  --------------------------------------------------------  IN: 698.3 mL / OUT: 1135 mL / NET: -436.7 mL          Appearance: Normal	  HEENT:  PERRLA   Lymphatic: No lymphadenopathy   Cardiovascular: Normal S1 S2, no JVD  Respiratory: normal effort , clear  Gastrointestinal:  Soft, Non-tender  Skin: No rashes,  warm to touch  Psychiatry:  Mood & affect appropriate  Musculuskeletal: No edema      All labs, Imaging and EKGs personally reviewed     09-30-22 @ 07:01  -  10-01-22 @ 07:00  --------------------------------------------------------  IN: 2950.4 mL / OUT: 1920 mL / NET: 1030.4 mL    10-01-22 @ 07:01  -  10-01-22 @ 16:25  --------------------------------------------------------  IN: 698.3 mL / OUT: 1135 mL / NET: -436.7 mL                          7.8    10.77 )-----------( 76       ( 01 Oct 2022 11:55 )             23.0               10-01    144  |  114<H>  |  61<H>  ----------------------------<  128<H>  4.2   |  22  |  0.93    Ca    8.7      01 Oct 2022 11:55  Phos  4.0     09-30  Mg     2.40     10-01    TPro  6.2  /  Alb  2.7<L>  /  TBili  0.3  /  DBili  x   /  AST  34  /  ALT  41  /  AlkPhos  27<L>  10-01    PT/INR - ( 01 Oct 2022 03:40 )   PT: 12.3 sec;   INR: 1.06 ratio         PTT - ( 01 Oct 2022 03:40 )  PTT:33.4 sec                 ABG - ( 01 Oct 2022 15:57 )  pH, Arterial: 7.43  pH, Blood: x     /  pCO2: 33    /  pO2: 128   / HCO3: 22    / Base Excess: -2.1  /  SaO2: 98.5

## 2022-10-02 LAB
ALBUMIN SERPL ELPH-MCNC: 2.8 G/DL — LOW (ref 3.3–5)
ALP SERPL-CCNC: 28 U/L — LOW (ref 40–120)
ALT FLD-CCNC: 36 U/L — SIGNIFICANT CHANGE UP (ref 4–41)
ANION GAP SERPL CALC-SCNC: 9 MMOL/L — SIGNIFICANT CHANGE UP (ref 7–14)
ANISOCYTOSIS BLD QL: SIGNIFICANT CHANGE UP
AST SERPL-CCNC: 37 U/L — SIGNIFICANT CHANGE UP (ref 4–40)
BASOPHILS # BLD AUTO: 0 K/UL — SIGNIFICANT CHANGE UP (ref 0–0.2)
BASOPHILS # BLD AUTO: 0.01 K/UL — SIGNIFICANT CHANGE UP (ref 0–0.2)
BASOPHILS NFR BLD AUTO: 0 % — SIGNIFICANT CHANGE UP (ref 0–2)
BASOPHILS NFR BLD AUTO: 0.1 % — SIGNIFICANT CHANGE UP (ref 0–2)
BILIRUB SERPL-MCNC: 0.4 MG/DL — SIGNIFICANT CHANGE UP (ref 0.2–1.2)
BUN SERPL-MCNC: 50 MG/DL — HIGH (ref 7–23)
CALCIUM SERPL-MCNC: 8.3 MG/DL — LOW (ref 8.4–10.5)
CHLORIDE SERPL-SCNC: 112 MMOL/L — HIGH (ref 98–107)
CO2 SERPL-SCNC: 21 MMOL/L — LOW (ref 22–31)
CREAT SERPL-MCNC: 1 MG/DL — SIGNIFICANT CHANGE UP (ref 0.5–1.3)
CULTURE RESULTS: SIGNIFICANT CHANGE UP
EGFR: 79 ML/MIN/1.73M2 — SIGNIFICANT CHANGE UP
EOSINOPHIL # BLD AUTO: 0 K/UL — SIGNIFICANT CHANGE UP (ref 0–0.5)
EOSINOPHIL # BLD AUTO: 0.28 K/UL — SIGNIFICANT CHANGE UP (ref 0–0.5)
EOSINOPHIL NFR BLD AUTO: 0 % — SIGNIFICANT CHANGE UP (ref 0–6)
EOSINOPHIL NFR BLD AUTO: 2.6 % — SIGNIFICANT CHANGE UP (ref 0–6)
GD1A GANGL IGG+IGM SER IA-ACNC: 52 IV — HIGH (ref 0–50)
GD1B GANGL IGG+IGM SER IA-ACNC: 15 IV — SIGNIFICANT CHANGE UP (ref 0–50)
GIANT PLATELETS BLD QL SMEAR: PRESENT — SIGNIFICANT CHANGE UP
GLUCOSE BLDC GLUCOMTR-MCNC: 108 MG/DL — HIGH (ref 70–99)
GLUCOSE BLDC GLUCOMTR-MCNC: 112 MG/DL — HIGH (ref 70–99)
GLUCOSE BLDC GLUCOMTR-MCNC: 123 MG/DL — HIGH (ref 70–99)
GLUCOSE BLDC GLUCOMTR-MCNC: 125 MG/DL — HIGH (ref 70–99)
GLUCOSE SERPL-MCNC: 107 MG/DL — HIGH (ref 70–99)
GM1 ASIALO IGG+IGM SER IA-ACNC: 12 IV — SIGNIFICANT CHANGE UP (ref 0–50)
GM1 GANGL IGG+IGM SER-ACNC: 10 IV — SIGNIFICANT CHANGE UP (ref 0–50)
GM2 GANGL IGG+IGM SER IA-ACNC: 12 IV — SIGNIFICANT CHANGE UP (ref 0–50)
GQ1B GANGL IGG+IGM SER IA-ACNC: 9 IV — SIGNIFICANT CHANGE UP (ref 0–50)
HCT VFR BLD CALC: 19.2 % — CRITICAL LOW (ref 39–50)
HCT VFR BLD CALC: 26.8 % — LOW (ref 39–50)
HGB BLD-MCNC: 6.4 G/DL — CRITICAL LOW (ref 13–17)
HGB BLD-MCNC: 8.7 G/DL — LOW (ref 13–17)
IANC: 5.65 K/UL — SIGNIFICANT CHANGE UP (ref 1.8–7.4)
IANC: 7.01 K/UL — SIGNIFICANT CHANGE UP (ref 1.8–7.4)
IMM GRANULOCYTES NFR BLD AUTO: 5.7 % — HIGH (ref 0–0.9)
LYMPHOCYTES # BLD AUTO: 1.77 K/UL — SIGNIFICANT CHANGE UP (ref 1–3.3)
LYMPHOCYTES # BLD AUTO: 1.93 K/UL — SIGNIFICANT CHANGE UP (ref 1–3.3)
LYMPHOCYTES # BLD AUTO: 16.7 % — SIGNIFICANT CHANGE UP (ref 13–44)
LYMPHOCYTES # BLD AUTO: 20.2 % — SIGNIFICANT CHANGE UP (ref 13–44)
MACROCYTES BLD QL: SLIGHT — SIGNIFICANT CHANGE UP
MAGNESIUM SERPL-MCNC: 2.2 MG/DL — SIGNIFICANT CHANGE UP (ref 1.6–2.6)
MANUAL SMEAR VERIFICATION: SIGNIFICANT CHANGE UP
MCHC RBC-ENTMCNC: 29.8 PG — SIGNIFICANT CHANGE UP (ref 27–34)
MCHC RBC-ENTMCNC: 29.8 PG — SIGNIFICANT CHANGE UP (ref 27–34)
MCHC RBC-ENTMCNC: 32.5 GM/DL — SIGNIFICANT CHANGE UP (ref 32–36)
MCHC RBC-ENTMCNC: 33.3 GM/DL — SIGNIFICANT CHANGE UP (ref 32–36)
MCV RBC AUTO: 89.3 FL — SIGNIFICANT CHANGE UP (ref 80–100)
MCV RBC AUTO: 91.8 FL — SIGNIFICANT CHANGE UP (ref 80–100)
MONOCYTES # BLD AUTO: 0.58 K/UL — SIGNIFICANT CHANGE UP (ref 0–0.9)
MONOCYTES # BLD AUTO: 0.91 K/UL — HIGH (ref 0–0.9)
MONOCYTES NFR BLD AUTO: 6.1 % — SIGNIFICANT CHANGE UP (ref 2–14)
MONOCYTES NFR BLD AUTO: 8.6 % — SIGNIFICANT CHANGE UP (ref 2–14)
MYELOCYTES NFR BLD: 1.8 % — HIGH (ref 0–0)
NEUTROPHILS # BLD AUTO: 6.78 K/UL — SIGNIFICANT CHANGE UP (ref 1.8–7.4)
NEUTROPHILS # BLD AUTO: 7.01 K/UL — SIGNIFICANT CHANGE UP (ref 1.8–7.4)
NEUTROPHILS NFR BLD AUTO: 66.3 % — SIGNIFICANT CHANGE UP (ref 43–77)
NEUTROPHILS NFR BLD AUTO: 69.3 % — SIGNIFICANT CHANGE UP (ref 43–77)
NEUTS BAND # BLD: 1.7 % — SIGNIFICANT CHANGE UP (ref 0–6)
NRBC # BLD: 0 /100 WBCS — SIGNIFICANT CHANGE UP (ref 0–0)
NRBC # FLD: 0.06 K/UL — HIGH (ref 0–0)
OVALOCYTES BLD QL SMEAR: SLIGHT — SIGNIFICANT CHANGE UP
PHOSPHATE SERPL-MCNC: 3.2 MG/DL — SIGNIFICANT CHANGE UP (ref 2.5–4.5)
PLAT MORPH BLD: NORMAL — SIGNIFICANT CHANGE UP
PLATELET # BLD AUTO: 68 K/UL — LOW (ref 150–400)
PLATELET # BLD AUTO: 74 K/UL — LOW (ref 150–400)
PLATELET COUNT - ESTIMATE: ABNORMAL
POIKILOCYTOSIS BLD QL AUTO: SLIGHT — SIGNIFICANT CHANGE UP
POLYCHROMASIA BLD QL SMEAR: SLIGHT — SIGNIFICANT CHANGE UP
POTASSIUM SERPL-MCNC: 4.1 MMOL/L — SIGNIFICANT CHANGE UP (ref 3.5–5.3)
POTASSIUM SERPL-SCNC: 4.1 MMOL/L — SIGNIFICANT CHANGE UP (ref 3.5–5.3)
PROT SERPL-MCNC: 5.7 G/DL — LOW (ref 6–8.3)
RBC # BLD: 2.15 M/UL — LOW (ref 4.2–5.8)
RBC # BLD: 2.92 M/UL — LOW (ref 4.2–5.8)
RBC # FLD: 14.6 % — HIGH (ref 10.3–14.5)
RBC # FLD: 14.9 % — HIGH (ref 10.3–14.5)
RBC BLD AUTO: ABNORMAL
SODIUM SERPL-SCNC: 142 MMOL/L — SIGNIFICANT CHANGE UP (ref 135–145)
SPECIMEN SOURCE: SIGNIFICANT CHANGE UP
TARGETS BLD QL SMEAR: SLIGHT — SIGNIFICANT CHANGE UP
VARIANT LYMPHS # BLD: 0.9 % — SIGNIFICANT CHANGE UP (ref 0–6)
WBC # BLD: 10.58 K/UL — HIGH (ref 3.8–10.5)
WBC # BLD: 9.55 K/UL — SIGNIFICANT CHANGE UP (ref 3.8–10.5)
WBC # FLD AUTO: 10.58 K/UL — HIGH (ref 3.8–10.5)
WBC # FLD AUTO: 9.55 K/UL — SIGNIFICANT CHANGE UP (ref 3.8–10.5)

## 2022-10-02 PROCEDURE — 99291 CRITICAL CARE FIRST HOUR: CPT

## 2022-10-02 PROCEDURE — 99222 1ST HOSP IP/OBS MODERATE 55: CPT | Mod: GC

## 2022-10-02 RX ORDER — SODIUM CHLORIDE 9 MG/ML
1000 INJECTION, SOLUTION INTRAVENOUS
Refills: 0 | Status: DISCONTINUED | OUTPATIENT
Start: 2022-10-02 | End: 2022-10-05

## 2022-10-02 RX ADMIN — CHLORHEXIDINE GLUCONATE 1 APPLICATION(S): 213 SOLUTION TOPICAL at 11:44

## 2022-10-02 RX ADMIN — SODIUM CHLORIDE 4 MILLILITER(S): 9 INJECTION INTRAMUSCULAR; INTRAVENOUS; SUBCUTANEOUS at 09:39

## 2022-10-02 RX ADMIN — Medication 1 APPLICATION(S): at 17:49

## 2022-10-02 RX ADMIN — PANTOPRAZOLE SODIUM 40 MILLIGRAM(S): 20 TABLET, DELAYED RELEASE ORAL at 22:24

## 2022-10-02 RX ADMIN — PROPOFOL 12.3 MICROGRAM(S)/KG/MIN: 10 INJECTION, EMULSION INTRAVENOUS at 08:42

## 2022-10-02 RX ADMIN — PROPOFOL 12.3 MICROGRAM(S)/KG/MIN: 10 INJECTION, EMULSION INTRAVENOUS at 19:34

## 2022-10-02 RX ADMIN — SODIUM CHLORIDE 4 MILLILITER(S): 9 INJECTION INTRAMUSCULAR; INTRAVENOUS; SUBCUTANEOUS at 21:11

## 2022-10-02 RX ADMIN — ATORVASTATIN CALCIUM 40 MILLIGRAM(S): 80 TABLET, FILM COATED ORAL at 22:24

## 2022-10-02 RX ADMIN — CHLORHEXIDINE GLUCONATE 15 MILLILITER(S): 213 SOLUTION TOPICAL at 05:22

## 2022-10-02 RX ADMIN — VALACYCLOVIR 500 MILLIGRAM(S): 500 TABLET, FILM COATED ORAL at 11:43

## 2022-10-02 RX ADMIN — Medication 1 APPLICATION(S): at 05:22

## 2022-10-02 RX ADMIN — DEXMEDETOMIDINE HYDROCHLORIDE IN 0.9% SODIUM CHLORIDE 2.56 MICROGRAM(S)/KG/HR: 4 INJECTION INTRAVENOUS at 19:33

## 2022-10-02 RX ADMIN — PANTOPRAZOLE SODIUM 40 MILLIGRAM(S): 20 TABLET, DELAYED RELEASE ORAL at 10:21

## 2022-10-02 RX ADMIN — CHLORHEXIDINE GLUCONATE 15 MILLILITER(S): 213 SOLUTION TOPICAL at 17:49

## 2022-10-02 RX ADMIN — Medication 1 PUFF(S): at 21:12

## 2022-10-02 RX ADMIN — ALBUTEROL 2 PUFF(S): 90 AEROSOL, METERED ORAL at 21:12

## 2022-10-02 RX ADMIN — POLYETHYLENE GLYCOL 3350 17 GRAM(S): 17 POWDER, FOR SOLUTION ORAL at 11:43

## 2022-10-02 RX ADMIN — SODIUM CHLORIDE 50 MILLILITER(S): 9 INJECTION, SOLUTION INTRAVENOUS at 13:17

## 2022-10-02 RX ADMIN — ALBUTEROL 2 PUFF(S): 90 AEROSOL, METERED ORAL at 09:39

## 2022-10-02 RX ADMIN — DEXMEDETOMIDINE HYDROCHLORIDE IN 0.9% SODIUM CHLORIDE 2.56 MICROGRAM(S)/KG/HR: 4 INJECTION INTRAVENOUS at 08:42

## 2022-10-02 RX ADMIN — SODIUM CHLORIDE 50 MILLILITER(S): 9 INJECTION, SOLUTION INTRAVENOUS at 19:35

## 2022-10-02 RX ADMIN — Medication 1 PUFF(S): at 09:39

## 2022-10-02 NOTE — CONSULT NOTE ADULT - CONSULT REASON
HYponatremia
c/f GI bleed
Multiple myeloma
b/l LE weakness
t8 pathologic fx
weakness
Weakness and SOB c/f GBS

## 2022-10-02 NOTE — PROGRESS NOTE ADULT - SUBJECTIVE AND OBJECTIVE BOX
[facility] [place] [facility]   MD DERRICK MOSS MD RUORU WONG, PA    TEL:  FROM 9 AM to 5 PM ---OFFICE: [phone]    FROM 5 PM - 9 AM PLEASE CALL ANSWERING SERVICE: [phone]    RENAL FOLLOW UP NOTE--Date of Service 10-02-22 @ 11:12  --------------------------------------------------------------------------------  HPI:      Pt seen and examined at bedside.       PAST HISTORY  --------------------------------------------------------------------------------  No significant changes to PMH, PSH, FHx, SHx, unless otherwise noted    ALLERGIES & MEDICATIONS  --------------------------------------------------------------------------------  Allergies    Beef (Unknown)  No Known Drug Allergies  pork (Unknown)    Intolerances      Standing Inpatient Medications  ALBUTerol    90 MICROgram(s) HFA Inhaler 2 Puff(s) Inhalation every 12 hours  atorvastatin 40 milliGRAM(s) Oral at bedtime  chlorhexidine 0.12% Liquid 15 milliLiter(s) Oral Mucosa every 12 hours  chlorhexidine 2% Cloths 1 Application(s) Topical daily  dexMEDEtomidine Infusion 0.2 MICROgram(s)/kG/Hr IV Continuous <Continuous>  glucagon  Injectable 1 milliGRAM(s) IntraMuscular once  ipratropium 17 MICROgram(s) HFA Inhaler 1 Puff(s) Inhalation <User Schedule>  pantoprazole  Injectable 40 milliGRAM(s) IV Push every 12 hours  petrolatum Ophthalmic Ointment 1 Application(s) Both EYES two times a day  polyethylene glycol 3350 17 Gram(s) Oral daily  propofol Infusion 40.039 MICROgram(s)/kG/Min IV Continuous <Continuous>  sodium chloride 3%  Inhalation 4 milliLiter(s) Inhalation every 12 hours  valACYclovir 500 milliGRAM(s) Oral daily    PRN Inpatient Medications      REVIEW OF SYSTEMS  --------------------------------------------------------------------------------  unable to obtain     VITALS/PHYSICAL EXAM  --------------------------------------------------------------------------------  T(C): 35.8 (10-02-22 @ 08:00), Max: 36.7 (10-01-22 @ 20:00)  HR: 54 (10-02-22 @ 11:00) (46 - 94)  BP: 136/55 (10-02-22 @ 11:00) (91/50 - 177/61)  RR: 18 (10-02-22 @ 11:00) (11 - 23)  SpO2: 100% (10-02-22 @ 11:00) (100% - 100%)  Wt(kg): --    Weight (kg): 51.2 (10-02-22 @ 04:00)      10-01-22 @ 07:01  -  10-02-22 @ 07:00  --------------------------------------------------------  IN: 1277.4 mL / OUT: 2210 mL / NET: -932.6 mL    10-02-22 @ 07:01  -  10-02-22 @ 11:12  --------------------------------------------------------  IN: 327.7 mL / OUT: 450 mL / NET: -122.3 mL      Physical Exam:  	Gen: NAD  	HEENT: ett  	Pulm: CTA B/L  	CV: S1S2  	Abd: Soft, +BS  	Ext: No LE edema B/L                      Neuro: sedated  	Skin: Warm and Dry   	Vascular access: NO HD catheter            no shanti  LABS/STUDIES  --------------------------------------------------------------------------------              6.4    9.55  >-----------<  68       [10-02-22 @ 05:37]              19.2     142  |  112  |  50  ----------------------------<  107      [10-02-22 @ 05:37]  4.1   |  21  |  1.00        Ca     8.3     [10-02-22 @ 05:37]      Mg     2.20     [10-02-22 @ 05:37]      Phos  3.2     [10-02-22 @ 05:37]    TPro  5.7  /  Alb  2.8  /  TBili  0.4  /  DBili  x   /  AST  37  /  ALT  36  /  AlkPhos  28  [10-02-22 @ 05:37]    PT/INR: PT 12.3 , INR 1.06       [10-01-22 @ 03:40]  PTT: 33.4       [10-01-22 @ 03:40]      Creatinine Trend:  SCr 1.00 [10-02 @ 05:37]  SCr 0.93 [10-01 @ 11:55]  SCr 1.07 [10-01 @ 03:40]  SCr 1.09 [09-30 @ 01:15]  SCr 1.17 [09-29 @ 10:55]    Urinalysis - [09-25-22 @ 12:01]      Color Light Yellow / Appearance Clear / SG 1.035 / pH 6.5      Gluc 200 mg/dL / Ketone Trace  / Bili Negative / Urobili <2 mg/dL       Blood Trace / Protein 30 mg/dL / Leuk Est Negative / Nitrite Negative      RBC 1 / WBC 1 / Hyaline  / Gran  / Sq Epi  / Non Sq Epi 2 / Bacteria Negative    Urine Creatinine 33      [09-27-22 @ 13:50]  Urine Protein 68      [09-25-22 @ 12:01]  Urine Sodium <20      [09-27-22 @ 13:50]  Urine Urea Nitrogen 888.4      [09-27-22 @ 13:50]  Urine Osmolality 428      [09-27-22 @ 13:50]    Iron 36, TIBC 290, %sat 12      [09-25-22 @ 11:15]  Ferritin 287      [09-25-22 @ 11:15]  TSH 1.57      [09-25-22 @ 07:30]      Free Light Chains: kappa 2.58, lambda 2.12, ratio = 1.22      [09-25 @ 11:15]  Immunofixation Serum:   No Monoclonal Band Identified. JERONIMO Farris M.D.  Reference Range: None Detected      [09-25-22 @ 11:15]  SPEP Interpretation: Increase in Alpha-2 fraction suggestive of acute inflammation.  JERONIMO Farris M.D.      [09-25-22 @ 11:15]  Immunofixation Urine: No Monoclonal Band Identified. JERONIMO Farris M.D.  Reference Range: None Detected      [09-25-22 @ 12:01]  UPEP Interpretation: Mild Selective Proteinuria. No Monoclonal band seen.  JERONIMO Farris M.D.      [09-25-22 @ 12:01]

## 2022-10-02 NOTE — CONSULT NOTE ADULT - CONSULT REQUESTED DATE/TIME
25-Sep-2022 03:25
25-Sep-2022 10:07
02-Oct-2022
19-Sep-2022
24-Sep-2022 19:52
25-Sep-2022 12:51
24-Sep-2022 23:50

## 2022-10-02 NOTE — CONSULT NOTE ADULT - ASSESSMENT
71 y/o M with a PMHx significant for HTN, HLD, CAD s/p stent (2015 in [facility] -- on ASA only), MM p/wwith descending paralysis complicated by acute hypoxic respiratory failure due to neuromuscular weakness mediated by GBS now status post IVIG. GI consulted for melena with acute blood loss anemia.       Impression:  #Melena, acute blood loss anemia: pt with melena previously seen in rectal tube w/ on-going pRBC requirements (3U during hospitalization). HD stable. Will need to evaluate for possible UGI bleed, ddx includes PUD, erosive esophagitis, erosive gastropathy, angioectasias malignancy.       Recommendations:   - NPO   - PPI gtt  - Trend CBC q12h, transfuse Hb < 7  - Active T&S  - Plan for EGD tomorrow 10/3  - if worsening hemodynamics with GI bleeding/on-going Hb drop ---> please call back GI for more urgent endoscopic evaluation   - appreciate ICU care    *Note not final unless signed by attending    Thank you for involving us in the care of this patient, please reach out if any further questions.     Hammad Lopez MD  Gastroenterology/Hepatology Fellow, PGY6    Available on [employer] Teams  [phone] ([facility])  [id number] ([facility])  Please contact on call fellow weekdays after 5pm-7am and weekends: [phone]         71 y/o M with a PMHx significant for HTN, HLD, CAD s/p stent (2015 in [facility] -- on ASA only), MM p/wwith descending paralysis complicated by acute hypoxic respiratory failure due to neuromuscular weakness mediated by GBS now status post IVIG. GI consulted for melena with anemia      Impression:  #Melena, anemia: pt with melena previously seen in rectal tube w/ on-going pRBC requirements (3U during hospitalization). HD stable. Will need to evaluate for possible UGI bleed, ddx includes PUD, erosive esophagitis, erosive gastropathy, angioectasias malignancy.       Recommendations:   - NPO   - PPI gtt  - Trend CBC q12h, transfuse Hb < 7  - Active T&S  - Plan for EGD tomorrow 10/3  - if worsening hemodynamics with GI bleeding/on-going Hb drop ---> please call back GI for more urgent endoscopic evaluation   - appreciate ICU care    *Note not final unless signed by attending    Thank you for involving us in the care of this patient, please reach out if any further questions.     Hammad Lopez MD  Gastroenterology/Hepatology Fellow, PGY6    Available on Microsoft Teams  [phone] ([facility])  [phone] ([facility])  Please contact on call fellow weekdays after 5pm-7am and weekends: [phone]

## 2022-10-02 NOTE — PROGRESS NOTE ADULT - SUBJECTIVE AND OBJECTIVE BOX
Deo Oropeza MD  Internal Medicine  Pager #[phone]    CHIEF COMPLAINT:Patient is a 72y old  Male who presents with a chief complaint of weakness (01 Oct 2022 15:19)        Interval Events:    REVIEW OF SYSTEMS:      OBJECTIVE:  ICU Vital Signs Last 24 Hrs  T(C): 36 (02 Oct 2022 04:00), Max: 36.8 (01 Oct 2022 10:00)  T(F): 96.8 (02 Oct 2022 04:00), Max: 98.2 (01 Oct 2022 10:00)  HR: 53 (02 Oct 2022 07:32) (44 - 94)  BP: 116/52 (02 Oct 2022 07:00) (91/50 - 177/61)  BP(mean): 69 (02 Oct 2022 07:00) (63 - 99)  ABP: --  ABP(mean): --  RR: 18 (02 Oct 2022 07:00) (11 - 23)  SpO2: 100% (02 Oct 2022 07:32) (100% - 100%)    O2 Parameters below as of 02 Oct 2022 06:00  Patient On (Oxygen Delivery Method): AC18//Peep+5    O2 Concentration (%): 30      Mode: AC/ CMV (Assist Control/ Continuous Mandatory Ventilation), RR (machine): 18, TV (machine): 370, FiO2: 30, PEEP: 6, ITime: 0.72, MAP: 11, PIP: 26    10-01 @ 07:01  -  10-02 @ 07:00  --------------------------------------------------------  IN: 1277.4 mL / OUT: 2210 mL / NET: -932.6 mL      CAPILLARY BLOOD GLUCOSE      POCT Blood Glucose.: 115 mg/dL (01 Oct 2022 23:47)      PHYSICAL EXAM:      LINES:    HOSPITAL MEDICATIONS:  Standing Meds:  ALBUTerol    90 MICROgram(s) HFA Inhaler 2 Puff(s) Inhalation every 12 hours  atorvastatin 40 milliGRAM(s) Oral at bedtime  chlorhexidine 0.12% Liquid 15 milliLiter(s) Oral Mucosa every 12 hours  chlorhexidine 2% Cloths 1 Application(s) Topical daily  dexMEDEtomidine Infusion 0.2 MICROgram(s)/kG/Hr IV Continuous <Continuous>  glucagon  Injectable 1 milliGRAM(s) IntraMuscular once  ipratropium 17 MICROgram(s) HFA Inhaler 1 Puff(s) Inhalation <User Schedule>  pantoprazole  Injectable 40 milliGRAM(s) IV Push every 12 hours  petrolatum Ophthalmic Ointment 1 Application(s) Both EYES two times a day  polyethylene glycol 3350 17 Gram(s) Oral daily  propofol Infusion 40.039 MICROgram(s)/kG/Min IV Continuous <Continuous>  sodium chloride 3%  Inhalation 4 milliLiter(s) Inhalation every 12 hours  valACYclovir 500 milliGRAM(s) Oral daily      PRN Meds:      LABS:                        6.4    9.55  )-----------( 68       ( 02 Oct 2022 05:37 )             19.2     Hgb Trend: 6.4<--, 7.8<--, 6.1<--, 6.2<--, 7.9<--  10-02    142  |  112<H>  |  50<H>  ----------------------------<  107<H>  4.1   |  21<L>  |  1.00    Ca    8.3<L>      02 Oct 2022 05:37  Phos  3.2     10-02  Mg     2.20     10-02    TPro  5.7<L>  /  Alb  2.8<L>  /  TBili  0.4  /  DBili  x   /  AST  37  /  ALT  36  /  AlkPhos  28<L>  10-02    Creatinine Trend: 1.00<--, 0.93<--, 1.07<--, 1.09<--, 1.17<--, 1.25<--  PT/INR - ( 01 Oct 2022 03:40 )   PT: 12.3 sec;   INR: 1.06 ratio         PTT - ( 01 Oct 2022 03:40 )  PTT:33.4 sec    Arterial Blood Gas:  10-01 @ 15:57  7.43/33/128/22/98.5/-2.1  ABG lactate: --        MICROBIOLOGY:     RADIOLOGY:  [ ] Reviewed and interpreted by me    EKG:     Deo Oropeza MD  Internal Medicine  Pager #[phone]    CHIEF COMPLAINT:Patient is a 72y old  Male who presents with a chief complaint of weakness (01 Oct 2022 15:19)        Interval Events:    Dark stools noted with downtrending hgb 6.4 requiring 1 u prbc pending post transfusion cbc.     REVIEW OF SYSTEMS:    Const: no pain     ROS limited by intubated and sedated       OBJECTIVE:  ICU Vital Signs Last 24 Hrs  T(C): 36 (02 Oct 2022 04:00), Max: 36.8 (01 Oct 2022 10:00)  T(F): 96.8 (02 Oct 2022 04:00), Max: 98.2 (01 Oct 2022 10:00)  HR: 53 (02 Oct 2022 07:32) (44 - 94)  BP: 116/52 (02 Oct 2022 07:00) (91/50 - 177/61)  BP(mean): 69 (02 Oct 2022 07:00) (63 - 99)  ABP: --  ABP(mean): --  RR: 18 (02 Oct 2022 07:00) (11 - 23)  SpO2: 100% (02 Oct 2022 07:32) (100% - 100%)    O2 Parameters below as of 02 Oct 2022 06:00  Patient On (Oxygen Delivery Method): AC18//Peep+5    O2 Concentration (%): 30      Mode: AC/ CMV (Assist Control/ Continuous Mandatory Ventilation), RR (machine): 18, TV (machine): 370, FiO2: 30, PEEP: 6, ITime: 0.72, MAP: 11, PIP: 26    10-01 @ 07:01  -  10-02 @ 07:00  --------------------------------------------------------  IN: 1277.4 mL / OUT: 2210 mL / NET: -932.6 mL      CAPILLARY BLOOD GLUCOSE      POCT Blood Glucose.: 115 mg/dL (01 Oct 2022 23:47)      PHYSICAL EXAM:  GENERAL: intubated and sedated   ENMT NGT in place   LUNG: decreased breath sounds at the bases   HEART: Regular rate and rhythm; No murmurs, rubs, or gallops  ABDOMEN: Soft, Nontender, Nondistended  EXTREMITIES:  No LE edema, 2+ Peripheral Pulses, No clubbing, cyanosis, or edema  NEUROLOGY: non-focal, follows commands, 4/5 strength UE & LE       LINES:    HOSPITAL MEDICATIONS:  Standing Meds:  ALBUTerol    90 MICROgram(s) HFA Inhaler 2 Puff(s) Inhalation every 12 hours  atorvastatin 40 milliGRAM(s) Oral at bedtime  chlorhexidine 0.12% Liquid 15 milliLiter(s) Oral Mucosa every 12 hours  chlorhexidine 2% Cloths 1 Application(s) Topical daily  dexMEDEtomidine Infusion 0.2 MICROgram(s)/kG/Hr IV Continuous <Continuous>  glucagon  Injectable 1 milliGRAM(s) IntraMuscular once  ipratropium 17 MICROgram(s) HFA Inhaler 1 Puff(s) Inhalation <User Schedule>  pantoprazole  Injectable 40 milliGRAM(s) IV Push every 12 hours  petrolatum Ophthalmic Ointment 1 Application(s) Both EYES two times a day  polyethylene glycol 3350 17 Gram(s) Oral daily  propofol Infusion 40.039 MICROgram(s)/kG/Min IV Continuous <Continuous>  sodium chloride 3%  Inhalation 4 milliLiter(s) Inhalation every 12 hours  valACYclovir 500 milliGRAM(s) Oral daily      PRN Meds:      LABS:                        6.4    9.55  )-----------( 68       ( 02 Oct 2022 05:37 )             19.2     Hgb Trend: 6.4<--, 7.8<--, 6.1<--, 6.2<--, 7.9<--  10-02    142  |  112<H>  |  50<H>  ----------------------------<  107<H>  4.1   |  21<L>  |  1.00    Ca    8.3<L>      02 Oct 2022 05:37  Phos  3.2     10-02  Mg     2.20     10-02    TPro  5.7<L>  /  Alb  2.8<L>  /  TBili  0.4  /  DBili  x   /  AST  37  /  ALT  36  /  AlkPhos  28<L>  10-02    Creatinine Trend: 1.00<--, 0.93<--, 1.07<--, 1.09<--, 1.17<--, 1.25<--  PT/INR - ( 01 Oct 2022 03:40 )   PT: 12.3 sec;   INR: 1.06 ratio         PTT - ( 01 Oct 2022 03:40 )  PTT:33.4 sec    Arterial Blood Gas:  10-01 @ 15:57  7.43/33/128/22/98.5/-2.1  ABG lactate: --        MICROBIOLOGY:     RADIOLOGY:  [ ] Reviewed and interpreted by me    EKG:

## 2022-10-02 NOTE — PROGRESS NOTE ADULT - ASSESSMENT
71 y/o M with a PMHx significant for HTN, HLD, CAD s/p stent, MM, presents with descending paralysis complicated by acute hypoxic respiratory failure due to neuromuscular weakness mediated by GBS now status post IVIG.     Neuro   #Generalized Weakness, likely GBS   Pt family reporting a descending weakness that ultimately started to involve his speaking ability as well.  Low c/f botulism given not having flaccid, but some suspicion for paraneoplastic process (e.g. LES/MG).  - MRI brain- No acute hemorrhage mass mass effect or acute territorial   infarcts seen. and C/T/L spine no cord compression   -Discontinued decadron 9/28 discussed with neurosurgery no cord compression and neuro deferred further decision making regarding decadron.   -IVIG 35g qd (3/3) as per neuro for possible GBS completed 9/28  -f/u anti ganglioside ab  -appreciate neurology recommendations   -improved strength on exam 9/30 , ctm     #Sedation/Analgesia  - C/W Propofol and precedex  - wean sedation as tolerated     Pulmonary   #Acute hypoxic respiratory failure secondary to neuromuscular weakness  Pt with increased WOB, reportedly wheezing as well. No h/o COPD  - C/W Albuterol/Ipratropium MDI's while intubated  -with decreased secretions 9/28 decreased frequency of airway clearance.   -9/29 NIF performed prerounds with respiratory therapist -21.   - CPAP today, f/u ABG     #Hemoptysis  Pt reportedly with hemoptysis in [facility]. Pt with RLL calcified granuloma & scattered pulm nodules.   -quant gold indeterminate  - sputum afb x3 no acid fast  -taken off of TB precautions     Cardiovascular   #Vasoplegic Shock  Pt with minimal pressor requirement on sedation for intubation. Likely vasoplegic in that context. CTM resolved.     #HLD   -atorvastatin 40 qhs     #CAD  - Continue to hold aspirin given hgb drop requiring 1 u pRBC 9/29  - HOLDing Home Metoprolol tartrate 50mg PO BID    #HTN  -with soft BPs 9/29 overnight, continuing to hold amlodipine.   -Losartan held given soft BP and resolving CRISTI     #BNP elevated  Pt with BNP elevation, respiratory distress.  - TTE mild AR and AS, Stage I diastolic dysfunction     GI  - NPO w/ TF    #GI bleed  -dark stools   - start pantoprazole 40 BID   -Ctm     Renal   #Hyponatremia, CRISTI  Pt with persistently low sodium. 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 Should have fluid restriction when not intubated.  - Na improving, cont to trend BMP q12  -c/w free water pushes   - Trend Cr, f/u urine lytes  - Strict Is/Os  -monitor sodium given GBS and patient s/p IVIG    MSK  #T8 Compression Fx   MR spine and spine consult to assess for possible interventions. Diffuse spinal lesions.   - Neurosx consulted   -no acute intervention. Decadron discontinued 9/28 following discussion with neurosurgery as well as neurology.     ID  #Bacillus cereus in blood cxs x1 bottle on 9/25, ?contaminate  - 9/27 BCx NGTD  - afebrile, WBC count improving, monitor off abx     r/o TB  - AFB negative x3  -off TB precautions     Endocrine   #Hyperglycemia  Pt with mild hyperglycemia on admission. A1c 5.7% (?pre-DM)  - FS q6h    Heme/onc  #Multiple Myeloma  Pt had last received Velcade 3/2022. Had been given opportunity for chemo vacation and travelled to [facility], but pt went for longer than anticipated. Now with diffuse lytic lesions involving most of spine. Had previously had XRT as well.   Diagnostics:  - LDH, B2-Microglobulin  - SPEP, UPEP, IFX (S/U), Immunoglobulins, FLCs    #Anemia  -hgb 6.7 9/29 transfused 1u prbc with response to hgb 9.2  -10/1: Hgb: 6.2, 6.1 and transfused 1 unit of pRBCs with good response to 7.8  -may be dilutional based upon decrease in all cell lines. IVIG may cause hemolytic anemia however bilirubin normal.   -Unlikely hemolytic as LDH and Haptoglobin are wnl   -ctm H/H and look for overt signs of bleeding   -heparin subq held         =====Hospital Bundle=====  Hospital Bundle  Fluids: with gtts  Electrolytes: Replete K > 4, Mg > 2, Phos > 3  Nutrition: Diet NPO with TF (Nutrition C/S)  PPX  ---VTE: SCD  ---GI: TFs  ---Resp: Vented  ---: Dahl Placed 9/25, removed now condom cath   Access: PIVs  Code Status: FULL CODE  Dispo: Pending Medical Optimization 73 y/o M with a PMHx significant for HTN, HLD, CAD s/p stent, MM, presents with descending paralysis complicated by acute hypoxic respiratory failure due to neuromuscular weakness mediated by GBS now status post IVIG.     Neuro   #Generalized Weakness, likely GBS   Pt family reporting a descending weakness that ultimately started to involve his speaking ability as well.  Low c/f botulism given not having flaccid, but some suspicion for paraneoplastic process (e.g. LES/MG).  - MRI brain- No acute hemorrhage mass mass effect or acute territorial   infarcts seen. and C/T/L spine no cord compression   -Discontinued decadron 9/28 discussed with neurosurgery no cord compression and neuro deferred further decision making regarding decadron.   -IVIG 35g qd (3/3) as per neuro for possible GBS completed 9/28  -positive anti ganglioside ab  -appreciate neurology recommendations       #Sedation/Analgesia  - C/W Propofol and precedex  - wean sedation as tolerated     Pulmonary   #Acute hypoxic respiratory failure secondary to neuromuscular weakness  Pt with increased WOB, reportedly wheezing as well. No h/o COPD  - C/W Albuterol/Ipratropium MDI's while intubated  -with decreased secretions 9/28 decreased frequency of airway clearance.   -9/29 NIF performed prerounds with respiratory therapist -21.   -PS trials, wean vent as tolerated    #Hemoptysis  Pt reportedly with hemoptysis in [facility]. Pt with RLL calcified granuloma & scattered pulm nodules.   -quant gold indeterminate  - sputum afb x3 no acid fast  -taken off of TB precautions     Cardiovascular   #Vasoplegic Shock  Pt with minimal pressor requirement on sedation for intubation. Likely vasoplegic in that context. CTM resolved.     #HLD   -atorvastatin 40 qhs     #CAD  - Continue to hold aspirin given hgb drop requiring 1 u pRBC 9/29,   - HOLDing Home Metoprolol tartrate 50mg PO BID    #HTN  -with soft BPs 9/29 overnight, continuing to hold amlodipine.   -Losartan held given soft BP and resolving CRISTI     #BNP elevated  Pt with BNP elevation, respiratory distress.  - TTE mild AR and AS, Stage I diastolic dysfunction     GI  - NPO w/ TF    #GI bleed  -dark stools   - start pantoprazole 40 BID   -Ctm     Renal   #Hyponatremia, CRISTI  Pt with persistently low sodium. 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 Should have fluid restriction when not intubated.  - Na improving, cont to trend BMP q12  -c/w free water pushes   - Trend Cr, f/u urine lytes  - Strict Is/Os  -monitor sodium given GBS and patient s/p IVIG    MSK  #T8 Compression Fx   MR spine and spine consult to assess for possible interventions. Diffuse spinal lesions.   - Neurosx consulted   -no acute intervention. Decadron discontinued 9/28 following discussion with neurosurgery as well as neurology.     ID  #Bacillus cereus in blood cxs x1 bottle on 9/25, ?contaminate  - 9/27 BCx NGTD  - afebrile, WBC count improving, monitor off abx     r/o TB  - AFB negative x3  -off TB precautions     Endocrine   #Hyperglycemia  Pt with mild hyperglycemia on admission. A1c 5.7% (?pre-DM)  - FS q6h    Heme/onc  #Multiple Myeloma  Pt had last received Velcade 3/2022. Had been given opportunity for chemo vacation and travelled to [facility], but pt went for longer than anticipated. Now with diffuse lytic lesions involving most of spine. Had previously had XRT as well.   Diagnostics:  - LDH, B2-Microglobulin  - SPEP, UPEP, IFX (S/U), Immunoglobulins, FLCs    #Anemia  -hgb 6.7 9/29 transfused 1u prbc with response to hgb 9.2  -10/1: Hgb: 6.2, 6.1 and transfused 1 unit of pRBCs with good response to 7.8, 10/2 requiring additional unit prbc   -may be dilutional based upon decrease in all cell lines. IVIG may cause hemolytic anemia however bilirubin normal.   -Unlikely hemolytic as LDH and Haptoglobin are wnl   -ctm H/H and look for overt signs of bleeding   -heparin subq held   -GI consulted         =====Hospital Bundle=====  Hospital Bundle  Fluids: with gtts  Electrolytes: Replete K > 4, Mg > 2, Phos > 3  Nutrition: Diet NPO with TF (Nutrition C/S)  PPX  ---VTE: SCD  ---GI: protonix, tube feeds held.   ---Resp: Vented  ---: Dahl Placed 9/25, removed now condom cath   Access: PIVs  Code Status: FULL CODE  Dispo: Pending Medical Optimization

## 2022-10-02 NOTE — CONSULT NOTE ADULT - ATTENDING COMMENTS
72y (1950) man with a PMHx significant for  HTN, HLD, CAD, cardiac stent, MM, presents with cc generalized weakness x 2-3 days associated with some tingling sensation. Pt was evaluated in the ED on 9/21 for vomiting but was discharged home. That evening, daughter noticed that patient was unable to walk on his own and needed assistance getting up the stairs. the next day patient was unable to get out of bed and unable to lift his arms from the bed. Pt also complains of mild changes in his voice and difficulty swallowing, and b/l numbness/tingling in both his feet and hands. Pt denied any urine and fecal incontinence, HA, dizziness, changes in vision or hearing, neck pain.   Of note, pt last chemo was 3/22 for MM and is supposed to be getting bi-weekly doses. Per daughter, pt was traveling for one month and never followed up afterwords with his oncologist.   MRI C [ine if negative LP
72 year old male with IgG kappa MM who follows with Dr. Bailey, initially diagnosed 2019, s/p RT to C7/T8 spine 5 fractions from 8/23/19-8/29/19  and RVD 9/2019-6/2020 followed by maintenance velcade [id number] starting 6/19/2020, LD 3/2022. Patient then had treatment break when he traveled to [facility], postponed trip and returned 9/5/2022. According to daughter, he had a viral cold like syndrome on his return, no fevers. Last week he was vomiting at home so daughter brought him to hospital to be evaluated and he was discharged from ER. Over the subsequent 2 days he began to have progressive weakness, first his arms, then legs, then hoarseness of voice so she brought him back to ER.  CT neck showed diffuse lytic lesions consistent with history of MM. While in ER, patient failed dysphagia study made NPO and Neurology consulted with concern for GBS. He then had acute hypoxic respiratory failure and admitted to MICU.   Evaluated patient at bedside with his daughter and wife present. He remains intubated. Will request myeloma labs: SPEP, UPEP, serum MICAH, immunoglobulins, kappa/lambda light chains, beta 2 microglobulin and LDH. Hem/Onc will continue to follow and monitor myeloma labs to assist in diagnosis of current clinical condition. Appreciate care from MICU and Neurology to help determine cause of weakness/respiratory failure.
Agree w above. 73 yo w GBS, s/p hypoxic respiratory failure, intubated and sedated, noted with melena and anemia. Hemodynamically stable. On exam rectal tube with dark brown stools. PPI tx. Trend CBC. Plan for EGD tomorrow or early this week.
71 yo M with PMH of HTN, HLD, CAD, cardiac stent, Multiple Myeloma (has not gotten prescribed treatment since 3/22) who presented to the ED for 3 days of progressive generalized weakness with associated voice and difficulty swallowing, and b/l numbness/tingling in both his feet and hands.  Ct spine shows diffuse lytic lesions  would consult spine (ortho vs Nsx)  continue steroids per neuro recs  NIF and VC, possible atypical presentation of GBS  pt appears comfortable on NC  reconsult as needed

## 2022-10-02 NOTE — PROGRESS NOTE ADULT - ASSESSMENT
Patient is a 71 yo M with PMH of HTN, HLD, CAD, cardiac stent, Multiple Myeloma who presented to the ED for 3 days of generalized weakness.    EKG: ST PVCs  Tele: NSR PVCs episodes of PAT    1. Weakness  -rapidly progressed. was unable to move extremities.   -s/p RRT for hypoxia now intubated in MICU   -neuro on board, possible GBS vs pathology in neck  -echo shows mild AR/AS, normal LV systolic function and mild diastolic dysfunction     2. CAD s/p PCI  -in 2015 in [facility] as per daughter patient had heart attack  -no reported CP prior to hospitalization    3. MM  -heme/onc on board    4. Afib:   Episodes of Afib on tele   start a/c if not contraindicated

## 2022-10-02 NOTE — CONSULT NOTE ADULT - SUBJECTIVE AND OBJECTIVE BOX
HPI: 71 y/o M with a PMHx significant for HTN, HLD, CAD s/p stent (2015 in [facility] -- on ASA only), MM p/wwith descending paralysis complicated by acute hypoxic respiratory failure due to neuromuscular weakness mediated by GBS now status post IVIG. GI consulted for melena with acute blood loss anemia.     Pt currently intubated/sedated, unable to obtain history. Per MICU team, pt with melena yesterday (now with dark brown stool in rectal tube).     Hb initially 11-12s on admission now Hb 8.7 after requiring 3U pRBCs this hospitalization (, 10/1, 10/2).      Allergies:  Beef (Unknown)  No Known Drug Allergies  pork (Unknown)      Home Medications:    Hospital Medications:  ALBUTerol    90 MICROgram(s) HFA Inhaler 2 Puff(s) Inhalation every 12 hours  atorvastatin 40 milliGRAM(s) Oral at bedtime  chlorhexidine 0.12% Liquid 15 milliLiter(s) Oral Mucosa every 12 hours  chlorhexidine 2% Cloths 1 Application(s) Topical daily  dexMEDEtomidine Infusion 0.2 MICROgram(s)/kG/Hr IV Continuous <Continuous>  dextrose 5% + lactated ringers. 1000 milliLiter(s) IV Continuous <Continuous>  glucagon  Injectable 1 milliGRAM(s) IntraMuscular once  ipratropium 17 MICROgram(s) HFA Inhaler 1 Puff(s) Inhalation <User Schedule>  pantoprazole  Injectable 40 milliGRAM(s) IV Push every 12 hours  petrolatum Ophthalmic Ointment 1 Application(s) Both EYES two times a day  polyethylene glycol 3350 17 Gram(s) Oral daily  propofol Infusion 40.039 MICROgram(s)/kG/Min IV Continuous <Continuous>  sodium chloride 3%  Inhalation 4 milliLiter(s) Inhalation every 12 hours  valACYclovir 500 milliGRAM(s) Oral daily      PMHX/PSHX:  HTN (hypertension)    HLD (hyperlipidemia)    Coronary artery disease    Multiple myeloma    No significant past surgical history    S/P coronary artery stent placement        Family history:  Maternal family history of hypertension        Denies family history of colon cancer/polyps, stomach cancer/polyps, pancreatic cancer/masses, liver cancer/disease, ovarian cancer and endometrial cancer.    Social History:     Tob: Denies  EtOH: Denies  Illicit Drugs: Denies    ROS:     General:  No wt loss, fevers, chills, night sweats, fatigue  Eyes:  Good vision, no reported pain  ENT:  No sore throat, pain, runny nose, dysphagia  CV:  No pain, palpitations, hypo/hypertension  Pulm:  No dyspnea, cough, tachypnea, wheezing  GI:  see above  :  No pain, bleeding, incontinence, nocturia  Muscle:  No pain, weakness  Neuro:  No weakness, tingling, memory problems  Psych:  No fatigue, insomnia, mood problems, depression  Endocrine:  No polyuria, polydipsia, cold/heat intolerance  Heme:  No petechiae, ecchymosis, easy bruisability  Skin:  No rash, tattoos, scars, edema    PHYSICAL EXAM:     GENERAL:  No acute distress, intubated, sedated  HEENT:  Normocephalic/atraumatic, no scleral icterus  CHEST: +mechanically vented BS  HEART:  Regular rate and rhythm, no murmurs/rubs/gallops  ABDOMEN:  Soft, non-tender, non-distended  RECTAL: (+) dark brown stool in rectal tube  EXTREMITIES: No cyanosis, clubbing, or edema  SKIN:  No rash/warm/dry  NEURO:  Alert and oriented x 0    Vital Signs:  Vital Signs Last 24 Hrs  T(C): 35.9 (02 Oct 2022 12:00), Max: 36.7 (01 Oct 2022 20:00)  T(F): 96.7 (02 Oct 2022 12:00), Max: 98.1 (02 Oct 2022 00:00)  HR: 63 (02 Oct 2022 14:43) (46 - 85)  BP: 154/53 (02 Oct 2022 12:00) (91/50 - 177/61)  BP(mean): 81 (02 Oct 2022 12:00) (63 - 94)  RR: 22 (02 Oct 2022 12:00) (11 - 22)  SpO2: 100% (02 Oct 2022 14:43) (100% - 100%)    Parameters below as of 02 Oct 2022 12:00  Patient On (Oxygen Delivery Method): CPAP 5/5    O2 Concentration (%): 30  Daily     Daily Weight in k.1 (02 Oct 2022 04:00)    LABS:                        8.7    10.58 )-----------( 74       ( 02 Oct 2022 13:32 )             26.8     Mean Cell Volume: 91.8 fL (10--22 @ 13:32)    10-02    142  |  112<H>  |  50<H>  ----------------------------<  107<H>  4.1   |  21<L>  |  1.00    Ca    8.3<L>      02 Oct 2022 05:37  Phos  3.2     10-02  Mg     2.20     10-02    TPro  5.7<L>  /  Alb  2.8<L>  /  TBili  0.4  /  DBili  x   /  AST  37  /  ALT  36  /  AlkPhos  28<L>  10-02    LIVER FUNCTIONS - ( 02 Oct 2022 05:37 )  Alb: 2.8 g/dL / Pro: 5.7 g/dL / ALK PHOS: 28 U/L / ALT: 36 U/L / AST: 37 U/L / GGT: x           PT/INR - ( 01 Oct 2022 03:40 )   PT: 12.3 sec;   INR: 1.06 ratio         PTT - ( 01 Oct 2022 03:40 )  PTT:33.4 sec                            8.7    10.58 )-----------( 74       ( 02 Oct 2022 13:32 )             26.8                         6.4    9.55  )-----------( 68       ( 02 Oct 2022 05:37 )             19.2                         7.8    10.77 )-----------( 76       ( 01 Oct 2022 11:55 )             23.0                         6.1    9.11  )-----------( 85       ( 01 Oct 2022 04:35 )             19.0                         6.2    8.99  )-----------( 81       ( 01 Oct 2022 03:40 )             18.1       Imaging:             HPI: 73 y/o M with a PMHx significant for HTN, HLD, CAD s/p stent (2015 in [facility] -- on ASA only), MM p/wwith descending paralysis complicated by acute hypoxic respiratory failure due to neuromuscular weakness mediated by GBS now status post IVIG. GI consulted for melena with on-going anemia    Pt currently intubated/sedated, unable to obtain history. Per MICU team, pt with melena yesterday (now with dark brown stool in rectal tube).     Hb initially 11-12s on admission now Hb 8.7 after requiring 3U pRBCs this hospitalization (, 10/1, 10/2).      Allergies:  Beef (Unknown)  No Known Drug Allergies  pork (Unknown)      Home Medications:    Hospital Medications:  ALBUTerol    90 MICROgram(s) HFA Inhaler 2 Puff(s) Inhalation every 12 hours  atorvastatin 40 milliGRAM(s) Oral at bedtime  chlorhexidine 0.12% Liquid 15 milliLiter(s) Oral Mucosa every 12 hours  chlorhexidine 2% Cloths 1 Application(s) Topical daily  dexMEDEtomidine Infusion 0.2 MICROgram(s)/kG/Hr IV Continuous <Continuous>  dextrose 5% + lactated ringers. 1000 milliLiter(s) IV Continuous <Continuous>  glucagon  Injectable 1 milliGRAM(s) IntraMuscular once  ipratropium 17 MICROgram(s) HFA Inhaler 1 Puff(s) Inhalation <User Schedule>  pantoprazole  Injectable 40 milliGRAM(s) IV Push every 12 hours  petrolatum Ophthalmic Ointment 1 Application(s) Both EYES two times a day  polyethylene glycol 3350 17 Gram(s) Oral daily  propofol Infusion 40.039 MICROgram(s)/kG/Min IV Continuous <Continuous>  sodium chloride 3%  Inhalation 4 milliLiter(s) Inhalation every 12 hours  valACYclovir 500 milliGRAM(s) Oral daily      PMHX/PSHX:  HTN (hypertension)    HLD (hyperlipidemia)    Coronary artery disease    Multiple myeloma    No significant past surgical history    S/P coronary artery stent placement        Family history:  Maternal family history of hypertension        Denies family history of colon cancer/polyps, stomach cancer/polyps, pancreatic cancer/masses, liver cancer/disease, ovarian cancer and endometrial cancer.    Social History:     Tob: Denies  EtOH: Denies  Illicit Drugs: Denies    ROS:     General:  No wt loss, fevers, chills, night sweats, fatigue  Eyes:  Good vision, no reported pain  ENT:  No sore throat, pain, runny nose, dysphagia  CV:  No pain, palpitations, hypo/hypertension  Pulm:  No dyspnea, cough, tachypnea, wheezing  GI:  see above  :  No pain, bleeding, incontinence, nocturia  Muscle:  No pain, weakness  Neuro:  No weakness, tingling, memory problems  Psych:  No fatigue, insomnia, mood problems, depression  Endocrine:  No polyuria, polydipsia, cold/heat intolerance  Heme:  No petechiae, ecchymosis, easy bruisability  Skin:  No rash, tattoos, scars, edema    PHYSICAL EXAM:     GENERAL:  No acute distress, intubated, sedated  HEENT:  Normocephalic/atraumatic, no scleral icterus  CHEST: +mechanically vented BS  HEART:  Regular rate and rhythm, no murmurs/rubs/gallops  ABDOMEN:  Soft, non-tender, non-distended  RECTAL: (+) dark brown stool in rectal tube  EXTREMITIES: No cyanosis, clubbing, or edema  SKIN:  No rash/warm/dry  NEURO:  Alert and oriented x 0    Vital Signs:  Vital Signs Last 24 Hrs  T(C): 35.9 (02 Oct 2022 12:00), Max: 36.7 (01 Oct 2022 20:00)  T(F): 96.7 (02 Oct 2022 12:00), Max: 98.1 (02 Oct 2022 00:00)  HR: 63 (02 Oct 2022 14:43) (46 - 85)  BP: 154/53 (02 Oct 2022 12:00) (91/50 - 177/61)  BP(mean): 81 (02 Oct 2022 12:00) (63 - 94)  RR: 22 (02 Oct 2022 12:00) (11 - 22)  SpO2: 100% (02 Oct 2022 14:43) (100% - 100%)    Parameters below as of 02 Oct 2022 12:00  Patient On (Oxygen Delivery Method): CPAP 5/5    O2 Concentration (%): 30  Daily     Daily Weight in k.1 (02 Oct 2022 04:00)    LABS:                        8.7    10.58 )-----------( 74       ( 02 Oct 2022 13:32 )             26.8     Mean Cell Volume: 91.8 fL (10--22 @ 13:32)    10-02    142  |  112<H>  |  50<H>  ----------------------------<  107<H>  4.1   |  21<L>  |  1.00    Ca    8.3<L>      02 Oct 2022 05:37  Phos  3.2     10-02  Mg     2.20     10-02    TPro  5.7<L>  /  Alb  2.8<L>  /  TBili  0.4  /  DBili  x   /  AST  37  /  ALT  36  /  AlkPhos  28<L>  10-02    LIVER FUNCTIONS - ( 02 Oct 2022 05:37 )  Alb: 2.8 g/dL / Pro: 5.7 g/dL / ALK PHOS: 28 U/L / ALT: 36 U/L / AST: 37 U/L / GGT: x           PT/INR - ( 01 Oct 2022 03:40 )   PT: 12.3 sec;   INR: 1.06 ratio         PTT - ( 01 Oct 2022 03:40 )  PTT:33.4 sec                            8.7    10.58 )-----------( 74       ( 02 Oct 2022 13:32 )             26.8                         6.4    9.55  )-----------( 68       ( 02 Oct 2022 05:37 )             19.2                         7.8    10.77 )-----------( 76       ( 01 Oct 2022 11:55 )             23.0                         6.1    9.11  )-----------( 85       ( 01 Oct 2022 04:35 )             19.0                         6.2    8.99  )-----------( 81       ( 01 Oct 2022 03:40 )             18.1       Imaging:

## 2022-10-02 NOTE — PROGRESS NOTE ADULT - SUBJECTIVE AND OBJECTIVE BOX
Payam Longoria MD  Interventional Cardiology / Endovascular Specialist  [place] Office : [phone] [place] [address] [place] [address]  Tel:   [place] Office : [phone] [facility] [place] [address]  Tel: [phone]    Subjective/Overnight events: Patient lying in bed remains intubated in ICU   	  MEDICATIONS:    valACYclovir 500 milliGRAM(s) Oral daily    ALBUTerol    90 MICROgram(s) HFA Inhaler 2 Puff(s) Inhalation every 12 hours  ipratropium 17 MICROgram(s) HFA Inhaler 1 Puff(s) Inhalation <User Schedule>  sodium chloride 3%  Inhalation 4 milliLiter(s) Inhalation every 12 hours    dexMEDEtomidine Infusion 0.2 MICROgram(s)/kG/Hr IV Continuous <Continuous>  propofol Infusion 40.039 MICROgram(s)/kG/Min IV Continuous <Continuous>    pantoprazole  Injectable 40 milliGRAM(s) IV Push every 12 hours  polyethylene glycol 3350 17 Gram(s) Oral daily    atorvastatin 40 milliGRAM(s) Oral at bedtime  glucagon  Injectable 1 milliGRAM(s) IntraMuscular once    chlorhexidine 0.12% Liquid 15 milliLiter(s) Oral Mucosa every 12 hours  chlorhexidine 2% Cloths 1 Application(s) Topical daily  dextrose 5% + lactated ringers. 1000 milliLiter(s) IV Continuous <Continuous>  petrolatum Ophthalmic Ointment 1 Application(s) Both EYES two times a day      PAST MEDICAL/SURGICAL HISTORY  PAST MEDICAL & SURGICAL HISTORY:  HTN (hypertension)      HLD (hyperlipidemia)      Coronary artery disease      Multiple myeloma      S/P coronary artery stent placement          SOCIAL HISTORY: Substance Use (street drugs): ( x ) never used  (  ) other:    FAMILY HISTORY:  Maternal family history of hypertension  Mother        REVIEW OF SYSTEMS:  CONSTITUTIONAL: No fever, weight loss, or fatigue  EYES: No eye pain, visual disturbances, or discharge  ENMT:  No difficulty hearing, tinnitus, vertigo; No sinus or throat pain  BREASTS: No pain, masses, or nipple discharge  GASTROINTESTINAL: No abdominal or epigastric pain. No nausea, vomiting, or hematemesis; No diarrhea or constipation. No melena or hematochezia.  GENITOURINARY: No dysuria, frequency, hematuria, or incontinence  NEUROLOGICAL: No headaches, memory loss, loss of strength, numbness, or tremors  ENDOCRINE: No heat or cold intolerance; No hair loss  MUSCULOSKELETAL: No joint pain or swelling; No muscle, back, or extremity pain  PSYCHIATRIC: No depression, anxiety, mood swings, or difficulty sleeping  HEME/LYMPH: No easy bruising, or bleeding gums  All others negative    PHYSICAL EXAM:  T(C): 36.4 (10-02-22 @ 16:00), Max: 36.7 (10-02-22 @ 00:00)  HR: 48 (10-02-22 @ 20:00) (46 - 75)  BP: 112/55 (10-02-22 @ 20:00) (99/52 - 173/67)  RR: 18 (10-02-22 @ 20:00) (17 - 22)  SpO2: 100% (10-02-22 @ 20:00) (100% - 100%)  Wt(kg): --  I&O's Summary    01 Oct 2022 07:01  -  02 Oct 2022 07:00  --------------------------------------------------------  IN: 1277.4 mL / OUT: 2210 mL / NET: -932.6 mL    02 Oct 2022 07:01  -  02 Oct 2022 22:23  --------------------------------------------------------  IN: 1559.5 mL / OUT: 800 mL / NET: 759.5 mL        Weight (kg): 51.2 (10-02 @ 04:00)        GENERAL: intubated  EYES:  conjunctiva and sclera clear  Cardiovascular: Normal S1 S2, No JVD, No murmurs, No edema  Respiratory: intubated	  Gastrointestinal:  Soft,   Extremities: No edema                         8.7    10.58 )-----------( 74       ( 02 Oct 2022 13:32 )             26.8     10-02    142  |  112<H>  |  50<H>  ----------------------------<  107<H>  4.1   |  21<L>  |  1.00    Ca    8.3<L>      02 Oct 2022 05:37  Phos  3.2     10-02  Mg     2.20     10-02    TPro  5.7<L>  /  Alb  2.8<L>  /  TBili  0.4  /  DBili  x   /  AST  37  /  ALT  36  /  AlkPhos  28<L>  10-02    proBNP:   Lipid Profile:   HgA1c:   TSH:     Consultant(s) Notes Reviewed:  [x ] YES  [ ] NO    Care Discussed with Consultants/Other Providers [ x] YES  [ ] NO    Imaging Personally Reviewed independently:  [x] YES  [ ] NO    All labs, radiologic studies, vitals, orders and medications list reviewed. Patient is seen and examined at bedside. Case discussed with medical team.

## 2022-10-03 ENCOUNTER — RESULT REVIEW (OUTPATIENT)
Age: 72
End: 2022-10-03

## 2022-10-03 DIAGNOSIS — G61.0 GUILLAIN-BARRE SYNDROME: ICD-10-CM

## 2022-10-03 DIAGNOSIS — K92.2 GASTROINTESTINAL HEMORRHAGE, UNSPECIFIED: ICD-10-CM

## 2022-10-03 LAB
ACRM BINDING ANTIBODY: 0 NMOL/L — SIGNIFICANT CHANGE UP
ALBUMIN SERPL ELPH-MCNC: 2.6 G/DL — LOW (ref 3.3–5)
ALP SERPL-CCNC: 32 U/L — LOW (ref 40–120)
ALT FLD-CCNC: 33 U/L — SIGNIFICANT CHANGE UP (ref 4–41)
AMPA-R AB CBA, CSF: NEGATIVE — SIGNIFICANT CHANGE UP
AMPHIPHYSIN AB TITR CSF: NEGATIVE TITER — SIGNIFICANT CHANGE UP
ANION GAP SERPL CALC-SCNC: 11 MMOL/L — SIGNIFICANT CHANGE UP (ref 7–14)
ANISOCYTOSIS BLD QL: SLIGHT — SIGNIFICANT CHANGE UP
AST SERPL-CCNC: 34 U/L — SIGNIFICANT CHANGE UP (ref 4–40)
BASOPHILS # BLD AUTO: 0 K/UL — SIGNIFICANT CHANGE UP (ref 0–0.2)
BASOPHILS # BLD AUTO: 0.01 K/UL — SIGNIFICANT CHANGE UP (ref 0–0.2)
BASOPHILS NFR BLD AUTO: 0 % — SIGNIFICANT CHANGE UP (ref 0–2)
BASOPHILS NFR BLD AUTO: 0.1 % — SIGNIFICANT CHANGE UP (ref 0–2)
BILIRUB SERPL-MCNC: 0.4 MG/DL — SIGNIFICANT CHANGE UP (ref 0.2–1.2)
BUN SERPL-MCNC: 40 MG/DL — HIGH (ref 7–23)
CALCIUM SERPL-MCNC: 8.1 MG/DL — LOW (ref 8.4–10.5)
CASPR2-IGG CBA, CSF: NEGATIVE — SIGNIFICANT CHANGE UP
CHLORIDE SERPL-SCNC: 108 MMOL/L — HIGH (ref 98–107)
CO2 SERPL-SCNC: 19 MMOL/L — LOW (ref 22–31)
CREAT SERPL-MCNC: 0.98 MG/DL — SIGNIFICANT CHANGE UP (ref 0.5–1.3)
CULTURE RESULTS: SIGNIFICANT CHANGE UP
CV2 IGG TITR CSF: NEGATIVE TITER — SIGNIFICANT CHANGE UP
DPPX ANTIBODY IFA, CSF: NEGATIVE — SIGNIFICANT CHANGE UP
EGFR: 81 ML/MIN/1.73M2 — SIGNIFICANT CHANGE UP
EOSINOPHIL # BLD AUTO: 0.13 K/UL — SIGNIFICANT CHANGE UP (ref 0–0.5)
EOSINOPHIL # BLD AUTO: 0.2 K/UL — SIGNIFICANT CHANGE UP (ref 0–0.5)
EOSINOPHIL NFR BLD AUTO: 1.7 % — SIGNIFICANT CHANGE UP (ref 0–6)
EOSINOPHIL NFR BLD AUTO: 1.8 % — SIGNIFICANT CHANGE UP (ref 0–6)
GABA-B-R AB CBA, CSF: NEGATIVE — SIGNIFICANT CHANGE UP
GAD65 AB CSF-SCNC: 0 NMOL/L — SIGNIFICANT CHANGE UP
GFAP IFA, CSF: NEGATIVE — SIGNIFICANT CHANGE UP
GIANT PLATELETS BLD QL SMEAR: PRESENT — SIGNIFICANT CHANGE UP
GLIAL NUC TYPE 1 AB TITR CSF: NEGATIVE TITER — SIGNIFICANT CHANGE UP
GLUCOSE BLDC GLUCOMTR-MCNC: 103 MG/DL — HIGH (ref 70–99)
GLUCOSE BLDC GLUCOMTR-MCNC: 110 MG/DL — HIGH (ref 70–99)
GLUCOSE BLDC GLUCOMTR-MCNC: 120 MG/DL — HIGH (ref 70–99)
GLUCOSE BLDC GLUCOMTR-MCNC: 137 MG/DL — HIGH (ref 70–99)
GLUCOSE SERPL-MCNC: 113 MG/DL — HIGH (ref 70–99)
HCT VFR BLD CALC: 23.8 % — LOW (ref 39–50)
HCT VFR BLD CALC: 26.6 % — LOW (ref 39–50)
HEPARIN-PF4 AB RESULT: <0.6 U/ML — SIGNIFICANT CHANGE UP (ref 0–0.9)
HGB BLD-MCNC: 8 G/DL — LOW (ref 13–17)
HGB BLD-MCNC: 9 G/DL — LOW (ref 13–17)
HU1 AB TITR CSF IF: NEGATIVE TITER — SIGNIFICANT CHANGE UP
HU2 AB TITR CSF IF: NEGATIVE TITER — SIGNIFICANT CHANGE UP
HU3 AB TITR CSF: NEGATIVE TITER — SIGNIFICANT CHANGE UP
IANC: 4.86 K/UL — SIGNIFICANT CHANGE UP (ref 1.8–7.4)
IANC: 9.09 K/UL — HIGH (ref 1.8–7.4)
IGLON5 IFA, CSF: NEGATIVE — SIGNIFICANT CHANGE UP
IMM GRANULOCYTES NFR BLD AUTO: 1.8 % — HIGH (ref 0–0.9)
IMMUNOLOGIST REVIEW: SIGNIFICANT CHANGE UP
INR BLD: 1.07 RATIO — SIGNIFICANT CHANGE UP (ref 0.88–1.16)
INTERPRETATION MG: SIGNIFICANT CHANGE UP
LGI1-IGG CBA, CSF: NEGATIVE — SIGNIFICANT CHANGE UP
LYMPHOCYTES # BLD AUTO: 1.12 K/UL — SIGNIFICANT CHANGE UP (ref 1–3.3)
LYMPHOCYTES # BLD AUTO: 1.36 K/UL — SIGNIFICANT CHANGE UP (ref 1–3.3)
LYMPHOCYTES # BLD AUTO: 11.8 % — LOW (ref 13–44)
LYMPHOCYTES # BLD AUTO: 15 % — SIGNIFICANT CHANGE UP (ref 13–44)
MACROCYTES BLD QL: SLIGHT — SIGNIFICANT CHANGE UP
MAGNESIUM SERPL-MCNC: 2.1 MG/DL — SIGNIFICANT CHANGE UP (ref 1.6–2.6)
MCHC RBC-ENTMCNC: 29.8 PG — SIGNIFICANT CHANGE UP (ref 27–34)
MCHC RBC-ENTMCNC: 30.7 PG — SIGNIFICANT CHANGE UP (ref 27–34)
MCHC RBC-ENTMCNC: 33.6 GM/DL — SIGNIFICANT CHANGE UP (ref 32–36)
MCHC RBC-ENTMCNC: 33.8 GM/DL — SIGNIFICANT CHANGE UP (ref 32–36)
MCV RBC AUTO: 88.1 FL — SIGNIFICANT CHANGE UP (ref 80–100)
MCV RBC AUTO: 91.2 FL — SIGNIFICANT CHANGE UP (ref 80–100)
MGLUR1 AB IFA, CSF: NEGATIVE — SIGNIFICANT CHANGE UP
MICROCYTES BLD QL: SLIGHT — SIGNIFICANT CHANGE UP
MONOCYTES # BLD AUTO: 0.46 K/UL — SIGNIFICANT CHANGE UP (ref 0–0.9)
MONOCYTES # BLD AUTO: 0.63 K/UL — SIGNIFICANT CHANGE UP (ref 0–0.9)
MONOCYTES NFR BLD AUTO: 5.5 % — SIGNIFICANT CHANGE UP (ref 2–14)
MONOCYTES NFR BLD AUTO: 6.2 % — SIGNIFICANT CHANGE UP (ref 2–14)
NEUTROPHILS # BLD AUTO: 5.75 K/UL — SIGNIFICANT CHANGE UP (ref 1.8–7.4)
NEUTROPHILS # BLD AUTO: 9.09 K/UL — HIGH (ref 1.8–7.4)
NEUTROPHILS NFR BLD AUTO: 77 % — SIGNIFICANT CHANGE UP (ref 43–77)
NEUTROPHILS NFR BLD AUTO: 79.1 % — HIGH (ref 43–77)
NIF IFA, CSF: NEGATIVE — SIGNIFICANT CHANGE UP
NMDA-R AB CBA, CSF: NEGATIVE — SIGNIFICANT CHANGE UP
NRBC # BLD: 0 /100 WBCS — SIGNIFICANT CHANGE UP (ref 0–0)
NRBC # BLD: 1 /100 — HIGH (ref 0–0)
NRBC # FLD: 0 K/UL — SIGNIFICANT CHANGE UP (ref 0–0)
OVALOCYTES BLD QL SMEAR: SLIGHT — SIGNIFICANT CHANGE UP
P/Q TYPE ANTIBODY: 0 NMOL/L — SIGNIFICANT CHANGE UP
PARANEOPLASTIC AB PNL SER: SIGNIFICANT CHANGE UP
PCA-TR AB TITR CSF: NEGATIVE TITER — SIGNIFICANT CHANGE UP
PF4 HEPARIN CMPLX AB SER-ACNC: NEGATIVE — SIGNIFICANT CHANGE UP
PHOSPHATE SERPL-MCNC: 2.6 MG/DL — SIGNIFICANT CHANGE UP (ref 2.5–4.5)
PLAT MORPH BLD: NORMAL — SIGNIFICANT CHANGE UP
PLATELET # BLD AUTO: 66 K/UL — LOW (ref 150–400)
PLATELET # BLD AUTO: 71 K/UL — LOW (ref 150–400)
PLATELET COUNT - ESTIMATE: ABNORMAL
POIKILOCYTOSIS BLD QL AUTO: SLIGHT — SIGNIFICANT CHANGE UP
POLYCHROMASIA BLD QL SMEAR: SLIGHT — SIGNIFICANT CHANGE UP
POTASSIUM SERPL-MCNC: 4 MMOL/L — SIGNIFICANT CHANGE UP (ref 3.5–5.3)
POTASSIUM SERPL-SCNC: 4 MMOL/L — SIGNIFICANT CHANGE UP (ref 3.5–5.3)
PROT SERPL-MCNC: 6.1 G/DL — SIGNIFICANT CHANGE UP (ref 6–8.3)
PROTHROM AB SERPL-ACNC: 12.4 SEC — SIGNIFICANT CHANGE UP (ref 10.5–13.4)
PURKINJE CELL CYTOPLASMIC AB TYPE 2: NEGATIVE TITER — SIGNIFICANT CHANGE UP
PURKINJE CELLS AB TITR CSF IF: NEGATIVE TITER — SIGNIFICANT CHANGE UP
RBC # BLD: 2.61 M/UL — LOW (ref 4.2–5.8)
RBC # BLD: 3.02 M/UL — LOW (ref 4.2–5.8)
RBC # FLD: 14.8 % — HIGH (ref 10.3–14.5)
RBC # FLD: 15 % — HIGH (ref 10.3–14.5)
RBC BLD AUTO: ABNORMAL
REFLEX ADDED: SIGNIFICANT CHANGE UP
SMUDGE CELLS # BLD: PRESENT — SIGNIFICANT CHANGE UP
SODIUM SERPL-SCNC: 138 MMOL/L — SIGNIFICANT CHANGE UP (ref 135–145)
SPECIMEN SOURCE: SIGNIFICANT CHANGE UP
WBC # BLD: 11.5 K/UL — HIGH (ref 3.8–10.5)
WBC # BLD: 7.47 K/UL — SIGNIFICANT CHANGE UP (ref 3.8–10.5)
WBC # FLD AUTO: 11.5 K/UL — HIGH (ref 3.8–10.5)
WBC # FLD AUTO: 7.47 K/UL — SIGNIFICANT CHANGE UP (ref 3.8–10.5)

## 2022-10-03 PROCEDURE — 43239 EGD BIOPSY SINGLE/MULTIPLE: CPT | Mod: GC

## 2022-10-03 PROCEDURE — 99291 CRITICAL CARE FIRST HOUR: CPT

## 2022-10-03 PROCEDURE — 88305 TISSUE EXAM BY PATHOLOGIST: CPT | Mod: 26

## 2022-10-03 PROCEDURE — 43255 EGD CONTROL BLEEDING ANY: CPT | Mod: 59,GC

## 2022-10-03 RX ORDER — IPRATROPIUM/ALBUTEROL SULFATE 18-103MCG
3 AEROSOL WITH ADAPTER (GRAM) INHALATION EVERY 6 HOURS
Refills: 0 | Status: DISCONTINUED | OUTPATIENT
Start: 2022-10-03 | End: 2022-10-04

## 2022-10-03 RX ORDER — AMLODIPINE BESYLATE 2.5 MG/1
10 TABLET ORAL DAILY
Refills: 0 | Status: DISCONTINUED | OUTPATIENT
Start: 2022-10-03 | End: 2022-10-11

## 2022-10-03 RX ORDER — PANTOPRAZOLE SODIUM 20 MG/1
8 TABLET, DELAYED RELEASE ORAL
Qty: 80 | Refills: 0 | Status: DISCONTINUED | OUTPATIENT
Start: 2022-10-03 | End: 2022-10-06

## 2022-10-03 RX ORDER — POTASSIUM PHOSPHATE, MONOBASIC POTASSIUM PHOSPHATE, DIBASIC 236; 224 MG/ML; MG/ML
15 INJECTION, SOLUTION INTRAVENOUS ONCE
Refills: 0 | Status: COMPLETED | OUTPATIENT
Start: 2022-10-03 | End: 2022-10-03

## 2022-10-03 RX ADMIN — Medication 1 APPLICATION(S): at 05:52

## 2022-10-03 RX ADMIN — SODIUM CHLORIDE 50 MILLILITER(S): 9 INJECTION, SOLUTION INTRAVENOUS at 12:17

## 2022-10-03 RX ADMIN — SODIUM CHLORIDE 4 MILLILITER(S): 9 INJECTION INTRAMUSCULAR; INTRAVENOUS; SUBCUTANEOUS at 08:20

## 2022-10-03 RX ADMIN — Medication 1 PUFF(S): at 08:20

## 2022-10-03 RX ADMIN — PANTOPRAZOLE SODIUM 10 MG/HR: 20 TABLET, DELAYED RELEASE ORAL at 21:20

## 2022-10-03 RX ADMIN — AMLODIPINE BESYLATE 10 MILLIGRAM(S): 2.5 TABLET ORAL at 20:01

## 2022-10-03 RX ADMIN — POTASSIUM PHOSPHATE, MONOBASIC POTASSIUM PHOSPHATE, DIBASIC 62.5 MILLIMOLE(S): 236; 224 INJECTION, SOLUTION INTRAVENOUS at 12:16

## 2022-10-03 RX ADMIN — CHLORHEXIDINE GLUCONATE 15 MILLILITER(S): 213 SOLUTION TOPICAL at 05:52

## 2022-10-03 RX ADMIN — DEXMEDETOMIDINE HYDROCHLORIDE IN 0.9% SODIUM CHLORIDE 2.56 MICROGRAM(S)/KG/HR: 4 INJECTION INTRAVENOUS at 11:23

## 2022-10-03 RX ADMIN — Medication 1 APPLICATION(S): at 18:11

## 2022-10-03 RX ADMIN — SODIUM CHLORIDE 4 MILLILITER(S): 9 INJECTION INTRAMUSCULAR; INTRAVENOUS; SUBCUTANEOUS at 20:56

## 2022-10-03 RX ADMIN — PROPOFOL 12.3 MICROGRAM(S)/KG/MIN: 10 INJECTION, EMULSION INTRAVENOUS at 11:23

## 2022-10-03 RX ADMIN — ATORVASTATIN CALCIUM 40 MILLIGRAM(S): 80 TABLET, FILM COATED ORAL at 21:54

## 2022-10-03 RX ADMIN — CHLORHEXIDINE GLUCONATE 15 MILLILITER(S): 213 SOLUTION TOPICAL at 18:09

## 2022-10-03 RX ADMIN — SODIUM CHLORIDE 50 MILLILITER(S): 9 INJECTION, SOLUTION INTRAVENOUS at 19:53

## 2022-10-03 RX ADMIN — PANTOPRAZOLE SODIUM 40 MILLIGRAM(S): 20 TABLET, DELAYED RELEASE ORAL at 10:08

## 2022-10-03 RX ADMIN — ALBUTEROL 2 PUFF(S): 90 AEROSOL, METERED ORAL at 08:20

## 2022-10-03 RX ADMIN — CHLORHEXIDINE GLUCONATE 1 APPLICATION(S): 213 SOLUTION TOPICAL at 11:24

## 2022-10-03 RX ADMIN — Medication 3 MILLILITER(S): at 20:56

## 2022-10-03 NOTE — PROGRESS NOTE ADULT - SUBJECTIVE AND OBJECTIVE BOX
[facility]   MD DERRICK MOSS MD KRISTINE SOLTANPOUR, DO ANGELA WONG, PA    TEL:  OFFICE: [phone]  From 5pm-7am Answering Service [phone]    -- RENAL FOLLOW UP NOTE ---Date of Service 10-03-22 @ 11:27    Patient is a 72y old  Male who presents with a chief complaint of weakness (03 Oct 2022 07:49)      Patient seen and examined at bedside.     VITALS:  T(F): 97.4 (10-03-22 @ 04:00), Max: 98.1 (10-03-22 @ 00:00)  HR: 59 (10-03-22 @ 11:13)  BP: 155/74 (10-03-22 @ 10:24)  RR: 18 (10-03-22 @ 10:24)  SpO2: 99% (10-03-22 @ 11:13)  Wt(kg): --    10-02 @ 07:01  -  10-03 @ 07:00  --------------------------------------------------------  IN: 2095.5 mL / OUT: 1350 mL / NET: 745.5 mL    10-03 @ 07:01  -  10-03 @ 11:27  --------------------------------------------------------  IN: 114.3 mL / OUT: 0 mL / NET: 114.3 mL          PHYSICAL EXAM:  General: intubated/sedate   Neck: No JVD  Respiratory: CTAB, no wheezes, rales or rhonchi  Cardiovascular: S1, S2, RRR  Gastrointestinal: BS+, soft, NT/ND  Extremities: No peripheral edema    Hospital Medications:   MEDICATIONS  (STANDING):  ALBUTerol    90 MICROgram(s) HFA Inhaler 2 Puff(s) Inhalation every 12 hours  amLODIPine   Tablet 10 milliGRAM(s) Oral daily  atorvastatin 40 milliGRAM(s) Oral at bedtime  chlorhexidine 0.12% Liquid 15 milliLiter(s) Oral Mucosa every 12 hours  chlorhexidine 2% Cloths 1 Application(s) Topical daily  dexMEDEtomidine Infusion 0.2 MICROgram(s)/kG/Hr (2.56 mL/Hr) IV Continuous <Continuous>  dextrose 5% + lactated ringers. 1000 milliLiter(s) (50 mL/Hr) IV Continuous <Continuous>  glucagon  Injectable 1 milliGRAM(s) IntraMuscular once  ipratropium 17 MICROgram(s) HFA Inhaler 1 Puff(s) Inhalation <User Schedule>  pantoprazole  Injectable 40 milliGRAM(s) IV Push every 12 hours  petrolatum Ophthalmic Ointment 1 Application(s) Both EYES two times a day  polyethylene glycol 3350 17 Gram(s) Oral daily  potassium phosphate IVPB 15 milliMole(s) IV Intermittent once  propofol Infusion 40.039 MICROgram(s)/kG/Min (12.3 mL/Hr) IV Continuous <Continuous>  sodium chloride 3%  Inhalation 4 milliLiter(s) Inhalation every 12 hours  valACYclovir 500 milliGRAM(s) Oral daily      LABS:  10-03    138  |  108<H>  |  40<H>  ----------------------------<  113<H>  4.0   |  19<L>  |  0.98    Ca    8.1<L>      03 Oct 2022 04:20  Phos  2.6     10-03  Mg     2.10     10-03    TPro  6.1  /  Alb  2.6<L>  /  TBili  0.4  /  DBili      /  AST  34  /  ALT  33  /  AlkPhos  32<L>  10-03    Creatinine Trend: 0.98 <--, 1.00 <--, 0.93 <--, 1.07 <--, 1.09 <--, 1.17 <--, 1.25 <--, 1.60 <--, 1.65 <--, 1.65 <--, 1.63 <--    Albumin, Serum: 2.6 g/dL (10-03 @ 04:20)  Phosphorus Level, Serum: 2.6 mg/dL (10-03 @ 04:20)                              8.0    7.47  )-----------( 66       ( 03 Oct 2022 04:20 )             23.8     Urine Studies:  Urinalysis - [09-25-22 @ 12:01]      Color Light Yellow / Appearance Clear / SG 1.035 / pH 6.5      Gluc 200 mg/dL / Ketone Trace  / Bili Negative / Urobili <2 mg/dL       Blood Trace / Protein 30 mg/dL / Leuk Est Negative / Nitrite Negative      RBC 1 / WBC 1 / Hyaline  / Gran  / Sq Epi  / Non Sq Epi 2 / Bacteria Negative    Urine Creatinine 33      [09-27-22 @ 13:50]  Urine Sodium <20      [09-27-22 @ 13:50]  Urine Urea Nitrogen 888.4      [09-27-22 @ 13:50]  Urine Osmolality 428      [09-27-22 @ 13:50]    Iron 36, TIBC 290, %sat 12      [09-25-22 @ 11:15]  Ferritin 287      [09-25-22 @ 11:15]  TSH 1.57      [09-25-22 @ 07:30]      Free Light Chains: kappa 2.58, lambda 2.12, ratio = 1.22      [09-25 @ 11:15]  Immunofixation Serum:   No Monoclonal Band Identified. JERONIMO Farris M.D.  Reference Range: None Detected      [09-25-22 @ 11:15]  SPEP Interpretation: Increase in Alpha-2 fraction suggestive of acute inflammation.  JERONIMO Farris M.D.      [09-25-22 @ 11:15]  Immunofixation Urine: No Monoclonal Band Identified. JERONIMO Farris M.D.  Reference Range: None Detected      [09-25-22 @ 12:01]  UPEP Interpretation: Mild Selective Proteinuria. No Monoclonal band seen.  JERONIMO Farris M.D.      [09-25-22 @ 12:01]    RADIOLOGY & ADDITIONAL STUDIES:

## 2022-10-03 NOTE — PROGRESS NOTE ADULT - ASSESSMENT
72 year-old male, history of HTN, HLD, CAD, cardiac stent, MM, presents with cc generalized weakness x 2-3 days associated with some tingling sensation.    A/P:  Hyponatremia/hypernatrmic:  work up suggested SIADH  Na appropriately improved now   on free water [id number]  Na level should not change more than 6-8meq in 24 hrs  monitor Na closely     acute on CKD stage 3  Baseline Scr 1.3-1.4  CRISTI likely sec to ATN  improved better than baseline  monitor bmp, i/o    Hypokalemia:  better  Monitor K level    acidosis  Non AG  improved.   off bicarb  monitor    HTN:  suboptimal   manage per MICU   MOnitor BP    Weakness:  Etiology?  h/o MM  s/p LP  possible GBS s/p [id number]  f/u neuro

## 2022-10-03 NOTE — PROGRESS NOTE ADULT - SUBJECTIVE AND OBJECTIVE BOX
Deo Oropeza MD  Internal Medicine  Pager #[phone]    CHIEF COMPLAINT:Patient is a 72y old  Male who presents with a chief complaint of weakness (02 Oct 2022 22:23)        Interval Events:    Pending EGD. hgb 8.7 ON -> 8.0 with dark output into fecal management system.     REVIEW OF SYSTEMS:    Unable to obtain due to intubated and sedated.       OBJECTIVE:  ICU Vital Signs Last 24 Hrs  T(C): 36.3 (03 Oct 2022 04:00), Max: 36.7 (03 Oct 2022 00:00)  T(F): 97.4 (03 Oct 2022 04:00), Max: 98.1 (03 Oct 2022 00:00)  HR: 49 (03 Oct 2022 07:35) (46 - 75)  BP: 149/60 (03 Oct 2022 06:00) (99/52 - 177/66)  BP(mean): 83 (03 Oct 2022 06:00) (66 - 97)  ABP: --  ABP(mean): --  RR: 18 (03 Oct 2022 06:00) (16 - 22)  SpO2: 100% (03 Oct 2022 07:35) (100% - 100%)    O2 Parameters below as of 03 Oct 2022 06:00  Patient On (Oxygen Delivery Method): ventilator    O2 Concentration (%): 30      Mode: AC/ CMV (Assist Control/ Continuous Mandatory Ventilation), RR (machine): 18, TV (machine): 370, FiO2: 30, PEEP: 5, ITime: 0.71, MAP: 9, PIP: 22    10-02 @ 07:01  -  10-03 @ 07:00  --------------------------------------------------------  IN: 2095.5 mL / OUT: 1350 mL / NET: 745.5 mL      CAPILLARY BLOOD GLUCOSE      POCT Blood Glucose.: 120 mg/dL (03 Oct 2022 05:50)      PHYSICAL EXAM:  GENERAL: intubated and sedated   ENMT NGT in place   LUNG: decreased breath sounds at the bases   HEART: bradycardia   ABDOMEN: Soft, Nontender, Nondistended  EXTREMITIES:  No LE edema, 2+ Peripheral Pulses, No clubbing, cyanosis, or edema  NEUROLOGY: unarousable to voice, not withdrawing on extremities. Exam limited due to sedated on propofol 40 and precedex 0.4.       LINES:    HOSPITAL MEDICATIONS:  Standing Meds:  ALBUTerol    90 MICROgram(s) HFA Inhaler 2 Puff(s) Inhalation every 12 hours  atorvastatin 40 milliGRAM(s) Oral at bedtime  chlorhexidine 0.12% Liquid 15 milliLiter(s) Oral Mucosa every 12 hours  chlorhexidine 2% Cloths 1 Application(s) Topical daily  dexMEDEtomidine Infusion 0.2 MICROgram(s)/kG/Hr IV Continuous <Continuous>  dextrose 5% + lactated ringers. 1000 milliLiter(s) IV Continuous <Continuous>  glucagon  Injectable 1 milliGRAM(s) IntraMuscular once  ipratropium 17 MICROgram(s) HFA Inhaler 1 Puff(s) Inhalation <User Schedule>  pantoprazole  Injectable 40 milliGRAM(s) IV Push every 12 hours  petrolatum Ophthalmic Ointment 1 Application(s) Both EYES two times a day  polyethylene glycol 3350 17 Gram(s) Oral daily  propofol Infusion 40.039 MICROgram(s)/kG/Min IV Continuous <Continuous>  sodium chloride 3%  Inhalation 4 milliLiter(s) Inhalation every 12 hours  valACYclovir 500 milliGRAM(s) Oral daily      PRN Meds:      LABS:                        8.0    7.47  )-----------( 66       ( 03 Oct 2022 04:20 )             23.8     Hgb Trend: 8.0<--, 8.7<--, 6.4<--, 7.8<--, 6.1<--  10-03    138  |  108<H>  |  40<H>  ----------------------------<  113<H>  4.0   |  19<L>  |  0.98    Ca    8.1<L>      03 Oct 2022 04:20  Phos  2.6     10-03  Mg     2.10     10-03    TPro  6.1  /  Alb  2.6<L>  /  TBili  0.4  /  DBili  x   /  AST  34  /  ALT  33  /  AlkPhos  32<L>  10-03    Creatinine Trend: 0.98<--, 1.00<--, 0.93<--, 1.07<--, 1.09<--, 1.17<--  PT/INR - ( 03 Oct 2022 04:20 )   PT: 12.4 sec;   INR: 1.07 ratio             Arterial Blood Gas:  10-01 @ 15:57  7.43/33/128/22/98.5/-2.1  ABG lactate: --        MICROBIOLOGY:     RADIOLOGY:  [ ] Reviewed and interpreted by me    EKG:

## 2022-10-03 NOTE — PROGRESS NOTE ADULT - ASSESSMENT
Patient is a 73 yo M with PMH of HTN, HLD, CAD, cardiac stent, Multiple Myeloma who presented to the ED for 3 days of generalized weakness.    EKG: ST PVCs  Tele: NSR PVCs episodes of PAT    1. Weakness  -rapidly progressed. was unable to move extremities.   -s/p RRT for hypoxia now intubated in MICU   -neuro on board, possible GBS vs pathology in neck  -echo shows mild AR/AS, normal LV systolic function and mild diastolic dysfunction   -IVIG 35g qd (3/3) as per neuro for possible GBS completed 9/28    2. CAD s/p PCI  -in 2015 in [facility] as per daughter patient had heart attack  -no reported CP prior to hospitalization    3. MM  -heme/onc on board    4. Afib:   Episodes of Afib on tele   start a/c if not contraindicated however currently being worked up for GIB   Patient is a 71 yo M with PMH of HTN, HLD, CAD, cardiac stent, Multiple Myeloma who presented to the ED for 3 days of generalized weakness.    EKG: ST PVCs  Tele: NSR PVCs episodes of PAT    1. Weakness  -rapidly progressed. was unable to move extremities.   -s/p RRT for hypoxia now intubated in MICU   -neuro on board, possible GBS vs pathology in neck  -echo shows mild AR/AS, normal LV systolic function and mild diastolic dysfunction   -IVIG 35g qd (3/3) as per neuro for possible GBS completed 9/28    2. CAD s/p PCI  -in 2015 in [facility] as per daughter patient had heart attack  -no reported CP prior to hospitalization    3. MM  -heme/onc on board    4. ?Afib  -?afib on tele  -no AC 2/2 anemia and duodenal ulcers

## 2022-10-03 NOTE — PROGRESS NOTE ADULT - SUBJECTIVE AND OBJECTIVE BOX
Payam Longoria MD  Interventional Cardiology / Advance Heart Failure and Cardiac Transplant Specialist  [place] Office : [phone] [place] [address] [facility] [address]  Tel:   [place] Office : [address] [facility] [place] [address]  Tel: [phone]      Subjective/Overnight events: Pt is lying in bed remains intubated, off sedation   	  MEDICATIONS:  amLODIPine   Tablet 10 milliGRAM(s) Oral daily    valACYclovir 500 milliGRAM(s) Oral daily    ALBUTerol    90 MICROgram(s) HFA Inhaler 2 Puff(s) Inhalation every 12 hours  ipratropium 17 MICROgram(s) HFA Inhaler 1 Puff(s) Inhalation <User Schedule>  sodium chloride 3%  Inhalation 4 milliLiter(s) Inhalation every 12 hours    dexMEDEtomidine Infusion 0.2 MICROgram(s)/kG/Hr IV Continuous <Continuous>  propofol Infusion 40.039 MICROgram(s)/kG/Min IV Continuous <Continuous>    pantoprazole  Injectable 40 milliGRAM(s) IV Push every 12 hours  polyethylene glycol 3350 17 Gram(s) Oral daily    atorvastatin 40 milliGRAM(s) Oral at bedtime  glucagon  Injectable 1 milliGRAM(s) IntraMuscular once    chlorhexidine 0.12% Liquid 15 milliLiter(s) Oral Mucosa every 12 hours  chlorhexidine 2% Cloths 1 Application(s) Topical daily  dextrose 5% + lactated ringers. 1000 milliLiter(s) IV Continuous <Continuous>  petrolatum Ophthalmic Ointment 1 Application(s) Both EYES two times a day      PAST MEDICAL/SURGICAL HISTORY  PAST MEDICAL & SURGICAL HISTORY:  HTN (hypertension)      HLD (hyperlipidemia)      Coronary artery disease      Multiple myeloma      S/P coronary artery stent placement          SOCIAL HISTORY: Substance Use (street drugs): ( x ) never used  (  ) other:    FAMILY HISTORY:  Maternal family history of hypertension  Mother          PHYSICAL EXAM:  T(C): 36.1 (10-03-22 @ 07:35), Max: 36.7 (10-03-22 @ 00:00)  HR: 67 (10-03-22 @ 13:00) (48 - 129)  BP: 133/62 (10-03-22 @ 13:00) (99/52 - 177/66)  RR: 15 (10-03-22 @ 13:00) (15 - 22)  SpO2: 100% (10-03-22 @ 13:00) (98% - 100%)  Wt(kg): --  I&O's Summary    02 Oct 2022 07:01  -  03 Oct 2022 07:00  --------------------------------------------------------  IN: 2095.5 mL / OUT: 1350 mL / NET: 745.5 mL    03 Oct 2022 07:01  -  03 Oct 2022 14:23  --------------------------------------------------------  IN: 421.1 mL / OUT: 0 mL / NET: 421.1 mL          GENERAL: intubated  EYES:  conjunctiva and sclera clear  Cardiovascular: Normal S1 S2, No JVD, No murmurs, No edema  Respiratory: intubated	  Gastrointestinal:  Soft,   Extremities: No edema                                   9.0    11.50 )-----------( 71       ( 03 Oct 2022 13:08 )             26.6     10-03    138  |  108<H>  |  40<H>  ----------------------------<  113<H>  4.0   |  19<L>  |  0.98    Ca    8.1<L>      03 Oct 2022 04:20  Phos  2.6     10-03  Mg     2.10     10-03    TPro  6.1  /  Alb  2.6<L>  /  TBili  0.4  /  DBili  x   /  AST  34  /  ALT  33  /  AlkPhos  32<L>  10-03    proBNP:   Lipid Profile:   HgA1c:   TSH:     Consultant(s) Notes Reviewed:  [x ] YES  [ ] NO    Care Discussed with Consultants/Other Providers [ x] YES  [ ] NO    Imaging Personally Reviewed independently:  [x] YES  [ ] NO    All labs, radiologic studies, vitals, orders and medications list reviewed. Patient is seen and examined at bedside. Case discussed with medical team.

## 2022-10-03 NOTE — PROGRESS NOTE ADULT - ASSESSMENT
73 y/o M with a PMHx significant for HTN, HLD, CAD s/p stent, MM, presents with descending paralysis complicated by acute hypoxic respiratory failure due to neuromuscular weakness mediated by GBS now status post IVIG.     Neuro   #Generalized Weakness, likely GBS   Pt family reporting a descending weakness that ultimately started to involve his speaking ability as well.  Low c/f botulism given not having flaccid, but some suspicion for paraneoplastic process (e.g. LES/MG).  - MRI brain- No acute hemorrhage mass mass effect or acute territorial   infarcts seen. and C/T/L spine no cord compression   -Discontinued decadron 9/28 discussed with neurosurgery no cord compression and neuro deferred further decision making regarding decadron.   -IVIG 35g qd (3/3) as per neuro for possible GBS completed 9/28  -positive anti ganglioside ab  -appreciate neurology recommendations       #Sedation/Analgesia  - C/W Propofol and precedex  - wean sedation as tolerated     Pulmonary   #Acute hypoxic respiratory failure secondary to neuromuscular weakness  Pt with increased WOB, reportedly wheezing as well. No h/o COPD  - C/W Albuterol/Ipratropium MDI's while intubated  -with decreased secretions 9/28 decreased frequency of airway clearance.   -9/29 NIF performed prerounds with respiratory therapist -21.   -PS trials, wean vent as tolerated    #Hemoptysis  Pt reportedly with hemoptysis in [facility]. Pt with RLL calcified granuloma & scattered pulm nodules.   -quant gold indeterminate  - sputum afb x3 no acid fast  -taken off of TB precautions     Cardiovascular   #Vasoplegic Shock  Pt with minimal pressor requirement on sedation for intubation. Likely vasoplegic in that context. CTM resolved.     #HLD   -atorvastatin 40 qhs     #CAD  - Continue to hold aspirin given hgb drop requiring 1 u pRBC 9/29,   - HOLDing Home Metoprolol tartrate 50mg PO BID    #HTN  -with soft BPs 9/29 overnight, continuing to hold amlodipine.   -Losartan held given soft BP and resolving CRISTI     #BNP elevated  Pt with BNP elevation, respiratory distress.  - TTE mild AR and AS, Stage I diastolic dysfunction     GI  - NPO w/ TF. On hold pending EGD with GI     #GI bleed  -dark stools   - pantoprazole 40 BID   -GI consulted  -pending EGD    Renal   #Hyponatremia, CRISTI  Pt with persistently low sodium. 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 Should have fluid restriction when not intubated.  - Na improving, cont to trend BMP q12  -c/w free water pushes   - Trend Cr, f/u urine lytes  - Strict Is/Os  -monitor sodium given GBS and patient s/p IVIG    MSK  #T8 Compression Fx   MR spine and spine consult to assess for possible interventions. Diffuse spinal lesions.   - Neurosx consulted   -no acute intervention. Decadron discontinued 9/28 following discussion with neurosurgery as well as neurology.     ID  #Bacillus cereus in blood cxs x1 bottle on 9/25, ?contaminate  - 9/27 BCx NGTD  - afebrile, WBC count improving, monitor off abx     r/o TB  - AFB negative x3  -off TB precautions     Endocrine   #Hyperglycemia  Pt with mild hyperglycemia on admission. A1c 5.7% (?pre-DM)  - FS q6h    Heme/onc  #Multiple Myeloma  Pt had last received Velcade 3/2022. Had been given opportunity for chemo vacation and travelled to [facility], but pt went for longer than anticipated. Now with diffuse lytic lesions involving most of spine. Had previously had XRT as well.   Diagnostics:  - LDH, B2-Microglobulin  - SPEP, UPEP, IFX (S/U), Immunoglobulins, FLCs    #Anemia  -hgb 6.7 9/29 transfused 1u prbc with response to hgb 9.2  -10/1: Hgb: 6.2, 6.1 and transfused 1 unit of pRBCs with good response to 7.8, 10/2 requiring additional unit prbc   -may be dilutional based upon decrease in all cell lines. IVIG may cause hemolytic anemia however bilirubin normal.   -Unlikely hemolytic as LDH and Haptoglobin are wnl   -ctm H/H and look for overt signs of bleeding   -heparin subq held   -GI consulted as above        =====Hospital Bundle=====  Hospital Bundle  Fluids: with gtts  Electrolytes: Replete K > 4, Mg > 2, Phos > 3  Nutrition: Diet NPO with TF (Nutrition C/S)  PPX  ---VTE: SCD  ---GI: protonix, tube feeds held.   ---Resp: Vented  ---: Dahl Placed 9/25, removed, previously with condom cath in place.   Access: PIVs  Code Status: FULL CODE  Dispo: Pending Medical Optimization 71 y/o M with a PMHx significant for HTN, HLD, CAD s/p stent, MM, presents with descending paralysis complicated by acute hypoxic respiratory failure due to neuromuscular weakness mediated by GBS now status post IVIG.     Neuro   #Generalized Weakness, likely GBS   Pt family reporting a descending weakness that ultimately started to involve his speaking ability as well.  Low c/f botulism given not having flaccid, but some suspicion for paraneoplastic process (e.g. LES/MG).  - MRI brain- No acute hemorrhage mass mass effect or acute territorial   infarcts seen. and C/T/L spine no cord compression   -Discontinued decadron 9/28 discussed with neurosurgery no cord compression and neuro deferred further decision making regarding decadron.   -IVIG 35g qd (3/3) as per neuro for possible GBS completed 9/28  -positive anti ganglioside ab  -appreciate neurology recommendations       #Sedation/Analgesia  - C/W Propofol and precedex  - wean sedation as tolerated     Pulmonary   #Acute hypoxic respiratory failure secondary to neuromuscular weakness  Pt with increased WOB, reportedly wheezing as well. No h/o COPD  - C/W Albuterol/Ipratropium MDI's while intubated  -with decreased secretions 9/28 decreased frequency of airway clearance.   -NIFs -20 and -21. Will follow clinically to gauge extubation readiness  -patient appearing ready for extubation lifting head/ shrugging shoulders, successful PSV trials, 4/5 strength throughout.       #Hemoptysis  Pt reportedly with hemoptysis in [facility]. Pt with RLL calcified granuloma & scattered pulm nodules.   -quant gold indeterminate  - sputum afb x3 no acid fast  -taken off of TB precautions     Cardiovascular   #Vasoplegic Shock  Resolved.     #HLD   -atorvastatin 40 qhs     #CAD  - Continue to hold aspirin given GIB,   - HOLDing Home Metoprolol tartrate 50mg PO BID    #HTN  -Amlodipine 10mg qd   -Losartan held. reintroduce as tolerated creatinine and BP permitting.     #BNP elevated  Pt with BNP elevation, respiratory distress.  - TTE mild AR and AS, Stage I diastolic dysfunction     GI  - NPO w/ TF. On hold pending EGD with GI     #GI bleed  -dark stools   - pantoprazole 40 BID   -GI consulted  -pending EGD    Renal   #Hyponatremia, CRISTI  Pt with persistently low sodium. 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 Should have fluid restriction when not intubated.  - Na improving, cont to trend BMP q12  -c/w free water pushes   - Trend Cr, f/u urine lytes  - Strict Is/Os  -monitor sodium given GBS and patient s/p IVIG  -Improved/ resolved.     MSK  #T8 Compression Fx   MR spine and spine consult to assess for possible interventions. Diffuse spinal lesions.   - Neurosx consulted   -no acute intervention. Decadron discontinued 9/28 following discussion with neurosurgery as well as neurology.     ID  #Bacillus cereus in blood cxs x1 bottle on 9/25, ?contaminate  - 9/27 BCx NGTD  - afebrile, WBC count improving, monitor off abx     r/o TB  - AFB negative x3  -off TB precautions     Endocrine   #Hyperglycemia  Pt with mild hyperglycemia on admission. A1c 5.7% (?pre-DM)  - FS q6h    Heme/onc  #Multiple Myeloma  Pt had last received Velcade 3/2022. Had been given opportunity for chemo vacation and travelled to [facility], but pt went for longer than anticipated. Now with diffuse lytic lesions involving most of spine. Had previously had XRT as well.   Diagnostics:  - LDH, B2-Microglobulin  - SPEP, UPEP, IFX (S/U), Immunoglobulins, FLCs    #Anemia  Likely multifactorial given UGIB, MM, less likely hemolytic anemia mediated by IVIG. additionally will rule out HIT  -Unlikely hemolytic as LDH and Haptoglobin are wnl   -heparin subq held   -High dose PPI   -4T score with intermediate probability of HIT   -Follow up Serotonin release assay, PF4.   -CBC q12h           =====Hospital Bundle=====  Hospital Bundle  Fluids: with gtts  Electrolytes: Replete K > 4, Mg > 2, Phos > 3  Nutrition: Diet NPO with TF (Nutrition C/S)  PPX  ---VTE: SCD  ---GI: protonix, tube feeds held.   ---Resp: Vented  ---: Dahl Placed 9/25, removed, previously with condom cath in place.   Access: PIVs  Code Status: FULL CODE  Dispo: Pending Medical Optimization

## 2022-10-03 NOTE — PROGRESS NOTE ADULT - SUBJECTIVE AND OBJECTIVE BOX
Name of Patient : RASHID MEIER  MRN: [id number]  Date of visit: 10-03-22     Subjective: Patient seen and examined. No new events except as noted.   intubated, sedated     MEDICATIONS:  MEDICATIONS  (STANDING):  albuterol/ipratropium for Nebulization 3 milliLiter(s) Nebulizer every 6 hours  amLODIPine   Tablet 10 milliGRAM(s) Oral daily  atorvastatin 40 milliGRAM(s) Oral at bedtime  chlorhexidine 2% Cloths 1 Application(s) Topical daily  dextrose 5% + lactated ringers. 1000 milliLiter(s) (50 mL/Hr) IV Continuous <Continuous>  glucagon  Injectable 1 milliGRAM(s) IntraMuscular once  pantoprazole Infusion 8 mG/Hr (10 mL/Hr) IV Continuous <Continuous>  petrolatum Ophthalmic Ointment 1 Application(s) Both EYES two times a day  polyethylene glycol 3350 17 Gram(s) Oral daily  sodium chloride 3%  Inhalation 4 milliLiter(s) Inhalation every 12 hours  valACYclovir 500 milliGRAM(s) Oral daily      PHYSICAL EXAM:  T(C): 36.7 (10-03-22 @ 20:00), Max: 36.7 (10-03-22 @ 00:00)  HR: 70 (10-03-22 @ 21:00) (48 - 129)  BP: 183/79 (10-03-22 @ 21:00) (133/62 - 183/79)  RR: 20 (10-03-22 @ 21:00) (12 - 22)  SpO2: 100% (10-03-22 @ 21:00) (98% - 100%)  Wt(kg): --  I&O's Summary    02 Oct 2022 07:01  -  03 Oct 2022 07:00  --------------------------------------------------------  IN: 2095.5 mL / OUT: 1350 mL / NET: 745.5 mL    03 Oct 2022 07:01  -  03 Oct 2022 21:54  --------------------------------------------------------  IN: 1111.3 mL / OUT: 1300 mL / NET: -188.7 mL        All labs, Imaging and EKGs personally reviewed       10-02-22 @ 07:01  -  10-03-22 @ 07:00  --------------------------------------------------------  IN: 2095.5 mL / OUT: 1350 mL / NET: 745.5 mL    10-03-22 @ 07:01  -  10-03-22 @ 21:54  --------------------------------------------------------  IN: 1111.3 mL / OUT: 1300 mL / NET: -188.7 mL                          9.0    11.50 )-----------( 71       ( 03 Oct 2022 13:08 )             26.6               10-03    138  |  108<H>  |  40<H>  ----------------------------<  113<H>  4.0   |  19<L>  |  0.98    Ca    8.1<L>      03 Oct 2022 04:20  Phos  2.6     10-03  Mg     2.10     10-03    TPro  6.1  /  Alb  2.6<L>  /  TBili  0.4  /  DBili  x   /  AST  34  /  ALT  33  /  AlkPhos  32<L>  10-03    PT/INR - ( 03 Oct 2022 04:20 )   PT: 12.4 sec;   INR: 1.07 ratio

## 2022-10-04 LAB
ALBUMIN SERPL ELPH-MCNC: 3.5 G/DL — SIGNIFICANT CHANGE UP (ref 3.3–5)
ALP SERPL-CCNC: 47 U/L — SIGNIFICANT CHANGE UP (ref 40–120)
ALT FLD-CCNC: 46 U/L — HIGH (ref 4–41)
ANION GAP SERPL CALC-SCNC: 13 MMOL/L — SIGNIFICANT CHANGE UP (ref 7–14)
AST SERPL-CCNC: 47 U/L — HIGH (ref 4–40)
BASOPHILS # BLD AUTO: 0.02 K/UL — SIGNIFICANT CHANGE UP (ref 0–0.2)
BASOPHILS NFR BLD AUTO: 0.2 % — SIGNIFICANT CHANGE UP (ref 0–2)
BILIRUB SERPL-MCNC: 0.8 MG/DL — SIGNIFICANT CHANGE UP (ref 0.2–1.2)
BLOOD GAS ARTERIAL COMPREHENSIVE RESULT: SIGNIFICANT CHANGE UP
BLOOD GAS ARTERIAL COMPREHENSIVE RESULT: SIGNIFICANT CHANGE UP
BUN SERPL-MCNC: 28 MG/DL — HIGH (ref 7–23)
CALCIUM SERPL-MCNC: 8.9 MG/DL — SIGNIFICANT CHANGE UP (ref 8.4–10.5)
CHLORIDE SERPL-SCNC: 104 MMOL/L — SIGNIFICANT CHANGE UP (ref 98–107)
CO2 SERPL-SCNC: 18 MMOL/L — LOW (ref 22–31)
CREAT SERPL-MCNC: 0.95 MG/DL — SIGNIFICANT CHANGE UP (ref 0.5–1.3)
EGFR: 85 ML/MIN/1.73M2 — SIGNIFICANT CHANGE UP
EOSINOPHIL # BLD AUTO: 0.08 K/UL — SIGNIFICANT CHANGE UP (ref 0–0.5)
EOSINOPHIL NFR BLD AUTO: 0.7 % — SIGNIFICANT CHANGE UP (ref 0–6)
GLUCOSE BLDC GLUCOMTR-MCNC: 119 MG/DL — HIGH (ref 70–99)
GLUCOSE BLDC GLUCOMTR-MCNC: 119 MG/DL — HIGH (ref 70–99)
GLUCOSE BLDC GLUCOMTR-MCNC: 131 MG/DL — HIGH (ref 70–99)
GLUCOSE BLDC GLUCOMTR-MCNC: 147 MG/DL — HIGH (ref 70–99)
GLUCOSE SERPL-MCNC: 97 MG/DL — SIGNIFICANT CHANGE UP (ref 70–99)
HCT VFR BLD CALC: 30.9 % — LOW (ref 39–50)
HGB BLD-MCNC: 10.8 G/DL — LOW (ref 13–17)
IANC: 8.51 K/UL — HIGH (ref 1.8–7.4)
IMM GRANULOCYTES NFR BLD AUTO: 1.8 % — HIGH (ref 0–0.9)
INR BLD: 0.97 RATIO — SIGNIFICANT CHANGE UP (ref 0.88–1.16)
LYMPHOCYTES # BLD AUTO: 1.41 K/UL — SIGNIFICANT CHANGE UP (ref 1–3.3)
LYMPHOCYTES # BLD AUTO: 13 % — SIGNIFICANT CHANGE UP (ref 13–44)
MAGNESIUM SERPL-MCNC: 2 MG/DL — SIGNIFICANT CHANGE UP (ref 1.6–2.6)
MCHC RBC-ENTMCNC: 30.3 PG — SIGNIFICANT CHANGE UP (ref 27–34)
MCHC RBC-ENTMCNC: 35 GM/DL — SIGNIFICANT CHANGE UP (ref 32–36)
MCV RBC AUTO: 86.8 FL — SIGNIFICANT CHANGE UP (ref 80–100)
MONOCYTES # BLD AUTO: 0.65 K/UL — SIGNIFICANT CHANGE UP (ref 0–0.9)
MONOCYTES NFR BLD AUTO: 6 % — SIGNIFICANT CHANGE UP (ref 2–14)
NEUTROPHILS # BLD AUTO: 8.51 K/UL — HIGH (ref 1.8–7.4)
NEUTROPHILS NFR BLD AUTO: 78.3 % — HIGH (ref 43–77)
NRBC # BLD: 0 /100 WBCS — SIGNIFICANT CHANGE UP (ref 0–0)
NRBC # FLD: 0 K/UL — SIGNIFICANT CHANGE UP (ref 0–0)
PHOSPHATE SERPL-MCNC: 2.6 MG/DL — SIGNIFICANT CHANGE UP (ref 2.5–4.5)
PLATELET # BLD AUTO: 97 K/UL — LOW (ref 150–400)
POTASSIUM SERPL-MCNC: 3.7 MMOL/L — SIGNIFICANT CHANGE UP (ref 3.5–5.3)
POTASSIUM SERPL-SCNC: 3.7 MMOL/L — SIGNIFICANT CHANGE UP (ref 3.5–5.3)
PROT SERPL-MCNC: 7.7 G/DL — SIGNIFICANT CHANGE UP (ref 6–8.3)
PROTHROM AB SERPL-ACNC: 11.3 SEC — SIGNIFICANT CHANGE UP (ref 10.5–13.4)
RBC # BLD: 3.56 M/UL — LOW (ref 4.2–5.8)
RBC # FLD: 14.8 % — HIGH (ref 10.3–14.5)
SODIUM SERPL-SCNC: 135 MMOL/L — SIGNIFICANT CHANGE UP (ref 135–145)
WBC # BLD: 10.87 K/UL — HIGH (ref 3.8–10.5)
WBC # FLD AUTO: 10.87 K/UL — HIGH (ref 3.8–10.5)

## 2022-10-04 PROCEDURE — 99232 SBSQ HOSP IP/OBS MODERATE 35: CPT | Mod: GC

## 2022-10-04 PROCEDURE — 99291 CRITICAL CARE FIRST HOUR: CPT

## 2022-10-04 RX ORDER — LANOLIN ALCOHOL/MO/W.PET/CERES
9 CREAM (GRAM) TOPICAL AT BEDTIME
Refills: 0 | Status: DISCONTINUED | OUTPATIENT
Start: 2022-10-04 | End: 2022-10-11

## 2022-10-04 RX ORDER — HEPARIN SODIUM 5000 [USP'U]/ML
5000 INJECTION INTRAVENOUS; SUBCUTANEOUS EVERY 12 HOURS
Refills: 0 | Status: DISCONTINUED | OUTPATIENT
Start: 2022-10-04 | End: 2022-10-06

## 2022-10-04 RX ORDER — HEPARIN SODIUM 5000 [USP'U]/ML
5000 INJECTION INTRAVENOUS; SUBCUTANEOUS EVERY 8 HOURS
Refills: 0 | Status: DISCONTINUED | OUTPATIENT
Start: 2022-10-04 | End: 2022-10-04

## 2022-10-04 RX ORDER — POTASSIUM PHOSPHATE, MONOBASIC POTASSIUM PHOSPHATE, DIBASIC 236; 224 MG/ML; MG/ML
15 INJECTION, SOLUTION INTRAVENOUS ONCE
Refills: 0 | Status: COMPLETED | OUTPATIENT
Start: 2022-10-04 | End: 2022-10-04

## 2022-10-04 RX ORDER — DEXMEDETOMIDINE HYDROCHLORIDE IN 0.9% SODIUM CHLORIDE 4 UG/ML
0.2 INJECTION INTRAVENOUS
Qty: 400 | Refills: 0 | Status: DISCONTINUED | OUTPATIENT
Start: 2022-10-04 | End: 2022-10-04

## 2022-10-04 RX ORDER — POTASSIUM CHLORIDE 20 MEQ
10 PACKET (EA) ORAL
Refills: 0 | Status: COMPLETED | OUTPATIENT
Start: 2022-10-04 | End: 2022-10-04

## 2022-10-04 RX ADMIN — Medication 100 MILLIEQUIVALENT(S): at 10:32

## 2022-10-04 RX ADMIN — POLYETHYLENE GLYCOL 3350 17 GRAM(S): 17 POWDER, FOR SOLUTION ORAL at 13:59

## 2022-10-04 RX ADMIN — Medication 3 MILLILITER(S): at 21:25

## 2022-10-04 RX ADMIN — Medication 3 MILLILITER(S): at 03:29

## 2022-10-04 RX ADMIN — SODIUM CHLORIDE 50 MILLILITER(S): 9 INJECTION, SOLUTION INTRAVENOUS at 14:03

## 2022-10-04 RX ADMIN — HEPARIN SODIUM 5000 UNIT(S): 5000 INJECTION INTRAVENOUS; SUBCUTANEOUS at 18:43

## 2022-10-04 RX ADMIN — PANTOPRAZOLE SODIUM 10 MG/HR: 20 TABLET, DELAYED RELEASE ORAL at 20:39

## 2022-10-04 RX ADMIN — VALACYCLOVIR 500 MILLIGRAM(S): 500 TABLET, FILM COATED ORAL at 12:49

## 2022-10-04 RX ADMIN — SODIUM CHLORIDE 50 MILLILITER(S): 9 INJECTION, SOLUTION INTRAVENOUS at 20:39

## 2022-10-04 RX ADMIN — SODIUM CHLORIDE 4 MILLILITER(S): 9 INJECTION INTRAMUSCULAR; INTRAVENOUS; SUBCUTANEOUS at 10:12

## 2022-10-04 RX ADMIN — POTASSIUM PHOSPHATE, MONOBASIC POTASSIUM PHOSPHATE, DIBASIC 62.5 MILLIMOLE(S): 236; 224 INJECTION, SOLUTION INTRAVENOUS at 10:32

## 2022-10-04 RX ADMIN — Medication 100 MILLIEQUIVALENT(S): at 12:36

## 2022-10-04 RX ADMIN — AMLODIPINE BESYLATE 10 MILLIGRAM(S): 2.5 TABLET ORAL at 08:35

## 2022-10-04 RX ADMIN — Medication 3 MILLILITER(S): at 15:30

## 2022-10-04 RX ADMIN — Medication 3 MILLILITER(S): at 10:13

## 2022-10-04 RX ADMIN — PANTOPRAZOLE SODIUM 10 MG/HR: 20 TABLET, DELAYED RELEASE ORAL at 14:03

## 2022-10-04 RX ADMIN — CHLORHEXIDINE GLUCONATE 1 APPLICATION(S): 213 SOLUTION TOPICAL at 12:36

## 2022-10-04 RX ADMIN — Medication 9 MILLIGRAM(S): at 23:43

## 2022-10-04 RX ADMIN — DEXMEDETOMIDINE HYDROCHLORIDE IN 0.9% SODIUM CHLORIDE 2.56 MICROGRAM(S)/KG/HR: 4 INJECTION INTRAVENOUS at 05:45

## 2022-10-04 RX ADMIN — SODIUM CHLORIDE 4 MILLILITER(S): 9 INJECTION INTRAMUSCULAR; INTRAVENOUS; SUBCUTANEOUS at 21:26

## 2022-10-04 RX ADMIN — Medication 100 MILLIEQUIVALENT(S): at 14:00

## 2022-10-04 RX ADMIN — PANTOPRAZOLE SODIUM 10 MG/HR: 20 TABLET, DELAYED RELEASE ORAL at 16:26

## 2022-10-04 RX ADMIN — ATORVASTATIN CALCIUM 40 MILLIGRAM(S): 80 TABLET, FILM COATED ORAL at 21:01

## 2022-10-04 NOTE — PHYSICAL THERAPY INITIAL EVALUATION ADULT - PERTINENT HX OF CURRENT PROBLEM, REHAB EVAL
72 year old Male with a PMHx significant for HTN, HLD, CAD s/p stent, MM, presents with descending paralysis complicated by acute hypoxic respiratory failure and interval vasoplegic shock resolved found to have 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

## 2022-10-04 NOTE — CHART NOTE - NSCHARTNOTEFT_GEN_A_CORE
NUTRITION FOLLOW-UP      71yo M w Hx of MM w prior chemo, HTN, HLD, CAD presenting with descending paralysis, hypoxic respiratory failure requiring intubation/vent support and vasoplegic shock, likely 2/2 to GBS.  Was receiving enteral nutrition with VitalHP @ 45mL/hr prior to NPO status.  S/p extubation.   Spoke w RN.  No noted/reported nausea/vomiting/diarrhea/constipation @ this time.  BM (10/2). Of note, no significant weight change since admission.     Met with Pt in attempt to obtain nutrition Hx PTA & corroborate weight Hx per review of chart/HIE tab.  Pt. endorses swallowing difficulty a "few weeks" PTA.  Was drinking Ensure supplement 1x daily at home.  Pt. currently pending swallow evaluation, which RDN informed Pt. of.  Is amenable to Ensure supplement if/when advanced to PO diet.  When asked, replies he has had weight loss.  States usual body weight as ~123lbs (~56kg) but cannot specify approximately how long ago he last weighed this.  56.6kg 1 year ago (10/1/2021) & 58.3kg ~6 mths ago (3/3/2022).  Consistent with 6.7% x1 yr and 9.6% x6mths weight loss, respectively. Upon reassessment of nutrition focused physical exam. Pt. with findings of moderate and severe muscle wasting and fat loss:  Temples...  severe  Clavicles...  moderate  Shoulders... moderate    Orbital... Severe  Buccal... moderate    Deemed moderately malnourished @ this time.               _________________Diet___________________  Diet, NPO:   Except Medications (10-02-22 @ 13:30)          Weight:                                         Height:   64"                  Ideal Body Weight: 130lbs (58.9kg)  52.7kg (10/4)  52.2kg (9/26)    ____Re-Estimated Energy Needs (based on current weight)____  30-35 kcals/kg = 8843-1363 kcals/d  1.4-1.6 g protein/kg = 74-84g protein/d    Edema:  None noted    Skin:  No pressure injuries       ________________________Pertinent Medications____________   MEDICATIONS  (STANDING):  albuterol/ipratropium for Nebulization 3 milliLiter(s) Nebulizer every 6 hours  amLODIPine   Tablet 10 milliGRAM(s) Oral daily  atorvastatin 40 milliGRAM(s) Oral at bedtime  chlorhexidine 2% Cloths 1 Application(s) Topical daily  dexMEDEtomidine Infusion 0.2 MICROgram(s)/kG/Hr (2.56 mL/Hr) IV Continuous <Continuous>  dextrose 5% + lactated ringers. 1000 milliLiter(s) (50 mL/Hr) IV Continuous <Continuous>  glucagon  Injectable 1 milliGRAM(s) IntraMuscular once  heparin   Injectable 5000 Unit(s) SubCutaneous every 12 hours  pantoprazole Infusion 8 mG/Hr (10 mL/Hr) IV Continuous <Continuous>  polyethylene glycol 3350 17 Gram(s) Oral daily  potassium chloride  10 mEq/100 mL IVPB 10 milliEquivalent(s) IV Intermittent every 1 hour  sodium chloride 3%  Inhalation 4 milliLiter(s) Inhalation every 12 hours  valACYclovir 500 milliGRAM(s) Oral daily    MEDICATIONS  (PRN):  guaiFENesin Oral Liquid (Sugar-Free) 100 milliGRAM(s) Oral every 6 hours PRN Cough          _________________________Pertinent Labs____________________     10-04    135  |  104  |  28<H>  ----------------------------<  97  3.7   |  18<L>  |  0.95    Ca    8.9      04 Oct 2022 00:10  Phos  2.6     10-04  Mg     2.00     10-04    TPro  7.7  /  Alb  3.5  /  TBili  0.8  /  DBili  x   /  AST  47<H>  /  ALT  46<H>  /  AlkPhos  47  10-04                                                                   10.8   10.87 )-----------( 97       ( 04 Oct 2022 00:10 )             30.9       CAPILLARY BLOOD GLUCOSE      POCT Blood Glucose.: 119 mg/dL (04 Oct 2022 05:04)    POCT Blood Glucose.: 119 mg/dL (10-04-22 @ 05:04)  POCT Blood Glucose.: 103 mg/dL (10-03-22 @ 23:32)  POCT Blood Glucose.: 110 mg/dL (10-03-22 @ 18:00)      ____NEW NUTRITION Dx_____  Pt. meets criteria for moderate malnutrition in the acute/context of chronic illness related to Dx GBS, as well as Hx of MM as evidenced by findings of moderate and severe muscle wasting and subcutaneous fat loss, as well as 9.6% weight loss x 6 mths.        PLAN/RECOMMENDATIONS:    1) If appropriate, advance to PO diet, pending swallow evaluation findings & SLP recommendations.    2) Add Ensure Plus HP 8oz PO 2x daily to diet order, once initiated.   3) Obtain daily weights      RDN remains available and will f/u PRN.          Lucy Dudley RDN, CDN pager [phone]

## 2022-10-04 NOTE — PROGRESS NOTE ADULT - ASSESSMENT
73 y/o M with a PMHx significant for HTN, HLD, CAD s/p stent (2015 in [place] -- on ASA only), MM p/wwith descending paralysis complicated by acute hypoxic respiratory failure due to neuromuscular weakness mediated by GBS now status post IVIG. GI consulted for melena with anemia    Impression:  #Melena likely 2/2 multiple non bleeding duodenal ulcers noted on EGD 10/3, two with visible vessels, smaller lesion 2/2 epi and bipolar cautery  #Anemia    Recommendations:   - No additional intervention from GI at this moment; okay to extubate from GI standpoint  - Follow up path  - Continue PPI gtt x 72 hrs, then transition PPI 40 PO BID  - Daily CBC, CMP, coags; transfuse if Hgb < 8, considering cardiac history  - Maintain active T&S  - Continue to monitor   - If rebleeds/significant drop in Hgb, obtain stat CTA and consult IR; given duodenal bulb lesion was in a difficult location for GI intervention     Preliminary note until signed by Attending.    Thank you for involving us in this patient's care. Please reach out to GI if any further questions or concerns.     Ana Strong MD  Gastroenterology/Hepatology Fellow, PGY-VI    NON-URGENT CONSULTS:  Please email [email or web address]  AT NIGHT AND ON WEEKENDS:  Contact on-call GI fellow via answering service ([phone]) from 5pm-8am and on weekends/holidays  MONDAY-FRIDAY 8AM-5PM:  Pager# [phone] ([facility])  GI Phone# [phone] ([facility])      73 y/o M with a PMHx significant for HTN, HLD, CAD s/p stent (2015 in [facility] -- on ASA only), MM p/wwith descending paralysis complicated by acute hypoxic respiratory failure due to neuromuscular weakness mediated by GBS now status post IVIG. GI consulted for melena with anemia    Impression:  #Melena likely 2/2 multiple non bleeding duodenal ulcers noted on EGD 10/3, two with visible vessels, smaller lesion 2/2 epi and bipolar cautery  #Anemia    Recommendations:   - No additional intervention from GI at this moment; okay to extubate from GI standpoint as Hgb stable and no further overt bleeding  - Follow up path  - Continue PPI gtt x 72 hrs, then transition PPI 40 PO BID  - Daily CBC, CMP, coags; transfuse if Hgb < 8, considering cardiac history  - Maintain active T&S  - Continue to monitor   - If rebleeds/significant drop in Hgb, obtain stat CTA and consult IR; given duodenal bulb lesion was in a difficult location for GI intervention     Preliminary note until signed by Attending.    Thank you for involving us in this patient's care. Please reach out to GI if any further questions or concerns.     Ana Strong MD  Gastroenterology/Hepatology Fellow, PGY-VI    NON-URGENT CONSULTS:  Please email [email or web address]  AT NIGHT AND ON WEEKENDS:  Contact on-call GI fellow via answering service ([phone]) from 5pm-8am and on weekends/holidays  MONDAY-FRIDAY 8AM-5PM:  Pager# [phone] ([facility])  GI Phone# [phone] ([facility])

## 2022-10-04 NOTE — CHART NOTE - NSCHARTNOTEFT_GEN_A_CORE
MICU Transfer Note    Transfer from: MICU    Transfer to: (  X) Medicine    (  ) Telemetry     (   ) RCU        (    ) Palliative         (   ) Stroke Unit          (   ) __________________    Accepting Physician:  Signout given to:     MICU COURSE:    71 y/o M with a PMHx significant for HTN, HLD, CAD s/p stent, MM, presents with descending paralysis complicated by acute hypoxic respiratory failure due to neuromuscular weakness mediated by GBS now status post IVIG. Patient in AHRF mediated by NMS intubated on floor at [facility] transferred to MICU. Briefly on pressors vasoplegic shock from sedation during intubation able to be downtitrated and discontinued early in MICU course. Placed on decadron found to have pathologic fracture of T8 neurosurgery consulted. MR without cord compression at any level C/T/or L and decadron discontinued. LP obtained with albuminocytologic dissociation for which IVIG given 3 day course completed 9/28. Patient demonstrated marked improvement with decadron and then with IVIG demonstrating 4/5 strength in all extremities prior to extubation. Previously with copious thick secretions which resolved with airway clearance now minimal. No plans for PLEX per neurology. PT has evaluated the patient as well as speech. Observed to have dark output into fecal management system and downtrending hemoglobin had required transfusion had received 3u prbc total with last transfusion 10/2 and without further dark output found to have ulcers with one lesion treated with epi and bipolar cautery on EGD and the other lesion was not intervened upon as risks outweighed benefits and technically difficult with reccomendation by GI to reconsult IR if rebleeds. Previously with CRISTI resolved and other electrolyte derangements notably hyponatremia and then hypernatremia also resolved. Clinically stable and ready for transfer to floor.  Heme onc has evaluated patient no active treatment at this time and following peripherally. Extubated to NC 10/3 and discontinued precedex 10/4 AM. Hemodynamically stable and clinically improved ready for transfer to floor.           ASSESSMENT & PLAN:     71 y/o M with a PMHx significant for HTN, HLD, CAD s/p stent, MM, presents with descending paralysis complicated by acute hypoxic respiratory failure due to neuromuscular weakness mediated by GBS now status post IVIG.     Neuro   #Generalized Weakness,  GBS   - MRI brain- No acute hemorrhage mass mass effect or acute territorial   infarcts seen. and C/T/L spine no cord compression   -Discontinued decadron 9/28 discussed with neurosurgery no cord compression and neuro deferred further decision making regarding decadron.   -IVIG 35g qd (3/3) as per neuro for possible GBS completed 9/28  -positive anti ganglioside ab  -appreciate neurology recommendations   -Resolving  -PT consulted and contacted to work with patient.       #Sedation/Analgesia  - precedex, wean as tolerated.     Pulmonary   #Acute hypoxic respiratory failure secondary to neuromuscular weakness  -improved. extubated 10/3  -continue airway clearance  -weaned off NC o2      #Hemoptysis  Pt reportedly with hemoptysis in [facility]. Pt with RLL calcified granuloma & scattered pulm nodules.   -quant gold indeterminate  - sputum afb x3 no acid fast  -taken off of TB precautions     Cardiovascular   #Vasoplegic Shock  Resolved.     #HLD   -atorvastatin 40 qhs     #CAD  - Continue to hold aspirin given GIB,   - HOLDing Home Metoprolol tartrate 50mg PO BID    #HTN  -Amlodipine 10mg qd   -Losartan held. reintroduce as tolerated creatinine and BP permitting.     #BNP elevated  Pt with BNP elevation, respiratory distress.  - TTE mild AR and AS, Stage I diastolic dysfunction     GI  - NPO w/ TF. On hold pending EGD with GI     #GI bleed  -dark stools improved/resolved.   - pantoprazole gtt 72h then BID dosing  -EGD 10/3 performed nonbleeding ulcerated lesions with visible vessels at two sites. One treated with epi and bipolar cautery. Other site was not intervened upon due to risks associated outweighing benefit and technically difficult.  GI reccomends IR consult if rebleed.     Renal   #Hyponatremia, CRISTI  -Improved/ resolved.     MSK  #T8 Compression Fx   MR spine and spine consult to assess for possible interventions. Diffuse spinal lesions.   - Neurosx consulted   -no acute intervention. Decadron discontinued 9/28 following discussion with neurosurgery as well as neurology.     ID  #Bacillus cereus in blood cxs x1 bottle on 9/25, ?contaminate  - 9/27 BCx NGTD  - afebrile, WBC count improving, monitor off abx     r/o TB  - AFB negative x3  -off TB precautions     Endocrine   #Hyperglycemia  Pt with mild hyperglycemia on admission. A1c 5.7% (?pre-DM)  - FS q6h    Heme/onc  #Multiple Myeloma  Pt had last received Velcade 3/2022. Had been given opportunity for chemo vacation and travelled to [facility], but pt went for longer than anticipated. Now with diffuse lytic lesions involving most of spine. Had previously had XRT as well.   Diagnostics:  - LDH, B2-Microglobulin  - SPEP, UPEP, IFX (S/U), Immunoglobulins, FLCs    #Anemia  Likely multifactorial given UGIB, MM, less likely hemolytic anemia mediated by IVIG. additionally will rule out HIT  -Unlikely hemolytic as LDH and Haptoglobin are wnl   -heparin subq reintroduced at 5000 q12h dose given weight borderline and recent GIB. discussed with pharmacy team.   -High dose PPI   -4T score with intermediate probability of HIT    Serotonin release assay, PF4 negative  -CBC qday          =====Hospital Bundle=====  Hospital Bundle  Fluids: with gtts  Electrolytes: Replete K > 4, Mg > 2, Phos > 3  Nutrition: Diet NPO with TF (Nutrition C/S)  PPX  ---VTE: SCD, SQH  ---GI: protonix,  ---Resp: Room air  ---: Dahl Placed 9/25, removed, previously with condom cath in place.   Access: PIVs  Code Status: FULL CODE  Dispo: Pending Medical Optimization              FOR FOLLOW UP:  [ ] NIFs and VC (ordered)  [ ] PT/OT  [ ] f/u neurology reccomendations  [ ] monitor H/H qday. If rebleeds would consult IR for embolization.   [ ] f/u with heme onc regarding valtrex prophylaxis as patient has not been taking Velcade for multiple months. VZV ppx while receiving treatment.       Deo Oropeza MD  Internal Medicine  Pager #[phone] MICU Transfer Note    Transfer from: MICU    Transfer to: (  X) Medicine    (  ) Telemetry     (   ) RCU        (    ) Palliative         (   ) Stroke Unit          (   ) __________________    Accepting Physician:  Signout given to:     MICU COURSE:    71 y/o M with a PMHx significant for HTN, HLD, CAD s/p stent, MM, presents with descending paralysis complicated by acute hypoxic respiratory failure due to neuromuscular weakness mediated by GBS now status post IVIG. Patient in AHRF mediated by NMS intubated on floor at [facility] transferred to MICU. Briefly on pressors vasoplegic shock from sedation during intubation able to be downtitrated and discontinued early in MICU course. Placed on decadron found to have pathologic fracture of T8 neurosurgery consulted. MR without cord compression at any level C/T/or L and decadron discontinued. LP obtained with albuminocytologic dissociation for which IVIG given 3 day course completed 9/28. Patient demonstrated marked improvement with decadron and then with IVIG demonstrating 4/5 strength in all extremities prior to extubation. Previously with copious thick secretions which resolved with airway clearance now minimal. No plans for PLEX per neurology. PT has evaluated the patient as well as speech. Observed to have dark output into fecal management system and downtrending hemoglobin had required transfusion had received 3u prbc total with last transfusion 10/2 and without further dark output found to have ulcers with one lesion treated with epi and bipolar cautery on EGD and the other lesion was not intervened upon as risks outweighed benefits and technically difficult with reccomendation by GI to reconsult IR if rebleeds. Previously with CRISTI resolved and other electrolyte derangements notably hyponatremia and then hypernatremia also resolved. Clinically stable and ready for transfer to floor.  Heme onc has evaluated patient no active treatment at this time and following peripherally. Extubated to NC 10/3 and discontinued precedex 10/4 AM. Hemodynamically stable and clinically improved ready for transfer to floor.           ASSESSMENT & PLAN:     71 y/o M with a PMHx significant for HTN, HLD, CAD s/p stent, MM, presents with descending paralysis complicated by acute hypoxic respiratory failure due to neuromuscular weakness mediated by GBS now status post IVIG.     Neuro   #Generalized Weakness,  GBS   - MRI brain- No acute hemorrhage mass mass effect or acute territorial   infarcts seen. and C/T/L spine no cord compression   -Discontinued decadron 9/28 discussed with neurosurgery no cord compression and neuro deferred further decision making regarding decadron.   -IVIG 35g qd (3/3) as per neuro for possible GBS completed 9/28  -positive anti ganglioside ab  -appreciate neurology recommendations   -Resolving  -PT consulted and contacted to work with patient.       #Sedation/Analgesia  - precedex, wean as tolerated.     Pulmonary   #Acute hypoxic respiratory failure secondary to neuromuscular weakness  -improved. extubated 10/3  -continue airway clearance  -weaned off NC o2      #Hemoptysis  Pt reportedly with hemoptysis in [facility]. Pt with RLL calcified granuloma & scattered pulm nodules.   -quant gold indeterminate  - sputum afb x3 no acid fast  -taken off of TB precautions     Cardiovascular   #Vasoplegic Shock  Resolved.     #HLD   -atorvastatin 40 qhs     #CAD  - Continue to hold aspirin given GIB,   - HOLDing Home Metoprolol tartrate 50mg PO BID    #HTN  -Amlodipine 10mg qd   -Losartan held. reintroduce as tolerated creatinine and BP permitting.     #BNP elevated  Pt with BNP elevation, respiratory distress.  - TTE mild AR and AS, Stage I diastolic dysfunction     GI  - NPO w/ TF. On hold pending EGD with GI     #GI bleed  -dark stools improved/resolved.   - pantoprazole gtt 72h then BID dosing  -EGD 10/3 performed nonbleeding ulcerated lesions with visible vessels at two sites. One treated with epi and bipolar cautery. Other site was not intervened upon due to risks associated outweighing benefit and technically difficult.  GI reccomends IR consult if rebleed.     Renal   #Hyponatremia, CRISTI  -Improved/ resolved.     MSK  #T8 Compression Fx   MR spine and spine consult to assess for possible interventions. Diffuse spinal lesions.   - Neurosx consulted   -no acute intervention. Decadron discontinued 9/28 following discussion with neurosurgery as well as neurology.     ID  #Bacillus cereus in blood cxs x1 bottle on 9/25, ?contaminate  - 9/27 BCx NGTD  - afebrile, WBC count improving, monitor off abx     r/o TB  - AFB negative x3  -off TB precautions     Endocrine   #Hyperglycemia  Pt with mild hyperglycemia on admission. A1c 5.7% (?pre-DM)  - FS q6h    Heme/onc  #Multiple Myeloma  Pt had last received Velcade 3/2022. Had been given opportunity for chemo vacation and travelled to [facility], but pt went for longer than anticipated. Now with diffuse lytic lesions involving most of spine. Had previously had XRT as well.   Diagnostics:  - LDH, B2-Microglobulin  - SPEP, UPEP, IFX (S/U), Immunoglobulins, FLCs    #Anemia  Likely multifactorial given UGIB, MM, less likely hemolytic anemia mediated by IVIG. additionally will rule out HIT  -Unlikely hemolytic as LDH and Haptoglobin are wnl   -heparin subq reintroduced at 5000 q12h dose given weight borderline and recent GIB. discussed with pharmacy team.   -High dose PPI   -4T score with intermediate probability of HIT    Serotonin release assay, PF4 negative  -CBC qday          =====Hospital Bundle=====  Hospital Bundle  Fluids: with gtts  Electrolytes: Replete K > 4, Mg > 2, Phos > 3  Nutrition: Diet NPO with TF (Nutrition C/S)  PPX  ---VTE: SCD, SQH  ---GI: protonix,  ---Resp: Room air  ---: Dahl Placed 9/25, removed, previously with condom cath in place.   Access: PIVs  Code Status: FULL CODE  Dispo: Pending Medical Optimization              FOR FOLLOW UP:  [ ] NIFs and VC (ordered)  [ ] PT/OT  [ ] f/u neurology reccomendations  [ ] monitor H/H qday. If rebleeds would consult IR for embolization.   [ ] transition to protonix BID dosing after 72h protonix gtt   [ ] f/u with heme onc regarding valtrex prophylaxis as patient has not been taking Velcade for multiple months. VZV ppx while receiving treatment.       Deo Oropeza MD  Internal Medicine  Pager #[phone] MICU Transfer Note    Transfer from: MICU    Transfer to: (  X) Medicine    (  ) Telemetry     (   ) RCU        (    ) Palliative         (   ) Stroke Unit          (   ) __________________    Accepting Physician:  Signout given to:     MICU COURSE:    73 y/o M with a PMHx significant for HTN, HLD, CAD s/p stent, MM, presents with descending paralysis complicated by acute hypoxic respiratory failure due to neuromuscular weakness mediated by GBS now status post IVIG. Patient in AHRF mediated by NMS intubated on floor at R[facility] transferred to MICU. Briefly on pressors vasoplegic shock from sedation during intubation able to be downtitrated and discontinued early in MICU course. Placed on decadron found to have pathologic fracture of T8 neurosurgery consulted. MR without cord compression at any level C/T/or L and decadron discontinued. LP obtained with albuminocytologic dissociation for which IVIG given 3 day course completed 9/28. Patient demonstrated marked improvement with decadron and then with IVIG demonstrating 4/5 strength in all extremities prior to extubation. Previously with copious thick secretions which resolved with airway clearance now minimal. No plans for PLEX per neurology. PT has evaluated the patient as well as speech. Observed to have dark output into fecal management system and downtrending hemoglobin had required transfusion had received 3u prbc total with last transfusion 10/2 and without further dark output found to have ulcers with one lesion treated with epi and bipolar cautery on EGD and the other lesion was not intervened upon as risks outweighed benefits and technically difficult with reccomendation by GI to reconsult IR if rebleeds. Previously with CRISTI resolved and other electrolyte derangements notably hyponatremia and then hypernatremia also resolved. Clinically stable and ready for transfer to floor.  Heme onc has evaluated patient no active treatment at this time and following peripherally. Extubated to NC 10/3 and discontinued precedex 10/4 AM. Hemodynamically stable and clinically improved ready for transfer to floor.           ASSESSMENT & PLAN:     73 y/o M with a PMHx significant for HTN, HLD, CAD s/p stent, MM, presents with descending paralysis complicated by acute hypoxic respiratory failure due to neuromuscular weakness mediated by GBS now status post IVIG.     Neuro   #Generalized Weakness,  GBS   - MRI brain- No acute hemorrhage mass mass effect or acute territorial   infarcts seen. and C/T/L spine no cord compression   -Discontinued decadron 9/28 discussed with neurosurgery no cord compression and neuro deferred further decision making regarding decadron.   -IVIG 35g qd (3/3) as per neuro for possible GBS completed 9/28  -positive anti ganglioside ab  -appreciate neurology recommendations   -Resolving  -PT consulted and contacted to work with patient.       #Sedation/Analgesia  - precedex, wean as tolerated.     Pulmonary   #Acute hypoxic respiratory failure secondary to neuromuscular weakness  -improved. extubated 10/3  -continue airway clearance  -weaned off NC o2      #Hemoptysis  Pt reportedly with hemoptysis in [facility]. Pt with RLL calcified granuloma & scattered pulm nodules.   -quant gold indeterminate  - sputum afb x3 no acid fast  -taken off of TB precautions     Cardiovascular   #Vasoplegic Shock  Resolved.     #HLD   -atorvastatin 40 qhs     #CAD  - Continue to hold aspirin given GIB,   - HOLDing Home Metoprolol tartrate 50mg PO BID    #HTN  -Amlodipine 10mg qd   -Losartan held. reintroduce as tolerated creatinine and BP permitting.     #BNP elevated  Pt with BNP elevation, respiratory distress.  - TTE mild AR and AS, Stage I diastolic dysfunction     GI  - NPO w/ TF. On hold pending EGD with GI     #GI bleed  -dark stools improved/resolved.   - pantoprazole gtt 72h then BID dosing  -EGD 10/3 performed nonbleeding ulcerated lesions with visible vessels at two sites. One treated with epi and bipolar cautery. Other site was not intervened upon due to risks associated outweighing benefit and technically difficult.  GI reccomends IR consult if rebleed.     Renal   #Hyponatremia, CRISTI  -Improved/ resolved.     MSK  #T8 Compression Fx   MR spine and spine consult to assess for possible interventions. Diffuse spinal lesions.   - Neurosx consulted   -no acute intervention. Decadron discontinued 9/28 following discussion with neurosurgery as well as neurology.     ID  #Bacillus cereus in blood cxs x1 bottle on 9/25, ?contaminate  - 9/27 BCx NGTD  - afebrile, WBC count improving, monitor off abx     r/o TB  - AFB negative x3  -off TB precautions     Endocrine   #Hyperglycemia  Pt with mild hyperglycemia on admission. A1c 5.7% (?pre-DM)  - FS q6h    Heme/onc  #Multiple Myeloma  Pt had last received Velcade 3/2022. Had been given opportunity for chemo vacation and travelled to [facility], but pt went for longer than anticipated. Now with diffuse lytic lesions involving most of spine. Had previously had XRT as well.   Diagnostics:  - LDH, B2-Microglobulin  - SPEP, UPEP, IFX (S/U), Immunoglobulins, FLCs    #Anemia  Likely multifactorial given UGIB, MM, less likely hemolytic anemia mediated by IVIG. additionally will rule out HIT  -Unlikely hemolytic as LDH and Haptoglobin are wnl   -heparin subq reintroduced at 5000 q12h dose given weight borderline and recent GIB. discussed with pharmacy team.   -High dose PPI   -4T score with intermediate probability of HIT    Serotonin release assay, PF4 negative  -CBC qday          =====Hospital Bundle=====  Hospital Bundle  Fluids: with gtts  Electrolytes: Replete K > 4, Mg > 2, Phos > 3  Nutrition: Diet NPO with TF (Nutrition C/S)  PPX  ---VTE: SCD, SQH  ---GI: protonix,  ---Resp: Room air  ---: Dahl Placed 9/25, removed, previously with condom cath in place.   Access: PIVs  Code Status: FULL CODE  Dispo: Pending Medical Optimization              FOR FOLLOW UP:  [ ] NIFs and VC  [ ] PT/OT  [ ] Monitor BP. Home amlodipine and Losartan restarted in ICU.   [ ] f/u neurology reccomendations  [ ] monitor H/H qday. If rebleeds would consult IR for embolization.   [ ] transition to protonix BID dosing after 72h protonix gtt   [ ] f/u with heme onc regarding valtrex prophylaxis as patient has not been taking Velcade for multiple months. VZV ppx while receiving treatment.       Deo Oropeza MD  Internal Medicine  Pager #[phone] MICU Transfer Note    Transfer from: MICU    Transfer to: (  X) Medicine    (  ) Telemetry     (   ) RCU        (    ) Palliative         (   ) Stroke Unit          (   ) __________________    Accepting Physician:  Signout given to:     MICU COURSE:    71 y/o M with a PMHx significant for HTN, HLD, CAD s/p stent, MM, presents with descending paralysis complicated by acute hypoxic respiratory failure due to neuromuscular weakness mediated by GBS now status post IVIG. Patient in AHRF mediated by NMS intubated on floor at RRT transferred to MICU. Briefly on pressors vasoplegic shock from sedation during intubation able to be downtitrated and discontinued early in MICU course. Placed on decadron found to have pathologic fracture of T8 neurosurgery consulted. MR without cord compression at any level C/T/or L and decadron discontinued. LP obtained with albuminocytologic dissociation for which IVIG given 3 day course completed 9/28. Patient demonstrated marked improvement with decadron and then with IVIG demonstrating 4/5 strength in all extremities prior to extubation. Previously with copious thick secretions which resolved with airway clearance now minimal. No plans for PLEX per neurology. PT has evaluated the patient as well as speech. Observed to have dark output into fecal management system and downtrending hemoglobin had required transfusion had received 3u prbc total with last transfusion 10/2 and without further dark output found to have ulcers with one lesion treated with epi and bipolar cautery on EGD and the other lesion was not intervened upon as risks outweighed benefits and technically difficult with reccomendation by GI to reconsult IR if rebleeds. Previously with CRISTI resolved and other electrolyte derangements notably hyponatremia and then hypernatremia also resolved. Clinically stable and ready for transfer to floor.  Heme onc has evaluated patient no active treatment at this time and following peripherally. Extubated to NC 10/3 and discontinued precedex 10/4 AM. Hemodynamically stable and clinically improved ready for transfer to floor.           ASSESSMENT & PLAN:     71 y/o M with a PMHx significant for HTN, HLD, CAD s/p stent, MM, presents with descending paralysis complicated by acute hypoxic respiratory failure due to neuromuscular weakness mediated by GBS now status post IVIG.     Neuro   #Generalized Weakness,  GBS   - MRI brain- No acute hemorrhage mass mass effect or acute territorial   infarcts seen. and C/T/L spine no cord compression   -Discontinued decadron 9/28 discussed with neurosurgery no cord compression and neuro deferred further decision making regarding decadron.   -IVIG 35g qd (3/3) as per neuro for possible GBS completed 9/28  -positive anti ganglioside ab  -appreciate neurology recommendations   -Resolving  -PT consulted and working with patient  -Cleared by speech and swallow      #Sedation/Analgesia  - off sedation.     Pulmonary   #Acute hypoxic respiratory failure secondary to neuromuscular weakness  -improved. extubated 10/3  -continue airway clearance  -weaned off NC o2      #Hemoptysis  Pt reportedly with hemoptysis in [facility]. Pt with RLL calcified granuloma & scattered pulm nodules.   -quant gold indeterminate  - sputum afb x3 no acid fast  -taken off of TB precautions     Cardiovascular   #Vasoplegic Shock  Resolved.     #HLD   -atorvastatin 40 qhs     #CAD  - Continue to hold aspirin given GIB,   - HOLDing Home Metoprolol tartrate 50mg PO BID    #HTN  -Amlodipine 10mg qd   -Losartan 100mg qd    #BNP elevated  Pt with BNP elevation, respiratory distress.  - TTE mild AR and AS, Stage I diastolic dysfunction     GI  - cleared by speech and swallow, regular diet w thin liquid and supplemental shake      #GI bleed  -dark stools improved/resolved.   - pantoprazole gtt 72h then BID dosing  -EGD 10/3 performed nonbleeding ulcerated lesions with visible vessels at two sites. One treated with epi and bipolar cautery. Other site was not intervened upon due to risks associated outweighing benefit and technically difficult.  GI reccomends IR consult if rebleed.     Renal   #Hyponatremia, CRISTI  -Improved/ resolved.     MSK  #T8 Compression Fx   MR spine and spine consult to assess for possible interventions. Diffuse spinal lesions.   - Neurosx consulted   -no acute intervention. Decadron discontinued 9/28 following discussion with neurosurgery as well as neurology.     ID  #Bacillus cereus in blood cxs x1 bottle on 9/25, ?contaminate  - 9/27 BCx NGTD  - afebrile, WBC count improving, monitor off abx     r/o TB  - AFB negative x3  -off TB precautions     Endocrine   #Hyperglycemia  Pt with mild hyperglycemia on admission. A1c 5.7% (?pre-DM)  - FS q6h    Heme/onc  #Multiple Myeloma  Pt had last received Velcade 3/2022. Had been given opportunity for chemo vacation and travelled to [facility], but pt went for longer than anticipated. Now with diffuse lytic lesions involving most of spine. Had previously had XRT as well.   Diagnostics:  - LDH, B2-Microglobulin  - SPEP, UPEP, IFX (S/U), Immunoglobulins, FLCs  -Follow up with heme onc regarding valtrex    #Anemia  Likely multifactorial given UGIB, MM, less likely hemolytic anemia mediated by IVIG. additionally will rule out HIT  -Unlikely hemolytic as LDH and Haptoglobin are wnl   -heparin subq reintroduced at 5000 q12h dose given weight borderline and recent GIB. discussed with pharmacy team.   -High dose PPI   -4T score with intermediate probability of HIT    Serotonin release assay, PF4 negative  -CBC qday          =====Hospital Bundle=====  Hospital Bundle  Fluids: with gtts  Electrolytes: Replete K > 4, Mg > 2, Phos > 3  Nutrition: Regular w thin liquids and supplemental shake  PPX  ---VTE:  SQH  ---GI: protonix,  ---Resp: Room air  ---: Dahl Placed 9/25, removed, previously with condom cath in place.   Access: PIVs  Code Status: FULL CODE  Dispo: Pending Medical Optimization        FOR FOLLOW UP:  [ ] NIFs and VC  [ ] PT/OT  [ ] Monitor BP. Home amlodipine and Losartan restarted in ICU.   [ ] f/u neurology reccomendations  [ ] monitor H/H qday. If rebleeds would consult IR for embolization.   [ ] transition to protonix BID dosing after 72h protonix gtt   [ ] f/u with heme onc regarding valtrex prophylaxis as patient has not been taking Velcade for multiple months. VZV ppx while receiving treatment.   FYI home aspirin held in setting of GIB      Deo Oropeza MD  Internal Medicine  Pager #[phone] MICU Transfer Note    Transfer from: MICU    Transfer to: (  X) Medicine    (  ) Telemetry     (   ) RCU        (    ) Palliative         (   ) Stroke Unit          (   ) __________________    Accepting Physician:  Signout given to:     MICU COURSE:    73 y/o M with a PMHx significant for HTN, HLD, CAD s/p stent, MM, presents with descending paralysis complicated by acute hypoxic respiratory failure due to neuromuscular weakness mediated by GBS now status post IVIG. Patient in AHRF mediated by NMS intubated on floor at [facility] transferred to MICU. Briefly on pressors vasoplegic shock from sedation during intubation able to be downtitrated and discontinued early in MICU course. Placed on decadron found to have pathologic fracture of T8 neurosurgery consulted. MR without cord compression at any level C/T/or L and decadron discontinued. LP obtained with albuminocytologic dissociation for which IVIG given 3 day course completed 9/28. Patient demonstrated marked improvement with decadron and then with IVIG demonstrating 4/5 strength in all extremities prior to extubation. Previously with copious thick secretions which resolved with airway clearance now minimal. No plans for PLEX per neurology. PT has evaluated the patient as well as speech. Observed to have dark output into fecal management system and downtrending hemoglobin had required transfusion had received 3u prbc total with last transfusion 10/2 and without further dark output found to have ulcers with one lesion treated with epi and bipolar cautery on EGD and the other lesion was not intervened upon as risks outweighed benefits and technically difficult with reccomendation by GI to reconsult IR if rebleeds. Previously with CRISTI resolved and other electrolyte derangements notably hyponatremia and then hypernatremia also resolved. Clinically stable and ready for transfer to floor.  Heme onc has evaluated patient no active treatment at this time and following peripherally. Extubated to NC 10/3 and discontinued precedex 10/4 AM. Hemodynamically stable and clinically improved ready for transfer to floor.           ASSESSMENT & PLAN:     73 y/o M with a PMHx significant for HTN, HLD, CAD s/p stent, MM, presents with descending paralysis complicated by acute hypoxic respiratory failure due to neuromuscular weakness mediated by GBS now status post IVIG.     Neuro   #Generalized Weakness,  GBS   - MRI brain- No acute hemorrhage mass mass effect or acute territorial   infarcts seen. and C/T/L spine no cord compression   -Discontinued decadron 9/28 discussed with neurosurgery no cord compression and neuro deferred further decision making regarding decadron.   -IVIG 35g qd (3/3) as per neuro for possible GBS completed 9/28  -positive anti ganglioside ab  -appreciate neurology recommendations   -Resolving  -PT consulted and working with patient  -Cleared by speech and swallow      #Sedation/Analgesia  - off sedation.     Pulmonary   #Acute hypoxic respiratory failure secondary to neuromuscular weakness  -improved. extubated 10/3  -continue airway clearance  -weaned off NC o2      #Hemoptysis  Pt reportedly with hemoptysis in [facility]. Pt with RLL calcified granuloma & scattered pulm nodules.   -quant gold indeterminate  - sputum afb x3 no acid fast  -taken off of TB precautions     Cardiovascular   #Vasoplegic Shock  Resolved.     #HLD   -atorvastatin 40 qhs     #CAD  - Continue to hold aspirin given GIB,   - HOLDing Home Metoprolol tartrate 50mg PO BID    #HTN  -Amlodipine 10mg qd   -Losartan 100mg qd    #BNP elevated  Pt with BNP elevation, respiratory distress.  - TTE mild AR and AS, Stage I diastolic dysfunction     GI  - cleared by speech and swallow, regular diet w thin liquid and supplemental shake      #GI bleed  -dark stools improved/resolved.   - pantoprazole gtt 72h then BID dosing  -EGD 10/3 performed nonbleeding ulcerated lesions with visible vessels at two sites. One treated with epi and bipolar cautery. Other site was not intervened upon due to risks associated outweighing benefit and technically difficult.  GI reccomends IR consult if rebleed.     Renal   #Hyponatremia, CRISTI  -Improved/ resolved.     MSK  #T8 Compression Fx   MR spine and spine consult to assess for possible interventions. Diffuse spinal lesions.   - Neurosx consulted   -no acute intervention. Decadron discontinued 9/28 following discussion with neurosurgery as well as neurology.     ID  #Bacillus cereus in blood cxs x1 bottle on 9/25, ?contaminate  - 9/27 BCx NGTD  - afebrile, WBC count improving, monitor off abx     r/o TB  - AFB negative x3  -off TB precautions     Endocrine   #Hyperglycemia  Pt with mild hyperglycemia on admission. A1c 5.7% (?pre-DM)  - FS q6h    Heme/onc  #Multiple Myeloma  Pt had last received Velcade 3/2022. Had been given opportunity for chemo vacation and travelled to [facility], but pt went for longer than anticipated. Now with diffuse lytic lesions involving most of spine. Had previously had XRT as well.   Diagnostics:  - LDH, B2-Microglobulin  - SPEP, UPEP, IFX (S/U), Immunoglobulins, FLCs  -No need for further valtrex at this time discussed with heme    #Anemia  Likely multifactorial given UGIB, MM, less likely hemolytic anemia mediated by IVIG. additionally will rule out HIT  -Unlikely hemolytic as LDH and Haptoglobin are wnl   -heparin subq reintroduced at 5000 q12h dose given weight borderline and recent GIB. discussed with pharmacy team.   -High dose PPI   -4T score with intermediate probability of HIT    Serotonin release assay, PF4 negative  -CBC qday          =====Hospital Bundle=====  Hospital Bundle  Fluids: with gtts  Electrolytes: Replete K > 4, Mg > 2, Phos > 3  Nutrition: Regular w thin liquids and supplemental shake  PPX  ---VTE:  SQH  ---GI: protonix,  ---Resp: Room air  ---: Dahl Placed 9/25, removed, previously with condom cath in place.   Access: PIVs  Code Status: FULL CODE  Dispo: Pending Medical Optimization        FOR FOLLOW UP:  [ ] NIFs and VC  [ ] PT/OT  [ ] Monitor BP. Home amlodipine and Losartan restarted in ICU.   [ ] f/u neurology reccomendations  [ ] monitor H/H qday. If rebleeds would consult IR for embolization.   [ ] transition to protonix BID dosing after 72h protonix gtt   FYI home aspirin held in setting of GIB      Deo Oropeza MD  Internal Medicine  Pager #[phone] MICU Transfer Note    Transfer from: MICU    Transfer to: (  ) Medicine    (  ) Telemetry     ( X  ) RCU        (    ) Palliative         (   ) Stroke Unit          (   ) __________________    Accepting Physician: RCU attending, private Refoua notified  Signout given to: RCU ACP     MICU COURSE:    73 y/o M with a PMHx significant for HTN, HLD, CAD s/p stent, MM, presents with descending paralysis complicated by acute hypoxic respiratory failure due to neuromuscular weakness mediated by GBS now status post IVIG. Patient in AHRF mediated by NMS intubated on floor at [facility] transferred to MICU. Briefly on pressors vasoplegic shock from sedation during intubation able to be downtitrated and discontinued early in MICU course. Placed on decadron found to have pathologic fracture of T8 neurosurgery consulted. MR without cord compression at any level C/T/or L and decadron discontinued. LP obtained with albuminocytologic dissociation for which IVIG given 3 day course completed 9/28. Patient demonstrated marked improvement with decadron and then with IVIG demonstrating 4/5 strength in all extremities prior to extubation. Previously with copious thick secretions which resolved with airway clearance now minimal. No plans for PLEX per neurology. PT has evaluated the patient as well as speech. Observed to have dark output into fecal management system and downtrending hemoglobin had required transfusion had received 3u prbc total with last transfusion 10/2 and without further dark output found to have ulcers with one lesion treated with epi and bipolar cautery on EGD and the other lesion was not intervened upon as risks outweighed benefits and technically difficult with reccomendation by GI to reconsult IR if rebleeds. Previously with CRISTI resolved and other electrolyte derangements notably hyponatremia and then hypernatremia also resolved. Clinically stable and ready for transfer to floor.  Heme onc has evaluated patient no active treatment at this time and following peripherally. Extubated to NC 10/3 and discontinued precedex 10/4 AM. Hemodynamically stable and clinically improved ready for transfer to floor.           ASSESSMENT & PLAN:     73 y/o M with a PMHx significant for HTN, HLD, CAD s/p stent, MM, presents with descending paralysis complicated by acute hypoxic respiratory failure due to neuromuscular weakness mediated by GBS now status post IVIG.     Neuro   #Generalized Weakness,  GBS   - MRI brain- No acute hemorrhage mass mass effect or acute territorial   infarcts seen. and C/T/L spine no cord compression   -Discontinued decadron 9/28 discussed with neurosurgery no cord compression and neuro deferred further decision making regarding decadron.   -IVIG 35g qd (3/3) as per neuro for possible GBS completed 9/28  -positive anti ganglioside ab  -appreciate neurology recommendations   -Resolving  -PT consulted and working with patient  -Cleared by speech and swallow      #Sedation/Analgesia  - off sedation.     Pulmonary   #Acute hypoxic respiratory failure secondary to neuromuscular weakness  -improved. extubated 10/3  -continue airway clearance  -weaned off NC o2      #Hemoptysis  Pt reportedly with hemoptysis in [facility]. Pt with RLL calcified granuloma & scattered pulm nodules.   -quant gold indeterminate  - sputum afb x3 no acid fast  -taken off of TB precautions     Cardiovascular   #Vasoplegic Shock  Resolved.     #HLD   -atorvastatin 40 qhs     #CAD  - Continue to hold aspirin given GIB,   - HOLDing Home Metoprolol tartrate 50mg PO BID    #HTN  -Amlodipine 10mg qd   -Losartan 100mg qd    #BNP elevated  Pt with BNP elevation, respiratory distress.  - TTE mild AR and AS, Stage I diastolic dysfunction     GI  - cleared by speech and swallow, regular diet w thin liquid and supplemental shake      #GI bleed  -dark stools improved/resolved.   - pantoprazole gtt 72h then BID dosing  -EGD 10/3 performed nonbleeding ulcerated lesions with visible vessels at two sites. One treated with epi and bipolar cautery. Other site was not intervened upon due to risks associated outweighing benefit and technically difficult.  GI reccomends IR consult if rebleed.     Renal   #Hyponatremia, CRISTI  -Improved/ resolved.     MSK  #T8 Compression Fx   MR spine and spine consult to assess for possible interventions. Diffuse spinal lesions.   - Neurosx consulted   -no acute intervention. Decadron discontinued 9/28 following discussion with neurosurgery as well as neurology.     ID  #Bacillus cereus in blood cxs x1 bottle on 9/25, ?contaminate  - 9/27 BCx NGTD  - afebrile, WBC count improving, monitor off abx     r/o TB  - AFB negative x3  -off TB precautions     Endocrine   #Hyperglycemia  Pt with mild hyperglycemia on admission. A1c 5.7% (?pre-DM)  - FS q6h    Heme/onc  #Multiple Myeloma  Pt had last received Velcade 3/2022. Had been given opportunity for chemo vacation and travelled to [facility], but pt went for longer than anticipated. Now with diffuse lytic lesions involving most of spine. Had previously had XRT as well.   Diagnostics:  - LDH, B2-Microglobulin  - SPEP, UPEP, IFX (S/U), Immunoglobulins, FLCs  -No need for further valtrex at this time discussed with heme    #Anemia  Likely multifactorial given UGIB, MM, less likely hemolytic anemia mediated by IVIG. additionally will rule out HIT  -Unlikely hemolytic as LDH and Haptoglobin are wnl   -heparin subq reintroduced at 5000 q12h dose given weight borderline and recent GIB. discussed with pharmacy team.   -High dose PPI   -4T score with intermediate probability of HIT    Serotonin release assay, PF4 negative  -CBC qday          =====Hospital Bundle=====  Hospital Bundle  Fluids: with gtts  Electrolytes: Replete K > 4, Mg > 2, Phos > 3  Nutrition: Regular w thin liquids and supplemental shake  PPX  ---VTE:  SQH  ---GI: protonix,  ---Resp: Room air  ---: Dahl Placed 9/25, removed, previously with condom cath in place.   Access: PIVs  Code Status: FULL CODE  Dispo: Pending Medical Optimization        FOR FOLLOW UP:  [ ] NIFs and VC  [ ] PT/OT  [ ] Monitor BP. Home amlodipine and Losartan restarted in ICU.   [ ] f/u neurology reccomendations  [ ] monitor H/H qday. If rebleeds would consult IR for embolization.   [ ] transition to protonix BID dosing after 72h protonix gtt   FYI home aspirin held in setting of GIB      Deo Oropeza MD  Internal Medicine  Pager #[phone] MICU Transfer Note    Transfer from: MICU    Transfer to: (  ) Medicine    (  ) Telemetry     ( X  ) RCU        (    ) Palliative         (   ) Stroke Unit          (   ) __________________    Accepting Physician: RCU attending, private Refoua notified  Signout given to: RCU ACP     MICU COURSE:    71 y/o M with a PMHx significant for HTN, HLD, CAD s/p stent, MM, presents with descending paralysis complicated by acute hypoxic respiratory failure due to neuromuscular weakness mediated by GBS now status post IVIG. Patient in AHRF mediated by NMS intubated on floor at [facility] transferred to MICU. Briefly on pressors vasoplegic shock from sedation during intubation able to be downtitrated and discontinued early in MICU course. Placed on decadron found to have pathologic fracture of T8 neurosurgery consulted. MR without cord compression at any level C/T/or L and decadron discontinued. LP obtained with albuminocytologic dissociation for which IVIG given 3 day course completed 9/28. Patient demonstrated marked improvement with decadron and then with IVIG demonstrating 4/5 strength in all extremities prior to extubation. Previously with copious thick secretions which resolved with airway clearance now minimal. No plans for PLEX per neurology. PT has evaluated the patient as well as speech. Observed to have dark output into fecal management system and downtrending hemoglobin had required transfusion had received 3u prbc total with last transfusion 10/2 and without further dark output found to have ulcers with one lesion treated with epi and bipolar cautery on EGD and the other lesion was not intervened upon as risks outweighed benefits and technically difficult with reccomendation by GI to reconsult IR if rebleeds. Previously with CRISTI resolved and other electrolyte derangements notably hyponatremia and then hypernatremia also resolved. Clinically stable and ready for transfer to floor.  Heme onc has evaluated patient no active treatment at this time and following peripherally. Extubated to NC 10/3 and discontinued precedex 10/4 AM. Agitated managed at this time with redirection and reorientation. Hemodynamically stable and clinically improved ready for transfer to floor.           ASSESSMENT & PLAN:     71 y/o M with a PMHx significant for HTN, HLD, CAD s/p stent, MM, presents with descending paralysis complicated by acute hypoxic respiratory failure due to neuromuscular weakness mediated by GBS now status post IVIG.     Neuro   #Generalized Weakness,  GBS   - MRI brain- No acute hemorrhage mass mass effect or acute territorial   infarcts seen. and C/T/L spine no cord compression   -Discontinued decadron 9/28 discussed with neurosurgery no cord compression and neuro deferred further decision making regarding decadron.   -IVIG 35g qd (3/3) as per neuro for possible GBS completed 9/28  -positive anti ganglioside ab  -appreciate neurology recommendations   -Resolving  -PT consulted and working with patient  -Cleared by speech and swallow      #Sedation/Analgesia  - off sedation.     Pulmonary   #Acute hypoxic respiratory failure secondary to neuromuscular weakness  -improved. extubated 10/3  -continue airway clearance  -weaned off NC o2      #Hemoptysis  Pt reportedly with hemoptysis in [facility]. Pt with RLL calcified granuloma & scattered pulm nodules.   -quant gold indeterminate  - sputum afb x3 no acid fast  -taken off of TB precautions     Cardiovascular   #Vasoplegic Shock  Resolved.     #HLD   -atorvastatin 40 qhs     #CAD  - Continue to hold aspirin given GIB,   - HOLDing Home Metoprolol tartrate 50mg PO BID    #HTN  -Amlodipine 10mg qd   -Losartan 100mg qd    #BNP elevated  Pt with BNP elevation, respiratory distress.  - TTE mild AR and AS, Stage I diastolic dysfunction     GI  - cleared by speech and swallow, regular diet w thin liquid and supplemental shake      #GI bleed  -dark stools improved/resolved.   - pantoprazole gtt 72h then BID dosing  -EGD 10/3 performed nonbleeding ulcerated lesions with visible vessels at two sites. One treated with epi and bipolar cautery. Other site was not intervened upon due to risks associated outweighing benefit and technically difficult.  GI reccomends IR consult if rebleed.     Renal   #Hyponatremia, CRISTI  -Improved/ resolved.     MSK  #T8 Compression Fx   MR spine and spine consult to assess for possible interventions. Diffuse spinal lesions.   - Neurosx consulted   -no acute intervention. Decadron discontinued 9/28 following discussion with neurosurgery as well as neurology.     ID  #Bacillus cereus in blood cxs x1 bottle on 9/25, ?contaminate  - 9/27 BCx NGTD  - afebrile, WBC count improving, monitor off abx     r/o TB  - AFB negative x3  -off TB precautions     Endocrine   #Hyperglycemia  Pt with mild hyperglycemia on admission. A1c 5.7% (?pre-DM)  - FS q6h    Heme/onc  #Multiple Myeloma  Pt had last received Velcade 3/2022. Had been given opportunity for chemo vacation and travelled to [facility], but pt went for longer than anticipated. Now with diffuse lytic lesions involving most of spine. Had previously had XRT as well.   Diagnostics:  - LDH, B2-Microglobulin  - SPEP, UPEP, IFX (S/U), Immunoglobulins, FLCs  -No need for further valtrex at this time discussed with heme    #Anemia  Likely multifactorial given UGIB, MM, less likely hemolytic anemia mediated by IVIG. additionally will rule out HIT  -Unlikely hemolytic as LDH and Haptoglobin are wnl   -heparin subq reintroduced at 5000 q12h dose given weight borderline and recent GIB. discussed with pharmacy team.   -High dose PPI   -4T score with intermediate probability of HIT    Serotonin release assay, PF4 negative  -CBC qday          =====Hospital Bundle=====  Hospital Bundle  Fluids: with gtts  Electrolytes: Replete K > 4, Mg > 2, Phos > 3  Nutrition: Regular w thin liquids and supplemental shake  PPX  ---VTE:  SQH  ---GI: protonix,  ---Resp: Room air  ---: Dahl Placed 9/25, removed, previously with condom cath in place.   Access: PIVs  Code Status: FULL CODE  Dispo: Pending Medical Optimization        FOR FOLLOW UP:  [ ] NIFs and VC  [ ] PT/OT  [ ] Monitor BP. Home amlodipine and Losartan restarted in ICU.   [ ] f/u neurology reccomendations  [ ] monitor H/H qday. If rebleeds would consult IR for embolization.   [ ] transition to protonix BID dosing after 72h protonix gtt   [ ] noted to have TWI II, III, aVF. Trops flat. plan to follow up with cards as an outpatient.   FYI home aspirin held in setting of GIB    Please notify Dr. Hdz once patient leaves RCU.       Deo Oropeza MD  Internal Medicine  Pager #[phone] MICU Transfer Note    Transfer from: MICU    Transfer to: (  ) Medicine    (  ) Telemetry     ( X  ) RCU        (    ) Palliative         (   ) Stroke Unit          (   ) __________________    Accepting Physician: RCU attending, private Refoua notified  Signout given to: RCU ACP     MICU COURSE:    71 y/o M with a PMHx significant for HTN, HLD, CAD s/p stent, MM, presents with descending paralysis complicated by acute hypoxic respiratory failure due to neuromuscular weakness mediated by GBS now status post IVIG. Patient in AHRF mediated by NMS intubated on floor at [facility] transferred to MICU. Briefly on pressors vasoplegic shock from sedation during intubation able to be downtitrated and discontinued early in MICU course. Placed on decadron found to have pathologic fracture of T8 neurosurgery consulted. MR without cord compression at any level C/T/or L and decadron discontinued. LP obtained with albuminocytologic dissociation for which IVIG given 3 day course completed 9/28. Patient demonstrated marked improvement with decadron and then with IVIG demonstrating 4/5 strength in all extremities prior to extubation. Previously with copious thick secretions which resolved with airway clearance now minimal. No plans for PLEX per neurology. PT has evaluated the patient as well as speech. Observed to have dark output into fecal management system and downtrending hemoglobin had required transfusion had received 3u prbc total with last transfusion 10/2 and without further dark output found to have ulcers with one lesion treated with epi and bipolar cautery on EGD and the other lesion was not intervened upon as risks outweighed benefits and technically difficult with reccomendation by GI to reconsult IR if rebleeds. Previously with CRISTI resolved and other electrolyte derangements notably hyponatremia and then hypernatremia also resolved. Clinically stable and ready for transfer to floor.  Heme onc has evaluated patient no active treatment at this time and following peripherally. Extubated to NC 10/3 and discontinued precedex 10/4 AM. Agitated managed at this time with redirection and reorientation. Hemodynamically stable and clinically improved ready for transfer to floor.           ASSESSMENT & PLAN:     71 y/o M with a PMHx significant for HTN, HLD, CAD s/p stent, MM, presents with descending paralysis complicated by acute hypoxic respiratory failure due to neuromuscular weakness mediated by GBS now status post IVIG.     Neuro   #Generalized Weakness,  GBS   - MRI brain- No acute hemorrhage mass mass effect or acute territorial   infarcts seen. and C/T/L spine no cord compression   -Discontinued decadron 9/28 discussed with neurosurgery no cord compression and neuro deferred further decision making regarding decadron.   -IVIG 35g qd (3/3) as per neuro for possible GBS completed 9/28  -positive anti ganglioside ab  -appreciate neurology recommendations   -Resolving  -PT consulted and working with patient  -Cleared by speech and swallow      #Sedation/Analgesia  - off sedation.     Pulmonary   #Acute hypoxic respiratory failure secondary to neuromuscular weakness  -improved. extubated 10/3  -continue airway clearance  -weaned off NC o2      #Hemoptysis  Pt reportedly with hemoptysis in [facility]. Pt with RLL calcified granuloma & scattered pulm nodules.   -quant gold indeterminate  - sputum afb x3 no acid fast  -taken off of TB precautions     Cardiovascular   #Vasoplegic Shock  Resolved.     #HLD   -atorvastatin 40 qhs     #CAD  - Continue to hold aspirin given GIB,   - HOLDing Home Metoprolol tartrate 50mg PO BID    #HTN  -Amlodipine 10mg qd   -Losartan 100mg qd    #BNP elevated  Pt with BNP elevation, respiratory distress.  - TTE mild AR and AS, Stage I diastolic dysfunction     GI  - cleared by speech and swallow, regular diet w thin liquid and supplemental shake      #GI bleed  -dark stools improved/resolved.   - pantoprazole gtt 72h then BID dosing  -EGD 10/3 performed nonbleeding ulcerated lesions with visible vessels at two sites. One treated with epi and bipolar cautery. Other site was not intervened upon due to risks associated outweighing benefit and technically difficult.  GI reccomends IR consult if rebleed.     Renal   #Hyponatremia, CRISTI  -Improved/ resolved.     MSK  #T8 Compression Fx   MR spine and spine consult to assess for possible interventions. Diffuse spinal lesions.   - Neurosx consulted   -no acute intervention. Decadron discontinued 9/28 following discussion with neurosurgery as well as neurology.     ID  #Bacillus cereus in blood cxs x1 bottle on 9/25, ?contaminate  - 9/27 BCx NGTD  - afebrile, WBC count improving, monitor off abx     r/o TB  - AFB negative x3  -off TB precautions     Endocrine   #Hyperglycemia  Pt with mild hyperglycemia on admission. A1c 5.7% (?pre-DM)  - FS q6h    Heme/onc  #Multiple Myeloma  Pt had last received Velcade 3/2022. Had been given opportunity for chemo vacation and travelled to [facility], but pt went for longer than anticipated. Now with diffuse lytic lesions involving most of spine. Had previously had XRT as well.   Diagnostics:  - LDH, B2-Microglobulin  - SPEP, UPEP, IFX (S/U), Immunoglobulins, FLCs  -No need for further valtrex at this time discussed with heme    #Anemia  Likely multifactorial given UGIB, MM, less likely hemolytic anemia mediated by IVIG. additionally will rule out HIT  -Unlikely hemolytic as LDH and Haptoglobin are wnl   -heparin subq reintroduced at 5000 q12h dose given weight borderline and recent GIB. discussed with pharmacy team.   -High dose PPI   -4T score with intermediate probability of HIT    Serotonin release assay, PF4 negative  -CBC qday          =====Hospital Bundle=====  Hospital Bundle  Fluids: with gtts  Electrolytes: Replete K > 4, Mg > 2, Phos > 3  Nutrition: Regular w thin liquids and supplemental shake  PPX  ---VTE:  SQH  ---GI: protonix,  ---Resp: Room air  ---: Dahl Placed 9/25, removed, previously with condom cath in place.   Access: PIVs  Code Status: FULL CODE  Dispo: Pending Medical Optimization        FOR FOLLOW UP:  [ ] NIFs and VC  [ ] PT/OT  [ ] Monitor BP. Home amlodipine and Losartan restarted in ICU.   [ ] f/u neurology reccomendations  [ ] monitor H/H qday. If rebleeds would consult IR for embolization.   [x] transitioned to protonix BID dosing after 72h protonix gtt   [ ] noted to have TWI II, III, aVF. Trops flat. plan to follow up with cards as an outpatient.   FYI home aspirin held in setting of GIB    Please notify Dr. Hdz once patient leaves RCU.     Georgia Torre MD  Internal Medicine MICU Transfer Note    Transfer from: MICU    Transfer to: ( X ) Medicine    (  ) Telemetry     (  ) RCU        (    ) Palliative         (   ) Stroke Unit          (   ) __________________    Accepting Physician: Wilder    MICU COURSE:    73 y/o M with a PMHx significant for HTN, HLD, CAD s/p stent, MM, presents with descending paralysis complicated by acute hypoxic respiratory failure due to neuromuscular weakness mediated by GBS now status post IVIG. Patient in AHRF mediated by NMS intubated on floor at RRT transferred to MICU. Briefly on pressors vasoplegic shock from sedation during intubation able to be downtitrated and discontinued early in MICU course. Placed on decadron found to have pathologic fracture of T8 neurosurgery consulted. MR without cord compression at any level C/T/or L and decadron discontinued. LP obtained with albuminocytologic dissociation for which IVIG given 3 day course completed 9/28. Patient demonstrated marked improvement with decadron and then with IVIG demonstrating 4/5 strength in all extremities prior to extubation. Previously with copious thick secretions which resolved with airway clearance now minimal. No plans for PLEX per neurology. PT has evaluated the patient as well as speech. Observed to have dark output into fecal management system and downtrending hemoglobin had required transfusion had received 3u prbc total with last transfusion 10/2 and without further dark output found to have ulcers with one lesion treated with epi and bipolar cautery on EGD and the other lesion was not intervened upon as risks outweighed benefits and technically difficult with reccomendation by GI to reconsult IR if rebleeds. Previously with CRISTI resolved and other electrolyte derangements notably hyponatremia and then hypernatremia also resolved. Clinically stable and ready for transfer to floor.  Heme onc has evaluated patient no active treatment at this time and following peripherally. Extubated to NC 10/3 and discontinued precedex 10/4 AM. Agitated managed at this time with redirection and reorientation. Hemodynamically stable and clinically improved ready for transfer to floor.           ASSESSMENT & PLAN:     73 y/o M with a PMHx significant for HTN, HLD, CAD s/p stent, MM, presents with descending paralysis complicated by acute hypoxic respiratory failure due to neuromuscular weakness mediated by GBS now status post IVIG.     Neuro   #Generalized Weakness,  GBS   - MRI brain- No acute hemorrhage mass mass effect or acute territorial   infarcts seen. and C/T/L spine no cord compression   -Discontinued decadron 9/28 discussed with neurosurgery no cord compression and neuro deferred further decision making regarding decadron.   -IVIG 35g qd (3/3) as per neuro for possible GBS completed 9/28  -positive anti ganglioside ab  -appreciate neurology recommendations   -Resolving  -PT consulted and working with patient  -Cleared by speech and swallow      #Sedation/Analgesia  - off sedation.     Pulmonary   #Acute hypoxic respiratory failure secondary to neuromuscular weakness  -improved. extubated 10/3  -continue airway clearance  -weaned off NC o2      #Hemoptysis  Pt reportedly with hemoptysis in [facility]. Pt with RLL calcified granuloma & scattered pulm nodules.   -quant gold indeterminate  - sputum afb x3 no acid fast  -taken off of TB precautions     Cardiovascular   #Vasoplegic Shock  Resolved.     #HLD   -atorvastatin 40 qhs     #CAD  - Continue to hold aspirin given GIB,   - HOLDing Home Metoprolol tartrate 50mg PO BID    #HTN  -Amlodipine 10mg qd   -Losartan 100mg qd    #BNP elevated  Pt with BNP elevation, respiratory distress.  - TTE mild AR and AS, Stage I diastolic dysfunction     GI  - cleared by speech and swallow, regular diet w thin liquid and supplemental shake      #GI bleed  -dark stools improved/resolved.   - pantoprazole gtt 72h then BID dosing  -EGD 10/3 performed nonbleeding ulcerated lesions with visible vessels at two sites. One treated with epi and bipolar cautery. Other site was not intervened upon due to risks associated outweighing benefit and technically difficult.  GI reccomends IR consult if rebleed.     Renal   #Hyponatremia, CRISTI  -Improved/ resolved.     MSK  #T8 Compression Fx   MR spine and spine consult to assess for possible interventions. Diffuse spinal lesions.   - Neurosx consulted   -no acute intervention. Decadron discontinued 9/28 following discussion with neurosurgery as well as neurology.     ID  #Bacillus cereus in blood cxs x1 bottle on 9/25, ?contaminate  - 9/27 BCx NGTD  - afebrile, WBC count improving, monitor off abx     r/o TB  - AFB negative x3  -off TB precautions     Endocrine   #Hyperglycemia  Pt with mild hyperglycemia on admission. A1c 5.7% (?pre-DM)  - FS q6h    Heme/onc  #Multiple Myeloma  Pt had last received Velcade 3/2022. Had been given opportunity for chemo vacation and travelled to [facility], but pt went for longer than anticipated. Now with diffuse lytic lesions involving most of spine. Had previously had XRT as well.   Diagnostics:  - LDH, B2-Microglobulin  - SPEP, UPEP, IFX (S/U), Immunoglobulins, FLCs  -No need for further valtrex at this time discussed with heme    #Anemia  Likely multifactorial given UGIB, MM, less likely hemolytic anemia mediated by IVIG. additionally will rule out HIT  -Unlikely hemolytic as LDH and Haptoglobin are wnl   -heparin subq reintroduced at 5000 q12h dose given weight borderline and recent GIB. discussed with pharmacy team.   -High dose PPI   -4T score with intermediate probability of HIT    Serotonin release assay, PF4 negative  -CBC qday          =====Hospital Bundle=====  Hospital Bundle  Fluids: with gtts  Electrolytes: Replete K > 4, Mg > 2, Phos > 3  Nutrition: Regular w thin liquids and supplemental shake  PPX  ---VTE:  SQH  ---GI: protonix,  ---Resp: Room air  ---: Dahl Placed 9/25, removed, previously with condom cath in place.   Access: PIVs  Code Status: FULL CODE  Dispo: Pending Medical Optimization        FOR FOLLOW UP:  [ ] NIFs and VC  [ ] PT/OT  [ ] Monitor BP. Home amlodipine and Losartan restarted in ICU.   [ ] f/u neurology reccomendations  [ ] monitor H/H qday. If rebleeds would consult IR for embolization.   [x] transitioned to protonix BID dosing after 72h protonix gtt   [ ] noted to have TWI II, III, aVF. Trops flat. plan to follow up with cards as an outpatient.   FYI home aspirin held in setting of GIB    Please notify Dr. Hdz once patient leaves RCU.     Georgia Torre MD  Internal Medicine MICU Transfer Note    Transfer from: MICU    Transfer to: ( X ) Medicine    (  ) Telemetry     (  ) RCU        (    ) Palliative         (   ) Stroke Unit          (   ) __________________    Accepting Physician: Wilder    MICU COURSE:    71 y/o M with a PMHx significant for HTN, HLD, CAD s/p stent, MM, presents with descending paralysis complicated by acute hypoxic respiratory failure due to neuromuscular weakness mediated by GBS now status post IVIG. Patient in AHRF mediated by NMS intubated on floor at RRT transferred to MICU. Briefly on pressors vasoplegic shock from sedation during intubation able to be downtitrated and discontinued early in MICU course. Placed on decadron found to have pathologic fracture of T8 neurosurgery consulted. MR without cord compression at any level C/T/or L and decadron discontinued. LP obtained with albuminocytologic dissociation for which IVIG given 3 day course completed 9/28. Patient demonstrated marked improvement with decadron and then with IVIG demonstrating 4/5 strength in all extremities prior to extubation. Previously with copious thick secretions which resolved with airway clearance now minimal. No plans for PLEX per neurology. PT has evaluated the patient as well as speech. Observed to have dark output into fecal management system and downtrending hemoglobin had required transfusion had received 3u prbc total with last transfusion 10/2 and without further dark output found to have ulcers with one lesion treated with epi and bipolar cautery on EGD and the other lesion was not intervened upon as risks outweighed benefits and technically difficult with reccomendation by GI to reconsult IR if rebleeds. Previously with CRISTI resolved and other electrolyte derangements notably hyponatremia and then hypernatremia also resolved. Clinically stable and ready for transfer to floor.  Heme onc has evaluated patient no active treatment at this time and following peripherally. Extubated to NC 10/3 and discontinued precedex 10/4 AM. Agitated managed at this time with redirection and reorientation. Hemodynamically stable and clinically improved ready for transfer to floor.           ASSESSMENT & PLAN:     71 y/o M with a PMHx significant for HTN, HLD, CAD s/p stent, MM, presents with descending paralysis complicated by acute hypoxic respiratory failure due to neuromuscular weakness mediated by GBS now status post IVIG.     Neuro   #Generalized Weakness,  GBS   - MRI brain- No acute hemorrhage mass mass effect or acute territorial   infarcts seen. and C/T/L spine no cord compression   -Discontinued decadron 9/28 discussed with neurosurgery no cord compression and neuro deferred further decision making regarding decadron.   -IVIG 35g qd (3/3) as per neuro for possible GBS completed 9/28  -positive anti ganglioside ab  -appreciate neurology recommendations   -Resolving  -PT consulted and working with patient  -Cleared by speech and swallow      #Sedation/Analgesia  - off sedation.     Pulmonary   #Acute hypoxic respiratory failure secondary to neuromuscular weakness  -improved. extubated 10/3  -continue airway clearance  -weaned off NC o2      #Hemoptysis  Pt reportedly with hemoptysis in [facility]. Pt with RLL calcified granuloma & scattered pulm nodules.   -quant gold indeterminate  - sputum afb x3 no acid fast  -taken off of TB precautions     Cardiovascular   #Vasoplegic Shock  Resolved.     #HLD   -atorvastatin 40 qhs     #CAD  - Continue to hold aspirin given GIB,   - HOLDing Home Metoprolol tartrate 50mg PO BID    #HTN  -Amlodipine 10mg qd   -Losartan 100mg qd    #BNP elevated  Pt with BNP elevation, respiratory distress.  - TTE mild AR and AS, Stage I diastolic dysfunction     GI  - cleared by speech and swallow, regular diet w thin liquid and supplemental shake      #GI bleed  -dark stools improved/resolved.   - pantoprazole gtt 72h then BID dosing  -EGD 10/3 performed nonbleeding ulcerated lesions with visible vessels at two sites. One treated with epi and bipolar cautery. Other site was not intervened upon due to risks associated outweighing benefit and technically difficult.  GI reccomends IR consult if rebleed.     Renal   #Hyponatremia, CRISTI  -Improved/ resolved.     MSK  #T8 Compression Fx   MR spine and spine consult to assess for possible interventions. Diffuse spinal lesions.   - Neurosx consulted   -no acute intervention. Decadron discontinued 9/28 following discussion with neurosurgery as well as neurology.     ID  #Bacillus cereus in blood cxs x1 bottle on 9/25, ?contaminate  - 9/27 BCx NGTD  - afebrile, WBC count improving, monitor off abx     r/o TB  - AFB negative x3  -off TB precautions     Endocrine   #Hyperglycemia  Pt with mild hyperglycemia on admission. A1c 5.7% (?pre-DM)  - FS q6h    Heme/onc  #Multiple Myeloma  Pt had last received Velcade 3/2022. Had been given opportunity for chemo vacation and travelled to [facility], but pt went for longer than anticipated. Now with diffuse lytic lesions involving most of spine. Had previously had XRT as well.   Diagnostics:  - LDH, B2-Microglobulin  - SPEP, UPEP, IFX (S/U), Immunoglobulins, FLCs  -No need for further valtrex at this time discussed with heme    #Anemia  Likely multifactorial given UGIB, MM, less likely hemolytic anemia mediated by IVIG. additionally will rule out HIT  -Unlikely hemolytic as LDH and Haptoglobin are wnl   -heparin subq reintroduced at 5000 q12h dose given weight borderline and recent GIB. discussed with pharmacy team.   -High dose PPI   -4T score with intermediate probability of HIT    Serotonin release assay, PF4 negative  -CBC qday          =====Hospital Bundle=====  Hospital Bundle  Fluids: with gtts  Electrolytes: Replete K > 4, Mg > 2, Phos > 3  Nutrition: Regular w thin liquids and supplemental shake  PPX  ---VTE:  SQH  ---GI: protonix,  ---Resp: Room air  ---: Dahl Placed 9/25, removed, previously with condom cath in place.   Access: PIVs  Code Status: FULL CODE  Dispo: Pending Medical Optimization        FOR FOLLOW UP:  [ ] NIFs and VC  [ ] PT/OT  [ ] Monitor BP. Home amlodipine and Losartan restarted in ICU.   [ ] f/u neurology reccomendations  [ ] monitor H/H qday. If rebleeds would consult IR for embolization.   [x] transitioned to protonix BID dosing after 72h protonix gtt   [ ] noted to have TWI II, III, aVF. Trops flat. plan to follow up with cards as an outpatient.   FYI home aspirin held in setting of GIB      Georgia Torre MD  Internal Medicine

## 2022-10-04 NOTE — PROGRESS NOTE ADULT - SUBJECTIVE AND OBJECTIVE BOX
Deo Oropeza MD  Internal Medicine  Pager #[phone]    CHIEF COMPLAINT:Patient is a 72y old  Male who presents with a chief complaint of weakness (03 Oct 2022 14:23)        Interval Events:    REVIEW OF SYSTEMS:      OBJECTIVE:  ICU Vital Signs Last 24 Hrs  T(C): 37.2 (04 Oct 2022 04:00), Max: 37.2 (04 Oct 2022 04:00)  T(F): 99 (04 Oct 2022 04:00), Max: 99 (04 Oct 2022 04:00)  HR: 59 (04 Oct 2022 06:23) (49 - 129)  BP: 105/83 (04 Oct 2022 06:00) (105/83 - 202/78)  BP(mean): 91 (04 Oct 2022 06:00) (77 - 119)  ABP: --  ABP(mean): --  RR: 20 (04 Oct 2022 06:23) (12 - 36)  SpO2: 100% (04 Oct 2022 06:23) (96% - 100%)    O2 Parameters below as of 04 Oct 2022 06:23  Patient On (Oxygen Delivery Method): nasal cannula  O2 Flow (L/min): 3        Mode: standby    10-03 @ 07:01  -  10-04 @ 07:00  --------------------------------------------------------  IN: 1348.3 mL / OUT: 2005 mL / NET: -656.7 mL      CAPILLARY BLOOD GLUCOSE      POCT Blood Glucose.: 119 mg/dL (04 Oct 2022 05:04)      PHYSICAL EXAM:      LINES:    HOSPITAL MEDICATIONS:  Standing Meds:  albuterol/ipratropium for Nebulization 3 milliLiter(s) Nebulizer every 6 hours  amLODIPine   Tablet 10 milliGRAM(s) Oral daily  atorvastatin 40 milliGRAM(s) Oral at bedtime  chlorhexidine 2% Cloths 1 Application(s) Topical daily  dexMEDEtomidine Infusion 0.2 MICROgram(s)/kG/Hr IV Continuous <Continuous>  dextrose 5% + lactated ringers. 1000 milliLiter(s) IV Continuous <Continuous>  glucagon  Injectable 1 milliGRAM(s) IntraMuscular once  pantoprazole Infusion 8 mG/Hr IV Continuous <Continuous>  polyethylene glycol 3350 17 Gram(s) Oral daily  sodium chloride 3%  Inhalation 4 milliLiter(s) Inhalation every 12 hours  valACYclovir 500 milliGRAM(s) Oral daily      PRN Meds:      LABS:                        10.8   10.87 )-----------( 97       ( 04 Oct 2022 00:10 )             30.9     Hgb Trend: 10.8<--, 9.0<--, 8.0<--, 8.7<--, 6.4<--  10-04    135  |  104  |  28<H>  ----------------------------<  97  3.7   |  18<L>  |  0.95    Ca    8.9      04 Oct 2022 00:10  Phos  2.6     10-04  Mg     2.00     10-04    TPro  7.7  /  Alb  3.5  /  TBili  0.8  /  DBili  x   /  AST  47<H>  /  ALT  46<H>  /  AlkPhos  47  10-04    Creatinine Trend: 0.95<--, 0.98<--, 1.00<--, 0.93<--, 1.07<--, 1.09<--  PT/INR - ( 04 Oct 2022 00:10 )   PT: 11.3 sec;   INR: 0.97 ratio             Arterial Blood Gas:  10-04 @ 01:27  7.58/18/157/17/98.6/-3.5  ABG lactate: --        MICROBIOLOGY:     RADIOLOGY:  [ ] Reviewed and interpreted by me    EKG:     Deo Oropeza MD  Internal Medicine  Pager #[phone]    CHIEF COMPLAINT:Patient is a 72y old  Male who presents with a chief complaint of weakness (03 Oct 2022 14:23)        Interval Events:    Not tolerating bipap overnight placed on NC and more comfortable. requiring hycodan ON for cough and placed on precedex with plan to downtitrate in AM.     Hgb trend reassuring. uptrending. No report of melena or BRBPR overnight.     No other acute issues.     REVIEW OF SYSTEMS:    Const no pain  CV no chest pain or palpitation  Pulm no dyspnea     Limited ROS due to recently extubated/weak phonation as well as component of ?delirium/agitation.       OBJECTIVE:  ICU Vital Signs Last 24 Hrs  T(C): 37.2 (04 Oct 2022 04:00), Max: 37.2 (04 Oct 2022 04:00)  T(F): 99 (04 Oct 2022 04:00), Max: 99 (04 Oct 2022 04:00)  HR: 59 (04 Oct 2022 06:23) (49 - 129)  BP: 105/83 (04 Oct 2022 06:00) (105/83 - 202/78)  BP(mean): 91 (04 Oct 2022 06:00) (77 - 119)  ABP: --  ABP(mean): --  RR: 20 (04 Oct 2022 06:23) (12 - 36)  SpO2: 100% (04 Oct 2022 06:23) (96% - 100%)    O2 Parameters below as of 04 Oct 2022 06:23  Patient On (Oxygen Delivery Method): nasal cannula  O2 Flow (L/min): 3        Mode: standby    10-03 @ 07:01  -  10-04 @ 07:00  --------------------------------------------------------  IN: 1348.3 mL / OUT: 2005 mL / NET: -656.7 mL      CAPILLARY BLOOD GLUCOSE      POCT Blood Glucose.: 119 mg/dL (04 Oct 2022 05:04)      PHYSICAL EXAM:  GENERAL: extubated. well appearing  LUNG: CTAB and no stridor  HEART: RRR  ABDOMEN: Soft, Nontender, Nondistended   condom catheter in place. Interval removal of fecal management system   EXTREMITIES:  No LE edema, 2+ Peripheral Pulses, No clubbing, cyanosis, or edema  NEUROLOGY: responsive to voice. follows commands. 4/5 strength throughout        LINES:    HOSPITAL MEDICATIONS:  Standing Meds:  albuterol/ipratropium for Nebulization 3 milliLiter(s) Nebulizer every 6 hours  amLODIPine   Tablet 10 milliGRAM(s) Oral daily  atorvastatin 40 milliGRAM(s) Oral at bedtime  chlorhexidine 2% Cloths 1 Application(s) Topical daily  dexMEDEtomidine Infusion 0.2 MICROgram(s)/kG/Hr IV Continuous <Continuous>  dextrose 5% + lactated ringers. 1000 milliLiter(s) IV Continuous <Continuous>  glucagon  Injectable 1 milliGRAM(s) IntraMuscular once  pantoprazole Infusion 8 mG/Hr IV Continuous <Continuous>  polyethylene glycol 3350 17 Gram(s) Oral daily  sodium chloride 3%  Inhalation 4 milliLiter(s) Inhalation every 12 hours  valACYclovir 500 milliGRAM(s) Oral daily      PRN Meds:      LABS:                        10.8   10.87 )-----------( 97       ( 04 Oct 2022 00:10 )             30.9     Hgb Trend: 10.8<--, 9.0<--, 8.0<--, 8.7<--, 6.4<--  10-04    135  |  104  |  28<H>  ----------------------------<  97  3.7   |  18<L>  |  0.95    Ca    8.9      04 Oct 2022 00:10  Phos  2.6     10-04  Mg     2.00     10-04    TPro  7.7  /  Alb  3.5  /  TBili  0.8  /  DBili  x   /  AST  47<H>  /  ALT  46<H>  /  AlkPhos  47  10-04    Creatinine Trend: 0.95<--, 0.98<--, 1.00<--, 0.93<--, 1.07<--, 1.09<--  PT/INR - ( 04 Oct 2022 00:10 )   PT: 11.3 sec;   INR: 0.97 ratio             Arterial Blood Gas:  10-04 @ 01:27  7.58/18/157/17/98.6/-3.5  ABG lactate: --        MICROBIOLOGY:     RADIOLOGY:  [ ] Reviewed and interpreted by me    EKG:

## 2022-10-04 NOTE — SWALLOW BEDSIDE ASSESSMENT ADULT - SWALLOW EVAL: DIAGNOSIS
1. Functional oral stage for puree, regular solids, mildly thick liquids, thin liquids marked by adequate oral acceptance, collection/chewing and transfer. 2. Functional pharyngeal phase for the aforementioned consistencies marked by a present pharyngeal swallow trigger with hyolaryngeal elevation upon digital palpation without evidence of airway penetration/aspiration.

## 2022-10-04 NOTE — PROGRESS NOTE ADULT - ASSESSMENT
73 y/o M with a PMHx significant for HTN, HLD, CAD s/p stent, MM, presents with descending paralysis complicated by acute hypoxic respiratory failure due to neuromuscular weakness mediated by GBS now status post IVIG.     Neuro   #Generalized Weakness,  GBS   - MRI brain- No acute hemorrhage mass mass effect or acute territorial   infarcts seen. and C/T/L spine no cord compression   -Discontinued decadron 9/28 discussed with neurosurgery no cord compression and neuro deferred further decision making regarding decadron.   -IVIG 35g qd (3/3) as per neuro for possible GBS completed 9/28  -positive anti ganglioside ab  -appreciate neurology recommendations   -Resolving  -PT consulted and contacted to work with patient.       #Sedation/Analgesia  - precedex, wean as tolerated.     Pulmonary   #Acute hypoxic respiratory failure secondary to neuromuscular weakness  -improved. extubated 10/3  -continue airway clearance  -weaned off NC o2      #Hemoptysis  Pt reportedly with hemoptysis in [facility]. Pt with RLL calcified granuloma & scattered pulm nodules.   -quant gold indeterminate  - sputum afb x3 no acid fast  -taken off of TB precautions     Cardiovascular   #Vasoplegic Shock  Resolved.     #HLD   -atorvastatin 40 qhs     #CAD  - Continue to hold aspirin given GIB,   - HOLDing Home Metoprolol tartrate 50mg PO BID    #HTN  -Amlodipine 10mg qd   -Losartan held. reintroduce as tolerated creatinine and BP permitting.     #BNP elevated  Pt with BNP elevation, respiratory distress.  - TTE mild AR and AS, Stage I diastolic dysfunction     GI  - NPO w/ TF. On hold pending EGD with GI     #GI bleed  -dark stools   - pantoprazole gtt  -GI consulted  -pending EGD    Renal   #Hyponatremia, CRISTI  Pt with persistently low sodium. 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 Should have fluid restriction when not intubated.  - Na improving, cont to trend BMP q12  -c/w free water pushes   - Trend Cr, f/u urine lytes  - Strict Is/Os  -monitor sodium given GBS and patient s/p IVIG  -Improved/ resolved.     MSK  #T8 Compression Fx   MR spine and spine consult to assess for possible interventions. Diffuse spinal lesions.   - Neurosx consulted   -no acute intervention. Decadron discontinued 9/28 following discussion with neurosurgery as well as neurology.     ID  #Bacillus cereus in blood cxs x1 bottle on 9/25, ?contaminate  - 9/27 BCx NGTD  - afebrile, WBC count improving, monitor off abx     r/o TB  - AFB negative x3  -off TB precautions     Endocrine   #Hyperglycemia  Pt with mild hyperglycemia on admission. A1c 5.7% (?pre-DM)  - FS q6h    Heme/onc  #Multiple Myeloma  Pt had last received Velcade 3/2022. Had been given opportunity for chemo vacation and travelled to [facility], but pt went for longer than anticipated. Now with diffuse lytic lesions involving most of spine. Had previously had XRT as well.   Diagnostics:  - LDH, B2-Microglobulin  - SPEP, UPEP, IFX (S/U), Immunoglobulins, FLCs    #Anemia  Likely multifactorial given UGIB, MM, less likely hemolytic anemia mediated by IVIG. additionally will rule out HIT  -Unlikely hemolytic as LDH and Haptoglobin are wnl   -heparin subq held   -High dose PPI   -4T score with intermediate probability of HIT   -Follow up Serotonin release assay, PF4.   -CBC q12h           =====Hospital Bundle=====  Hospital Bundle  Fluids: with gtts  Electrolytes: Replete K > 4, Mg > 2, Phos > 3  Nutrition: Diet NPO with TF (Nutrition C/S)  PPX  ---VTE: SCD  ---GI: protonix, tube feeds held.   ---Resp: Vented  ---: Dahl Placed 9/25, removed, previously with condom cath in place.   Access: PIVs  Code Status: FULL CODE  Dispo: Pending Medical Optimization 71 y/o M with a PMHx significant for HTN, HLD, CAD s/p stent, MM, presents with descending paralysis complicated by acute hypoxic respiratory failure due to neuromuscular weakness mediated by GBS now status post IVIG.     Neuro   #Generalized Weakness,  GBS   - MRI brain- No acute hemorrhage mass mass effect or acute territorial   infarcts seen. and C/T/L spine no cord compression   -Discontinued decadron 9/28 discussed with neurosurgery no cord compression and neuro deferred further decision making regarding decadron.   -IVIG 35g qd (3/3) as per neuro for possible GBS completed 9/28  -positive anti ganglioside ab  -appreciate neurology recommendations   -Resolving  -PT consulted and contacted to work with patient.       #Sedation/Analgesia  - precedex, wean as tolerated.     Pulmonary   #Acute hypoxic respiratory failure secondary to neuromuscular weakness  -improved. extubated 10/3  -continue airway clearance  -weaned off NC o2      #Hemoptysis  Pt reportedly with hemoptysis in [facility]. Pt with RLL calcified granuloma & scattered pulm nodules.   -quant gold indeterminate  - sputum afb x3 no acid fast  -taken off of TB precautions     Cardiovascular   #Vasoplegic Shock  Resolved.     #HLD   -atorvastatin 40 qhs     #CAD  - Continue to hold aspirin given GIB,   - HOLDing Home Metoprolol tartrate 50mg PO BID    #HTN  -Amlodipine 10mg qd   -Losartan held. reintroduce as tolerated creatinine and BP permitting.     #BNP elevated  Pt with BNP elevation, respiratory distress.  - TTE mild AR and AS, Stage I diastolic dysfunction     GI  - NPO w/ TF. On hold pending EGD with GI     #GI bleed  -dark stools improved/resolved.   - pantoprazole gtt 72h then BID dosing  -EGD 10/3 performed nonbleeding ulcerated lesions with visible vessels at two sites. One treated with epi and bipolar cautery. Other site was not intervened upon due to risks associated outweighing benefit and technically difficult.  GI reccomends IR consult if rebleed.     Renal   #Hyponatremia, CRISTI  -Improved/ resolved.     MSK  #T8 Compression Fx   MR spine and spine consult to assess for possible interventions. Diffuse spinal lesions.   - Neurosx consulted   -no acute intervention. Decadron discontinued 9/28 following discussion with neurosurgery as well as neurology.     ID  #Bacillus cereus in blood cxs x1 bottle on 9/25, ?contaminate  - 9/27 BCx NGTD  - afebrile, WBC count improving, monitor off abx     r/o TB  - AFB negative x3  -off TB precautions     Endocrine   #Hyperglycemia  Pt with mild hyperglycemia on admission. A1c 5.7% (?pre-DM)  - FS q6h    Heme/onc  #Multiple Myeloma  Pt had last received Velcade 3/2022. Had been given opportunity for chemo vacation and travelled to [facility], but pt went for longer than anticipated. Now with diffuse lytic lesions involving most of spine. Had previously had XRT as well.   Diagnostics:  - LDH, B2-Microglobulin  - SPEP, UPEP, IFX (S/U), Immunoglobulins, FLCs    #Anemia  Likely multifactorial given UGIB, MM, less likely hemolytic anemia mediated by IVIG. additionally will rule out HIT  -Unlikely hemolytic as LDH and Haptoglobin are wnl   -heparin subq reintroduced at 5000 q12h dose given weight borderline and recent GIB. discussed with pharmacy team.   -High dose PPI   -4T score with intermediate probability of HIT    Serotonin release assay, PF4 negative  -CBC qday          =====Hospital Bundle=====  Hospital Bundle  Fluids: with gtts  Electrolytes: Replete K > 4, Mg > 2, Phos > 3  Nutrition: Diet NPO with TF (Nutrition C/S)  PPX  ---VTE: SCD, SQH  ---GI: protonix,  ---Resp: Room air  ---: Dahl Placed 9/25, removed, previously with condom cath in place.   Access: PIVs  Code Status: FULL CODE  Dispo: Pending Medical Optimization

## 2022-10-04 NOTE — PROGRESS NOTE ADULT - ASSESSMENT
72 year-old male, history of HTN, HLD, CAD, cardiac stent, MM, presents with cc generalized weakness x 2-3 days associated with some tingling sensation.    A/P:  Hyponatremia/hypernatrmic:  work up suggested SIADH  Na appropriately improved now   on free water 250q8  Na level should not change more than 6-8meq in 24 hrs  monitor Na closely     acute on CKD stage 3  Baseline Scr 1.3-1.4  CRISTI likely sec to ATN  improved better than baseline  monitor bmp, i/o    Hypokalemia:  better  Monitor K level    acidosis  Non AG  dropping.  off bicarb. consider restarting bicarb 650 tid  monitor    HTN:  optimal   manage per MICU   MOnitor BP    Weakness:  Etiology?  h/o MM  s/p LP  possible GBS s/p [id number]  f/u neuro

## 2022-10-04 NOTE — SWALLOW BEDSIDE ASSESSMENT ADULT - COMMENTS
MICU note 10/4 "71 y/o M with a PMHx significant for HTN, HLD, CAD s/p stent, MM, presents with descending paralysis complicated by acute hypoxic respiratory failure due to neuromuscular weakness mediated by GBS now status post IVIG. "    CXR 9/26 "Clear lungs."    Patient seen at bedside this afternoon for an initial assessment for swallow function, at which time patient was alert and cooperative. Patient is able to verbalize and gesture wants/needs. Patient denies pain/difficulty with swallow. Patient noted to have Yankauer suction in hand and stated to have secretions. Vocal quality clear prior to PO trials.

## 2022-10-04 NOTE — PHYSICAL THERAPY INITIAL EVALUATION ADULT - GROSSLY INTACT, SENSORY
diminished in right forearm and hand when compared to left side; otherwise grossly intact throughout/Left UE

## 2022-10-04 NOTE — PROGRESS NOTE ADULT - SUBJECTIVE AND OBJECTIVE BOX
Chief Complaint:  Patient is a 72y old  Male who presents with a chief complaint of weakness (04 Oct 2022 07:53)    Interval Events:   No BMs overnight  HDS, Hgb stable  Remains intubated, not on pressors     Hospital Medications:  albuterol/ipratropium for Nebulization 3 milliLiter(s) Nebulizer every 6 hours  amLODIPine   Tablet 10 milliGRAM(s) Oral daily  atorvastatin 40 milliGRAM(s) Oral at bedtime  chlorhexidine 2% Cloths 1 Application(s) Topical daily  dexMEDEtomidine Infusion 0.2 MICROgram(s)/kG/Hr IV Continuous <Continuous>  dextrose 5% + lactated ringers. 1000 milliLiter(s) IV Continuous <Continuous>  glucagon  Injectable 1 milliGRAM(s) IntraMuscular once  heparin   Injectable 5000 Unit(s) SubCutaneous every 12 hours  pantoprazole Infusion 8 mG/Hr IV Continuous <Continuous>  polyethylene glycol 3350 17 Gram(s) Oral daily  potassium chloride  10 mEq/100 mL IVPB 10 milliEquivalent(s) IV Intermittent every 1 hour  potassium phosphate IVPB 15 milliMole(s) IV Intermittent once  sodium chloride 3%  Inhalation 4 milliLiter(s) Inhalation every 12 hours  valACYclovir 500 milliGRAM(s) Oral daily      ROS:   Limited ROS due to patient's condition.     PHYSICAL EXAM:   Vital Signs:  Vital Signs Last 24 Hrs  T(C): 37.2 (04 Oct 2022 04:00), Max: 37.2 (04 Oct 2022 04:00)  T(F): 99 (04 Oct 2022 04:00), Max: 99 (04 Oct 2022 04:00)  HR: 58 (04 Oct 2022 08:00) (53 - 129)  BP: 123/58 (04 Oct 2022 08:00) (105/83 - 202/78)  BP(mean): 76 (04 Oct 2022 08:00) (76 - 119)  RR: 18 (04 Oct 2022 08:00) (12 - 36)  SpO2: 100% (04 Oct 2022 08:00) (96% - 100%)    Parameters below as of 04 Oct 2022 08:00  Patient On (Oxygen Delivery Method): nasal cannula  O2 Flow (L/min): 3    Daily     Daily Weight in k.7 (04 Oct 2022 02:00)    GENERAL:  No acute distress, intubated, awake  HEENT:  Normocephalic/atraumatic, no scleral icterus  CHEST: +mechanically vented BS  HEART:  Bradycardic, no murmurs/rubs/gallops  ABDOMEN:  Soft, non-tender, non-distended  EXTREMITIES: No cyanosis, clubbing, or edema  SKIN:  No rash/warm/dry  NEURO:  awake, able to signal yes and no with shaking head and nodding and following commands    LABS: reviewed                        10.8   10.87 )-----------( 97       ( 04 Oct 2022 00:10 )             30.9     10-04    135  |  104  |  28<H>  ----------------------------<  97  3.7   |  18<L>  |  0.95    Ca    8.9      04 Oct 2022 00:10  Phos  2.6     10-04  Mg     2.00     10-04    TPro  7.7  /  Alb  3.5  /  TBili  0.8  /  DBili  x   /  AST  47<H>  /  ALT  46<H>  /  AlkPhos  47  10-04    LIVER FUNCTIONS - ( 04 Oct 2022 00:10 )  Alb: 3.5 g/dL / Pro: 7.7 g/dL / ALK PHOS: 47 U/L / ALT: 46 U/L / AST: 47 U/L / GGT: x             Interval Diagnostic Studies: see sunrise for full report   Interval Events:   No BMs overnight  HDS, Hgb stable  Remains intubated, not on pressors     Hospital Medications:  albuterol/ipratropium for Nebulization 3 milliLiter(s) Nebulizer every 6 hours  amLODIPine   Tablet 10 milliGRAM(s) Oral daily  atorvastatin 40 milliGRAM(s) Oral at bedtime  chlorhexidine 2% Cloths 1 Application(s) Topical daily  dexMEDEtomidine Infusion 0.2 MICROgram(s)/kG/Hr IV Continuous <Continuous>  dextrose 5% + lactated ringers. 1000 milliLiter(s) IV Continuous <Continuous>  glucagon  Injectable 1 milliGRAM(s) IntraMuscular once  heparin   Injectable 5000 Unit(s) SubCutaneous every 12 hours  pantoprazole Infusion 8 mG/Hr IV Continuous <Continuous>  polyethylene glycol 3350 17 Gram(s) Oral daily  potassium chloride  10 mEq/100 mL IVPB 10 milliEquivalent(s) IV Intermittent every 1 hour  potassium phosphate IVPB 15 milliMole(s) IV Intermittent once  sodium chloride 3%  Inhalation 4 milliLiter(s) Inhalation every 12 hours  valACYclovir 500 milliGRAM(s) Oral daily      ROS:   Limited ROS due to patient's condition.     PHYSICAL EXAM:   Vital Signs:  Vital Signs Last 24 Hrs  T(C): 37.2 (04 Oct 2022 04:00), Max: 37.2 (04 Oct 2022 04:00)  T(F): 99 (04 Oct 2022 04:00), Max: 99 (04 Oct 2022 04:00)  HR: 58 (04 Oct 2022 08:00) (53 - 129)  BP: 123/58 (04 Oct 2022 08:00) (105/83 - 202/78)  BP(mean): 76 (04 Oct 2022 08:00) (76 - 119)  RR: 18 (04 Oct 2022 08:00) (12 - 36)  SpO2: 100% (04 Oct 2022 08:00) (96% - 100%)    Parameters below as of 04 Oct 2022 08:00  Patient On (Oxygen Delivery Method): nasal cannula  O2 Flow (L/min): 3    Daily     Daily Weight in k.7 (04 Oct 2022 02:00)    GENERAL:  No acute distress, intubated, awake  HEENT:  Normocephalic/atraumatic, no scleral icterus  CHEST: +mechanically vented BS  HEART:  Bradycardic, no murmurs/rubs/gallops  ABDOMEN:  Soft, non-tender, non-distended  EXTREMITIES: No cyanosis, clubbing, or edema  SKIN:  No rash/warm/dry  NEURO:  awake, able to signal yes and no with shaking head and nodding and following commands    LABS: reviewed                        10.8   10.87 )-----------( 97       ( 04 Oct 2022 00:10 )             30.9     10-    135  |  104  |  28<H>  ----------------------------<  97  3.7   |  18<L>  |  0.95    Ca    8.9      04 Oct 2022 00:10  Phos  2.6     10-04  Mg     2.00     10-04    TPro  7.7  /  Alb  3.5  /  TBili  0.8  /  DBili  x   /  AST  47<H>  /  ALT  46<H>  /  AlkPhos  47  10-04    LIVER FUNCTIONS - ( 04 Oct 2022 00:10 )  Alb: 3.5 g/dL / Pro: 7.7 g/dL / ALK PHOS: 47 U/L / ALT: 46 U/L / AST: 47 U/L / GGT: x             Interval Diagnostic Studies: see sunrise for full report

## 2022-10-04 NOTE — PROGRESS NOTE ADULT - SUBJECTIVE AND OBJECTIVE BOX
[facility]   MD DERRICK MOSS MD KRISTINE SOLTANPOUR, DO ANGELA WONG, PA    TEL:  OFFICE: [phone]  From 5pm-7am Answering Service [phone]    -- RENAL FOLLOW UP NOTE ---Date of Service 10-04-22 @ 10:44    Patient is a 72y old  Male who presents with a chief complaint of weakness (04 Oct 2022 09:38)      Patient seen and examined at bedside. No chest pain/sob    VITALS:  T(F): 98.8 (10-04-22 @ 08:00), Max: 99 (10-04-22 @ 04:00)  HR: 64 (10-04-22 @ 10:17)  BP: 131/56 (10-04-22 @ 09:00)  RR: 18 (10-04-22 @ 09:00)  SpO2: 98% (10-04-22 @ 10:17)  Wt(kg): --    10-03 @ 07:01  -  10-04 @ 07:00  --------------------------------------------------------  IN: 1362.1 mL / OUT: 2005 mL / NET: -642.9 mL    10-04 @ 07:01  -  10-04 @ 10:44  --------------------------------------------------------  IN: 182.6 mL / OUT: 250 mL / NET: -67.4 mL          PHYSICAL EXAM:  General: NAD  Neck: No JVD  Respiratory: CTAB, no wheezes, rales or rhonchi  Cardiovascular: S1, S2, RRR  Gastrointestinal: BS+, soft, NT/ND  Extremities: No peripheral edema    Hospital Medications:   MEDICATIONS  (STANDING):  albuterol/ipratropium for Nebulization 3 milliLiter(s) Nebulizer every 6 hours  amLODIPine   Tablet 10 milliGRAM(s) Oral daily  atorvastatin 40 milliGRAM(s) Oral at bedtime  chlorhexidine 2% Cloths 1 Application(s) Topical daily  dexMEDEtomidine Infusion 0.2 MICROgram(s)/kG/Hr (2.56 mL/Hr) IV Continuous <Continuous>  dextrose 5% + lactated ringers. 1000 milliLiter(s) (50 mL/Hr) IV Continuous <Continuous>  glucagon  Injectable 1 milliGRAM(s) IntraMuscular once  heparin   Injectable 5000 Unit(s) SubCutaneous every 12 hours  pantoprazole Infusion 8 mG/Hr (10 mL/Hr) IV Continuous <Continuous>  polyethylene glycol 3350 17 Gram(s) Oral daily  potassium chloride  10 mEq/100 mL IVPB 10 milliEquivalent(s) IV Intermittent every 1 hour  sodium chloride 3%  Inhalation 4 milliLiter(s) Inhalation every 12 hours  valACYclovir 500 milliGRAM(s) Oral daily      LABS:  10-04    135  |  104  |  28<H>  ----------------------------<  97  3.7   |  18<L>  |  0.95    Ca    8.9      04 Oct 2022 00:10  Phos  2.6     10-04  Mg     2.00     10-04    TPro  7.7  /  Alb  3.5  /  TBili  0.8  /  DBili      /  AST  47<H>  /  ALT  46<H>  /  AlkPhos  47  10-04    Creatinine Trend: 0.95 <--, 0.98 <--, 1.00 <--, 0.93 <--, 1.07 <--, 1.09 <--, 1.17 <--, 1.25 <--, 1.60 <--, 1.65 <--    Albumin, Serum: 3.5 g/dL (10-04 @ 00:10)  Phosphorus Level, Serum: 2.6 mg/dL (10-04 @ 00:10)                              10.8   10.87 )-----------( 97       ( 04 Oct 2022 00:10 )             30.9     Urine Studies:  Urinalysis - [09-25-22 @ 12:01]      Color Light Yellow / Appearance Clear / SG 1.035 / pH 6.5      Gluc 200 mg/dL / Ketone Trace  / Bili Negative / Urobili <2 mg/dL       Blood Trace / Protein 30 mg/dL / Leuk Est Negative / Nitrite Negative      RBC 1 / WBC 1 / Hyaline  / Gran  / Sq Epi  / Non Sq Epi 2 / Bacteria Negative    Urine Creatinine 33      [09-27-22 @ 13:50]  Urine Sodium <20      [09-27-22 @ 13:50]  Urine Urea Nitrogen 888.4      [09-27-22 @ 13:50]  Urine Osmolality 428      [09-27-22 @ 13:50]    Iron 36, TIBC 290, %sat 12      [09-25-22 @ 11:15]  Ferritin 287      [09-25-22 @ 11:15]  TSH 1.57      [09-25-22 @ 07:30]      Free Light Chains: kappa 2.58, lambda 2.12, ratio = 1.22      [09-25 @ 11:15]  Immunofixation Serum:   No Monoclonal Band Identified. JERONIMO Farris M.D.  Reference Range: None Detected      [09-25-22 @ 11:15]  SPEP Interpretation: Increase in Alpha-2 fraction suggestive of acute inflammation.  JERONIMO Farris M.D.      [09-25-22 @ 11:15]  Immunofixation Urine: No Monoclonal Band Identified. JERONIMO Farris M.D.  Reference Range: None Detected      [09-25-22 @ 12:01]  UPEP Interpretation: Mild Selective Proteinuria. No Monoclonal band seen.  JERONIMO Farris M.D.      [09-25-22 @ 12:01]    RADIOLOGY & ADDITIONAL STUDIES:

## 2022-10-04 NOTE — PHYSICAL THERAPY INITIAL EVALUATION ADULT - GENERAL OBSERVATIONS, REHAB EVAL
Pt received semi-supine in bed, +Dahl, +IV, +telemetry, comfortable and in NAD. Pt agreeable to participate in PT evaluation.

## 2022-10-04 NOTE — PHYSICAL THERAPY INITIAL EVALUATION ADULT - ADDITIONAL COMMENTS
Pt lives in a private house with family, +3 floors to negotiate in home that are 10-12 steps for each floor, was independent with all functional mobility without use of an assistive device and ADLs.     Pt left semi-supine in bed, all lines intact, call bell in reach, comfortable and in NAD. GAURANG Gonsalves present.

## 2022-10-04 NOTE — DIETITIAN NUTRITION RISK NOTIFICATION - ADDITIONAL COMMENTS/DIETITIAN RECOMMENDATIONS
Nutrition follow up completed. Please refer to chart note via documents section in EMR for further recommendations.  Last updated: 10/4/2022

## 2022-10-05 LAB
ALBUMIN SERPL ELPH-MCNC: 3.4 G/DL — SIGNIFICANT CHANGE UP (ref 3.3–5)
ALBUMIN SERPL ELPH-MCNC: 3.8 G/DL — SIGNIFICANT CHANGE UP (ref 3.3–5)
ALP SERPL-CCNC: 47 U/L — SIGNIFICANT CHANGE UP (ref 40–120)
ALP SERPL-CCNC: 59 U/L — SIGNIFICANT CHANGE UP (ref 40–120)
ALT FLD-CCNC: 33 U/L — SIGNIFICANT CHANGE UP (ref 4–41)
ALT FLD-CCNC: 39 U/L — SIGNIFICANT CHANGE UP (ref 4–41)
ANION GAP SERPL CALC-SCNC: 11 MMOL/L — SIGNIFICANT CHANGE UP (ref 7–14)
ANION GAP SERPL CALC-SCNC: 13 MMOL/L — SIGNIFICANT CHANGE UP (ref 7–14)
APTT BLD: 43.3 SEC — HIGH (ref 27–36.3)
AST SERPL-CCNC: 34 U/L — SIGNIFICANT CHANGE UP (ref 4–40)
AST SERPL-CCNC: 37 U/L — SIGNIFICANT CHANGE UP (ref 4–40)
BASOPHILS # BLD AUTO: 0.01 K/UL — SIGNIFICANT CHANGE UP (ref 0–0.2)
BASOPHILS # BLD AUTO: 0.02 K/UL — SIGNIFICANT CHANGE UP (ref 0–0.2)
BASOPHILS NFR BLD AUTO: 0.1 % — SIGNIFICANT CHANGE UP (ref 0–2)
BASOPHILS NFR BLD AUTO: 0.2 % — SIGNIFICANT CHANGE UP (ref 0–2)
BILIRUB SERPL-MCNC: 0.7 MG/DL — SIGNIFICANT CHANGE UP (ref 0.2–1.2)
BILIRUB SERPL-MCNC: 0.8 MG/DL — SIGNIFICANT CHANGE UP (ref 0.2–1.2)
BUN SERPL-MCNC: 17 MG/DL — SIGNIFICANT CHANGE UP (ref 7–23)
BUN SERPL-MCNC: 22 MG/DL — SIGNIFICANT CHANGE UP (ref 7–23)
CALCIUM SERPL-MCNC: 8.6 MG/DL — SIGNIFICANT CHANGE UP (ref 8.4–10.5)
CALCIUM SERPL-MCNC: 8.9 MG/DL — SIGNIFICANT CHANGE UP (ref 8.4–10.5)
CHLORIDE SERPL-SCNC: 107 MMOL/L — SIGNIFICANT CHANGE UP (ref 98–107)
CHLORIDE SERPL-SCNC: 109 MMOL/L — HIGH (ref 98–107)
CK MB CFR SERPL CALC: 2.6 NG/ML — SIGNIFICANT CHANGE UP
CO2 SERPL-SCNC: 17 MMOL/L — LOW (ref 22–31)
CO2 SERPL-SCNC: 19 MMOL/L — LOW (ref 22–31)
CREAT SERPL-MCNC: 1 MG/DL — SIGNIFICANT CHANGE UP (ref 0.5–1.3)
CREAT SERPL-MCNC: 1.12 MG/DL — SIGNIFICANT CHANGE UP (ref 0.5–1.3)
EGFR: 69 ML/MIN/1.73M2 — SIGNIFICANT CHANGE UP
EGFR: 79 ML/MIN/1.73M2 — SIGNIFICANT CHANGE UP
EOSINOPHIL # BLD AUTO: 0.12 K/UL — SIGNIFICANT CHANGE UP (ref 0–0.5)
EOSINOPHIL # BLD AUTO: 0.17 K/UL — SIGNIFICANT CHANGE UP (ref 0–0.5)
EOSINOPHIL NFR BLD AUTO: 1.3 % — SIGNIFICANT CHANGE UP (ref 0–6)
EOSINOPHIL NFR BLD AUTO: 1.5 % — SIGNIFICANT CHANGE UP (ref 0–6)
GLUCOSE BLDC GLUCOMTR-MCNC: 112 MG/DL — HIGH (ref 70–99)
GLUCOSE BLDC GLUCOMTR-MCNC: 113 MG/DL — HIGH (ref 70–99)
GLUCOSE BLDC GLUCOMTR-MCNC: 127 MG/DL — HIGH (ref 70–99)
GLUCOSE SERPL-MCNC: 110 MG/DL — HIGH (ref 70–99)
GLUCOSE SERPL-MCNC: 116 MG/DL — HIGH (ref 70–99)
HCT VFR BLD CALC: 28 % — LOW (ref 39–50)
HCT VFR BLD CALC: 32.5 % — LOW (ref 39–50)
HGB BLD-MCNC: 11 G/DL — LOW (ref 13–17)
HGB BLD-MCNC: 9.4 G/DL — LOW (ref 13–17)
IANC: 7.19 K/UL — SIGNIFICANT CHANGE UP (ref 1.8–7.4)
IANC: 9.43 K/UL — HIGH (ref 1.8–7.4)
IMM GRANULOCYTES NFR BLD AUTO: 0.4 % — SIGNIFICANT CHANGE UP (ref 0–0.9)
IMM GRANULOCYTES NFR BLD AUTO: 0.6 % — SIGNIFICANT CHANGE UP (ref 0–0.9)
INR BLD: 1.07 RATIO — SIGNIFICANT CHANGE UP (ref 0.88–1.16)
LYMPHOCYTES # BLD AUTO: 1.15 K/UL — SIGNIFICANT CHANGE UP (ref 1–3.3)
LYMPHOCYTES # BLD AUTO: 1.16 K/UL — SIGNIFICANT CHANGE UP (ref 1–3.3)
LYMPHOCYTES # BLD AUTO: 10.1 % — LOW (ref 13–44)
LYMPHOCYTES # BLD AUTO: 12.8 % — LOW (ref 13–44)
MAGNESIUM SERPL-MCNC: 2.1 MG/DL — SIGNIFICANT CHANGE UP (ref 1.6–2.6)
MAGNESIUM SERPL-MCNC: 2.2 MG/DL — SIGNIFICANT CHANGE UP (ref 1.6–2.6)
MCHC RBC-ENTMCNC: 30.1 PG — SIGNIFICANT CHANGE UP (ref 27–34)
MCHC RBC-ENTMCNC: 30.2 PG — SIGNIFICANT CHANGE UP (ref 27–34)
MCHC RBC-ENTMCNC: 33.6 GM/DL — SIGNIFICANT CHANGE UP (ref 32–36)
MCHC RBC-ENTMCNC: 33.8 GM/DL — SIGNIFICANT CHANGE UP (ref 32–36)
MCV RBC AUTO: 89.3 FL — SIGNIFICANT CHANGE UP (ref 80–100)
MCV RBC AUTO: 89.7 FL — SIGNIFICANT CHANGE UP (ref 80–100)
MONOCYTES # BLD AUTO: 0.53 K/UL — SIGNIFICANT CHANGE UP (ref 0–0.9)
MONOCYTES # BLD AUTO: 0.55 K/UL — SIGNIFICANT CHANGE UP (ref 0–0.9)
MONOCYTES NFR BLD AUTO: 4.8 % — SIGNIFICANT CHANGE UP (ref 2–14)
MONOCYTES NFR BLD AUTO: 5.8 % — SIGNIFICANT CHANGE UP (ref 2–14)
NEUTROPHILS # BLD AUTO: 7.19 K/UL — SIGNIFICANT CHANGE UP (ref 1.8–7.4)
NEUTROPHILS # BLD AUTO: 9.43 K/UL — HIGH (ref 1.8–7.4)
NEUTROPHILS NFR BLD AUTO: 79.4 % — HIGH (ref 43–77)
NEUTROPHILS NFR BLD AUTO: 83 % — HIGH (ref 43–77)
NRBC # BLD: 0 /100 WBCS — SIGNIFICANT CHANGE UP (ref 0–0)
NRBC # BLD: 0 /100 WBCS — SIGNIFICANT CHANGE UP (ref 0–0)
NRBC # FLD: 0 K/UL — SIGNIFICANT CHANGE UP (ref 0–0)
NRBC # FLD: 0 K/UL — SIGNIFICANT CHANGE UP (ref 0–0)
PHOSPHATE SERPL-MCNC: 2.8 MG/DL — SIGNIFICANT CHANGE UP (ref 2.5–4.5)
PHOSPHATE SERPL-MCNC: 3.4 MG/DL — SIGNIFICANT CHANGE UP (ref 2.5–4.5)
PLATELET # BLD AUTO: 114 K/UL — LOW (ref 150–400)
PLATELET # BLD AUTO: 144 K/UL — LOW (ref 150–400)
POTASSIUM SERPL-MCNC: 3.8 MMOL/L — SIGNIFICANT CHANGE UP (ref 3.5–5.3)
POTASSIUM SERPL-MCNC: 4.5 MMOL/L — SIGNIFICANT CHANGE UP (ref 3.5–5.3)
POTASSIUM SERPL-SCNC: 3.8 MMOL/L — SIGNIFICANT CHANGE UP (ref 3.5–5.3)
POTASSIUM SERPL-SCNC: 4.5 MMOL/L — SIGNIFICANT CHANGE UP (ref 3.5–5.3)
PROT SERPL-MCNC: 7.3 G/DL — SIGNIFICANT CHANGE UP (ref 6–8.3)
PROT SERPL-MCNC: 8 G/DL — SIGNIFICANT CHANGE UP (ref 6–8.3)
PROTHROM AB SERPL-ACNC: 12.4 SEC — SIGNIFICANT CHANGE UP (ref 10.5–13.4)
RBC # BLD: 3.12 M/UL — LOW (ref 4.2–5.8)
RBC # BLD: 3.64 M/UL — LOW (ref 4.2–5.8)
RBC # FLD: 15.4 % — HIGH (ref 10.3–14.5)
RBC # FLD: 15.5 % — HIGH (ref 10.3–14.5)
SODIUM SERPL-SCNC: 137 MMOL/L — SIGNIFICANT CHANGE UP (ref 135–145)
SODIUM SERPL-SCNC: 139 MMOL/L — SIGNIFICANT CHANGE UP (ref 135–145)
TROPONIN T, HIGH SENSITIVITY RESULT: 34 NG/L — SIGNIFICANT CHANGE UP
WBC # BLD: 11.37 K/UL — HIGH (ref 3.8–10.5)
WBC # BLD: 9.06 K/UL — SIGNIFICANT CHANGE UP (ref 3.8–10.5)
WBC # FLD AUTO: 11.37 K/UL — HIGH (ref 3.8–10.5)
WBC # FLD AUTO: 9.06 K/UL — SIGNIFICANT CHANGE UP (ref 3.8–10.5)

## 2022-10-05 PROCEDURE — 99233 SBSQ HOSP IP/OBS HIGH 50: CPT | Mod: GC

## 2022-10-05 RX ORDER — LOSARTAN POTASSIUM 100 MG/1
100 TABLET, FILM COATED ORAL DAILY
Refills: 0 | Status: DISCONTINUED | OUTPATIENT
Start: 2022-10-05 | End: 2022-10-11

## 2022-10-05 RX ORDER — POTASSIUM CHLORIDE 20 MEQ
40 PACKET (EA) ORAL ONCE
Refills: 0 | Status: COMPLETED | OUTPATIENT
Start: 2022-10-05 | End: 2022-10-05

## 2022-10-05 RX ORDER — POTASSIUM CHLORIDE 20 MEQ
40 PACKET (EA) ORAL ONCE
Refills: 0 | Status: DISCONTINUED | OUTPATIENT
Start: 2022-10-05 | End: 2022-10-05

## 2022-10-05 RX ORDER — METOPROLOL TARTRATE 50 MG
5 TABLET ORAL ONCE
Refills: 0 | Status: COMPLETED | OUTPATIENT
Start: 2022-10-05 | End: 2022-10-05

## 2022-10-05 RX ADMIN — PANTOPRAZOLE SODIUM 10 MG/HR: 20 TABLET, DELAYED RELEASE ORAL at 19:58

## 2022-10-05 RX ADMIN — Medication 40 MILLIEQUIVALENT(S): at 10:16

## 2022-10-05 RX ADMIN — VALACYCLOVIR 500 MILLIGRAM(S): 500 TABLET, FILM COATED ORAL at 11:33

## 2022-10-05 RX ADMIN — Medication 5 MILLIGRAM(S): at 18:37

## 2022-10-05 RX ADMIN — AMLODIPINE BESYLATE 10 MILLIGRAM(S): 2.5 TABLET ORAL at 05:57

## 2022-10-05 RX ADMIN — Medication 9 MILLIGRAM(S): at 21:27

## 2022-10-05 RX ADMIN — LOSARTAN POTASSIUM 100 MILLIGRAM(S): 100 TABLET, FILM COATED ORAL at 10:15

## 2022-10-05 RX ADMIN — CHLORHEXIDINE GLUCONATE 1 APPLICATION(S): 213 SOLUTION TOPICAL at 11:56

## 2022-10-05 RX ADMIN — ATORVASTATIN CALCIUM 40 MILLIGRAM(S): 80 TABLET, FILM COATED ORAL at 21:27

## 2022-10-05 RX ADMIN — Medication 100 MILLIGRAM(S): at 01:34

## 2022-10-05 RX ADMIN — HEPARIN SODIUM 5000 UNIT(S): 5000 INJECTION INTRAVENOUS; SUBCUTANEOUS at 05:57

## 2022-10-05 RX ADMIN — HEPARIN SODIUM 5000 UNIT(S): 5000 INJECTION INTRAVENOUS; SUBCUTANEOUS at 17:01

## 2022-10-05 NOTE — PROGRESS NOTE ADULT - ASSESSMENT
73 y/o M with a PMHx significant for HTN, HLD, CAD s/p stent, MM, presents with descending paralysis complicated by acute hypoxic respiratory failure due to neuromuscular weakness mediated by GBS now status post IVIG.     Neuro   #Generalized Weakness,  GBS   - MRI brain- No acute hemorrhage mass mass effect or acute territorial   infarcts seen. and C/T/L spine no cord compression   -Discontinued decadron 9/28 discussed with neurosurgery no cord compression and neuro deferred further decision making regarding decadron.   -IVIG 35g qd (3/3) as per neuro for possible GBS completed 9/28  -positive anti ganglioside ab  -appreciate neurology recommendations   -Resolving  -PT consulted and working with patient  -Cleared by speech and swallow      #Sedation/Analgesia  - precedex, wean as tolerated.     Pulmonary   #Acute hypoxic respiratory failure secondary to neuromuscular weakness  -improved. extubated 10/3  -continue airway clearance  -weaned off NC o2      #Hemoptysis  Pt reportedly with hemoptysis in [facility]. Pt with RLL calcified granuloma & scattered pulm nodules.   -quant gold indeterminate  - sputum afb x3 no acid fast  -taken off of TB precautions     Cardiovascular   #Vasoplegic Shock  Resolved.     #HLD   -atorvastatin 40 qhs     #CAD  - Continue to hold aspirin given GIB,   - HOLDing Home Metoprolol tartrate 50mg PO BID    #HTN  -Amlodipine 10mg qd   -Losartan held. reintroduce as tolerated creatinine and BP permitting.     #BNP elevated  Pt with BNP elevation, respiratory distress.  - TTE mild AR and AS, Stage I diastolic dysfunction     GI  - NPO w/ TF. On hold pending EGD with GI     #GI bleed  -dark stools improved/resolved.   - pantoprazole gtt 72h then BID dosing  -EGD 10/3 performed nonbleeding ulcerated lesions with visible vessels at two sites. One treated with epi and bipolar cautery. Other site was not intervened upon due to risks associated outweighing benefit and technically difficult.  GI reccomends IR consult if rebleed.     Renal   #Hyponatremia, CRISTI  -Improved/ resolved.     MSK  #T8 Compression Fx   MR spine and spine consult to assess for possible interventions. Diffuse spinal lesions.   - Neurosx consulted   -no acute intervention. Decadron discontinued 9/28 following discussion with neurosurgery as well as neurology.     ID  #Bacillus cereus in blood cxs x1 bottle on 9/25, ?contaminate  - 9/27 BCx NGTD  - afebrile, WBC count improving, monitor off abx     r/o TB  - AFB negative x3  -off TB precautions     Endocrine   #Hyperglycemia  Pt with mild hyperglycemia on admission. A1c 5.7% (?pre-DM)  - FS q6h    Heme/onc  #Multiple Myeloma  Pt had last received Velcade 3/2022. Had been given opportunity for chemo vacation and travelled to [facility], but pt went for longer than anticipated. Now with diffuse lytic lesions involving most of spine. Had previously had XRT as well.   Diagnostics:  - LDH, B2-Microglobulin  - SPEP, UPEP, IFX (S/U), Immunoglobulins, FLCs    #Anemia  Likely multifactorial given UGIB, MM, less likely hemolytic anemia mediated by IVIG. additionally will rule out HIT  -Unlikely hemolytic as LDH and Haptoglobin are wnl   -heparin subq reintroduced at 5000 q12h dose given weight borderline and recent GIB. discussed with pharmacy team.   -High dose PPI   -4T score with intermediate probability of HIT    Serotonin release assay, PF4 negative  -CBC qday          =====Hospital Bundle=====  Hospital Bundle  Fluids: with gtts  Electrolytes: Replete K > 4, Mg > 2, Phos > 3  Nutrition: Diet NPO with TF (Nutrition C/S)  PPX  ---VTE:  SQH  ---GI: protonix,  ---Resp: Room air  ---: Dahl Placed 9/25, removed, previously with condom cath in place.   Access: PIVs  Code Status: FULL CODE  Dispo: Pending Medical Optimization 71 y/o M with a PMHx significant for HTN, HLD, CAD s/p stent, MM, presents with descending paralysis complicated by acute hypoxic respiratory failure due to neuromuscular weakness mediated by GBS now status post IVIG.     Neuro   #Generalized Weakness,  GBS   - MRI brain- No acute hemorrhage mass mass effect or acute territorial   infarcts seen. and C/T/L spine no cord compression   -Discontinued decadron 9/28 discussed with neurosurgery no cord compression and neuro deferred further decision making regarding decadron.   -IVIG 35g qd (3/3) as per neuro for possible GBS completed 9/28  -positive anti ganglioside ab  -appreciate neurology recommendations   -Resolving  -PT consulted and working with patient  -Cleared by speech and swallow      #Sedation/Analgesia  - precedex, wean as tolerated.     Pulmonary   #Acute hypoxic respiratory failure secondary to neuromuscular weakness  -improved. extubated 10/3  -continue airway clearance  -weaned off NC o2      #Hemoptysis  Pt reportedly with hemoptysis in [facility]. Pt with RLL calcified granuloma & scattered pulm nodules.   -quant gold indeterminate  - sputum afb x3 no acid fast  -taken off of TB precautions     Cardiovascular   #Vasoplegic Shock  Resolved.     #HLD   -atorvastatin 40 qhs     #CAD  - Continue to hold aspirin given GIB,   - HOLDing Home Metoprolol tartrate 50mg PO BID    #HTN  -Amlodipine 10mg qd   -Losartan held. reintroduce as tolerated creatinine and BP permitting.     #BNP elevated  Pt with BNP elevation, respiratory distress.  - TTE mild AR and AS, Stage I diastolic dysfunction     GI  - cleared by speech and swallow, regular diet w thin liquid and supplemental shake      #GI bleed  -dark stools improved/resolved.   - pantoprazole gtt 72h then BID dosing  -EGD 10/3 performed nonbleeding ulcerated lesions with visible vessels at two sites. One treated with epi and bipolar cautery. Other site was not intervened upon due to risks associated outweighing benefit and technically difficult.  GI reccomends IR consult if rebleed.     Renal   #Hyponatremia, CRISTI  -Improved/ resolved.     MSK  #T8 Compression Fx   MR spine and spine consult to assess for possible interventions. Diffuse spinal lesions.   - Neurosx consulted   -no acute intervention. Decadron discontinued 9/28 following discussion with neurosurgery as well as neurology.     ID  #Bacillus cereus in blood cxs x1 bottle on 9/25, ?contaminate  - 9/27 BCx NGTD  - afebrile, WBC count improving, monitor off abx     r/o TB  - AFB negative x3  -off TB precautions     Endocrine   #Hyperglycemia  Pt with mild hyperglycemia on admission. A1c 5.7% (?pre-DM)  - FS q6h    Heme/onc  #Multiple Myeloma  Pt had last received Velcade 3/2022. Had been given opportunity for chemo vacation and travelled to [facility], but pt went for longer than anticipated. Now with diffuse lytic lesions involving most of spine. Had previously had XRT as well.   Diagnostics:  - LDH, B2-Microglobulin  - SPEP, UPEP, IFX (S/U), Immunoglobulins, FLCs    #Anemia  Likely multifactorial given UGIB, MM, less likely hemolytic anemia mediated by IVIG. additionally will rule out HIT  -Unlikely hemolytic as LDH and Haptoglobin are wnl   -heparin subq reintroduced at 5000 q12h dose given weight borderline and recent GIB. discussed with pharmacy team.   -High dose PPI   -4T score with intermediate probability of HIT    Serotonin release assay, PF4 negative  -CBC qday          =====Hospital Bundle=====  Hospital Bundle  Fluids: with gtts  Electrolytes: Replete K > 4, Mg > 2, Phos > 3  Nutrition: Regular w thin liquids and supplemental shake  PPX  ---VTE:  SQH  ---GI: protonix,  ---Resp: Room air  ---: Dahl Placed 9/25, removed, previously with condom cath in place.   Access: PIVs  Code Status: FULL CODE  Dispo: Pending Medical Optimization 73 y/o M with a PMHx significant for HTN, HLD, CAD s/p stent, MM, presents with descending paralysis complicated by acute hypoxic respiratory failure due to neuromuscular weakness mediated by GBS now status post IVIG.     Neuro   #Generalized Weakness,  GBS   - MRI brain- No acute hemorrhage mass mass effect or acute territorial   infarcts seen. and C/T/L spine no cord compression   -Discontinued decadron 9/28 discussed with neurosurgery no cord compression and neuro deferred further decision making regarding decadron.   -IVIG 35g qd (3/3) as per neuro for possible GBS completed 9/28  -positive anti ganglioside ab  -appreciate neurology recommendations   -Resolving  -PT consulted and working with patient  -Cleared by speech and swallow      #Sedation/Analgesia  - off sedation.     Pulmonary   #Acute hypoxic respiratory failure secondary to neuromuscular weakness  -improved. extubated 10/3  -continue airway clearance  -weaned off NC o2      #Hemoptysis  Pt reportedly with hemoptysis in [facility]. Pt with RLL calcified granuloma & scattered pulm nodules.   -quant gold indeterminate  - sputum afb x3 no acid fast  -taken off of TB precautions     Cardiovascular   #Vasoplegic Shock  Resolved.     #HLD   -atorvastatin 40 qhs     #CAD  - Continue to hold aspirin given GIB,   - HOLDing Home Metoprolol tartrate 50mg PO BID    #HTN  -Amlodipine 10mg qd   -Losartan 100mg qd    #BNP elevated  Pt with BNP elevation, respiratory distress.  - TTE mild AR and AS, Stage I diastolic dysfunction     GI  - cleared by speech and swallow, regular diet w thin liquid and supplemental shake      #GI bleed  -dark stools improved/resolved.   - pantoprazole gtt 72h then BID dosing  -EGD 10/3 performed nonbleeding ulcerated lesions with visible vessels at two sites. One treated with epi and bipolar cautery. Other site was not intervened upon due to risks associated outweighing benefit and technically difficult.  GI reccomends IR consult if rebleed.     Renal   #Hyponatremia, CRISTI  -Improved/ resolved.     MSK  #T8 Compression Fx   MR spine and spine consult to assess for possible interventions. Diffuse spinal lesions.   - Neurosx consulted   -no acute intervention. Decadron discontinued 9/28 following discussion with neurosurgery as well as neurology.     ID  #Bacillus cereus in blood cxs x1 bottle on 9/25, ?contaminate  - 9/27 BCx NGTD  - afebrile, WBC count improving, monitor off abx     r/o TB  - AFB negative x3  -off TB precautions     Endocrine   #Hyperglycemia  Pt with mild hyperglycemia on admission. A1c 5.7% (?pre-DM)  - FS q6h    Heme/onc  #Multiple Myeloma  Pt had last received Velcade 3/2022. Had been given opportunity for chemo vacation and travelled to [facility], but pt went for longer than anticipated. Now with diffuse lytic lesions involving most of spine. Had previously had XRT as well.   Diagnostics:  - LDH, B2-Microglobulin  - SPEP, UPEP, IFX (S/U), Immunoglobulins, FLCs  -Follow up with heme onc regarding valtrex    #Anemia  Likely multifactorial given UGIB, MM, less likely hemolytic anemia mediated by IVIG. additionally will rule out HIT  -Unlikely hemolytic as LDH and Haptoglobin are wnl   -heparin subq reintroduced at 5000 q12h dose given weight borderline and recent GIB. discussed with pharmacy team.   -High dose PPI   -4T score with intermediate probability of HIT    Serotonin release assay, PF4 negative  -CBC qday          =====Hospital Bundle=====  Hospital Bundle  Fluids: with gtts  Electrolytes: Replete K > 4, Mg > 2, Phos > 3  Nutrition: Regular w thin liquids and supplemental shake  PPX  ---VTE:  SQH  ---GI: protonix,  ---Resp: Room air  ---: Dahl Placed 9/25, removed, previously with condom cath in place.   Access: PIVs  Code Status: FULL CODE  Dispo: Pending Medical Optimization

## 2022-10-05 NOTE — PROGRESS NOTE ADULT - ASSESSMENT
72 year-old male, history of HTN, HLD, CAD, cardiac stent, MM, presents with cc generalized weakness x 2-3 days associated with some tingling sensation.    A/P:  Hyponatremia/hypernatrmic:  work up suggested SIADH  Na appropriately improved now   monitor Na closely     acute on CKD stage 3  Baseline Scr 1.3-1.4  CRISTI likely sec to ATN  improved better than baseline  monitor bmp, i/o    Hypokalemia:  better  Monitor K level    acidosis  Non AG  dropping.  off bicarb. consider restarting bicarb 650 tid  monitor    HTN:  optimal   manage per MICU   MOnitor BP    Weakness:  Etiology?  h/o MM  s/p LP  possible GBS s/p [id number]  f/u neuro

## 2022-10-05 NOTE — PROGRESS NOTE ADULT - SUBJECTIVE AND OBJECTIVE BOX
[facility]   MD DERRICK MOSS MD KRISTINE SOLTANPOUR, DO ANGELA WONG, PA    TEL:  OFFICE: [phone]  From 5pm-7am Answering Service [phone]    -- RENAL FOLLOW UP NOTE ---Date of Service 10-05-22 @ 15:16    Patient is a 72y old  Male who presents with a chief complaint of weakness (05 Oct 2022 07:35)      Patient seen and examined at bedside. No chest pain/sob    VITALS:  T(F): 99.6 (10-05-22 @ 12:00), Max: 99.7 (10-05-22 @ 08:00)  HR: 116 (10-05-22 @ 13:00)  BP: 137/70 (10-05-22 @ 13:00)  RR: 27 (10-05-22 @ 13:00)  SpO2: 100% (10-05-22 @ 13:00)  Wt(kg): --    10-04 @ 07:01  -  10-05 @ 07:00  --------------------------------------------------------  IN: 1932.6 mL / OUT: 2205 mL / NET: -272.4 mL    10-05 @ 07:01  -  10-05 @ 15:16  --------------------------------------------------------  IN: 300 mL / OUT: 850 mL / NET: -550 mL          PHYSICAL EXAM:  General: NAD  Neck: No JVD  Respiratory: CTAB, no wheezes, rales or rhonchi  Cardiovascular: S1, S2, RRR  Gastrointestinal: BS+, soft, NT/ND  Extremities: No peripheral edema    Hospital Medications:   MEDICATIONS  (STANDING):  amLODIPine   Tablet 10 milliGRAM(s) Oral daily  atorvastatin 40 milliGRAM(s) Oral at bedtime  chlorhexidine 2% Cloths 1 Application(s) Topical daily  glucagon  Injectable 1 milliGRAM(s) IntraMuscular once  heparin   Injectable 5000 Unit(s) SubCutaneous every 12 hours  losartan 100 milliGRAM(s) Oral daily  melatonin 9 milliGRAM(s) Oral at bedtime  pantoprazole Infusion 8 mG/Hr (10 mL/Hr) IV Continuous <Continuous>  polyethylene glycol 3350 17 Gram(s) Oral daily      LABS:  10-05    137  |  107  |  17  ----------------------------<  116<H>  3.8   |  17<L>  |  1.00    Ca    8.6      05 Oct 2022 01:45  Phos  3.4     10-05  Mg     2.10     10-05    TPro  7.3  /  Alb  3.4  /  TBili  0.8  /  DBili      /  AST  34  /  ALT  33  /  AlkPhos  47  10-05    Creatinine Trend: 1.00 <--, 0.95 <--, 0.98 <--, 1.00 <--, 0.93 <--, 1.07 <--, 1.09 <--, 1.17 <--, 1.25 <--    Albumin, Serum: 3.4 g/dL (10-05 @ 01:45)  Phosphorus Level, Serum: 3.4 mg/dL (10-05 @ 01:45)                              9.4    9.06  )-----------( 114      ( 05 Oct 2022 01:45 )             28.0     Urine Studies:  Urinalysis - [09-25-22 @ 12:01]      Color Light Yellow / Appearance Clear / SG 1.035 / pH 6.5      Gluc 200 mg/dL / Ketone Trace  / Bili Negative / Urobili <2 mg/dL       Blood Trace / Protein 30 mg/dL / Leuk Est Negative / Nitrite Negative      RBC 1 / WBC 1 / Hyaline  / Gran  / Sq Epi  / Non Sq Epi 2 / Bacteria Negative      Iron 36, TIBC 290, %sat 12      [09-25-22 @ 11:15]  Ferritin 287      [09-25-22 @ 11:15]  TSH 1.57      [09-25-22 @ 07:30]      Free Light Chains: kappa 2.58, lambda 2.12, ratio = 1.22      [09-25 @ 11:15]  Immunofixation Serum:   No Monoclonal Band Identified. PDennys Farris M.D.  Reference Range: None Detected      [09-25-22 @ 11:15]  SPEP Interpretation: Increase in Alpha-2 fraction suggestive of acute inflammation.  JERONIMO Farris M.D.      [09-25-22 @ 11:15]  Immunofixation Urine: No Monoclonal Band Identified. JERONIMO Farris M.D.  Reference Range: None Detected      [09-25-22 @ 12:01]  UPEP Interpretation: Mild Selective Proteinuria. No Monoclonal band seen.  JERONIMO Farris M.D.      [09-25-22 @ 12:01]    RADIOLOGY & ADDITIONAL STUDIES:

## 2022-10-05 NOTE — PROGRESS NOTE ADULT - SUBJECTIVE AND OBJECTIVE BOX
Payam Longoria MD  Interventional Cardiology / Advance Heart Failure and Cardiac Transplant Specialist  [place] Office : [phone] [place] [address] [address] [facility] [address]  Tel:   [place] Office : [address] [facility] [place] [address]  Tel: [phone]      Subjective/Overnight events: Pt is lying in bed comfortable not in distress  	  MEDICATIONS:  amLODIPine   Tablet 10 milliGRAM(s) Oral daily  heparin   Injectable 5000 Unit(s) SubCutaneous every 12 hours  losartan 100 milliGRAM(s) Oral daily      guaiFENesin Oral Liquid (Sugar-Free) 100 milliGRAM(s) Oral every 6 hours PRN    melatonin 9 milliGRAM(s) Oral at bedtime    pantoprazole Infusion 8 mG/Hr IV Continuous <Continuous>  polyethylene glycol 3350 17 Gram(s) Oral daily    atorvastatin 40 milliGRAM(s) Oral at bedtime  glucagon  Injectable 1 milliGRAM(s) IntraMuscular once    chlorhexidine 2% Cloths 1 Application(s) Topical daily      PAST MEDICAL/SURGICAL HISTORY  PAST MEDICAL & SURGICAL HISTORY:  HTN (hypertension)      HLD (hyperlipidemia)      Coronary artery disease      Multiple myeloma      S/P coronary artery stent placement          SOCIAL HISTORY: Substance Use (street drugs): ( x ) never used  (  ) other:    FAMILY HISTORY:  Maternal family history of hypertension  Mother      PHYSICAL EXAM:  T(C): 37.6 (10-05-22 @ 12:00), Max: 37.6 (10-05-22 @ 08:00)  HR: 116 (10-05-22 @ 13:00) (78 - 128)  BP: 137/70 (10-05-22 @ 13:00) (137/70 - 178/77)  RR: 27 (10-05-22 @ 13:00) (14 - 27)  SpO2: 100% (10-05-22 @ 13:00) (98% - 100%)  Wt(kg): --  I&O's Summary    04 Oct 2022 07:01  -  05 Oct 2022 07:00  --------------------------------------------------------  IN: 1932.6 mL / OUT: 2205 mL / NET: -272.4 mL    05 Oct 2022 07:01  -  05 Oct 2022 15:53  --------------------------------------------------------  IN: 300 mL / OUT: 850 mL / NET: -550 mL          GENERAL: NAD  EYES:  conjunctiva and sclera clear  Cardiovascular: Normal S1 S2, No JVD, No murmurs, No edema  Gastrointestinal:  Soft,   Extremities: No edema                                     9.4    9.06  )-----------( 114      ( 05 Oct 2022 01:45 )             28.0     10-05    137  |  107  |  17  ----------------------------<  116<H>  3.8   |  17<L>  |  1.00    Ca    8.6      05 Oct 2022 01:45  Phos  3.4     10-05  Mg     2.10     10-05    TPro  7.3  /  Alb  3.4  /  TBili  0.8  /  DBili  x   /  AST  34  /  ALT  33  /  AlkPhos  47  10-05    proBNP:   Lipid Profile:   HgA1c:   TSH:     Consultant(s) Notes Reviewed:  [x ] YES  [ ] NO    Care Discussed with Consultants/Other Providers [ x] YES  [ ] NO    Imaging Personally Reviewed independently:  [x] YES  [ ] NO    All labs, radiologic studies, vitals, orders and medications list reviewed. Patient is seen and examined at bedside. Case discussed with medical team.

## 2022-10-05 NOTE — PROGRESS NOTE ADULT - CRITICAL CARE ATTENDING COMMENT
Coordinating care  Please give sodium bicarbonate of 1300 mg po BID.
Scr improving. Hyponatremia resolved.
Coordinating care.
Coordinating care. Spoke to MICU team.

## 2022-10-05 NOTE — PROGRESS NOTE ADULT - SUBJECTIVE AND OBJECTIVE BOX
Deo Oropeza MD  Internal Medicine  Pager #[phone]    CHIEF COMPLAINT:Patient is a 72y old  Male who presents with a chief complaint of weakness (04 Oct 2022 13:17)        Interval Events:    REVIEW OF SYSTEMS:      OBJECTIVE:  ICU Vital Signs Last 24 Hrs  T(C): 37.4 (05 Oct 2022 04:00), Max: 37.4 (05 Oct 2022 04:00)  T(F): 99.4 (05 Oct 2022 04:00), Max: 99.4 (05 Oct 2022 04:00)  HR: 86 (05 Oct 2022 04:00) (58 - 128)  BP: 175/75 (05 Oct 2022 04:00) (123/58 - 178/77)  BP(mean): 104 (05 Oct 2022 04:00) (76 - 111)  ABP: --  ABP(mean): --  RR: 22 (05 Oct 2022 04:00) (14 - 23)  SpO2: 100% (05 Oct 2022 04:00) (98% - 100%)    O2 Parameters below as of 05 Oct 2022 04:00  Patient On (Oxygen Delivery Method): room air              10-04 @ 07:01  -  10-05 @ 07:00  --------------------------------------------------------  IN: 1932.6 mL / OUT: 2205 mL / NET: -272.4 mL      CAPILLARY BLOOD GLUCOSE      POCT Blood Glucose.: 127 mg/dL (05 Oct 2022 06:04)      PHYSICAL EXAM:      LINES:    HOSPITAL MEDICATIONS:  Standing Meds:  amLODIPine   Tablet 10 milliGRAM(s) Oral daily  atorvastatin 40 milliGRAM(s) Oral at bedtime  chlorhexidine 2% Cloths 1 Application(s) Topical daily  dextrose 5% + lactated ringers. 1000 milliLiter(s) IV Continuous <Continuous>  glucagon  Injectable 1 milliGRAM(s) IntraMuscular once  heparin   Injectable 5000 Unit(s) SubCutaneous every 12 hours  melatonin 9 milliGRAM(s) Oral at bedtime  pantoprazole Infusion 8 mG/Hr IV Continuous <Continuous>  polyethylene glycol 3350 17 Gram(s) Oral daily  valACYclovir 500 milliGRAM(s) Oral daily      PRN Meds:  guaiFENesin Oral Liquid (Sugar-Free) 100 milliGRAM(s) Oral every 6 hours PRN      LABS:                        9.4    9.06  )-----------( 114      ( 05 Oct 2022 01:45 )             28.0     Hgb Trend: 9.4<--, 10.8<--, 9.0<--, 8.0<--, 8.7<--  10-05    137  |  107  |  17  ----------------------------<  116<H>  3.8   |  17<L>  |  1.00    Ca    8.6      05 Oct 2022 01:45  Phos  3.4     10-05  Mg     2.10     10-05    TPro  7.3  /  Alb  3.4  /  TBili  0.8  /  DBili  x   /  AST  34  /  ALT  33  /  AlkPhos  47  10-05    Creatinine Trend: 1.00<--, 0.95<--, 0.98<--, 1.00<--, 0.93<--, 1.07<--  PT/INR - ( 05 Oct 2022 01:45 )   PT: 12.4 sec;   INR: 1.07 ratio         PTT - ( 05 Oct 2022 01:45 )  PTT:43.3 sec    Arterial Blood Gas:  10-04 @ 16:40  7.44/27/93/18/97.9/-4.9  ABG lactate: --  Arterial Blood Gas:  10-04 @ 01:27  7.58/18/157/17/98.6/-3.5  ABG lactate: --        MICROBIOLOGY:     RADIOLOGY:  [ ] Reviewed and interpreted by me    EKG:     Deo Oropeza MD  Internal Medicine  Pager #[phone]    CHIEF COMPLAINT:Patient is a 72y old  Male who presents with a chief complaint of weakness (04 Oct 2022 13:17)        Interval Events:    Agitation overnight with interval discontinuation of duonebs by night team with thought process duonebs contributing to agitation    hgb stable    one black tarry bowel movement noted by overnight RN team    REVIEW OF SYSTEMS:    Const no fever chills sweats  CV no chest pain or palpitation  Pulm no dyspnea  Abdominal no abdominal pain      OBJECTIVE:  ICU Vital Signs Last 24 Hrs  T(C): 37.4 (05 Oct 2022 04:00), Max: 37.4 (05 Oct 2022 04:00)  T(F): 99.4 (05 Oct 2022 04:00), Max: 99.4 (05 Oct 2022 04:00)  HR: 86 (05 Oct 2022 04:00) (58 - 128)  BP: 175/75 (05 Oct 2022 04:00) (123/58 - 178/77)  BP(mean): 104 (05 Oct 2022 04:00) (76 - 111)  ABP: --  ABP(mean): --  RR: 22 (05 Oct 2022 04:00) (14 - 23)  SpO2: 100% (05 Oct 2022 04:00) (98% - 100%)    O2 Parameters below as of 05 Oct 2022 04:00  Patient On (Oxygen Delivery Method): room air              10-04 @ 07:01  -  10-05 @ 07:00  --------------------------------------------------------  IN: 1932.6 mL / OUT: 2205 mL / NET: -272.4 mL      CAPILLARY BLOOD GLUCOSE      POCT Blood Glucose.: 127 mg/dL (05 Oct 2022 06:04)      PHYSICAL EXAM:  GENERAL: extubated. well appearing  LUNG: CTAB and no stridor  HEART: RRR  ABDOMEN: Soft, Nontender, Nondistended   condom catheter. Interval removal of fecal management system   EXTREMITIES:  No LE edema, 2+ Peripheral Pulses, No clubbing, cyanosis, or edema  NEUROLOGY: responsive to voice. follows commands. 4/5 strength throughout        LINES:    HOSPITAL MEDICATIONS:  Standing Meds:  amLODIPine   Tablet 10 milliGRAM(s) Oral daily  atorvastatin 40 milliGRAM(s) Oral at bedtime  chlorhexidine 2% Cloths 1 Application(s) Topical daily  dextrose 5% + lactated ringers. 1000 milliLiter(s) IV Continuous <Continuous>  glucagon  Injectable 1 milliGRAM(s) IntraMuscular once  heparin   Injectable 5000 Unit(s) SubCutaneous every 12 hours  melatonin 9 milliGRAM(s) Oral at bedtime  pantoprazole Infusion 8 mG/Hr IV Continuous <Continuous>  polyethylene glycol 3350 17 Gram(s) Oral daily  valACYclovir 500 milliGRAM(s) Oral daily      PRN Meds:  guaiFENesin Oral Liquid (Sugar-Free) 100 milliGRAM(s) Oral every 6 hours PRN      LABS:                        9.4    9.06  )-----------( 114      ( 05 Oct 2022 01:45 )             28.0     Hgb Trend: 9.4<--, 10.8<--, 9.0<--, 8.0<--, 8.7<--  10-05    137  |  107  |  17  ----------------------------<  116<H>  3.8   |  17<L>  |  1.00    Ca    8.6      05 Oct 2022 01:45  Phos  3.4     10-05  Mg     2.10     10-05    TPro  7.3  /  Alb  3.4  /  TBili  0.8  /  DBili  x   /  AST  34  /  ALT  33  /  AlkPhos  47  10-05    Creatinine Trend: 1.00<--, 0.95<--, 0.98<--, 1.00<--, 0.93<--, 1.07<--  PT/INR - ( 05 Oct 2022 01:45 )   PT: 12.4 sec;   INR: 1.07 ratio         PTT - ( 05 Oct 2022 01:45 )  PTT:43.3 sec    Arterial Blood Gas:  10-04 @ 16:40  7.44/27/93/18/97.9/-4.9  ABG lactate: --  Arterial Blood Gas:  10-04 @ 01:27  7.58/18/157/17/98.6/-3.5  ABG lactate: --        MICROBIOLOGY:     RADIOLOGY:  [ ] Reviewed and interpreted by me    EKG:

## 2022-10-05 NOTE — PROGRESS NOTE ADULT - ASSESSMENT
Patient is a 73 yo M with PMH of HTN, HLD, CAD, cardiac stent, Multiple Myeloma who presented to the ED for 3 days of generalized weakness.    EKG: ST PVCs  Tele: NSR PVCs episodes of PAT    1. Weakness  -rapidly progressed. was unable to move extremities.   -s/p RRT for hypoxia now intubated in MICU   -neuro on board, possible GBS vs pathology in neck  -echo shows mild AR/AS, normal LV systolic function and mild diastolic dysfunction   -IVIG 35g qd (3/3) as per neuro for possible GBS completed 9/28    2. CAD s/p PCI  -in 2015 in [facility] as per daughter patient had heart attack  -no reported CP prior to hospitalization    3. MM  -heme/onc on board    4. ?Afib  -?afib on tele  -no AC 2/2 anemia and duodenal ulcers

## 2022-10-06 LAB
ALBUMIN SERPL ELPH-MCNC: 3.4 G/DL — SIGNIFICANT CHANGE UP (ref 3.3–5)
ALBUMIN SERPL ELPH-MCNC: 3.4 G/DL — SIGNIFICANT CHANGE UP (ref 3.3–5)
ALP SERPL-CCNC: 49 U/L — SIGNIFICANT CHANGE UP (ref 40–120)
ALP SERPL-CCNC: 55 U/L — SIGNIFICANT CHANGE UP (ref 40–120)
ALT FLD-CCNC: 35 U/L — SIGNIFICANT CHANGE UP (ref 4–41)
ALT FLD-CCNC: 37 U/L — SIGNIFICANT CHANGE UP (ref 4–41)
ANION GAP SERPL CALC-SCNC: 10 MMOL/L — SIGNIFICANT CHANGE UP (ref 7–14)
ANION GAP SERPL CALC-SCNC: 12 MMOL/L — SIGNIFICANT CHANGE UP (ref 7–14)
APTT BLD: 48.7 SEC — HIGH (ref 27–36.3)
AST SERPL-CCNC: 31 U/L — SIGNIFICANT CHANGE UP (ref 4–40)
AST SERPL-CCNC: 33 U/L — SIGNIFICANT CHANGE UP (ref 4–40)
BASOPHILS # BLD AUTO: 0.02 K/UL — SIGNIFICANT CHANGE UP (ref 0–0.2)
BASOPHILS NFR BLD AUTO: 0.2 % — SIGNIFICANT CHANGE UP (ref 0–2)
BILIRUB SERPL-MCNC: 0.5 MG/DL — SIGNIFICANT CHANGE UP (ref 0.2–1.2)
BILIRUB SERPL-MCNC: 0.7 MG/DL — SIGNIFICANT CHANGE UP (ref 0.2–1.2)
BUN SERPL-MCNC: 29 MG/DL — HIGH (ref 7–23)
BUN SERPL-MCNC: 30 MG/DL — HIGH (ref 7–23)
CALCIUM SERPL-MCNC: 8.4 MG/DL — SIGNIFICANT CHANGE UP (ref 8.4–10.5)
CALCIUM SERPL-MCNC: 8.6 MG/DL — SIGNIFICANT CHANGE UP (ref 8.4–10.5)
CHLORIDE SERPL-SCNC: 109 MMOL/L — HIGH (ref 98–107)
CHLORIDE SERPL-SCNC: 110 MMOL/L — HIGH (ref 98–107)
CO2 SERPL-SCNC: 18 MMOL/L — LOW (ref 22–31)
CO2 SERPL-SCNC: 18 MMOL/L — LOW (ref 22–31)
CREAT SERPL-MCNC: 1.09 MG/DL — SIGNIFICANT CHANGE UP (ref 0.5–1.3)
CREAT SERPL-MCNC: 1.29 MG/DL — SIGNIFICANT CHANGE UP (ref 0.5–1.3)
EGFR: 59 ML/MIN/1.73M2 — LOW
EGFR: 72 ML/MIN/1.73M2 — SIGNIFICANT CHANGE UP
EOSINOPHIL # BLD AUTO: 0.19 K/UL — SIGNIFICANT CHANGE UP (ref 0–0.5)
EOSINOPHIL NFR BLD AUTO: 1.9 % — SIGNIFICANT CHANGE UP (ref 0–6)
GANGLIOSIDE GQ1B IGG ANTIBODY RESULT: SIGNIFICANT CHANGE UP TITER
GANGLIOSIDE GQ1B IGG ANTIBODY RESULT: SIGNIFICANT CHANGE UP TITER
GLUCOSE BLDC GLUCOMTR-MCNC: 94 MG/DL — SIGNIFICANT CHANGE UP (ref 70–99)
GLUCOSE SERPL-MCNC: 96 MG/DL — SIGNIFICANT CHANGE UP (ref 70–99)
GLUCOSE SERPL-MCNC: 97 MG/DL — SIGNIFICANT CHANGE UP (ref 70–99)
HCT VFR BLD CALC: 25.4 % — LOW (ref 39–50)
HCT VFR BLD CALC: 28.2 % — LOW (ref 39–50)
HGB BLD-MCNC: 8.5 G/DL — LOW (ref 13–17)
HGB BLD-MCNC: 9.2 G/DL — LOW (ref 13–17)
IANC: 8.4 K/UL — HIGH (ref 1.8–7.4)
IMM GRANULOCYTES NFR BLD AUTO: 0.3 % — SIGNIFICANT CHANGE UP (ref 0–0.9)
INR BLD: 1.03 RATIO — SIGNIFICANT CHANGE UP (ref 0.88–1.16)
LYMPHOCYTES # BLD AUTO: 0.96 K/UL — LOW (ref 1–3.3)
LYMPHOCYTES # BLD AUTO: 9.4 % — LOW (ref 13–44)
MAGNESIUM SERPL-MCNC: 2.2 MG/DL — SIGNIFICANT CHANGE UP (ref 1.6–2.6)
MCHC RBC-ENTMCNC: 29.8 PG — SIGNIFICANT CHANGE UP (ref 27–34)
MCHC RBC-ENTMCNC: 30.1 PG — SIGNIFICANT CHANGE UP (ref 27–34)
MCHC RBC-ENTMCNC: 32.6 GM/DL — SIGNIFICANT CHANGE UP (ref 32–36)
MCHC RBC-ENTMCNC: 33.5 GM/DL — SIGNIFICANT CHANGE UP (ref 32–36)
MCV RBC AUTO: 90.1 FL — SIGNIFICANT CHANGE UP (ref 80–100)
MCV RBC AUTO: 91.3 FL — SIGNIFICANT CHANGE UP (ref 80–100)
MONOCYTES # BLD AUTO: 0.64 K/UL — SIGNIFICANT CHANGE UP (ref 0–0.9)
MONOCYTES NFR BLD AUTO: 6.3 % — SIGNIFICANT CHANGE UP (ref 2–14)
NEUTROPHILS # BLD AUTO: 8.4 K/UL — HIGH (ref 1.8–7.4)
NEUTROPHILS NFR BLD AUTO: 81.9 % — HIGH (ref 43–77)
NRBC # BLD: 0 /100 WBCS — SIGNIFICANT CHANGE UP (ref 0–0)
NRBC # BLD: 0 /100 WBCS — SIGNIFICANT CHANGE UP (ref 0–0)
NRBC # FLD: 0 K/UL — SIGNIFICANT CHANGE UP (ref 0–0)
NRBC # FLD: 0 K/UL — SIGNIFICANT CHANGE UP (ref 0–0)
PHOSPHATE SERPL-MCNC: 3.3 MG/DL — SIGNIFICANT CHANGE UP (ref 2.5–4.5)
PLATELET # BLD AUTO: 144 K/UL — LOW (ref 150–400)
PLATELET # BLD AUTO: 153 K/UL — SIGNIFICANT CHANGE UP (ref 150–400)
POTASSIUM SERPL-MCNC: 4.3 MMOL/L — SIGNIFICANT CHANGE UP (ref 3.5–5.3)
POTASSIUM SERPL-MCNC: 4.5 MMOL/L — SIGNIFICANT CHANGE UP (ref 3.5–5.3)
POTASSIUM SERPL-SCNC: 4.3 MMOL/L — SIGNIFICANT CHANGE UP (ref 3.5–5.3)
POTASSIUM SERPL-SCNC: 4.5 MMOL/L — SIGNIFICANT CHANGE UP (ref 3.5–5.3)
PROT SERPL-MCNC: 7 G/DL — SIGNIFICANT CHANGE UP (ref 6–8.3)
PROT SERPL-MCNC: 7.4 G/DL — SIGNIFICANT CHANGE UP (ref 6–8.3)
PROTHROM AB SERPL-ACNC: 12 SEC — SIGNIFICANT CHANGE UP (ref 10.5–13.4)
RBC # BLD: 2.82 M/UL — LOW (ref 4.2–5.8)
RBC # BLD: 3.09 M/UL — LOW (ref 4.2–5.8)
RBC # FLD: 15.4 % — HIGH (ref 10.3–14.5)
RBC # FLD: 15.6 % — HIGH (ref 10.3–14.5)
SODIUM SERPL-SCNC: 138 MMOL/L — SIGNIFICANT CHANGE UP (ref 135–145)
SODIUM SERPL-SCNC: 139 MMOL/L — SIGNIFICANT CHANGE UP (ref 135–145)
SRA INTERP SER-IMP: SIGNIFICANT CHANGE UP
SURGICAL PATHOLOGY STUDY: SIGNIFICANT CHANGE UP
TROPONIN T, HIGH SENSITIVITY RESULT: 34 NG/L — SIGNIFICANT CHANGE UP
WBC # BLD: 10.24 K/UL — SIGNIFICANT CHANGE UP (ref 3.8–10.5)
WBC # BLD: 9.38 K/UL — SIGNIFICANT CHANGE UP (ref 3.8–10.5)
WBC # FLD AUTO: 10.24 K/UL — SIGNIFICANT CHANGE UP (ref 3.8–10.5)
WBC # FLD AUTO: 9.38 K/UL — SIGNIFICANT CHANGE UP (ref 3.8–10.5)

## 2022-10-06 PROCEDURE — 99233 SBSQ HOSP IP/OBS HIGH 50: CPT | Mod: GC

## 2022-10-06 RX ORDER — HEPARIN SODIUM 5000 [USP'U]/ML
5000 INJECTION INTRAVENOUS; SUBCUTANEOUS EVERY 8 HOURS
Refills: 0 | Status: DISCONTINUED | OUTPATIENT
Start: 2022-10-06 | End: 2022-10-06

## 2022-10-06 RX ORDER — SODIUM CHLORIDE 9 MG/ML
1000 INJECTION, SOLUTION INTRAVENOUS ONCE
Refills: 0 | Status: COMPLETED | OUTPATIENT
Start: 2022-10-06 | End: 2022-10-06

## 2022-10-06 RX ORDER — PANTOPRAZOLE SODIUM 20 MG/1
40 TABLET, DELAYED RELEASE ORAL EVERY 12 HOURS
Refills: 0 | Status: DISCONTINUED | OUTPATIENT
Start: 2022-10-06 | End: 2022-10-08

## 2022-10-06 RX ORDER — HEPARIN SODIUM 5000 [USP'U]/ML
5000 INJECTION INTRAVENOUS; SUBCUTANEOUS EVERY 12 HOURS
Refills: 0 | Status: DISCONTINUED | OUTPATIENT
Start: 2022-10-06 | End: 2022-10-11

## 2022-10-06 RX ADMIN — ATORVASTATIN CALCIUM 40 MILLIGRAM(S): 80 TABLET, FILM COATED ORAL at 21:09

## 2022-10-06 RX ADMIN — CHLORHEXIDINE GLUCONATE 1 APPLICATION(S): 213 SOLUTION TOPICAL at 14:32

## 2022-10-06 RX ADMIN — AMLODIPINE BESYLATE 10 MILLIGRAM(S): 2.5 TABLET ORAL at 05:11

## 2022-10-06 RX ADMIN — SODIUM CHLORIDE 1000 MILLILITER(S): 9 INJECTION, SOLUTION INTRAVENOUS at 10:36

## 2022-10-06 RX ADMIN — HEPARIN SODIUM 5000 UNIT(S): 5000 INJECTION INTRAVENOUS; SUBCUTANEOUS at 05:10

## 2022-10-06 RX ADMIN — LOSARTAN POTASSIUM 100 MILLIGRAM(S): 100 TABLET, FILM COATED ORAL at 06:40

## 2022-10-06 RX ADMIN — HEPARIN SODIUM 5000 UNIT(S): 5000 INJECTION INTRAVENOUS; SUBCUTANEOUS at 18:47

## 2022-10-06 RX ADMIN — PANTOPRAZOLE SODIUM 40 MILLIGRAM(S): 20 TABLET, DELAYED RELEASE ORAL at 17:36

## 2022-10-06 NOTE — PROGRESS NOTE ADULT - TIME BILLING
Medical management as above, reviewing chart and coordinating care with primary team/staff, as well as reviewing vitals, radiology, medication list, recent labs, and prior records.
Medical management as above, reviewing chart and coordinating care with primary team/staff, as well as reviewing vitals, radiology, medication list, recent labs, and prior records.

## 2022-10-06 NOTE — PROGRESS NOTE ADULT - ASSESSMENT
73 y/o M with a PMHx significant for HTN, HLD, CAD s/p stent, MM, presents with descending paralysis complicated by acute hypoxic respiratory failure due to neuromuscular weakness mediated by GBS now status post IVIG.     Neuro   #Generalized Weakness,  GBS   - MRI brain- No acute hemorrhage mass mass effect or acute territorial   infarcts seen. and C/T/L spine no cord compression   -Discontinued decadron 9/28 discussed with neurosurgery no cord compression and neuro deferred further decision making regarding decadron.   -IVIG 35g qd (3/3) as per neuro for possible GBS completed 9/28  -positive anti ganglioside ab  -appreciate neurology recommendations   -Resolving  -PT consulted and working with patient  -Cleared by speech and swallow      #Sedation/Analgesia  - off sedation.     Pulmonary   #Acute hypoxic respiratory failure secondary to neuromuscular weakness  -improved. extubated 10/3  -continue airway clearance  -weaned off NC o2      #Hemoptysis  Pt reportedly with hemoptysis in [facility]. Pt with RLL calcified granuloma & scattered pulm nodules.   -quant gold indeterminate  - sputum afb x3 no acid fast  -taken off of TB precautions     Cardiovascular   #Vasoplegic Shock  Resolved.     #HLD   -atorvastatin 40 qhs     #CAD  - Continue to hold aspirin given GIB,   - HOLDing Home Metoprolol tartrate 50mg PO BID    #HTN  -Amlodipine 10mg qd   -Losartan 100mg qd    #BNP elevated  Pt with BNP elevation, respiratory distress.  - TTE mild AR and AS, Stage I diastolic dysfunction     GI  - cleared by speech and swallow, regular diet w thin liquid and supplemental shake      #GI bleed  -dark stools improved/resolved.   - pantoprazole gtt 72h then BID dosing  -EGD 10/3 performed nonbleeding ulcerated lesions with visible vessels at two sites. One treated with epi and bipolar cautery. Other site was not intervened upon due to risks associated outweighing benefit and technically difficult.  GI reccomends IR consult if rebleed.     Renal   #Hyponatremia, CRISTI  -Improved/ resolved.     MSK  #T8 Compression Fx   MR spine and spine consult to assess for possible interventions. Diffuse spinal lesions.   - Neurosx consulted   -no acute intervention. Decadron discontinued 9/28 following discussion with neurosurgery as well as neurology.     ID  #Bacillus cereus in blood cxs x1 bottle on 9/25, ?contaminate  - 9/27 BCx NGTD  - afebrile, WBC count improving, monitor off abx     r/o TB  - AFB negative x3  -off TB precautions     Endocrine   #Hyperglycemia  Pt with mild hyperglycemia on admission. A1c 5.7% (?pre-DM)  - FS q6h    Heme/onc  #Multiple Myeloma  Pt had last received Velcade 3/2022. Had been given opportunity for chemo vacation and travelled to [facility], but pt went for longer than anticipated. Now with diffuse lytic lesions involving most of spine. Had previously had XRT as well.   Diagnostics:  - LDH, B2-Microglobulin  - SPEP, UPEP, IFX (S/U), Immunoglobulins, FLCs  -Follow up with heme onc regarding valtrex    #Anemia  Likely multifactorial given UGIB, MM, less likely hemolytic anemia mediated by IVIG. additionally will rule out HIT  -Unlikely hemolytic as LDH and Haptoglobin are wnl   -heparin subq reintroduced at 5000 q12h dose given weight borderline and recent GIB. discussed with pharmacy team.   -High dose PPI   -4T score with intermediate probability of HIT    Serotonin release assay, PF4 negative  -CBC qday          =====Hospital Bundle=====  Hospital Bundle  Fluids: with gtts  Electrolytes: Replete K > 4, Mg > 2, Phos > 3  Nutrition: Regular w thin liquids and supplemental shake  PPX  ---VTE:  SQH  ---GI: protonix,  ---Resp: Room air  ---: Dahl Placed 9/25, removed, previously with condom cath in place.   Access: PIVs  Code Status: FULL CODE  Dispo: Pending Medical Optimization 73 y/o M with a PMHx significant for HTN, HLD, CAD s/p stent, MM, presents with descending paralysis complicated by acute hypoxic respiratory failure due to neuromuscular weakness mediated by GBS now status post IVIG.     Neuro   #Generalized Weakness,  GBS   - MRI brain- No acute hemorrhage mass mass effect or acute territorial   infarcts seen. and C/T/L spine no cord compression   -Discontinued decadron 9/28 discussed with neurosurgery no cord compression and neuro deferred further decision making regarding decadron.   -IVIG 35g qd (3/3) as per neuro for possible GBS completed 9/28  -positive anti ganglioside ab  -appreciate neurology recommendations   -Resolving  -PT consulted and working with patient  -Cleared by speech and swallow    #Delirium  likely ICU delirium  -redirection and reorientation  -family at bedside daily, encouraged family to talk with patient for orientation purposes  -PT working with patient  -if necessary will consider low dose seroquel or other antipsychotic.         Pulmonary   #Acute hypoxic respiratory failure secondary to neuromuscular weakness  -resolved.     #Hemoptysis  Pt reportedly with hemoptysis in [facility]. Pt with RLL calcified granuloma & scattered pulm nodules.   -quant gold indeterminate  - sputum afb x3 no acid fast  -taken off of TB precautions     Cardiovascular   #Vasoplegic Shock  Resolved.     #HLD   -atorvastatin 40 qhs     #CAD, new TWI noted  - Continue to hold aspirin given GIB,   - HOLDing Home Metoprolol tartrate 50mg PO BID  -TWI noted II, III, aVF, trops flat, underlying rhythm sinus. reviewed with cardiology no acute intervention at this time given GIB   -repeat interval ekg  -likely to follow up as outpatient for stress testing     #HTN  -Amlodipine 10mg qd   -Losartan 100mg qd    #BNP elevated  Pt with BNP elevation, respiratory distress.  - TTE mild AR and AS, Stage I diastolic dysfunction     GI  - cleared by speech and swallow, regular diet w thin liquid and supplemental shake      #GI bleed  -dark stools improved/resolved.   - pantoprazole gtt 72h then BID dosing  -EGD 10/3 performed nonbleeding ulcerated lesions with visible vessels at two sites. One treated with epi and bipolar cautery. Other site was not intervened upon due to risks associated outweighing benefit and technically difficult.  GI reccomends IR consult if rebleed.   -monitoring H/H    Renal   #Hyponatremia, CRISTI  -Improved/ resolved.     MSK  #T8 Compression Fx   MR spine and spine consult to assess for possible interventions. Diffuse spinal lesions.   - Neurosx consulted   -no acute intervention. Decadron discontinued 9/28 following discussion with neurosurgery as well as neurology.     ID  #Bacillus cereus in blood cxs x1 bottle on 9/25, ?contaminate  - 9/27 BCx NGTD  - afebrile, WBC count improving, monitor off abx     r/o TB  - AFB negative x3  -off TB precautions     Endocrine   #Hyperglycemia  Pt with mild hyperglycemia on admission. A1c 5.7% (?pre-DM)  - FS q6h    Heme/onc  #Multiple Myeloma  Pt had last received Velcade 3/2022. Had been given opportunity for chemo vacation and travelled to [facility], but pt went for longer than anticipated. Now with diffuse lytic lesions involving most of spine. Had previously had XRT as well.   Diagnostics:  - LDH, B2-Microglobulin  - SPEP, UPEP, IFX (S/U), Immunoglobulins, FLCs  -valtrex discontinued as no recent chemotherapy. spoke with hematology.     #Anemia  Likely multifactorial given UGIB, MM, less likely hemolytic anemia mediated by IVIG. additionally will rule out HIT  -Unlikely hemolytic as LDH and Haptoglobin are wnl   -heparin subq reintroduced at 5000 q12h dose given weight borderline and recent GIB. discussed with pharmacy team.   -High dose PPI   -4T score with intermediate probability of HIT    Serotonin release assay, PF4 negative  -CBC qday          =====Hospital Bundle=====  Hospital Bundle  Fluids: with gtts  Electrolytes: Replete K > 4, Mg > 2, Phos > 3  Nutrition: Regular w thin liquids and supplemental shake  PPX  ---VTE:  SQH  ---GI: protonix,  ---Resp: Room air  ---: Dahl Placed 9/25, removed, previously with condom cath in place.   Access: PIVs  Code Status: FULL CODE  Dispo: Pending Medical Optimization

## 2022-10-06 NOTE — PROGRESS NOTE ADULT - SUBJECTIVE AND OBJECTIVE BOX
Payam Longoria MD  Interventional Cardiology / Advance Heart Failure and Cardiac Transplant Specialist  [place] Office : [phone] [place] [address] [address] [address]  Tel:   [place] Office : [address] [facility] [place] [address]  Tel: [phone]      Subjective/Overnight events: Pt is lying in bed no acute distress noted. as per family at bedside, patient with some AMS this AM   	  MEDICATIONS:  amLODIPine   Tablet 10 milliGRAM(s) Oral daily  heparin   Injectable 5000 Unit(s) SubCutaneous every 12 hours  losartan 100 milliGRAM(s) Oral daily      guaiFENesin Oral Liquid (Sugar-Free) 100 milliGRAM(s) Oral every 6 hours PRN    melatonin 9 milliGRAM(s) Oral at bedtime    pantoprazole Infusion 8 mG/Hr IV Continuous <Continuous>  polyethylene glycol 3350 17 Gram(s) Oral daily    atorvastatin 40 milliGRAM(s) Oral at bedtime  glucagon  Injectable 1 milliGRAM(s) IntraMuscular once    chlorhexidine 2% Cloths 1 Application(s) Topical daily      PAST MEDICAL/SURGICAL HISTORY  PAST MEDICAL & SURGICAL HISTORY:  HTN (hypertension)      HLD (hyperlipidemia)      Coronary artery disease      Multiple myeloma      S/P coronary artery stent placement          SOCIAL HISTORY: Substance Use (street drugs): ( x ) never used  (  ) other:    FAMILY HISTORY:  Maternal family history of hypertension  Mother          PHYSICAL EXAM:  T(C): 35.8 (10-06-22 @ 08:00), Max: 37.7 (10-05-22 @ 16:00)  HR: 87 (10-06-22 @ 12:00) (81 - 133)  BP: 155/84 (10-06-22 @ 12:00) (111/60 - 168/77)  RR: 17 (10-06-22 @ 12:00) (15 - 25)  SpO2: 100% (10-06-22 @ 12:00) (98% - 100%)  Wt(kg): --  I&O's Summary    05 Oct 2022 07:01  -  06 Oct 2022 07:00  --------------------------------------------------------  IN: 870 mL / OUT: 1680 mL / NET: -810 mL    06 Oct 2022 07:01  -  06 Oct 2022 14:14  --------------------------------------------------------  IN: 1010 mL / OUT: 0 mL / NET: 1010 mL          GENERAL: NAD  EYES:  conjunctiva and sclera clear  Cardiovascular: Normal S1 S2, No JVD, No murmurs, No edema  Gastrointestinal:  Soft,   Extremities: No edema                                 9.2    9.38  )-----------( 153      ( 06 Oct 2022 12:15 )             28.2     10-06    139  |  109<H>  |  29<H>  ----------------------------<  96  4.3   |  18<L>  |  1.09    Ca    8.6      06 Oct 2022 12:15  Phos  3.3     10-06  Mg     2.20     10-06    TPro  7.4  /  Alb  3.4  /  TBili  0.7  /  DBili  x   /  AST  31  /  ALT  37  /  AlkPhos  55  10-06    proBNP:   Lipid Profile:   HgA1c:   TSH:     Consultant(s) Notes Reviewed:  [x ] YES  [ ] NO    Care Discussed with Consultants/Other Providers [ x] YES  [ ] NO    Imaging Personally Reviewed independently:  [x] YES  [ ] NO    All labs, radiologic studies, vitals, orders and medications list reviewed. Patient is seen and examined at bedside. Case discussed with medical team.

## 2022-10-06 NOTE — PROGRESS NOTE ADULT - SUBJECTIVE AND OBJECTIVE BOX
Deo Oropeza MD  Internal Medicine  Pager #[phone]    CHIEF COMPLAINT:Patient is a 72y old  Male who presents with a chief complaint of weakness (05 Oct 2022 15:53)        Interval Events:    REVIEW OF SYSTEMS:      OBJECTIVE:  ICU Vital Signs Last 24 Hrs  T(C): 36.3 (06 Oct 2022 04:00), Max: 37.7 (05 Oct 2022 16:00)  T(F): 97.4 (06 Oct 2022 04:00), Max: 99.8 (05 Oct 2022 16:00)  HR: 109 (06 Oct 2022 06:00) (85 - 133)  BP: 111/60 (06 Oct 2022 06:00) (111/60 - 168/77)  BP(mean): 74 (06 Oct 2022 06:00) (74 - 104)  ABP: --  ABP(mean): --  RR: 19 (06 Oct 2022 06:00) (15 - 27)  SpO2: 99% (06 Oct 2022 06:00) (98% - 100%)    O2 Parameters below as of 05 Oct 2022 20:00  Patient On (Oxygen Delivery Method): room air              10-05 @ 07:01  -  10-06 @ 07:00  --------------------------------------------------------  IN: 870 mL / OUT: 1680 mL / NET: -810 mL      CAPILLARY BLOOD GLUCOSE      POCT Blood Glucose.: 113 mg/dL (05 Oct 2022 16:55)      PHYSICAL EXAM:      LINES:    HOSPITAL MEDICATIONS:  Standing Meds:  amLODIPine   Tablet 10 milliGRAM(s) Oral daily  atorvastatin 40 milliGRAM(s) Oral at bedtime  chlorhexidine 2% Cloths 1 Application(s) Topical daily  glucagon  Injectable 1 milliGRAM(s) IntraMuscular once  losartan 100 milliGRAM(s) Oral daily  melatonin 9 milliGRAM(s) Oral at bedtime  pantoprazole Infusion 8 mG/Hr IV Continuous <Continuous>  polyethylene glycol 3350 17 Gram(s) Oral daily      PRN Meds:  guaiFENesin Oral Liquid (Sugar-Free) 100 milliGRAM(s) Oral every 6 hours PRN      LABS:                        8.5    10.24 )-----------( 144      ( 06 Oct 2022 00:30 )             25.4     Hgb Trend: 8.5<--, 11.0<--, 9.4<--, 10.8<--, 9.0<--  10-06    138  |  110<H>  |  30<H>  ----------------------------<  97  4.5   |  18<L>  |  1.29    Ca    8.4      06 Oct 2022 00:30  Phos  3.3     10-06  Mg     2.20     10-06    TPro  7.0  /  Alb  3.4  /  TBili  0.5  /  DBili  x   /  AST  33  /  ALT  35  /  AlkPhos  49  10-06    Creatinine Trend: 1.29<--, 1.12<--, 1.00<--, 0.95<--, 0.98<--, 1.00<--  PT/INR - ( 06 Oct 2022 00:30 )   PT: 12.0 sec;   INR: 1.03 ratio         PTT - ( 06 Oct 2022 00:30 )  PTT:48.7 sec    Arterial Blood Gas:  10-04 @ 16:40  7.44/27/93/18/97.9/-4.9  ABG lactate: --        MICROBIOLOGY:     RADIOLOGY:  [ ] Reviewed and interpreted by me    EKG:     Deo Oropeza MD  Internal Medicine  Pager #[phone]    CHIEF COMPLAINT:Patient is a 72y old  Male who presents with a chief complaint of weakness (05 Oct 2022 15:53)        Interval Events:    downtrending hgb 11 -> 8.5. pending repeat cbc    small dark stool output noted overnight without other overt bleeding    EKG obtained overnight TWI noted II, III, aVF. Trops flat. Reviewed w cardiology pending follow up EKG.     REVIEW OF SYSTEMS:    CV no chest pain or palpitation  Pulm no dyspnea  Abd mild abdominal pain       OBJECTIVE:  ICU Vital Signs Last 24 Hrs  T(C): 36.3 (06 Oct 2022 04:00), Max: 37.7 (05 Oct 2022 16:00)  T(F): 97.4 (06 Oct 2022 04:00), Max: 99.8 (05 Oct 2022 16:00)  HR: 109 (06 Oct 2022 06:00) (85 - 133)  BP: 111/60 (06 Oct 2022 06:00) (111/60 - 168/77)  BP(mean): 74 (06 Oct 2022 06:00) (74 - 104)  ABP: --  ABP(mean): --  RR: 19 (06 Oct 2022 06:00) (15 - 27)  SpO2: 99% (06 Oct 2022 06:00) (98% - 100%)    O2 Parameters below as of 05 Oct 2022 20:00  Patient On (Oxygen Delivery Method): room air              10-05 @ 07:01  -  10-06 @ 07:00  --------------------------------------------------------  IN: 870 mL / OUT: 1680 mL / NET: -810 mL      CAPILLARY BLOOD GLUCOSE      POCT Blood Glucose.: 113 mg/dL (05 Oct 2022 16:55)      PHYSICAL EXAM:    GENERAL: anxious/agitated  LUNG: CTAB and no stridor  HEART: RRR  ABDOMEN: diffuse mild abdominal tenderness to palpation  NEUROLOGY: responsive to voice. follows commands. 4/5 strength throughout  Psych: paranoid ideas, guarded    LINES:    HOSPITAL MEDICATIONS:  Standing Meds:  amLODIPine   Tablet 10 milliGRAM(s) Oral daily  atorvastatin 40 milliGRAM(s) Oral at bedtime  chlorhexidine 2% Cloths 1 Application(s) Topical daily  glucagon  Injectable 1 milliGRAM(s) IntraMuscular once  losartan 100 milliGRAM(s) Oral daily  melatonin 9 milliGRAM(s) Oral at bedtime  pantoprazole Infusion 8 mG/Hr IV Continuous <Continuous>  polyethylene glycol 3350 17 Gram(s) Oral daily      PRN Meds:  guaiFENesin Oral Liquid (Sugar-Free) 100 milliGRAM(s) Oral every 6 hours PRN      LABS:                        8.5    10.24 )-----------( 144      ( 06 Oct 2022 00:30 )             25.4     Hgb Trend: 8.5<--, 11.0<--, 9.4<--, 10.8<--, 9.0<--  10-06    138  |  110<H>  |  30<H>  ----------------------------<  97  4.5   |  18<L>  |  1.29    Ca    8.4      06 Oct 2022 00:30  Phos  3.3     10-06  Mg     2.20     10-06    TPro  7.0  /  Alb  3.4  /  TBili  0.5  /  DBili  x   /  AST  33  /  ALT  35  /  AlkPhos  49  10-06    Creatinine Trend: 1.29<--, 1.12<--, 1.00<--, 0.95<--, 0.98<--, 1.00<--  PT/INR - ( 06 Oct 2022 00:30 )   PT: 12.0 sec;   INR: 1.03 ratio         PTT - ( 06 Oct 2022 00:30 )  PTT:48.7 sec    Arterial Blood Gas:  10-04 @ 16:40  7.44/27/93/18/97.9/-4.9  ABG lactate: --        MICROBIOLOGY:     RADIOLOGY:  [ ] Reviewed and interpreted by me    EKG:

## 2022-10-06 NOTE — PROGRESS NOTE ADULT - ASSESSMENT
72 year-old male, history of HTN, HLD, CAD, cardiac stent, MM, presents with cc generalized weakness x 2-3 days associated with some tingling sensation.    A/P:  Hyponatremia/hypernatrmic:  work up suggested SIADH  Na appropriately improved now   monitor Na closely     acute on CKD stage 3  Baseline Scr 1.3-1.4  CRISTI likely sec to ATN  improved better than baseline  monitor bmp, i/o    Hypokalemia:  better  Monitor K level    acidosis  Non AG  dropping.  off bicarb. consider restarting bicarb 650 tid  monitor    HTN:  optimal   manage per MICU   MOnitor BP    Weakness:  Etiology?  h/o MM  s/p LP  possible GBS s/p [id number]  f/u neuro

## 2022-10-06 NOTE — PROGRESS NOTE ADULT - SUBJECTIVE AND OBJECTIVE BOX
Name of Patient : RASHID MEIER  MRN: [id number]  Date of visit: 10-06-22       Subjective: Patient seen and examined. No new events except as noted.     REVIEW OF SYSTEMS:    CONSTITUTIONAL: No weakness, fevers or chills  EYES/ENT: No visual changes;  No vertigo or throat pain   NECK: No pain or stiffness  RESPIRATORY: No cough, wheezing, hemoptysis; No shortness of breath  CARDIOVASCULAR: No chest pain or palpitations  GASTROINTESTINAL: No abdominal or epigastric pain. No nausea, vomiting, or hematemesis; No diarrhea or constipation. No melena or hematochezia.  GENITOURINARY: No dysuria, frequency or hematuria  NEUROLOGICAL: No numbness or weakness  SKIN: No itching, burning, rashes, or lesions   All other review of systems is negative unless indicated above.    MEDICATIONS:  MEDICATIONS  (STANDING):  amLODIPine   Tablet 10 milliGRAM(s) Oral daily  atorvastatin 40 milliGRAM(s) Oral at bedtime  chlorhexidine 2% Cloths 1 Application(s) Topical daily  glucagon  Injectable 1 milliGRAM(s) IntraMuscular once  heparin   Injectable 5000 Unit(s) SubCutaneous every 12 hours  losartan 100 milliGRAM(s) Oral daily  melatonin 9 milliGRAM(s) Oral at bedtime  pantoprazole Infusion 8 mG/Hr (10 mL/Hr) IV Continuous <Continuous>  polyethylene glycol 3350 17 Gram(s) Oral daily      PHYSICAL EXAM:  T(C): 35.9 (10-06-22 @ 12:00), Max: 36.6 (10-05-22 @ 20:00)  HR: 90 (10-06-22 @ 14:00) (81 - 133)  BP: 125/69 (10-06-22 @ 14:00) (111/60 - 168/77)  RR: 20 (10-06-22 @ 14:00) (15 - 25)  SpO2: 100% (10-06-22 @ 14:00) (98% - 100%)  Wt(kg): --  I&O's Summary    05 Oct 2022 07:01  -  06 Oct 2022 07:00  --------------------------------------------------------  IN: 870 mL / OUT: 1680 mL / NET: -810 mL    06 Oct 2022 07:01  -  06 Oct 2022 16:30  --------------------------------------------------------  IN: 1010 mL / OUT: 0 mL / NET: 1010 mL          Appearance: Normal	  HEENT:  PERRLA   Lymphatic: No lymphadenopathy   Cardiovascular: Normal S1 S2, no JVD  Respiratory: normal effort , clear  Gastrointestinal:  Soft, Non-tender  Skin: No rashes,  warm to touch  Psychiatry:  Mood & affect appropriate  Musculuskeletal: No edema      All labs, Imaging and EKGs personally reviewed     10-05-22 @ 07:01  -  10-06-22 @ 07:00  --------------------------------------------------------  IN: 870 mL / OUT: 1680 mL / NET: -810 mL    10-06-22 @ 07:01  -  10-06-22 @ 16:30  --------------------------------------------------------  IN: 1010 mL / OUT: 0 mL / NET: 1010 mL                            9.2    9.38  )-----------( 153      ( 06 Oct 2022 12:15 )             28.2               10-06    139  |  109<H>  |  29<H>  ----------------------------<  96  4.3   |  18<L>  |  1.09    Ca    8.6      06 Oct 2022 12:15  Phos  3.3     10-06  Mg     2.20     10-06    TPro  7.4  /  Alb  3.4  /  TBili  0.7  /  DBili  x   /  AST  31  /  ALT  37  /  AlkPhos  55  10-06    PT/INR - ( 06 Oct 2022 00:30 )   PT: 12.0 sec;   INR: 1.03 ratio         PTT - ( 06 Oct 2022 00:30 )  PTT:48.7 sec       CARDIAC MARKERS ( 05 Oct 2022 18:45 )  x     / x     / x     / x     / 2.6 ng/mL            ABG - ( 04 Oct 2022 16:40 )  pH, Arterial: 7.44  pH, Blood: x     /  pCO2: 27    /  pO2: 93    / HCO3: 18    / Base Excess: -4.9  /  SaO2: 97.9

## 2022-10-06 NOTE — PROGRESS NOTE ADULT - ASSESSMENT
Patient is a 73 yo M with PMH of HTN, HLD, CAD, cardiac stent, Multiple Myeloma who presented to the ED for 3 days of generalized weakness.    EKG: ST PVCs  Tele: NSR PVCs episodes of PAT    1. Weakness  -rapidly progressed. was unable to move extremities.   -s/p RRT for hypoxia now intubated in MICU   -neuro on board, possible GBS vs pathology in neck  -echo shows mild AR/AS, normal LV systolic function and mild diastolic dysfunction   -IVIG 35g qd (3/3) as per neuro for possible GBS completed 9/28    2. CAD s/p PCI  -in 2015 in [facility] as per daughter patient had heart attack  -no reported CP prior to hospitalization    3. MM  -heme/onc on board    4. ?Afib  -?afib on tele  -no AC 2/2 anemia and duodenal ulcers  -EKG yesterday with some changes, TWI inferior leads with incomplete LBBB. trops flat 34-34. no CP this morning. repeat EKG and trops.

## 2022-10-06 NOTE — PROGRESS NOTE ADULT - SUBJECTIVE AND OBJECTIVE BOX
[facility]   MD DERRICK MOSS MD KRISTINE SOLTANPOUR, CARLOS MILLER    TEL:  OFFICE: [phone]  From 5pm-7am Answering Service [phone]    -- RENAL FOLLOW UP NOTE ---Date of Service 10-06-22 @ 13:23    Patient is a 72y old  Male who presents with a chief complaint of weakness (06 Oct 2022 07:34)      Patient seen and examined at bedside. No chest pain/sob    VITALS:  T(F): 96.5 (10-06-22 @ 08:00), Max: 99.8 (10-05-22 @ 16:00)  HR: 87 (10-06-22 @ 12:00)  BP: 155/84 (10-06-22 @ 12:00)  RR: 17 (10-06-22 @ 12:00)  SpO2: 100% (10-06-22 @ 12:00)  Wt(kg): --    10-05 @ 07:01  -  10-06 @ 07:00  --------------------------------------------------------  IN: 870 mL / OUT: 1680 mL / NET: -810 mL    10-06 @ 07:01  -  10-06 @ 13:23  --------------------------------------------------------  IN: 1010 mL / OUT: 0 mL / NET: 1010 mL          PHYSICAL EXAM:  General: NAD  Neck: No JVD  Respiratory: CTAB, no wheezes, rales or rhonchi  Cardiovascular: S1, S2, RRR  Gastrointestinal: BS+, soft, NT/ND  Extremities: No peripheral edema    Hospital Medications:   MEDICATIONS  (STANDING):  amLODIPine   Tablet 10 milliGRAM(s) Oral daily  atorvastatin 40 milliGRAM(s) Oral at bedtime  chlorhexidine 2% Cloths 1 Application(s) Topical daily  glucagon  Injectable 1 milliGRAM(s) IntraMuscular once  heparin   Injectable 5000 Unit(s) SubCutaneous every 12 hours  losartan 100 milliGRAM(s) Oral daily  melatonin 9 milliGRAM(s) Oral at bedtime  pantoprazole Infusion 8 mG/Hr (10 mL/Hr) IV Continuous <Continuous>  polyethylene glycol 3350 17 Gram(s) Oral daily      LABS:  10-06    139  |  109<H>  |  29<H>  ----------------------------<  96  4.3   |  18<L>  |  1.09    Ca    8.6      06 Oct 2022 12:15  Phos  3.3     10-06  Mg     2.20     10-06    TPro  7.4  /  Alb  3.4  /  TBili  0.7  /  DBili      /  AST  31  /  ALT  37  /  AlkPhos  55  10-06    Creatinine Trend: 1.09 <--, 1.29 <--, 1.12 <--, 1.00 <--, 0.95 <--, 0.98 <--, 1.00 <--, 0.93 <--, 1.07 <--, 1.09 <--    Albumin, Serum: 3.4 g/dL (10-06 @ 12:15)  Albumin, Serum: 3.4 g/dL (10-06 @ 00:30)  Phosphorus Level, Serum: 3.3 mg/dL (10-06 @ 00:30)  Albumin, Serum: 3.8 g/dL (10-05 @ 18:45)  Phosphorus Level, Serum: 2.8 mg/dL (10-05 @ 18:45)                              9.2    9.38  )-----------( 153      ( 06 Oct 2022 12:15 )             28.2     Urine Studies:  Urinalysis - [09-25-22 @ 12:01]      Color Light Yellow / Appearance Clear / SG 1.035 / pH 6.5      Gluc 200 mg/dL / Ketone Trace  / Bili Negative / Urobili <2 mg/dL       Blood Trace / Protein 30 mg/dL / Leuk Est Negative / Nitrite Negative      RBC 1 / WBC 1 / Hyaline  / Gran  / Sq Epi  / Non Sq Epi 2 / Bacteria Negative      Iron 36, TIBC 290, %sat 12      [09-25-22 @ 11:15]  Ferritin 287      [09-25-22 @ 11:15]  TSH 1.57      [09-25-22 @ 07:30]      Free Light Chains: kappa 2.58, lambda 2.12, ratio = 1.22      [09-25 @ 11:15]  Immunofixation Serum:   No Monoclonal Band Identified. JERONIMO Farris M.D.  Reference Range: None Detected      [09-25-22 @ 11:15]  SPEP Interpretation: Increase in Alpha-2 fraction suggestive of acute inflammation.  JERONIMO Farris M.D.      [09-25-22 @ 11:15]  Immunofixation Urine: No Monoclonal Band Identified. JERONIMO Farris M.D.  Reference Range: None Detected      [09-25-22 @ 12:01]  UPEP Interpretation: Mild Selective Proteinuria. No Monoclonal band seen.  JERONIMO Farris M.D.      [09-25-22 @ 12:01]    RADIOLOGY & ADDITIONAL STUDIES:

## 2022-10-07 LAB
ANION GAP SERPL CALC-SCNC: 12 MMOL/L — SIGNIFICANT CHANGE UP (ref 7–14)
BUN SERPL-MCNC: 33 MG/DL — HIGH (ref 7–23)
CALCIUM SERPL-MCNC: 8.3 MG/DL — LOW (ref 8.4–10.5)
CHLORIDE SERPL-SCNC: 108 MMOL/L — HIGH (ref 98–107)
CO2 SERPL-SCNC: 17 MMOL/L — LOW (ref 22–31)
CREAT SERPL-MCNC: 1.14 MG/DL — SIGNIFICANT CHANGE UP (ref 0.5–1.3)
EGFR: 68 ML/MIN/1.73M2 — SIGNIFICANT CHANGE UP
GLUCOSE SERPL-MCNC: 86 MG/DL — SIGNIFICANT CHANGE UP (ref 70–99)
HCT VFR BLD CALC: 26.7 % — LOW (ref 39–50)
HGB BLD-MCNC: 8.9 G/DL — LOW (ref 13–17)
MAGNESIUM SERPL-MCNC: 2.1 MG/DL — SIGNIFICANT CHANGE UP (ref 1.6–2.6)
MCHC RBC-ENTMCNC: 30.5 PG — SIGNIFICANT CHANGE UP (ref 27–34)
MCHC RBC-ENTMCNC: 33.3 GM/DL — SIGNIFICANT CHANGE UP (ref 32–36)
MCV RBC AUTO: 91.4 FL — SIGNIFICANT CHANGE UP (ref 80–100)
NRBC # BLD: 0 /100 WBCS — SIGNIFICANT CHANGE UP (ref 0–0)
NRBC # FLD: 0 K/UL — SIGNIFICANT CHANGE UP (ref 0–0)
PHOSPHATE SERPL-MCNC: 2.5 MG/DL — SIGNIFICANT CHANGE UP (ref 2.5–4.5)
PLATELET # BLD AUTO: 168 K/UL — SIGNIFICANT CHANGE UP (ref 150–400)
POTASSIUM SERPL-MCNC: 4.3 MMOL/L — SIGNIFICANT CHANGE UP (ref 3.5–5.3)
POTASSIUM SERPL-SCNC: 4.3 MMOL/L — SIGNIFICANT CHANGE UP (ref 3.5–5.3)
RBC # BLD: 2.92 M/UL — LOW (ref 4.2–5.8)
RBC # FLD: 15.4 % — HIGH (ref 10.3–14.5)
SODIUM SERPL-SCNC: 137 MMOL/L — SIGNIFICANT CHANGE UP (ref 135–145)
WBC # BLD: 7.14 K/UL — SIGNIFICANT CHANGE UP (ref 3.8–10.5)
WBC # FLD AUTO: 7.14 K/UL — SIGNIFICANT CHANGE UP (ref 3.8–10.5)

## 2022-10-07 PROCEDURE — 99232 SBSQ HOSP IP/OBS MODERATE 35: CPT

## 2022-10-07 RX ORDER — ACETAMINOPHEN 500 MG
650 TABLET ORAL EVERY 6 HOURS
Refills: 0 | Status: DISCONTINUED | OUTPATIENT
Start: 2022-10-07 | End: 2022-10-11

## 2022-10-07 RX ORDER — METRONIDAZOLE 500 MG
500 TABLET ORAL THREE TIMES A DAY
Refills: 0 | Status: DISCONTINUED | OUTPATIENT
Start: 2022-10-07 | End: 2022-10-11

## 2022-10-07 RX ORDER — ONDANSETRON 8 MG/1
4 TABLET, FILM COATED ORAL EVERY 8 HOURS
Refills: 0 | Status: DISCONTINUED | OUTPATIENT
Start: 2022-10-07 | End: 2022-10-11

## 2022-10-07 RX ORDER — SODIUM BICARBONATE 1 MEQ/ML
650 SYRINGE (ML) INTRAVENOUS THREE TIMES A DAY
Refills: 0 | Status: DISCONTINUED | OUTPATIENT
Start: 2022-10-07 | End: 2022-10-11

## 2022-10-07 RX ADMIN — Medication 650 MILLIGRAM(S): at 14:59

## 2022-10-07 RX ADMIN — PANTOPRAZOLE SODIUM 40 MILLIGRAM(S): 20 TABLET, DELAYED RELEASE ORAL at 18:29

## 2022-10-07 RX ADMIN — ATORVASTATIN CALCIUM 40 MILLIGRAM(S): 80 TABLET, FILM COATED ORAL at 22:57

## 2022-10-07 RX ADMIN — AMLODIPINE BESYLATE 10 MILLIGRAM(S): 2.5 TABLET ORAL at 06:41

## 2022-10-07 RX ADMIN — PANTOPRAZOLE SODIUM 40 MILLIGRAM(S): 20 TABLET, DELAYED RELEASE ORAL at 06:41

## 2022-10-07 RX ADMIN — HEPARIN SODIUM 5000 UNIT(S): 5000 INJECTION INTRAVENOUS; SUBCUTANEOUS at 18:30

## 2022-10-07 RX ADMIN — HEPARIN SODIUM 5000 UNIT(S): 5000 INJECTION INTRAVENOUS; SUBCUTANEOUS at 06:41

## 2022-10-07 RX ADMIN — LOSARTAN POTASSIUM 100 MILLIGRAM(S): 100 TABLET, FILM COATED ORAL at 06:41

## 2022-10-07 RX ADMIN — CHLORHEXIDINE GLUCONATE 1 APPLICATION(S): 213 SOLUTION TOPICAL at 12:46

## 2022-10-07 NOTE — PROGRESS NOTE ADULT - ATTENDING COMMENTS
73 y/o M with a PMH as above here with weakness (UE>>LE) and intubated on the floors of worsening respiratory failure and inability to protect airway.  LP with findings c/w albuminocytologic dissociation, c/w GBS, completed course of IVIG.  B. cereus in the blood, unclear source, but repeat cultures with NGTD, now off abx. MRI showed no evidence of cord or cauda equina compression, NSG recs appreciated. AFB negative x3, off isolation. Attempt SAT/SBT. Hypernatremia, start free water, trend BMP. Patient examined and case reviewed in detail on bedside rounds. Frequent bedside visits with therapy change today.  Prognosis guarded.
Patient is a 73 yo M with HTN, HLD, CAD s/p stent, MM who was initially admitted on 9/24 after p/w weakness with course c/b respiratory failure requiring intubation thought to be due to GBS treated with IVIG, now c/b GIB d/t duodenal ulcers.    #GIB - EGD today with multiple non bleeding duodenal ulcers. Hb stable overnight, last transfusion 24 hours prior   - c/w PPI gtt  - f/u EGD path  - Consider IR eval as per GI if rebleed due to extensive nature of disease  - CBC q12h for now    #Thrombocytopenia - Worsening. 4T score intermediate. May be in setting of acute GIB with underlying MM  - Trend  - Transfuse PRN  - Checkt HIT panel and JJ     #Acute respiratory failure - Intubated for airway protection in setting of GBS  - c/w ventilatory support, wean as tolerated  - Possible extubation today    #GB - LP with findings c/w albuminocytologic dissociation. Improved with IVIG x3 sessions  - PT/OT  - Monitor    Krishan Suarez MD  Pulmonary & Critical Care
#Melena with post-hemorrhagic anemia s/p EGD 10/3 with evidence of multiple non-bleeding duodenal ulcers, including 2 high risk lesions; given size/position, one not amenable to endoscopic therapy, second lesion s/p epi and cautery.     --High dose PPI x 72h post-EGD and then BID x 4 weeks. If H pylori negative, consider lifelong daily PPI  --Monitor CBC and for recurrent bleeding  --Follow up EGD path    Additional recommendations as above
73 y/o M with a PMH as above here with weakness (UE>>LE) and intubated on the floors of worsening respiratory failure and inability to protect airway.  LP with findings c/w albuminocytologic dissociation, c/w GBS, cont IVIg.  B. cereus in the blood, unclear source, but repeat cultures with NGTD. Follow up MRI spine official read, NSG recs appreciated. Patient examined and case reviewed in detail on bedside rounds. Frequent bedside visits with therapy change today.  Prognosis guarded.
Mr. 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   Also, the improvement in his strength over the past few days is consistent with this diagnosis.   I agree with work up and management as above.  GBS is a monophasic illness and there is no further specific treatment indicated at this time other than supportive care, rehab medicine.   Thank you .
Mr. 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   I agree with work up and management as above.  Thank you
Mr. 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   I agree with work up and management as above.  Also seen by neurosurgery because of the possibility of spinal cord compression as the etiology of weakness. However, he is areflexic and had respiratory symptoms.   Thank you  D/W family    There is a high probability of sudden, clinically significant, or life threatening deterioration in the patient’s condition which requires the highest level of physician preparedness to intervene urgently.  Critical care time:  40 minutes.
patient seen and examined at bedside. agree with above. all questions addressed and answered, daughter at bedside
71 y/o M with a PMH as above here with weakness (UE>>LE) and intubated on the floors of worsening respiratory failure and inability to protect airway.  LP with findings c/w albuminocytologic dissociation, c/w GBS, completed course of IVIG.  B. cereus in the blood, unclear source, but repeat cultures with NGTD, now off abx. MRI showed no evidence of cord or cauda equina compression, NSG recs appreciated. AFB negative x3, off isolation. Patient examined and case reviewed in detail on bedside rounds. Frequent bedside visits with therapy change today.  Prognosis guarded.
71 y/o M with a PMH as above here with weakness (UE>>LE) and intubated on the floors of worsening respiratory failure and inability to protect airway.  LP with findings c/w albuminocytologic dissociation, c/w GBS.    1. GBS - s/p course of IVIg. Muscle strength 3+/5 b/l today and continues to improve. Able to tolerate pressure support trials on the ventilator at 5/5. PT/OT ordered. Continue to monitor for signs of improvement.  2. Acute hypoxemic respiratory failure - 2/2 GBS. Weakness continues to improve. Tolerating PS trials at 5/5. ABG 7.43/33/128/22 on PS trials 10/1. NIF -20. Continue daily SBT.  3. Acute blood loss anemia - Hb dropped to 6.4 overnight and again required an additional transfusion of pRBC, which responded appropriately. Dark stool noted in flexiseal. Continue holding feeds and continue PPI bid for suspected GI bleed. GI recs appreciated, plan for EGD tomorrow. Monitor serial CBC.  4. Hypernatremia - Improved. Continue free water at decreased frequency.    Patient examined and case reviewed in detail on bedside rounds. Frequent bedside visits with therapy change today.  Prognosis guarded.
73 y/o M with a PMH as above here with weakness (UE>>LE) and intubated on the floors of worsening respiratory failure and inability to protect airway.  LP with findings c/w albuminocytologic dissociation, will discuss with neuro re: therapeutic options.  GPRs in the sputum, cont abx and follow up speciation.  Repeat BCXs at 48 hours for surveillance.  Patient examined and case reviewed in detail on bedside rounds. Frequent bedside visits with therapy change today.  Prognosis guarded.
Mr. Rand is a 71 yo man with history, exam and CSF profile most c/w Guillain-Michael syndrome.   Also, the continued improvement in his strength is consistent with this diagnosis.   I agree with work up and management as above.  GBS is a monophasic illness and there is no further specific treatment indicated at this time other than supportive care, rehab medicine.   Thank you .
73 y/o M with a PMH as above here with weakness (UE>>LE) and intubated on the floors of worsening respiratory failure and inability to protect airway.  LP with findings c/w albuminocytologic dissociation, c/w GBS, started on IVIg.  B. cereus in the blood, unclear source?, follow up repeat cxs, cont abx.  Follow up MRI spine today, NSG recs appreciated. Patient examined and case reviewed in detail on bedside rounds. Frequent bedside visits with therapy change today.  Prognosis guarded.
Patient is a 71 yo M with HTN, HLD, CAD s/p stent, MM who was initially admitted on 9/24 after p/w weakness with course c/b respiratory failure requiring intubation thought to be due to GBS treated with IVIG, now c/b GIB d/t duodenal ulcers.    #GIB - EGD with multiple non bleeding duodenal ulcers. Hb stable  - c/w PPI gtt total for 72 hours then transition to BID dosing  - f/u EGD path  - Consider IR eval as per GI if rebleed due to extensive nature of disease  - CBC daily for now    #Thrombocytopenia - Likely in setting of acute GIB with underlying MM. Improved. HIT negative  - Trend  - Transfuse PRN  - f/u JJ    #Acute respiratory failure - Intubated for airway protection in setting of GBS, now extubated  - Wean supplemental O2 as tolerated    #GB - LP with findings c/w albuminocytologic dissociation. Improved with IVIG x3 sessions  - PT/OT  - Monitor    #HTN   - Resume home meds as tolerated    Krishan Suarez MD  Pulmonary & Critical Care
Patient is a 73 yo M with HTN, HLD, CAD s/p stent, MM who was initially admitted on 9/24 after p/w weakness with course c/b respiratory failure requiring intubation thought to be due to GBS treated with IVIG, now c/b GIB d/t duodenal ulcers.    #GIB - EGD with multiple non bleeding duodenal ulcers. Hb stable  - s/p PPI gtt total for 72 hours, will change to BID dosing  - f/u EGD path  - Consider IR eval as per GI if rebleed due to extensive nature of disease  - CBC daily for now    #CRISTI - Creatinine increased from 0.9 to 1.3 over the last several days likely prerenal in setting of poor PO intake.  - Will give LR x 1 L    #Thrombocytopenia - Likely in setting of acute GIB with underlying MM. Improved. HIT negative. Improved  - Trend  - Transfuse PRN    #Acute respiratory failure - Intubated for airway protection in setting of GBS, now extubated  - Wean supplemental O2 as tolerated    #GB - LP with findings c/w albuminocytologic dissociation. Improved with IVIG x3 sessions  - PT/OT  - Monitor    #HTN   - Resume home meds as tolerated    Krishan Suarez MD  Pulmonary & Critical Care
Patient is a 71 yo M with HTN, HLD, CAD s/p stent, MM who was initially admitted on 9/24 after p/w weakness with course c/b respiratory failure requiring intubation thought to be due to GBS treated with IVIG, now c/b GIB d/t duodenal ulcers.    #GIB - EGD today with multiple non bleeding duodenal ulcers. Hb stable overnight, last transfusion 24 hours prior   - c/w PPI gtt for 72 hours then transition to BID dosing  - f/u EGD path  - Consider IR eval as per GI if rebleed due to extensive nature of disease  - CBC daily for now    #Thrombocytopenia - Likely in setting of acute GIB with underlying MM. Improved. HIT negative  - Trend  - Transfuse PRN  - f/u JJ    #Acute respiratory failure - Intubated for airway protection in setting of GBS, now extubated  - Wean supplemental O2 as tolerated    #GB - LP with findings c/w albuminocytologic dissociation. Improved with IVIG x3 sessions  - PT/OT  - S+S evaluation  - Monitor    Krishan Suarez MD  Pulmonary & Critical Care
73 y/o M with a PMH as above here with weakness (UE>>LE) and intubated on the floors of worsening respiratory failure and inability to protect airway.  LP with findings c/w albuminocytologic dissociation, c/w GBS.    1. GBS - s/p course of IVIg. Weakness continues to improve daily. Muscle strength 3/5 b/l today. Able to tolerate pressure support trials on the ventilator at 5/5. PT/OT ordered. Continue to monitor for signs of improvement.  2. Acute hypoxemic respiratory failure - 2/2 GBS. Weakness continues to improve. Tolerating PS trials at 5/5. ABG 7.43/33/128/22 on PS trials. NIF -20. Continue daily SBT.  3. Acute blood loss anemia - Hb dropped to 6.1 overnight and required an additional transfusion of pRBC, which responded appropriately. Dark stool noted in flexiseal. Hold feeds and start PPI bid for potential GI bleed. GI consulted. Monitor serial CBC.  4. Hypernatremia - Improving on free water.    Patient examined and case reviewed in detail on bedside rounds. Frequent bedside visits with therapy change today.  Prognosis guarded.

## 2022-10-07 NOTE — PROGRESS NOTE ADULT - ATTENDING SUPERVISION STATEMENT
Resident
Fellow
Resident

## 2022-10-07 NOTE — PROGRESS NOTE ADULT - SUBJECTIVE AND OBJECTIVE BOX
Name of Patient : RASHID MEIER  MRN: [id number]  Date of visit: 10-07-22       Subjective: Patient seen and examined. No new events except as noted.   awake, weakness  daughter at the bedside     REVIEW OF SYSTEMS:    CONSTITUTIONAL: + weakness  EYES/ENT: No visual changes;  No vertigo or throat pain   NECK: No pain or stiffness  RESPIRATORY: No cough, wheezing, hemoptysis; No shortness of breath  CARDIOVASCULAR: No chest pain or palpitations  GASTROINTESTINAL: No abdominal or epigastric pain. No nausea, vomiting, or hematemesis; No diarrhea or constipation. No melena or hematochezia.  GENITOURINARY: No dysuria, frequency or hematuria  NEUROLOGICAL: No numbness or weakness  SKIN: No itching, burning, rashes, or lesions   All other review of systems is negative unless indicated above.    MEDICATIONS:  MEDICATIONS  (STANDING):  amLODIPine   Tablet 10 milliGRAM(s) Oral daily  atorvastatin 40 milliGRAM(s) Oral at bedtime  bismuth subsalicylate Liquid 300 milliGRAM(s) Oral four times a day  chlorhexidine 2% Cloths 1 Application(s) Topical daily  glucagon  Injectable 1 milliGRAM(s) IntraMuscular once  heparin   Injectable 5000 Unit(s) SubCutaneous every 12 hours  losartan 100 milliGRAM(s) Oral daily  melatonin 9 milliGRAM(s) Oral at bedtime  metroNIDAZOLE    Tablet 500 milliGRAM(s) Oral three times a day  pantoprazole  Injectable 40 milliGRAM(s) IV Push every 12 hours  polyethylene glycol 3350 17 Gram(s) Oral daily  sodium bicarbonate 650 milliGRAM(s) Oral three times a day  tetracycline 500 milliGRAM(s) Oral four times a day      PHYSICAL EXAM:  T(C): 36.8 (10-07-22 @ 17:25), Max: 36.8 (10-07-22 @ 06:41)  HR: 82 (10-07-22 @ 17:25) (73 - 97)  BP: 132/60 (10-07-22 @ 17:25) (130/65 - 146/78)  RR: 19 (10-07-22 @ 17:25) (18 - 23)  SpO2: 100% (10-07-22 @ 17:25) (97% - 100%)  Wt(kg): --  I&O's Summary    06 Oct 2022 07:01  -  07 Oct 2022 07:00  --------------------------------------------------------  IN: 1010 mL / OUT: 0 mL / NET: 1010 mL          Appearance: Normal	  HEENT:  PERRLA   Lymphatic: No lymphadenopathy   Cardiovascular: Normal S1 S2, no JVD  Respiratory: normal effort , clear  Gastrointestinal:  Soft, Non-tender  Skin: No rashes,  warm to touch  Psychiatry:  Mood & affect appropriate  Musculuskeletal: No edema      All labs, Imaging and EKGs personally reviewed     10-06-22 @ 07:01  -  10-07-22 @ 07:00  --------------------------------------------------------  IN: 1010 mL / OUT: 0 mL / NET: 1010 mL                          8.9    7.14  )-----------( 168      ( 07 Oct 2022 09:44 )             26.7               10-07    137  |  108<H>  |  33<H>  ----------------------------<  86  4.3   |  17<L>  |  1.14    Ca    8.3<L>      07 Oct 2022 09:44  Phos  2.5     10-07  Mg     2.10     10-07    TPro  7.4  /  Alb  3.4  /  TBili  0.7  /  DBili  x   /  AST  31  /  ALT  37  /  AlkPhos  55  10-06    PT/INR - ( 06 Oct 2022 00:30 )   PT: 12.0 sec;   INR: 1.03 ratio         PTT - ( 06 Oct 2022 00:30 )  PTT:48.7 sec

## 2022-10-07 NOTE — CHART NOTE - NSCHARTNOTEFT_GEN_A_CORE
Patient is positive for h. pylori infection. Please start quadruple therapy: Bismuth salicylate 300mg QID, tetracycline 500mg QID, Flagyl 500mg TID, and pantoprazole 40mg BID, will need to complete treatment for 14 days total. Patient will need to follow up in GI clinic and have to check for h. pylori eradication 4-6 weeks after completion of treatment.    Thank you for the consult. Please call us back if you have any questions.       Merary Mejia, PGY-4  Gastroenterology/Hepatology Fellow  Available on [employer] Teams  [phone] (Short Range Pager)  [phone] (Long Range Pager)    After 5pm, please contact the on-call GI fellow. [phone]

## 2022-10-07 NOTE — CHART NOTE - NSCHARTNOTESELECT_GEN_ALL_CORE
Event Note
Event Note
Follow Up/Nutrition Services
MAR Accept Note
MICU Transfer/Event Note
POCUS
RRT/Event Note
MICU Accept Note/Transfer Note

## 2022-10-07 NOTE — PROGRESS NOTE ADULT - ASSESSMENT
Patient is a 73 yo M with PMH of HTN, HLD, CAD, cardiac stent, Multiple Myeloma who presented to the ED for 3 days of generalized weakness.    EKG: ST PVCs  Tele: NSR PVCs episodes of PAT    1. Weakness  -rapidly progressed. was unable to move extremities.   -s/p RRT for hypoxia now intubated in MICU   -neuro on board, possible GBS vs pathology in neck  -echo shows mild AR/AS, normal LV systolic function and mild diastolic dysfunction   -IVIG 35g qd (3/3) as per neuro for possible GBS completed 9/28    2. CAD s/p PCI  -in 2015 in [facility] as per daughter patient had heart attack  -no reported CP prior to hospitalization    3. MM  -heme/onc on board    4. ?Afib  -?afib on tele  -no AC 2/2 anemia and duodenal ulcers  -EKG yesterday with some changes, TWI inferior leads with incomplete LBBB. trops flat 34-34. no CP this morning. trops flat , out pt stress test

## 2022-10-07 NOTE — PROGRESS NOTE ADULT - ASSESSMENT
Assessment: 71 yo male with a PMHx of HTN, HLD, CAD (s/p stent, not on AP/AC), and  multiple myeloma who presented to [facility] on 9/24 for worsening generalized weakness since 9/21. On 9/25 AM, patient developed worsening respiratory distress and was intubated. LP was performed on 9/26. CSF glucose 91 <H>, protein 166 <H>, TNC 1. Neuro exam (while off sedation) notable for absent reflexes throughout.    Impression: Worsening weakness which affected respiratory status in patient with albuminocytologic dissociation on CSF studies and absent reflexes on exam, concerning for GBS. Neuro exam has significantly improved s/p course of IVIG.    Recommendations:  [] Neuro checks per routine.  [] Fall/Aspiration precautions.  [] No further inpatient neurologic workup indicated. Patient is neurologically cleared to be discharged.   [x] MR Head/Spine imaging: results as above.  [x] s/p IVIG 35G QD x3 days (9/26-9/28).  [] Would not suggest pursing PLEX at this time as IVIG has the same efficacy for GBS, and PLEX would wash out the IVIG. GBS is typically treated with one course of either IVIG or PLEX, and then the remainder of the recovery is supportive care. We would expect the patient to continue improving even after completion of IVIG, especially as he has responded very well thus far.  [x] Ganglioside GQ1B Ab neg.  [x] s/p LP, CSF glucose 91 <H>, protein 166 <H>, TNC 1.  [] PT/OT: DARREL.  [] Rest of care per primary team.  [] Neurology signing off. Please reconsult PRN or call [facility] [phone] with any questions.   [] Patient can follow up with general neurology at [address] [place] [address] after discharge. Please instruct the patient/family to call [phone] to schedule this appointment.     Case seen and discussed with neurology attending Dr. Doherty.

## 2022-10-07 NOTE — PROGRESS NOTE ADULT - ASSESSMENT
72 year-old male, history of HTN, HLD, CAD, cardiac stent, MM, presents with cc generalized weakness x 2-3 days associated with some tingling sensation.    A/P:  Hyponatremia/hypernatrmic:  work up suggested SIADH  Na appropriately improved now   monitor Na closely     acute on CKD stage 3  Baseline Scr 1.3-1.4  CRISTI likely sec to ATN  improved better than baseline  monitor bmp, i/o    Hypokalemia:  better  Monitor K level    acidosis  Non AG  dropping.   restarting bicarb 650 tid  monitor    HTN:  optimal   manage per MICU   MOnitor BP    Weakness:  Etiology?  h/o MM  s/p LP  possible GBS s/p [id number]  f/u neuro

## 2022-10-07 NOTE — CHART NOTE - NSCHARTNOTEFT_GEN_A_CORE
MAR Accept Note  Transfer to:  Medicine  Accepting Attending Physician:  Wilder  Assigned Room:  603A    Patient seen and examined.   Labs and data reviewed.   No findings precluding transfer of service.       HPI/MICU COURSE:   Please refer to MICU transfer note for full details. Briefly, this is a 73 y/o M with a PMHx significant for HTN, HLD, CAD s/p stent, MM, presents with descending paralysis complicated by acute hypoxic respiratory failure due to neuromuscular weakness mediated by GBS now status post IVIG. Patient in AHRF mediated by NMS intubated on floor at RRT transferred to MICU. Briefly on pressors vasoplegic shock from sedation during intubation able to be downtitrated and discontinued early in MICU course. Placed on decadron found to have pathologic fracture of T8 neurosurgery consulted. MR without cord compression at any level C/T/or L and decadron discontinued. LP obtained with albuminocytologic dissociation for which IVIG given 3 day course completed 9/28. Patient demonstrated marked improvement with decadron and then with IVIG demonstrating 4/5 strength in all extremities prior to extubation. Previously with copious thick secretions which resolved with airway clearance now minimal. No plans for PLEX per neurology. PT has evaluated the patient as well as speech. Observed to have dark output into fecal management system and downtrending hemoglobin had required transfusion had received 3u prbc total with last transfusion 10/2 and without further dark output found to have ulcers with one lesion treated with epi and bipolar cautery on EGD and the other lesion was not intervened upon as risks outweighed benefits and technically difficult with reccomendation by GI to reconsult IR if rebleeds. Previously with CRISTI resolved and other electrolyte derangements notably hyponatremia and then hypernatremia also resolved. Clinically stable and ready for transfer to floor.  Heme onc has evaluated patient no active treatment at this time and following peripherally. Extubated to NC 10/3 and discontinued precedex 10/4 AM. Agitated managed at this time with redirection and reorientation. Hemodynamically stable and clinically improved ready for transfer to floor.           FOR FOLLOW-UP:  [ ] NIFs and VC  [ ] PT/OT  [ ] Monitor BP. Home amlodipine and Losartan restarted in ICU.   [ ] f/u neurology reccomendations  [ ] monitor H/H qday. If rebleeds would consult IR for embolization.   [ ] noted to have TWI II, III, aVF. Trops flat. plan to follow up with cards as an outpatient.   FYI home aspirin held in setting of GIB

## 2022-10-07 NOTE — PROGRESS NOTE ADULT - SUBJECTIVE AND OBJECTIVE BOX
[id number]    Subjective: 73 yo male seen and examined at bedside. Daughter at bedside assisted with translation. Patient states he is feeling much better.    PAST MEDICAL & SURGICAL HISTORY:  HTN (hypertension)    HLD (hyperlipidemia)    Coronary artery disease    Multiple myeloma    S/P coronary artery stent placement    FAMILY HISTORY:  Maternal family history of hypertension  Mother    Social Hx:  Nonsmoker, no drug or alcohol use    Home Medications:  amLODIPine 10 mg oral tablet: 1 tab(s) orally once a day (25 Sep 2022 03:01)  Aspirin Enteric Coated 81 mg oral delayed release tablet: 1 tab(s) orally once a day (25 Sep 2022 03:01)  atorvastatin 40 mg oral tablet: 1 tab(s) orally once a day (at bedtime) (25 Sep 2022 03:01)  calcium carbonate 500 mg (200 mg elemental calcium) oral tablet, chewable: 1 tab(s) orally once a day (25 Sep 2022 03:01)  losartan 100 mg oral tablet: 1 tab(s) orally once a day (25 Sep 2022 03:01)  Metoprolol Tartrate 50 mg oral tablet: 1 tab(s) orally 2 times a day (25 Sep 2022 03:01)  valACYclovir 500 mg oral tablet: 1 tab(s) orally once a day (25 Sep 2022 03:01)    MEDICATIONS  (STANDING):  amLODIPine   Tablet 10 milliGRAM(s) Oral daily  atorvastatin 40 milliGRAM(s) Oral at bedtime  chlorhexidine 2% Cloths 1 Application(s) Topical daily  glucagon  Injectable 1 milliGRAM(s) IntraMuscular once  heparin   Injectable 5000 Unit(s) SubCutaneous every 12 hours  losartan 100 milliGRAM(s) Oral daily  melatonin 9 milliGRAM(s) Oral at bedtime  pantoprazole  Injectable 40 milliGRAM(s) IV Push every 12 hours  polyethylene glycol 3350 17 Gram(s) Oral daily  sodium bicarbonate 650 milliGRAM(s) Oral three times a day    MEDICATIONS  (PRN):  acetaminophen     Tablet .. 650 milliGRAM(s) Oral every 6 hours PRN Temp greater or equal to 38C (100.4F), Mild Pain (1 - 3)  aluminum hydroxide/magnesium hydroxide/simethicone Suspension 30 milliLiter(s) Oral every 4 hours PRN Dyspepsia  guaiFENesin Oral Liquid (Sugar-Free) 100 milliGRAM(s) Oral every 6 hours PRN Cough  ondansetron Injectable 4 milliGRAM(s) IV Push every 8 hours PRN Nausea and/or Vomiting    Allergies  Beef (Unknown)  No Known Drug Allergies  pork (Unknown)    ROS: Pertinent positives above, all other ROS were reviewed and are negative.      Vital Signs Last 24 Hrs  T(C): 36.8 (07 Oct 2022 06:41), Max: 37.7 (06 Oct 2022 16:00)  T(F): 98.2 (07 Oct 2022 06:41), Max: 99.8 (06 Oct 2022 16:00)  HR: 97 (07 Oct 2022 06:41) (73 - 103)  BP: 146/78 (07 Oct 2022 06:41) (125/69 - 146/78)  BP(mean): 86 (07 Oct 2022 00:00) (85 - 95)  RR: 20 (07 Oct 2022 06:41) (19 - 23)  SpO2: 100% (07 Oct 2022 06:41) (97% - 100%)    Parameters below as of 07 Oct 2022 06:41  Patient On (Oxygen Delivery Method): room air    GENERAL EXAM:  Constitutional: Sitting up in bed, eating lunch. NAD.  Head: Normocephalic, atraumatic.  Extremities: No edema, no cyanosis.    NEUROLOGICAL EXAM:  MS: Alert, eyes open spontaneously. Speech is fluent, not slurred. Follows commands.  CN: EOMI. Face symmetric.  Motor:             Deltoid	Biceps	Triceps	Wrist Ext	  R	4+	4+	4-	4+	  L	4+	4+	4-	4+    	H-Flex	H-Ext	K-Flex	K-Ext	D-Flex	P-Flex  R	4+	5	4	4+	5	5	   L	4+	5	4	4+	5	5  Sensory: Intact to LT throughout.  Reflexes: Absent throughout.  Coordination/Gait: Not assessed.    Labs:   cbc                      8.9    7.14  )-----------( 168      ( 07 Oct 2022 09:44 )             26.7     [id number]    137  |  108<H>  |  33<H>  ----------------------------<  86  4.3   |  17<L>  |  1.14    Ca    8.3<L>      07 Oct 2022 09:44  Phos  2.5     10-07  Mg     2.10     10-07    TPro  7.4  /  Alb  3.4  /  TBili  0.7  /  DBili  x   /  AST  31  /  ALT  37  /  AlkPhos  55  10-06    Coags: PT/INR - ( 06 Oct 2022 00:30 )   PT: 12.0 sec;   INR: 1.03 ratio      PTT - ( 06 Oct 2022 00:30 )  PTT:48.7 sec    Cardiac Markers: CARDIAC MARKERS ( 05 Oct 2022 18:45 )  x     / x     / x     / x     / 2.6 ng/mL    LIVER FUNCTIONS - ( 06 Oct 2022 12:15 )  Alb: 3.4 g/dL / Pro: 7.4 g/dL / ALK PHOS: 55 U/L / ALT: 37 U/L / AST: 31 U/L / GGT: x           Radiology:  -09/24 CTH: No hydrocephalus, acute intracranial hemorrhage, mass effect, or brain edema.  -09/25 CT CERVICAL SPINE, w/ contrast: Multiple mixed lytic sclerotic lesions in the cervical spine may be sequela of previously treated multiple myeloma. No soft tissue mass, epidural tumor extension or abnormal enhancement is visualized by CT technique.  Follow-up MRI may be obtained for more sensitive evaluation. No fracture or traumatic malalignment. Cervical spine degenerative changes with mild to moderate spinal canal stenosis at C3-C4 through C5-C6.  Moderate to severe neural foraminal narrowing at C4-C5 and C5-C6 on the right side. Approximately 1 cm nodule in the left thyroid lobe.  -09/25 CT THORACIC SPINE, w/ contrast: Multiple mixed lytic sclerotic lesions in the thoracic spine, sternum and ribs may be sequela of previously treated multiple myeloma.  No soft tissue mass, epidural tumor extension or abnormal enhancement is visualized by CT technique. Chronic pathologic compression fracture at T8 demonstrating up to 50% loss of vertebral body height.  Chronic displaced fracture of the left superior facet of T8; the displaced fracture fragment is fused to the left inferior facet of T7. Chronic appearing mild central endplate fracture and Schmorl's node in the superior endplate of T12. No significant spinal canal stenosis is visualized by CT technique.  -09/25 CT LUMBAR SPINE, w/ contrast: There are small rudimentary ribs at T12.  There is partial sacralization of L5, with a rudimentary disc space at L5-S1. The last completely formed intervertebral disc space corresponds L4-L5 level. Multiple mixed lytic sclerotic lesions in the lumbar spine, sacrum and visualized pelvis may be sequela of previously treated multiple myeloma. No soft tissue mass, epidural tumor extension or abnormal enhancement is visualized by CT technique. No lumbar spine fracture or traumatic malalignment. Mild lumbar spine degenerative changes.  Mild spinal canal stenosis at L3-L4 and L4-L5. The bladder is distended without wall thickening or surrounding inflammatory changes.  If the patient is unable to urinate Dahl catheter may be placed.  -09/27 MRI Head w/wo: No acute hemorrhage mass mass effect or acute territorial infarcts seen.  -09/27 MRI C/T/L Spine, all w/wo: No evidence of cord or cauda equina compression. Abnormal signal scattered throughout the cervical spine as well as involving the T8 vertebral body which may be related to multiple myeloma. No abnormal intraspinal enhancement.

## 2022-10-07 NOTE — PROGRESS NOTE ADULT - PROBLEM SELECTOR PLAN 8
DVT ppx: SC heparin
DVT ppx: SC heparin      D/c planing

## 2022-10-07 NOTE — PROGRESS NOTE ADULT - SUBJECTIVE AND OBJECTIVE BOX
Payam Longoria MD  Interventional Cardiology / Advance Heart Failure and Cardiac Transplant Specialist  [place] Office : [address] [place] [address] [place]. [address]  Tel:   [place] Office : [address] [facility] [place] [address]  Tel: [phone]       Pt is lying in bed comfortable not in distress, no chest pains no SOB no palpitations  	  MEDICATIONS:  amLODIPine   Tablet 10 milliGRAM(s) Oral daily  heparin   Injectable 5000 Unit(s) SubCutaneous every 12 hours  losartan 100 milliGRAM(s) Oral daily  guaiFENesin Oral Liquid (Sugar-Free) 100 milliGRAM(s) Oral every 6 hours PRN  acetaminophen     Tablet .. 650 milliGRAM(s) Oral every 6 hours PRN  melatonin 9 milliGRAM(s) Oral at bedtime  ondansetron Injectable 4 milliGRAM(s) IV Push every 8 hours PRN  aluminum hydroxide/magnesium hydroxide/simethicone Suspension 30 milliLiter(s) Oral every 4 hours PRN  pantoprazole  Injectable 40 milliGRAM(s) IV Push every 12 hours  polyethylene glycol 3350 17 Gram(s) Oral daily  atorvastatin 40 milliGRAM(s) Oral at bedtime  glucagon  Injectable 1 milliGRAM(s) IntraMuscular once  chlorhexidine 2% Cloths 1 Application(s) Topical daily  sodium bicarbonate 650 milliGRAM(s) Oral three times a day      PAST MEDICAL/SURGICAL HISTORY  PAST MEDICAL & SURGICAL HISTORY:  HTN (hypertension)      HLD (hyperlipidemia)      Coronary artery disease      Multiple myeloma      S/P coronary artery stent placement          SOCIAL HISTORY: Substance Use (street drugs): ( x ) never used  (  ) other:    FAMILY HISTORY:  Maternal family history of hypertension  Mother      PHYSICAL EXAM:  T(C): 36.7 (10-07-22 @ 13:31), Max: 37.7 (10-06-22 @ 16:00)  HR: 89 (10-07-22 @ 13:31) (73 - 103)  BP: 130/65 (10-07-22 @ 13:31) (130/65 - 146/78)  RR: 18 (10-07-22 @ 13:31) (18 - 23)  SpO2: 100% (10-07-22 @ 13:31) (97% - 100%)  Wt(kg): --  I&O's Summary    06 Oct 2022 07:01  -  07 Oct 2022 07:00  --------------------------------------------------------  IN: 1010 mL / OUT: 0 mL / NET: 1010 mL      EYES:   PERRLA   ENMT:   Moist mucous membranes, Good dentition, No lesions  Cardiovascular: Normal S1 S2, No JVD, No murmurs, No edema  Respiratory: Lungs clear to auscultation	  Gastrointestinal:  Soft, Non-tender, + BS	  Extremities: no edema               8.9    7.14  )-----------( 168      ( 07 Oct 2022 09:44 )             26.7     10-07    137  |  108<H>  |  33<H>  ----------------------------<  86  4.3   |  17<L>  |  1.14    Ca    8.3<L>      07 Oct 2022 09:44  Phos  2.5     10-07  Mg     2.10     10-07    TPro  7.4  /  Alb  3.4  /  TBili  0.7  /  DBili  x   /  AST  31  /  ALT  37  /  AlkPhos  55  10-06    proBNP:   Lipid Profile:   HgA1c:   TSH:     Consultant(s) Notes Reviewed:  [x ] YES  [ ] NO    Care Discussed with Consultants/Other Providers [ x] YES  [ ] NO    Imaging Personally Reviewed independently:  [x] YES  [ ] NO    All labs, radiologic studies, vitals, orders and medications list reviewed. Patient is seen and examined at bedside. Case discussed with medical team.

## 2022-10-07 NOTE — PROGRESS NOTE ADULT - SUBJECTIVE AND OBJECTIVE BOX
[facility]   MD DERRICK MOSS MD KRISTINE SOLTANPOUR, DO ANGELA WONG, PA    TEL:  OFFICE: [phone]  From 5pm-7am Answering Service [phone]    -- RENAL FOLLOW UP NOTE ---Date of Service 10-07-22 @ 13:25    Patient is a 72y old  Male who presents with a chief complaint of weakness (06 Oct 2022 14:14)      Patient seen and examined at bedside. No chest pain/sob    VITALS:  T(F): 98.2 (10-07-22 @ 06:41), Max: 99.8 (10-06-22 @ 16:00)  HR: 97 (10-07-22 @ 06:41)  BP: 146/78 (10-07-22 @ 06:41)  RR: 20 (10-07-22 @ 06:41)  SpO2: 100% (10-07-22 @ 06:41)  Wt(kg): --    10-06 @ 07:01  -  10-07 @ 07:00  --------------------------------------------------------  IN: 1010 mL / OUT: 0 mL / NET: 1010 mL          PHYSICAL EXAM:  General: NAD  Neck: No JVD  Respiratory: CTAB, no wheezes, rales or rhonchi  Cardiovascular: S1, S2, RRR  Gastrointestinal: BS+, soft, NT/ND  Extremities: No peripheral edema    Hospital Medications:   MEDICATIONS  (STANDING):  amLODIPine   Tablet 10 milliGRAM(s) Oral daily  atorvastatin 40 milliGRAM(s) Oral at bedtime  chlorhexidine 2% Cloths 1 Application(s) Topical daily  glucagon  Injectable 1 milliGRAM(s) IntraMuscular once  heparin   Injectable 5000 Unit(s) SubCutaneous every 12 hours  losartan 100 milliGRAM(s) Oral daily  melatonin 9 milliGRAM(s) Oral at bedtime  pantoprazole  Injectable 40 milliGRAM(s) IV Push every 12 hours  polyethylene glycol 3350 17 Gram(s) Oral daily  sodium bicarbonate 650 milliGRAM(s) Oral three times a day      LABS:  10-07    137  |  108<H>  |  33<H>  ----------------------------<  86  4.3   |  17<L>  |  1.14    Ca    8.3<L>      07 Oct 2022 09:44  Phos  2.5     10-07  Mg     2.10     10-07    TPro  7.4  /  Alb  3.4  /  TBili  0.7  /  DBili      /  AST  31  /  ALT  37  /  AlkPhos  55  10-06    Creatinine Trend: 1.14 <--, 1.09 <--, 1.29 <--, 1.12 <--, 1.00 <--, 0.95 <--, 0.98 <--, 1.00 <--, 0.93 <--, 1.07 <--    Phosphorus Level, Serum: 2.5 mg/dL (10-07 @ 09:44)                              8.9    7.14  )-----------( 168      ( 07 Oct 2022 09:44 )             26.7     Urine Studies:  Urinalysis - [09-25-22 @ 12:01]      Color Light Yellow / Appearance Clear / SG 1.035 / pH 6.5      Gluc 200 mg/dL / Ketone Trace  / Bili Negative / Urobili <2 mg/dL       Blood Trace / Protein 30 mg/dL / Leuk Est Negative / Nitrite Negative      RBC 1 / WBC 1 / Hyaline  / Gran  / Sq Epi  / Non Sq Epi 2 / Bacteria Negative      Iron 36, TIBC 290, %sat 12      [09-25-22 @ 11:15]  Ferritin 287      [09-25-22 @ 11:15]  TSH 1.57      [09-25-22 @ 07:30]      Free Light Chains: kappa 2.58, lambda 2.12, ratio = 1.22      [09-25 @ 11:15]  Immunofixation Serum:   No Monoclonal Band Identified. JERONIMO Farris M.D.  Reference Range: None Detected      [09-25-22 @ 11:15]  SPEP Interpretation: Increase in Alpha-2 fraction suggestive of acute inflammation.  JERONIMO Farris M.D.      [09-25-22 @ 11:15]  Immunofixation Urine: No Monoclonal Band Identified. JERONIMO Farris M.D.  Reference Range: None Detected      [09-25-22 @ 12:01]  UPEP Interpretation: Mild Selective Proteinuria. No Monoclonal band seen.  JERONIMO Farris M.D.      [09-25-22 @ 12:01]    RADIOLOGY & ADDITIONAL STUDIES:

## 2022-10-08 DIAGNOSIS — K29.70 GASTRITIS, UNSPECIFIED, WITHOUT BLEEDING: ICD-10-CM

## 2022-10-08 LAB
ANION GAP SERPL CALC-SCNC: 11 MMOL/L — SIGNIFICANT CHANGE UP (ref 7–14)
BUN SERPL-MCNC: 38 MG/DL — HIGH (ref 7–23)
CALCIUM SERPL-MCNC: 8.3 MG/DL — LOW (ref 8.4–10.5)
CHLORIDE SERPL-SCNC: 110 MMOL/L — HIGH (ref 98–107)
CO2 SERPL-SCNC: 19 MMOL/L — LOW (ref 22–31)
CREAT SERPL-MCNC: 1.27 MG/DL — SIGNIFICANT CHANGE UP (ref 0.5–1.3)
EGFR: 60 ML/MIN/1.73M2 — SIGNIFICANT CHANGE UP
GLUCOSE SERPL-MCNC: 94 MG/DL — SIGNIFICANT CHANGE UP (ref 70–99)
HCT VFR BLD CALC: 24 % — LOW (ref 39–50)
HGB BLD-MCNC: 8 G/DL — LOW (ref 13–17)
MAGNESIUM SERPL-MCNC: 2.2 MG/DL — SIGNIFICANT CHANGE UP (ref 1.6–2.6)
MCHC RBC-ENTMCNC: 30.7 PG — SIGNIFICANT CHANGE UP (ref 27–34)
MCHC RBC-ENTMCNC: 33.3 GM/DL — SIGNIFICANT CHANGE UP (ref 32–36)
MCV RBC AUTO: 92 FL — SIGNIFICANT CHANGE UP (ref 80–100)
NRBC # BLD: 0 /100 WBCS — SIGNIFICANT CHANGE UP (ref 0–0)
NRBC # FLD: 0 K/UL — SIGNIFICANT CHANGE UP (ref 0–0)
PHOSPHATE SERPL-MCNC: 2.8 MG/DL — SIGNIFICANT CHANGE UP (ref 2.5–4.5)
PLATELET # BLD AUTO: 187 K/UL — SIGNIFICANT CHANGE UP (ref 150–400)
POTASSIUM SERPL-MCNC: 4.5 MMOL/L — SIGNIFICANT CHANGE UP (ref 3.5–5.3)
POTASSIUM SERPL-SCNC: 4.5 MMOL/L — SIGNIFICANT CHANGE UP (ref 3.5–5.3)
RBC # BLD: 2.61 M/UL — LOW (ref 4.2–5.8)
RBC # FLD: 15.7 % — HIGH (ref 10.3–14.5)
SODIUM SERPL-SCNC: 140 MMOL/L — SIGNIFICANT CHANGE UP (ref 135–145)
WBC # BLD: 7.75 K/UL — SIGNIFICANT CHANGE UP (ref 3.8–10.5)
WBC # FLD AUTO: 7.75 K/UL — SIGNIFICANT CHANGE UP (ref 3.8–10.5)

## 2022-10-08 RX ORDER — PANTOPRAZOLE SODIUM 20 MG/1
40 TABLET, DELAYED RELEASE ORAL EVERY 12 HOURS
Refills: 0 | Status: DISCONTINUED | OUTPATIENT
Start: 2022-10-08 | End: 2022-10-11

## 2022-10-08 RX ADMIN — PANTOPRAZOLE SODIUM 40 MILLIGRAM(S): 20 TABLET, DELAYED RELEASE ORAL at 18:58

## 2022-10-08 RX ADMIN — Medication 300 MILLIGRAM(S): at 23:56

## 2022-10-08 RX ADMIN — Medication 500 MILLIGRAM(S): at 13:11

## 2022-10-08 RX ADMIN — Medication 500 MILLIGRAM(S): at 13:13

## 2022-10-08 RX ADMIN — Medication 300 MILLIGRAM(S): at 20:20

## 2022-10-08 RX ADMIN — Medication 650 MILLIGRAM(S): at 22:47

## 2022-10-08 RX ADMIN — ATORVASTATIN CALCIUM 40 MILLIGRAM(S): 80 TABLET, FILM COATED ORAL at 22:47

## 2022-10-08 RX ADMIN — CHLORHEXIDINE GLUCONATE 1 APPLICATION(S): 213 SOLUTION TOPICAL at 13:25

## 2022-10-08 RX ADMIN — Medication 500 MILLIGRAM(S): at 22:46

## 2022-10-08 RX ADMIN — Medication 500 MILLIGRAM(S): at 23:56

## 2022-10-08 RX ADMIN — Medication 500 MILLIGRAM(S): at 18:53

## 2022-10-08 RX ADMIN — Medication 300 MILLIGRAM(S): at 13:11

## 2022-10-08 RX ADMIN — Medication 650 MILLIGRAM(S): at 13:13

## 2022-10-08 NOTE — PROVIDER CONTACT NOTE (MEDICATION) - SITUATION
Patient is 72 years old PMH significant HTN, HLD CAD s/p stent descending paralysis complicated by acute hypoxic respiratory failure

## 2022-10-08 NOTE — PROGRESS NOTE ADULT - SUBJECTIVE AND OBJECTIVE BOX
[facility]   MD DERRICK MOSS MD KRISTINE SOLTANPOUR, DO ANGELA WONG, PA    TEL:  OFFICE: [phone]  From 5pm-7am Answering Service [phone]    -- RENAL FOLLOW UP NOTE ---Date of Service 10-08-22 @ 19:27    Patient is a 72y old  Male who presents with a chief complaint of weakness (08 Oct 2022 13:58)      Patient seen and examined at bedside. No chest pain/sob    VITALS:  T(F): 97.7 (10-08-22 @ 17:14), Max: 98.6 (10-08-22 @ 05:00)  HR: 82 (10-08-22 @ 17:14)  BP: 132/57 (10-08-22 @ 17:14)  RR: 18 (10-08-22 @ 17:14)  SpO2: 100% (10-08-22 @ 17:14)  Wt(kg): --    10-07 @ 07:01  -  10-08 @ 07:00  --------------------------------------------------------  IN: 0 mL / OUT: 600 mL / NET: -600 mL          PHYSICAL EXAM:  General: NAD  Neck: No JVD  Respiratory: CTAB, no wheezes, rales or rhonchi  Cardiovascular: S1, S2, RRR  Gastrointestinal: BS+, soft, NT/ND  Extremities: No peripheral edema    Hospital Medications:   MEDICATIONS  (STANDING):  amLODIPine   Tablet 10 milliGRAM(s) Oral daily  atorvastatin 40 milliGRAM(s) Oral at bedtime  bismuth subsalicylate Liquid 300 milliGRAM(s) Oral four times a day  chlorhexidine 2% Cloths 1 Application(s) Topical daily  glucagon  Injectable 1 milliGRAM(s) IntraMuscular once  heparin   Injectable 5000 Unit(s) SubCutaneous every 12 hours  losartan 100 milliGRAM(s) Oral daily  melatonin 9 milliGRAM(s) Oral at bedtime  metroNIDAZOLE    Tablet 500 milliGRAM(s) Oral three times a day  pantoprazole    Tablet 40 milliGRAM(s) Oral every 12 hours  polyethylene glycol 3350 17 Gram(s) Oral daily  sodium bicarbonate 650 milliGRAM(s) Oral three times a day  tetracycline 500 milliGRAM(s) Oral four times a day      LABS:  10-08    140  |  110<H>  |  38<H>  ----------------------------<  94  4.5   |  19<L>  |  1.27    Ca    8.3<L>      08 Oct 2022 05:44  Phos  2.8     10-08  Mg     2.20     10-08      Creatinine Trend: 1.27 <--, 1.14 <--, 1.09 <--, 1.29 <--, 1.12 <--, 1.00 <--, 0.95 <--, 0.98 <--, 1.00 <--    Phosphorus Level, Serum: 2.8 mg/dL (10-08 @ 05:44)                              8.0    7.75  )-----------( 187      ( 08 Oct 2022 05:44 )             24.0     Urine Studies:  Urinalysis - [09-25-22 @ 12:01]      Color Light Yellow / Appearance Clear / SG 1.035 / pH 6.5      Gluc 200 mg/dL / Ketone Trace  / Bili Negative / Urobili <2 mg/dL       Blood Trace / Protein 30 mg/dL / Leuk Est Negative / Nitrite Negative      RBC 1 / WBC 1 / Hyaline  / Gran  / Sq Epi  / Non Sq Epi 2 / Bacteria Negative      Iron 36, TIBC 290, %sat 12      [09-25-22 @ 11:15]  Ferritin 287      [09-25-22 @ 11:15]  TSH 1.57      [09-25-22 @ 07:30]      Free Light Chains: kappa 2.58, lambda 2.12, ratio = 1.22      [09-25 @ 11:15]  Immunofixation Serum:   No Monoclonal Band Identified. JERONIMO Farris M.D.  Reference Range: None Detected      [09-25-22 @ 11:15]  SPEP Interpretation: Increase in Alpha-2 fraction suggestive of acute inflammation.  JERONIMO Farris M.D.      [09-25-22 @ 11:15]  Immunofixation Urine: No Monoclonal Band Identified. JERONIMO Farris M.D.  Reference Range: None Detected      [09-25-22 @ 12:01]  UPEP Interpretation: Mild Selective Proteinuria. No Monoclonal band seen.  JERONIMO Farris M.D.      [09-25-22 @ 12:01]    RADIOLOGY & ADDITIONAL STUDIES:

## 2022-10-08 NOTE — PROGRESS NOTE ADULT - SUBJECTIVE AND OBJECTIVE BOX
Name of Patient : RASHID MEIER  MRN: [id number]  Date of visit: 10-08-22       Subjective: Patient seen and examined. No new events except as noted.   anxious, wants to go home  refusing to take oral medication   daughter at the bedside     MEDICATIONS:  MEDICATIONS  (STANDING):  amLODIPine   Tablet 10 milliGRAM(s) Oral daily  atorvastatin 40 milliGRAM(s) Oral at bedtime  bismuth subsalicylate Liquid 300 milliGRAM(s) Oral four times a day  chlorhexidine 2% Cloths 1 Application(s) Topical daily  glucagon  Injectable 1 milliGRAM(s) IntraMuscular once  heparin   Injectable 5000 Unit(s) SubCutaneous every 12 hours  losartan 100 milliGRAM(s) Oral daily  melatonin 9 milliGRAM(s) Oral at bedtime  metroNIDAZOLE    Tablet 500 milliGRAM(s) Oral three times a day  pantoprazole    Tablet 40 milliGRAM(s) Oral every 12 hours  polyethylene glycol 3350 17 Gram(s) Oral daily  sodium bicarbonate 650 milliGRAM(s) Oral three times a day  tetracycline 500 milliGRAM(s) Oral four times a day      PHYSICAL EXAM:  T(C): 36.5 (10-08-22 @ 17:14), Max: 37 (10-08-22 @ 05:00)  HR: 82 (10-08-22 @ 17:14) (76 - 100)  BP: 132/57 (10-08-22 @ 17:14) (120/57 - 132/57)  RR: 18 (10-08-22 @ 17:14) (17 - 18)  SpO2: 100% (10-08-22 @ 17:14) (100% - 100%)  Wt(kg): --  I&O's Summary    07 Oct 2022 07:01  -  08 Oct 2022 07:00  --------------------------------------------------------  IN: 0 mL / OUT: 600 mL / NET: -600 mL          Appearance: Normal	  HEENT:  PERRLA   Lymphatic: No lymphadenopathy   Cardiovascular: Normal S1 S2, no JVD  Respiratory: normal effort , clear  Gastrointestinal:  Soft, Non-tender  Skin: No rashes,  warm to touch  Psychiatry:  Mood & affect appropriate  Musculuskeletal: No edema      All labs, Imaging and EKGs personally reviewed     10-07-22 @ 07:01  -  10-08-22 @ 07:00  --------------------------------------------------------  IN: 0 mL / OUT: 600 mL / NET: -600 mL                            8.0    7.75  )-----------( 187      ( 08 Oct 2022 05:44 )             24.0               10-08    140  |  110<H>  |  38<H>  ----------------------------<  94  4.5   |  19<L>  |  1.27    Ca    8.3<L>      08 Oct 2022 05:44  Phos  2.8     10-08  Mg     2.20     10-08

## 2022-10-08 NOTE — PROGRESS NOTE ADULT - ASSESSMENT
Patient is a 73 yo M with PMH of HTN, HLD, CAD, cardiac stent, Multiple Myeloma who presented to the ED for 3 days of generalized weakness.    EKG: ST PVCs  Tele: NSR PVCs episodes of PAT    1. Weakness  -rapidly progressed. was unable to move extremities.   -s/p RRT for hypoxia now intubated in MICU   -neuro on board, possible GBS vs pathology in neck  -echo shows mild AR/AS, normal LV systolic function and mild diastolic dysfunction   -IVIG 35g qd (3/3) as per neuro for possible GBS completed 9/28  -imrpoving    2. CAD s/p PCI  -in 2015 in [facility] as per daughter patient had heart attack  -no reported CP prior to hospitalization    3. MM  -heme/onc on board    4. ?Afib  -?afib on tele  -no AC 2/2 anemia and duodenal ulcers  -EKG with some changes, TWI inferior leads with incomplete LBBB. trops flat 34-34. no CP this morning. trops flat , out pt stress test

## 2022-10-08 NOTE — PROVIDER CONTACT NOTE (MEDICATION) - REASON
Patient is refusing to take his medication for H pylori tetracycline bismuth subsallicylate sodium bicarb and flagyl po he on

## 2022-10-08 NOTE — PROGRESS NOTE ADULT - ASSESSMENT
72 year-old male, history of HTN, HLD, CAD, cardiac stent, MM, presents with cc generalized weakness x 2-3 days associated with some tingling sensation.    A/P:  Hyponatremia/hypernatrmic:  work up suggested SIADH  Na appropriately improved now   monitor Na closely     CRISTI on CKD stage 3  Baseline Scr 1.3-1.4  CRISTI likely sec to ATN  improved better than baseline  monitor bmp, i/o    Hypokalemia:  Resolved  Monitor K level    acidosis  Non AG  dropping.   restarting bicarb 650 tid  monitor    HTN:  optimal   manage per MICU   MOnitor BP    Weakness:  Etiology?  h/o MM  s/p LP  possible GBS s/p [id number]  f/u neuro

## 2022-10-08 NOTE — PROGRESS NOTE ADULT - SUBJECTIVE AND OBJECTIVE BOX
Payam Longoria MD  Interventional Cardiology / Advance Heart Failure and Cardiac Transplant Specialist  [place] Office : [phone] [place] [id number]  Tel:   [place] Office : [address] [facility] [place] [address]  Tel: [phone]       Pt is lying in bed comfortable not in distress, no chest pains no SOB no palpitations  	  MEDICATIONS:  amLODIPine   Tablet 10 milliGRAM(s) Oral daily  heparin   Injectable 5000 Unit(s) SubCutaneous every 12 hours  losartan 100 milliGRAM(s) Oral daily    metroNIDAZOLE    Tablet 500 milliGRAM(s) Oral three times a day  tetracycline 500 milliGRAM(s) Oral four times a day    guaiFENesin Oral Liquid (Sugar-Free) 100 milliGRAM(s) Oral every 6 hours PRN    acetaminophen     Tablet .. 650 milliGRAM(s) Oral every 6 hours PRN  melatonin 9 milliGRAM(s) Oral at bedtime  ondansetron Injectable 4 milliGRAM(s) IV Push every 8 hours PRN    aluminum hydroxide/magnesium hydroxide/simethicone Suspension 30 milliLiter(s) Oral every 4 hours PRN  bismuth subsalicylate Liquid 300 milliGRAM(s) Oral four times a day  pantoprazole  Injectable 40 milliGRAM(s) IV Push every 12 hours  polyethylene glycol 3350 17 Gram(s) Oral daily    atorvastatin 40 milliGRAM(s) Oral at bedtime  glucagon  Injectable 1 milliGRAM(s) IntraMuscular once    chlorhexidine 2% Cloths 1 Application(s) Topical daily  sodium bicarbonate 650 milliGRAM(s) Oral three times a day      PAST MEDICAL/SURGICAL HISTORY  PAST MEDICAL & SURGICAL HISTORY:  HTN (hypertension)      HLD (hyperlipidemia)      Coronary artery disease      Multiple myeloma      S/P coronary artery stent placement          SOCIAL HISTORY: Substance Use (street drugs): ( x ) never used  (  ) other:    FAMILY HISTORY:  Maternal family history of hypertension  Mother             PHYSICAL EXAM:  T(C): 37 (10-08-22 @ 05:00), Max: 37 (10-08-22 @ 05:00)  HR: 76 (10-08-22 @ 05:00) (76 - 100)  BP: 121/51 (10-08-22 @ 05:00) (120/57 - 132/60)  RR: 17 (10-08-22 @ 05:00) (17 - 19)  SpO2: 100% (10-08-22 @ 05:00) (100% - 100%)  Wt(kg): --  I&O's Summary    07 Oct 2022 07:01  -  08 Oct 2022 07:00  --------------------------------------------------------  IN: 0 mL / OUT: 600 mL / NET: -600 mL          GENERAL: NAD  EYES:   PERRLA   ENMT:   Moist mucous membranes, Good dentition, No lesions  Cardiovascular: Normal S1 S2, No JVD, No murmurs, No edema  Respiratory: Lungs clear to auscultation	  Gastrointestinal:  Soft, Non-tender, + BS	  Extremities: no edema                                    8.0    7.75  )-----------( 187      ( 08 Oct 2022 05:44 )             24.0     10-08    140  |  110<H>  |  38<H>  ----------------------------<  94  4.5   |  19<L>  |  1.27    Ca    8.3<L>      08 Oct 2022 05:44  Phos  2.8     10-08  Mg     2.20     10-08      proBNP:   Lipid Profile:   HgA1c:   TSH:     Consultant(s) Notes Reviewed:  [x ] YES  [ ] NO    Care Discussed with Consultants/Other Providers [ x] YES  [ ] NO    Imaging Personally Reviewed independently:  [x] YES  [ ] NO    All labs, radiologic studies, vitals, orders and medications list reviewed. Patient is seen and examined at bedside. Case discussed with medical team.

## 2022-10-09 LAB
ANION GAP SERPL CALC-SCNC: 10 MMOL/L — SIGNIFICANT CHANGE UP (ref 7–14)
BLD GP AB SCN SERPL QL: NEGATIVE — SIGNIFICANT CHANGE UP
BUN SERPL-MCNC: 30 MG/DL — HIGH (ref 7–23)
CALCIUM SERPL-MCNC: 8.1 MG/DL — LOW (ref 8.4–10.5)
CHLORIDE SERPL-SCNC: 109 MMOL/L — HIGH (ref 98–107)
CO2 SERPL-SCNC: 19 MMOL/L — LOW (ref 22–31)
CREAT SERPL-MCNC: 1.13 MG/DL — SIGNIFICANT CHANGE UP (ref 0.5–1.3)
EGFR: 69 ML/MIN/1.73M2 — SIGNIFICANT CHANGE UP
GLUCOSE SERPL-MCNC: 93 MG/DL — SIGNIFICANT CHANGE UP (ref 70–99)
HCT VFR BLD CALC: 24.1 % — LOW (ref 39–50)
HGB BLD-MCNC: 8.1 G/DL — LOW (ref 13–17)
MAGNESIUM SERPL-MCNC: 2.1 MG/DL — SIGNIFICANT CHANGE UP (ref 1.6–2.6)
MCHC RBC-ENTMCNC: 30.8 PG — SIGNIFICANT CHANGE UP (ref 27–34)
MCHC RBC-ENTMCNC: 33.6 GM/DL — SIGNIFICANT CHANGE UP (ref 32–36)
MCV RBC AUTO: 91.6 FL — SIGNIFICANT CHANGE UP (ref 80–100)
NRBC # BLD: 0 /100 WBCS — SIGNIFICANT CHANGE UP (ref 0–0)
NRBC # FLD: 0 K/UL — SIGNIFICANT CHANGE UP (ref 0–0)
PHOSPHATE SERPL-MCNC: 2.4 MG/DL — LOW (ref 2.5–4.5)
PLATELET # BLD AUTO: 184 K/UL — SIGNIFICANT CHANGE UP (ref 150–400)
POTASSIUM SERPL-MCNC: 3.9 MMOL/L — SIGNIFICANT CHANGE UP (ref 3.5–5.3)
POTASSIUM SERPL-SCNC: 3.9 MMOL/L — SIGNIFICANT CHANGE UP (ref 3.5–5.3)
RBC # BLD: 2.63 M/UL — LOW (ref 4.2–5.8)
RBC # FLD: 15.9 % — HIGH (ref 10.3–14.5)
RH IG SCN BLD-IMP: POSITIVE — SIGNIFICANT CHANGE UP
SODIUM SERPL-SCNC: 138 MMOL/L — SIGNIFICANT CHANGE UP (ref 135–145)
WBC # BLD: 7.09 K/UL — SIGNIFICANT CHANGE UP (ref 3.8–10.5)
WBC # FLD AUTO: 7.09 K/UL — SIGNIFICANT CHANGE UP (ref 3.8–10.5)

## 2022-10-09 RX ORDER — POTASSIUM PHOSPHATE, MONOBASIC POTASSIUM PHOSPHATE, DIBASIC 236; 224 MG/ML; MG/ML
15 INJECTION, SOLUTION INTRAVENOUS ONCE
Refills: 0 | Status: COMPLETED | OUTPATIENT
Start: 2022-10-09 | End: 2022-10-09

## 2022-10-09 RX ADMIN — ATORVASTATIN CALCIUM 40 MILLIGRAM(S): 80 TABLET, FILM COATED ORAL at 22:07

## 2022-10-09 RX ADMIN — Medication 650 MILLIGRAM(S): at 14:42

## 2022-10-09 RX ADMIN — PANTOPRAZOLE SODIUM 40 MILLIGRAM(S): 20 TABLET, DELAYED RELEASE ORAL at 17:29

## 2022-10-09 RX ADMIN — Medication 500 MILLIGRAM(S): at 22:08

## 2022-10-09 RX ADMIN — Medication 300 MILLIGRAM(S): at 17:29

## 2022-10-09 RX ADMIN — LOSARTAN POTASSIUM 100 MILLIGRAM(S): 100 TABLET, FILM COATED ORAL at 07:16

## 2022-10-09 RX ADMIN — POLYETHYLENE GLYCOL 3350 17 GRAM(S): 17 POWDER, FOR SOLUTION ORAL at 12:46

## 2022-10-09 RX ADMIN — Medication 650 MILLIGRAM(S): at 07:54

## 2022-10-09 RX ADMIN — Medication 500 MILLIGRAM(S): at 12:45

## 2022-10-09 RX ADMIN — Medication 300 MILLIGRAM(S): at 07:15

## 2022-10-09 RX ADMIN — Medication 650 MILLIGRAM(S): at 22:08

## 2022-10-09 RX ADMIN — CHLORHEXIDINE GLUCONATE 1 APPLICATION(S): 213 SOLUTION TOPICAL at 12:45

## 2022-10-09 RX ADMIN — PANTOPRAZOLE SODIUM 40 MILLIGRAM(S): 20 TABLET, DELAYED RELEASE ORAL at 07:16

## 2022-10-09 RX ADMIN — Medication 500 MILLIGRAM(S): at 14:42

## 2022-10-09 RX ADMIN — Medication 500 MILLIGRAM(S): at 17:28

## 2022-10-09 RX ADMIN — AMLODIPINE BESYLATE 10 MILLIGRAM(S): 2.5 TABLET ORAL at 12:45

## 2022-10-09 RX ADMIN — Medication 500 MILLIGRAM(S): at 07:54

## 2022-10-09 RX ADMIN — Medication 500 MILLIGRAM(S): at 07:16

## 2022-10-09 NOTE — PROGRESS NOTE ADULT - SUBJECTIVE AND OBJECTIVE BOX
Payam Longoria MD  Interventional Cardiology / Advance Heart Failure and Cardiac Transplant Specialist  [place] Office : [phone] [place] [address] [place] [address]  Tel:   [place] Office : [address] [facility] [place] [address]  Tel: [phone]       Pt is lying in bed comfortable not in distress      MEDICATIONS:  amLODIPine   Tablet 10 milliGRAM(s) Oral daily  heparin   Injectable 5000 Unit(s) SubCutaneous every 12 hours  losartan 100 milliGRAM(s) Oral daily    metroNIDAZOLE    Tablet 500 milliGRAM(s) Oral three times a day  tetracycline 500 milliGRAM(s) Oral four times a day    guaiFENesin Oral Liquid (Sugar-Free) 100 milliGRAM(s) Oral every 6 hours PRN    acetaminophen     Tablet .. 650 milliGRAM(s) Oral every 6 hours PRN  melatonin 9 milliGRAM(s) Oral at bedtime  ondansetron Injectable 4 milliGRAM(s) IV Push every 8 hours PRN    aluminum hydroxide/magnesium hydroxide/simethicone Suspension 30 milliLiter(s) Oral every 4 hours PRN  bismuth subsalicylate Liquid 300 milliGRAM(s) Oral four times a day  pantoprazole    Tablet 40 milliGRAM(s) Oral every 12 hours  polyethylene glycol 3350 17 Gram(s) Oral daily    atorvastatin 40 milliGRAM(s) Oral at bedtime  glucagon  Injectable 1 milliGRAM(s) IntraMuscular once    chlorhexidine 2% Cloths 1 Application(s) Topical daily  sodium bicarbonate 650 milliGRAM(s) Oral three times a day      PAST MEDICAL/SURGICAL HISTORY  PAST MEDICAL & SURGICAL HISTORY:  HTN (hypertension)      HLD (hyperlipidemia)      Coronary artery disease      Multiple myeloma      S/P coronary artery stent placement          SOCIAL HISTORY: Substance Use (street drugs): ( x ) never used  (  ) other:    FAMILY HISTORY:  Maternal family history of hypertension  Mother               PHYSICAL EXAM:  T(C): 36.7 (10-09-22 @ 09:15), Max: 36.9 (10-09-22 @ 06:18)  HR: 62 (10-09-22 @ 09:15) (62 - 100)  BP: 123/64 (10-09-22 @ 09:15) (123/64 - 142/59)  RR: 18 (10-09-22 @ 09:15) (18 - 18)  SpO2: 100% (10-09-22 @ 09:15) (100% - 100%)  Wt(kg): --  I&O's Summary        GENERAL: NAD  EYES:   PERRLA   ENMT:   Moist mucous membranes, Good dentition, No lesions  Cardiovascular: Normal S1 S2, No JVD, No murmurs, No edema  Respiratory: Lungs clear to auscultation	  Gastrointestinal:  Soft, Non-tender, + BS	  Extremities: no edema                                    8.1    7.09  )-----------( 184      ( 09 Oct 2022 05:45 )             24.1     10-09    138  |  109<H>  |  30<H>  ----------------------------<  93  3.9   |  19<L>  |  1.13    Ca    8.1<L>      09 Oct 2022 05:45  Phos  2.4     10-09  Mg     2.10     10-09      proBNP:   Lipid Profile:   HgA1c:   TSH:     Consultant(s) Notes Reviewed:  [x ] YES  [ ] NO    Care Discussed with Consultants/Other Providers [ x] YES  [ ] NO    Imaging Personally Reviewed independently:  [x] YES  [ ] NO    All labs, radiologic studies, vitals, orders and medications list reviewed. Patient is seen and examined at bedside. Case discussed with medical team.

## 2022-10-09 NOTE — PROGRESS NOTE ADULT - SUBJECTIVE AND OBJECTIVE BOX
Name of Patient : RASHID MEIER  MRN: [id number]  Date of visit: 10-09-22       Subjective: Patient seen and examined. No new events except as noted.   family at the bedside  calm today  agreed to take his daily medications       REVIEW OF SYSTEMS:    CONSTITUTIONAL: No weakness, fevers or chills  EYES/ENT: No visual changes;  No vertigo or throat pain   NECK: No pain or stiffness  RESPIRATORY: No cough, wheezing, hemoptysis; No shortness of breath  CARDIOVASCULAR: No chest pain or palpitations  GASTROINTESTINAL: No abdominal or epigastric pain. No nausea, vomiting, or hematemesis; No diarrhea or constipation. No melena or hematochezia.  GENITOURINARY: No dysuria, frequency or hematuria  NEUROLOGICAL: No numbness or weakness  SKIN: No itching, burning, rashes, or lesions   All other review of systems is negative unless indicated above.    MEDICATIONS:  MEDICATIONS  (STANDING):  amLODIPine   Tablet 10 milliGRAM(s) Oral daily  atorvastatin 40 milliGRAM(s) Oral at bedtime  bismuth subsalicylate Liquid 300 milliGRAM(s) Oral four times a day  chlorhexidine 2% Cloths 1 Application(s) Topical daily  glucagon  Injectable 1 milliGRAM(s) IntraMuscular once  heparin   Injectable 5000 Unit(s) SubCutaneous every 12 hours  losartan 100 milliGRAM(s) Oral daily  melatonin 9 milliGRAM(s) Oral at bedtime  metroNIDAZOLE    Tablet 500 milliGRAM(s) Oral three times a day  pantoprazole    Tablet 40 milliGRAM(s) Oral every 12 hours  polyethylene glycol 3350 17 Gram(s) Oral daily  sodium bicarbonate 650 milliGRAM(s) Oral three times a day  tetracycline 500 milliGRAM(s) Oral four times a day      PHYSICAL EXAM:  T(C): 36.9 (10-09-22 @ 17:17), Max: 36.9 (10-09-22 @ 06:18)  HR: 84 (10-09-22 @ 17:17) (62 - 100)  BP: 119/53 (10-09-22 @ 17:17) (118/52 - 142/59)  RR: 18 (10-09-22 @ 17:17) (18 - 18)  SpO2: 100% (10-09-22 @ 17:17) (100% - 100%)  Wt(kg): --  I&O's Summary        Appearance:awake, calm   HEENT:  PERRLA   Lymphatic: No lymphadenopathy   Cardiovascular: Normal S1 S2, no JVD  Respiratory: normal effort , clear  Gastrointestinal:  Soft, Non-tender  Skin: No rashes,  warm to touch  Psychiatry:  Mood & affect appropriate  Musculuskeletal: No edema      All labs, Imaging and EKGs personally reviewed                           8.1    7.09  )-----------( 184      ( 09 Oct 2022 05:45 )             24.1               10-09    138  |  109<H>  |  30<H>  ----------------------------<  93  3.9   |  19<L>  |  1.13    Ca    8.1<L>      09 Oct 2022 05:45  Phos  2.4     10-09  Mg     2.10     10-09

## 2022-10-09 NOTE — PROGRESS NOTE ADULT - NSPROGADDITIONALINFOA_GEN_ALL_CORE

## 2022-10-09 NOTE — PROGRESS NOTE ADULT - SUBJECTIVE AND OBJECTIVE BOX
[facility]   MD DERRICK MOSS MD KRISTINE SOLTANPOUR, DO ANGELA WONG, PA    TEL:  OFFICE: [phone]  From 5pm-7am Answering Service [phone]    -- RENAL FOLLOW UP NOTE ---Date of Service 10-09-22 @ 23:03    Patient is a 72y old  Male who presents with a chief complaint of weakness (09 Oct 2022 13:32)      Patient seen and examined at bedside. No chest pain/sob    VITALS:  T(F): 98.4 (10-09-22 @ 17:17), Max: 98.4 (10-09-22 @ 06:18)  HR: 84 (10-09-22 @ 17:17)  BP: 119/53 (10-09-22 @ 17:17)  RR: 18 (10-09-22 @ 17:17)  SpO2: 100% (10-09-22 @ 17:17)  Wt(kg): --        PHYSICAL EXAM:  General: NAD  Neck: No JVD  Respiratory: CTAB, no wheezes, rales or rhonchi  Cardiovascular: S1, S2, RRR  Gastrointestinal: BS+, soft, NT/ND  Extremities: No peripheral edema    Hospital Medications:   MEDICATIONS  (STANDING):  amLODIPine   Tablet 10 milliGRAM(s) Oral daily  atorvastatin 40 milliGRAM(s) Oral at bedtime  bismuth subsalicylate Liquid 300 milliGRAM(s) Oral four times a day  chlorhexidine 2% Cloths 1 Application(s) Topical daily  glucagon  Injectable 1 milliGRAM(s) IntraMuscular once  heparin   Injectable 5000 Unit(s) SubCutaneous every 12 hours  losartan 100 milliGRAM(s) Oral daily  melatonin 9 milliGRAM(s) Oral at bedtime  metroNIDAZOLE    Tablet 500 milliGRAM(s) Oral three times a day  pantoprazole    Tablet 40 milliGRAM(s) Oral every 12 hours  polyethylene glycol 3350 17 Gram(s) Oral daily  sodium bicarbonate 650 milliGRAM(s) Oral three times a day  tetracycline 500 milliGRAM(s) Oral four times a day      LABS:  10-09    138  |  109<H>  |  30<H>  ----------------------------<  93  3.9   |  19<L>  |  1.13    Ca    8.1<L>      09 Oct 2022 05:45  Phos  2.4     10-09  Mg     2.10     10-09      Creatinine Trend: 1.13 <--, 1.27 <--, 1.14 <--, 1.09 <--, 1.29 <--, 1.12 <--, 1.00 <--, 0.95 <--, 0.98 <--    Phosphorus Level, Serum: 2.4 mg/dL (10-09 @ 05:45)                              8.1    7.09  )-----------( 184      ( 09 Oct 2022 05:45 )             24.1     Urine Studies:  Urinalysis - [09-25-22 @ 12:01]      Color Light Yellow / Appearance Clear / SG 1.035 / pH 6.5      Gluc 200 mg/dL / Ketone Trace  / Bili Negative / Urobili <2 mg/dL       Blood Trace / Protein 30 mg/dL / Leuk Est Negative / Nitrite Negative      RBC 1 / WBC 1 / Hyaline  / Gran  / Sq Epi  / Non Sq Epi 2 / Bacteria Negative      Iron 36, TIBC 290, %sat 12      [09-25-22 @ 11:15]  Ferritin 287      [09-25-22 @ 11:15]  TSH 1.57      [09-25-22 @ 07:30]      Free Light Chains: kappa 2.58, lambda 2.12, ratio = 1.22      [09-25 @ 11:15]  Immunofixation Serum:   No Monoclonal Band Identified. JERONIMO Farris M.D.  Reference Range: None Detected      [09-25-22 @ 11:15]  SPEP Interpretation: Increase in Alpha-2 fraction suggestive of acute inflammation.  JERONIMO Farris M.D.      [09-25-22 @ 11:15]  Immunofixation Urine: No Monoclonal Band Identified. JERONIMO Farris M.D.  Reference Range: None Detected      [09-25-22 @ 12:01]  UPEP Interpretation: Mild Selective Proteinuria. No Monoclonal band seen.  JERONIMO Farrsi M.D.      [09-25-22 @ 12:01]    RADIOLOGY & ADDITIONAL STUDIES:

## 2022-10-09 NOTE — PROGRESS NOTE ADULT - ASSESSMENT
Patient is a 73 yo M with PMH of HTN, HLD, CAD, cardiac stent, Multiple Myeloma who presented to the ED for 3 days of generalized weakness.    EKG: ST PVCs  Tele: NSR PVCs episodes of PAT    1. Weakness  -rapidly progressed. was unable to move extremities.   -s/p RRT for hypoxia now intubated in MICU   -neuro on board, possible GBS vs pathology in neck  -echo shows mild AR/AS, normal LV systolic function and mild diastolic dysfunction   -IVIG 35g qd (3/3) as per neuro for possible GBS completed 9/28  -imrpoving    2. CAD s/p PCI  -in 2015 in [facility] as per daughter patient had heart attack  -no reported CP prior to hospitalization    3. MM  -heme/onc on board    4. ?Afib  -?afib on tele  -no AC 2/2 anemia and duodenal ulcers  -EKG with some changes, TWI inferior leads with incomplete LBBB. trops flat 34-34. no CP. trops flat , out pt stress test

## 2022-10-09 NOTE — PROGRESS NOTE ADULT - ASSESSMENT
72 year-old male, history of HTN, HLD, CAD, cardiac stent, MM, presents with cc generalized weakness x 2-3 days associated with some tingling sensation.    A/P:  Hyponatremia and or hypernatremia  work up suggested SIADH  Na appropriately improved now   monitor Na closely     CRISTI on CKD stage 3  Baseline Scr 1.3-1.4  CRISTI likely sec to ATN  improved better than baseline  monitor bmp, i/o    Hypokalemia:  Resolved  Monitor K level    acidosis  Non AG  dropping.   restarting bicarb 650 tid  monitor    Mild hypophosphatemia  Supplement    HTN:  optimal   manage per MICU   MOnitor BP    Weakness:  Etiology?  h/o MM  s/p LP  possible GBS s/p [id number]  f/u neuro

## 2022-10-10 ENCOUNTER — TRANSCRIPTION ENCOUNTER (OUTPATIENT)
Age: 72
End: 2022-10-10

## 2022-10-10 LAB
ANION GAP SERPL CALC-SCNC: 11 MMOL/L — SIGNIFICANT CHANGE UP (ref 7–14)
BUN SERPL-MCNC: 25 MG/DL — HIGH (ref 7–23)
CALCIUM SERPL-MCNC: 7.9 MG/DL — LOW (ref 8.4–10.5)
CHLORIDE SERPL-SCNC: 109 MMOL/L — HIGH (ref 98–107)
CO2 SERPL-SCNC: 19 MMOL/L — LOW (ref 22–31)
CREAT SERPL-MCNC: 1.12 MG/DL — SIGNIFICANT CHANGE UP (ref 0.5–1.3)
EGFR: 69 ML/MIN/1.73M2 — SIGNIFICANT CHANGE UP
GLUCOSE SERPL-MCNC: 93 MG/DL — SIGNIFICANT CHANGE UP (ref 70–99)
HCT VFR BLD CALC: 23.6 % — LOW (ref 39–50)
HGB BLD-MCNC: 8.2 G/DL — LOW (ref 13–17)
MAGNESIUM SERPL-MCNC: 2.1 MG/DL — SIGNIFICANT CHANGE UP (ref 1.6–2.6)
MCHC RBC-ENTMCNC: 30.8 PG — SIGNIFICANT CHANGE UP (ref 27–34)
MCHC RBC-ENTMCNC: 34.7 GM/DL — SIGNIFICANT CHANGE UP (ref 32–36)
MCV RBC AUTO: 88.7 FL — SIGNIFICANT CHANGE UP (ref 80–100)
NRBC # BLD: 0 /100 WBCS — SIGNIFICANT CHANGE UP (ref 0–0)
NRBC # FLD: 0 K/UL — SIGNIFICANT CHANGE UP (ref 0–0)
PHOSPHATE SERPL-MCNC: 2.6 MG/DL — SIGNIFICANT CHANGE UP (ref 2.5–4.5)
PLATELET # BLD AUTO: 193 K/UL — SIGNIFICANT CHANGE UP (ref 150–400)
POTASSIUM SERPL-MCNC: 3.8 MMOL/L — SIGNIFICANT CHANGE UP (ref 3.5–5.3)
POTASSIUM SERPL-SCNC: 3.8 MMOL/L — SIGNIFICANT CHANGE UP (ref 3.5–5.3)
RBC # BLD: 2.66 M/UL — LOW (ref 4.2–5.8)
RBC # FLD: 15.9 % — HIGH (ref 10.3–14.5)
SARS-COV-2 RNA SPEC QL NAA+PROBE: SIGNIFICANT CHANGE UP
SODIUM SERPL-SCNC: 139 MMOL/L — SIGNIFICANT CHANGE UP (ref 135–145)
WBC # BLD: 6.4 K/UL — SIGNIFICANT CHANGE UP (ref 3.8–10.5)
WBC # FLD AUTO: 6.4 K/UL — SIGNIFICANT CHANGE UP (ref 3.8–10.5)

## 2022-10-10 RX ORDER — POLYETHYLENE GLYCOL 3350 17 G/17G
17 POWDER, FOR SOLUTION ORAL
Qty: 0 | Refills: 0 | DISCHARGE
Start: 2022-10-10

## 2022-10-10 RX ORDER — PANTOPRAZOLE SODIUM 20 MG/1
1 TABLET, DELAYED RELEASE ORAL
Qty: 0 | Refills: 0 | DISCHARGE
Start: 2022-10-10 | End: 2022-10-21

## 2022-10-10 RX ORDER — METOPROLOL TARTRATE 50 MG
1 TABLET ORAL
Qty: 0 | Refills: 0 | DISCHARGE

## 2022-10-10 RX ORDER — HEPARIN SODIUM 5000 [USP'U]/ML
5000 INJECTION INTRAVENOUS; SUBCUTANEOUS
Qty: 0 | Refills: 0 | DISCHARGE
Start: 2022-10-10

## 2022-10-10 RX ORDER — ATORVASTATIN CALCIUM 80 MG/1
1 TABLET, FILM COATED ORAL
Qty: 0 | Refills: 0 | DISCHARGE
Start: 2022-10-10

## 2022-10-10 RX ORDER — SODIUM BICARBONATE 1 MEQ/ML
1 SYRINGE (ML) INTRAVENOUS
Qty: 0 | Refills: 0 | DISCHARGE
Start: 2022-10-10

## 2022-10-10 RX ORDER — LOSARTAN POTASSIUM 100 MG/1
1 TABLET, FILM COATED ORAL
Qty: 0 | Refills: 0 | DISCHARGE
Start: 2022-10-10

## 2022-10-10 RX ORDER — ATORVASTATIN CALCIUM 80 MG/1
1 TABLET, FILM COATED ORAL
Qty: 0 | Refills: 0 | DISCHARGE

## 2022-10-10 RX ORDER — SODIUM,POTASSIUM PHOSPHATES 278-250MG
1 POWDER IN PACKET (EA) ORAL ONCE
Refills: 0 | Status: COMPLETED | OUTPATIENT
Start: 2022-10-10 | End: 2022-10-10

## 2022-10-10 RX ORDER — AMLODIPINE BESYLATE 2.5 MG/1
1 TABLET ORAL
Qty: 0 | Refills: 0 | DISCHARGE

## 2022-10-10 RX ORDER — ASPIRIN/CALCIUM CARB/MAGNESIUM 324 MG
1 TABLET ORAL
Qty: 0 | Refills: 0 | DISCHARGE

## 2022-10-10 RX ORDER — METRONIDAZOLE 500 MG
1 TABLET ORAL
Qty: 0 | Refills: 0 | DISCHARGE
Start: 2022-10-10 | End: 2022-10-21

## 2022-10-10 RX ORDER — ACETAMINOPHEN 500 MG
2 TABLET ORAL
Qty: 0 | Refills: 0 | DISCHARGE
Start: 2022-10-10

## 2022-10-10 RX ORDER — AMLODIPINE BESYLATE 2.5 MG/1
1 TABLET ORAL
Qty: 0 | Refills: 0 | DISCHARGE
Start: 2022-10-10

## 2022-10-10 RX ORDER — CALCIUM CARBONATE 500(1250)
1 TABLET ORAL
Qty: 0 | Refills: 0 | DISCHARGE

## 2022-10-10 RX ADMIN — Medication 500 MILLIGRAM(S): at 23:50

## 2022-10-10 RX ADMIN — Medication 500 MILLIGRAM(S): at 22:00

## 2022-10-10 RX ADMIN — Medication 500 MILLIGRAM(S): at 14:46

## 2022-10-10 RX ADMIN — Medication 300 MILLIGRAM(S): at 05:49

## 2022-10-10 RX ADMIN — Medication 500 MILLIGRAM(S): at 00:08

## 2022-10-10 RX ADMIN — PANTOPRAZOLE SODIUM 40 MILLIGRAM(S): 20 TABLET, DELAYED RELEASE ORAL at 05:49

## 2022-10-10 RX ADMIN — PANTOPRAZOLE SODIUM 40 MILLIGRAM(S): 20 TABLET, DELAYED RELEASE ORAL at 17:21

## 2022-10-10 RX ADMIN — Medication 500 MILLIGRAM(S): at 11:51

## 2022-10-10 RX ADMIN — Medication 500 MILLIGRAM(S): at 05:49

## 2022-10-10 RX ADMIN — Medication 300 MILLIGRAM(S): at 00:08

## 2022-10-10 RX ADMIN — Medication 500 MILLIGRAM(S): at 17:20

## 2022-10-10 RX ADMIN — Medication 300 MILLIGRAM(S): at 23:49

## 2022-10-10 RX ADMIN — Medication 300 MILLIGRAM(S): at 11:53

## 2022-10-10 RX ADMIN — Medication 1 PACKET(S): at 05:49

## 2022-10-10 RX ADMIN — AMLODIPINE BESYLATE 10 MILLIGRAM(S): 2.5 TABLET ORAL at 05:50

## 2022-10-10 RX ADMIN — Medication 650 MILLIGRAM(S): at 14:46

## 2022-10-10 RX ADMIN — Medication 500 MILLIGRAM(S): at 05:50

## 2022-10-10 RX ADMIN — LOSARTAN POTASSIUM 100 MILLIGRAM(S): 100 TABLET, FILM COATED ORAL at 05:50

## 2022-10-10 RX ADMIN — Medication 650 MILLIGRAM(S): at 05:50

## 2022-10-10 RX ADMIN — Medication 650 MILLIGRAM(S): at 22:00

## 2022-10-10 RX ADMIN — CHLORHEXIDINE GLUCONATE 1 APPLICATION(S): 213 SOLUTION TOPICAL at 12:37

## 2022-10-10 RX ADMIN — HEPARIN SODIUM 5000 UNIT(S): 5000 INJECTION INTRAVENOUS; SUBCUTANEOUS at 05:50

## 2022-10-10 RX ADMIN — Medication 300 MILLIGRAM(S): at 17:21

## 2022-10-10 RX ADMIN — ATORVASTATIN CALCIUM 40 MILLIGRAM(S): 80 TABLET, FILM COATED ORAL at 22:00

## 2022-10-10 NOTE — PROVIDER CONTACT NOTE (OTHER) - ACTION/TREATMENT ORDERED:
po packet of sodium phos administered. will continue to monitor patient.
Provider notified. Continue to monitor.

## 2022-10-10 NOTE — PROGRESS NOTE ADULT - SUBJECTIVE AND OBJECTIVE BOX
Name of Patient : RASHID MEIER  MRN: [id number]  Date of visit: 10-10-22      Subjective: Patient seen and examined. No new events except as noted.   doing okay  daughter at the bedside     REVIEW OF SYSTEMS:  calm    MEDICATIONS:  MEDICATIONS  (STANDING):  amLODIPine   Tablet 10 milliGRAM(s) Oral daily  atorvastatin 40 milliGRAM(s) Oral at bedtime  bismuth subsalicylate Liquid 300 milliGRAM(s) Oral four times a day  chlorhexidine 2% Cloths 1 Application(s) Topical daily  glucagon  Injectable 1 milliGRAM(s) IntraMuscular once  heparin   Injectable 5000 Unit(s) SubCutaneous every 12 hours  losartan 100 milliGRAM(s) Oral daily  melatonin 9 milliGRAM(s) Oral at bedtime  metroNIDAZOLE    Tablet 500 milliGRAM(s) Oral three times a day  pantoprazole    Tablet 40 milliGRAM(s) Oral every 12 hours  polyethylene glycol 3350 17 Gram(s) Oral daily  sodium bicarbonate 650 milliGRAM(s) Oral three times a day  tetracycline 500 milliGRAM(s) Oral four times a day      PHYSICAL EXAM:  T(C): 36.4 (10-10-22 @ 17:00), Max: 36.9 (10-10-22 @ 05:00)  HR: 70 (10-10-22 @ 17:00) (70 - 90)  BP: 111/55 (10-10-22 @ 17:00) (111/55 - 126/63)  RR: 18 (10-10-22 @ 17:00) (16 - 18)  SpO2: 100% (10-10-22 @ 17:00) (100% - 100%)  Wt(kg): --  I&O's Summary        Appearance: Normal	  HEENT:  PERRLA   Lymphatic: No lymphadenopathy   Cardiovascular: Normal S1 S2, no JVD  Respiratory: normal effort , clear  Gastrointestinal:  Soft, Non-tender  Skin: No rashes,  warm to touch  Psychiatry:  Mood & affect appropriate  Musculuskeletal: No edema      All labs, Imaging and EKGs personally reviewed                           8.2    6.40  )-----------( 193      ( 10 Oct 2022 06:40 )             23.6               10-10    139  |  109<H>  |  25<H>  ----------------------------<  93  3.8   |  19<L>  |  1.12    Ca    7.9<L>      10 Oct 2022 06:40  Phos  2.6     10-10  Mg     2.10     10-10

## 2022-10-10 NOTE — PROGRESS NOTE ADULT - NS ATTEND AMEND GEN_ALL_CORE FT
I reviewed the overnight course of events on the unit, re-confirming the patient history. I discussed the care with the patient and their family. The plan of care was discussed with the ACP team and modifications were made to the notation where appropriate. Differential diagnosis and plan of care discussed with patient after the evaluation. Advanced care planning was discussed with patient and family.  Advanced care planning forms were reviewed and discussed.  Risks, benefits and alternatives of cardiac procedures were discussed in detail and all questions were answered. 35 minutes spent on total encounter of which more than fifty percent of the encounter was spent counseling and/or coordinating care by the attending physician.
Spoke with resident. No objection to IVIG. Unless pt is in [facility]. Currently at baseline.
Scr seems to be at baseline but will continue to monitor
Sodium improved tonight.     acetaminophen     Tablet .. 650 milliGRAM(s) Oral once  ALBUTerol    90 MICROgram(s) HFA Inhaler 2 Puff(s) Inhalation every 6 hours  atorvastatin 40 milliGRAM(s) Oral at bedtime  chlorhexidine 0.12% Liquid 15 milliLiter(s) Oral Mucosa every 12 hours  chlorhexidine 2% Cloths 1 Application(s) Topical <User Schedule>  dexAMETHasone  Injectable 4 milliGRAM(s) IV Push every 6 hours  dexMEDEtomidine Infusion 0.2 MICROgram(s)/kG/Hr IV Continuous <Continuous>  diphenhydrAMINE Injectable 25 milliGRAM(s) IV Push once  fentaNYL    Injectable 100 MICROGram(s) IV Push once PRN  glucagon  Injectable 1 milliGRAM(s) IntraMuscular once  heparin   Injectable 5000 Unit(s) SubCutaneous every 8 hours  immune   globulin 10% (GAMMAGARD) IVPB 35 Gram(s) IV Intermittent daily  ipratropium 17 MICROgram(s) HFA Inhaler 2 Puff(s) Inhalation every 6 hours  norepinephrine Infusion 0.3 MICROgram(s)/kG/Min IV Continuous <Continuous>  petrolatum Ophthalmic Ointment 1 Application(s) Both EYES two times a day  piperacillin/tazobactam IVPB.. 3.375 Gram(s) IV Intermittent every 8 hours  polyethylene glycol 3350 17 Gram(s) Oral daily  potassium chloride  10 mEq/100 mL IVPB 10 milliEquivalent(s) IV Intermittent every 1 hour  propofol Infusion 40.039 MICROgram(s)/kG/Min IV Continuous <Continuous>  sodium bicarbonate 1300 milliGRAM(s) Oral two times a day  sodium chloride 3%  Inhalation 4 milliLiter(s) Inhalation every 6 hours  valACYclovir 500 milliGRAM(s) Oral daily      VITAL:  T(C): , Max: 36.1 (09-27-22 @ 00:00)  T(F): , Max: 97 (09-27-22 @ 00:00)  HR: 63 (09-27-22 @ 15:19)  BP: 108/55 (09-27-22 @ 14:00)  BP(mean): 72 (09-27-22 @ 14:00)  RR: 19 (09-27-22 @ 14:00)  SpO2: 100% (09-27-22 @ 14:53)  Wt(kg): --    09-26-22 @ 07:01  -  09-27-22 @ 07:00  --------------------------------------------------------  IN: 2131.1 mL / OUT: 1140 mL / NET: 991.1 mL    09-27-22 @ 07:01  -  09-27-22 @ 18:31  --------------------------------------------------------  IN: 1051.4 mL / OUT: 175 mL / NET: 876.4 mL        PHYSICAL EXAM:  General: Intubated and sedate + right nare  HEENT:  NCAT; PERRLA  Neck: No JVD; supple  Respiratory: CTA-b/l  Cardiac: RRR s1s2  Gastrointestinal: BS+, soft, NT/ND  Urologic: No moser  Extremities: No peripheral edema      LABS:                          8.0    9.83  )-----------( 197      ( 27 Sep 2022 17:15 )             23.6     Na(133)/K(4.5)/Cl(105)/HCO3(16)/BUN(84)/Cr(1.65)Glu(194)/Ca(8.0)/Mg(2.50)/PO4(3.2)    09-27 @ 17:15  Na(127)/K(4.1)/Cl(98)/HCO3(15)/BUN(69)/Cr(1.65)Glu(175)/Ca(8.4)/Mg(2.50)/PO4(3.2)    09-27 @ 04:00  Na(128)/K(4.7)/Cl(97)/HCO3(15)/BUN(56)/Cr(1.63)Glu(183)/Ca(8.4)/Mg(--)/PO4(3.2)    09-26 @ 20:20  Na(123)/K(4.9)/Cl(94)/HCO3(15)/BUN(47)/Cr(1.49)Glu(190)/Ca(8.5)/Mg(2.50)/PO4(3.0)    09-26 @ 10:30  Na(128)/K(3.4)/Cl(94)/HCO3(17)/BUN(31)/Cr(1.33)Glu(172)/Ca(8.8)/Mg(2.40)/PO4(4.6)    09-26 @ 01:40  Na(128)/K(3.3)/Cl(96)/HCO3(18)/BUN(27)/Cr(1.12)Glu(173)/Ca(8.9)/Mg(2.40)/PO4(3.6)    09-25 @ 19:20  Na(129)/K(3.4)/Cl(95)/HCO3(18)/BUN(26)/Cr(1.15)Glu(160)/Ca(9.0)/Mg(2.50)/PO4(3.6)    09-25 @ 18:43  Na(126)/K(3.2)/Cl(91)/HCO3(19)/BUN(24)/Cr(1.19)Glu(188)/Ca(8.7)/Mg(2.20)/PO4(4.0)    09-25 @ 10:30  Na(121)/K(3.5)/Cl(90)/HCO3(15)/BUN(22)/Cr(1.08)Glu(209)/Ca(9.2)/Mg(2.20)/PO4(4.4)    09-25 @ 07:30  Na(123)/K(3.3)/Cl(89)/HCO3(19)/BUN(21)/Cr(1.17)Glu(135)/Ca(9.3)/Mg(2.10)/PO4(4.2)    09-25 @ 00:35      Osmolality, Random Urine: 428 mosm/kg (09-27 @ 13:50)  Sodium, Random Urine: <20 mmol/L (09-27 @ 13:50)  Creatinine, Random Urine: 33 mg/dL (09-27 @ 13:50)      Check BMP every 8 hours. If sodium drops will need 1.5%. IVIG agree with no objection.,

## 2022-10-10 NOTE — DISCHARGE NOTE PROVIDER - NSFOLLOWUPCLINICS_GEN_ALL_ED_FT
[facility]  Gastroenterology  [address] [place] [address] [address]  [place] [address]  Phone: [phone]  Fax:

## 2022-10-10 NOTE — DISCHARGE NOTE PROVIDER - CARE PROVIDERS DIRECT ADDRESSES
,DirectAddress_Unknown,[email or web address],DirectAddress_Unknown,DirectAddress_Unknown

## 2022-10-10 NOTE — DISCHARGE NOTE PROVIDER - NSDCCPCAREPLAN_GEN_ALL_CORE_FT
PRINCIPAL DISCHARGE DIAGNOSIS  Diagnosis: Acute weakness  Assessment and Plan of Treatment: You experienced progressive weakness beginning with arms and spreading to legs. 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 You were given IVIG which is made up of antibodies to help fight infection. Please follow up with your primary care physician.      SECONDARY DISCHARGE DIAGNOSES  Diagnosis: Gastritis, Helicobacter pylori  Assessment and Plan of Treatment: You were positive for H. Pylori. Please continue the quadruple therapy : Bismuth salicylate 300mg 4xday, tetracycline 500mg 4xday, Flagyl 500mg 3xday, and pantoprazole 40mg 2xday until October 21.    Diagnosis: Hypertension  Assessment and Plan of Treatment: Low sodium and fat diet, continue anti-hypertensive medications, and follow up with primary care physician.    Diagnosis: CAD (coronary artery disease)  Assessment and Plan of Treatment: Continue low salt, fat, cholesterol and carbohydrate diet. Follow up with cardiologist and primary care physician's routine appointment.    Diagnosis: Multiple myeloma  Assessment and Plan of Treatment: You have a history of multiple myeloma which is cancer of plasma cells. Please follow hematology /oncology and primary care physician.    Diagnosis: CKD (chronic kidney disease)  Assessment and Plan of Treatment: Continue blood pressure, cholesterol and diabetic medications. Goal of hemoglobin A1C (HgbA1C) < 7%.  Avoid nephrotoxic drugs such as nonsteroidal anti-inflammatory agents (NSAIDs).   Please follow up with your nephrologist to monitor your kidney function, continue with low protein and potassium diet.     PRINCIPAL DISCHARGE DIAGNOSIS  Diagnosis: Acute weakness  Assessment and Plan of Treatment: You experienced progressive weakness beginning with arms and spreading to legs. 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 You were given IVIG which is made up of antibodies to help fight infection. Please follow up with your primary care physician.  Follow up with a neurologist on discharge.      SECONDARY DISCHARGE DIAGNOSES  Diagnosis: Gastritis, Helicobacter pylori  Assessment and Plan of Treatment: You were positive for H. Pylori. Please continue the quadruple therapy: Bismuth salicylate 300mg 4x/day, tetracycline 500mg 4x/day, Flagyl 500mg 3x/day, and pantoprazole 40mg 2x/day until October 21.    Diagnosis: Hypertension  Assessment and Plan of Treatment: Low sodium and fat diet, continue anti-hypertensive medications, and follow up with primary care physician.    Diagnosis: CAD (coronary artery disease)  Assessment and Plan of Treatment: Continue low salt, fat, cholesterol and carbohydrate diet. Follow up with cardiologist and primary care physician's routine appointment.    Diagnosis: Multiple myeloma  Assessment and Plan of Treatment: You have a history of multiple myeloma which is cancer of plasma cells. Please follow hematology /oncology and primary care physician.    Diagnosis: CKD (chronic kidney disease)  Assessment and Plan of Treatment: Continue blood pressure, cholesterol and diabetic medications. Goal of hemoglobin A1C (HgbA1C) < 7%. Avoid nephrotoxic drugs such as nonsteroidal anti-inflammatory agents (NSAIDs). Please follow up with your nephrologist to monitor your kidney function, continue with low protein and potassium diet.

## 2022-10-10 NOTE — PROGRESS NOTE ADULT - SUBJECTIVE AND OBJECTIVE BOX
[facility]   MD DERRICK MOSS MD KRISTINE SOLTANPOUR, DO ANGELA WONG, PA    TEL:  OFFICE: [phone]  From 5pm-7am Answering Service [phone]    -- RENAL FOLLOW UP NOTE ---Date of Service 10-10-22 @ 12:13    Patient is a 72y old  Male who presents with a chief complaint of weakness (09 Oct 2022 19:03)      Patient seen and examined at bedside. No chest pain/sob    VITALS:  T(F): 98.4 (10-10-22 @ 05:00), Max: 98.4 (10-09-22 @ 17:17)  HR: 90 (10-10-22 @ 05:00)  BP: 126/63 (10-10-22 @ 05:00)  RR: 16 (10-10-22 @ 05:00)  SpO2: 100% (10-10-22 @ 05:00)  Wt(kg): --        PHYSICAL EXAM:  General: NAD  Neck: No JVD  Respiratory: CTAB, no wheezes, rales or rhonchi  Cardiovascular: S1, S2, RRR  Gastrointestinal: BS+, soft, NT/ND  Extremities: No peripheral edema    Hospital Medications:   MEDICATIONS  (STANDING):  amLODIPine   Tablet 10 milliGRAM(s) Oral daily  atorvastatin 40 milliGRAM(s) Oral at bedtime  bismuth subsalicylate Liquid 300 milliGRAM(s) Oral four times a day  chlorhexidine 2% Cloths 1 Application(s) Topical daily  glucagon  Injectable 1 milliGRAM(s) IntraMuscular once  heparin   Injectable 5000 Unit(s) SubCutaneous every 12 hours  losartan 100 milliGRAM(s) Oral daily  melatonin 9 milliGRAM(s) Oral at bedtime  metroNIDAZOLE    Tablet 500 milliGRAM(s) Oral three times a day  pantoprazole    Tablet 40 milliGRAM(s) Oral every 12 hours  polyethylene glycol 3350 17 Gram(s) Oral daily  sodium bicarbonate 650 milliGRAM(s) Oral three times a day  tetracycline 500 milliGRAM(s) Oral four times a day      LABS:  10-10    139  |  109<H>  |  25<H>  ----------------------------<  93  3.8   |  19<L>  |  1.12    Ca    7.9<L>      10 Oct 2022 06:40  Phos  2.6     10-10  Mg     2.10     10-10      Creatinine Trend: 1.12 <--, 1.13 <--, 1.27 <--, 1.14 <--, 1.09 <--, 1.29 <--, 1.12 <--, 1.00 <--, 0.95 <--    Phosphorus Level, Serum: 2.6 mg/dL (10-10 @ 06:40)                              8.2    6.40  )-----------( 193      ( 10 Oct 2022 06:40 )             23.6     Urine Studies:  Urinalysis - [09-25-22 @ 12:01]      Color Light Yellow / Appearance Clear / SG 1.035 / pH 6.5      Gluc 200 mg/dL / Ketone Trace  / Bili Negative / Urobili <2 mg/dL       Blood Trace / Protein 30 mg/dL / Leuk Est Negative / Nitrite Negative      RBC 1 / WBC 1 / Hyaline  / Gran  / Sq Epi  / Non Sq Epi 2 / Bacteria Negative      Iron 36, TIBC 290, %sat 12      [09-25-22 @ 11:15]  Ferritin 287      [09-25-22 @ 11:15]  TSH 1.57      [09-25-22 @ 07:30]      Free Light Chains: kappa 2.58, lambda 2.12, ratio = 1.22      [09-25 @ 11:15]  Immunofixation Serum:   No Monoclonal Band Identified. JERONIMO Farris M.D.  Reference Range: None Detected      [09-25-22 @ 11:15]  SPEP Interpretation: Increase in Alpha-2 fraction suggestive of acute inflammation.  JERONIMO Farris M.D.      [09-25-22 @ 11:15]  Immunofixation Urine: No Monoclonal Band Identified. JERONIMO Farris M.D.  Reference Range: None Detected      [09-25-22 @ 12:01]  UPEP Interpretation: Mild Selective Proteinuria. No Monoclonal band seen.  JERONIMO Farris M.D.      [09-25-22 @ 12:01]    RADIOLOGY & ADDITIONAL STUDIES:

## 2022-10-10 NOTE — DISCHARGE NOTE PROVIDER - PROVIDER TOKENS
PROVIDER:[TOKEN:[[id number]],FOLLOWUP:[1 week]],PROVIDER:[TOKEN:[[id number]],FOLLOWUP:[1 week]],FREE:[LAST:[Your primary care physician],PHONE:[(   )    -],FAX:[(   )    -],FOLLOWUP:[1 week]],FREE:[LAST:[general neurology],PHONE:[phone]],FAX:[(   )    -],ADDRESS:[[address] [place] [address]],FOLLOWUP:[1 week]]

## 2022-10-10 NOTE — DISCHARGE NOTE PROVIDER - NSDCMRMEDTOKEN_GEN_ALL_CORE_FT
amLODIPine 10 mg oral tablet: 1 tab(s) orally once a day  Aspirin Enteric Coated 81 mg oral delayed release tablet: 1 tab(s) orally once a day  atorvastatin 40 mg oral tablet: 1 tab(s) orally once a day (at bedtime)  calcium carbonate 500 mg (200 mg elemental calcium) oral tablet, chewable: 1 tab(s) orally once a day  losartan 100 mg oral tablet: 1 tab(s) orally once a day  Metoprolol Tartrate 50 mg oral tablet: 1 tab(s) orally 2 times a day  valACYclovir 500 mg oral tablet: 1 tab(s) orally once a day   acetaminophen 325 mg oral tablet: 2 tab(s) orally every 6 hours, As needed, Temp greater or equal to 38C (100.4F), Mild Pain (1 - 3)  amLODIPine 10 mg oral tablet: 1 tab(s) orally once a day  atorvastatin 40 mg oral tablet: 1 tab(s) orally once a day (at bedtime)  bismuth subsalicylate 262 mg/15 mL oral suspension: 300 milligram(s) orally 4 times a day  heparin: 5000 unit(s) subcutaneous every 12 hours  losartan 100 mg oral tablet: 1 tab(s) orally once a day  metroNIDAZOLE 500 mg oral tablet: 1 tab(s) orally 3 times a day  pantoprazole 40 mg oral delayed release tablet: 1 tab(s) orally every 12 hours  polyethylene glycol 3350 oral powder for reconstitution: 17 gram(s) orally once a day  sodium bicarbonate 650 mg oral tablet: 1 tab(s) orally 3 times a day  tetracycline 500 mg oral capsule: 1 cap(s) orally 4 times a day    acetaminophen 325 mg oral tablet: 2 tab(s) orally every 6 hours, As needed, Temp greater or equal to 38C (100.4F), Mild Pain (1 - 3)  amLODIPine 10 mg oral tablet: 1 tab(s) orally once a day  atorvastatin 40 mg oral tablet: 1 tab(s) orally once a day (at bedtime)  bismuth subsalicylate 262 mg/15 mL oral suspension: 300 milligram(s) orally 4 times a day  losartan 100 mg oral tablet: 1 tab(s) orally once a day  metroNIDAZOLE 500 mg oral tablet: 1 tab(s) orally 3 times a day  pantoprazole 40 mg oral delayed release tablet: 1 tab(s) orally every 12 hours  polyethylene glycol 3350 oral powder for reconstitution: 17 gram(s) orally once a day  sodium bicarbonate 650 mg oral tablet: 1 tab(s) orally 3 times a day  tetracycline 500 mg oral capsule: 1 cap(s) orally 4 times a day

## 2022-10-10 NOTE — DISCHARGE NOTE PROVIDER - HOSPITAL COURSE
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    Acute respiratory failure with hypoxia.   -pt intermittently tachypneic on exam and with wheezing that appears to be positional  -CT cervical spine without obstructing masses to explain wheezing  -COVID-19 PCR 9/24/22 (-)h  -Proventil HFA q6h/ipratropium nebs q6h  -CT angio --> no central, right, left lobar or segmental pulmonary embolism. Limited   evaluation of subsegmental branches.  Complete atelectasis of the left lower lobe without associated   endobronchial lesion..  -RRT 9/25/22 due to airway compromise; pt intubated and accepted to MICU  -VAP bundle  -on reverse isolation for r/o AFB; quantiferon was indeterminate  -AFB tracheal aspirate (-) 9/27/22, 9/28/22  -S/P extubation, O2 supplement  - RA.    Severe sepsis with septic shock.   -COVID-19 PCR 9/24/22 (-)  -B cereus in single blood cx bottle 9/25/22 likely contaminant  -CSF microbiological studies   -LLL atelectasis on chest imaging without discrete consolidation.    Weakness.   -Pt p/w rapidly progressive weakness beginning in the arms and spreading to legs. With significant proximal muscle weakness on exam. Also with associated hoarse voice and dysphagia  -s/p RRT for hypoxia, s/p intubated in MICU -CT head wnl  -appreciate neuro recs   -given neck pain on exam, c/f pathology in the neck causing cord compression. CT cervical spine limited but no obvious masses noted. MRI of the spine ordered. Called several numbers to expedite study, no response   -ddx includes GBS, myasthenia gravis less likely botulism   -treating empirically with decadron  -MRI spine: Abnormal signal scattered throughout the cervical spine as well as involving the T8 vertebral body which may be related to multiple myeloma.   -IVIG 35g qd (3/3) as per neuro for possible GBS completed 9/28-autoimmune panel   -antiganglioside Ab.    Hyponatremia.   -likely contributing to weakness  -appreciate renal recs.    Multiple myeloma.   -Lost to follow up, last treatment in March  -with diffuse lytic lesions noted in spine  -appreciate heme/onc  -no acute neurosurgical intervention indicated  -kappa/lambda ratio not diagnostic  -no monoclonal gammopathy present.    CAD s/p PCI  -in 2015 in [place] as per daughter patient had heart attack  -no reported CP prior to hospitalization  -EKG stable  -monitor on tele.    ?Afib  -?afib on tele  -no AC 2/2 anemia and duodenal ulcers  -EKG with some changes, TWI inferior leads with incomplete LBBB. trops flat 34-34. no CP this morning. trops flat , out pt stress test     HTN (hypertension).   - monitor BP    On 10/10/2022, case was discussed with Dr. Hdz, patient is medically cleared and optimized for discharge today. All medications were reviewed with attending. 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    Acute respiratory failure with hypoxia.   -pt intermittently tachypneic on exam and with wheezing that appears to be positional  -CT cervical spine without obstructing masses to explain wheezing  -COVID-19 PCR 9/24/22 (-)  -Proventil HFA q6h/ipratropium nebs q6h  -CT angio --> no central, right, left lobar or segmental pulmonary embolism. Limited   evaluation of subsegmental branches.  Complete atelectasis of the left lower lobe without associated   endobronchial lesion..  -RRT 9/25/22 due to airway compromise; pt intubated and accepted to MICU  -VAP bundle  -on reverse isolation for r/o AFB; quantiferon was indeterminate  -AFB tracheal aspirate (-) 9/27/22, 9/28/22  -S/P extubation, O2 supplement  - RA.    Severe sepsis with septic shock.   -COVID-19 PCR 9/24/22 (-)  -B cereus in single blood cx bottle 9/25/22 likely contaminant  -CSF microbiological studies   -LLL atelectasis on chest imaging without discrete consolidation.    Weakness.   -Pt p/w rapidly progressive weakness beginning in the arms and spreading to legs. With significant proximal muscle weakness on exam. Also with associated hoarse voice and dysphagia  -s/p RRT for hypoxia, s/p intubated in MICU -CT head wnl  -appreciate neuro recs   -given neck pain on exam, c/f pathology in the neck causing cord compression. CT cervical spine limited but no obvious masses noted. MRI of the spine ordered. Called several numbers to expedite study, no response   -ddx includes GBS, myasthenia gravis less likely botulism   -treating empirically with decadron  -MRI spine: Abnormal signal scattered throughout the cervical spine as well as involving the T8 vertebral body which may be related to multiple myeloma.   -IVIG 35g qd (3/3) as per neuro for possible GBS completed 9/28-autoimmune panel   -antiganglioside Ab.    Hyponatremia.   -likely contributing to weakness  -appreciate renal recs.    Multiple myeloma.   -Lost to follow up, last treatment in March  -with diffuse lytic lesions noted in spine  -appreciate heme/onc  -no acute neurosurgical intervention indicated  -kappa/lambda ratio not diagnostic  -no monoclonal gammopathy present.    CAD s/p PCI  -in 2015 in [place] as per daughter patient had heart attack  -no reported CP prior to hospitalization  -EKG stable  -monitor on tele.    ?Afib  -?afib on tele  -no AC 2/2 anemia and duodenal ulcers  -EKG with some changes, TWI inferior leads with incomplete LBBB. trops flat 34-34. no CP this morning. trops flat , out pt stress test     HTN (hypertension).   - monitor BP    CRISTI on CKD stage 3  - Baseline Scr 1.3-1.4  - CRISTI likely sec to ATN  - improved better than baseline  - monitor bmp, i/o    H Pylori infection   - Started quadruple therapy: Bismuth salicylate 300mg QID, tetracycline 500mg QID, Flagyl 500mg TID, and pantoprazole 40mg BID, will need to complete treatment for 14 days total.   - Will need to follow up in GI clinic and have to check for h. pylori eradication 4-6 weeks after completion of treatment.    On 10/11/2022, case was discussed with Dr. Hdz, patient is medically cleared and optimized for discharge today. All medications were reviewed with attending. 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    Acute respiratory failure with hypoxia  - resolved  - pt intermittently tachypneic on exam and with wheezing that appears to be positional  - CT cervical spine without obstructing masses to explain wheezing  - COVID-19 PCR 9/24/22 (-)  - CTA: no central, right, left lobar or segmental pulmonary embolism; limited evaluation of subsegmental branches; complete atelectasis of the left lower lobe without associated endobronchial lesion   -RRT 9/25/22 due to airway compromise; pt intubated and accepted to MICU  - s/p reverse isolation for r/o AFB; quantiferon was indeterminate; AFB tracheal aspirate (-) 9/27/22, 9/28/22  - s/p extubation, no on room air    Severe sepsis with septic shock.   -COVID-19 PCR 9/24/22 (-)  -B cereus in single blood cx bottle 9/25/22 likely contaminant  -CSF microbiological studies   -LLL atelectasis on chest imaging without discrete consolidation.    Weakness  - rapidly progressive weakness beginning in the arms and spreading to legs; significant proximal muscle weakness on exam; also with associated hoarse voice and dysphagia  - s/p RRT for hypoxia, s/p intubation and MICU stay  - CT head wnl  - given neck pain on exam, c/f pathology in the neck causing cord compression; CT cervical spine limited but no obvious masses noted  - MRI spine: abnormal signal scattered throughout the cervical spine as well as involving the T8 vertebral body which may be related to multiple myeloma  - neuro consulted: s/p IVIG 35g qd (3/3) for possible GBS completed 9/28    Multiple myeloma  - lost to follow up, last treatment in March  - with diffuse lytic lesions noted in spine  - heme/onc following during this admission  - neurosurgery consulted: no acute neurosurgical intervention indicated  - kappa/lambda ratio not diagnostic; no monoclonal gammopathy present    CAD s/p PCI  - in 2015 in [facility] as per daughter patient had heart attack  - no reported CP prior to hospitalization  - EKG stable    Possible history of a-fib  - possible afib on tele  - EKG with some changes, TWI inferior leads with incomplete LBBB; trops flat 34-34; no CP  - outpt stress test     HTN (hypertension)  - c/w current medication regimen  - monitor BP    CRISTI on CKD stage 3  - baseline Scr 1.3-1.4  - CRISTI likely sec to ATN; improved better than baseline    H. pylori infection   - c/w quadruple therapy: Bismuth salicylate 300mg QID, tetracycline 500mg QID, Flagyl 500mg TID, and pantoprazole 40mg BID, to complete 14 day total treatment  - will need to follow up in GI clinic and have to check for H. pylori eradication 4-6 weeks after completion of treatment    Case discussed with Dr. Hdz on 10/11. Patient is medically stable and optimized for discharge to rehab as per attending. Discharge plan reviewed with patient and family. 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    Acute respiratory failure with hypoxia  - resolved  - pt intermittently tachypneic on exam and with wheezing that appears to be positional  - CT cervical spine without obstructing masses to explain wheezing  - COVID-19 PCR 9/24/22 (-)  - CTA: no central, right, left lobar or segmental pulmonary embolism; limited evaluation of subsegmental branches; complete atelectasis of the left lower lobe without associated endobronchial lesion  - s/p RRT 9/25/22 due to airway compromise; pt intubated and accepted to MICU  - s/p reverse isolation for r/o AFB; quantiferon was indeterminate; AFB tracheal aspirate (-) 9/27/22, 9/28/22  - s/p extubation, no on room air    Severe sepsis with septic shock  - COVID-19 PCR 9/24/22 (-)  - B cereus in single blood cx bottle 9/25/22 likely contaminant  - LLL atelectasis on chest imaging without discrete consolidation    Weakness  - rapidly progressive weakness beginning in the arms and spreading to legs; significant proximal muscle weakness on exam; also with associated hoarse voice and dysphagia  - s/p RRT for hypoxia, s/p intubation and MICU stay  - CT head wnl  - given neck pain on exam, c/f pathology in the neck causing cord compression; CT cervical spine limited but no obvious masses noted  - MRI spine: abnormal signal scattered throughout the cervical spine as well as involving the T8 vertebral body which may be related to multiple myeloma  - neuro consulted: s/p IVIG 35g qd (3/3) for possible GBS completed 9/28    Multiple myeloma  - lost to follow up, last treatment in March  - with diffuse lytic lesions noted in spine  - heme/onc following during this admission  - neurosurgery consulted: no acute neurosurgical intervention indicated  - kappa/lambda ratio not diagnostic; no monoclonal gammopathy present    CAD s/p PCI  - in 2015 in [facility] as per daughter patient had heart attack  - no reported CP prior to hospitalization  - EKG stable    Possible history of a-fib  - possible afib on tele  - EKG with some changes, TWI inferior leads with incomplete LBBB; trops flat 34-34; no CP  - outpt stress test     HTN (hypertension)  - c/w current medication regimen  - monitor BP    CRISTI on CKD stage 3  - baseline Scr 1.3-1.4  - CRISTI likely sec to ATN; improved better than baseline    H. pylori infection   - c/w quadruple therapy: Bismuth salicylate 300mg QID, tetracycline 500mg QID, Flagyl 500mg TID, and pantoprazole 40mg BID, to complete 14 day total treatment  - will need to follow up in GI clinic and have to check for H. pylori eradication 4-6 weeks after completion of treatment    Case discussed with Dr. Hdz on 10/11. Patient is medically stable and optimized for discharge to rehab as per attending. Discharge plan reviewed with patient and family.

## 2022-10-10 NOTE — PROVIDER CONTACT NOTE (OTHER) - RECOMMENDATIONS
Provider notified. Trial of 1 L NC
okay to hold at this time. no aware of no iv- will pass on to day team.

## 2022-10-10 NOTE — PROGRESS NOTE ADULT - ASSESSMENT
72 year-old male, history of HTN, HLD, CAD, cardiac stent, MM, presents with cc generalized weakness x 2-3 days associated with some tingling sensation.    A/P:  Hyponatremia and or hypernatremia  work up suggested SIADH  Na appropriately improved now   monitor Na closely     CRISTI on CKD stage 3  Baseline Scr 1.3-1.4  CRISTI likely sec to ATN  improved better than baseline  monitor bmp, i/o    Hypokalemia:  Resolved  Monitor K level    acidosis  Non AG  stable   on oral bicarb  monitor    Mild hypophosphatemia  Supplement    HTN:  optimal   manage per MICU   MOnitor BP    Weakness:  Etiology?  h/o MM  s/p LP  possible GBS s/p [id number]  f/u neuro

## 2022-10-10 NOTE — PROGRESS NOTE ADULT - ASSESSMENT
Patient is a 71 yo M with PMH of HTN, HLD, CAD, cardiac stent, Multiple Myeloma who presented to the ED for 3 days of generalized weakness.    EKG: ST PVCs  Tele: NSR PVCs episodes of PAT    1. Weakness  -rapidly progressed. was unable to move extremities.   -s/p RRT for hypoxia now intubated in MICU   -neuro on board, possible GBS vs pathology in neck  -echo shows mild AR/AS, normal LV systolic function and mild diastolic dysfunction   -IVIG 35g qd (3/3) as per neuro for possible GBS completed 9/28  -imrpoving    2. CAD s/p PCI  -in 2015 in [facility] as per daughter patient had heart attack  -no reported CP prior to hospitalization    3. MM  -heme/onc on board    4. ?Afib  -?afib on tele  -no AC 2/2 anemia and duodenal ulcers  -EKG with some changes, TWI inferior leads with incomplete LBBB. trops flat 34-34. no CP. trops flat , out pt stress test

## 2022-10-10 NOTE — PROVIDER CONTACT NOTE (OTHER) - BACKGROUND
patient states he has not had an iv for days and that all doctors have been aware that he does not want any needles.. shahram Is due for sodium phos iv/
73 y/o M admitted for weakness

## 2022-10-10 NOTE — PROGRESS NOTE ADULT - SUBJECTIVE AND OBJECTIVE BOX
Payam Longoria MD  Interventional Cardiology / Endovascular Specialist  [place] Office : [phone] [place] [address]. [phone]  Tel:   [place] Office : [address] [facility] [place] [address]  Tel: [phone]      Subjective/Overnight events: Patient lying in bed comfortably. No acute distress.   	  MEDICATIONS:  amLODIPine   Tablet 10 milliGRAM(s) Oral daily  heparin   Injectable 5000 Unit(s) SubCutaneous every 12 hours  losartan 100 milliGRAM(s) Oral daily    metroNIDAZOLE    Tablet 500 milliGRAM(s) Oral three times a day  tetracycline 500 milliGRAM(s) Oral four times a day    guaiFENesin Oral Liquid (Sugar-Free) 100 milliGRAM(s) Oral every 6 hours PRN    acetaminophen     Tablet .. 650 milliGRAM(s) Oral every 6 hours PRN  melatonin 9 milliGRAM(s) Oral at bedtime  ondansetron Injectable 4 milliGRAM(s) IV Push every 8 hours PRN    aluminum hydroxide/magnesium hydroxide/simethicone Suspension 30 milliLiter(s) Oral every 4 hours PRN  bismuth subsalicylate Liquid 300 milliGRAM(s) Oral four times a day  pantoprazole    Tablet 40 milliGRAM(s) Oral every 12 hours  polyethylene glycol 3350 17 Gram(s) Oral daily    atorvastatin 40 milliGRAM(s) Oral at bedtime  glucagon  Injectable 1 milliGRAM(s) IntraMuscular once    chlorhexidine 2% Cloths 1 Application(s) Topical daily  sodium bicarbonate 650 milliGRAM(s) Oral three times a day      PAST MEDICAL/SURGICAL HISTORY  PAST MEDICAL & SURGICAL HISTORY:  HTN (hypertension)      HLD (hyperlipidemia)      Coronary artery disease      Multiple myeloma      S/P coronary artery stent placement          SOCIAL HISTORY: Substance Use (street drugs): ( x ) never used  (  ) other:    FAMILY HISTORY:  Maternal family history of hypertension  Mother        PHYSICAL EXAM:  T(C): 36.6 (10-10-22 @ 11:50), Max: 36.9 (10-09-22 @ 17:17)  HR: 74 (10-10-22 @ 11:50) (74 - 90)  BP: 114/62 (10-10-22 @ 11:50) (114/62 - 126/63)  RR: 17 (10-10-22 @ 11:50) (16 - 18)  SpO2: 100% (10-10-22 @ 11:50) (100% - 100%)  Wt(kg): --  I&O's Summary        GENERAL: NAD  EYES:   PERRLA   ENMT:   Moist mucous membranes, Good dentition, No lesions  Cardiovascular: Normal S1 S2, No JVD, No murmurs, No edema  Respiratory: Lungs clear to auscultation	  Gastrointestinal:  Soft, Non-tender, + BS	  Extremities: no edema                                  8.2    6.40  )-----------( 193      ( 10 Oct 2022 06:40 )             23.6     10-10    139  |  109<H>  |  25<H>  ----------------------------<  93  3.8   |  19<L>  |  1.12    Ca    7.9<L>      10 Oct 2022 06:40  Phos  2.6     10-10  Mg     2.10     10-10      proBNP:   Lipid Profile:   HgA1c:   TSH:     Consultant(s) Notes Reviewed:  [x ] YES  [ ] NO    Care Discussed with Consultants/Other Providers [ x] YES  [ ] NO    Imaging Personally Reviewed independently:  [x] YES  [ ] NO    All labs, radiologic studies, vitals, orders and medications list reviewed. Patient is seen and examined at bedside. Case discussed with medical team.

## 2022-10-10 NOTE — DISCHARGE NOTE PROVIDER - DETAILS OF MALNUTRITION DIAGNOSIS/DIAGNOSES
This patient has been assessed with a concern for Malnutrition and was treated during this hospitalization for the following Nutrition diagnosis/diagnoses:     -  10/04/2022: Moderate protein-calorie malnutrition

## 2022-10-11 ENCOUNTER — TRANSCRIPTION ENCOUNTER (OUTPATIENT)
Age: 72
End: 2022-10-11

## 2022-10-11 VITALS
TEMPERATURE: 98 F | HEART RATE: 79 BPM | SYSTOLIC BLOOD PRESSURE: 113 MMHG | OXYGEN SATURATION: 100 % | DIASTOLIC BLOOD PRESSURE: 60 MMHG | RESPIRATION RATE: 18 BRPM

## 2022-10-11 LAB
ANION GAP SERPL CALC-SCNC: 11 MMOL/L — SIGNIFICANT CHANGE UP (ref 7–14)
BUN SERPL-MCNC: 23 MG/DL — SIGNIFICANT CHANGE UP (ref 7–23)
CALCIUM SERPL-MCNC: 8.1 MG/DL — LOW (ref 8.4–10.5)
CHLORIDE SERPL-SCNC: 107 MMOL/L — SIGNIFICANT CHANGE UP (ref 98–107)
CO2 SERPL-SCNC: 20 MMOL/L — LOW (ref 22–31)
CREAT SERPL-MCNC: 1.04 MG/DL — SIGNIFICANT CHANGE UP (ref 0.5–1.3)
EGFR: 76 ML/MIN/1.73M2 — SIGNIFICANT CHANGE UP
GLUCOSE SERPL-MCNC: 100 MG/DL — HIGH (ref 70–99)
HCT VFR BLD CALC: 23.7 % — LOW (ref 39–50)
HGB BLD-MCNC: 8.2 G/DL — LOW (ref 13–17)
MAGNESIUM SERPL-MCNC: 1.9 MG/DL — SIGNIFICANT CHANGE UP (ref 1.6–2.6)
MCHC RBC-ENTMCNC: 31.2 PG — SIGNIFICANT CHANGE UP (ref 27–34)
MCHC RBC-ENTMCNC: 34.6 GM/DL — SIGNIFICANT CHANGE UP (ref 32–36)
MCV RBC AUTO: 90.1 FL — SIGNIFICANT CHANGE UP (ref 80–100)
NRBC # BLD: 0 /100 WBCS — SIGNIFICANT CHANGE UP (ref 0–0)
NRBC # FLD: 0 K/UL — SIGNIFICANT CHANGE UP (ref 0–0)
PHOSPHATE SERPL-MCNC: 2.8 MG/DL — SIGNIFICANT CHANGE UP (ref 2.5–4.5)
PLATELET # BLD AUTO: 204 K/UL — SIGNIFICANT CHANGE UP (ref 150–400)
POTASSIUM SERPL-MCNC: 3.8 MMOL/L — SIGNIFICANT CHANGE UP (ref 3.5–5.3)
POTASSIUM SERPL-SCNC: 3.8 MMOL/L — SIGNIFICANT CHANGE UP (ref 3.5–5.3)
RBC # BLD: 2.63 M/UL — LOW (ref 4.2–5.8)
RBC # FLD: 16.7 % — HIGH (ref 10.3–14.5)
SODIUM SERPL-SCNC: 138 MMOL/L — SIGNIFICANT CHANGE UP (ref 135–145)
WBC # BLD: 4.94 K/UL — SIGNIFICANT CHANGE UP (ref 3.8–10.5)
WBC # FLD AUTO: 4.94 K/UL — SIGNIFICANT CHANGE UP (ref 3.8–10.5)

## 2022-10-11 RX ADMIN — PANTOPRAZOLE SODIUM 40 MILLIGRAM(S): 20 TABLET, DELAYED RELEASE ORAL at 05:47

## 2022-10-11 RX ADMIN — Medication 500 MILLIGRAM(S): at 05:46

## 2022-10-11 RX ADMIN — LOSARTAN POTASSIUM 100 MILLIGRAM(S): 100 TABLET, FILM COATED ORAL at 05:48

## 2022-10-11 RX ADMIN — Medication 300 MILLIGRAM(S): at 05:44

## 2022-10-11 RX ADMIN — Medication 650 MILLIGRAM(S): at 05:45

## 2022-10-11 RX ADMIN — AMLODIPINE BESYLATE 10 MILLIGRAM(S): 2.5 TABLET ORAL at 05:48

## 2022-10-11 RX ADMIN — Medication 500 MILLIGRAM(S): at 05:45

## 2022-10-11 RX ADMIN — HEPARIN SODIUM 5000 UNIT(S): 5000 INJECTION INTRAVENOUS; SUBCUTANEOUS at 05:47

## 2022-10-11 NOTE — PROGRESS NOTE ADULT - PROVIDER SPECIALTY LIST ADULT
Cardiology
MICU
Nephrology
Nephrology
Neurology
Cardiology
MICU
Nephrology
Neurology
Neurology
Cardiology
Cardiology
Gastroenterology
Internal Medicine
MICU
MICU
Nephrology
Cardiology
MICU
Nephrology
Neurology
Internal Medicine

## 2022-10-11 NOTE — DISCHARGE NOTE NURSING/CASE MANAGEMENT/SOCIAL WORK - PATIENT PORTAL LINK FT
You can access the FollowMyHealth Patient Portal offered by [facility] by registering at the following website: [email or web address]. By joining [employer]’s FollowMyHealth portal, you will also be able to view your health information using other applications (apps) compatible with our system.

## 2022-10-11 NOTE — PROGRESS NOTE ADULT - PROBLEM SELECTOR PROBLEM 7
HTN (hypertension)
Coronary artery disease
Coronary artery disease
HTN (hypertension)
Coronary artery disease
HTN (hypertension)
HTN (hypertension)
Coronary artery disease
HTN (hypertension)
HTN (hypertension)

## 2022-10-11 NOTE — PROGRESS NOTE ADULT - PROBLEM SELECTOR PLAN 2
-COVID-19 PCR 9/24/22 (-)  -B cereus in single blood cx bottle 9/25/22 likely contaminant  -CSF microbiological studies 9/26/22 are testing  -LLL atelectasis on chest imaging without discrete consolidation  -zosyn 9/26/22 -->  -weaning of pressors as per MICU team
on biopsy by GI   quad treatment per GI  GI to follow up   discussed with daughter at the bedside
-COVID-19 PCR 9/24/22 (-)  -B cereus in single blood cx bottle 9/25/22 likely contaminant  -CSF microbiological studies 9/26/22 are testing  -LLL atelectasis on chest imaging without discrete consolidation  -zosyn 9/26/22 -->  -weaning of pressors as per MICU team
-COVID-19 PCR 9/24/22 (-)  -B cereus in single blood cx bottle 9/25/22 likely contaminant  -CSF microbiological studies 9/26/22 are testing  -LLL atelectasis on chest imaging without discrete consolidation  -zosyn 9/26/22 --> 9/28/22  -observing off antibx for now  -blood cx 9/28/22 -->  -weaning of pressors as per MICU team
-COVID-19 PCR 9/24/22 (-)  -B cereus in single blood cx bottle 9/25/22 likely contaminant  -CSF microbiological studies   -LLL atelectasis on chest imaging without discrete consolidation
-COVID-19 PCR 9/24/22 (-)  -B cereus in single blood cx bottle 9/25/22 likely contaminant  -CSF microbiological studies 9/26/22 are testing  -LLL atelectasis on chest imaging without discrete consolidation  -zosyn 9/26/22 --> 9/28/22  -observing off antibx for now  -blood cx 9/28/22 -->  -weaning of pressors as per MICU team
-COVID-19 PCR 9/24/22 (-)  -B cereus in single blood cx bottle 9/25/22 likely contaminant  -CSF microbiological studies 9/26/22 are testing  -LLL atelectasis on chest imaging without discrete consolidation  -zosyn 9/26/22 --> 9/28/22  -observing off antibx for now  -blood cx 9/28/22 -->  -weaning of pressors as per MICU team
on biopsy by GI   quad treatment per GI  GI to follow up   discussed with daughter at the bedside
-COVID-19 PCR 9/24/22 (-)  -B cereus in single blood cx bottle 9/25/22 likely contaminant  -CSF microbiological studies   -LLL atelectasis on chest imaging without discrete consolidation
on biopsy by GI   quad treatment per GI  GI to follow up   discussed with daughter at the bedside
on biopsy by GI   quad treatment per GI  GI to follow up   discussed with daughter at the bedside
-COVID-19 PCR 9/24/22 (-)  -B cereus in single blood cx bottle 9/25/22 likely contaminant  -CSF microbiological studies 9/26/22 are testing  -LLL atelectasis on chest imaging without discrete consolidation  -zosyn 9/26/22 --> 9/28/22  -observing off antibx for now  -blood cx 9/28/22 -->  -weaning of pressors as per MICU team
-COVID-19 PCR 9/24/22 (-)  -B cereus in single blood cx bottle 9/25/22 likely contaminant  -CSF microbiological studies 9/26/22 are testing  -LLL atelectasis on chest imaging without discrete consolidation  -zosyn 9/26/22 --> 9/28/22  -observing off antibx for now  -blood cx 9/28/22 -->  -weaning of pressors as per MICU team

## 2022-10-11 NOTE — PROGRESS NOTE ADULT - PROBLEM SELECTOR PROBLEM 5
Multiple myeloma
Hyponatremia
Hyponatremia
Multiple myeloma
Hyponatremia
Multiple myeloma
Hyponatremia

## 2022-10-11 NOTE — PROGRESS NOTE ADULT - PROBLEM SELECTOR PROBLEM 3
Weakness
Severe sepsis with septic shock
Severe sepsis with septic shock
Weakness
Severe sepsis with septic shock
Weakness
Weakness
Severe sepsis with septic shock
Weakness
Weakness

## 2022-10-11 NOTE — PROGRESS NOTE ADULT - REASON FOR ADMISSION
weakness

## 2022-10-11 NOTE — PROGRESS NOTE ADULT - PROBLEM SELECTOR PROBLEM 8
Need for prophylactic measure
HTN (hypertension)
Need for prophylactic measure
HTN (hypertension)

## 2022-10-11 NOTE — PROGRESS NOTE ADULT - PROBLEM SELECTOR PLAN 1
-pt intermittently tachypneic on exam and with wheezing that appears to be positional  -repeat CXR obtained, no pulm edema or focal consolidations appreciated to my eye  -CT cervical spine without obstructing masses to explain wheezing  -COVID-19 PCR 9/24/22 (-)  -NIF obtained and abnormal. VBG with mild resp alkalosis in response to mild met acidosis, no evidence of hypercarbia at this time. Trend q8h. MICU consulted, f/u recs. Continue to check NIFs q4h  -Proventil HFA q6h/ipratropium nebs q6h  -CT angio --> no central, right, left lobar or segmental pulmonary embolism. Limited   evaluation of subsegmental branches.  Complete atelectasis of the left lower lobe without associated   endobronchial lesion..  -RRT 9/25/22 due to airway compromise; pt intubated and accepted to MICU  -VAP bundle  -on reverse isolation for r/o AFB; quantiferon was indeterminate  -AFB tracheal aspirate (-) 9/27/22, 9/28/22  -weaning of FiO2, vent settings, and CPAP trials as per MICU team
-pt intermittently tachypneic on exam and with wheezing that appears to be positional  -repeat CXR obtained, no pulm edema or focal consolidations appreciated to my eye  -CT cervical spine without obstructing masses to explain wheezing  -COVID-19 PCR 9/24/22 (-)  -NIF obtained and abnormal. VBG with mild resp alkalosis in response to mild met acidosis, no evidence of hypercarbia at this time. Trend q8h. MICU consulted, f/u recs. Continue to check NIFs q4h  -Proventil HFA q6h/ipratropium nebs q6h  -CT angio --> no central, right, left lobar or segmental pulmonary embolism. Limited   evaluation of subsegmental branches.  Complete atelectasis of the left lower lobe without associated   endobronchial lesion..  -RRT 9/25/22 due to airway compromise; pt intubated and accepted to MICU  -VAP bundle  -weaning of FiO2, vent settings, and CPAP trials as per MICU team
-pt intermittently tachypneic on exam and with wheezing that appears to be positional  -repeat CXR obtained, no pulm edema or focal consolidations appreciated to my eye  -CT cervical spine without obstructing masses to explain wheezing  -COVID-19 PCR 9/24/22 (-)  -NIF obtained and abnormal. VBG with mild resp alkalosis in response to mild met acidosis, no evidence of hypercarbia at this time. Trend q8h. MICU consulted, f/u recs. Continue to check NIFs q4h  -Proventil HFA q6h/ipratropium nebs q6h  -CT angio --> no central, right, left lobar or segmental pulmonary embolism. Limited   evaluation of subsegmental branches.  Complete atelectasis of the left lower lobe without associated   endobronchial lesion..  -RRT 9/25/22 due to airway compromise; pt intubated and accepted to MICU  -VAP bundle  -on reverse isolation for r/o AFB; quantiferon was indeterminatte  -weaning of FiO2, vent settings, and CPAP trials as per MICU team
-pt intermittently tachypneic on exam and with wheezing that appears to be positional  -repeat CXR obtained, no pulm edema or focal consolidations appreciated to my eye  -CT cervical spine without obstructing masses to explain wheezing  -COVID-19 PCR 9/24/22 (-)  -NIF obtained and abnormal. VBG with mild resp alkalosis in response to mild met acidosis, no evidence of hypercarbia at this time. Trend q8h. MICU consulted, f/u recs. Continue to check NIFs q4h  -Proventil HFA q6h/ipratropium nebs q6h  -CT angio --> no central, right, left lobar or segmental pulmonary embolism. Limited   evaluation of subsegmental branches.  Complete atelectasis of the left lower lobe without associated   endobronchial lesion..  -RRT 9/25/22 due to airway compromise; pt intubated and accepted to MICU  -VAP bundle  -on reverse isolation for r/o AFB; quantiferon was indeterminate  -AFB tracheal aspirate (-) 9/27/22, 9/28/22  -S/P extubation, O2 supplement
-pt intermittently tachypneic on exam and with wheezing that appears to be positional  -repeat CXR obtained, no pulm edema or focal consolidations appreciated to my eye  -CT cervical spine without obstructing masses to explain wheezing  -COVID-19 PCR 9/24/22 (-)  -NIF obtained and abnormal. VBG with mild resp alkalosis in response to mild met acidosis, no evidence of hypercarbia at this time. Trend q8h. MICU consulted, f/u recs. Continue to check NIFs q4h  -Proventil HFA q6h/ipratropium nebs q6h  -CT angio --> no central, right, left lobar or segmental pulmonary embolism. Limited   evaluation of subsegmental branches.  Complete atelectasis of the left lower lobe without associated   endobronchial lesion..  -RRT 9/25/22 due to airway compromise; pt intubated and accepted to MICU  -VAP bundle  -on reverse isolation for r/o AFB; quantiferon was indeterminate  -AFB tracheal aspirate (-) 9/27/22, 9/28/22  -weaning of FiO2, vent settings, and CPAP trials as per MICU team
-pt intermittently tachypneic on exam and with wheezing that appears to be positional  -CT cervical spine without obstructing masses to explain wheezing  -COVID-19 PCR 9/24/22 (-)h  -Proventil HFA q6h/ipratropium nebs q6h  -CT angio --> no central, right, left lobar or segmental pulmonary embolism. Limited   evaluation of subsegmental branches.  Complete atelectasis of the left lower lobe without associated   endobronchial lesion..  -RRT 9/25/22 due to airway compromise; pt intubated and accepted to MICU  -VAP bundle  -on reverse isolation for r/o AFB; quantiferon was indeterminate  -AFB tracheal aspirate (-) 9/27/22, 9/28/22  -S/P extubation, O2 supplement  - RA
-pt intermittently tachypneic on exam and with wheezing that appears to be positional  -repeat CXR obtained, no pulm edema or focal consolidations appreciated to my eye  -CT cervical spine without obstructing masses to explain wheezing  -COVID-19 PCR 9/24/22 (-)  -NIF obtained and abnormal. VBG with mild resp alkalosis in response to mild met acidosis, no evidence of hypercarbia at this time. Trend q8h. MICU consulted, f/u recs. Continue to check NIFs q4h  -Proventil HFA q6h/ipratropium nebs q6h  -CT angio --> no central, right, left lobar or segmental pulmonary embolism. Limited   evaluation of subsegmental branches.  Complete atelectasis of the left lower lobe without associated   endobronchial lesion..  -RRT 9/25/22 due to airway compromise; pt intubated and accepted to MICU  -VAP bundle  -on reverse isolation for r/o AFB; quantiferon was indeterminate  -AFB tracheal aspirate (-) 9/27/22, 9/28/22  -weaning of FiO2, vent settings, and CPAP trials as per MICU team
-pt intermittently tachypneic on exam and with wheezing that appears to be positional  -CT cervical spine without obstructing masses to explain wheezing  -COVID-19 PCR 9/24/22 (-)h  -Proventil HFA q6h/ipratropium nebs q6h  -CT angio --> no central, right, left lobar or segmental pulmonary embolism. Limited   evaluation of subsegmental branches.  Complete atelectasis of the left lower lobe without associated   endobronchial lesion..  -RRT 9/25/22 due to airway compromise; pt intubated and accepted to MICU  -VAP bundle  -on reverse isolation for r/o AFB; quantiferon was indeterminate  -AFB tracheal aspirate (-) 9/27/22, 9/28/22  -S/P extubation, O2 supplement  - discussed with daughter the neccesity of patient being complaint  - one to one for safety
-pt intermittently tachypneic on exam and with wheezing that appears to be positional  -CT cervical spine without obstructing masses to explain wheezing  -COVID-19 PCR 9/24/22 (-)h  -Proventil HFA q6h/ipratropium nebs q6h  -CT angio --> no central, right, left lobar or segmental pulmonary embolism. Limited   evaluation of subsegmental branches.  Complete atelectasis of the left lower lobe without associated   endobronchial lesion..  -RRT 9/25/22 due to airway compromise; pt intubated and accepted to MICU  -VAP bundle  -on reverse isolation for r/o AFB; quantiferon was indeterminate  -AFB tracheal aspirate (-) 9/27/22, 9/28/22  -S/P extubation, O2 supplement  - discussed with daughter the neccesity of patient being complaint  - one to one for safety PRN
-pt intermittently tachypneic on exam and with wheezing that appears to be positional  -repeat CXR obtained, no pulm edema or focal consolidations appreciated to my eye  -CT cervical spine without obstructing masses to explain wheezing  -COVID-19 PCR 9/24/22 (-)  -NIF obtained and abnormal. VBG with mild resp alkalosis in response to mild met acidosis, no evidence of hypercarbia at this time. Trend q8h. MICU consulted, f/u recs. Continue to check NIFs q4h  -Proventil HFA q6h/ipratropium nebs q6h  -CT angio --> no central, right, left lobar or segmental pulmonary embolism. Limited   evaluation of subsegmental branches.  Complete atelectasis of the left lower lobe without associated   endobronchial lesion..  -RRT 9/25/22 due to airway compromise; pt intubated and accepted to MICU  -VAP bundle  -on reverse isolation for r/o AFB; quantiferon was indeterminate  -AFB tracheal aspirate (-) 9/27/22, 9/28/22  -weaning of FiO2, vent settings, and CPAP trials as per MICU team
-pt intermittently tachypneic on exam and with wheezing that appears to be positional  -repeat CXR obtained, no pulm edema or focal consolidations appreciated to my eye  -CT cervical spine without obstructing masses to explain wheezing  -COVID-19 PCR 9/24/22 (-)  -NIF obtained and abnormal. VBG with mild resp alkalosis in response to mild met acidosis, no evidence of hypercarbia at this time. Trend q8h. MICU consulted, f/u recs. Continue to check NIFs q4h  -Proventil HFA q6h/ipratropium nebs q6h  -CT angio --> no central, right, left lobar or segmental pulmonary embolism. Limited   evaluation of subsegmental branches.  Complete atelectasis of the left lower lobe without associated   endobronchial lesion..  -RRT 9/25/22 due to airway compromise; pt intubated and accepted to MICU  -VAP bundle  -on reverse isolation for r/o AFB; quantiferon was indeterminate  -AFB tracheal aspirate (-) 9/27/22, 9/28/22  -weaning of FiO2, vent settings, and CPAP trials as per MICU team
-pt intermittently tachypneic on exam and with wheezing that appears to be positional  -CT cervical spine without obstructing masses to explain wheezing  -COVID-19 PCR 9/24/22 (-)h  -Proventil HFA q6h/ipratropium nebs q6h  -CT angio --> no central, right, left lobar or segmental pulmonary embolism. Limited   evaluation of subsegmental branches.  Complete atelectasis of the left lower lobe without associated   endobronchial lesion..  -RRT 9/25/22 due to airway compromise; pt intubated and accepted to MICU  -VAP bundle  -on reverse isolation for r/o AFB; quantiferon was indeterminate  -AFB tracheal aspirate (-) 9/27/22, 9/28/22  -S/P extubation, O2 supplement  - discussed with daughter the neccesity of patient being complaint  - one to one for safety PRN
-pt intermittently tachypneic on exam and with wheezing that appears to be positional  -CT cervical spine without obstructing masses to explain wheezing  -COVID-19 PCR 9/24/22 (-)h  -Proventil HFA q6h/ipratropium nebs q6h  -CT angio --> no central, right, left lobar or segmental pulmonary embolism. Limited   evaluation of subsegmental branches.  Complete atelectasis of the left lower lobe without associated   endobronchial lesion..  -RRT 9/25/22 due to airway compromise; pt intubated and accepted to MICU  -VAP bundle  -on reverse isolation for r/o AFB; quantiferon was indeterminate  -AFB tracheal aspirate (-) 9/27/22, 9/28/22  -S/P extubation, O2 supplement  - discussed with daughter the neccesity of patient being complaint  - one to one for safety PRN

## 2022-10-11 NOTE — PROGRESS NOTE ADULT - PROBLEM SELECTOR PLAN 5
-likely contributing to weakness  -appreciate renal recs
-Lost to follow up, last treatment in March  -with diffuse lytic lesions noted in spine  -appreciate heme/onc  -no acute neurosurgical intervention indicated
-likely contributing to weakness  -appreciate renal recs
-Lost to follow up, last treatment in March  -with diffuse lytic lesions noted in spine  -appreciate heme/onc  -no acute neurosurgical intervention indicated  -kappa/lambda ratio not diagnostic  -no monoclonal gammopathy present  -SPEP/IPEP testing
-Lost to follow up, last treatment in March  -with diffuse lytic lesions noted in spine  -appreciate heme/onc  -no acute neurosurgical intervention indicated  -kappa/lambda ratio not diagnostic  -no monoclonal gammopathy present
-Lost to follow up, last treatment in March  -with diffuse lytic lesions noted in spine  -appreciate heme/onc  -no acute neurosurgical intervention indicated  -kappa/lambda ratio not diagnostic  -no monoclonal gammopathy present  -SPEP/IPEP testing
-likely contributing to weakness  -appreciate renal recs
-likely contributing to weakness  -appreciate renal recs

## 2022-10-11 NOTE — PROGRESS NOTE ADULT - SUBJECTIVE AND OBJECTIVE BOX
Name of Patient : RASHID MEIER  MRN: [id number]  Date of visit: 10-11-22       Subjective: Patient seen and examined. No new events except as noted.       MEDICATIONS:  MEDICATIONS  (STANDING):  amLODIPine   Tablet 10 milliGRAM(s) Oral daily  atorvastatin 40 milliGRAM(s) Oral at bedtime  bismuth subsalicylate Liquid 300 milliGRAM(s) Oral four times a day  chlorhexidine 2% Cloths 1 Application(s) Topical daily  glucagon  Injectable 1 milliGRAM(s) IntraMuscular once  heparin   Injectable 5000 Unit(s) SubCutaneous every 12 hours  losartan 100 milliGRAM(s) Oral daily  melatonin 9 milliGRAM(s) Oral at bedtime  metroNIDAZOLE    Tablet 500 milliGRAM(s) Oral three times a day  pantoprazole    Tablet 40 milliGRAM(s) Oral every 12 hours  polyethylene glycol 3350 17 Gram(s) Oral daily  sodium bicarbonate 650 milliGRAM(s) Oral three times a day  tetracycline 500 milliGRAM(s) Oral four times a day      PHYSICAL EXAM:  T(C): 36.6 (10-11-22 @ 10:30), Max: 37.2 (10-11-22 @ 10:02)  HR: 79 (10-11-22 @ 10:30) (71 - 79)  BP: 113/60 (10-11-22 @ 10:30) (113/60 - 129/51)  RR: 18 (10-11-22 @ 10:30) (17 - 18)  SpO2: 100% (10-11-22 @ 10:30) (100% - 100%)  Wt(kg): --  I&O's Summary    10 Oct 2022 07:01  -  11 Oct 2022 07:00  --------------------------------------------------------  IN: 150 mL / OUT: 350 mL / NET: -200 mL    11 Oct 2022 07:01  -  11 Oct 2022 17:02  --------------------------------------------------------  IN: 0 mL / OUT: 200 mL / NET: -200 mL          Appearance: Normal	  HEENT:  PERRLA   Lymphatic: No lymphadenopathy   Cardiovascular: Normal S1 S2, no JVD  Respiratory: normal effort , clear  Gastrointestinal:  Soft, Non-tender  Skin: No rashes,  warm to touch  Psychiatry:  Mood & affect appropriate  Musculuskeletal: No edema      All labs, Imaging and EKGs personally reviewed     10-10-22 @ 07:01  -  10-11-22 @ 07:00  --------------------------------------------------------  IN: 150 mL / OUT: 350 mL / NET: -200 mL    10-11-22 @ 07:01  -  10-11-22 @ 17:02  --------------------------------------------------------  IN: 0 mL / OUT: 200 mL / NET: -200 mL                          8.2    4.94  )-----------( 204      ( 11 Oct 2022 05:40 )             23.7               10-11    138  |  107  |  23  ----------------------------<  100<H>  3.8   |  20<L>  |  1.04    Ca    8.1<L>      11 Oct 2022 05:40  Phos  2.8     10-11  Mg     1.90     10-11

## 2022-10-11 NOTE — DISCHARGE NOTE NURSING/CASE MANAGEMENT/SOCIAL WORK - NSDCPEFALRISK_GEN_ALL_CORE
For information on Fall & Injury Prevention, visit: [email or web address] OR  [email or web address] OR  https://www.cdc.gov/steadi/patient.html

## 2022-10-11 NOTE — PROGRESS NOTE ADULT - PROBLEM SELECTOR PROBLEM 4
Hyponatremia
Weakness
Weakness
Hyponatremia
Hyponatremia
Weakness
Hyponatremia
Hyponatremia
Weakness

## 2022-10-11 NOTE — PROGRESS NOTE ADULT - PROBLEM SELECTOR PLAN 7
-denies chest pain  -EKG stable  -monitor on tele
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
-denies chest pain  -EKG stable  -monitor on tele
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
-denies chest pain  -EKG stable  -monitor on tele
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
-denies chest pain  -EKG stable  -monitor on tele

## 2022-10-11 NOTE — PROGRESS NOTE ADULT - PROBLEM SELECTOR PLAN 3
-Pt p/w weakness beginning in the arms and spreading to legs. With significant proximal muscle weakness on exam. Also with associated hoarse voice and dysphagia  -CT head wnl  -appreciate neuro recs   -given neck pain on exam, c/f pathology in the neck causing cord compression. CT cervical spine limited but no obvious masses noted. MRI of the spine ordered. Called several numbers to expedite study, no response   -ddx includes GBS, myasthenia gravis less likely botulism   -treating empirically with decadron  -MRI spine pending  -IVIG 9/26/22-9/28/22  -autoimmune panel from CSF -->  -antiganglioside Ab -->
-Pt p/w weakness beginning in the arms and spreading to legs. With significant proximal muscle weakness on exam. Also with associated hoarse voice and dysphagia  -CT head wnl  -appreciate neuro recs   -given neck pain on exam, c/f pathology in the neck causing cord compression. CT cervical spine limited but no obvious masses noted. MRI of the spine ordered. Called several numbers to expedite study, no response   -ddx includes GBS, myasthenia gravis less likely botulism   -treating empirically with decadron  -MRI spine pending  -autoimmune panel from CSF is testing
-Pt p/w weakness beginning in the arms and spreading to legs. With significant proximal muscle weakness on exam. Also with associated hoarse voice and dysphagia  -CT head wnl  -appreciate neuro recs   -given neck pain on exam, c/f pathology in the neck causing cord compression. CT cervical spine limited but no obvious masses noted. MRI of the spine ordered. Called several numbers to expedite study, no response   -ddx includes GBS, myasthenia gravis less likely botulism   -treating empirically with decadron  -MRI spine pending  -IVIG 9/26/22-9/28/22  -autoimmune panel from CSF -->  -antiganglioside Ab -->
-COVID-19 PCR 9/24/22 (-)  -B cereus in single blood cx bottle 9/25/22 likely contaminant  -CSF microbiological studies   -LLL atelectasis on chest imaging without discrete consolidation
-Pt p/w weakness beginning in the arms and spreading to legs. With significant proximal muscle weakness on exam. Also with associated hoarse voice and dysphagia  -CT head wnl  -appreciate neuro recs   -given neck pain on exam, c/f pathology in the neck causing cord compression. CT cervical spine limited but no obvious masses noted. MRI of the spine ordered. Called several numbers to expedite study, no response   -ddx includes GBS, myasthenia gravis less likely botulism   -treating empirically with decadron  -MRI spine pending  -IVIG 9/26/22-9/28/22  -autoimmune panel from CSF -->  -antiganglioside Ab -->
-COVID-19 PCR 9/24/22 (-)  -B cereus in single blood cx bottle 9/25/22 likely contaminant  -CSF microbiological studies   -LLL atelectasis on chest imaging without discrete consolidation
-Pt p/w weakness beginning in the arms and spreading to legs. With significant proximal muscle weakness on exam. Also with associated hoarse voice and dysphagia  -CT head wnl  -appreciate neuro recs   -given neck pain on exam, c/f pathology in the neck causing cord compression. CT cervical spine limited but no obvious masses noted. MRI of the spine ordered. Called several numbers to expedite study, no response   -ddx includes GBS, myasthenia gravis less likely botulism   -treating empirically with decadron  -MRI spine   -IVIG   -autoimmune panel   -antiganglioside Ab
-COVID-19 PCR 9/24/22 (-)  -B cereus in single blood cx bottle 9/25/22 likely contaminant  -CSF microbiological studies   -LLL atelectasis on chest imaging without discrete consolidation
-Pt p/w weakness beginning in the arms and spreading to legs. With significant proximal muscle weakness on exam. Also with associated hoarse voice and dysphagia  -CT head wnl  -appreciate neuro recs   -given neck pain on exam, c/f pathology in the neck causing cord compression. CT cervical spine limited but no obvious masses noted. MRI of the spine ordered. Called several numbers to expedite study, no response   -ddx includes GBS, myasthenia gravis less likely botulism   -treating empirically with decadron  -MRI spine pending  -IVIG 9/26/22-9/28/22  -autoimmune panel from CSF -->  -antiganglioside Ab -->
-Pt p/w weakness beginning in the arms and spreading to legs. With significant proximal muscle weakness on exam. Also with associated hoarse voice and dysphagia  -CT head wnl  -appreciate neuro recs   -given neck pain on exam, c/f pathology in the neck causing cord compression. CT cervical spine limited but no obvious masses noted. MRI of the spine ordered. Called several numbers to expedite study, no response   -ddx includes GBS, myasthenia gravis less likely botulism   -treating empirically with decadron  -MRI spine pending  -IVIG 9/26/22-9/28/22  -autoimmune panel from CSF -->  -antiganglioside Ab -->
-Pt p/w weakness beginning in the arms and spreading to legs. With significant proximal muscle weakness on exam. Also with associated hoarse voice and dysphagia  -CT head wnl  -appreciate neuro recs   -given neck pain on exam, c/f pathology in the neck causing cord compression. CT cervical spine limited but no obvious masses noted. MRI of the spine ordered. Called several numbers to expedite study, no response   -ddx includes GBS, myasthenia gravis less likely botulism   -treating empirically with decadron  -MRI spine   -IVIG   -autoimmune panel   -antiganglioside Ab
-COVID-19 PCR 9/24/22 (-)  -B cereus in single blood cx bottle 9/25/22 likely contaminant  -CSF microbiological studies   -LLL atelectasis on chest imaging without discrete consolidation
-Pt p/w weakness beginning in the arms and spreading to legs. With significant proximal muscle weakness on exam. Also with associated hoarse voice and dysphagia  -CT head wnl  -appreciate neuro recs   -given neck pain on exam, c/f pathology in the neck causing cord compression. CT cervical spine limited but no obvious masses noted. MRI of the spine ordered. Called several numbers to expedite study, no response   -ddx includes GBS, myasthenia gravis less likely botulism   -treating empirically with decadron  -MRI spine pending  -IVIG 9/26/22-9/28/22  -autoimmune panel from CSF -->  -antiganglioside Ab -->

## 2022-10-11 NOTE — PROGRESS NOTE ADULT - PROBLEM SELECTOR PROBLEM 6
Coronary artery disease
Multiple myeloma
Coronary artery disease

## 2022-10-11 NOTE — PROGRESS NOTE ADULT - PROBLEM SELECTOR PLAN 6
-Lost to follow up, last treatment in March  -with diffuse lytic lesions noted in spine  -appreciate heme/onc  -no acute neurosurgical intervention indicated  -kappa/lambda ratio not diagnostic  -no monoclonal gammopathy present
-denies chest pain  -EKG stable  -monitor on tele
-denies chest pain  -EKG stable  -monitor on tele  -restart aspirin, statin, Metoprolol and losartan when able to take po
-Lost to follow up, last treatment in March  -with diffuse lytic lesions noted in spine  -appreciate heme/onc  -no acute neurosurgical intervention indicated  -kappa/lambda ratio not diagnostic  -no monoclonal gammopathy present
-denies chest pain  -EKG stable  -monitor on tele  -restart aspirin, statin, Metoprolol and losartan when able to take po
-denies chest pain  -EKG stable  -monitor on tele  -restart aspirin, statin, Metoprolol and losartan when able to take po
-Lost to follow up, last treatment in March  -with diffuse lytic lesions noted in spine  -appreciate heme/onc  -no acute neurosurgical intervention indicated  -kappa/lambda ratio not diagnostic  -no monoclonal gammopathy present
-denies chest pain  -EKG stable  -monitor on tele  -restart aspirin, statin, Metoprolol and losartan when able to take po
-denies chest pain  -EKG stable  -monitor on tele  -restart aspirin, statin, Metoprolol and losartan when able to take po
-Lost to follow up, last treatment in March  -with diffuse lytic lesions noted in spine  -appreciate heme/onc  -no acute neurosurgical intervention indicated  -kappa/lambda ratio not diagnostic  -no monoclonal gammopathy present
-denies chest pain  -EKG stable  -monitor on tele  -restart aspirin, statin, Metoprolol and losartan when able to take po
-denies chest pain  -EKG stable  -monitor on tele  -restart aspirin, statin, Metoprolol and losartan when able to take po
-denies chest pain  -EKG stable  -monitor on tele

## 2022-10-11 NOTE — PROGRESS NOTE ADULT - PROBLEM SELECTOR PROBLEM 2
Severe sepsis with septic shock
Severe sepsis with septic shock
Gastritis, Helicobacter pylori
Severe sepsis with septic shock
Gastritis, Helicobacter pylori
Severe sepsis with septic shock
Gastritis, Helicobacter pylori
Gastritis, Helicobacter pylori

## 2022-10-11 NOTE — PROGRESS NOTE ADULT - NUTRITIONAL ASSESSMENT
This patient has been assessed with a concern for Malnutrition and has been determined to have a diagnosis/diagnoses of Moderate protein-calorie malnutrition.    This patient is being managed with:   Diet Regular-  Supplement Feeding Modality:  Oral  Ensure Plus High Protein Cans or Servings Per Day:  1       Frequency:  Daily  Entered: Oct  4 2022  4:54PM    

## 2022-10-11 NOTE — PROGRESS NOTE ADULT - PROBLEM SELECTOR PLAN 4
-likely contributing to weakness  -appreciate renal recs  -possibly SIADH  -hypertonic saline 1.5% as needed  -avoid correcting more than 6-8meq in 24 hrs
-likely contributing to weakness  -appreciate renal recs  -possibly SIADH  -hypertonic saline 1.5% as needed  -avoid correcting more than 6-8meq in 24 hrs
-likely contributing to weakness  -appreciate renal recs  -possibly SIADH  -cont hypertonic saline 1.5% @ 50cc for 5 hours. Monitor BMP q6h.  -avoid correcting more than 6-8meq in 24 hrs
-likely contributing to weakness  -appreciate renal recs  -possibly SIADH  -hypertonic saline 1.5% as needed  -avoid correcting more than 6-8meq in 24 hrs
-Pt p/w weakness beginning in the arms and spreading to legs. With significant proximal muscle weakness on exam. Also with associated hoarse voice and dysphagia  -CT head wnl  -appreciate neuro recs   -given neck pain on exam, c/f pathology in the neck causing cord compression. CT cervical spine limited but no obvious masses noted. MRI of the spine ordered. Called several numbers to expedite study, no response   -ddx includes GBS, myasthenia gravis less likely botulism   -treating empirically with decadron  -MRI spine   -IVIG   -autoimmune panel   -antiganglioside Ab
-Pt p/w weakness beginning in the arms and spreading to legs. With significant proximal muscle weakness on exam. Also with associated hoarse voice and dysphagia  -CT head wnl  -appreciate neuro recs   -given neck pain on exam, c/f pathology in the neck causing cord compression. CT cervical spine limited but no obvious masses noted. MRI of the spine ordered. Called several numbers to expedite study, no response   -ddx includes GBS, myasthenia gravis less likely botulism   -treating empirically with decadron  -MRI spine   -IVIG   -autoimmune panel   -antiganglioside Ab
-likely contributing to weakness  -appreciate renal recs  -possibly SIADH  -hypertonic saline 1.5% as needed  -avoid correcting more than 6-8meq in 24 hrs
-likely contributing to weakness  -appreciate renal recs  -possibly SIADH  -hypertonic saline 1.5% as needed  -avoid correcting more than 6-8meq in 24 hrs
-likely contributing to weakness  -appreciate renal recs  -hypertonic saline 1.5% as needed  -avoid correcting more than 6-8meq in 24 hrs
-Pt p/w weakness beginning in the arms and spreading to legs. With significant proximal muscle weakness on exam. Also with associated hoarse voice and dysphagia  -CT head wnl  -appreciate neuro recs   -given neck pain on exam, c/f pathology in the neck causing cord compression. CT cervical spine limited but no obvious masses noted. MRI of the spine ordered. Called several numbers to expedite study, no response   -ddx includes GBS, myasthenia gravis less likely botulism   -treating empirically with decadron  -MRI spine   -IVIG   -autoimmune panel   -antiganglioside Ab
-likely contributing to weakness  -appreciate renal recs  -possibly SIADH  -hypertonic saline 1.5% as needed  -avoid correcting more than 6-8meq in 24 hrs
-likely contributing to weakness  -appreciate renal recs
-Pt p/w weakness beginning in the arms and spreading to legs. With significant proximal muscle weakness on exam. Also with associated hoarse voice and dysphagia  -CT head wnl  -appreciate neuro recs   -given neck pain on exam, c/f pathology in the neck causing cord compression. CT cervical spine limited but no obvious masses noted. MRI of the spine ordered. Called several numbers to expedite study, no response   -ddx includes GBS, myasthenia gravis less likely botulism   -treating empirically with decadron  -MRI spine   -IVIG   -autoimmune panel   -antiganglioside Ab

## 2022-10-11 NOTE — PROGRESS NOTE ADULT - PROBLEM SELECTOR PLAN 9
DVT ppx: SC heparin      D/c planing

## 2022-10-17 PROBLEM — C90.00 MULTIPLE MYELOMA NOT HAVING ACHIEVED REMISSION: Chronic | Status: ACTIVE | Noted: 2022-09-25

## 2022-10-17 LAB — OLIGOCLONAL BANDS CSF ELPH-IMP: SIGNIFICANT CHANGE UP

## 2022-10-26 LAB
CULTURE RESULTS: SIGNIFICANT CHANGE UP
SPECIMEN SOURCE: SIGNIFICANT CHANGE UP

## 2022-11-04 ENCOUNTER — NON-APPOINTMENT (OUTPATIENT)
Age: 72
End: 2022-11-04

## 2022-11-04 ENCOUNTER — APPOINTMENT (OUTPATIENT)
Dept: GASTROENTEROLOGY | Facility: CLINIC | Age: 72
End: 2022-11-04

## 2022-11-04 DIAGNOSIS — K29.80 DUODENITIS W/OUT BLEEDING: ICD-10-CM

## 2022-11-04 DIAGNOSIS — B96.81 DUODENITIS W/OUT BLEEDING: ICD-10-CM

## 2022-11-04 DIAGNOSIS — K27.9 PEPTIC ULCER, SITE UNSPECIFIED, UNSPECIFIED AS ACUTE OR CHRONIC, W/OUT HEMORRHAGE OR PERFORATION: ICD-10-CM

## 2022-11-04 DIAGNOSIS — B96.81 PEPTIC ULCER, SITE UNSPECIFIED, UNSPECIFIED AS ACUTE OR CHRONIC, W/OUT HEMORRHAGE OR PERFORATION: ICD-10-CM

## 2022-11-04 PROCEDURE — 99213 OFFICE O/P EST LOW 20 MIN: CPT

## 2022-11-04 PROCEDURE — 99203 OFFICE O/P NEW LOW 30 MIN: CPT

## 2022-11-04 NOTE — ASSESSMENT
[FreeTextEntry1] : Pt. is a 71y/o M with PMH of MM, CAD s/p stents,  mild to moderate aortic stenosis, HLD, HTN, recent admission to [facility] with melena, found to have multiple gsatric ulcers and one 15mm duodenal bulb ulcer with visible vessel which was not intervened upon here for h.pylori treatment. It was found on pathology from his recent EGD. He was given treatment for h. pylori , he thinks it was quadruple therapy, either way he took the regimen for 14 days. He denies any symptoms now. He thinks his stool was tested for microscopic blood and was negative. He denies headaches, weight loss. He still has leg weakness. He has good appetite. He has occasional gas.\par \par He has had a colonoscopy 3-4 years ago, per his daughter, there were no polyps, so he doesn’t need another colonoscopy.\par \par We will perform another EGD to ensure healing of his gastric ulcer  and ensure eradication of his h pylori. We will do his procedure at [facility] endo after pre op clearance. He doesn’t take anti coagulants, he can resume his baby ASA if his EGD is normal\par \par Rahul Travis MD\par [facility]

## 2022-11-04 NOTE — HISTORY OF PRESENT ILLNESS
[FreeTextEntry1] : Pt. is a 73y/o M with PMH of MM, recent admission to [facility] with melena, found to have multiple gsatric ulcers and one 15mm duodenal bulb ulcer with visible vessel which was not intervened upon here for h.pylori treatment. It was found on pathology from his recent EGD. He was given treatment for h. pylori , he thinks it was quadruple therapy, either way he took the regimen for 14 days. He denies any symptoms now. He thinks his stool was tested for microscopic blood and was negative. He denies headaches, weight loss. He still has leg weakness. He has good appetite. He has occasional gas.\par \par I spoke with his daughter on the phone to obtain additional information and to inform her of the plan. \par \par Daughter Cell: [phone], Name is Terri.

## 2022-11-04 NOTE — PHYSICAL EXAM
[Alert] : alert [Normal Voice/Communication] : normal voice/communication [Normal] : normal bowel sounds, non-tender, no masses, soft, no no hepato-splenomegaly

## 2022-11-16 LAB
CULTURE RESULTS: SIGNIFICANT CHANGE UP
SPECIMEN SOURCE: SIGNIFICANT CHANGE UP

## 2022-11-19 LAB
CULTURE RESULTS: SIGNIFICANT CHANGE UP
SPECIMEN SOURCE: SIGNIFICANT CHANGE UP

## 2022-11-22 ENCOUNTER — OUTPATIENT (OUTPATIENT)
Dept: OUTPATIENT SERVICES | Facility: HOSPITAL | Age: 72
LOS: 1 days | Discharge: HOME | End: 2022-11-22

## 2022-11-22 DIAGNOSIS — Z95.5 PRESENCE OF CORONARY ANGIOPLASTY IMPLANT AND GRAFT: Chronic | ICD-10-CM

## 2022-11-22 DIAGNOSIS — C90.00 MULTIPLE MYELOMA NOT HAVING ACHIEVED REMISSION: ICD-10-CM

## 2022-11-23 ENCOUNTER — APPOINTMENT (OUTPATIENT)
Dept: HEMATOLOGY ONCOLOGY | Facility: CLINIC | Age: 72
End: 2022-11-23

## 2022-11-23 ENCOUNTER — RESULT REVIEW (OUTPATIENT)
Age: 72
End: 2022-11-23

## 2022-11-23 VITALS
HEIGHT: 64.29 IN | RESPIRATION RATE: 16 BRPM | DIASTOLIC BLOOD PRESSURE: 70 MMHG | OXYGEN SATURATION: 99 % | TEMPERATURE: 97.3 F | HEART RATE: 88 BPM | SYSTOLIC BLOOD PRESSURE: 155 MMHG | BODY MASS INDEX: 20.7 KG/M2 | WEIGHT: 121.23 LBS

## 2022-11-23 LAB
BASOPHILS # BLD AUTO: 0.06 K/UL — SIGNIFICANT CHANGE UP (ref 0–0.2)
BASOPHILS NFR BLD AUTO: 0.9 % — SIGNIFICANT CHANGE UP (ref 0–2)
EOSINOPHIL # BLD AUTO: 0.15 K/UL — SIGNIFICANT CHANGE UP (ref 0–0.5)
EOSINOPHIL NFR BLD AUTO: 2.2 % — SIGNIFICANT CHANGE UP (ref 0–6)
HCT VFR BLD CALC: 32.9 % — LOW (ref 39–50)
HGB BLD-MCNC: 10.8 G/DL — LOW (ref 13–17)
IMM GRANULOCYTES NFR BLD AUTO: 0.3 % — SIGNIFICANT CHANGE UP (ref 0–0.9)
LYMPHOCYTES # BLD AUTO: 1.51 K/UL — SIGNIFICANT CHANGE UP (ref 1–3.3)
LYMPHOCYTES # BLD AUTO: 22.5 % — SIGNIFICANT CHANGE UP (ref 13–44)
MCHC RBC-ENTMCNC: 30.9 PG — SIGNIFICANT CHANGE UP (ref 27–34)
MCHC RBC-ENTMCNC: 32.8 G/DL — SIGNIFICANT CHANGE UP (ref 32–36)
MCV RBC AUTO: 94.3 FL — SIGNIFICANT CHANGE UP (ref 80–100)
MONOCYTES # BLD AUTO: 0.69 K/UL — SIGNIFICANT CHANGE UP (ref 0–0.9)
MONOCYTES NFR BLD AUTO: 10.3 % — SIGNIFICANT CHANGE UP (ref 2–14)
NEUTROPHILS # BLD AUTO: 4.28 K/UL — SIGNIFICANT CHANGE UP (ref 1.8–7.4)
NEUTROPHILS NFR BLD AUTO: 63.8 % — SIGNIFICANT CHANGE UP (ref 43–77)
NRBC # BLD: 0 /100 WBCS — SIGNIFICANT CHANGE UP (ref 0–0)
PLATELET # BLD AUTO: 198 K/UL — SIGNIFICANT CHANGE UP (ref 150–400)
RBC # BLD: 3.49 M/UL — LOW (ref 4.2–5.8)
RBC # FLD: 13.2 % — SIGNIFICANT CHANGE UP (ref 10.3–14.5)
WBC # BLD: 6.71 K/UL — SIGNIFICANT CHANGE UP (ref 3.8–10.5)
WBC # FLD AUTO: 6.71 K/UL — SIGNIFICANT CHANGE UP (ref 3.8–10.5)

## 2022-11-23 PROCEDURE — 99214 OFFICE O/P EST MOD 30 MIN: CPT

## 2022-11-23 RX ORDER — VALACYCLOVIR 500 MG/1
500 TABLET, FILM COATED ORAL
Qty: 30 | Refills: 4 | Status: COMPLETED | COMMUNITY
Start: 2019-09-03 | End: 2022-11-23

## 2022-11-23 NOTE — HISTORY OF PRESENT ILLNESS
[Disease:__________________________] : Disease: [unfilled] [Treatment Protocol] : Treatment Protocol [de-identified] : As per Dr Kilgore's note on 8/18/2020\par \par "Mr Rand was referred to my office for newly diagnosed IgG kappa multiple myeloma. The patient's primary language is Mongolian, he has his daughter Mirlande as the , per his wishes. He was seen by Dr. Andrés Valle (hematology) in Feb 2019 and had a BM bx showing 10-15% plasma cells, concerning for smoldering myeloma. According to the daughter, he was awaiting insurance approval for imaging studies. He was admitted from 8/18/19 at [facility] where he presented with pain in the L leg/lower back, worsening for the past 2-3 months. On labs, he was found to have a total protein of 10, Ca level 12 until 8/29/19. Dental consult was called and patient needs to have 10 teeth extracted -thus, IV bisphosphonates were not given, pt improved with hydration. He also had a slightly inc'ed creatinine -improved after IVF. CT C/A/P 8/18/19 showed a lytic expansile soft tissue lesion centered in the manubrium measuring approximately 7.3 x 3.4 x 4.3 cm, invading both 1st ribs. There was also a A lytic expansile soft tissue lesion in the T8 vertebral body causing mass effect on spinal canal and obliterating left neural foramina at T7-8 and T8-9. ON 8/22/19 an MRI thoracic spine was done showing multiple areas of tumor involvement within the thoracic spine in keeping with the patient's history of multiple myeloma. Prominent lesion within C7 on the right incompletely seen.\par Large lesion within T8 on the left producing epidural tumor extension and moderate narrowing of the spinal canal with mild flattening of the spinal cord. He was evaluated on 8/22/19 by Dr. Foley (Rad Onc) and was given 5 fractions of XRT from 8/23/19 to 8/29/19 to C7 and T8. He was discharged home on 8/29/19 with follow up in the outpatient office, and on Dexamethasone 4mg po daily. \par \par  \par \par \par \par Interval History: The patient is being followed for IgG kappa MM with multiple bone lytic lesions, hypercalcemia (resolved) and mild anemia. He started Velcade/Dexa weekly on 9/919. The patient got the Revlimid and started it C1 day 1 on 10/21/19 and hed been tolerating it well. \par \par Starting in February, his blood counts became low -plt count 54, Hb 8.3 wbc 5.8. Revlimid was held -after 2/3/20. He got 1 shot Velcade/Dexa on 2/3/20, NO chemo since then. \par BM done on 3/4/20 to eval for residual disease (MM) vs. MDS. BM showed recovering marrow elements, plasma cells <5%. SPEP/MICAH from 3/9/20 showed NO monoclonal proteins, c/w CR. \par \par The PET scan from 5/7/20 showed L uptake maxillary -pt reports that he had dentures done in Jan 2020 and has discomfort from them. He did not go to the dentist during COVID Rib fractures -pt denied falls/trauma. Patient has dental appointment for next week. He has some discomfort in the upper teeth b/l. He also missed his Velcade SQ last week 'i did not know if i am supposed to continue it.' He has no neuropathy from it, tolerating well. "\par  [de-identified] : RISS- II [FreeTextEntry1] : s/p RT to C7/T8 spine\par RVD 9/2019 -6/2020 transitioned to maintenance velcade q2wk started 6/19/2020 [de-identified] : Oct 6, 2020\par Pt is here for initial visit with me. He has his dtr on the phone, prefers her included in conversation and declined an . Pt is feeling well. He is tolerating monthly zometa and maintenance velcade w/o any adverse effects. Last set of myeloma labs done in August showed stable remission. He is requesting refill of several meds today.\par ---------------------------------\par \par 1/25/2021\par Patient presents for a follow up. He has no complaints except infrequent short duration chills with no fever. He is on biweekly Velcade and monthly Zometa. His last treatment of Zometa was on 1/8/2021 and last Velcade on 1/22/2021. His most recent SPEP/MICAH was normal.   \par \par 6/10/2021\par Patient seen in follow up. He did not go to his home country as he planned. He does not endorse any big change in his health status. She saw his Kidney doctor who told him he is iron deficient (labs are not supportive). His anemia is multifactorial including CKD. His most recent myeloma labs (03/2021) show continued CR. \par \par 9/1/2021\par Mr Rand returns for a follow up. His daughter is accompanying him. He has no complaints. He reports that he develops diarrhea (one motion) the day after his Velcade treatment. He plans to visit his home town soon. He has some unavoidable things to take care of. Of note he has been in sCR. \par \par 1/19/2022\par Patient seen in follow up. He has done well in the interim. He also visited his home country in this interval. He was safe all along.  As regards to his disease, he is stable and appears to be in remission. He has no complaints.  \par \par 11/23/2022\par Patient presents to clinic after being lost to follow-up; went to his country for an extended visit from 3/10 - 9/8/2022. Was on maintenance velcade [id number] starting 6/19/2020, last dose was on 3/3/22. Was on monthly Zometa since 11/2019, last dose of zometa was on 2/17/2022. In the interim, he developed severe weakness and was hospitalized at [facility] (9/24-10/11/22) for Acute respiratory failure with hypoxia, and severe sepsis due to GBS. Was discharged to rehab, and as of yesterday he was discharged from rehab. Will now be doing PT at home. During inpatient work up, he was found to have diffuse lytic lesions noted in C/T/L spine CT scans (9/25/22) which were identified as "Multiple mixed lytic sclerotic lesions may be sequela of previously treated multiple myeloma". MRI of the C/T/L spine were done for follow up revealing an "abnormal signal involving T8 vertebral body which may be related to multiple myeloma". Was seen by neurosurgery, no acute intervention. In patient immunofixation did not reveal a monoclonal band; SPEP WNL; kappa/lambda ratio not diagnostic. Today he feels at baseline, but notes residual weakness from GBS. Patient denies bone pain, fever, chills, night sweats, back pain, headache, or peripheral neuropathy. Good appetite, stable weight.\par

## 2022-11-23 NOTE — RESULTS/DATA
[FreeTextEntry1] : 1/19/2022\par Stable myeloma labs with normal range values.Mild high Lappa lambda with normal ratio is likely due to his renal failure. \par MIld to moderate anemia is stable. \par \par 9/1/2021\par Last PCN labs in 06/2021 showed no M-spike, M-band, Normal FLCR.\par \par 6/9/2021\par Last Myeloma labs in 03/2021 showed no M-spike, M-band, Normal FLCR. \par \par 1/25/2021\par Most recent SPEP / MICAH (1/8/2021) No monoclonal band \par \par ------------------------------------------------------------------------------------------\par   PET/CT Whole Body Oncology FDG             Final\par \par No Documents Attached\par \par \par \par \par   EXAM:  PETCT WB ONC FDG SUBS\par \par \par PROCEDURE DATE:  05/07/2020\par \par \par \par INTERPRETATION:  PROCEDURE:  PET/CT WHOLE BODY\par \par RADIOPHARMACEUTICAL:  9.97 mCi F-18, FDG, I.V.\par \par CLINICAL INFORMATION:  70-year-old male referred for follow-up of multiple myeloma on Zometa. Bone marrow biopsy done 3/4/2020 showed less than 5% plasma cells and repeat myeloma labs done on 3/9/2020, showed CT are. PET/CT is done as part of the subsequent treatment strategy evaluation.\par \par TECHNIQUE:  Fasting blood sugar measured prior to injection of radiopharmaceutical was 116 mg/dl. Following intravenous injection of the radiopharmaceutical and an uptake period of approximately 60 minutes, FDG-PET/CT was obtained on a  Sols Discovery 710 from the vertex of the skull to the toes. Oral contrast was given during the uptake period. CT was performed during shallow respiration. The CT protocol was optimized for PET attenuation correction and to provide anatomic detail for localization of PET abnormalities. The CT protocol was not designed to produce and cannot replace state-of-the-art diagnostic CT images with specific imaging protocols for different body parts and indications. Images were reviewed on a dedicated workstation using multiplanar reconstruction.\par \par The standardized uptake values (SUV) are normalized to patient body weight and indicate the highest activity concentration (SUVmax) in a given disease site. All image numbers refer to the axial image number.\par \par COMPARISON:  FDG PET/CT dated 9/7/2019.\par \par OTHER STUDIES USED FOR CORRELATION: MRI of cervical spine dated 9/9/2019.\par \par FINDINGS:\par \par HEAD/NECK: Physiologic FDG activity in the brain.\par \par Mild hypermetabolism in left masseter muscle, decreased as compared to prior study, may be secondary to bruxism. Elongated focus of increased FDG activity in left lower cervical region may reflect inflammation or muscle spasm (image 77).\par \par New hypermetabolic lucency in left maxillary alveolus is nonspecific (SUV 8.0; image 53). Recommend correlation with dental exam. A new small hypermetabolic focus is seen in the right maxilla (SUV 3.7; image 55).\par \par CHEST: Few new small hypermetabolic foci in bilateral hilar region likely correspond to small lymph nodes that are difficult to delineate on CT. For reference, left perihilar region demonstrates SUV 3.6 (image 117). Resolution of FDG-avid subcentimeter left posterior paraesophageal lymph node.\par \par LUNGS: No abnormal FDG activity. Calcified right lower lobe granuloma, unchanged (image 128).\par \par PLEURA/PERICARDIUM: No abnormal FDG activity. No effusion.\par \par HEPATOBILIARY/PANCREAS: Physiologic FDG activity. Liver SUV mean is 2.1; previous 2.3.\par \par SPLEEN: No abnormal FDG activity. Normal in size.\par \par ADRENAL GLANDS: No abnormal FDG activity. No nodule.\par \par KIDNEYS/URINARY BLADDER: Physiologic FDG activity. Multiple bilateral renal cysts, measuring up to 4.6 cm on the right, unchanged.\par \par PELVIC ORGANS: No abnormal FDG activity.\par \par ABDOMINOPELVIC NODES: No enlarged or FDG avid lymph node\par \par BOWEL/PERITONEUM/MESENTERY: Physiologic FDG activity. Resolution of hypermetabolism in distal esophagus/GE junction.\par \par BONES: Near total resolution of previously seen hypermetabolic foci in the axial skeleton, right humerus, and proximal bilateral femurs. Previously seen lytic lesions demonstrate new areas of sclerosis on CT. For reference, one of the most FDG-avid residual lesions is a mixed lytic and sclerotic lesion in the left scapula which demonstrates SUV 3.0 (image 98); previously lytic with SUV 5.7. Previously seen hypermetabolic lesion in right sphenoid bone has resolved. Previously seen large destructive lesion in the manubrium the sternum demonstrates new sclerosis with SUV 3.0 (image 103); previous SUV 5.3. Resolution of hypermetabolic intramedullary soft tissue in proximal right humerus. Near total resolution of hypermetabolic intramedullary soft tissue in proximal left femur (SUV 1.3; image 273; previous SUV 5.3).\par \par FDG-avid fracture in left inferior pubic ramus demonstrates increased callus formation and is similar in metabolism (SUV 3.8; image 250; previous SUV 3.1). Few new mildly FDG-avid bilateral rib fractures. For reference, fracture in the anterior aspect of the left second rib demonstrates SUV 3.4 (image 105).\par \par Resolution of hypermetabolism in left palmar musculature.\par \par IMPRESSION:  Abnormal whole body FDG-PET/CT scan.\par \par 1. Near total resolution of hypermetabolism associated with lytic and intramedullary lesions in the axial and appendicular skeleton as compared to prior study dated 9/7/2019. Previously seen lytic lesions demonstrate new sclerosis. Findings are compatible with response to interval therapy.\par \par 2. Few new mildly FDG-avid bilateral rib fractures. An FDG-avid fracture in the left inferior pubic ramus demonstrates increased callus formation and is similar in metabolism.\par \par 3. New hypermetabolic lucency in left maxillary alveolus and new small hypermetabolic focus in right maxilla are nonspecific. Recommend correlation with dental exam.\par \par 4. Few new mildly FDG-avid nonspecific bilateral hilar lymph nodes.\par \par 5. Resolution of many specific hypermetabolism in distal esophagus/GE junction.\par \par \par \par \par \par \par \par \par \par \par \par \par \par \par \par LUANA RAMOS M.D., NUCLEAR MEDICINE ATTENDING\par This document has been electronically signed. May  7 2020  4:08PM\par \par  \par \par  Ordered by: OZZY ALMEIDA       Collected/Examined: 07May2020 12:57PM       \par Verified by: OZZY ALMEIDA 11May2020 09:27AM       \par  Result Communication: Call patient with results,Discussed results with patient;\par Stage: Final       \par  Performed at: [facility] at the [facility]       Resulted: 07May2020 04:11PM       Last Updated: 11May2020 09:27AM       Accession: [id number]       \par \par \par  Pathology             Final\par \par No Documents Attached\par \par \par \par \par   [facility] Accession Number : [id number]\par \par HARDEEP, SUBASH                          2\par \par \par \par Cytopathology Report\par \par \par \par \par \par Final Diagnosis\par SOFT TISSUE, SACRUM, CT GUIDED CORE BIOPSY\par Plasma cell neoplasm\par See note and description.\par \par Diagnostic note:  Overall the morphologic and immunophenotypic\par findings are consistent with plasma cell neoplasm. The\par differential diagnosis includes plasmacytoma vs plasma cells\par myeloma. Correlation with laboratory, radiologic and clinical\par findings is required for further classification.\par \par Microscopic description:\par The touch prep slides are composed of numerous enlarged plasma\par cells with prominent nucleoli, occasional binucleated forms are\par seen.\par \par Histologic sections consist of multiple needle shaped cores of\par soft tissue showing proliferation of enlarged plasma cells with\par prominent nucleoli, forming sheets.\par \par Immunohistochemistry:  , kappaISH, lambdaISH, cyclinD1, p53\par stains performed on paraffin embedded section of block 1C.\par \par Neoplastic cells are:\par Positive:  , KappaISH, p53\par Negative:  LambdaISH, cyclinD1\par \par Flow cytometry analysis (17-PO-): Monotypic plasma cells\par (24.1% of total analyzed cells) are present. There are two\par aberrant monoclonal plasma cell populations:\par Population #1 (16.5% of total analyzed cells, 68.6% of plasma\par cell population) positive for cytoplasmic kappa, CD38 dimmer,\par CD45 dimmer, CD56, ; negative for , CD19, CD20.\par Population # 2 (7.6% of total analyzed cells, 31.4% of plasma\par cell population) positive for cytoplasmic kappa\par \par \par \par \par \par \par HARDEEP, SUBASH                          2\par \par \par \par Cytopathology Report\par \par \par \par \par (brighter), , CD38 brighter, CD45 dim, CD56 (brighter),\par ; negative for CD19, CD20.\par \par Screened by: Jose Juan Smith CT(ASCP)\par Verified by: Orlando Spicer MD\par (Electronic Signature)\par Reported on: 08/22/19 13:22 EDT, 6 [facility] [address]\par Cytology technical processing performed at [address] [place] [id number]\par _________________________________________________________________\par \par \par Specimen(s) Submitted\par SOFT TISSUE, SACRUM, CT GUIDED CORE BIOPSY\par \par \par Statement of Adequacy\par Immediate cytologic study for adequacy of specimen using a Diff-\par Quik stain was performed at [facility], [address] [place] [address]. Touch\par imprints acceptable for further evaluation by Jose Juan Smith\par CT(ASCP).\par \par \par Clinical Information\par Sacral mass.\par \par \par Gross Description\par Core Biopsy:\par Tissue collected: Specimen container has been inspected to\par confirm patient?s name and date of birth, contains 3  tan cores\par measuring 1.0, 1.0, 0.8 cm in length (and multiple fragments).\par Entire specimen submitted in 2 cassette(s) labeled 1B and 1 C.\par \par 4 Touch prep slides ( 2 air dried + 2 fixed)\par \par FNA:\par No material submitted for cell block (No block 1A)\par \par Prepared:\par 4 Touch Prep,\par 1 core biopsy labeled 1B\par 1 core biopsy labeled 1C\par 6 Total slides\par \par  \par \par  Ordered by: DELORES MOSS       Collected/Examined: [id number] 03:14PM       \par Verified by: OZZY ALMEIDA [id number] 03:07PM       \par  Result Communication: No patient communication needed at this time;\par Stage: Final       \par  Performed at: [facility]       Resulted: [id number] 01:22PM       Last Updated: 18Jun2020 03:07PM       Accession: [id number]       \par \par \par  Pathology             Final\par \par No Documents Attached\par \par \par \par \par   [facility] Accession Number : [id number]\par \par HARDEEP, SUBASH                          4\par \par \par \par Addendum Report\par \par \par \par \par Hematopathology Addendum\par Comprehensive Hematopathology Report\par \par Final Diagnosis:\par 1, 2. Bone marrow biopsy and bone marrow aspirate\par - Plasma cell myeloma (10% with focal 25% by  stain)\par - Slight erythroid predominant trilineage hematopoiesis\par with maturation\par \par Diagnostic Note:\par Please note findings of a normal male karyotype and a negative\par multiple myeloma FISH panel. Based on the additional findings,\par the diagnosis has not changed. Correlation with studies for\par myeloma-related organ dysfunction is necessary.\par \par Morphology:\par Microscopic description:\par 1. Biopsy: Sections of bone marrow biopsy show normocellularity\par (60 to 70% cellularity), mild plasmacytosis with foci of small\par clusters, slight erythroid predominant trilineage hematopoiesis\par with maturation, mild megakaryocytosis with normal morphology,\par and increased iron stores.\par 2. Aspirate: Cellular spicules are not present, precluding\par differential count. Review of the smear shows some maturing and\par predominant mature myeloid cells, a few erythroid elements, and\par rare megakaryocytes.\par \par Ancillary Studies:\par Bone marrow aspirate iron stain: No spicules are present to\par evaluate for iron stores and there are insufficient nRBC to\par evaluate for ring sideroblasts.\par Congo red stain is negative for amyloidosis.\par Flow cytometry:  Monotypic plasma cells (1% of cells), positive\par for cytoplasmic kappa, CD38, , dimmer CD45, partial CD56;\par negative CD19, CD20, .\par CD45/side scatter shows no significant blast population. There is\par no increase in CD34,  or CD14 positive cells.\par Lymphocytes (8% of cells) show a heterogeneous population of T-\par cells (with normal CD4 to CD8 ratio), and polytypic B-cells.\par Immunohistochemical stains ( performed on the block 1A and\par 1B, cyclin-D1 performed on block 1A):   stains show overall\par approximately 10% plasma cells, with foci of small clusters,\par focal up to 25%. Plasma cells are negative for cyclin-D1.\par \par Cytogenetics:\par Result: Normal male karyotype\par Karyotype: 46,XY[20]\par \par \par \par \par \par \par HARDEEP, SUBASH                          4\par \par \par \par Addendum Report\par \par \par \par \par FISH:\par Result: NORMAL FISH -\par - MULTIPLE MYELOMA PANEL\par Probe(s) and Location(s): FGFR3(4p16), CCND1(11q13), RB1(13q14),\par IGH(14q32), MAF(16q23), TP53(17p13.1)\par \par Clinical History/Data:\par New diagnosis multiple myeloma\par CBC on 08/24/2019: WBC: 8.09 K/uL, HGB: 9.5 g/dL, MCV: 90 fl,\par Plt: 227 K/uL\par Serum Immunofixation: IgG kappa\par \par Verified by: Nancy Farris M.D.\par (Electronic Signature)\par Reported on: 09/07/19 10:37 EDT, [address] [place], [place]\par [address]\par _________________________________________________________________\par \par \par Hematopathology Report\par \par \par \par \par Final Diagnosis\par 1, 2. Bone marrow biopsy and bone marrow aspirate\par - Plasma cell myeloma (10% with focal 25% by  stain)\par - Slight erythroid predominant trilineage hematopoiesis\par with maturation\par \par See note and description.\par \par Diagnostic note:\par Correlation with studies for myeloma-related organ dysfunction is\par necessary.\par Comprehensive report with results of pending ancillary studies to\par follow.\par \par Ancillary studies\par Bone marrow aspirate iron stain: No spicules are present to\par evaluate for iron stores and there are insufficient nRBC to\par evaluate for ring sideroblasts.\par \par Flow cytometry:  Monotypic plasma cells (1% of cells), positive\par for cytoplasmic kappa, CD38, , dimmer CD45, partial CD56;\par negative CD19, CD20, .\par CD45/side scatter shows no significant blast population. There is\par no increase in CD34,  or CD14 positive cells.\par \par \par \par \par \par \par HARDEEP, SUBASH                          4\par \par \par \par Hematopathology Report\par \par \par \par \par Lymphocytes (8% of cells) show a heterogeneous population of T-\par cells (with normal CD4 to CD8 ratio), and polytypic B-cells.\par \par Immunohistochemical stains ( performed on the block 1A and\par 1B, cyclin-D1 performed on block 1A):   stains show overall\par approximately 10% plasma cells, with foci of small clusters,\par focal up to 25%. Plasma cells are negative for cyclin-D1.\par \par Microscopic description:\par 1. Biopsy: Sections of bone marrow biopsy show normocellularity\par (60 to 70% cellularity), mild plasmacytosis with foci of small\par clusters, slight erythroid predominant trilineage hematopoiesis\par with maturation, mild megakaryocytosis with normal morphology,\par and increased iron stores.\par \par 2. Aspirate: Cellular spicules are not present, precluding\par differential count. Review of the smear shows some maturing and\par predominant mature myeloid cells, a few erythroid elements, and\par rare megakaryocytes.\par \par Comment:  Iron stain (examined to evaluate for iron stores; see\par microscopic description) and Giemsa stain (shows appropriate\par staining pattern) are performed and evaluated on block(s): 1A,\par 1B.\par \par Slide(s) with built in immunohistochemical study control(s)\par associated and negative control with this case has/have been\par verified by the sign out pathologist.\par For slide(s) without built in controls positive control slides\par has/have been reviewed and approved by immunohistochemistry lab\par These immunohistochemical/ in-situ hybridization tests have been\par developed and their performance characteristics determined by\par [facility], Department of\par Pathology, Division of Immunopathology, [address] [place] [address]\[place] [address].  It has not been cleared or approved by the\par U.S. Food and Drug Administration.  The FDA has determined that\par such clearance or approval is not necessary.  This test is used\par for clinical purposes.  The laboratory is certified under the\par CLIA-88 as qualified to perform high\par complexity clinical testing.\par \par Verified by: Nancy Farris M.D.\par (Electronic Signature)\par Reported on: 08/27/19 10:00 EDT, [address] [place] [address]\par [id number]\par _________________________________________________________________\par \par \par \par \par \par \par \par HARDEEP, SUBASH                          4\par \par \par \par Hematopathology Report\par \par \par \par \par Clinical History\par New diagnosis multiple myeloma\par CBC on 08/24/2019: WBC: 8.09 K/uL, HGB: 9.5 g/dL, MCV: 90 fl,\par Plt: 227 K/uL\par Serum Immunofixation: IgG kappa\par \par Specimen(s) Submitted\par 1     Bone marrow biopsy left\par 2     Bone marrow aspirate\par \par Gross Description\par 1. The specimen is received in bouin's fixative and the specimen\par container is labeled: Left bone marrow biopsy.  It consists of a\par 0.5 x 0.2 cm bone marrow core with a 1.5 x 1.3 x 0.5 cm blood\par clot.  Entirely submitted.  Two cassettes: A = bone marrow core;\par B = blood clot.\par 2. The specimen is received labeled with patient's name and\par consists of 3 air dried slide(s). 2 slide(s) stained with Monzon\par Giemsa stain. 1 stained for iron stain.\par In addition to other data that may appear on the specimen\par container, the label has been inspected to confirm the presence\par of the patient's name and date of birth.\par Catarina ANAYA Brian 08/22/2019 17:12\par \par  \par \par  Ordered by: BECKY BILLS       Collected/Examined: [id number] 03:50PM       \par Verified by: BECKY BILLS [id number] 05:42PM       \par  Result Communication: No patient communication needed at this time;\par Stage: Final       \par  Performed at: [facility]       Resulted: [id number] 10:37AM       Last Updated: [id number] 05:42PM       Accession: [id number]

## 2022-11-23 NOTE — ASSESSMENT
[FreeTextEntry1] : 72 year-old Mr. MEIER is seen  in follow up for IgG Kappa Multiple Myeloma presented with lytic percy lesions, diagnosed in 9/2019. He received XRT to percy lesions,was started on RVD induction, achieved VGPR. Last PET 5/2020 - markedly improved and reduced FDG avidity throughout. His most recent SPEP/MICAH and FLCR (9/25/2022) was normal, done inpatient. 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 Will repeat in 3 months from today (11/23/22) as he has not received any myeloma treatment since his last dose of Velcade on 3/3/22. \par - Consider repeat PET scan\par - Restart treatment regimen with Velcade weekly, and Dex 40mg weekly. \par - Continue other ppx medications (Acyclovir, ASA) \par - Zometa q monthly (started 11/2019); will consider role of Zometa after a total of 24 doses (2 years) is achieved.\par - Follow up monthly\par \par # Anemia \par - Multifactorial origin including CKD \par - WIll do an anemia w/u for further management (Orders entered)\par - May benefit from GABRIEL \par \par Case and management discussed with Dr. Bailey

## 2022-11-25 ENCOUNTER — APPOINTMENT (OUTPATIENT)
Dept: INFUSION THERAPY | Facility: HOSPITAL | Age: 72
End: 2022-11-25

## 2022-11-25 LAB
EPO SERPL-MCNC: 17.4 MIU/ML
FERRITIN SERPL-MCNC: 124 NG/ML
FOLATE SERPL-MCNC: 10.7 NG/ML
IRON SATN MFR SERPL: 19 %
IRON SERPL-MCNC: 67 UG/DL
TIBC SERPL-MCNC: 344 UG/DL
UIBC SERPL-MCNC: 277 UG/DL
VIT B12 SERPL-MCNC: 708 PG/ML

## 2022-11-28 LAB
ALBUMIN MFR SERPL ELPH: 57.9 %
ALBUMIN SERPL ELPH-MCNC: 5.1 G/DL
ALBUMIN SERPL-MCNC: 4.6 G/DL
ALBUMIN/GLOB SERPL: 1.4 RATIO
ALP BLD-CCNC: 49 U/L
ALPHA1 GLOB MFR SERPL ELPH: 4.3 %
ALPHA1 GLOB SERPL ELPH-MCNC: 0.3 G/DL
ALPHA2 GLOB MFR SERPL ELPH: 11.3 %
ALPHA2 GLOB SERPL ELPH-MCNC: 0.9 G/DL
ALT SERPL-CCNC: 32 U/L
ANION GAP SERPL CALC-SCNC: 15 MMOL/L
AST SERPL-CCNC: 25 U/L
B-GLOBULIN MFR SERPL ELPH: 11.6 %
B-GLOBULIN SERPL ELPH-MCNC: 0.9 G/DL
BILIRUB SERPL-MCNC: 0.2 MG/DL
BUN SERPL-MCNC: 34 MG/DL
CALCIUM SERPL-MCNC: 9.9 MG/DL
CHLORIDE SERPL-SCNC: 104 MMOL/L
CO2 SERPL-SCNC: 23 MMOL/L
CREAT SERPL-MCNC: 1.16 MG/DL
DEPRECATED KAPPA LC FREE/LAMBDA SER: 1.78 RATIO
DEPRECATED KAPPA LC FREE/LAMBDA SER: 1.78 RATIO
EGFR: 67 ML/MIN/1.73M2
GAMMA GLOB FLD ELPH-MCNC: 1.2 G/DL
GAMMA GLOB MFR SERPL ELPH: 14.9 %
GLUCOSE SERPL-MCNC: 102 MG/DL
IGA SER QL IEP: 173 MG/DL
IGG SER QL IEP: 1209 MG/DL
IGM SER QL IEP: 36 MG/DL
INTERPRETATION SERPL IEP-IMP: NORMAL
KAPPA LC CSF-MCNC: 2.22 MG/DL
KAPPA LC CSF-MCNC: 2.22 MG/DL
KAPPA LC SERPL-MCNC: 3.96 MG/DL
KAPPA LC SERPL-MCNC: 3.96 MG/DL
LDH SERPL-CCNC: 172 U/L
M PROTEIN SPEC IFE-MCNC: NORMAL
POTASSIUM SERPL-SCNC: 5.3 MMOL/L
PROT SERPL-MCNC: 8 G/DL
SODIUM SERPL-SCNC: 142 MMOL/L

## 2022-12-02 ENCOUNTER — RESULT REVIEW (OUTPATIENT)
Age: 72
End: 2022-12-02

## 2022-12-02 ENCOUNTER — APPOINTMENT (OUTPATIENT)
Dept: INFUSION THERAPY | Facility: HOSPITAL | Age: 72
End: 2022-12-02

## 2022-12-02 LAB
BASOPHILS # BLD AUTO: 0.01 K/UL — SIGNIFICANT CHANGE UP (ref 0–0.2)
BASOPHILS NFR BLD AUTO: 0.1 % — SIGNIFICANT CHANGE UP (ref 0–2)
EOSINOPHIL # BLD AUTO: 0 K/UL — SIGNIFICANT CHANGE UP (ref 0–0.5)
EOSINOPHIL NFR BLD AUTO: 0 % — SIGNIFICANT CHANGE UP (ref 0–6)
HCT VFR BLD CALC: 28.2 % — LOW (ref 39–50)
HGB BLD-MCNC: 9.7 G/DL — LOW (ref 13–17)
IMM GRANULOCYTES NFR BLD AUTO: 0.4 % — SIGNIFICANT CHANGE UP (ref 0–0.9)
LYMPHOCYTES # BLD AUTO: 0.88 K/UL — LOW (ref 1–3.3)
LYMPHOCYTES # BLD AUTO: 6.2 % — LOW (ref 13–44)
MCHC RBC-ENTMCNC: 31.5 PG — SIGNIFICANT CHANGE UP (ref 27–34)
MCHC RBC-ENTMCNC: 34.4 G/DL — SIGNIFICANT CHANGE UP (ref 32–36)
MCV RBC AUTO: 91.6 FL — SIGNIFICANT CHANGE UP (ref 80–100)
MONOCYTES # BLD AUTO: 0.59 K/UL — SIGNIFICANT CHANGE UP (ref 0–0.9)
MONOCYTES NFR BLD AUTO: 4.1 % — SIGNIFICANT CHANGE UP (ref 2–14)
NEUTROPHILS # BLD AUTO: 12.7 K/UL — HIGH (ref 1.8–7.4)
NEUTROPHILS NFR BLD AUTO: 89.2 % — HIGH (ref 43–77)
NRBC # BLD: 0 /100 WBCS — SIGNIFICANT CHANGE UP (ref 0–0)
PLATELET # BLD AUTO: 193 K/UL — SIGNIFICANT CHANGE UP (ref 150–400)
RBC # BLD: 3.08 M/UL — LOW (ref 4.2–5.8)
RBC # FLD: 12.9 % — SIGNIFICANT CHANGE UP (ref 10.3–14.5)
WBC # BLD: 14.24 K/UL — HIGH (ref 3.8–10.5)
WBC # FLD AUTO: 14.24 K/UL — HIGH (ref 3.8–10.5)

## 2022-12-09 ENCOUNTER — RESULT REVIEW (OUTPATIENT)
Age: 72
End: 2022-12-09

## 2022-12-09 ENCOUNTER — APPOINTMENT (OUTPATIENT)
Dept: INFUSION THERAPY | Facility: HOSPITAL | Age: 72
End: 2022-12-09

## 2022-12-09 LAB
BASOPHILS # BLD AUTO: 0.04 K/UL — SIGNIFICANT CHANGE UP (ref 0–0.2)
BASOPHILS NFR BLD AUTO: 0.6 % — SIGNIFICANT CHANGE UP (ref 0–2)
EOSINOPHIL # BLD AUTO: 0.18 K/UL — SIGNIFICANT CHANGE UP (ref 0–0.5)
EOSINOPHIL NFR BLD AUTO: 2.6 % — SIGNIFICANT CHANGE UP (ref 0–6)
HCT VFR BLD CALC: 30.4 % — LOW (ref 39–50)
HGB BLD-MCNC: 10.4 G/DL — LOW (ref 13–17)
IMM GRANULOCYTES NFR BLD AUTO: 0.4 % — SIGNIFICANT CHANGE UP (ref 0–0.9)
LYMPHOCYTES # BLD AUTO: 1.51 K/UL — SIGNIFICANT CHANGE UP (ref 1–3.3)
LYMPHOCYTES # BLD AUTO: 21.5 % — SIGNIFICANT CHANGE UP (ref 13–44)
MCHC RBC-ENTMCNC: 31.3 PG — SIGNIFICANT CHANGE UP (ref 27–34)
MCHC RBC-ENTMCNC: 34.2 G/DL — SIGNIFICANT CHANGE UP (ref 32–36)
MCV RBC AUTO: 91.6 FL — SIGNIFICANT CHANGE UP (ref 80–100)
MONOCYTES # BLD AUTO: 0.78 K/UL — SIGNIFICANT CHANGE UP (ref 0–0.9)
MONOCYTES NFR BLD AUTO: 11.1 % — SIGNIFICANT CHANGE UP (ref 2–14)
NEUTROPHILS # BLD AUTO: 4.48 K/UL — SIGNIFICANT CHANGE UP (ref 1.8–7.4)
NEUTROPHILS NFR BLD AUTO: 63.8 % — SIGNIFICANT CHANGE UP (ref 43–77)
NRBC # BLD: 0 /100 WBCS — SIGNIFICANT CHANGE UP (ref 0–0)
PLATELET # BLD AUTO: 164 K/UL — SIGNIFICANT CHANGE UP (ref 150–400)
RBC # BLD: 3.32 M/UL — LOW (ref 4.2–5.8)
RBC # FLD: 12.7 % — SIGNIFICANT CHANGE UP (ref 10.3–14.5)
WBC # BLD: 7.02 K/UL — SIGNIFICANT CHANGE UP (ref 3.8–10.5)
WBC # FLD AUTO: 7.02 K/UL — SIGNIFICANT CHANGE UP (ref 3.8–10.5)

## 2022-12-12 DIAGNOSIS — R11.2 NAUSEA WITH VOMITING, UNSPECIFIED: ICD-10-CM

## 2022-12-12 DIAGNOSIS — Z51.11 ENCOUNTER FOR ANTINEOPLASTIC CHEMOTHERAPY: ICD-10-CM

## 2022-12-16 ENCOUNTER — RESULT REVIEW (OUTPATIENT)
Age: 72
End: 2022-12-16

## 2022-12-16 ENCOUNTER — APPOINTMENT (OUTPATIENT)
Dept: INFUSION THERAPY | Facility: HOSPITAL | Age: 72
End: 2022-12-16

## 2022-12-16 LAB
BASOPHILS # BLD AUTO: 0.03 K/UL — SIGNIFICANT CHANGE UP (ref 0–0.2)
BASOPHILS NFR BLD AUTO: 0.4 % — SIGNIFICANT CHANGE UP (ref 0–2)
EOSINOPHIL # BLD AUTO: 0.13 K/UL — SIGNIFICANT CHANGE UP (ref 0–0.5)
EOSINOPHIL NFR BLD AUTO: 1.9 % — SIGNIFICANT CHANGE UP (ref 0–6)
HCT VFR BLD CALC: 30.5 % — LOW (ref 39–50)
HGB BLD-MCNC: 10.3 G/DL — LOW (ref 13–17)
IMM GRANULOCYTES NFR BLD AUTO: 0.3 % — SIGNIFICANT CHANGE UP (ref 0–0.9)
LYMPHOCYTES # BLD AUTO: 1.41 K/UL — SIGNIFICANT CHANGE UP (ref 1–3.3)
LYMPHOCYTES # BLD AUTO: 20.9 % — SIGNIFICANT CHANGE UP (ref 13–44)
MCHC RBC-ENTMCNC: 31 PG — SIGNIFICANT CHANGE UP (ref 27–34)
MCHC RBC-ENTMCNC: 33.8 G/DL — SIGNIFICANT CHANGE UP (ref 32–36)
MCV RBC AUTO: 91.9 FL — SIGNIFICANT CHANGE UP (ref 80–100)
MONOCYTES # BLD AUTO: 0.49 K/UL — SIGNIFICANT CHANGE UP (ref 0–0.9)
MONOCYTES NFR BLD AUTO: 7.3 % — SIGNIFICANT CHANGE UP (ref 2–14)
NEUTROPHILS # BLD AUTO: 4.66 K/UL — SIGNIFICANT CHANGE UP (ref 1.8–7.4)
NEUTROPHILS NFR BLD AUTO: 69.2 % — SIGNIFICANT CHANGE UP (ref 43–77)
NRBC # BLD: 0 /100 WBCS — SIGNIFICANT CHANGE UP (ref 0–0)
PLATELET # BLD AUTO: 152 K/UL — SIGNIFICANT CHANGE UP (ref 150–400)
RBC # BLD: 3.32 M/UL — LOW (ref 4.2–5.8)
RBC # FLD: 12.6 % — SIGNIFICANT CHANGE UP (ref 10.3–14.5)
WBC # BLD: 6.74 K/UL — SIGNIFICANT CHANGE UP (ref 3.8–10.5)
WBC # FLD AUTO: 6.74 K/UL — SIGNIFICANT CHANGE UP (ref 3.8–10.5)

## 2022-12-20 ENCOUNTER — APPOINTMENT (OUTPATIENT)
Dept: NEUROLOGY | Facility: CLINIC | Age: 72
End: 2022-12-20

## 2022-12-20 VITALS
BODY MASS INDEX: 20.66 KG/M2 | HEART RATE: 92 BPM | DIASTOLIC BLOOD PRESSURE: 82 MMHG | HEIGHT: 64 IN | WEIGHT: 121 LBS | SYSTOLIC BLOOD PRESSURE: 144 MMHG

## 2022-12-20 DIAGNOSIS — Z86.69 PERSONAL HISTORY OF OTHER DISEASES OF THE NERVOUS SYSTEM AND SENSE ORGANS: ICD-10-CM

## 2022-12-20 PROCEDURE — 99214 OFFICE O/P EST MOD 30 MIN: CPT

## 2022-12-20 NOTE — HISTORY OF PRESENT ILLNESS
[FreeTextEntry1] : Yoly Rand is a 72 year old Nepali speaking M with a PMHx of Multiple Myeloma, on chemotherapy, Gastric Ulcer due to H.Pylori, and HTN. He presents for initial evaluation in the Neurology Clinic at [facility] for GBS follow up. He is accompanied by his daughter who provides history and Nepali translation.\par \par Diagnosed with GBS and he was admitted to the [facility]. He completed therapy and has been home since 11/23/22. His walking is normal, but not as it was before. He has some weakness still. No falls. \par He had severe muscle weakness that led him to the [facility], he had respiratory issues and was on the ventilator. \par No recent vaccines at the time he presented. \par \par He has some trouble sleeping. He has trouble falling asleep. \par Appetite is good. \par No dizziness/lightheadedness when standing. \par No headache.\par Sometimes he has tingling for a few seconds on the left arm and in the neck. \par He denies shortness of breath\par No trouble with bowel/bladder function. \par \par He is no longer is in PT, but does practice his home exercises.\par \par Family history: unremarkable\par \par

## 2022-12-20 NOTE — PHYSICAL EXAM
[Person] : oriented to person [Place] : oriented to place [Time] : oriented to time [Concentration Intact] : normal concentrating ability [Comprehension] : comprehension intact [Cranial Nerves Optic (II)] : visual acuity intact bilaterally,  visual fields full to confrontation, pupils equal round and reactive to light [Cranial Nerves Oculomotor (III)] : extraocular motion intact [Cranial Nerves Trigeminal (V)] : facial sensation intact symmetrically [Cranial Nerves Facial (VII)] : face symmetrical [Cranial Nerves Vestibulocochlear (VIII)] : hearing was intact bilaterally [Cranial Nerves Glossopharyngeal (IX)] : tongue and palate midline [Cranial Nerves Accessory (XI - Cranial And Spinal)] : head turning and shoulder shrug symmetric [Cranial Nerves Hypoglossal (XII)] : there was no tongue deviation with protrusion [Motor Strength] : muscle strength was normal in all four extremities [Involuntary Movements] : no involuntary movements were seen [Paresis Pronator Drift Right-Sided] : no pronator drift on the right [Paresis Pronator Drift Left-Sided] : no pronator drift on the left [Sensation Tactile Decrease] : light touch was intact [Romberg's Sign] : Romberg's sign was negtive [Abnormal Walk] : normal gait [Balance] : balance was intact [Coordination - Dysmetria Impaired Finger-to-Nose Bilateral] : not present [1+] : Brachioradialis left 1+ [2+] : Patella left 2+ [0] : Ankle jerk left 0

## 2022-12-20 NOTE — DISCUSSION/SUMMARY
[FreeTextEntry1] : Yoly Rand is a 72 year old Maltese speaking M with a PMHx of Multiple Myeloma, on chemotherapy, Gastric Ulcer due to H.Pylori, and HTN. He presents for initial evaluation in the Neurology Clinic at [facility] for GBS follow up. He is accompanied by his daughter who provides history and Maltese translation.\par \par The patient's neurological examination is normal. 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 At present, it is not significantly impacting his activities.\par No further neurological intervention/treatment recommended at this time.\par \par RTC PRN\par \par All questions were answered to their satisfaction. I spent 45 minutes on this encounter.\par

## 2022-12-23 ENCOUNTER — APPOINTMENT (OUTPATIENT)
Dept: INFUSION THERAPY | Facility: HOSPITAL | Age: 72
End: 2022-12-23

## 2022-12-23 ENCOUNTER — APPOINTMENT (OUTPATIENT)
Dept: HEMATOLOGY ONCOLOGY | Facility: CLINIC | Age: 72
End: 2022-12-23

## 2022-12-23 ENCOUNTER — RESULT REVIEW (OUTPATIENT)
Age: 72
End: 2022-12-23

## 2022-12-23 ENCOUNTER — APPOINTMENT (OUTPATIENT)
Dept: GASTROENTEROLOGY | Facility: HOSPITAL | Age: 72
End: 2022-12-23

## 2022-12-23 VITALS
SYSTOLIC BLOOD PRESSURE: 148 MMHG | HEART RATE: 79 BPM | TEMPERATURE: 96.9 F | DIASTOLIC BLOOD PRESSURE: 71 MMHG | RESPIRATION RATE: 16 BRPM | BODY MASS INDEX: 20.6 KG/M2 | OXYGEN SATURATION: 99 % | WEIGHT: 120 LBS

## 2022-12-23 LAB
BASOPHILS # BLD AUTO: 0.04 K/UL — SIGNIFICANT CHANGE UP (ref 0–0.2)
BASOPHILS NFR BLD AUTO: 0.5 % — SIGNIFICANT CHANGE UP (ref 0–2)
EOSINOPHIL # BLD AUTO: 0.18 K/UL — SIGNIFICANT CHANGE UP (ref 0–0.5)
EOSINOPHIL NFR BLD AUTO: 2.3 % — SIGNIFICANT CHANGE UP (ref 0–6)
HCT VFR BLD CALC: 32.2 % — LOW (ref 39–50)
HGB BLD-MCNC: 10.7 G/DL — LOW (ref 13–17)
IMM GRANULOCYTES NFR BLD AUTO: 0.5 % — SIGNIFICANT CHANGE UP (ref 0–0.9)
LYMPHOCYTES # BLD AUTO: 1.58 K/UL — SIGNIFICANT CHANGE UP (ref 1–3.3)
LYMPHOCYTES # BLD AUTO: 20.2 % — SIGNIFICANT CHANGE UP (ref 13–44)
MCHC RBC-ENTMCNC: 31.4 PG — SIGNIFICANT CHANGE UP (ref 27–34)
MCHC RBC-ENTMCNC: 33.2 G/DL — SIGNIFICANT CHANGE UP (ref 32–36)
MCV RBC AUTO: 94.4 FL — SIGNIFICANT CHANGE UP (ref 80–100)
MONOCYTES # BLD AUTO: 0.63 K/UL — SIGNIFICANT CHANGE UP (ref 0–0.9)
MONOCYTES NFR BLD AUTO: 8.1 % — SIGNIFICANT CHANGE UP (ref 2–14)
NEUTROPHILS # BLD AUTO: 5.34 K/UL — SIGNIFICANT CHANGE UP (ref 1.8–7.4)
NEUTROPHILS NFR BLD AUTO: 68.4 % — SIGNIFICANT CHANGE UP (ref 43–77)
NRBC # BLD: 0 /100 WBCS — SIGNIFICANT CHANGE UP (ref 0–0)
PLATELET # BLD AUTO: 155 K/UL — SIGNIFICANT CHANGE UP (ref 150–400)
RBC # BLD: 3.41 M/UL — LOW (ref 4.2–5.8)
RBC # FLD: 13 % — SIGNIFICANT CHANGE UP (ref 10.3–14.5)
WBC # BLD: 7.81 K/UL — SIGNIFICANT CHANGE UP (ref 3.8–10.5)
WBC # FLD AUTO: 7.81 K/UL — SIGNIFICANT CHANGE UP (ref 3.8–10.5)

## 2022-12-23 PROCEDURE — 99214 OFFICE O/P EST MOD 30 MIN: CPT

## 2022-12-23 RX ORDER — METOPROLOL TARTRATE 25 MG/1
25 TABLET, FILM COATED ORAL TWICE DAILY
Qty: 28 | Refills: 0 | Status: COMPLETED | COMMUNITY
Start: 2019-09-03 | End: 2022-12-23

## 2022-12-23 RX ORDER — DEXAMETHASONE 4 MG/1
4 TABLET ORAL
Qty: 100 | Refills: 0 | Status: ACTIVE | COMMUNITY
Start: 2022-11-23 | End: 1900-01-01

## 2022-12-23 NOTE — HISTORY OF PRESENT ILLNESS
[Disease:__________________________] : Disease: [unfilled] [Treatment Protocol] : Treatment Protocol [de-identified] : As per Dr Kilgore's note on 8/18/2020\par \par "Mr Rand was referred to my office for newly diagnosed IgG kappa multiple myeloma. The patient's primary language is [place], he has his daughter Mirlande as the , per his wishes. He was seen by Dr. Andrés Valle (hematology) in Feb 2019 and had a BM bx showing 10-15% plasma cells, concerning for smoldering myeloma. According to the daughter, he was awaiting insurance approval for imaging studies. He was admitted from 8/18/19 at [facility] where he presented with pain in the L leg/lower back, worsening for the past 2-3 months. On labs, he was found to have a total protein of 10, Ca level 12 until 8/29/19. Dental consult was called and patient needs to have 10 teeth extracted -thus, IV bisphosphonates were not given, pt improved with hydration. He also had a slightly inc'ed creatinine -improved after IVF. CT C/A/P 8/18/19 showed a lytic expansile soft tissue lesion centered in the manubrium measuring approximately 7.3 x 3.4 x 4.3 cm, invading both 1st ribs. There was also a A lytic expansile soft tissue lesion in the T8 vertebral body causing mass effect on spinal canal and obliterating left neural foramina at T7-8 and T8-9. ON 8/22/19 an MRI thoracic spine was done showing multiple areas of tumor involvement within the thoracic spine in keeping with the patient's history of multiple myeloma. Prominent lesion within C7 on the right incompletely seen.\par Large lesion within T8 on the left producing epidural tumor extension and moderate narrowing of the spinal canal with mild flattening of the spinal cord. He was evaluated on 8/22/19 by Dr. Foley (Rad Onc) and was given 5 fractions of XRT from 8/23/19 to 8/29/19 to C7 and T8. He was discharged home on 8/29/19 with follow up in the outpatient office, and on Dexamethasone 4mg po daily. \par \par  \par \par \par \par Interval History: The patient is being followed for IgG kappa MM with multiple bone lytic lesions, hypercalcemia (resolved) and mild anemia. He started Velcade/Dexa weekly on 9/919. The patient got the Revlimid and started it C1 day 1 on 10/21/19 and hed been tolerating it well. \par \par Starting in February, his blood counts became low -plt count 54, Hb 8.3 wbc 5.8. Revlimid was held -after 2/3/20. He got 1 shot Velcade/Dexa on 2/3/20, NO chemo since then. \par BM done on 3/4/20 to eval for residual disease (MM) vs. MDS. BM showed recovering marrow elements, plasma cells <5%. SPEP/MICAH from 3/9/20 showed NO monoclonal proteins, c/w CR. \par \par The PET scan from 5/7/20 showed L uptake maxillary -pt reports that he had dentures done in Jan 2020 and has discomfort from them. He did not go to the dentist during COVID Rib fractures -pt denied falls/trauma. Patient has dental appointment for next week. He has some discomfort in the upper teeth b/l. He also missed his Velcade SQ last week 'i did not know if i am supposed to continue it.' He has no neuropathy from it, tolerating well. "\par  [de-identified] : RISS- II [FreeTextEntry1] : s/p RT to C7/T8 spine\par RVD 9/2019 -6/2020 transitioned to maintenance velcade q2wk started 6/19/2020 [de-identified] : Oct 6, 2020\par Pt is here for initial visit with me. He has his dtr on the phone, prefers her included in conversation and declined an . Pt is feeling well. He is tolerating monthly zometa and maintenance velcade w/o any adverse effects. Last set of myeloma labs done in August showed stable remission. He is requesting refill of several meds today.\par ---------------------------------\par \par 1/25/2021\par Patient presents for a follow up. He has no complaints except infrequent short duration chills with no fever. He is on biweekly Velcade and monthly Zometa. His last treatment of Zometa was on 1/8/2021 and last Velcade on 1/22/2021. His most recent SPEP/MICAH was normal.   \par \par 6/10/2021\par Patient seen in follow up. He did not go to his home country as he planned. He does not endorse any big change in his health status. She saw his Kidney doctor who told him he is iron deficient (labs are not supportive). His anemia is multifactorial including CKD. His most recent myeloma labs (03/2021) show continued CR. \par \par 9/1/2021\par Mr Rand returns for a follow up. His daughter is accompanying him. He has no complaints. He reports that he develops diarrhea (one motion) the day after his Velcade treatment. He plans to visit his home town soon. He has some unavoidable things to take care of. Of note he has been in sCR. \par \par 1/19/2022\par Patient seen in follow up. He has done well in the interim. He also visited his home country in this interval. He was safe all along.  As regards to his disease, he is stable and appears to be in remission. He has no complaints.  \par \par 11/23/2022\par Patient presents to clinic after being lost to follow-up; went to his country for an extended visit from 3/10 - 9/8/2022. Was on maintenance velcade [id number] starting 6/19/2020, last dose was on 3/3/22. Was on monthly Zometa since 11/2019, last dose of zometa was on 2/17/2022. In the interim, he developed severe weakness and was hospitalized at [facility] (9/24-10/11/22) for Acute respiratory failure with hypoxia, and severe sepsis due to GBS. Was discharged to rehab, and as of yesterday he was discharged from rehab. Will now be doing PT at home. During inpatient work up, he was found to have diffuse lytic lesions noted in C/T/L spine CT scans (9/25/22) which were identified as "Multiple mixed lytic sclerotic lesions may be sequela of previously treated multiple myeloma". MRI of the C/T/L spine were done for follow up revealing an "abnormal signal involving T8 vertebral body which may be related to multiple myeloma". Was seen by neurosurgery, no acute intervention. In patient immunofixation did not reveal a monoclonal band; SPEP WNL; kappa/lambda ratio not diagnostic. Today he feels at baseline, but notes residual weakness from GBS. Patient denies bone pain, fever, chills, night sweats, back pain, headache, or peripheral neuropathy. Good appetite, stable weight.\par \par 12/23/2022\par Follow up. Today he feels at baseline, but notes residual weakness from GBS. Today is C1D22 of Velcade and dexamethasone since restarting it; he is tolerating the medications well. He has some questions requesting clarification on Acyclovir and dexamethasone, otherwise no other concerns. Patient denies bone pain, fever, chills, night sweats, back pain, headache, or peripheral neuropathy. Good appetite, stable weight.

## 2022-12-23 NOTE — ASSESSMENT
[FreeTextEntry1] : 72 year-old Mr. MEIER is seen  in follow up for IgG Kappa Multiple Myeloma presented with lytic percy lesions, diagnosed in 9/2019. He received XRT to percy lesions,was started on RVD induction, achieved VGPR. Last PET 5/2020 - markedly improved and reduced FDG avidity throughout. His most recent SPEP/MICAH and FLCR (11/23/2022) was normal. 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 \par - MRI T-spine (9/25/22): abnormal signal involving T8 vertebral body which may be related to multiple myeloma\par - Repeat T-spine MRI in 3 months ~3/2/2023, to re-assess previous findings as there was no definitive evidence of myeloma involvement. Will repeat in 3 months from 12/2/22 as he has not received any myeloma treatment since his last dose of Velcade on 3/3/22. \par - Continue treatment regimen with Velcade 3on/1off, Dex 20mg weekly, and monthly Zometa. \par - Continue other ppx medications (Acyclovir, ASA) \par - Zometa q monthly (started 11/2019); will consider role of Zometa after a total of 24 doses (2 years) is achieved.\par - Follow up monthly\par \par # Anemia \par - Multifactorial origin including CKD, CKD now improving therefore will trend Hgb further before intervention.\par - Anemia w/u completed on 11/23/22\par \par Case and management discussed with Dr. Bialey

## 2022-12-23 NOTE — RESULTS/DATA
[FreeTextEntry1] : 1/19/2022\par Stable myeloma labs with normal range values.Mild high Lappa lambda with normal ratio is likely due to his renal failure. \par MIld to moderate anemia is stable. \par \par 9/1/2021\par Last PCN labs in 06/2021 showed no M-spike, M-band, Normal FLCR.\par \par 6/9/2021\par Last Myeloma labs in 03/2021 showed no M-spike, M-band, Normal FLCR. \par \par 1/25/2021\par Most recent SPEP / MICAH (1/8/2021) No monoclonal band \par \par ------------------------------------------------------------------------------------------\par   PET/CT Whole Body Oncology FDG             Final\par \par No Documents Attached\par \par \par \par \par   EXAM:  PETCT WB ONC FDG SUBS\par \par \par PROCEDURE DATE:  05/07/2020\par \par \par \par INTERPRETATION:  PROCEDURE:  PET/CT WHOLE BODY\par \par RADIOPHARMACEUTICAL:  9.97 mCi F-18, FDG, I.V.\par \par CLINICAL INFORMATION:  70-year-old male referred for follow-up of multiple myeloma on Zometa. Bone marrow biopsy done 3/4/2020 showed less than 5% plasma cells and repeat myeloma labs done on 3/9/2020, showed CT are. PET/CT is done as part of the subsequent treatment strategy evaluation.\par \par TECHNIQUE:  Fasting blood sugar measured prior to injection of radiopharmaceutical was 116 mg/dl. Following intravenous injection of the radiopharmaceutical and an uptake period of approximately 60 minutes, FDG-PET/CT was obtained on a  [employer] Discovery 710 from the vertex of the skull to the toes. Oral contrast was given during the uptake period. CT was performed during shallow respiration. The CT protocol was optimized for PET attenuation correction and to provide anatomic detail for localization of PET abnormalities. The CT protocol was not designed to produce and cannot replace state-of-the-art diagnostic CT images with specific imaging protocols for different body parts and indications. Images were reviewed on a dedicated workstation using multiplanar reconstruction.\par \par The standardized uptake values (SUV) are normalized to patient body weight and indicate the highest activity concentration (SUVmax) in a given disease site. All image numbers refer to the axial image number.\par \par COMPARISON:  FDG PET/CT dated 9/7/2019.\par \par OTHER STUDIES USED FOR CORRELATION: MRI of cervical spine dated 9/9/2019.\par \par FINDINGS:\par \par HEAD/NECK: Physiologic FDG activity in the brain.\par \par Mild hypermetabolism in left masseter muscle, decreased as compared to prior study, may be secondary to bruxism. Elongated focus of increased FDG activity in left lower cervical region may reflect inflammation or muscle spasm (image 77).\par \par New hypermetabolic lucency in left maxillary alveolus is nonspecific (SUV 8.0; image 53). Recommend correlation with dental exam. A new small hypermetabolic focus is seen in the right maxilla (SUV 3.7; image 55).\par \par CHEST: Few new small hypermetabolic foci in bilateral hilar region likely correspond to small lymph nodes that are difficult to delineate on CT. For reference, left perihilar region demonstrates SUV 3.6 (image 117). Resolution of FDG-avid subcentimeter left posterior paraesophageal lymph node.\par \par LUNGS: No abnormal FDG activity. Calcified right lower lobe granuloma, unchanged (image 128).\par \par PLEURA/PERICARDIUM: No abnormal FDG activity. No effusion.\par \par HEPATOBILIARY/PANCREAS: Physiologic FDG activity. Liver SUV mean is 2.1; previous 2.3.\par \par SPLEEN: No abnormal FDG activity. Normal in size.\par \par ADRENAL GLANDS: No abnormal FDG activity. No nodule.\par \par KIDNEYS/URINARY BLADDER: Physiologic FDG activity. Multiple bilateral renal cysts, measuring up to 4.6 cm on the right, unchanged.\par \par PELVIC ORGANS: No abnormal FDG activity.\par \par ABDOMINOPELVIC NODES: No enlarged or FDG avid lymph node\par \par BOWEL/PERITONEUM/MESENTERY: Physiologic FDG activity. Resolution of hypermetabolism in distal esophagus/GE junction.\par \par BONES: Near total resolution of previously seen hypermetabolic foci in the axial skeleton, right humerus, and proximal bilateral femurs. Previously seen lytic lesions demonstrate new areas of sclerosis on CT. For reference, one of the most FDG-avid residual lesions is a mixed lytic and sclerotic lesion in the left scapula which demonstrates SUV 3.0 (image 98); previously lytic with SUV 5.7. Previously seen hypermetabolic lesion in right sphenoid bone has resolved. Previously seen large destructive lesion in the manubrium the sternum demonstrates new sclerosis with SUV 3.0 (image 103); previous SUV 5.3. Resolution of hypermetabolic intramedullary soft tissue in proximal right humerus. Near total resolution of hypermetabolic intramedullary soft tissue in proximal left femur (SUV 1.3; image 273; previous SUV 5.3).\par \par FDG-avid fracture in left inferior pubic ramus demonstrates increased callus formation and is similar in metabolism (SUV 3.8; image 250; previous SUV 3.1). Few new mildly FDG-avid bilateral rib fractures. For reference, fracture in the anterior aspect of the left second rib demonstrates SUV 3.4 (image 105).\par \par Resolution of hypermetabolism in left palmar musculature.\par \par IMPRESSION:  Abnormal whole body FDG-PET/CT scan.\par \par 1. Near total resolution of hypermetabolism associated with lytic and intramedullary lesions in the axial and appendicular skeleton as compared to prior study dated 9/7/2019. Previously seen lytic lesions demonstrate new sclerosis. Findings are compatible with response to interval therapy.\par \par 2. Few new mildly FDG-avid bilateral rib fractures. An FDG-avid fracture in the left inferior pubic ramus demonstrates increased callus formation and is similar in metabolism.\par \par 3. New hypermetabolic lucency in left maxillary alveolus and new small hypermetabolic focus in right maxilla are nonspecific. Recommend correlation with dental exam.\par \par 4. Few new mildly FDG-avid nonspecific bilateral hilar lymph nodes.\par \par 5. Resolution of many specific hypermetabolism in distal esophagus/GE junction.\par \par \par \par \par \par \par \par \par \par \par \par \par \par \par \par LUANA RAMOS M.D., NUCLEAR MEDICINE ATTENDING\par This document has been electronically signed. May  7 2020  4:08PM\par \par  \par \par  Ordered by: OZZY ALMEIDA       Collected/Examined: 07May2020 12:57PM       \par Verified by: OZZY ALMEIDA 11May2020 09:27AM       \par  Result Communication: Call patient with results,Discussed results with patient;\par Stage: Final       \par  Performed at: [facility] the [facility]       Resulted: 07May2020 04:11PM       Last Updated: 11May2020 09:27AM       Accession: [id number]       \par \par \par  Pathology             Final\par \par No Documents Attached\par \par \par \par \par   [facility] Accession Number : [id number]\par \par HARDEEP, SUBASH                          2\par \par \par \par Cytopathology Report\par \par \par \par \par \par Final Diagnosis\par SOFT TISSUE, SACRUM, CT GUIDED CORE BIOPSY\par Plasma cell neoplasm\par See note and description.\par \par Diagnostic note:  Overall the morphologic and immunophenotypic\par findings are consistent with plasma cell neoplasm. The\par differential diagnosis includes plasmacytoma vs plasma cells\par myeloma. Correlation with laboratory, radiologic and clinical\par findings is required for further classification.\par \par Microscopic description:\par The touch prep slides are composed of numerous enlarged plasma\par cells with prominent nucleoli, occasional binucleated forms are\par seen.\par \par Histologic sections consist of multiple needle shaped cores of\par soft tissue showing proliferation of enlarged plasma cells with\par prominent nucleoli, forming sheets.\par \par Immunohistochemistry:  , kappaISH, lambdaISH, cyclinD1, p53\par stains performed on paraffin embedded section of block 1C.\par \par Neoplastic cells are:\par Positive:  , KappaISH, p53\par Negative:  LambdaISH, cyclinD1\par \par Flow cytometry analysis (21-JP-): Monotypic plasma cells\par (24.1% of total analyzed cells) are present. There are two\par aberrant monoclonal plasma cell populations:\par Population #1 (16.5% of total analyzed cells, 68.6% of plasma\par cell population) positive for cytoplasmic kappa, CD38 dimmer,\par CD45 dimmer, CD56, ; negative for , CD19, CD20.\par Population # 2 (7.6% of total analyzed cells, 31.4% of plasma\par cell population) positive for cytoplasmic kappa\par \par \par \par \par \par \par HARDEEP, SUBASH                          2\par \par \par \par Cytopathology Report\par \par \par \par \par (brighter), , CD38 brighter, CD45 dim, CD56 (brighter),\par ; negative for CD19, CD20.\par \par Screened by: Jose Juan Smith CT(ASCP)\par Verified by: Orlando Spicer MD\par (Electronic Signature)\par Reported on: 08/22/19 13:22 EDT, 6 [facility] [address]\par Cytology technical processing performed at [address] [place] [id number]\par _________________________________________________________________\par \par \par Specimen(s) Submitted\par SOFT TISSUE, SACRUM, CT GUIDED CORE BIOPSY\par \par \par Statement of Adequacy\par Immediate cytologic study for adequacy of specimen using a Diff-\par Quik stain was performed at [facility], [address] [place] [address]. Touch\par imprints acceptable for further evaluation by Jose Juan Smith\par CT(ASCP).\par \par \par Clinical Information\par Sacral mass.\par \par \par Gross Description\par Core Biopsy:\par Tissue collected: Specimen container has been inspected to\par confirm patient?s name and date of birth, contains 3  tan cores\par measuring 1.0, 1.0, 0.8 cm in length (and multiple fragments).\par Entire specimen submitted in 2 cassette(s) labeled 1B and 1 C.\par \par 4 Touch prep slides ( 2 air dried + 2 fixed)\par \par FNA:\par No material submitted for cell block (No block 1A)\par \par Prepared:\par 4 Touch Prep,\par 1 core biopsy labeled 1B\par 1 core biopsy labeled 1C\par 6 Total slides\par \par  \par \par  Ordered by: DELORES MOSS       Collected/Examined: [id number] 03:14PM       \par Verified by: OZZY ALMEIDA [id number] 03:07PM       \par  Result Communication: No patient communication needed at this time;\par Stage: Final       \par  Performed at: [facility]       Resulted: [id number] 01:22PM       Last Updated: 18Jun2020 03:07PM       Accession: [id number]       \par \par \par  Pathology             Final\par \par No Documents Attached\par \par \par \par \par   [facility] Accession Number : [id number]\par \par HARDEEP, SUBASH                          4\par \par \par \par Addendum Report\par \par \par \par \par Hematopathology Addendum\par Comprehensive Hematopathology Report\par \par Final Diagnosis:\par 1, 2. Bone marrow biopsy and bone marrow aspirate\par - Plasma cell myeloma (10% with focal 25% by  stain)\par - Slight erythroid predominant trilineage hematopoiesis\par with maturation\par \par Diagnostic Note:\par Please note findings of a normal male karyotype and a negative\par multiple myeloma FISH panel. Based on the additional findings,\par the diagnosis has not changed. Correlation with studies for\par myeloma-related organ dysfunction is necessary.\par \par Morphology:\par Microscopic description:\par 1. Biopsy: Sections of bone marrow biopsy show normocellularity\par (60 to 70% cellularity), mild plasmacytosis with foci of small\par clusters, slight erythroid predominant trilineage hematopoiesis\par with maturation, mild megakaryocytosis with normal morphology,\par and increased iron stores.\par 2. Aspirate: Cellular spicules are not present, precluding\par differential count. Review of the smear shows some maturing and\par predominant mature myeloid cells, a few erythroid elements, and\par rare megakaryocytes.\par \par Ancillary Studies:\par Bone marrow aspirate iron stain: No spicules are present to\par evaluate for iron stores and there are insufficient nRBC to\par evaluate for ring sideroblasts.\par Congo red stain is negative for amyloidosis.\par Flow cytometry:  Monotypic plasma cells (1% of cells), positive\par for cytoplasmic kappa, CD38, , dimmer CD45, partial CD56;\par negative CD19, CD20, .\par CD45/side scatter shows no significant blast population. There is\par no increase in CD34,  or CD14 positive cells.\par Lymphocytes (8% of cells) show a heterogeneous population of T-\par cells (with normal CD4 to CD8 ratio), and polytypic B-cells.\par Immunohistochemical stains ( performed on the block 1A and\par 1B, cyclin-D1 performed on block 1A):   stains show overall\par approximately 10% plasma cells, with foci of small clusters,\par focal up to 25%. Plasma cells are negative for cyclin-D1.\par \par Cytogenetics:\par Result: Normal male karyotype\par Karyotype: 46,XY[20]\par \par \par \par \par \par \par HARDEEP, SUBASH                          4\par \par \par \par Addendum Report\par \par \par \par \par FISH:\par Result: NORMAL FISH -\par - MULTIPLE MYELOMA PANEL\par Probe(s) and Location(s): FGFR3(4p16), CCND1(11q13), RB1(13q14),\par IGH(14q32), MAF(16q23), TP53(17p13.1)\par \par Clinical History/Data:\par New diagnosis multiple myeloma\par CBC on 08/24/2019: WBC: 8.09 K/uL, HGB: 9.5 g/dL, MCV: 90 fl,\par Plt: 227 K/uL\par Serum Immunofixation: IgG kappa\par \par Verified by: Nancy Farris M.D.\par (Electronic Signature)\par Reported on: 09/07/19 10:37 EDT, 6 [place], [place]\par [address]\par _________________________________________________________________\par \par \par Hematopathology Report\par \par \par \par \par Final Diagnosis\par 1, 2. Bone marrow biopsy and bone marrow aspirate\par - Plasma cell myeloma (10% with focal 25% by  stain)\par - Slight erythroid predominant trilineage hematopoiesis\par with maturation\par \par See note and description.\par \par Diagnostic note:\par Correlation with studies for myeloma-related organ dysfunction is\par necessary.\par Comprehensive report with results of pending ancillary studies to\par follow.\par \par Ancillary studies\par Bone marrow aspirate iron stain: No spicules are present to\par evaluate for iron stores and there are insufficient nRBC to\par evaluate for ring sideroblasts.\par \par Flow cytometry:  Monotypic plasma cells (1% of cells), positive\par for cytoplasmic kappa, CD38, , dimmer CD45, partial CD56;\par negative CD19, CD20, .\par CD45/side scatter shows no significant blast population. There is\par no increase in CD34,  or CD14 positive cells.\par \par \par \par \par \par \par HARDEEP, SUBASH                          4\par \par \par \par Hematopathology Report\par \par \par \par \par Lymphocytes (8% of cells) show a heterogeneous population of T-\par cells (with normal CD4 to CD8 ratio), and polytypic B-cells.\par \par Immunohistochemical stains ( performed on the block 1A and\par 1B, cyclin-D1 performed on block 1A):   stains show overall\par approximately 10% plasma cells, with foci of small clusters,\par focal up to 25%. Plasma cells are negative for cyclin-D1.\par \par Microscopic description:\par 1. Biopsy: Sections of bone marrow biopsy show normocellularity\par (60 to 70% cellularity), mild plasmacytosis with foci of small\par clusters, slight erythroid predominant trilineage hematopoiesis\par with maturation, mild megakaryocytosis with normal morphology,\par and increased iron stores.\par \par 2. Aspirate: Cellular spicules are not present, precluding\par differential count. Review of the smear shows some maturing and\par predominant mature myeloid cells, a few erythroid elements, and\par rare megakaryocytes.\par \par Comment:  Iron stain (examined to evaluate for iron stores; see\par microscopic description) and Giemsa stain (shows appropriate\par staining pattern) are performed and evaluated on block(s): 1A,\par 1B.\par \par Slide(s) with built in immunohistochemical study control(s)\par associated and negative control with this case has/have been\par verified by the sign out pathologist.\par For slide(s) without built in controls positive control slides\par has/have been reviewed and approved by immunohistochemistry lab\par These immunohistochemical/ in-situ hybridization tests have been\par developed and their performance characteristics determined by\par [facility] / [facility], Department of\par Pathology, Division of Immunopathology, [address] [place] [address]\[place] [address].  It has not been cleared or approved by the\par U.S. Food and Drug Administration.  The FDA has determined that\par such clearance or approval is not necessary.  This test is used\par for clinical purposes.  The laboratory is certified under the\par CLIA-88 as qualified to perform high\par complexity clinical testing.\par \par Verified by: Nancy Farris M.D.\par (Electronic Signature)\par Reported on: 08/27/19 10:00 EDT, [address] [place] [address]\par [id number]\par _________________________________________________________________\par \par \par \par \par \par \par \par HARDEEP, SUBASH                          4\par \par \par \par Hematopathology Report\par \par \par \par \par Clinical History\par New diagnosis multiple myeloma\par CBC on 08/24/2019: WBC: 8.09 K/uL, HGB: 9.5 g/dL, MCV: 90 fl,\par Plt: 227 K/uL\par Serum Immunofixation: IgG kappa\par \par Specimen(s) Submitted\par 1     Bone marrow biopsy left\par 2     Bone marrow aspirate\par \par Gross Description\par 1. The specimen is received in bouin's fixative and the specimen\par container is labeled: Left bone marrow biopsy.  It consists of a\par 0.5 x 0.2 cm bone marrow core with a 1.5 x 1.3 x 0.5 cm blood\par clot.  Entirely submitted.  Two cassettes: A = bone marrow core;\par B = blood clot.\par 2. The specimen is received labeled with patient's name and\par consists of 3 air dried slide(s). 2 slide(s) stained with Monzon\par Giemsa stain. 1 stained for iron stain.\par In addition to other data that may appear on the specimen\par container, the label has been inspected to confirm the presence\par of the patient's name and date of birth.\par Catarina ANAYA Brian 08/22/2019 17:12\par \par  \par \par  Ordered by: BECKY BILLS       Collected/Examined: [id number] 03:50PM       \par Verified by: BECKY BILLS [id number] 05:42PM       \par  Result Communication: No patient communication needed at this time;\par Stage: Final       \par  Performed at: [facility]       Resulted: [id number] 10:37AM       Last Updated: [id number] 05:42PM       Accession: [id number]

## 2022-12-28 LAB
ALBUMIN SERPL ELPH-MCNC: 4.8 G/DL
ALP BLD-CCNC: 54 U/L
ALT SERPL-CCNC: 17 U/L
ANION GAP SERPL CALC-SCNC: 12 MMOL/L
AST SERPL-CCNC: 18 U/L
B2 MICROGLOB SERPL-MCNC: 4.1 MG/L
BILIRUB SERPL-MCNC: 0.3 MG/DL
BUN SERPL-MCNC: 30 MG/DL
CALCIUM SERPL-MCNC: 9.8 MG/DL
CHLORIDE SERPL-SCNC: 105 MMOL/L
CO2 SERPL-SCNC: 23 MMOL/L
CREAT SERPL-MCNC: 1.58 MG/DL
EGFR: 46 ML/MIN/1.73M2
GLUCOSE SERPL-MCNC: 128 MG/DL
POTASSIUM SERPL-SCNC: 4.3 MMOL/L
PROT SERPL-MCNC: 7 G/DL
SODIUM SERPL-SCNC: 139 MMOL/L

## 2022-12-30 ENCOUNTER — RESULT REVIEW (OUTPATIENT)
Age: 72
End: 2022-12-30

## 2022-12-30 ENCOUNTER — APPOINTMENT (OUTPATIENT)
Dept: INFUSION THERAPY | Facility: HOSPITAL | Age: 72
End: 2022-12-30

## 2022-12-30 LAB
BASOPHILS # BLD AUTO: 0.04 K/UL — SIGNIFICANT CHANGE UP (ref 0–0.2)
BASOPHILS NFR BLD AUTO: 0.4 % — SIGNIFICANT CHANGE UP (ref 0–2)
EOSINOPHIL # BLD AUTO: 0.33 K/UL — SIGNIFICANT CHANGE UP (ref 0–0.5)
EOSINOPHIL NFR BLD AUTO: 3.7 % — SIGNIFICANT CHANGE UP (ref 0–6)
HCT VFR BLD CALC: 30.7 % — LOW (ref 39–50)
HGB BLD-MCNC: 10.5 G/DL — LOW (ref 13–17)
IMM GRANULOCYTES NFR BLD AUTO: 0.3 % — SIGNIFICANT CHANGE UP (ref 0–0.9)
LYMPHOCYTES # BLD AUTO: 1.14 K/UL — SIGNIFICANT CHANGE UP (ref 1–3.3)
LYMPHOCYTES # BLD AUTO: 12.8 % — LOW (ref 13–44)
MCHC RBC-ENTMCNC: 31.3 PG — SIGNIFICANT CHANGE UP (ref 27–34)
MCHC RBC-ENTMCNC: 34.2 G/DL — SIGNIFICANT CHANGE UP (ref 32–36)
MCV RBC AUTO: 91.4 FL — SIGNIFICANT CHANGE UP (ref 80–100)
MONOCYTES # BLD AUTO: 0.68 K/UL — SIGNIFICANT CHANGE UP (ref 0–0.9)
MONOCYTES NFR BLD AUTO: 7.6 % — SIGNIFICANT CHANGE UP (ref 2–14)
NEUTROPHILS # BLD AUTO: 6.7 K/UL — SIGNIFICANT CHANGE UP (ref 1.8–7.4)
NEUTROPHILS NFR BLD AUTO: 75.2 % — SIGNIFICANT CHANGE UP (ref 43–77)
NRBC # BLD: 0 /100 WBCS — SIGNIFICANT CHANGE UP (ref 0–0)
PLATELET # BLD AUTO: 162 K/UL — SIGNIFICANT CHANGE UP (ref 150–400)
RBC # BLD: 3.36 M/UL — LOW (ref 4.2–5.8)
RBC # FLD: 12.9 % — SIGNIFICANT CHANGE UP (ref 10.3–14.5)
WBC # BLD: 8.92 K/UL — SIGNIFICANT CHANGE UP (ref 3.8–10.5)
WBC # FLD AUTO: 8.92 K/UL — SIGNIFICANT CHANGE UP (ref 3.8–10.5)

## 2023-01-03 LAB
ALBUMIN MFR SERPL ELPH: 61.1 %
ALBUMIN SERPL-MCNC: 4.3 G/DL
ALBUMIN/GLOB SERPL: 1.6 RATIO
ALPHA1 GLOB MFR SERPL ELPH: 4.1 %
ALPHA1 GLOB SERPL ELPH-MCNC: 0.3 G/DL
ALPHA2 GLOB MFR SERPL ELPH: 11.7 %
ALPHA2 GLOB SERPL ELPH-MCNC: 0.8 G/DL
B-GLOBULIN MFR SERPL ELPH: 11.4 %
B-GLOBULIN SERPL ELPH-MCNC: 0.8 G/DL
DEPRECATED KAPPA LC FREE/LAMBDA SER: 1.79 RATIO
GAMMA GLOB FLD ELPH-MCNC: 0.8 G/DL
GAMMA GLOB MFR SERPL ELPH: 11.7 %
IGA SER QL IEP: 132 MG/DL
IGG SER QL IEP: 931 MG/DL
IGM SER QL IEP: 32 MG/DL
INTERPRETATION SERPL IEP-IMP: NORMAL
KAPPA LC CSF-MCNC: 1.46 MG/DL
KAPPA LC SERPL-MCNC: 2.62 MG/DL
M PROTEIN SPEC IFE-MCNC: NORMAL
PROT SERPL-MCNC: 7 G/DL
PROT SERPL-MCNC: 7 G/DL

## 2023-01-06 ENCOUNTER — RESULT REVIEW (OUTPATIENT)
Age: 73
End: 2023-01-06

## 2023-01-06 ENCOUNTER — APPOINTMENT (OUTPATIENT)
Dept: INFUSION THERAPY | Facility: HOSPITAL | Age: 73
End: 2023-01-06

## 2023-01-06 LAB
BASOPHILS # BLD AUTO: 0.02 K/UL — SIGNIFICANT CHANGE UP (ref 0–0.2)
BASOPHILS NFR BLD AUTO: 0.3 % — SIGNIFICANT CHANGE UP (ref 0–2)
EOSINOPHIL # BLD AUTO: 0.36 K/UL — SIGNIFICANT CHANGE UP (ref 0–0.5)
EOSINOPHIL NFR BLD AUTO: 4.7 % — SIGNIFICANT CHANGE UP (ref 0–6)
HCT VFR BLD CALC: 30.3 % — LOW (ref 39–50)
HGB BLD-MCNC: 10.5 G/DL — LOW (ref 13–17)
IMM GRANULOCYTES NFR BLD AUTO: 0.4 % — SIGNIFICANT CHANGE UP (ref 0–0.9)
LYMPHOCYTES # BLD AUTO: 1.16 K/UL — SIGNIFICANT CHANGE UP (ref 1–3.3)
LYMPHOCYTES # BLD AUTO: 15.1 % — SIGNIFICANT CHANGE UP (ref 13–44)
MCHC RBC-ENTMCNC: 31.5 PG — SIGNIFICANT CHANGE UP (ref 27–34)
MCHC RBC-ENTMCNC: 34.7 G/DL — SIGNIFICANT CHANGE UP (ref 32–36)
MCV RBC AUTO: 91 FL — SIGNIFICANT CHANGE UP (ref 80–100)
MONOCYTES # BLD AUTO: 0.76 K/UL — SIGNIFICANT CHANGE UP (ref 0–0.9)
MONOCYTES NFR BLD AUTO: 9.9 % — SIGNIFICANT CHANGE UP (ref 2–14)
NEUTROPHILS # BLD AUTO: 5.35 K/UL — SIGNIFICANT CHANGE UP (ref 1.8–7.4)
NEUTROPHILS NFR BLD AUTO: 69.6 % — SIGNIFICANT CHANGE UP (ref 43–77)
NRBC # BLD: 0 /100 WBCS — SIGNIFICANT CHANGE UP (ref 0–0)
PLATELET # BLD AUTO: 142 K/UL — LOW (ref 150–400)
RBC # BLD: 3.33 M/UL — LOW (ref 4.2–5.8)
RBC # FLD: 12.9 % — SIGNIFICANT CHANGE UP (ref 10.3–14.5)
WBC # BLD: 7.68 K/UL — SIGNIFICANT CHANGE UP (ref 3.8–10.5)
WBC # FLD AUTO: 7.68 K/UL — SIGNIFICANT CHANGE UP (ref 3.8–10.5)

## 2023-01-13 ENCOUNTER — RESULT REVIEW (OUTPATIENT)
Age: 73
End: 2023-01-13

## 2023-01-13 ENCOUNTER — APPOINTMENT (OUTPATIENT)
Dept: INFUSION THERAPY | Facility: HOSPITAL | Age: 73
End: 2023-01-13

## 2023-01-13 LAB
BASOPHILS # BLD AUTO: 0.04 K/UL — SIGNIFICANT CHANGE UP (ref 0–0.2)
BASOPHILS NFR BLD AUTO: 0.5 % — SIGNIFICANT CHANGE UP (ref 0–2)
EOSINOPHIL # BLD AUTO: 0.52 K/UL — HIGH (ref 0–0.5)
EOSINOPHIL NFR BLD AUTO: 6 % — SIGNIFICANT CHANGE UP (ref 0–6)
HCT VFR BLD CALC: 31.6 % — LOW (ref 39–50)
HGB BLD-MCNC: 10.8 G/DL — LOW (ref 13–17)
IMM GRANULOCYTES NFR BLD AUTO: 0.3 % — SIGNIFICANT CHANGE UP (ref 0–0.9)
LYMPHOCYTES # BLD AUTO: 0.97 K/UL — LOW (ref 1–3.3)
LYMPHOCYTES # BLD AUTO: 11.2 % — LOW (ref 13–44)
MCHC RBC-ENTMCNC: 30.9 PG — SIGNIFICANT CHANGE UP (ref 27–34)
MCHC RBC-ENTMCNC: 34.2 G/DL — SIGNIFICANT CHANGE UP (ref 32–36)
MCV RBC AUTO: 90.3 FL — SIGNIFICANT CHANGE UP (ref 80–100)
MONOCYTES # BLD AUTO: 1.15 K/UL — HIGH (ref 0–0.9)
MONOCYTES NFR BLD AUTO: 13.3 % — SIGNIFICANT CHANGE UP (ref 2–14)
NEUTROPHILS # BLD AUTO: 5.96 K/UL — SIGNIFICANT CHANGE UP (ref 1.8–7.4)
NEUTROPHILS NFR BLD AUTO: 68.7 % — SIGNIFICANT CHANGE UP (ref 43–77)
NRBC # BLD: 0 /100 WBCS — SIGNIFICANT CHANGE UP (ref 0–0)
PLATELET # BLD AUTO: 135 K/UL — LOW (ref 150–400)
RBC # BLD: 3.5 M/UL — LOW (ref 4.2–5.8)
RBC # FLD: 12.8 % — SIGNIFICANT CHANGE UP (ref 10.3–14.5)
WBC # BLD: 8.67 K/UL — SIGNIFICANT CHANGE UP (ref 3.8–10.5)
WBC # FLD AUTO: 8.67 K/UL — SIGNIFICANT CHANGE UP (ref 3.8–10.5)

## 2023-01-20 ENCOUNTER — APPOINTMENT (OUTPATIENT)
Dept: INFUSION THERAPY | Facility: HOSPITAL | Age: 73
End: 2023-01-20

## 2023-01-20 ENCOUNTER — APPOINTMENT (OUTPATIENT)
Dept: HEMATOLOGY ONCOLOGY | Facility: CLINIC | Age: 73
End: 2023-01-20
Payer: MEDICARE

## 2023-01-20 VITALS
DIASTOLIC BLOOD PRESSURE: 72 MMHG | BODY MASS INDEX: 21.72 KG/M2 | WEIGHT: 126.54 LBS | HEART RATE: 78 BPM | TEMPERATURE: 97.2 F | OXYGEN SATURATION: 98 % | RESPIRATION RATE: 16 BRPM | SYSTOLIC BLOOD PRESSURE: 134 MMHG

## 2023-01-20 PROCEDURE — 99213 OFFICE O/P EST LOW 20 MIN: CPT

## 2023-01-21 ENCOUNTER — OUTPATIENT (OUTPATIENT)
Dept: OUTPATIENT SERVICES | Facility: HOSPITAL | Age: 73
LOS: 1 days | Discharge: HOME | End: 2023-01-21

## 2023-01-21 DIAGNOSIS — Z95.5 PRESENCE OF CORONARY ANGIOPLASTY IMPLANT AND GRAFT: Chronic | ICD-10-CM

## 2023-01-21 DIAGNOSIS — C90.00 MULTIPLE MYELOMA NOT HAVING ACHIEVED REMISSION: ICD-10-CM

## 2023-01-21 NOTE — ASSESSMENT
[FreeTextEntry1] : 72 year-old Mr. MEIER is seen in follow up for IgG Kappa Multiple Myeloma presented with lytic percy lesions, diagnosed in 9/2019. He received XRT to percy lesions,was started on RVD induction, achieved VGPR. Last PET 05/2020 - markedly improved and reduced FDG avidity throughout. His most recent SPEP/MICAH are normal, and FLCR mildly increased (12/23/2022). 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 \par - MRI T-spine (9/25/22): abnormal signal involving T8 vertebral body which may be related to multiple myeloma\par - Repeat T-spine MRI in 3 months ~3/2/2023, to re-assess previous findings as there was no definitive evidence of myeloma involvement. Will repeat in 3 months from 12/2/22 as he has not received any myeloma treatment since his last dose of Velcade on 3/3/22. \par - Continue treatment regimen with Velcade 3 week on,  1 week off, Dex 20mg weekly, and monthly Zometa. \par - Continue other ppx medications (Acyclovir, ASA) \par - Zometa q monthly (started 11/2019); will consider role of Zometa after a total of 24 doses (2 years) is achieved.\par - Follow up monthly\par \par # Anemia \par - Multifactorial origin including CKD, CKD now improving therefore will trend Hgb further before intervention.\par - Anemia w/u completed on 11/23/22\par \par

## 2023-01-21 NOTE — HISTORY OF PRESENT ILLNESS
[Disease:__________________________] : Disease: [unfilled] [de-identified] : As per Dr Kilgore's note on 8/18/2020\par \par "Mr Rand was referred to my office for newly diagnosed IgG kappa multiple myeloma. The patient's primary language is [place]ic, he has his daughter Mirlande as the , per his wishes. He was seen by Dr. Andrés Valle (hematology) in Feb 2019 and had a BM bx showing 10-15% plasma cells, concerning for smoldering myeloma. According to the daughter, he was awaiting insurance approval for imaging studies. He was admitted from 8/18/19 at [facility] where he presented with pain in the L leg/lower back, worsening for the past 2-3 months. On labs, he was found to have a total protein of 10, Ca level 12 until 8/29/19. Dental consult was called and patient needs to have 10 teeth extracted -thus, IV bisphosphonates were not given, pt improved with hydration. He also had a slightly inc'ed creatinine -improved after IVF. CT C/A/P 8/18/19 showed a lytic expansile soft tissue lesion centered in the manubrium measuring approximately 7.3 x 3.4 x 4.3 cm, invading both 1st ribs. There was also a A lytic expansile soft tissue lesion in the T8 vertebral body causing mass effect on spinal canal and obliterating left neural foramina at T7-8 and T8-9. ON 8/22/19 an MRI thoracic spine was done showing multiple areas of tumor involvement within the thoracic spine in keeping with the patient's history of multiple myeloma. Prominent lesion within C7 on the right incompletely seen.\par Large lesion within T8 on the left producing epidural tumor extension and moderate narrowing of the spinal canal with mild flattening of the spinal cord. He was evaluated on 8/22/19 by Dr. Foley (Rad Onc) and was given 5 fractions of XRT from 8/23/19 to 8/29/19 to C7 and T8. He was discharged home on 8/29/19 with follow up in the outpatient office, and on Dexamethasone 4mg po daily. \par \par  \par \par \par \par Interval History: The patient is being followed for IgG kappa MM with multiple bone lytic lesions, hypercalcemia (resolved) and mild anemia. He started Velcade/Dexa weekly on 9/919. The patient got the Revlimid and started it C1 day 1 on 10/21/19 and hed been tolerating it well. \par \par Starting in February, his blood counts became low -plt count 54, Hb 8.3 wbc 5.8. Revlimid was held -after 2/3/20. He got 1 shot Velcade/Dexa on 2/3/20, NO chemo since then. \par BM done on 3/4/20 to eval for residual disease (MM) vs. MDS. BM showed recovering marrow elements, plasma cells <5%. SPEP/MICAH from 3/9/20 showed NO monoclonal proteins, c/w CR. \par \par The PET scan from 5/7/20 showed L uptake maxillary -pt reports that he had dentures done in Jan 2020 and has discomfort from them. He did not go to the dentist during COVID Rib fractures -pt denied falls/trauma. Patient has dental appointment for next week. He has some discomfort in the upper teeth b/l. He also missed his Velcade SQ last week 'i did not know if i am supposed to continue it.' He has no neuropathy from it, tolerating well. "\par  [de-identified] : RISS- II [Treatment Protocol] : Treatment Protocol [FreeTextEntry1] : s/p RT to C7/T8 spine\par RVD 9/2019 -6/2020 transitioned to maintenance velcade q2wk started 6/19/2020 [de-identified] : Oct 6, 2020\par Pt is here for initial visit with me. He has his dtr on the phone, prefers her included in conversation and declined an . Pt is feeling well. He is tolerating monthly zometa and maintenance velcade w/o any adverse effects. Last set of myeloma labs done in August showed stable remission. He is requesting refill of several meds today.\par ---------------------------------\par \par 1/25/2021\par Patient presents for a follow up. He has no complaints except infrequent short duration chills with no fever. He is on biweekly Velcade and monthly Zometa. His last treatment of Zometa was on 1/8/2021 and last Velcade on 1/22/2021. His most recent SPEP/MICAH was normal.   \par \par 6/10/2021\par Patient seen in follow up. He did not go to his home country as he planned. He does not endorse any big change in his health status. She saw his Kidney doctor who told him he is iron deficient (labs are not supportive). His anemia is multifactorial including CKD. His most recent myeloma labs (03/2021) show continued CR. \par \par 9/1/2021\par Mr Rand returns for a follow up. His daughter is accompanying him. He has no complaints. He reports that he develops diarrhea (one motion) the day after his Velcade treatment. He plans to visit his home town soon. He has some unavoidable things to take care of. Of note he has been in sCR. \par \par 1/19/2022\par Patient seen in follow up. He has done well in the interim. He also visited his home country in this interval. He was safe all along.  As regards to his disease, he is stable and appears to be in remission. He has no complaints.  \par \par 11/23/2022\par Patient presents to clinic after being lost to follow-up; went to his country for an extended visit from 3/10 - 9/8/2022. Was on maintenance velcade [id number] starting 6/19/2020, last dose was on 3/3/22. Was on monthly Zometa since 11/2019, last dose of zometa was on 2/17/2022. In the interim, he developed severe weakness and was hospitalized at [facility] (9/24-10/11/22) for Acute respiratory failure with hypoxia, and severe sepsis due to GBS. Was discharged to rehab, and as of yesterday he was discharged from rehab. Will now be doing PT at home. During inpatient work up, he was found to have diffuse lytic lesions noted in C/T/L spine CT scans (9/25/22) which were identified as "Multiple mixed lytic sclerotic lesions may be sequela of previously treated multiple myeloma". MRI of the C/T/L spine were done for follow up revealing an "abnormal signal involving T8 vertebral body which may be related to multiple myeloma". Was seen by neurosurgery, no acute intervention. In patient immunofixation did not reveal a monoclonal band; SPEP WNL; kappa/lambda ratio not diagnostic. Today he feels at baseline, but notes residual weakness from GBS. Patient denies bone pain, fever, chills, night sweats, back pain, headache, or peripheral neuropathy. Good appetite, stable weight.\par \par 12/23/2022\par Follow up. Today he feels at baseline, but notes residual weakness from GBS. Today is C1D22 of Velcade and dexamethasone since restarting it; he is tolerating the medications well. He has some questions requesting clarification on Acyclovir and dexamethasone, otherwise no other concerns. Patient denies bone pain, fever, chills, night sweats, back pain, headache, or peripheral neuropathy. Good appetite, stable weight. \par \par 1/20/2023 \par Patient seen in follow up for multiple myeloma. He was on maintenance Velcade. He went too his home country and missed treatment for more than two months. His disease progressed as noted upon return. He had new percy lesion. He was again started on the same induction regimen. He is also getting Zometa monthly. He has now achieved VGPR having only mild high Kappa, no M-spike/band. He offers no complaints. Denies any infection, fever, bone pain, weigh loss.

## 2023-01-21 NOTE — RESULTS/DATA
[FreeTextEntry1] : 1/20/2023 \par Most recent myeloma labs from 12/23/2022\par \par IgG Kappa disease \par No M-spike, no M-band \par IgG 931\par IgA 132\par IgM 32\par K 2.62\par L 1.45\par K/L 1.79 \par \par Most recent CBC from 1/12/2023\par Mild anemia (10.8 and thrombocytopenia (135K)\par \par \par 1/19/2022\par Stable myeloma labs with normal range values.Mild high Lappa lambda with normal ratio is likely due to his renal failure. \par MIld to moderate anemia is stable. \par \par 9/1/2021\par Last PCN labs in 06/2021 showed no M-spike, M-band, Normal FLCR.\par \par 6/9/2021\par Last Myeloma labs in 03/2021 showed no M-spike, M-band, Normal FLCR. \par \par 1/25/2021\par Most recent SPEP / MICAH (1/8/2021) No monoclonal band \par \par ------------------------------------------------------------------------------------------\par   PET/CT Whole Body Oncology FDG             Final\par \par No Documents Attached\par \par \par \par \par   EXAM:  PETCT WB ONC FDG SUBS\par \par \par PROCEDURE DATE:  05/07/2020\par \par \par \par INTERPRETATION:  PROCEDURE:  PET/CT WHOLE BODY\par \par RADIOPHARMACEUTICAL:  9.97 mCi F-18, FDG, I.V.\par \par CLINICAL INFORMATION:  70-year-old male referred for follow-up of multiple myeloma on Zometa. Bone marrow biopsy done 3/4/2020 showed less than 5% plasma cells and repeat myeloma labs done on 3/9/2020, showed CT are. PET/CT is done as part of the subsequent treatment strategy evaluation.\par \par TECHNIQUE:  Fasting blood sugar measured prior to injection of radiopharmaceutical was 116 mg/dl. Following intravenous injection of the radiopharmaceutical and an uptake period of approximately 60 minutes, FDG-PET/CT was obtained on a  wedgies Discovery 710 from the vertex of the skull to the toes. Oral contrast was given during the uptake period. CT was performed during shallow respiration. The CT protocol was optimized for PET attenuation correction and to provide anatomic detail for localization of PET abnormalities. The CT protocol was not designed to produce and cannot replace state-of-the-art diagnostic CT images with specific imaging protocols for different body parts and indications. Images were reviewed on a dedicated workstation using multiplanar reconstruction.\par \par The standardized uptake values (SUV) are normalized to patient body weight and indicate the highest activity concentration (SUVmax) in a given disease site. All image numbers refer to the axial image number.\par \par COMPARISON:  FDG PET/CT dated 9/7/2019.\par \par OTHER STUDIES USED FOR CORRELATION: MRI of cervical spine dated 9/9/2019.\par \par FINDINGS:\par \par HEAD/NECK: Physiologic FDG activity in the brain.\par \par Mild hypermetabolism in left masseter muscle, decreased as compared to prior study, may be secondary to bruxism. Elongated focus of increased FDG activity in left lower cervical region may reflect inflammation or muscle spasm (image 77).\par \par New hypermetabolic lucency in left maxillary alveolus is nonspecific (SUV 8.0; image 53). Recommend correlation with dental exam. A new small hypermetabolic focus is seen in the right maxilla (SUV 3.7; image 55).\par \par CHEST: Few new small hypermetabolic foci in bilateral hilar region likely correspond to small lymph nodes that are difficult to delineate on CT. For reference, left perihilar region demonstrates SUV 3.6 (image 117). Resolution of FDG-avid subcentimeter left posterior paraesophageal lymph node.\par \par LUNGS: No abnormal FDG activity. Calcified right lower lobe granuloma, unchanged (image 128).\par \par PLEURA/PERICARDIUM: No abnormal FDG activity. No effusion.\par \par HEPATOBILIARY/PANCREAS: Physiologic FDG activity. Liver SUV mean is 2.1; previous 2.3.\par \par SPLEEN: No abnormal FDG activity. Normal in size.\par \par ADRENAL GLANDS: No abnormal FDG activity. No nodule.\par \par KIDNEYS/URINARY BLADDER: Physiologic FDG activity. Multiple bilateral renal cysts, measuring up to 4.6 cm on the right, unchanged.\par \par PELVIC ORGANS: No abnormal FDG activity.\par \par ABDOMINOPELVIC NODES: No enlarged or FDG avid lymph node\par \par BOWEL/PERITONEUM/MESENTERY: Physiologic FDG activity. Resolution of hypermetabolism in distal esophagus/GE junction.\par \par BONES: Near total resolution of previously seen hypermetabolic foci in the axial skeleton, right humerus, and proximal bilateral femurs. Previously seen lytic lesions demonstrate new areas of sclerosis on CT. For reference, one of the most FDG-avid residual lesions is a mixed lytic and sclerotic lesion in the left scapula which demonstrates SUV 3.0 (image 98); previously lytic with SUV 5.7. Previously seen hypermetabolic lesion in right sphenoid bone has resolved. Previously seen large destructive lesion in the manubrium the sternum demonstrates new sclerosis with SUV 3.0 (image 103); previous SUV 5.3. Resolution of hypermetabolic intramedullary soft tissue in proximal right humerus. Near total resolution of hypermetabolic intramedullary soft tissue in proximal left femur (SUV 1.3; image 273; previous SUV 5.3).\par \par FDG-avid fracture in left inferior pubic ramus demonstrates increased callus formation and is similar in metabolism (SUV 3.8; image 250; previous SUV 3.1). Few new mildly FDG-avid bilateral rib fractures. For reference, fracture in the anterior aspect of the left second rib demonstrates SUV 3.4 (image 105).\par \par Resolution of hypermetabolism in left palmar musculature.\par \par IMPRESSION:  Abnormal whole body FDG-PET/CT scan.\par \par 1. Near total resolution of hypermetabolism associated with lytic and intramedullary lesions in the axial and appendicular skeleton as compared to prior study dated 9/7/2019. Previously seen lytic lesions demonstrate new sclerosis. Findings are compatible with response to interval therapy.\par \par 2. Few new mildly FDG-avid bilateral rib fractures. An FDG-avid fracture in the left inferior pubic ramus demonstrates increased callus formation and is similar in metabolism.\par \par 3. New hypermetabolic lucency in left maxillary alveolus and new small hypermetabolic focus in right maxilla are nonspecific. Recommend correlation with dental exam.\par \par 4. Few new mildly FDG-avid nonspecific bilateral hilar lymph nodes.\par \par 5. Resolution of many specific hypermetabolism in distal esophagus/GE junction.\par \par \par \par \par \par \par \par \par \par \par \par \par \par \par \par LUANA RAMOS M.D., NUCLEAR MEDICINE ATTENDING\par This document has been electronically signed. May  7 2020  4:08PM\par \par  \par \par  Ordered by: OZZY ALMEIDA       Collected/Examined: 07May2020 12:57PM       \par Verified by: OZZY ALMEIDA 11May2020 09:27AM       \par  Result Communication: Call patient with results,Discussed results with patient;\par Stage: Final       \par  Performed at: [facility]       Resulted: 07May2020 04:11PM       Last Updated: 11May2020 09:27AM       Accession: [id number]       \par \par \par  Pathology             Final\par \par No Documents Attached\par \par \par \par \par   [facility] Accession Number : [id number]\par \par HARDEEP, SUBASH                          2\par \par \par \par Cytopathology Report\par \par \par \par \par \par Final Diagnosis\par SOFT TISSUE, SACRUM, CT GUIDED CORE BIOPSY\par Plasma cell neoplasm\par See note and description.\par \par Diagnostic note:  Overall the morphologic and immunophenotypic\par findings are consistent with plasma cell neoplasm. The\par differential diagnosis includes plasmacytoma vs plasma cells\par myeloma. Correlation with laboratory, radiologic and clinical\par findings is required for further classification.\par \par Microscopic description:\par The touch prep slides are composed of numerous enlarged plasma\par cells with prominent nucleoli, occasional binucleated forms are\par seen.\par \par Histologic sections consist of multiple needle shaped cores of\par soft tissue showing proliferation of enlarged plasma cells with\par prominent nucleoli, forming sheets.\par \par Immunohistochemistry:  , kappaISH, lambdaISH, cyclinD1, p53\par stains performed on paraffin embedded section of block 1C.\par \par Neoplastic cells are:\par Positive:  , KappaISH, p53\par Negative:  LambdaISH, cyclinD1\par \par Flow cytometry analysis (17-YC-): Monotypic plasma cells\par (24.1% of total analyzed cells) are present. There are two\par aberrant monoclonal plasma cell populations:\par Population #1 (16.5% of total analyzed cells, 68.6% of plasma\par cell population) positive for cytoplasmic kappa, CD38 dimmer,\par CD45 dimmer, CD56, ; negative for , CD19, CD20.\par Population # 2 (7.6% of total analyzed cells, 31.4% of plasma\par cell population) positive for cytoplasmic kappa\par \par \par \par \par \par \par HARDEEP, SUBASH                          2\par \par \par \par Cytopathology Report\par \par \par \par \par (brighter), , CD38 brighter, CD45 dim, CD56 (brighter),\par ; negative for CD19, CD20.\par \par Screened by: Jose Juan LARA(ASCP)\par Verified by: Orlando Spicer MD\par (Electronic Signature)\par Reported on: 08/22/19 13:22 EDT, [address] [place] [address]\par [id number]\par Cytology technical processing performed at [address] [place] [id number]\par _________________________________________________________________\par \par \par Specimen(s) Submitted\par SOFT TISSUE, SACRUM, CT GUIDED CORE BIOPSY\par \par \par Statement of Adequacy\par Immediate cytologic study for adequacy of specimen using a Diff-\par Quik stain was performed at [facility], [address], [place], [place]. Touch\par imprints acceptable for further evaluation by Jose Juan Smith\par CT(ASCP).\par \par \par Clinical Information\par Sacral mass.\par \par \par Gross Description\par Core Biopsy:\par Tissue collected: Specimen container has been inspected to\par confirm patient?s name and date of birth, contains 3  tan cores\par measuring 1.0, 1.0, 0.8 cm in length (and multiple fragments).\par Entire specimen submitted in 2 cassette(s) labeled 1B and 1 C.\par \par 4 Touch prep slides ( 2 air dried + 2 fixed)\par \par FNA:\par No material submitted for cell block (No block 1A)\par \par Prepared:\par 4 Touch Prep,\par 1 core biopsy labeled 1B\par 1 core biopsy labeled 1C\par 6 Total slides\par \par  \par \par  Ordered by: DELORES MOSS       Collected/Examined: [id number] 03:14PM       \par Verified by: OZZY ALMEIDA 18Jun2020 03:07PM       \par  Result Communication: No patient communication needed at this time;\par Stage: Final       \par  Performed at: [facility]       Resulted: [id number] 01:22PM       Last Updated: 18Jun2020 03:07PM       Accession: [id number]       \par \par \par  Pathology             Final\par \par No Documents Attached\par \par \par \par \par   Marie Accession Number : [id number]\par \par HARDEEP, SUBASH                          4\par \par \par \par Addendum Report\par \par \par \par \par Hematopathology Addendum\par Comprehensive Hematopathology Report\par \par Final Diagnosis:\par 1, 2. Bone marrow biopsy and bone marrow aspirate\par - Plasma cell myeloma (10% with focal 25% by  stain)\par - Slight erythroid predominant trilineage hematopoiesis\par with maturation\par \par Diagnostic Note:\par Please note findings of a normal male karyotype and a negative\par multiple myeloma FISH panel. Based on the additional findings,\par the diagnosis has not changed. Correlation with studies for\par myeloma-related organ dysfunction is necessary.\par \par Morphology:\par Microscopic description:\par 1. Biopsy: Sections of bone marrow biopsy show normocellularity\par (60 to 70% cellularity), mild plasmacytosis with foci of small\par clusters, slight erythroid predominant trilineage hematopoiesis\par with maturation, mild megakaryocytosis with normal morphology,\par and increased iron stores.\par 2. Aspirate: Cellular spicules are not present, precluding\par differential count. Review of the smear shows some maturing and\par predominant mature myeloid cells, a few erythroid elements, and\par rare megakaryocytes.\par \par Ancillary Studies:\par Bone marrow aspirate iron stain: No spicules are present to\par evaluate for iron stores and there are insufficient nRBC to\par evaluate for ring sideroblasts.\par Congo red stain is negative for amyloidosis.\par Flow cytometry:  Monotypic plasma cells (1% of cells), positive\par for cytoplasmic kappa, CD38, , dimmer CD45, partial CD56;\par negative CD19, CD20, .\par CD45/side scatter shows no significant blast population. There is\par no increase in CD34,  or CD14 positive cells.\par Lymphocytes (8% of cells) show a heterogeneous population of T-\par cells (with normal CD4 to CD8 ratio), and polytypic B-cells.\par Immunohistochemical stains ( performed on the block 1A and\par 1B, cyclin-D1 performed on block 1A):   stains show overall\par approximately 10% plasma cells, with foci of small clusters,\par focal up to 25%. Plasma cells are negative for cyclin-D1.\par \par Cytogenetics:\par Result: Normal male karyotype\par Karyotype: 46,XY[20]\par \par \par \par \par \par \par HARDEEP, SUBASH                          4\par \par \par \par Addendum Report\par \par \par \par \par FISH:\par Result: NORMAL FISH -\par - MULTIPLE MYELOMA PANEL\par Probe(s) and Location(s): FGFR3(4p16), CCND1(11q13), RB1(13q14),\par IGH(14q32), MAF(16q23), TP53(17p13.1)\par \par Clinical History/Data:\par New diagnosis multiple myeloma\par CBC on 08/24/2019: WBC: 8.09 K/uL, HGB: 9.5 g/dL, MCV: 90 fl,\par Plt: 227 K/uL\par Serum Immunofixation: IgG kappa\par \par Verified by: Nancy Farris M.D.\par (Electronic Signature)\par Reported on: 09/07/19 10:37 EDT, [address], [place]\par [address]\par _________________________________________________________________\par \par \par Hematopathology Report\par \par \par \par \par Final Diagnosis\par 1, 2. Bone marrow biopsy and bone marrow aspirate\par - Plasma cell myeloma (10% with focal 25% by  stain)\par - Slight erythroid predominant trilineage hematopoiesis\par with maturation\par \par See note and description.\par \par Diagnostic note:\par Correlation with studies for myeloma-related organ dysfunction is\par necessary.\par Comprehensive report with results of pending ancillary studies to\par follow.\par \par Ancillary studies\par Bone marrow aspirate iron stain: No spicules are present to\par evaluate for iron stores and there are insufficient nRBC to\par evaluate for ring sideroblasts.\par \par Flow cytometry:  Monotypic plasma cells (1% of cells), positive\par for cytoplasmic kappa, CD38, , dimmer CD45, partial CD56;\par negative CD19, CD20, .\par CD45/side scatter shows no significant blast population. There is\par no increase in CD34,  or CD14 positive cells.\par \par \par \par \par \par \par HARDEEP, SUBASH                          4\par \par \par \par Hematopathology Report\par \par \par \par \par Lymphocytes (8% of cells) show a heterogeneous population of T-\par cells (with normal CD4 to CD8 ratio), and polytypic B-cells.\par \par Immunohistochemical stains ( performed on the block 1A and\par 1B, cyclin-D1 performed on block 1A):   stains show overall\par approximately 10% plasma cells, with foci of small clusters,\par focal up to 25%. Plasma cells are negative for cyclin-D1.\par \par Microscopic description:\par 1. Biopsy: Sections of bone marrow biopsy show normocellularity\par (60 to 70% cellularity), mild plasmacytosis with foci of small\par clusters, slight erythroid predominant trilineage hematopoiesis\par with maturation, mild megakaryocytosis with normal morphology,\par and increased iron stores.\par \par 2. Aspirate: Cellular spicules are not present, precluding\par differential count. Review of the smear shows some maturing and\par predominant mature myeloid cells, a few erythroid elements, and\par rare megakaryocytes.\par \par Comment:  Iron stain (examined to evaluate for iron stores; see\par microscopic description) and Giemsa stain (shows appropriate\par staining pattern) are performed and evaluated on block(s): 1A,\par 1B.\par \par Slide(s) with built in immunohistochemical study control(s)\par associated and negative control with this case has/have been\par verified by the sign out pathologist.\par For slide(s) without built in controls positive control slides\par has/have been reviewed and approved by immunohistochemistry lab\par These immunohistochemical/ in-situ hybridization tests have been\par developed and their performance characteristics determined by\par [facility], Department of\par Pathology, Division of Immunopathology, [address] [place] [address]\[place] [address].  It has not been cleared or approved by the\par U.S. Food and Drug Administration.  The FDA has determined that\par such clearance or approval is not necessary.  This test is used\par for clinical purposes.  The laboratory is certified under the\par CLIA-88 as qualified to perform high\par complexity clinical testing.\par \par Verified by: Nancy Farris M.D.\par (Electronic Signature)\par Reported on: 08/27/19 10:00 EDT, [address] [place] [address]\par [id number]\par _________________________________________________________________\par \par \par \par \par \par \par \par HARDEEP, SUBASH                          4\par \par \par \par Hematopathology Report\par \par \par \par \par Clinical History\par New diagnosis multiple myeloma\par CBC on 08/24/2019: WBC: 8.09 K/uL, HGB: 9.5 g/dL, MCV: 90 fl,\par Plt: 227 K/uL\par Serum Immunofixation: IgG kappa\par \par Specimen(s) Submitted\par 1     Bone marrow biopsy left\par 2     Bone marrow aspirate\par \par Gross Description\par 1. The specimen is received in bouin's fixative and the specimen\par container is labeled: Left bone marrow biopsy.  It consists of a\par 0.5 x 0.2 cm bone marrow core with a 1.5 x 1.3 x 0.5 cm blood\par clot.  Entirely submitted.  Two cassettes: A = bone marrow core;\par B = blood clot.\par 2. The specimen is received labeled with patient's name and\par consists of 3 air dried slide(s). 2 slide(s) stained with Monzon\par Giemsa stain. 1 stained for iron stain.\par In addition to other data that may appear on the specimen\par container, the label has been inspected to confirm the presence\par of the patient's name and date of birth.\par Catarina Torres 08/22/2019 17:12\par \par  \par \par  Ordered by: BECKY BILLS       Collected/Examined: [id number] 03:50PM       \par Verified by: BECKY BILLS [id number] 05:42PM       \par  Result Communication: No patient communication needed at this time;\par Stage: Final       \par  Performed at: [facility]       Resulted: [id number] 10:37AM       Last Updated: 07Sep2019 05:42PM       Accession: [id number]

## 2023-01-27 ENCOUNTER — RESULT REVIEW (OUTPATIENT)
Age: 73
End: 2023-01-27

## 2023-01-27 ENCOUNTER — APPOINTMENT (OUTPATIENT)
Dept: INFUSION THERAPY | Facility: HOSPITAL | Age: 73
End: 2023-01-27

## 2023-01-27 LAB
BASOPHILS # BLD AUTO: 0.06 K/UL — SIGNIFICANT CHANGE UP (ref 0–0.2)
BASOPHILS # BLD AUTO: 0.07 K/UL — SIGNIFICANT CHANGE UP (ref 0–0.2)
BASOPHILS NFR BLD AUTO: 0.9 % — SIGNIFICANT CHANGE UP (ref 0–2)
BASOPHILS NFR BLD AUTO: 1 % — SIGNIFICANT CHANGE UP (ref 0–2)
EOSINOPHIL # BLD AUTO: 0.65 K/UL — HIGH (ref 0–0.5)
EOSINOPHIL # BLD AUTO: 0.71 K/UL — HIGH (ref 0–0.5)
EOSINOPHIL NFR BLD AUTO: 10.5 % — HIGH (ref 0–6)
EOSINOPHIL NFR BLD AUTO: 9.3 % — HIGH (ref 0–6)
HCT VFR BLD CALC: 26.4 % — LOW (ref 39–50)
HCT VFR BLD CALC: 27.2 % — LOW (ref 39–50)
HGB BLD-MCNC: 9.2 G/DL — LOW (ref 13–17)
HGB BLD-MCNC: 9.4 G/DL — LOW (ref 13–17)
IMM GRANULOCYTES NFR BLD AUTO: 0.1 % — SIGNIFICANT CHANGE UP (ref 0–0.9)
IMM GRANULOCYTES NFR BLD AUTO: 0.3 % — SIGNIFICANT CHANGE UP (ref 0–0.9)
LYMPHOCYTES # BLD AUTO: 0.9 K/UL — LOW (ref 1–3.3)
LYMPHOCYTES # BLD AUTO: 0.98 K/UL — LOW (ref 1–3.3)
LYMPHOCYTES # BLD AUTO: 13.3 % — SIGNIFICANT CHANGE UP (ref 13–44)
LYMPHOCYTES # BLD AUTO: 14 % — SIGNIFICANT CHANGE UP (ref 13–44)
MCHC RBC-ENTMCNC: 30.6 PG — SIGNIFICANT CHANGE UP (ref 27–34)
MCHC RBC-ENTMCNC: 30.6 PG — SIGNIFICANT CHANGE UP (ref 27–34)
MCHC RBC-ENTMCNC: 34.6 G/DL — SIGNIFICANT CHANGE UP (ref 32–36)
MCHC RBC-ENTMCNC: 34.8 G/DL — SIGNIFICANT CHANGE UP (ref 32–36)
MCV RBC AUTO: 87.7 FL — SIGNIFICANT CHANGE UP (ref 80–100)
MCV RBC AUTO: 88.6 FL — SIGNIFICANT CHANGE UP (ref 80–100)
MONOCYTES # BLD AUTO: 0.87 K/UL — SIGNIFICANT CHANGE UP (ref 0–0.9)
MONOCYTES # BLD AUTO: 0.97 K/UL — HIGH (ref 0–0.9)
MONOCYTES NFR BLD AUTO: 12.8 % — SIGNIFICANT CHANGE UP (ref 2–14)
MONOCYTES NFR BLD AUTO: 13.9 % — SIGNIFICANT CHANGE UP (ref 2–14)
NEUTROPHILS # BLD AUTO: 4.23 K/UL — SIGNIFICANT CHANGE UP (ref 1.8–7.4)
NEUTROPHILS # BLD AUTO: 4.31 K/UL — SIGNIFICANT CHANGE UP (ref 1.8–7.4)
NEUTROPHILS NFR BLD AUTO: 61.7 % — SIGNIFICANT CHANGE UP (ref 43–77)
NEUTROPHILS NFR BLD AUTO: 62.2 % — SIGNIFICANT CHANGE UP (ref 43–77)
NRBC # BLD: 0 /100 WBCS — SIGNIFICANT CHANGE UP (ref 0–0)
NRBC # BLD: 0 /100 WBCS — SIGNIFICANT CHANGE UP (ref 0–0)
PLATELET # BLD AUTO: 187 K/UL — SIGNIFICANT CHANGE UP (ref 150–400)
PLATELET # BLD AUTO: 201 K/UL — SIGNIFICANT CHANGE UP (ref 150–400)
RBC # BLD: 3.01 M/UL — LOW (ref 4.2–5.8)
RBC # BLD: 3.07 M/UL — LOW (ref 4.2–5.8)
RBC # FLD: 12.3 % — SIGNIFICANT CHANGE UP (ref 10.3–14.5)
RBC # FLD: 12.5 % — SIGNIFICANT CHANGE UP (ref 10.3–14.5)
RETICS #: 23 K/UL — LOW (ref 25–125)
RETICS/RBC NFR: 0.8 % — SIGNIFICANT CHANGE UP (ref 0.5–2.5)
WBC # BLD: 6.79 K/UL — SIGNIFICANT CHANGE UP (ref 3.8–10.5)
WBC # BLD: 6.99 K/UL — SIGNIFICANT CHANGE UP (ref 3.8–10.5)
WBC # FLD AUTO: 6.79 K/UL — SIGNIFICANT CHANGE UP (ref 3.8–10.5)
WBC # FLD AUTO: 6.99 K/UL — SIGNIFICANT CHANGE UP (ref 3.8–10.5)

## 2023-01-28 LAB
ALBUMIN SERPL ELPH-MCNC: 4.3 G/DL — SIGNIFICANT CHANGE UP (ref 3.3–5)
ALP SERPL-CCNC: 48 U/L — SIGNIFICANT CHANGE UP (ref 40–120)
ALT FLD-CCNC: 13 U/L — SIGNIFICANT CHANGE UP (ref 10–45)
ANION GAP SERPL CALC-SCNC: 16 MMOL/L — SIGNIFICANT CHANGE UP (ref 5–17)
AST SERPL-CCNC: 21 U/L — SIGNIFICANT CHANGE UP (ref 10–40)
BILIRUB SERPL-MCNC: 0.3 MG/DL — SIGNIFICANT CHANGE UP (ref 0.2–1.2)
BUN SERPL-MCNC: 29 MG/DL — HIGH (ref 7–23)
CALCIUM SERPL-MCNC: 9.4 MG/DL — SIGNIFICANT CHANGE UP (ref 8.4–10.5)
CHLORIDE SERPL-SCNC: 103 MMOL/L — SIGNIFICANT CHANGE UP (ref 96–108)
CO2 SERPL-SCNC: 19 MMOL/L — LOW (ref 22–31)
CREAT SERPL-MCNC: 1.52 MG/DL — HIGH (ref 0.5–1.3)
EGFR: 48 ML/MIN/1.73M2 — LOW
GLUCOSE SERPL-MCNC: 96 MG/DL — SIGNIFICANT CHANGE UP (ref 70–99)
IGA FLD-MCNC: 123 MG/DL — SIGNIFICANT CHANGE UP (ref 84–499)
IGG FLD-MCNC: 802 MG/DL — SIGNIFICANT CHANGE UP (ref 610–1660)
IGM SERPL-MCNC: 25 MG/DL — LOW (ref 35–242)
KAPPA LC SER QL IFE: 3 MG/DL — HIGH (ref 0.33–1.94)
KAPPA/LAMBDA FREE LIGHT CHAIN RATIO, SERUM: 1.52 RATIO — SIGNIFICANT CHANGE UP (ref 0.26–1.65)
LAMBDA LC SER QL IFE: 1.97 MG/DL — SIGNIFICANT CHANGE UP (ref 0.57–2.63)
MAGNESIUM SERPL-MCNC: 2.2 MG/DL — SIGNIFICANT CHANGE UP (ref 1.6–2.6)
PHOSPHATE SERPL-MCNC: 3.6 MG/DL — SIGNIFICANT CHANGE UP (ref 2.5–4.5)
POTASSIUM SERPL-MCNC: 4.3 MMOL/L — SIGNIFICANT CHANGE UP (ref 3.5–5.3)
POTASSIUM SERPL-SCNC: 4.3 MMOL/L — SIGNIFICANT CHANGE UP (ref 3.5–5.3)
PROT SERPL-MCNC: 6.8 G/DL — SIGNIFICANT CHANGE UP (ref 6–8.3)
SODIUM SERPL-SCNC: 138 MMOL/L — SIGNIFICANT CHANGE UP (ref 135–145)
URATE SERPL-MCNC: 3.3 MG/DL — LOW (ref 3.4–8.8)

## 2023-01-30 ENCOUNTER — NON-APPOINTMENT (OUTPATIENT)
Age: 73
End: 2023-01-30

## 2023-01-31 DIAGNOSIS — R11.2 NAUSEA WITH VOMITING, UNSPECIFIED: ICD-10-CM

## 2023-01-31 DIAGNOSIS — Z51.11 ENCOUNTER FOR ANTINEOPLASTIC CHEMOTHERAPY: ICD-10-CM

## 2023-01-31 LAB
% ALBUMIN: 55.3 % — SIGNIFICANT CHANGE UP
% ALPHA 1: 6.3 % — SIGNIFICANT CHANGE UP
% ALPHA 2: 16.7 % — SIGNIFICANT CHANGE UP
% BETA: 11.6 % — SIGNIFICANT CHANGE UP
% GAMMA: 10.1 % — SIGNIFICANT CHANGE UP
ALBUMIN SERPL ELPH-MCNC: 3.8 G/DL — SIGNIFICANT CHANGE UP (ref 3.6–5.5)
ALBUMIN/GLOB SERPL ELPH: 1.3 RATIO — SIGNIFICANT CHANGE UP
ALPHA1 GLOB SERPL ELPH-MCNC: 0.4 G/DL — SIGNIFICANT CHANGE UP (ref 0.1–0.4)
ALPHA2 GLOB SERPL ELPH-MCNC: 1.1 G/DL — HIGH (ref 0.5–1)
B-GLOBULIN SERPL ELPH-MCNC: 0.8 G/DL — SIGNIFICANT CHANGE UP (ref 0.5–1)
GAMMA GLOBULIN: 0.7 G/DL — SIGNIFICANT CHANGE UP (ref 0.6–1.6)
INTERPRETATION SERPL IFE-IMP: SIGNIFICANT CHANGE UP
PROT PATTERN SERPL ELPH-IMP: SIGNIFICANT CHANGE UP

## 2023-02-03 ENCOUNTER — APPOINTMENT (OUTPATIENT)
Dept: INFUSION THERAPY | Facility: HOSPITAL | Age: 73
End: 2023-02-03

## 2023-02-03 ENCOUNTER — RESULT REVIEW (OUTPATIENT)
Age: 73
End: 2023-02-03

## 2023-02-03 LAB
BASOPHILS # BLD AUTO: 0.04 K/UL — SIGNIFICANT CHANGE UP (ref 0–0.2)
BASOPHILS NFR BLD AUTO: 0.7 % — SIGNIFICANT CHANGE UP (ref 0–2)
EOSINOPHIL # BLD AUTO: 0.81 K/UL — HIGH (ref 0–0.5)
EOSINOPHIL NFR BLD AUTO: 13.3 % — HIGH (ref 0–6)
HCT VFR BLD CALC: 29.3 % — LOW (ref 39–50)
HGB BLD-MCNC: 9.9 G/DL — LOW (ref 13–17)
IMM GRANULOCYTES NFR BLD AUTO: 0.3 % — SIGNIFICANT CHANGE UP (ref 0–0.9)
LYMPHOCYTES # BLD AUTO: 0.72 K/UL — LOW (ref 1–3.3)
LYMPHOCYTES # BLD AUTO: 11.8 % — LOW (ref 13–44)
MCHC RBC-ENTMCNC: 30 PG — SIGNIFICANT CHANGE UP (ref 27–34)
MCHC RBC-ENTMCNC: 33.8 G/DL — SIGNIFICANT CHANGE UP (ref 32–36)
MCV RBC AUTO: 88.8 FL — SIGNIFICANT CHANGE UP (ref 80–100)
MONOCYTES # BLD AUTO: 1.34 K/UL — HIGH (ref 0–0.9)
MONOCYTES NFR BLD AUTO: 21.9 % — HIGH (ref 2–14)
NEUTROPHILS # BLD AUTO: 3.18 K/UL — SIGNIFICANT CHANGE UP (ref 1.8–7.4)
NEUTROPHILS NFR BLD AUTO: 52 % — SIGNIFICANT CHANGE UP (ref 43–77)
NRBC # BLD: 0 /100 WBCS — SIGNIFICANT CHANGE UP (ref 0–0)
PLATELET # BLD AUTO: 160 K/UL — SIGNIFICANT CHANGE UP (ref 150–400)
RBC # BLD: 3.3 M/UL — LOW (ref 4.2–5.8)
RBC # FLD: 12.7 % — SIGNIFICANT CHANGE UP (ref 10.3–14.5)
WBC # BLD: 6.11 K/UL — SIGNIFICANT CHANGE UP (ref 3.8–10.5)
WBC # FLD AUTO: 6.11 K/UL — SIGNIFICANT CHANGE UP (ref 3.8–10.5)

## 2023-02-09 ENCOUNTER — NON-APPOINTMENT (OUTPATIENT)
Age: 73
End: 2023-02-09

## 2023-02-10 ENCOUNTER — APPOINTMENT (OUTPATIENT)
Dept: INFUSION THERAPY | Facility: HOSPITAL | Age: 73
End: 2023-02-10

## 2023-02-16 ENCOUNTER — APPOINTMENT (OUTPATIENT)
Dept: HEMATOLOGY ONCOLOGY | Facility: CLINIC | Age: 73
End: 2023-02-16
Payer: MEDICARE

## 2023-02-16 VITALS
SYSTOLIC BLOOD PRESSURE: 136 MMHG | WEIGHT: 124.56 LBS | TEMPERATURE: 96.8 F | RESPIRATION RATE: 16 BRPM | DIASTOLIC BLOOD PRESSURE: 71 MMHG | BODY MASS INDEX: 21.38 KG/M2 | OXYGEN SATURATION: 98 % | HEART RATE: 80 BPM

## 2023-02-16 PROCEDURE — 99213 OFFICE O/P EST LOW 20 MIN: CPT

## 2023-02-17 NOTE — RESULTS/DATA
[FreeTextEntry1] : 1/20/2023 \par Most recent myeloma labs from 12/23/2022\par \par IgG Kappa disease \par No M-spike, no M-band \par IgG 931\par IgA 132\par IgM 32\par K 2.62\par L 1.45\par K/L 1.79 \par \par Most recent CBC from 1/12/2023\par Mild anemia (10.8 and thrombocytopenia (135K)\par \par \par 1/19/2022\par Stable myeloma labs with normal range values.Mild high Lappa lambda with normal ratio is likely due to his renal failure. \par MIld to moderate anemia is stable. \par \par 9/1/2021\par Last PCN labs in 06/2021 showed no M-spike, M-band, Normal FLCR.\par \par 6/9/2021\par Last Myeloma labs in 03/2021 showed no M-spike, M-band, Normal FLCR. \par \par 1/25/2021\par Most recent SPEP / MICAH (1/8/2021) No monoclonal band \par \par ------------------------------------------------------------------------------------------\par   PET/CT Whole Body Oncology FDG             Final\par \par No Documents Attached\par \par \par \par \par   EXAM:  PETCT WB ONC FDG SUBS\par \par \par PROCEDURE DATE:  05/07/2020\par \par \par \par INTERPRETATION:  PROCEDURE:  PET/CT WHOLE BODY\par \par RADIOPHARMACEUTICAL:  9.97 mCi F-18, FDG, I.V.\par \par CLINICAL INFORMATION:  70-year-old male referred for follow-up of multiple myeloma on Zometa. Bone marrow biopsy done 3/4/2020 showed less than 5% plasma cells and repeat myeloma labs done on 3/9/2020, showed CT are. PET/CT is done as part of the subsequent treatment strategy evaluation.\par \par TECHNIQUE:  Fasting blood sugar measured prior to injection of radiopharmaceutical was 116 mg/dl. Following intravenous injection of the radiopharmaceutical and an uptake period of approximately 60 minutes, FDG-PET/CT was obtained on a  [employer] 710 from the vertex of the skull to the toes. Oral contrast was given during the uptake period. CT was performed during shallow respiration. The CT protocol was optimized for PET attenuation correction and to provide anatomic detail for localization of PET abnormalities. The CT protocol was not designed to produce and cannot replace state-of-the-art diagnostic CT images with specific imaging protocols for different body parts and indications. Images were reviewed on a dedicated workstation using multiplanar reconstruction.\par \par The standardized uptake values (SUV) are normalized to patient body weight and indicate the highest activity concentration (SUVmax) in a given disease site. All image numbers refer to the axial image number.\par \par COMPARISON:  FDG PET/CT dated 9/7/2019.\par \par OTHER STUDIES USED FOR CORRELATION: MRI of cervical spine dated 9/9/2019.\par \par FINDINGS:\par \par HEAD/NECK: Physiologic FDG activity in the brain.\par \par Mild hypermetabolism in left masseter muscle, decreased as compared to prior study, may be secondary to bruxism. Elongated focus of increased FDG activity in left lower cervical region may reflect inflammation or muscle spasm (image 77).\par \par New hypermetabolic lucency in left maxillary alveolus is nonspecific (SUV 8.0; image 53). Recommend correlation with dental exam. A new small hypermetabolic focus is seen in the right maxilla (SUV 3.7; image 55).\par \par CHEST: Few new small hypermetabolic foci in bilateral hilar region likely correspond to small lymph nodes that are difficult to delineate on CT. For reference, left perihilar region demonstrates SUV 3.6 (image 117). Resolution of FDG-avid subcentimeter left posterior paraesophageal lymph node.\par \par LUNGS: No abnormal FDG activity. Calcified right lower lobe granuloma, unchanged (image 128).\par \par PLEURA/PERICARDIUM: No abnormal FDG activity. No effusion.\par \par HEPATOBILIARY/PANCREAS: Physiologic FDG activity. Liver SUV mean is 2.1; previous 2.3.\par \par SPLEEN: No abnormal FDG activity. Normal in size.\par \par ADRENAL GLANDS: No abnormal FDG activity. No nodule.\par \par KIDNEYS/URINARY BLADDER: Physiologic FDG activity. Multiple bilateral renal cysts, measuring up to 4.6 cm on the right, unchanged.\par \par PELVIC ORGANS: No abnormal FDG activity.\par \par ABDOMINOPELVIC NODES: No enlarged or FDG avid lymph node\par \par BOWEL/PERITONEUM/MESENTERY: Physiologic FDG activity. Resolution of hypermetabolism in distal esophagus/GE junction.\par \par BONES: Near total resolution of previously seen hypermetabolic foci in the axial skeleton, right humerus, and proximal bilateral femurs. Previously seen lytic lesions demonstrate new areas of sclerosis on CT. For reference, one of the most FDG-avid residual lesions is a mixed lytic and sclerotic lesion in the left scapula which demonstrates SUV 3.0 (image 98); previously lytic with SUV 5.7. Previously seen hypermetabolic lesion in right sphenoid bone has resolved. Previously seen large destructive lesion in the manubrium the sternum demonstrates new sclerosis with SUV 3.0 (image 103); previous SUV 5.3. Resolution of hypermetabolic intramedullary soft tissue in proximal right humerus. Near total resolution of hypermetabolic intramedullary soft tissue in proximal left femur (SUV 1.3; image 273; previous SUV 5.3).\par \par FDG-avid fracture in left inferior pubic ramus demonstrates increased callus formation and is similar in metabolism (SUV 3.8; image 250; previous SUV 3.1). Few new mildly FDG-avid bilateral rib fractures. For reference, fracture in the anterior aspect of the left second rib demonstrates SUV 3.4 (image 105).\par \par Resolution of hypermetabolism in left palmar musculature.\par \par IMPRESSION:  Abnormal whole body FDG-PET/CT scan.\par \par 1. Near total resolution of hypermetabolism associated with lytic and intramedullary lesions in the axial and appendicular skeleton as compared to prior study dated 9/7/2019. Previously seen lytic lesions demonstrate new sclerosis. Findings are compatible with response to interval therapy.\par \par 2. Few new mildly FDG-avid bilateral rib fractures. An FDG-avid fracture in the left inferior pubic ramus demonstrates increased callus formation and is similar in metabolism.\par \par 3. New hypermetabolic lucency in left maxillary alveolus and new small hypermetabolic focus in right maxilla are nonspecific. Recommend correlation with dental exam.\par \par 4. Few new mildly FDG-avid nonspecific bilateral hilar lymph nodes.\par \par 5. Resolution of many specific hypermetabolism in distal esophagus/GE junction.\par \par \par \par \par \par \par \par \par \par \par \par \par \par \par \par LUANA RAMOS M.D., NUCLEAR MEDICINE ATTENDING\par This document has been electronically signed. May  7 2020  4:08PM\par \par  \par \par  Ordered by: OZZY ALMEIDA       Collected/Examined: 07May2020 12:57PM       \par Verified by: OZZY ALMEIDA 11May2020 09:27AM       \par  Result Communication: Call patient with results,Discussed results with patient;\par Stage: Final       \par  Performed at: [facility]       Resulted: 07May2020 04:11PM       Last Updated: 11May2020 09:27AM       Accession: [id number]       \par \par \par  Pathology             Final\par \par No Documents Attached\par \par \par \par \par   [facility] Accession Number : [id number]\par \par HARDEEP, SUBASH                          2\par \par \par \par Cytopathology Report\par \par \par \par \par \par Final Diagnosis\par SOFT TISSUE, SACRUM, CT GUIDED CORE BIOPSY\par Plasma cell neoplasm\par See note and description.\par \par Diagnostic note:  Overall the morphologic and immunophenotypic\par findings are consistent with plasma cell neoplasm. The\par differential diagnosis includes plasmacytoma vs plasma cells\par myeloma. Correlation with laboratory, radiologic and clinical\par findings is required for further classification.\par \par Microscopic description:\par The touch prep slides are composed of numerous enlarged plasma\par cells with prominent nucleoli, occasional binucleated forms are\par seen.\par \par Histologic sections consist of multiple needle shaped cores of\par soft tissue showing proliferation of enlarged plasma cells with\par prominent nucleoli, forming sheets.\par \par Immunohistochemistry:  , kappaISH, lambdaISH, cyclinD1, p53\par stains performed on paraffin embedded section of block 1C.\par \par Neoplastic cells are:\par Positive:  , KappaISH, p53\par Negative:  LambdaISH, cyclinD1\par \par Flow cytometry analysis (19-VA-): Monotypic plasma cells\par (24.1% of total analyzed cells) are present. There are two\par aberrant monoclonal plasma cell populations:\par Population #1 (16.5% of total analyzed cells, 68.6% of plasma\par cell population) positive for cytoplasmic kappa, CD38 dimmer,\par CD45 dimmer, CD56, ; negative for , CD19, CD20.\par Population # 2 (7.6% of total analyzed cells, 31.4% of plasma\par cell population) positive for cytoplasmic kappa\par \par \par \par \par \par \par HARDEEP, SUBASH                          2\par \par \par \par Cytopathology Report\par \par \par \par \par (brighter), , CD38 brighter, CD45 dim, CD56 (brighter),\par ; negative for CD19, CD20.\par \par Screened by: Jose Juan LARA(ASCP)\par Verified by: Orlando Spicer MD\par (Electronic Signature)\par Reported on: 08/22/19 13:22 EDT, [address] [place] [address]\par [id number]\par Cytology technical processing performed at [address] [place] [id number]\par _________________________________________________________________\par \par \par Specimen(s) Submitted\par SOFT TISSUE, SACRUM, CT GUIDED CORE BIOPSY\par \par \par Statement of Adequacy\par Immediate cytologic study for adequacy of specimen using a Diff-\par Quik stain was performed at [facility], [address], [place], [place]. Touch\par imprints acceptable for further evaluation by Jose Juan Smith\par CT(ASCP).\par \par \par Clinical Information\par Sacral mass.\par \par \par Gross Description\par Core Biopsy:\par Tissue collected: Specimen container has been inspected to\par confirm patient?s name and date of birth, contains 3  tan cores\par measuring 1.0, 1.0, 0.8 cm in length (and multiple fragments).\par Entire specimen submitted in 2 cassette(s) labeled 1B and 1 C.\par \par 4 Touch prep slides ( 2 air dried + 2 fixed)\par \par FNA:\par No material submitted for cell block (No block 1A)\par \par Prepared:\par 4 Touch Prep,\par 1 core biopsy labeled 1B\par 1 core biopsy labeled 1C\par 6 Total slides\par \par  \par \par  Ordered by: DELORES MOSS       Collected/Examined: [id number] 03:14PM       \par Verified by: OZZY ALMEIDA 18Jun2020 03:07PM       \par  Result Communication: No patient communication needed at this time;\par Stage: Final       \par  Performed at: [facility]       Resulted: [id number] 01:22PM       Last Updated: 18Jun2020 03:07PM       Accession: [id number]       \par \par \par  Pathology             Final\par \par No Documents Attached\par \par \par \par \par   Marie Accession Number : [id number]\par \par HARDEEP, SUBASH                          4\par \par \par \par Addendum Report\par \par \par \par \par Hematopathology Addendum\par Comprehensive Hematopathology Report\par \par Final Diagnosis:\par 1, 2. Bone marrow biopsy and bone marrow aspirate\par - Plasma cell myeloma (10% with focal 25% by  stain)\par - Slight erythroid predominant trilineage hematopoiesis\par with maturation\par \par Diagnostic Note:\par Please note findings of a normal male karyotype and a negative\par multiple myeloma FISH panel. Based on the additional findings,\par the diagnosis has not changed. Correlation with studies for\par myeloma-related organ dysfunction is necessary.\par \par Morphology:\par Microscopic description:\par 1. Biopsy: Sections of bone marrow biopsy show normocellularity\par (60 to 70% cellularity), mild plasmacytosis with foci of small\par clusters, slight erythroid predominant trilineage hematopoiesis\par with maturation, mild megakaryocytosis with normal morphology,\par and increased iron stores.\par 2. Aspirate: Cellular spicules are not present, precluding\par differential count. Review of the smear shows some maturing and\par predominant mature myeloid cells, a few erythroid elements, and\par rare megakaryocytes.\par \par Ancillary Studies:\par Bone marrow aspirate iron stain: No spicules are present to\par evaluate for iron stores and there are insufficient nRBC to\par evaluate for ring sideroblasts.\par Congo red stain is negative for amyloidosis.\par Flow cytometry:  Monotypic plasma cells (1% of cells), positive\par for cytoplasmic kappa, CD38, , dimmer CD45, partial CD56;\par negative CD19, CD20, .\par CD45/side scatter shows no significant blast population. There is\par no increase in CD34,  or CD14 positive cells.\par Lymphocytes (8% of cells) show a heterogeneous population of T-\par cells (with normal CD4 to CD8 ratio), and polytypic B-cells.\par Immunohistochemical stains ( performed on the block 1A and\par 1B, cyclin-D1 performed on block 1A):   stains show overall\par approximately 10% plasma cells, with foci of small clusters,\par focal up to 25%. Plasma cells are negative for cyclin-D1.\par \par Cytogenetics:\par Result: Normal male karyotype\par Karyotype: 46,XY[20]\par \par \par \par \par \par \par HARDEEP, SUBASH                          4\par \par \par \par Addendum Report\par \par \par \par \par FISH:\par Result: NORMAL FISH -\par - MULTIPLE MYELOMA PANEL\par Probe(s) and Location(s): FGFR3(4p16), CCND1(11q13), RB1(13q14),\par IGH(14q32), MAF(16q23), TP53(17p13.1)\par \par Clinical History/Data:\par New diagnosis multiple myeloma\par CBC on 08/24/2019: WBC: 8.09 K/uL, HGB: 9.5 g/dL, MCV: 90 fl,\par Plt: 227 K/uL\par Serum Immunofixation: IgG kappa\par \par Verified by: Nancy Farris M.D.\par (Electronic Signature)\par Reported on: 09/07/19 10:37 EDT, [address], [place], [place]\par [address]\par _________________________________________________________________\par \par \par Hematopathology Report\par \par \par \par \par Final Diagnosis\par 1, 2. Bone marrow biopsy and bone marrow aspirate\par - Plasma cell myeloma (10% with focal 25% by  stain)\par - Slight erythroid predominant trilineage hematopoiesis\par with maturation\par \par See note and description.\par \par Diagnostic note:\par Correlation with studies for myeloma-related organ dysfunction is\par necessary.\par Comprehensive report with results of pending ancillary studies to\par follow.\par \par Ancillary studies\par Bone marrow aspirate iron stain: No spicules are present to\par evaluate for iron stores and there are insufficient nRBC to\par evaluate for ring sideroblasts.\par \par Flow cytometry:  Monotypic plasma cells (1% of cells), positive\par for cytoplasmic kappa, CD38, , dimmer CD45, partial CD56;\par negative CD19, CD20, .\par CD45/side scatter shows no significant blast population. There is\par no increase in CD34,  or CD14 positive cells.\par \par \par \par \par \par \par HARDEEP, SUBASH                          4\par \par \par \par Hematopathology Report\par \par \par \par \par Lymphocytes (8% of cells) show a heterogeneous population of T-\par cells (with normal CD4 to CD8 ratio), and polytypic B-cells.\par \par Immunohistochemical stains ( performed on the block 1A and\par 1B, cyclin-D1 performed on block 1A):   stains show overall\par approximately 10% plasma cells, with foci of small clusters,\par focal up to 25%. Plasma cells are negative for cyclin-D1.\par \par Microscopic description:\par 1. Biopsy: Sections of bone marrow biopsy show normocellularity\par (60 to 70% cellularity), mild plasmacytosis with foci of small\par clusters, slight erythroid predominant trilineage hematopoiesis\par with maturation, mild megakaryocytosis with normal morphology,\par and increased iron stores.\par \par 2. Aspirate: Cellular spicules are not present, precluding\par differential count. Review of the smear shows some maturing and\par predominant mature myeloid cells, a few erythroid elements, and\par rare megakaryocytes.\par \par Comment:  Iron stain (examined to evaluate for iron stores; see\par microscopic description) and Giemsa stain (shows appropriate\par staining pattern) are performed and evaluated on block(s): 1A,\par 1B.\par \par Slide(s) with built in immunohistochemical study control(s)\par associated and negative control with this case has/have been\par verified by the sign out pathologist.\par For slide(s) without built in controls positive control slides\par has/have been reviewed and approved by immunohistochemistry lab\par These immunohistochemical/ in-situ hybridization tests have been\par developed and their performance characteristics determined by\par [facility], Department of\par Pathology, Division of Immunopathology, [address] [place] [address]\[place] [address].  It has not been cleared or approved by the\par U.S. Food and Drug Administration.  The FDA has determined that\par such clearance or approval is not necessary.  This test is used\par for clinical purposes.  The laboratory is certified under the\par CLIA-88 as qualified to perform high\par complexity clinical testing.\par \par Verified by: Nancy Farris M.D.\par (Electronic Signature)\par Reported on: 08/27/19 10:00 EDT, [address] [place] [address]\par [id number]\par _________________________________________________________________\par \par \par \par \par \par \par \par HARDEEP, SUBASH                          4\par \par \par \par Hematopathology Report\par \par \par \par \par Clinical History\par New diagnosis multiple myeloma\par CBC on 08/24/2019: WBC: 8.09 K/uL, HGB: 9.5 g/dL, MCV: 90 fl,\par Plt: 227 K/uL\par Serum Immunofixation: IgG kappa\par \par Specimen(s) Submitted\par 1     Bone marrow biopsy left\par 2     Bone marrow aspirate\par \par Gross Description\par 1. The specimen is received in bouin's fixative and the specimen\par container is labeled: Left bone marrow biopsy.  It consists of a\par 0.5 x 0.2 cm bone marrow core with a 1.5 x 1.3 x 0.5 cm blood\par clot.  Entirely submitted.  Two cassettes: A = bone marrow core;\par B = blood clot.\par 2. The specimen is received labeled with patient's name and\par consists of 3 air dried slide(s). 2 slide(s) stained with Monzon\par Giemsa stain. 1 stained for iron stain.\par In addition to other data that may appear on the specimen\par container, the label has been inspected to confirm the presence\par of the patient's name and date of birth.\par Catarina Torres 08/22/2019 17:12\par \par  \par \par  Ordered by: BECKY BILLS       Collected/Examined: [id number] 03:50PM       \par Verified by: BECKY BILLS [id number] 05:42PM       \par  Result Communication: No patient communication needed at this time;\par Stage: Final       \par  Performed at: [facility]       Resulted: [id number] 10:37AM       Last Updated: 07Sep2019 05:42PM       Accession: [id number]

## 2023-02-17 NOTE — ASSESSMENT
[FreeTextEntry1] : 72 year-old Mr. MEIER is seen in follow up for IgG Kappa Multiple Myeloma presented with lytic percy lesions, diagnosed in 9/2019. He received XRT to percy lesions,was started on RVD induction, achieved VGPR. Last PET 05/2020 - markedly improved and reduced FDG avidity throughout. His most recent SPEP/MICAH are normal, and FLCR has normalized as well. He is in sCR. Missed last week treatment due to cold. Will reschedule for next week. 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 \par - MRI T-spine (9/25/22): abnormal signal involving T8 vertebral body which may be related to multiple myeloma\par - Repeat T-spine MRI in 3 months ~3/2/2023, to re-assess previous findings as there was no definitive evidence of myeloma involvement. Will repeat in 3 months from 12/2/22 as he has not received any myeloma treatment since his last dose of Velcade on 3/3/22. \par - Continue treatment regimen with Velcade 3 week on,  1 week off, Dex 20mg weekly, and monthly Zometa\par - Achieved sCR as of 01/2023 labs \par - Continue ppx medications (Acyclovir, ASA) \par - Zometa q monthly (started 11/2019); will consider role of Zometa after a total of 24 doses (2 years) is achieved.\par - Follow up monthly\par \par # Anemia \par - Multifactorial origin including CKD, CKD now improving therefore will trend Hgb further before intervention.\par - Anemia w/u completed on 11/23/22\par \par

## 2023-02-17 NOTE — HISTORY OF PRESENT ILLNESS
[Disease:__________________________] : Disease: [unfilled] [Treatment Protocol] : Treatment Protocol [de-identified] : As per Dr Kilgore's note on 8/18/2020\par \par "Mr Rand was referred to my office for newly diagnosed IgG kappa multiple myeloma. The patient's primary language is Irish, he has his daughter Mirlande as the , per his wishes. He was seen by Dr. Andrés Valle (hematology) in Feb 2019 and had a BM bx showing 10-15% plasma cells, concerning for smoldering myeloma. According to the daughter, he was awaiting insurance approval for imaging studies. He was admitted from 8/18/19 at [facility] where he presented with pain in the L leg/lower back, worsening for the past 2-3 months. On labs, he was found to have a total protein of 10, Ca level 12 until 8/29/19. Dental consult was called and patient needs to have 10 teeth extracted -thus, IV bisphosphonates were not given, pt improved with hydration. He also had a slightly inc'ed creatinine -improved after IVF. CT C/A/P 8/18/19 showed a lytic expansile soft tissue lesion centered in the manubrium measuring approximately 7.3 x 3.4 x 4.3 cm, invading both 1st ribs. There was also a A lytic expansile soft tissue lesion in the T8 vertebral body causing mass effect on spinal canal and obliterating left neural foramina at T7-8 and T8-9. ON 8/22/19 an MRI thoracic spine was done showing multiple areas of tumor involvement within the thoracic spine in keeping with the patient's history of multiple myeloma. Prominent lesion within C7 on the right incompletely seen.\par Large lesion within T8 on the left producing epidural tumor extension and moderate narrowing of the spinal canal with mild flattening of the spinal cord. He was evaluated on 8/22/19 by Dr. Foley (Rad Onc) and was given 5 fractions of XRT from 8/23/19 to 8/29/19 to C7 and T8. He was discharged home on 8/29/19 with follow up in the outpatient office, and on Dexamethasone 4mg po daily. \par \par  \par \par \par \par Interval History: The patient is being followed for IgG kappa MM with multiple bone lytic lesions, hypercalcemia (resolved) and mild anemia. He started Velcade/Dexa weekly on 9/919. The patient got the Revlimid and started it C1 day 1 on 10/21/19 and hed been tolerating it well. \par \par Starting in February, his blood counts became low -plt count 54, Hb 8.3 wbc 5.8. Revlimid was held -after 2/3/20. He got 1 shot Velcade/Dexa on 2/3/20, NO chemo since then. \par BM done on 3/4/20 to eval for residual disease (MM) vs. MDS. BM showed recovering marrow elements, plasma cells <5%. SPEP/MICAH from 3/9/20 showed NO monoclonal proteins, c/w CR. \par \par The PET scan from 5/7/20 showed L uptake maxillary -pt reports that he had dentures done in Jan 2020 and has discomfort from them. He did not go to the dentist during COVID Rib fractures -pt denied falls/trauma. Patient has dental appointment for next week. He has some discomfort in the upper teeth b/l. He also missed his Velcade SQ last week 'i did not know if i am supposed to continue it.' He has no neuropathy from it, tolerating well. "\par  [de-identified] : RISS- II [FreeTextEntry1] : s/p RT to C7/T8 spine\par RVD 9/2019 -6/2020 transitioned to maintenance velcade q2wk started 6/19/2020 [de-identified] : Oct 6, 2020\par Pt is here for initial visit with me. He has his dtr on the phone, prefers her included in conversation and declined an . Pt is feeling well. He is tolerating monthly zometa and maintenance velcade w/o any adverse effects. Last set of myeloma labs done in August showed stable remission. He is requesting refill of several meds today.\par ---------------------------------\par \par 1/25/2021\par Patient presents for a follow up. He has no complaints except infrequent short duration chills with no fever. He is on biweekly Velcade and monthly Zometa. His last treatment of Zometa was on 1/8/2021 and last Velcade on 1/22/2021. His most recent SPEP/MICAH was normal.   \par \par 6/10/2021\par Patient seen in follow up. He did not go to his home country as he planned. He does not endorse any big change in his health status. She saw his Kidney doctor who told him he is iron deficient (labs are not supportive). His anemia is multifactorial including CKD. His most recent myeloma labs (03/2021) show continued CR. \par \par 9/1/2021\par Mr Rand returns for a follow up. His daughter is accompanying him. He has no complaints. He reports that he develops diarrhea (one motion) the day after his Velcade treatment. He plans to visit his home town soon. He has some unavoidable things to take care of. Of note he has been in sCR. \par \par 1/19/2022\par Patient seen in follow up. He has done well in the interim. He also visited his home country in this interval. He was safe all along.  As regards to his disease, he is stable and appears to be in remission. He has no complaints.  \par \par 11/23/2022\par Patient presents to clinic after being lost to follow-up; went to his country for an extended visit from 3/10 - 9/8/2022. Was on maintenance velcade [id number] starting 6/19/2020, last dose was on 3/3/22. Was on monthly Zometa since 11/2019, last dose of zometa was on 2/17/2022. In the interim, he developed severe weakness and was hospitalized at [facility] (9/24-10/11/22) for Acute respiratory failure with hypoxia, and severe sepsis due to GBS. Was discharged to rehab, and as of yesterday he was discharged from rehab. Will now be doing PT at home. During inpatient work up, he was found to have diffuse lytic lesions noted in C/T/L spine CT scans (9/25/22) which were identified as "Multiple mixed lytic sclerotic lesions may be sequela of previously treated multiple myeloma". MRI of the C/T/L spine were done for follow up revealing an "abnormal signal involving T8 vertebral body which may be related to multiple myeloma". Was seen by neurosurgery, no acute intervention. In patient immunofixation did not reveal a monoclonal band; SPEP WNL; kappa/lambda ratio not diagnostic. Today he feels at baseline, but notes residual weakness from GBS. Patient denies bone pain, fever, chills, night sweats, back pain, headache, or peripheral neuropathy. Good appetite, stable weight.\par \par 12/23/2022\par Follow up. Today he feels at baseline, but notes residual weakness from GBS. Today is C1D22 of Velcade and dexamethasone since restarting it; he is tolerating the medications well. He has some questions requesting clarification on Acyclovir and dexamethasone, otherwise no other concerns. Patient denies bone pain, fever, chills, night sweats, back pain, headache, or peripheral neuropathy. Good appetite, stable weight. \par \par 1/20/2023 \par Patient seen in follow up for multiple myeloma. He was on maintenance Velcade. He went too his home country and missed treatment for more than two months. His disease progressed as noted upon return. He had new percy lesion. He was again started on the same induction regimen. He is also getting Zometa monthly. He has now achieved VGPR having only mild high Kappa, no M-spike/band. He offers no complaints. Denies any infection, fever, bone pain, weigh loss.  \par \par 2/16/2023\par Patient presents for a follow up. He missed his last treatment as he was having cough body ache. He saw his PCP and took a course of Azithromycin. His symptoms relieved. He is feeling well now. His treatment schedule for tomorrow (2/17/2023). Of note, his myeloma labs from 01/2023 are suggestive of sCR.

## 2023-02-24 ENCOUNTER — RESULT REVIEW (OUTPATIENT)
Age: 73
End: 2023-02-24

## 2023-02-24 ENCOUNTER — APPOINTMENT (OUTPATIENT)
Dept: INFUSION THERAPY | Facility: HOSPITAL | Age: 73
End: 2023-02-24

## 2023-02-24 LAB
BASOPHILS # BLD AUTO: 0.05 K/UL — SIGNIFICANT CHANGE UP (ref 0–0.2)
BASOPHILS NFR BLD AUTO: 0.9 % — SIGNIFICANT CHANGE UP (ref 0–2)
EOSINOPHIL # BLD AUTO: 0.17 K/UL — SIGNIFICANT CHANGE UP (ref 0–0.5)
EOSINOPHIL NFR BLD AUTO: 2.9 % — SIGNIFICANT CHANGE UP (ref 0–6)
HCT VFR BLD CALC: 28 % — LOW (ref 39–50)
HGB BLD-MCNC: 9.6 G/DL — LOW (ref 13–17)
IMM GRANULOCYTES NFR BLD AUTO: 1 % — HIGH (ref 0–0.9)
LYMPHOCYTES # BLD AUTO: 1.32 K/UL — SIGNIFICANT CHANGE UP (ref 1–3.3)
LYMPHOCYTES # BLD AUTO: 22.8 % — SIGNIFICANT CHANGE UP (ref 13–44)
MCHC RBC-ENTMCNC: 30.2 PG — SIGNIFICANT CHANGE UP (ref 27–34)
MCHC RBC-ENTMCNC: 34.3 G/DL — SIGNIFICANT CHANGE UP (ref 32–36)
MCV RBC AUTO: 88.1 FL — SIGNIFICANT CHANGE UP (ref 80–100)
MONOCYTES # BLD AUTO: 0.53 K/UL — SIGNIFICANT CHANGE UP (ref 0–0.9)
MONOCYTES NFR BLD AUTO: 9.2 % — SIGNIFICANT CHANGE UP (ref 2–14)
NEUTROPHILS # BLD AUTO: 3.65 K/UL — SIGNIFICANT CHANGE UP (ref 1.8–7.4)
NEUTROPHILS NFR BLD AUTO: 63.2 % — SIGNIFICANT CHANGE UP (ref 43–77)
NRBC # BLD: 0 /100 WBCS — SIGNIFICANT CHANGE UP (ref 0–0)
PLATELET # BLD AUTO: 168 K/UL — SIGNIFICANT CHANGE UP (ref 150–400)
RBC # BLD: 3.18 M/UL — LOW (ref 4.2–5.8)
RBC # FLD: 13.2 % — SIGNIFICANT CHANGE UP (ref 10.3–14.5)
WBC # BLD: 5.78 K/UL — SIGNIFICANT CHANGE UP (ref 3.8–10.5)
WBC # FLD AUTO: 5.78 K/UL — SIGNIFICANT CHANGE UP (ref 3.8–10.5)

## 2023-02-25 LAB
ALBUMIN SERPL ELPH-MCNC: 4.7 G/DL — SIGNIFICANT CHANGE UP (ref 3.3–5)
ALP SERPL-CCNC: 48 U/L — SIGNIFICANT CHANGE UP (ref 40–120)
ALT FLD-CCNC: 21 U/L — SIGNIFICANT CHANGE UP (ref 10–45)
ANION GAP SERPL CALC-SCNC: 18 MMOL/L — HIGH (ref 5–17)
AST SERPL-CCNC: 26 U/L — SIGNIFICANT CHANGE UP (ref 10–40)
BILIRUB SERPL-MCNC: 0.3 MG/DL — SIGNIFICANT CHANGE UP (ref 0.2–1.2)
BUN SERPL-MCNC: 27 MG/DL — HIGH (ref 7–23)
CALCIUM SERPL-MCNC: 10.1 MG/DL — SIGNIFICANT CHANGE UP (ref 8.4–10.5)
CHLORIDE SERPL-SCNC: 102 MMOL/L — SIGNIFICANT CHANGE UP (ref 96–108)
CO2 SERPL-SCNC: 19 MMOL/L — LOW (ref 22–31)
CREAT SERPL-MCNC: 1.47 MG/DL — HIGH (ref 0.5–1.3)
EGFR: 50 ML/MIN/1.73M2 — LOW
GLUCOSE SERPL-MCNC: 105 MG/DL — HIGH (ref 70–99)
LDH SERPL L TO P-CCNC: 276 U/L — HIGH (ref 50–242)
MAGNESIUM SERPL-MCNC: 2.3 MG/DL — SIGNIFICANT CHANGE UP (ref 1.6–2.6)
PHOSPHATE SERPL-MCNC: 4.9 MG/DL — HIGH (ref 2.5–4.5)
POTASSIUM SERPL-MCNC: 4.9 MMOL/L — SIGNIFICANT CHANGE UP (ref 3.5–5.3)
POTASSIUM SERPL-SCNC: 4.9 MMOL/L — SIGNIFICANT CHANGE UP (ref 3.5–5.3)
PROT SERPL-MCNC: 7.6 G/DL — SIGNIFICANT CHANGE UP (ref 6–8.3)
SODIUM SERPL-SCNC: 139 MMOL/L — SIGNIFICANT CHANGE UP (ref 135–145)
URATE SERPL-MCNC: 3 MG/DL — LOW (ref 3.4–8.8)

## 2023-02-27 LAB
IGA FLD-MCNC: 142 MG/DL — SIGNIFICANT CHANGE UP (ref 84–499)
IGG FLD-MCNC: 1177 MG/DL — SIGNIFICANT CHANGE UP (ref 610–1660)
IGM SERPL-MCNC: 37 MG/DL — SIGNIFICANT CHANGE UP (ref 35–242)
KAPPA LC SER QL IFE: 4 MG/DL — HIGH (ref 0.33–1.94)
KAPPA/LAMBDA FREE LIGHT CHAIN RATIO, SERUM: 2.12 RATIO — HIGH (ref 0.26–1.65)
LAMBDA LC SER QL IFE: 1.89 MG/DL — SIGNIFICANT CHANGE UP (ref 0.57–2.63)

## 2023-02-28 LAB
% ALBUMIN: 57.7 % — SIGNIFICANT CHANGE UP
% ALPHA 1: 4.8 % — SIGNIFICANT CHANGE UP
% ALPHA 2: 13.8 % — SIGNIFICANT CHANGE UP
% BETA: 10.4 % — SIGNIFICANT CHANGE UP
% GAMMA: 13.3 % — SIGNIFICANT CHANGE UP
% M SPIKE: SIGNIFICANT CHANGE UP
ALBUMIN SERPL ELPH-MCNC: 4.4 G/DL — SIGNIFICANT CHANGE UP (ref 3.6–5.5)
ALBUMIN/GLOB SERPL ELPH: 1.4 RATIO — SIGNIFICANT CHANGE UP
ALPHA1 GLOB SERPL ELPH-MCNC: 0.4 G/DL — SIGNIFICANT CHANGE UP (ref 0.1–0.4)
ALPHA2 GLOB SERPL ELPH-MCNC: 1 G/DL — SIGNIFICANT CHANGE UP (ref 0.5–1)
B-GLOBULIN SERPL ELPH-MCNC: 0.8 G/DL — SIGNIFICANT CHANGE UP (ref 0.5–1)
GAMMA GLOBULIN: 1 G/DL — SIGNIFICANT CHANGE UP (ref 0.6–1.6)
INTERPRETATION SERPL IFE-IMP: SIGNIFICANT CHANGE UP
M-SPIKE: SIGNIFICANT CHANGE UP (ref 0–0)
PROT PATTERN SERPL ELPH-IMP: SIGNIFICANT CHANGE UP

## 2023-03-03 ENCOUNTER — APPOINTMENT (OUTPATIENT)
Dept: INFUSION THERAPY | Facility: HOSPITAL | Age: 73
End: 2023-03-03

## 2023-03-03 ENCOUNTER — RESULT REVIEW (OUTPATIENT)
Age: 73
End: 2023-03-03

## 2023-03-03 LAB
BASOPHILS # BLD AUTO: 0.04 K/UL — SIGNIFICANT CHANGE UP (ref 0–0.2)
BASOPHILS NFR BLD AUTO: 0.7 % — SIGNIFICANT CHANGE UP (ref 0–2)
EOSINOPHIL # BLD AUTO: 0.43 K/UL — SIGNIFICANT CHANGE UP (ref 0–0.5)
EOSINOPHIL NFR BLD AUTO: 7.8 % — HIGH (ref 0–6)
HCT VFR BLD CALC: 27.6 % — LOW (ref 39–50)
HGB BLD-MCNC: 9.4 G/DL — LOW (ref 13–17)
IMM GRANULOCYTES NFR BLD AUTO: 0.7 % — SIGNIFICANT CHANGE UP (ref 0–0.9)
LYMPHOCYTES # BLD AUTO: 1.43 K/UL — SIGNIFICANT CHANGE UP (ref 1–3.3)
LYMPHOCYTES # BLD AUTO: 26 % — SIGNIFICANT CHANGE UP (ref 13–44)
MCHC RBC-ENTMCNC: 30 PG — SIGNIFICANT CHANGE UP (ref 27–34)
MCHC RBC-ENTMCNC: 34.1 G/DL — SIGNIFICANT CHANGE UP (ref 32–36)
MCV RBC AUTO: 88.2 FL — SIGNIFICANT CHANGE UP (ref 80–100)
MONOCYTES # BLD AUTO: 0.78 K/UL — SIGNIFICANT CHANGE UP (ref 0–0.9)
MONOCYTES NFR BLD AUTO: 14.2 % — HIGH (ref 2–14)
NEUTROPHILS # BLD AUTO: 2.78 K/UL — SIGNIFICANT CHANGE UP (ref 1.8–7.4)
NEUTROPHILS NFR BLD AUTO: 50.6 % — SIGNIFICANT CHANGE UP (ref 43–77)
NRBC # BLD: 0 /100 WBCS — SIGNIFICANT CHANGE UP (ref 0–0)
PLATELET # BLD AUTO: 141 K/UL — LOW (ref 150–400)
RBC # BLD: 3.13 M/UL — LOW (ref 4.2–5.8)
RBC # FLD: 13.8 % — SIGNIFICANT CHANGE UP (ref 10.3–14.5)
WBC # BLD: 5.5 K/UL — SIGNIFICANT CHANGE UP (ref 3.8–10.5)
WBC # FLD AUTO: 5.5 K/UL — SIGNIFICANT CHANGE UP (ref 3.8–10.5)

## 2023-03-09 ENCOUNTER — APPOINTMENT (OUTPATIENT)
Dept: HEMATOLOGY ONCOLOGY | Facility: CLINIC | Age: 73
End: 2023-03-09

## 2023-03-09 ENCOUNTER — APPOINTMENT (OUTPATIENT)
Dept: HEMATOLOGY ONCOLOGY | Facility: CLINIC | Age: 73
End: 2023-03-09
Payer: MEDICARE

## 2023-03-09 VITALS
DIASTOLIC BLOOD PRESSURE: 67 MMHG | SYSTOLIC BLOOD PRESSURE: 134 MMHG | HEIGHT: 63.98 IN | RESPIRATION RATE: 17 BRPM | WEIGHT: 124.12 LBS | BODY MASS INDEX: 21.19 KG/M2 | OXYGEN SATURATION: 99 % | TEMPERATURE: 97 F | HEART RATE: 60 BPM

## 2023-03-09 PROCEDURE — 99213 OFFICE O/P EST LOW 20 MIN: CPT

## 2023-03-09 NOTE — ASSESSMENT
[FreeTextEntry1] : 72 year-old Mr. MEIER is seen in follow up for IgG Kappa Multiple Myeloma presented with lytic percy lesions, diagnosed in 9/2019. He received XRT to percy lesions,was started on RVD induction, achieved VGPR. Last PET 05/2020 - markedly improved and reduced FDG avidity throughout. His most recent SPEP/MICAH are normal, and FLCR has normalized as well. He is in sCR. Missed last week treatment due to cold. Will reschedule for next week. 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 \par - MRI T-spine (9/25/22): abnormal signal involving T8 vertebral body which may be related to multiple myeloma\par - Repeat T-spine MRI in 3 months ~3/2/2023, to re-assess previous findings as there was no definitive evidence of myeloma involvement. Will repeat in 3 months from 12/2/22 as he has not received any myeloma treatment since his last dose of Velcade on 3/3/22. \par - Continue treatment regimen with Velcade 3 week on,  1 week off, Dex 20mg weekly, and monthly Zometa\par - Achieved sCR as of 01/2023 labs \par - WIll stop after 4 cycles \par -Patient will vivit his home country and remain off treatment for a month\par - Continue Dex 20 mg QW\par - Continue ppx medications (Acyclovir) \par - Follow up monthly \par -Will start maintenance velcade q2 weeks after he returns \par \par # Anemia \par - Multifactorial origin including CKD, CKD now improving therefore will trend Hgb further before intervention.\par - Anemia w/u completed on 11/23/22\par \par

## 2023-03-09 NOTE — HISTORY OF PRESENT ILLNESS
[Disease:__________________________] : Disease: [unfilled] [Treatment Protocol] : Treatment Protocol [de-identified] : As per Dr Kilgore's note on 8/18/2020\par \par "Mr Rand was referred to my office for newly diagnosed IgG kappa multiple myeloma. The patient's primary language is Irish, he has his daughter Mirlande as the , per his wishes. He was seen by Dr. Andrés Valle (hematology) in Feb 2019 and had a BM bx showing 10-15% plasma cells, concerning for smoldering myeloma. According to the daughter, he was awaiting insurance approval for imaging studies. He was admitted from 8/18/19 at [facility] where he presented with pain in the L leg/lower back, worsening for the past 2-3 months. On labs, he was found to have a total protein of 10, Ca level 12 until 8/29/19. Dental consult was called and patient needs to have 10 teeth extracted -thus, IV bisphosphonates were not given, pt improved with hydration. He also had a slightly inc'ed creatinine -improved after IVF. CT C/A/P 8/18/19 showed a lytic expansile soft tissue lesion centered in the manubrium measuring approximately 7.3 x 3.4 x 4.3 cm, invading both 1st ribs. There was also a A lytic expansile soft tissue lesion in the T8 vertebral body causing mass effect on spinal canal and obliterating left neural foramina at T7-8 and T8-9. ON 8/22/19 an MRI thoracic spine was done showing multiple areas of tumor involvement within the thoracic spine in keeping with the patient's history of multiple myeloma. Prominent lesion within C7 on the right incompletely seen.\par Large lesion within T8 on the left producing epidural tumor extension and moderate narrowing of the spinal canal with mild flattening of the spinal cord. He was evaluated on 8/22/19 by Dr. Foley (Rad Onc) and was given 5 fractions of XRT from 8/23/19 to 8/29/19 to C7 and T8. He was discharged home on 8/29/19 with follow up in the outpatient office, and on Dexamethasone 4mg po daily. \par \par  \par \par \par \par Interval History: The patient is being followed for IgG kappa MM with multiple bone lytic lesions, hypercalcemia (resolved) and mild anemia. He started Velcade/Dexa weekly on 9/919. The patient got the Revlimid and started it C1 day 1 on 10/21/19 and hed been tolerating it well. \par \par Starting in February, his blood counts became low -plt count 54, Hb 8.3 wbc 5.8. Revlimid was held -after 2/3/20. He got 1 shot Velcade/Dexa on 2/3/20, NO chemo since then. \par BM done on 3/4/20 to eval for residual disease (MM) vs. MDS. BM showed recovering marrow elements, plasma cells <5%. SPEP/MICAH from 3/9/20 showed NO monoclonal proteins, c/w CR. \par \par The PET scan from 5/7/20 showed L uptake maxillary -pt reports that he had dentures done in Jan 2020 and has discomfort from them. He did not go to the dentist during COVID Rib fractures -pt denied falls/trauma. Patient has dental appointment for next week. He has some discomfort in the upper teeth b/l. He also missed his Velcade SQ last week 'i did not know if i am supposed to continue it.' He has no neuropathy from it, tolerating well. "\par  [de-identified] : RISS- II [FreeTextEntry1] : s/p RT to C7/T8 spine\par RVD 9/2019 -6/2020 transitioned to maintenance velcade q2wk started 6/19/2020 [de-identified] : Oct 6, 2020\par Pt is here for initial visit with me. He has his dtr on the phone, prefers her included in conversation and declined an . Pt is feeling well. He is tolerating monthly zometa and maintenance velcade w/o any adverse effects. Last set of myeloma labs done in August showed stable remission. He is requesting refill of several meds today.\par ---------------------------------\par \par 1/25/2021\par Patient presents for a follow up. He has no complaints except infrequent short duration chills with no fever. He is on biweekly Velcade and monthly Zometa. His last treatment of Zometa was on 1/8/2021 and last Velcade on 1/22/2021. His most recent SPEP/MICAH was normal.   \par \par 6/10/2021\par Patient seen in follow up. He did not go to his home country as he planned. He does not endorse any big change in his health status. She saw his Kidney doctor who told him he is iron deficient (labs are not supportive). His anemia is multifactorial including CKD. His most recent myeloma labs (03/2021) show continued CR. \par \par 9/1/2021\par Mr Rand returns for a follow up. His daughter is accompanying him. He has no complaints. He reports that he develops diarrhea (one motion) the day after his Velcade treatment. He plans to visit his home town soon. He has some unavoidable things to take care of. Of note he has been in sCR. \par \par 1/19/2022\par Patient seen in follow up. He has done well in the interim. He also visited his home country in this interval. He was safe all along.  As regards to his disease, he is stable and appears to be in remission. He has no complaints.  \par \par 11/23/2022\par Patient presents to clinic after being lost to follow-up; went to his country for an extended visit from 3/10 - 9/8/2022. Was on maintenance velcade [id number] starting 6/19/2020, last dose was on 3/3/22. Was on monthly Zometa since 11/2019, last dose of zometa was on 2/17/2022. In the interim, he developed severe weakness and was hospitalized at [facility] (9/24-10/11/22) for Acute respiratory failure with hypoxia, and severe sepsis due to GBS. Was discharged to rehab, and as of yesterday he was discharged from rehab. Will now be doing PT at home. During inpatient work up, he was found to have diffuse lytic lesions noted in C/T/L spine CT scans (9/25/22) which were identified as "Multiple mixed lytic sclerotic lesions may be sequela of previously treated multiple myeloma". MRI of the C/T/L spine were done for follow up revealing an "abnormal signal involving T8 vertebral body which may be related to multiple myeloma". Was seen by neurosurgery, no acute intervention. In patient immunofixation did not reveal a monoclonal band; SPEP WNL; kappa/lambda ratio not diagnostic. Today he feels at baseline, but notes residual weakness from GBS. Patient denies bone pain, fever, chills, night sweats, back pain, headache, or peripheral neuropathy. Good appetite, stable weight.\par \par 12/23/2022\par Follow up. Today he feels at baseline, but notes residual weakness from GBS. Today is C1D22 of Velcade and dexamethasone since restarting it; he is tolerating the medications well. He has some questions requesting clarification on Acyclovir and dexamethasone, otherwise no other concerns. Patient denies bone pain, fever, chills, night sweats, back pain, headache, or peripheral neuropathy. Good appetite, stable weight. \par \par 1/20/2023 \par Patient seen in follow up for multiple myeloma. He was on maintenance Velcade. He went too his home country and missed treatment for more than two months. His disease progressed as noted upon return. He had new percy lesion. He was again started on the same induction regimen. He is also getting Zometa monthly. He has now achieved VGPR having only mild high Kappa, no M-spike/band. He offers no complaints. Denies any infection, fever, bone pain, weigh loss.  \par \par 2/16/2023\par Patient presents for a follow up. He missed his last treatment as he was having cough body ache. He saw his PCP and took a course of Azithromycin. His symptoms relieved. He is feeling well now. His treatment schedule for tomorrow (2/17/2023). Of note, his myeloma labs from 01/2023 are suggestive of sCR.  \par \par 3/9/2023\par Patient presents for a follow up. He has done well in the interim. His cough has resolved. He plans to take a chemo vacation for a month as he plans to visit his home country. He will finish his C4 treatment tomorrow, following which he will continue on maintenance therapy which can be started after he comes back. Of note he again as achieved a very good partial response.

## 2023-03-09 NOTE — RESULTS/DATA
[FreeTextEntry1] : 3/9/2023\par Most recent labs\par Hgb 9.4 \par Plt 141\par \par M-spike: unable to quantitate\par K 4.0\par L 1.89\par K/.L 2.12\par \par \par 1/20/2023 \par Most recent myeloma labs from 12/23/2022\par \par IgG Kappa disease \par No M-spike, no M-band \par IgG 931\par IgA 132\par IgM 32\par K 2.62\par L 1.45\par K/L 1.79 \par \par Most recent CBC from 1/12/2023\par Mild anemia (10.8 and thrombocytopenia (135K)\par \par \par 1/19/2022\par Stable myeloma labs with normal range values.Mild high Lappa lambda with normal ratio is likely due to his renal failure. \par MIld to moderate anemia is stable. \par \par 9/1/2021\par Last PCN labs in 06/2021 showed no M-spike, M-band, Normal FLCR.\par \par 6/9/2021\par Last Myeloma labs in 03/2021 showed no M-spike, M-band, Normal FLCR. \par \par 1/25/2021\par Most recent SPEP / MICAH (1/8/2021) No monoclonal band \par \par ------------------------------------------------------------------------------------------\par   PET/CT Whole Body Oncology FDG             Final\par \par No Documents Attached\par \par \par \par \par   EXAM:  PETCT WB ONC FDG SUBS\par \par \par PROCEDURE DATE:  05/07/2020\par \par \par \par INTERPRETATION:  PROCEDURE:  PET/CT WHOLE BODY\par \par RADIOPHARMACEUTICAL:  9.97 mCi F-18, FDG, I.V.\par \par CLINICAL INFORMATION:  70-year-old male referred for follow-up of multiple myeloma on Zometa. Bone marrow biopsy done 3/4/2020 showed less than 5% plasma cells and repeat myeloma labs done on 3/9/2020, showed CT are. PET/CT is done as part of the subsequent treatment strategy evaluation.\par \par TECHNIQUE:  Fasting blood sugar measured prior to injection of radiopharmaceutical was 116 mg/dl. Following intravenous injection of the radiopharmaceutical and an uptake period of approximately 60 minutes, FDG-PET/CT was obtained on a  Radiance Discovery 710 from the vertex of the skull to the toes. Oral contrast was given during the uptake period. CT was performed during shallow respiration. The CT protocol was optimized for PET attenuation correction and to provide anatomic detail for localization of PET abnormalities. The CT protocol was not designed to produce and cannot replace state-of-the-art diagnostic CT images with specific imaging protocols for different body parts and indications. Images were reviewed on a dedicated workstation using multiplanar reconstruction.\par \par The standardized uptake values (SUV) are normalized to patient body weight and indicate the highest activity concentration (SUVmax) in a given disease site. All image numbers refer to the axial image number.\par \par COMPARISON:  FDG PET/CT dated 9/7/2019.\par \par OTHER STUDIES USED FOR CORRELATION: MRI of cervical spine dated 9/9/2019.\par \par FINDINGS:\par \par HEAD/NECK: Physiologic FDG activity in the brain.\par \par Mild hypermetabolism in left masseter muscle, decreased as compared to prior study, may be secondary to bruxism. Elongated focus of increased FDG activity in left lower cervical region may reflect inflammation or muscle spasm (image 77).\par \par New hypermetabolic lucency in left maxillary alveolus is nonspecific (SUV 8.0; image 53). Recommend correlation with dental exam. A new small hypermetabolic focus is seen in the right maxilla (SUV 3.7; image 55).\par \par CHEST: Few new small hypermetabolic foci in bilateral hilar region likely correspond to small lymph nodes that are difficult to delineate on CT. For reference, left perihilar region demonstrates SUV 3.6 (image 117). Resolution of FDG-avid subcentimeter left posterior paraesophageal lymph node.\par \par LUNGS: No abnormal FDG activity. Calcified right lower lobe granuloma, unchanged (image 128).\par \par PLEURA/PERICARDIUM: No abnormal FDG activity. No effusion.\par \par HEPATOBILIARY/PANCREAS: Physiologic FDG activity. Liver SUV mean is 2.1; previous 2.3.\par \par SPLEEN: No abnormal FDG activity. Normal in size.\par \par ADRENAL GLANDS: No abnormal FDG activity. No nodule.\par \par KIDNEYS/URINARY BLADDER: Physiologic FDG activity. Multiple bilateral renal cysts, measuring up to 4.6 cm on the right, unchanged.\par \par PELVIC ORGANS: No abnormal FDG activity.\par \par ABDOMINOPELVIC NODES: No enlarged or FDG avid lymph node\par \par BOWEL/PERITONEUM/MESENTERY: Physiologic FDG activity. Resolution of hypermetabolism in distal esophagus/GE junction.\par \par BONES: Near total resolution of previously seen hypermetabolic foci in the axial skeleton, right humerus, and proximal bilateral femurs. Previously seen lytic lesions demonstrate new areas of sclerosis on CT. For reference, one of the most FDG-avid residual lesions is a mixed lytic and sclerotic lesion in the left scapula which demonstrates SUV 3.0 (image 98); previously lytic with SUV 5.7. Previously seen hypermetabolic lesion in right sphenoid bone has resolved. Previously seen large destructive lesion in the manubrium the sternum demonstrates new sclerosis with SUV 3.0 (image 103); previous SUV 5.3. Resolution of hypermetabolic intramedullary soft tissue in proximal right humerus. Near total resolution of hypermetabolic intramedullary soft tissue in proximal left femur (SUV 1.3; image 273; previous SUV 5.3).\par \par FDG-avid fracture in left inferior pubic ramus demonstrates increased callus formation and is similar in metabolism (SUV 3.8; image 250; previous SUV 3.1). Few new mildly FDG-avid bilateral rib fractures. For reference, fracture in the anterior aspect of the left second rib demonstrates SUV 3.4 (image 105).\par \par Resolution of hypermetabolism in left palmar musculature.\par \par IMPRESSION:  Abnormal whole body FDG-PET/CT scan.\par \par 1. Near total resolution of hypermetabolism associated with lytic and intramedullary lesions in the axial and appendicular skeleton as compared to prior study dated 9/7/2019. Previously seen lytic lesions demonstrate new sclerosis. Findings are compatible with response to interval therapy.\par \par 2. Few new mildly FDG-avid bilateral rib fractures. An FDG-avid fracture in the left inferior pubic ramus demonstrates increased callus formation and is similar in metabolism.\par \par 3. New hypermetabolic lucency in left maxillary alveolus and new small hypermetabolic focus in right maxilla are nonspecific. Recommend correlation with dental exam.\par \par 4. Few new mildly FDG-avid nonspecific bilateral hilar lymph nodes.\par \par 5. Resolution of many specific hypermetabolism in distal esophagus/GE junction.\par \par \par \par \par \par \par \par \par \par \par \par \par \par \par \par LUANA RAMOS M.D., NUCLEAR MEDICINE ATTENDING\par This document has been electronically signed. May  7 2020  4:08PM\par \par  \par \par  Ordered by: OZZY ALMEIDA       Collected/Examined: 07May2020 12:57PM       \par Verified by: OZZY ALMEIDA 11May2020 09:27AM       \par  Result Communication: Call patient with results,Discussed results with patient;\par Stage: Final       \par  Performed at: [facility]       Resulted: 07May2020 04:11PM       Last Updated: 11May2020 09:27AM       Accession: [id number]       \par \par \par  Pathology             Final\par \par No Documents Attached\par \par \par \par \par   [facility] Accession Number : [id number]\par \par HARDEEP, SUBASH                          2\par \par \par \par Cytopathology Report\par \par \par \par \par \par Final Diagnosis\par SOFT TISSUE, SACRUM, CT GUIDED CORE BIOPSY\par Plasma cell neoplasm\par See note and description.\par \par Diagnostic note:  Overall the morphologic and immunophenotypic\par findings are consistent with plasma cell neoplasm. The\par differential diagnosis includes plasmacytoma vs plasma cells\par myeloma. Correlation with laboratory, radiologic and clinical\par findings is required for further classification.\par \par Microscopic description:\par The touch prep slides are composed of numerous enlarged plasma\par cells with prominent nucleoli, occasional binucleated forms are\par seen.\par \par Histologic sections consist of multiple needle shaped cores of\par soft tissue showing proliferation of enlarged plasma cells with\par prominent nucleoli, forming sheets.\par \par Immunohistochemistry:  , kappaISH, lambdaISH, cyclinD1, p53\par stains performed on paraffin embedded section of block 1C.\par \par Neoplastic cells are:\par Positive:  , KappaISH, p53\par Negative:  LambdaISH, cyclinD1\par \par Flow cytometry analysis (26-ZI-): Monotypic plasma cells\par (24.1% of total analyzed cells) are present. There are two\par aberrant monoclonal plasma cell populations:\par Population #1 (16.5% of total analyzed cells, 68.6% of plasma\par cell population) positive for cytoplasmic kappa, CD38 dimmer,\par CD45 dimmer, CD56, ; negative for , CD19, CD20.\par Population # 2 (7.6% of total analyzed cells, 31.4% of plasma\par cell population) positive for cytoplasmic kappa\par \par \par \par \par \par \par HARDEEP, SUBASH                          2\par \par \par \par Cytopathology Report\par \par \par \par \par (brighter), , CD38 brighter, CD45 dim, CD56 (brighter),\par ; negative for CD19, CD20.\par \par Screened by: Jose Juan LARA(ASCP)\par Verified by: Orlando Spicer MD\par (Electronic Signature)\par Reported on: 08/22/19 13:22 EDT, [address] [place], [place]\par [address]\par Cytology technical processing performed at [address] [place] [id number]\par _________________________________________________________________\par \par \par Specimen(s) Submitted\par SOFT TISSUE, SACRUM, CT GUIDED CORE BIOPSY\par \par \par Statement of Adequacy\par Immediate cytologic study for adequacy of specimen using a Diff-\par Quik stain was performed at [facility], [address] [place] [address]. Touch\par imprints acceptable for further evaluation by Jose Juan Smith\par CT(ASCP).\par \par \par Clinical Information\par Sacral mass.\par \par \par Gross Description\par Core Biopsy:\par Tissue collected: Specimen container has been inspected to\par confirm patient?s name and date of birth, contains 3  tan cores\par measuring 1.0, 1.0, 0.8 cm in length (and multiple fragments).\par Entire specimen submitted in 2 cassette(s) labeled 1B and 1 C.\par \par 4 Touch prep slides ( 2 air dried + 2 fixed)\par \par FNA:\par No material submitted for cell block (No block 1A)\par \par Prepared:\par 4 Touch Prep,\par 1 core biopsy labeled 1B\par 1 core biopsy labeled 1C\par 6 Total slides\par \par  \par \par  Ordered by: DELORES MOSS       Collected/Examined: [id number] 03:14PM       \par Verified by: OZZY ALMEIDA 18Jun2020 03:07PM       \par  Result Communication: No patient communication needed at this time;\par Stage: Final       \par  Performed at: [facility]       Resulted: [id number] 01:22PM       Last Updated: 18Jun2020 03:07PM       Accession: [id number]       \par \par \par  Pathology             Final\par \par No Documents Attached\par \par \par \par \par   [facility] Accession Number : [id number]\par \par HARDEEP, SUBASH                          4\par \par \par \par Addendum Report\par \par \par \par \par Hematopathology Addendum\par Comprehensive Hematopathology Report\par \par Final Diagnosis:\par 1, 2. Bone marrow biopsy and bone marrow aspirate\par - Plasma cell myeloma (10% with focal 25% by  stain)\par - Slight erythroid predominant trilineage hematopoiesis\par with maturation\par \par Diagnostic Note:\par Please note findings of a normal male karyotype and a negative\par multiple myeloma FISH panel. Based on the additional findings,\par the diagnosis has not changed. Correlation with studies for\par myeloma-related organ dysfunction is necessary.\par \par Morphology:\par Microscopic description:\par 1. Biopsy: Sections of bone marrow biopsy show normocellularity\par (60 to 70% cellularity), mild plasmacytosis with foci of small\par clusters, slight erythroid predominant trilineage hematopoiesis\par with maturation, mild megakaryocytosis with normal morphology,\par and increased iron stores.\par 2. Aspirate: Cellular spicules are not present, precluding\par differential count. Review of the smear shows some maturing and\par predominant mature myeloid cells, a few erythroid elements, and\par rare megakaryocytes.\par \par Ancillary Studies:\par Bone marrow aspirate iron stain: No spicules are present to\par evaluate for iron stores and there are insufficient nRBC to\par evaluate for ring sideroblasts.\par Congo red stain is negative for amyloidosis.\par Flow cytometry:  Monotypic plasma cells (1% of cells), positive\par for cytoplasmic kappa, CD38, , dimmer CD45, partial CD56;\par negative CD19, CD20, .\par CD45/side scatter shows no significant blast population. There is\par no increase in CD34,  or CD14 positive cells.\par Lymphocytes (8% of cells) show a heterogeneous population of T-\par cells (with normal CD4 to CD8 ratio), and polytypic B-cells.\par Immunohistochemical stains ( performed on the block 1A and\par 1B, cyclin-D1 performed on block 1A):   stains show overall\par approximately 10% plasma cells, with foci of small clusters,\par focal up to 25%. Plasma cells are negative for cyclin-D1.\par \par Cytogenetics:\par Result: Normal male karyotype\par Karyotype: 46,XY[20]\par \par \par \par \par \par \par HARDEEP, SUBASH                          4\par \par \par \par Addendum Report\par \par \par \par \par FISH:\par Result: NORMAL FISH -\par - MULTIPLE MYELOMA PANEL\par Probe(s) and Location(s): FGFR3(4p16), CCND1(11q13), RB1(13q14),\par IGH(14q32), MAF(16q23), TP53(17p13.1)\par \par Clinical History/Data:\par New diagnosis multiple myeloma\par CBC on 08/24/2019: WBC: 8.09 K/uL, HGB: 9.5 g/dL, MCV: 90 fl,\par Plt: 227 K/uL\par Serum Immunofixation: IgG kappa\par \par Verified by: Nancy Farris M.D.\par (Electronic Signature)\par Reported on: 09/07/19 10:37 EDT, [address] [place], [place]\par [address]\par _________________________________________________________________\par \par \par Hematopathology Report\par \par \par \par \par Final Diagnosis\par 1, 2. Bone marrow biopsy and bone marrow aspirate\par - Plasma cell myeloma (10% with focal 25% by  stain)\par - Slight erythroid predominant trilineage hematopoiesis\par with maturation\par \par See note and description.\par \par Diagnostic note:\par Correlation with studies for myeloma-related organ dysfunction is\par necessary.\par Comprehensive report with results of pending ancillary studies to\par follow.\par \par Ancillary studies\par Bone marrow aspirate iron stain: No spicules are present to\par evaluate for iron stores and there are insufficient nRBC to\par evaluate for ring sideroblasts.\par \par Flow cytometry:  Monotypic plasma cells (1% of cells), positive\par for cytoplasmic kappa, CD38, , dimmer CD45, partial CD56;\par negative CD19, CD20, .\par CD45/side scatter shows no significant blast population. There is\par no increase in CD34,  or CD14 positive cells.\par \par \par \par \par \par \par HARDEEP, SUBASH                          4\par \par \par \par Hematopathology Report\par \par \par \par \par Lymphocytes (8% of cells) show a heterogeneous population of T-\par cells (with normal CD4 to CD8 ratio), and polytypic B-cells.\par \par Immunohistochemical stains ( performed on the block 1A and\par 1B, cyclin-D1 performed on block 1A):   stains show overall\par approximately 10% plasma cells, with foci of small clusters,\par focal up to 25%. Plasma cells are negative for cyclin-D1.\par \par Microscopic description:\par 1. Biopsy: Sections of bone marrow biopsy show normocellularity\par (60 to 70% cellularity), mild plasmacytosis with foci of small\par clusters, slight erythroid predominant trilineage hematopoiesis\par with maturation, mild megakaryocytosis with normal morphology,\par and increased iron stores.\par \par 2. Aspirate: Cellular spicules are not present, precluding\par differential count. Review of the smear shows some maturing and\par predominant mature myeloid cells, a few erythroid elements, and\par rare megakaryocytes.\par \par Comment:  Iron stain (examined to evaluate for iron stores; see\par microscopic description) and Giemsa stain (shows appropriate\par staining pattern) are performed and evaluated on block(s): 1A,\par 1B.\par \par Slide(s) with built in immunohistochemical study control(s)\par associated and negative control with this case has/have been\par verified by the sign out pathologist.\par For slide(s) without built in controls positive control slides\par has/have been reviewed and approved by immunohistochemistry lab\par These immunohistochemical/ in-situ hybridization tests have been\par developed and their performance characteristics determined by\par [facility], Department of\par Pathology, Division of Immunopathology, [address] [place] [address]\[place] [address].  It has not been cleared or approved by the\par U.S. Food and Drug Administration.  The FDA has determined that\par such clearance or approval is not necessary.  This test is used\par for clinical purposes.  The laboratory is certified under the\par CLIA-88 as qualified to perform high\par complexity clinical testing.\par \par Verified by: Nancy Farris M.D.\par (Electronic Signature)\par Reported on: 08/27/19 10:00 EDT, [address] [place] [address]\par [id number]\par _________________________________________________________________\par \par \par \par \par \par \par \par HARDEEP, SUBASH                          4\par \par \par \par Hematopathology Report\par \par \par \par \par Clinical History\par New diagnosis multiple myeloma\par CBC on 08/24/2019: WBC: 8.09 K/uL, HGB: 9.5 g/dL, MCV: 90 fl,\par Plt: 227 K/uL\par Serum Immunofixation: IgG kappa\par \par Specimen(s) Submitted\par 1     Bone marrow biopsy left\par 2     Bone marrow aspirate\par \par Gross Description\par 1. The specimen is received in bouin's fixative and the specimen\par container is labeled: Left bone marrow biopsy.  It consists of a\par 0.5 x 0.2 cm bone marrow core with a 1.5 x 1.3 x 0.5 cm blood\par clot.  Entirely submitted.  Two cassettes: A = bone marrow core;\par B = blood clot.\par 2. The specimen is received labeled with patient's name and\par consists of 3 air dried slide(s). 2 slide(s) stained with Monzon\par Giemsa stain. 1 stained for iron stain.\par In addition to other data that may appear on the specimen\par container, the label has been inspected to confirm the presence\par of the patient's name and date of birth.\par Catairna ANAYA Brian 08/22/2019 17:12\par \par  \par \par  Ordered by: BECKY BILLS       Collected/Examined: [id number] 03:50PM       \par Verified by: BECKY BILLS [id number] 05:42PM       \par  Result Communication: No patient communication needed at this time;\par Stage: Final       \par  Performed at: [facility]       Resulted: [id number] 10:37AM       Last Updated: 07Sep2019 05:42PM       Accession: [id number]

## 2023-03-10 ENCOUNTER — APPOINTMENT (OUTPATIENT)
Dept: INFUSION THERAPY | Facility: HOSPITAL | Age: 73
End: 2023-03-10

## 2023-03-10 ENCOUNTER — RESULT REVIEW (OUTPATIENT)
Age: 73
End: 2023-03-10

## 2023-03-10 ENCOUNTER — LABORATORY RESULT (OUTPATIENT)
Age: 73
End: 2023-03-10

## 2023-03-10 LAB
BASOPHILS # BLD AUTO: 0.03 K/UL — SIGNIFICANT CHANGE UP (ref 0–0.2)
BASOPHILS NFR BLD AUTO: 0.5 % — SIGNIFICANT CHANGE UP (ref 0–2)
EOSINOPHIL # BLD AUTO: 0.32 K/UL — SIGNIFICANT CHANGE UP (ref 0–0.5)
EOSINOPHIL NFR BLD AUTO: 5.2 % — SIGNIFICANT CHANGE UP (ref 0–6)
HCT VFR BLD CALC: 29 % — LOW (ref 39–50)
HGB BLD-MCNC: 9.5 G/DL — LOW (ref 13–17)
IMM GRANULOCYTES NFR BLD AUTO: 0.5 % — SIGNIFICANT CHANGE UP (ref 0–0.9)
LYMPHOCYTES # BLD AUTO: 1.4 K/UL — SIGNIFICANT CHANGE UP (ref 1–3.3)
LYMPHOCYTES # BLD AUTO: 22.7 % — SIGNIFICANT CHANGE UP (ref 13–44)
MCHC RBC-ENTMCNC: 30.1 PG — SIGNIFICANT CHANGE UP (ref 27–34)
MCHC RBC-ENTMCNC: 32.8 G/DL — SIGNIFICANT CHANGE UP (ref 32–36)
MCV RBC AUTO: 91.8 FL — SIGNIFICANT CHANGE UP (ref 80–100)
MONOCYTES # BLD AUTO: 0.67 K/UL — SIGNIFICANT CHANGE UP (ref 0–0.9)
MONOCYTES NFR BLD AUTO: 10.9 % — SIGNIFICANT CHANGE UP (ref 2–14)
NEUTROPHILS # BLD AUTO: 3.71 K/UL — SIGNIFICANT CHANGE UP (ref 1.8–7.4)
NEUTROPHILS NFR BLD AUTO: 60.2 % — SIGNIFICANT CHANGE UP (ref 43–77)
NRBC # BLD: 0 /100 WBCS — SIGNIFICANT CHANGE UP (ref 0–0)
PLATELET # BLD AUTO: 127 K/UL — LOW (ref 150–400)
RBC # BLD: 3.16 M/UL — LOW (ref 4.2–5.8)
RBC # FLD: 13.8 % — SIGNIFICANT CHANGE UP (ref 10.3–14.5)
WBC # BLD: 6.16 K/UL — SIGNIFICANT CHANGE UP (ref 3.8–10.5)
WBC # FLD AUTO: 6.16 K/UL — SIGNIFICANT CHANGE UP (ref 3.8–10.5)

## 2023-03-14 ENCOUNTER — NON-APPOINTMENT (OUTPATIENT)
Age: 73
End: 2023-03-14

## 2023-03-14 ENCOUNTER — APPOINTMENT (OUTPATIENT)
Dept: CARDIOLOGY | Facility: CLINIC | Age: 73
End: 2023-03-14
Payer: MEDICARE

## 2023-03-14 VITALS
TEMPERATURE: 97.2 F | HEART RATE: 59 BPM | WEIGHT: 128 LBS | SYSTOLIC BLOOD PRESSURE: 132 MMHG | DIASTOLIC BLOOD PRESSURE: 58 MMHG | OXYGEN SATURATION: 100 % | HEIGHT: 63.98 IN | BODY MASS INDEX: 21.85 KG/M2

## 2023-03-14 PROCEDURE — 99214 OFFICE O/P EST MOD 30 MIN: CPT

## 2023-03-14 PROCEDURE — 93306 TTE W/DOPPLER COMPLETE: CPT

## 2023-03-14 PROCEDURE — 93000 ELECTROCARDIOGRAM COMPLETE: CPT

## 2023-03-14 NOTE — DISCUSSION/SUMMARY
[FreeTextEntry1] : In a summary Yoly López is an elderly male with CAD s/p stent, stable. Continue Aspirin. Hypertension, controlled. Continue Amlodipine, Losartan and Metoprolol.  Cut down on salt intake. Hypercholesterolemia, continue Atorvastatin  and low cholesterol diet. LDL goal less than 70 g/dL.  Hypertension and Aortic stenosis, Echo done showed normal LV systolic function and mild- moderate calcific Aortic stenosis which is unchanged from previous Echo done last year. Echo results explained. Follow up in 6 months.

## 2023-03-14 NOTE — PHYSICAL EXAM
[Normal Conjunctiva] : normal conjunctiva [No Carotid Bruit] : no carotid bruit [Normal S1, S2] : normal S1, S2 [Soft] : abdomen soft [Non Tender] : non-tender [Normal Bowel Sounds] : normal bowel sounds [No Edema] : no edema [No Cyanosis] : no cyanosis [Normal] : alert and oriented, normal memory [de-identified] : Ejection systolic murmur in 2 nd ICS.

## 2023-03-14 NOTE — HISTORY OF PRESENT ILLNESS
[FreeTextEntry1] : Yoly López is a 73 year old male with history of CAD s/p stent, hypertension, Aortic stenosis  and hypercholesterolemia comes for follow up visit. Denies any chest pain or palpitations. No shortness of breath on exertion. Compliant to medications. Physically active. Follows up with PCP. Getting treatment for Multiple myeloma.

## 2023-04-02 ENCOUNTER — OUTPATIENT (OUTPATIENT)
Dept: OUTPATIENT SERVICES | Facility: HOSPITAL | Age: 73
LOS: 1 days | Discharge: HOME | End: 2023-04-02

## 2023-04-02 DIAGNOSIS — Z95.5 PRESENCE OF CORONARY ANGIOPLASTY IMPLANT AND GRAFT: Chronic | ICD-10-CM

## 2023-04-02 DIAGNOSIS — C90.00 MULTIPLE MYELOMA NOT HAVING ACHIEVED REMISSION: ICD-10-CM

## 2023-04-05 ENCOUNTER — APPOINTMENT (OUTPATIENT)
Dept: INFUSION THERAPY | Facility: HOSPITAL | Age: 73
End: 2023-04-05

## 2023-04-05 ENCOUNTER — APPOINTMENT (OUTPATIENT)
Dept: HEMATOLOGY ONCOLOGY | Facility: CLINIC | Age: 73
End: 2023-04-05

## 2023-04-19 ENCOUNTER — APPOINTMENT (OUTPATIENT)
Dept: INFUSION THERAPY | Facility: HOSPITAL | Age: 73
End: 2023-04-19

## 2023-05-19 ENCOUNTER — APPOINTMENT (OUTPATIENT)
Dept: HEMATOLOGY ONCOLOGY | Facility: CLINIC | Age: 73
End: 2023-05-19
Payer: MEDICARE

## 2023-05-19 VITALS
TEMPERATURE: 97 F | HEIGHT: 63.98 IN | HEART RATE: 62 BPM | BODY MASS INDEX: 21.6 KG/M2 | OXYGEN SATURATION: 100 % | RESPIRATION RATE: 16 BRPM | SYSTOLIC BLOOD PRESSURE: 126 MMHG | DIASTOLIC BLOOD PRESSURE: 70 MMHG | WEIGHT: 126.55 LBS

## 2023-05-19 PROCEDURE — 99213 OFFICE O/P EST LOW 20 MIN: CPT

## 2023-05-19 NOTE — ASSESSMENT
[FreeTextEntry1] : 72 year-old Mr. MEIER is seen in follow up for IgG Kappa Multiple Myeloma presented with lytic percy lesions, diagnosed in 9/2019. He received XRT to percy lesions,was started on RVD induction, achieved VGPR. Last PET 05/2020 - markedly improved and reduced FDG avidity throughout. His most recent SPEP/MICAH are normal, and FLCR has normalized as well. He is in sCR. Missed last week treatment due to cold. Will reschedule for next week. 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 \par - MRI T-spine (9/25/22): abnormal signal involving T8 vertebral body which may be related to multiple myeloma\par - Repeat T-spine MRI in 3 months ~3/2/2023, to re-assess previous findings as there was no definitive evidence of myeloma involvement. Will repeat in 3 months from 12/2/22 as he has not received any myeloma treatment since his last dose of Velcade on 3/3/22. \par - Continue treatment regimen with Velcade 3 week on,  1 week off, Dex 20mg weekly, and monthly Zometa\par - Achieved sCR as of 01/2023 labs \par - Above regimen stopped after 4 cycles, switched 2 Q2w Velcade \par - Patient visited his [place] and remained off treatment for a month\par - Will continue Velcade SQ Q2W\par - Discontinue Dex \par - Continue Zometa q month upto one year (8 more) \par - Continue ppx medications (Acyclovir) \par - Follow up 3 monthly \par \par \par # Anemia \par - Multifactorial origin including CKD, CKD now improving therefore will trend Hgb further before intervention.\par - Anemia w/u completed on 11/23/22 \par \par \par \par

## 2023-05-19 NOTE — HISTORY OF PRESENT ILLNESS
[Disease:__________________________] : Disease: [unfilled] [Treatment Protocol] : Treatment Protocol [de-identified] : As per Dr Kilgore's note on 8/18/2020\par \par "Mr Rand was referred to my office for newly diagnosed IgG kappa multiple myeloma. The patient's primary language is [place], he has his daughter Mirlande as the , per his wishes. He was seen by Dr. Andrés Valle (hematology) in Feb 2019 and had a BM bx showing 10-15% plasma cells, concerning for smoldering myeloma. According to the daughter, he was awaiting insurance approval for imaging studies. He was admitted from 8/18/19 at [facility] where he presented with pain in the L leg/lower back, worsening for the past 2-3 months. On labs, he was found to have a total protein of 10, Ca level 12 until 8/29/19. Dental consult was called and patient needs to have 10 teeth extracted -thus, IV bisphosphonates were not given, pt improved with hydration. He also had a slightly inc'ed creatinine -improved after IVF. CT C/A/P 8/18/19 showed a lytic expansile soft tissue lesion centered in the manubrium measuring approximately 7.3 x 3.4 x 4.3 cm, invading both 1st ribs. There was also a A lytic expansile soft tissue lesion in the T8 vertebral body causing mass effect on spinal canal and obliterating left neural foramina at T7-8 and T8-9. ON 8/22/19 an MRI thoracic spine was done showing multiple areas of tumor involvement within the thoracic spine in keeping with the patient's history of multiple myeloma. Prominent lesion within C7 on the right incompletely seen.\par Large lesion within T8 on the left producing epidural tumor extension and moderate narrowing of the spinal canal with mild flattening of the spinal cord. He was evaluated on 8/22/19 by Dr. Foley (Rad Onc) and was given 5 fractions of XRT from 8/23/19 to 8/29/19 to C7 and T8. He was discharged home on 8/29/19 with follow up in the outpatient office, and on Dexamethasone 4mg po daily. \par \par  \par \par \par \par Interval History: The patient is being followed for IgG kappa MM with multiple bone lytic lesions, hypercalcemia (resolved) and mild anemia. He started Velcade/Dexa weekly on 9/919. The patient got the Revlimid and started it C1 day 1 on 10/21/19 and hed been tolerating it well. \par \par Starting in February, his blood counts became low -plt count 54, Hb 8.3 wbc 5.8. Revlimid was held -after 2/3/20. He got 1 shot Velcade/Dexa on 2/3/20, NO chemo since then. \par BM done on 3/4/20 to eval for residual disease (MM) vs. MDS. BM showed recovering marrow elements, plasma cells <5%. SPEP/MICAH from 3/9/20 showed NO monoclonal proteins, c/w CR. \par \par The PET scan from 5/7/20 showed L uptake maxillary -pt reports that he had dentures done in Jan 2020 and has discomfort from them. He did not go to the dentist during COVID Rib fractures -pt denied falls/trauma. Patient has dental appointment for next week. He has some discomfort in the upper teeth b/l. He also missed his Velcade SQ last week 'i did not know if i am supposed to continue it.' He has no neuropathy from it, tolerating well. "\par  [de-identified] : RISS- II [FreeTextEntry1] : s/p RT to C7/T8 spine\par RVD 9/2019 -6/2020 transitioned to maintenance velcade q2wk started 6/19/2020 [de-identified] : Oct 6, 2020\par Pt is here for initial visit with me. He has his dtr on the phone, prefers her included in conversation and declined an . Pt is feeling well. He is tolerating monthly zometa and maintenance velcade w/o any adverse effects. Last set of myeloma labs done in August showed stable remission. He is requesting refill of several meds today.\par ---------------------------------\par \par 1/25/2021\par Patient presents for a follow up. He has no complaints except infrequent short duration chills with no fever. He is on biweekly Velcade and monthly Zometa. His last treatment of Zometa was on 1/8/2021 and last Velcade on 1/22/2021. His most recent SPEP/MICAH was normal.   \par \par 6/10/2021\par Patient seen in follow up. He did not go to his home country as he planned. He does not endorse any big change in his health status. She saw his Kidney doctor who told him he is iron deficient (labs are not supportive). His anemia is multifactorial including CKD. His most recent myeloma labs (03/2021) show continued CR. \par \par 9/1/2021\par Mr Rand returns for a follow up. His daughter is accompanying him. He has no complaints. He reports that he develops diarrhea (one motion) the day after his Velcade treatment. He plans to visit his home town soon. He has some unavoidable things to take care of. Of note he has been in sCR. \par \par 1/19/2022\par Patient seen in follow up. He has done well in the interim. He also visited his home country in this interval. He was safe all along.  As regards to his disease, he is stable and appears to be in remission. He has no complaints.  \par \par 11/23/2022\par Patient presents to clinic after being lost to follow-up; went to his country for an extended visit from 3/10 - 9/8/2022. Was on maintenance velcade [id number] starting 6/19/2020, last dose was on 3/3/22. Was on monthly Zometa since 11/2019, last dose of zometa was on 2/17/2022. In the interim, he developed severe weakness and was hospitalized at [facility] (9/24-10/11/22) for Acute respiratory failure with hypoxia, and severe sepsis due to GBS. Was discharged to rehab, and as of yesterday he was discharged from rehab. Will now be doing PT at home. During inpatient work up, he was found to have diffuse lytic lesions noted in C/T/L spine CT scans (9/25/22) which were identified as "Multiple mixed lytic sclerotic lesions may be sequela of previously treated multiple myeloma". MRI of the C/T/L spine were done for follow up revealing an "abnormal signal involving T8 vertebral body which may be related to multiple myeloma". Was seen by neurosurgery, no acute intervention. In patient immunofixation did not reveal a monoclonal band; SPEP WNL; kappa/lambda ratio not diagnostic. Today he feels at baseline, but notes residual weakness from GBS. Patient denies bone pain, fever, chills, night sweats, back pain, headache, or peripheral neuropathy. Good appetite, stable weight.\par \par 12/23/2022\par Follow up. Today he feels at baseline, but notes residual weakness from GBS. Today is C1D22 of Velcade and dexamethasone since restarting it; he is tolerating the medications well. He has some questions requesting clarification on Acyclovir and dexamethasone, otherwise no other concerns. Patient denies bone pain, fever, chills, night sweats, back pain, headache, or peripheral neuropathy. Good appetite, stable weight. \par \par 1/20/2023 \par Patient seen in follow up for multiple myeloma. He was on maintenance Velcade. He went too his home country and missed treatment for more than two months. His disease progressed as noted upon return. He had new percy lesion. He was again started on the same induction regimen. He is also getting Zometa monthly. He has now achieved VGPR having only mild high Kappa, no M-spike/band. He offers no complaints. Denies any infection, fever, bone pain, weigh loss.  \par \par 2/16/2023\par Patient presents for a follow up. He missed his last treatment as he was having cough body ache. He saw his PCP and took a course of Azithromycin. His symptoms relieved. He is feeling well now. His treatment schedule for tomorrow (2/17/2023). Of note, his myeloma labs from 01/2023 are suggestive of sCR.  \par \par 3/9/2023\par Patient presents for a follow up. He has done well in the interim. His cough has resolved. He plans to take a chemo vacation for a month as he plans to visit his home country. He will finish his C4 treatment tomorrow, following which he will continue on maintenance therapy which can be started after he comes back. Of note he again as achieved a very good partial response. \par \par 5/19/2023\par Patient returns for a follow up. He has missed 2 cycles of treatment as he went back to his home country. His last paraprotein labs are from 03/2023 and they are WNL including FLCR suggesting sCR.  He has no complaints.

## 2023-05-19 NOTE — RESULTS/DATA
[FreeTextEntry1] : 5/19/2023\par \par \par \par 3/9/2023\par Most recent labs\par Hgb 9.4 \par Plt 141\par \par M-spike: unable to quantitate\par K 4.0\par L 1.89\par K/.L 2.12\par \par \par 1/20/2023 \par Most recent myeloma labs from 12/23/2022\par \par IgG Kappa disease \par No M-spike, no M-band \par IgG 931\par IgA 132\par IgM 32\par K 2.62\par L 1.45\par K/L 1.79 \par \par Most recent CBC from 1/12/2023\par Mild anemia (10.8 and thrombocytopenia (135K)\par \par \par 1/19/2022\par Stable myeloma labs with normal range values.Mild high Lappa lambda with normal ratio is likely due to his renal failure. \par MIld to moderate anemia is stable. \par \par 9/1/2021\par Last PCN labs in 06/2021 showed no M-spike, M-band, Normal FLCR.\par \par 6/9/2021\par Last Myeloma labs in 03/2021 showed no M-spike, M-band, Normal FLCR. \par \par 1/25/2021\par Most recent SPEP / MICAH (1/8/2021) No monoclonal band \par \par ------------------------------------------------------------------------------------------\par   PET/CT Whole Body Oncology FDG             Final\par \par No Documents Attached\par \par \par \par \par   EXAM:  PETCT WB ONC FDG SUBS\par \par \par PROCEDURE DATE:  05/07/2020\par \par \par \par INTERPRETATION:  PROCEDURE:  PET/CT WHOLE BODY\par \par RADIOPHARMACEUTICAL:  9.97 mCi F-18, FDG, I.V.\par \par CLINICAL INFORMATION:  70-year-old male referred for follow-up of multiple myeloma on Zometa. Bone marrow biopsy done 3/4/2020 showed less than 5% plasma cells and repeat myeloma labs done on 3/9/2020, showed CT are. PET/CT is done as part of the subsequent treatment strategy evaluation.\par \par TECHNIQUE:  Fasting blood sugar measured prior to injection of radiopharmaceutical was 116 mg/dl. Following intravenous injection of the radiopharmaceutical and an uptake period of approximately 60 minutes, FDG-PET/CT was obtained on a  avocarrot Discovery 710 from the vertex of the skull to the toes. Oral contrast was given during the uptake period. CT was performed during shallow respiration. The CT protocol was optimized for PET attenuation correction and to provide anatomic detail for localization of PET abnormalities. The CT protocol was not designed to produce and cannot replace state-of-the-art diagnostic CT images with specific imaging protocols for different body parts and indications. Images were reviewed on a dedicated workstation using multiplanar reconstruction.\par \par The standardized uptake values (SUV) are normalized to patient body weight and indicate the highest activity concentration (SUVmax) in a given disease site. All image numbers refer to the axial image number.\par \par COMPARISON:  FDG PET/CT dated 9/7/2019.\par \par OTHER STUDIES USED FOR CORRELATION: MRI of cervical spine dated 9/9/2019.\par \par FINDINGS:\par \par HEAD/NECK: Physiologic FDG activity in the brain.\par \par Mild hypermetabolism in left masseter muscle, decreased as compared to prior study, may be secondary to bruxism. Elongated focus of increased FDG activity in left lower cervical region may reflect inflammation or muscle spasm (image 77).\par \par New hypermetabolic lucency in left maxillary alveolus is nonspecific (SUV 8.0; image 53). Recommend correlation with dental exam. A new small hypermetabolic focus is seen in the right maxilla (SUV 3.7; image 55).\par \par CHEST: Few new small hypermetabolic foci in bilateral hilar region likely correspond to small lymph nodes that are difficult to delineate on CT. For reference, left perihilar region demonstrates SUV 3.6 (image 117). Resolution of FDG-avid subcentimeter left posterior paraesophageal lymph node.\par \par LUNGS: No abnormal FDG activity. Calcified right lower lobe granuloma, unchanged (image 128).\par \par PLEURA/PERICARDIUM: No abnormal FDG activity. No effusion.\par \par HEPATOBILIARY/PANCREAS: Physiologic FDG activity. Liver SUV mean is 2.1; previous 2.3.\par \par SPLEEN: No abnormal FDG activity. Normal in size.\par \par ADRENAL GLANDS: No abnormal FDG activity. No nodule.\par \par KIDNEYS/URINARY BLADDER: Physiologic FDG activity. Multiple bilateral renal cysts, measuring up to 4.6 cm on the right, unchanged.\par \par PELVIC ORGANS: No abnormal FDG activity.\par \par ABDOMINOPELVIC NODES: No enlarged or FDG avid lymph node\par \par BOWEL/PERITONEUM/MESENTERY: Physiologic FDG activity. Resolution of hypermetabolism in distal esophagus/GE junction.\par \par BONES: Near total resolution of previously seen hypermetabolic foci in the axial skeleton, right humerus, and proximal bilateral femurs. Previously seen lytic lesions demonstrate new areas of sclerosis on CT. For reference, one of the most FDG-avid residual lesions is a mixed lytic and sclerotic lesion in the left scapula which demonstrates SUV 3.0 (image 98); previously lytic with SUV 5.7. Previously seen hypermetabolic lesion in right sphenoid bone has resolved. Previously seen large destructive lesion in the manubrium the sternum demonstrates new sclerosis with SUV 3.0 (image 103); previous SUV 5.3. Resolution of hypermetabolic intramedullary soft tissue in proximal right humerus. Near total resolution of hypermetabolic intramedullary soft tissue in proximal left femur (SUV 1.3; image 273; previous SUV 5.3).\par \par FDG-avid fracture in left inferior pubic ramus demonstrates increased callus formation and is similar in metabolism (SUV 3.8; image 250; previous SUV 3.1). Few new mildly FDG-avid bilateral rib fractures. For reference, fracture in the anterior aspect of the left second rib demonstrates SUV 3.4 (image 105).\par \par Resolution of hypermetabolism in left palmar musculature.\par \par IMPRESSION:  Abnormal whole body FDG-PET/CT scan.\par \par 1. Near total resolution of hypermetabolism associated with lytic and intramedullary lesions in the axial and appendicular skeleton as compared to prior study dated 9/7/2019. Previously seen lytic lesions demonstrate new sclerosis. Findings are compatible with response to interval therapy.\par \par 2. Few new mildly FDG-avid bilateral rib fractures. An FDG-avid fracture in the left inferior pubic ramus demonstrates increased callus formation and is similar in metabolism.\par \par 3. New hypermetabolic lucency in left maxillary alveolus and new small hypermetabolic focus in right maxilla are nonspecific. Recommend correlation with dental exam.\par \par 4. Few new mildly FDG-avid nonspecific bilateral hilar lymph nodes.\par \par 5. Resolution of many specific hypermetabolism in distal esophagus/GE junction.\par \par \par \par \par \par \par \par \par \par \par \par \par \par \par \par LUANA RAMOS M.D., NUCLEAR MEDICINE ATTENDING\par This document has been electronically signed. May  7 2020  4:08PM\par \par  \par \par  Ordered by: OZZY ALMEIDA       Collected/Examined: 07May2020 12:57PM       \par Verified by: OZZY ALMEIDA 11May2020 09:27AM       \par  Result Communication: Call patient with results,Discussed results with patient;\par Stage: Final       \par  Performed at: [facility]       Resulted: 07May2020 04:11PM       Last Updated: 11May2020 09:27AM       Accession: [id number]       \par \par \par  Pathology             Final\par \par No Documents Attached\par \par \par \par \par   [facility] : [id number]\par \par HARDEEP, SUBASH                          2\par \par \par \par Cytopathology Report\par \par \par \par \par \par Final Diagnosis\par SOFT TISSUE, SACRUM, CT GUIDED CORE BIOPSY\par Plasma cell neoplasm\par See note and description.\par \par Diagnostic note:  Overall the morphologic and immunophenotypic\par findings are consistent with plasma cell neoplasm. The\par differential diagnosis includes plasmacytoma vs plasma cells\par myeloma. Correlation with laboratory, radiologic and clinical\par findings is required for further classification.\par \par Microscopic description:\par The touch prep slides are composed of numerous enlarged plasma\par cells with prominent nucleoli, occasional binucleated forms are\par seen.\par \par Histologic sections consist of multiple needle shaped cores of\par soft tissue showing proliferation of enlarged plasma cells with\par prominent nucleoli, forming sheets.\par \par Immunohistochemistry:  , kappaISH, lambdaISH, cyclinD1, p53\par stains performed on paraffin embedded section of block 1C.\par \par Neoplastic cells are:\par Positive:  , KappaISH, p53\par Negative:  LambdaISH, cyclinD1\par \par Flow cytometry analysis (18-YV-): Monotypic plasma cells\par (24.1% of total analyzed cells) are present. There are two\par aberrant monoclonal plasma cell populations:\par Population #1 (16.5% of total analyzed cells, 68.6% of plasma\par cell population) positive for cytoplasmic kappa, CD38 dimmer,\par CD45 dimmer, CD56, ; negative for , CD19, CD20.\par Population # 2 (7.6% of total analyzed cells, 31.4% of plasma\par cell population) positive for cytoplasmic kappa\par \par \par \par \par \par \par HARDEEP, SUBASH                          2\par \par \par \par Cytopathology Report\par \par \par \par \par (brighter), , CD38 brighter, CD45 dim, CD56 (brighter),\par ; negative for CD19, CD20.\par \par Screened by: Jose Juan LARA(ASCP)\par Verified by: Orlando Spicer MD\par (Electronic Signature)\par Reported on: 08/22/19 13:22 EDT, [address] [place], [place]\par [address]\par Cytology technical processing performed at [address] [place] [id number]\par _________________________________________________________________\par \par \par Specimen(s) Submitted\par SOFT TISSUE, SACRUM, CT GUIDED CORE BIOPSY\par \par \par Statement of Adequacy\par Immediate cytologic study for adequacy of specimen using a Diff-\par Quik stain was performed at [facility], [address] [place] [address]. Touch\par imprints acceptable for further evaluation by Jose Juan Smith\par CT(ASCP).\par \par \par Clinical Information\par Sacral mass.\par \par \par Gross Description\par Core Biopsy:\par Tissue collected: Specimen container has been inspected to\par confirm patient?s name and date of birth, contains 3  tan cores\par measuring 1.0, 1.0, 0.8 cm in length (and multiple fragments).\par Entire specimen submitted in 2 cassette(s) labeled 1B and 1 C.\par \par 4 Touch prep slides ( 2 air dried + 2 fixed)\par \par FNA:\par No material submitted for cell block (No block 1A)\par \par Prepared:\par 4 Touch Prep,\par 1 core biopsy labeled 1B\par 1 core biopsy labeled 1C\par 6 Total slides\par \par  \par \par  Ordered by: DELORES MOSS       Collected/Examined: [id number] 03:14PM       \par Verified by: OZZY ALMEIDA 18Jun2020 03:07PM       \par  Result Communication: No patient communication needed at this time;\par Stage: Final       \par  Performed at: [facility]       Resulted: [id number] 01:22PM       Last Updated: 18Jun2020 03:07PM       Accession: [id number]       \par \par \par  Pathology             Final\par \par No Documents Attached\par \par \par \par \par   [facility] Accession Number : [id number]\par \par HARDEEP, SUBASH                          4\par \par \par \par Addendum Report\par \par \par \par \par Hematopathology Addendum\par Comprehensive Hematopathology Report\par \par Final Diagnosis:\par 1, 2. Bone marrow biopsy and bone marrow aspirate\par - Plasma cell myeloma (10% with focal 25% by  stain)\par - Slight erythroid predominant trilineage hematopoiesis\par with maturation\par \par Diagnostic Note:\par Please note findings of a normal male karyotype and a negative\par multiple myeloma FISH panel. Based on the additional findings,\par the diagnosis has not changed. Correlation with studies for\par myeloma-related organ dysfunction is necessary.\par \par Morphology:\par Microscopic description:\par 1. Biopsy: Sections of bone marrow biopsy show normocellularity\par (60 to 70% cellularity), mild plasmacytosis with foci of small\par clusters, slight erythroid predominant trilineage hematopoiesis\par with maturation, mild megakaryocytosis with normal morphology,\par and increased iron stores.\par 2. Aspirate: Cellular spicules are not present, precluding\par differential count. Review of the smear shows some maturing and\par predominant mature myeloid cells, a few erythroid elements, and\par rare megakaryocytes.\par \par Ancillary Studies:\par Bone marrow aspirate iron stain: No spicules are present to\par evaluate for iron stores and there are insufficient nRBC to\par evaluate for ring sideroblasts.\par Congo red stain is negative for amyloidosis.\par Flow cytometry:  Monotypic plasma cells (1% of cells), positive\par for cytoplasmic kappa, CD38, , dimmer CD45, partial CD56;\par negative CD19, CD20, .\par CD45/side scatter shows no significant blast population. There is\par no increase in CD34,  or CD14 positive cells.\par Lymphocytes (8% of cells) show a heterogeneous population of T-\par cells (with normal CD4 to CD8 ratio), and polytypic B-cells.\par Immunohistochemical stains ( performed on the block 1A and\par 1B, cyclin-D1 performed on block 1A):   stains show overall\par approximately 10% plasma cells, with foci of small clusters,\par focal up to 25%. Plasma cells are negative for cyclin-D1.\par \par Cytogenetics:\par Result: Normal male karyotype\par Karyotype: 46,XY[20]\par \par \par \par \par \par \par HARDEEP, SUBASH                          4\par \par \par \par Addendum Report\par \par \par \par \par FISH:\par Result: NORMAL FISH -\par - MULTIPLE MYELOMA PANEL\par Probe(s) and Location(s): FGFR3(4p16), CCND1(11q13), RB1(13q14),\par IGH(14q32), MAF(16q23), TP53(17p13.1)\par \par Clinical History/Data:\par New diagnosis multiple myeloma\par CBC on 08/24/2019: WBC: 8.09 K/uL, HGB: 9.5 g/dL, MCV: 90 fl,\par Plt: 227 K/uL\par Serum Immunofixation: IgG kappa\par \par Verified by: Nancy Farris M.D.\par (Electronic Signature)\par Reported on: 09/07/19 10:37 EDT, [address], [place], [place]\par [address]\par _________________________________________________________________\par \par \par Hematopathology Report\par \par \par \par \par Final Diagnosis\par 1, 2. Bone marrow biopsy and bone marrow aspirate\par - Plasma cell myeloma (10% with focal 25% by  stain)\par - Slight erythroid predominant trilineage hematopoiesis\par with maturation\par \par See note and description.\par \par Diagnostic note:\par Correlation with studies for myeloma-related organ dysfunction is\par necessary.\par Comprehensive report with results of pending ancillary studies to\par follow.\par \par Ancillary studies\par Bone marrow aspirate iron stain: No spicules are present to\par evaluate for iron stores and there are insufficient nRBC to\par evaluate for ring sideroblasts.\par \par Flow cytometry:  Monotypic plasma cells (1% of cells), positive\par for cytoplasmic kappa, CD38, , dimmer CD45, partial CD56;\par negative CD19, CD20, .\par CD45/side scatter shows no significant blast population. There is\par no increase in CD34,  or CD14 positive cells.\par \par \par \par \par \par \par HARDEEP, SUBASH                          4\par \par \par \par Hematopathology Report\par \par \par \par \par Lymphocytes (8% of cells) show a heterogeneous population of T-\par cells (with normal CD4 to CD8 ratio), and polytypic B-cells.\par \par Immunohistochemical stains ( performed on the block 1A and\par 1B, cyclin-D1 performed on block 1A):   stains show overall\par approximately 10% plasma cells, with foci of small clusters,\par focal up to 25%. Plasma cells are negative for cyclin-D1.\par \par Microscopic description:\par 1. Biopsy: Sections of bone marrow biopsy show normocellularity\par (60 to 70% cellularity), mild plasmacytosis with foci of small\par clusters, slight erythroid predominant trilineage hematopoiesis\par with maturation, mild megakaryocytosis with normal morphology,\par and increased iron stores.\par \par 2. Aspirate: Cellular spicules are not present, precluding\par differential count. Review of the smear shows some maturing and\par predominant mature myeloid cells, a few erythroid elements, and\par rare megakaryocytes.\par \par Comment:  Iron stain (examined to evaluate for iron stores; see\par microscopic description) and Giemsa stain (shows appropriate\par staining pattern) are performed and evaluated on block(s): 1A,\par 1B.\par \par Slide(s) with built in immunohistochemical study control(s)\par associated and negative control with this case has/have been\par verified by the sign out pathologist.\par For slide(s) without built in controls positive control slides\par has/have been reviewed and approved by immunohistochemistry lab\par These immunohistochemical/ in-situ hybridization tests have been\par developed and their performance characteristics determined by\par [facility], Department of\par Pathology, Division of Immunopathology, [address] [place] [address]\[place] [address].  It has not been cleared or approved by the\par U.S. Food and Drug Administration.  The FDA has determined that\par such clearance or approval is not necessary.  This test is used\par for clinical purposes.  The laboratory is certified under the\par CLIA-88 as qualified to perform high\par complexity clinical testing.\par \par Verified by: Nancy Farris M.D.\par (Electronic Signature)\par Reported on: 08/27/19 10:00 EDT, [address] [place] [address] [place], [place]\par [address]\par _________________________________________________________________\par \par \par \par \par \par \par \par HARDEEP, SUBASH                          4\par \par \par \par Hematopathology Report\par \par \par \par \par Clinical History\par New diagnosis multiple myeloma\par CBC on 08/24/2019: WBC: 8.09 K/uL, HGB: 9.5 g/dL, MCV: 90 fl,\par Plt: 227 K/uL\par Serum Immunofixation: IgG kappa\par \par Specimen(s) Submitted\par 1     Bone marrow biopsy left\par 2     Bone marrow aspirate\par \par Gross Description\par 1. The specimen is received in bouin's fixative and the specimen\par container is labeled: Left bone marrow biopsy.  It consists of a\par 0.5 x 0.2 cm bone marrow core with a 1.5 x 1.3 x 0.5 cm blood\par clot.  Entirely submitted.  Two cassettes: A = bone marrow core;\par B = blood clot.\par 2. The specimen is received labeled with patient's name and\par consists of 3 air dried slide(s). 2 slide(s) stained with Monzon\par Giemsa stain. 1 stained for iron stain.\par In addition to other data that may appear on the specimen\par container, the label has been inspected to confirm the presence\par of the patient's name and date of birth.\par Catarina ANAYA Brian 08/22/2019 17:12\par \par  \par \par  Ordered by: BECKY BILLS       Collected/Examined: [id number] 03:50PM       \par Verified by: BECKY BILLS [id number] 05:42PM       \par  Result Communication: No patient communication needed at this time;\par Stage: Final       \par  Performed at: [facility]       Resulted: [id number] 10:37AM       Last Updated: 07Sep2019 05:42PM       Accession: [id number]

## 2023-05-25 ENCOUNTER — OUTPATIENT (OUTPATIENT)
Dept: OUTPATIENT SERVICES | Facility: HOSPITAL | Age: 73
LOS: 1 days | Discharge: HOME | End: 2023-05-25

## 2023-05-25 DIAGNOSIS — Z95.5 PRESENCE OF CORONARY ANGIOPLASTY IMPLANT AND GRAFT: Chronic | ICD-10-CM

## 2023-05-25 DIAGNOSIS — C90.00 MULTIPLE MYELOMA NOT HAVING ACHIEVED REMISSION: ICD-10-CM

## 2023-05-26 ENCOUNTER — APPOINTMENT (OUTPATIENT)
Dept: INFUSION THERAPY | Facility: HOSPITAL | Age: 73
End: 2023-05-26

## 2023-05-26 ENCOUNTER — RESULT REVIEW (OUTPATIENT)
Age: 73
End: 2023-05-26

## 2023-05-26 ENCOUNTER — APPOINTMENT (OUTPATIENT)
Dept: HEMATOLOGY ONCOLOGY | Facility: CLINIC | Age: 73
End: 2023-05-26

## 2023-05-26 DIAGNOSIS — Z51.11 ENCOUNTER FOR ANTINEOPLASTIC CHEMOTHERAPY: ICD-10-CM

## 2023-05-26 DIAGNOSIS — R11.2 NAUSEA WITH VOMITING, UNSPECIFIED: ICD-10-CM

## 2023-05-26 LAB
BASOPHILS # BLD AUTO: 0.06 K/UL — SIGNIFICANT CHANGE UP (ref 0–0.2)
BASOPHILS NFR BLD AUTO: 1.2 % — SIGNIFICANT CHANGE UP (ref 0–2)
EOSINOPHIL # BLD AUTO: 0.34 K/UL — SIGNIFICANT CHANGE UP (ref 0–0.5)
EOSINOPHIL NFR BLD AUTO: 6.8 % — HIGH (ref 0–6)
HCT VFR BLD CALC: 29.3 % — LOW (ref 39–50)
HGB BLD-MCNC: 9.8 G/DL — LOW (ref 13–17)
IMM GRANULOCYTES NFR BLD AUTO: 0.4 % — SIGNIFICANT CHANGE UP (ref 0–0.9)
LYMPHOCYTES # BLD AUTO: 1.47 K/UL — SIGNIFICANT CHANGE UP (ref 1–3.3)
LYMPHOCYTES # BLD AUTO: 29.4 % — SIGNIFICANT CHANGE UP (ref 13–44)
MCHC RBC-ENTMCNC: 30.9 PG — SIGNIFICANT CHANGE UP (ref 27–34)
MCHC RBC-ENTMCNC: 33.4 G/DL — SIGNIFICANT CHANGE UP (ref 32–36)
MCV RBC AUTO: 92.4 FL — SIGNIFICANT CHANGE UP (ref 80–100)
MONOCYTES # BLD AUTO: 0.61 K/UL — SIGNIFICANT CHANGE UP (ref 0–0.9)
MONOCYTES NFR BLD AUTO: 12.2 % — SIGNIFICANT CHANGE UP (ref 2–14)
NEUTROPHILS # BLD AUTO: 2.5 K/UL — SIGNIFICANT CHANGE UP (ref 1.8–7.4)
NEUTROPHILS NFR BLD AUTO: 50 % — SIGNIFICANT CHANGE UP (ref 43–77)
NRBC # BLD: 0 /100 WBCS — SIGNIFICANT CHANGE UP (ref 0–0)
PLATELET # BLD AUTO: 184 K/UL — SIGNIFICANT CHANGE UP (ref 150–400)
RBC # BLD: 3.17 M/UL — LOW (ref 4.2–5.8)
RBC # FLD: 13.1 % — SIGNIFICANT CHANGE UP (ref 10.3–14.5)
WBC # BLD: 5 K/UL — SIGNIFICANT CHANGE UP (ref 3.8–10.5)
WBC # FLD AUTO: 5 K/UL — SIGNIFICANT CHANGE UP (ref 3.8–10.5)

## 2023-06-09 ENCOUNTER — RESULT REVIEW (OUTPATIENT)
Age: 73
End: 2023-06-09

## 2023-06-09 ENCOUNTER — APPOINTMENT (OUTPATIENT)
Dept: INFUSION THERAPY | Facility: HOSPITAL | Age: 73
End: 2023-06-09

## 2023-06-09 ENCOUNTER — APPOINTMENT (OUTPATIENT)
Dept: HEMATOLOGY ONCOLOGY | Facility: CLINIC | Age: 73
End: 2023-06-09

## 2023-06-09 LAB
BASOPHILS # BLD AUTO: 0.08 K/UL — SIGNIFICANT CHANGE UP (ref 0–0.2)
BASOPHILS NFR BLD AUTO: 1.5 % — SIGNIFICANT CHANGE UP (ref 0–2)
EOSINOPHIL # BLD AUTO: 0.23 K/UL — SIGNIFICANT CHANGE UP (ref 0–0.5)
EOSINOPHIL NFR BLD AUTO: 4.3 % — SIGNIFICANT CHANGE UP (ref 0–6)
HCT VFR BLD CALC: 30.1 % — LOW (ref 39–50)
HGB BLD-MCNC: 9.9 G/DL — LOW (ref 13–17)
IMM GRANULOCYTES NFR BLD AUTO: 0.4 % — SIGNIFICANT CHANGE UP (ref 0–0.9)
LYMPHOCYTES # BLD AUTO: 1.5 K/UL — SIGNIFICANT CHANGE UP (ref 1–3.3)
LYMPHOCYTES # BLD AUTO: 27.7 % — SIGNIFICANT CHANGE UP (ref 13–44)
MCHC RBC-ENTMCNC: 30.7 PG — SIGNIFICANT CHANGE UP (ref 27–34)
MCHC RBC-ENTMCNC: 32.9 G/DL — SIGNIFICANT CHANGE UP (ref 32–36)
MCV RBC AUTO: 93.2 FL — SIGNIFICANT CHANGE UP (ref 80–100)
MONOCYTES # BLD AUTO: 0.65 K/UL — SIGNIFICANT CHANGE UP (ref 0–0.9)
MONOCYTES NFR BLD AUTO: 12 % — SIGNIFICANT CHANGE UP (ref 2–14)
NEUTROPHILS # BLD AUTO: 2.93 K/UL — SIGNIFICANT CHANGE UP (ref 1.8–7.4)
NEUTROPHILS NFR BLD AUTO: 54.1 % — SIGNIFICANT CHANGE UP (ref 43–77)
NRBC # BLD: 0 /100 WBCS — SIGNIFICANT CHANGE UP (ref 0–0)
PLATELET # BLD AUTO: 144 K/UL — LOW (ref 150–400)
RBC # BLD: 3.23 M/UL — LOW (ref 4.2–5.8)
RBC # FLD: 12.7 % — SIGNIFICANT CHANGE UP (ref 10.3–14.5)
WBC # BLD: 5.41 K/UL — SIGNIFICANT CHANGE UP (ref 3.8–10.5)
WBC # FLD AUTO: 5.41 K/UL — SIGNIFICANT CHANGE UP (ref 3.8–10.5)

## 2023-06-12 LAB
ALBUMIN SERPL ELPH-MCNC: 4.8 G/DL
ALP BLD-CCNC: 41 U/L
ALT SERPL-CCNC: 17 U/L
ANION GAP SERPL CALC-SCNC: 10 MMOL/L
AST SERPL-CCNC: 21 U/L
B2 MICROGLOB SERPL-MCNC: 3.7 MG/L
BILIRUB SERPL-MCNC: 0.2 MG/DL
BUN SERPL-MCNC: 24 MG/DL
CALCIUM SERPL-MCNC: 9.2 MG/DL
CHLORIDE SERPL-SCNC: 110 MMOL/L
CO2 SERPL-SCNC: 22 MMOL/L
CREAT SERPL-MCNC: 1.5 MG/DL
EGFR: 49 ML/MIN/1.73M2
GLUCOSE SERPL-MCNC: 117 MG/DL
LDH SERPL-CCNC: 190 U/L
POTASSIUM SERPL-SCNC: 4.9 MMOL/L
PROT SERPL-MCNC: 7.1 G/DL
SODIUM SERPL-SCNC: 142 MMOL/L

## 2023-06-14 LAB
ALBUMIN MFR SERPL ELPH: 61.6 %
ALBUMIN SERPL-MCNC: 4.4 G/DL
ALBUMIN/GLOB SERPL: 1.6 RATIO
ALPHA1 GLOB MFR SERPL ELPH: 4.5 %
ALPHA1 GLOB SERPL ELPH-MCNC: 0.3 G/DL
ALPHA2 GLOB MFR SERPL ELPH: 12 %
ALPHA2 GLOB SERPL ELPH-MCNC: 0.9 G/DL
B-GLOBULIN MFR SERPL ELPH: 11.3 %
B-GLOBULIN SERPL ELPH-MCNC: 0.8 G/DL
DEPRECATED KAPPA LC FREE/LAMBDA SER: 1.5 RATIO
GAMMA GLOB FLD ELPH-MCNC: 0.8 G/DL
GAMMA GLOB MFR SERPL ELPH: 10.6 %
IGA SER QL IEP: 142 MG/DL
IGG SER QL IEP: 807 MG/DL
IGM SER QL IEP: 24 MG/DL
INTERPRETATION SERPL IEP-IMP: NORMAL
KAPPA LC CSF-MCNC: 1.91 MG/DL
KAPPA LC SERPL-MCNC: 2.87 MG/DL
M PROTEIN MFR SERPL ELPH: NORMAL
M PROTEIN SPEC IFE-MCNC: NORMAL
MONOCLON BAND OBS SERPL: NORMAL
PROT SERPL-MCNC: 7.1 G/DL
PROT SERPL-MCNC: 7.1 G/DL

## 2023-06-23 ENCOUNTER — APPOINTMENT (OUTPATIENT)
Dept: INFUSION THERAPY | Facility: HOSPITAL | Age: 73
End: 2023-06-23

## 2023-06-23 ENCOUNTER — APPOINTMENT (OUTPATIENT)
Dept: HEMATOLOGY ONCOLOGY | Facility: CLINIC | Age: 73
End: 2023-06-23

## 2023-06-23 ENCOUNTER — RESULT REVIEW (OUTPATIENT)
Age: 73
End: 2023-06-23

## 2023-06-23 LAB
BASOPHILS # BLD AUTO: 0.07 K/UL — SIGNIFICANT CHANGE UP (ref 0–0.2)
BASOPHILS NFR BLD AUTO: 1.3 % — SIGNIFICANT CHANGE UP (ref 0–2)
EOSINOPHIL # BLD AUTO: 0.22 K/UL — SIGNIFICANT CHANGE UP (ref 0–0.5)
EOSINOPHIL NFR BLD AUTO: 4 % — SIGNIFICANT CHANGE UP (ref 0–6)
HCT VFR BLD CALC: 30.7 % — LOW (ref 39–50)
HGB BLD-MCNC: 10.4 G/DL — LOW (ref 13–17)
IMM GRANULOCYTES NFR BLD AUTO: 0.4 % — SIGNIFICANT CHANGE UP (ref 0–0.9)
LYMPHOCYTES # BLD AUTO: 1.55 K/UL — SIGNIFICANT CHANGE UP (ref 1–3.3)
LYMPHOCYTES # BLD AUTO: 28.3 % — SIGNIFICANT CHANGE UP (ref 13–44)
MCHC RBC-ENTMCNC: 30.8 PG — SIGNIFICANT CHANGE UP (ref 27–34)
MCHC RBC-ENTMCNC: 33.9 G/DL — SIGNIFICANT CHANGE UP (ref 32–36)
MCV RBC AUTO: 90.8 FL — SIGNIFICANT CHANGE UP (ref 80–100)
MONOCYTES # BLD AUTO: 0.63 K/UL — SIGNIFICANT CHANGE UP (ref 0–0.9)
MONOCYTES NFR BLD AUTO: 11.5 % — SIGNIFICANT CHANGE UP (ref 2–14)
NEUTROPHILS # BLD AUTO: 2.98 K/UL — SIGNIFICANT CHANGE UP (ref 1.8–7.4)
NEUTROPHILS NFR BLD AUTO: 54.5 % — SIGNIFICANT CHANGE UP (ref 43–77)
NRBC # BLD: 0 /100 WBCS — SIGNIFICANT CHANGE UP (ref 0–0)
PLATELET # BLD AUTO: 171 K/UL — SIGNIFICANT CHANGE UP (ref 150–400)
RBC # BLD: 3.38 M/UL — LOW (ref 4.2–5.8)
RBC # FLD: 12.6 % — SIGNIFICANT CHANGE UP (ref 10.3–14.5)
WBC # BLD: 5.47 K/UL — SIGNIFICANT CHANGE UP (ref 3.8–10.5)
WBC # FLD AUTO: 5.47 K/UL — SIGNIFICANT CHANGE UP (ref 3.8–10.5)

## 2023-07-07 ENCOUNTER — RESULT REVIEW (OUTPATIENT)
Age: 73
End: 2023-07-07

## 2023-07-07 ENCOUNTER — APPOINTMENT (OUTPATIENT)
Dept: INFUSION THERAPY | Facility: HOSPITAL | Age: 73
End: 2023-07-07

## 2023-07-07 ENCOUNTER — APPOINTMENT (OUTPATIENT)
Dept: HEMATOLOGY ONCOLOGY | Facility: CLINIC | Age: 73
End: 2023-07-07

## 2023-07-07 LAB
BASOPHILS # BLD AUTO: 0.07 K/UL — SIGNIFICANT CHANGE UP (ref 0–0.2)
BASOPHILS NFR BLD AUTO: 1.3 % — SIGNIFICANT CHANGE UP (ref 0–2)
EOSINOPHIL # BLD AUTO: 0.23 K/UL — SIGNIFICANT CHANGE UP (ref 0–0.5)
EOSINOPHIL NFR BLD AUTO: 4.1 % — SIGNIFICANT CHANGE UP (ref 0–6)
HCT VFR BLD CALC: 29.1 % — LOW (ref 39–50)
HGB BLD-MCNC: 9.8 G/DL — LOW (ref 13–17)
IMM GRANULOCYTES NFR BLD AUTO: 0.4 % — SIGNIFICANT CHANGE UP (ref 0–0.9)
LYMPHOCYTES # BLD AUTO: 1.7 K/UL — SIGNIFICANT CHANGE UP (ref 1–3.3)
LYMPHOCYTES # BLD AUTO: 30.4 % — SIGNIFICANT CHANGE UP (ref 13–44)
MCHC RBC-ENTMCNC: 30.5 PG — SIGNIFICANT CHANGE UP (ref 27–34)
MCHC RBC-ENTMCNC: 33.7 G/DL — SIGNIFICANT CHANGE UP (ref 32–36)
MCV RBC AUTO: 90.7 FL — SIGNIFICANT CHANGE UP (ref 80–100)
MONOCYTES # BLD AUTO: 0.62 K/UL — SIGNIFICANT CHANGE UP (ref 0–0.9)
MONOCYTES NFR BLD AUTO: 11.1 % — SIGNIFICANT CHANGE UP (ref 2–14)
NEUTROPHILS # BLD AUTO: 2.95 K/UL — SIGNIFICANT CHANGE UP (ref 1.8–7.4)
NEUTROPHILS NFR BLD AUTO: 52.7 % — SIGNIFICANT CHANGE UP (ref 43–77)
NRBC # BLD: 0 /100 WBCS — SIGNIFICANT CHANGE UP (ref 0–0)
PLATELET # BLD AUTO: 170 K/UL — SIGNIFICANT CHANGE UP (ref 150–400)
RBC # BLD: 3.21 M/UL — LOW (ref 4.2–5.8)
RBC # FLD: 12 % — SIGNIFICANT CHANGE UP (ref 10.3–14.5)
WBC # BLD: 5.59 K/UL — SIGNIFICANT CHANGE UP (ref 3.8–10.5)
WBC # FLD AUTO: 5.59 K/UL — SIGNIFICANT CHANGE UP (ref 3.8–10.5)

## 2023-07-21 ENCOUNTER — RESULT REVIEW (OUTPATIENT)
Age: 73
End: 2023-07-21

## 2023-07-21 ENCOUNTER — APPOINTMENT (OUTPATIENT)
Dept: INFUSION THERAPY | Facility: HOSPITAL | Age: 73
End: 2023-07-21

## 2023-07-21 ENCOUNTER — APPOINTMENT (OUTPATIENT)
Dept: HEMATOLOGY ONCOLOGY | Facility: CLINIC | Age: 73
End: 2023-07-21

## 2023-07-21 LAB
BASOPHILS # BLD AUTO: 0.08 K/UL — SIGNIFICANT CHANGE UP (ref 0–0.2)
BASOPHILS NFR BLD AUTO: 1.3 % — SIGNIFICANT CHANGE UP (ref 0–2)
EOSINOPHIL # BLD AUTO: 0.64 K/UL — HIGH (ref 0–0.5)
EOSINOPHIL NFR BLD AUTO: 10.2 % — HIGH (ref 0–6)
HCT VFR BLD CALC: 30 % — LOW (ref 39–50)
HGB BLD-MCNC: 10.1 G/DL — LOW (ref 13–17)
IMM GRANULOCYTES NFR BLD AUTO: 0.2 % — SIGNIFICANT CHANGE UP (ref 0–0.9)
LYMPHOCYTES # BLD AUTO: 1.52 K/UL — SIGNIFICANT CHANGE UP (ref 1–3.3)
LYMPHOCYTES # BLD AUTO: 24.1 % — SIGNIFICANT CHANGE UP (ref 13–44)
MCHC RBC-ENTMCNC: 30.8 PG — SIGNIFICANT CHANGE UP (ref 27–34)
MCHC RBC-ENTMCNC: 33.7 G/DL — SIGNIFICANT CHANGE UP (ref 32–36)
MCV RBC AUTO: 91.5 FL — SIGNIFICANT CHANGE UP (ref 80–100)
MONOCYTES # BLD AUTO: 0.62 K/UL — SIGNIFICANT CHANGE UP (ref 0–0.9)
MONOCYTES NFR BLD AUTO: 9.8 % — SIGNIFICANT CHANGE UP (ref 2–14)
NEUTROPHILS # BLD AUTO: 3.43 K/UL — SIGNIFICANT CHANGE UP (ref 1.8–7.4)
NEUTROPHILS NFR BLD AUTO: 54.4 % — SIGNIFICANT CHANGE UP (ref 43–77)
NRBC # BLD: 0 /100 WBCS — SIGNIFICANT CHANGE UP (ref 0–0)
PLATELET # BLD AUTO: 153 K/UL — SIGNIFICANT CHANGE UP (ref 150–400)
RBC # BLD: 3.28 M/UL — LOW (ref 4.2–5.8)
RBC # FLD: 12.3 % — SIGNIFICANT CHANGE UP (ref 10.3–14.5)
WBC # BLD: 6.3 K/UL — SIGNIFICANT CHANGE UP (ref 3.8–10.5)
WBC # FLD AUTO: 6.3 K/UL — SIGNIFICANT CHANGE UP (ref 3.8–10.5)

## 2023-07-24 LAB
ALBUMIN SERPL ELPH-MCNC: 5 G/DL
ALP BLD-CCNC: 41 U/L
ALT SERPL-CCNC: 16 U/L
ANION GAP SERPL CALC-SCNC: 13 MMOL/L
AST SERPL-CCNC: 27 U/L
BILIRUB SERPL-MCNC: 0.2 MG/DL
BUN SERPL-MCNC: 22 MG/DL
CALCIUM SERPL-MCNC: 9.2 MG/DL
CHLORIDE SERPL-SCNC: 109 MMOL/L
CO2 SERPL-SCNC: 19 MMOL/L
CREAT SERPL-MCNC: 1.49 MG/DL
EGFR: 49 ML/MIN/1.73M2
GLUCOSE SERPL-MCNC: 110 MG/DL
POTASSIUM SERPL-SCNC: 4.5 MMOL/L
PROT SERPL-MCNC: 7.3 G/DL
SODIUM SERPL-SCNC: 141 MMOL/L

## 2023-07-26 ENCOUNTER — OUTPATIENT (OUTPATIENT)
Dept: OUTPATIENT SERVICES | Facility: HOSPITAL | Age: 73
LOS: 1 days | Discharge: HOME | End: 2023-07-26

## 2023-07-26 DIAGNOSIS — C90.00 MULTIPLE MYELOMA NOT HAVING ACHIEVED REMISSION: ICD-10-CM

## 2023-07-26 DIAGNOSIS — Z95.5 PRESENCE OF CORONARY ANGIOPLASTY IMPLANT AND GRAFT: Chronic | ICD-10-CM

## 2023-08-04 ENCOUNTER — APPOINTMENT (OUTPATIENT)
Dept: INFUSION THERAPY | Facility: HOSPITAL | Age: 73
End: 2023-08-04

## 2023-08-04 ENCOUNTER — APPOINTMENT (OUTPATIENT)
Dept: HEMATOLOGY ONCOLOGY | Facility: CLINIC | Age: 73
End: 2023-08-04

## 2023-08-04 ENCOUNTER — RESULT REVIEW (OUTPATIENT)
Age: 73
End: 2023-08-04

## 2023-08-04 LAB
BASOPHILS # BLD AUTO: 0.06 K/UL — SIGNIFICANT CHANGE UP (ref 0–0.2)
BASOPHILS NFR BLD AUTO: 0.8 % — SIGNIFICANT CHANGE UP (ref 0–2)
EOSINOPHIL # BLD AUTO: 0.74 K/UL — HIGH (ref 0–0.5)
EOSINOPHIL NFR BLD AUTO: 10.5 % — HIGH (ref 0–6)
HCT VFR BLD CALC: 31 % — LOW (ref 39–50)
HGB BLD-MCNC: 10.3 G/DL — LOW (ref 13–17)
IMM GRANULOCYTES NFR BLD AUTO: 0.3 % — SIGNIFICANT CHANGE UP (ref 0–0.9)
LYMPHOCYTES # BLD AUTO: 1.69 K/UL — SIGNIFICANT CHANGE UP (ref 1–3.3)
LYMPHOCYTES # BLD AUTO: 23.9 % — SIGNIFICANT CHANGE UP (ref 13–44)
MCHC RBC-ENTMCNC: 30.2 PG — SIGNIFICANT CHANGE UP (ref 27–34)
MCHC RBC-ENTMCNC: 33.2 G/DL — SIGNIFICANT CHANGE UP (ref 32–36)
MCV RBC AUTO: 90.9 FL — SIGNIFICANT CHANGE UP (ref 80–100)
MONOCYTES # BLD AUTO: 0.58 K/UL — SIGNIFICANT CHANGE UP (ref 0–0.9)
MONOCYTES NFR BLD AUTO: 8.2 % — SIGNIFICANT CHANGE UP (ref 2–14)
NEUTROPHILS # BLD AUTO: 3.98 K/UL — SIGNIFICANT CHANGE UP (ref 1.8–7.4)
NEUTROPHILS NFR BLD AUTO: 56.3 % — SIGNIFICANT CHANGE UP (ref 43–77)
NRBC # BLD: 0 /100 WBCS — SIGNIFICANT CHANGE UP (ref 0–0)
PLATELET # BLD AUTO: 193 K/UL — SIGNIFICANT CHANGE UP (ref 150–400)
RBC # BLD: 3.41 M/UL — LOW (ref 4.2–5.8)
RBC # FLD: 12.5 % — SIGNIFICANT CHANGE UP (ref 10.3–14.5)
WBC # BLD: 7.07 K/UL — SIGNIFICANT CHANGE UP (ref 3.8–10.5)
WBC # FLD AUTO: 7.07 K/UL — SIGNIFICANT CHANGE UP (ref 3.8–10.5)

## 2023-08-07 DIAGNOSIS — R11.2 NAUSEA WITH VOMITING, UNSPECIFIED: ICD-10-CM

## 2023-08-07 DIAGNOSIS — Z51.11 ENCOUNTER FOR ANTINEOPLASTIC CHEMOTHERAPY: ICD-10-CM

## 2023-08-07 LAB
ALBUMIN MFR SERPL ELPH: 59.3 %
ALBUMIN SERPL ELPH-MCNC: 5 G/DL
ALBUMIN SERPL-MCNC: 4.3 G/DL
ALBUMIN/GLOB SERPL: 1.4 RATIO
ALP BLD-CCNC: 47 U/L
ALPHA1 GLOB MFR SERPL ELPH: 4.5 %
ALPHA1 GLOB SERPL ELPH-MCNC: 0.3 G/DL
ALPHA2 GLOB MFR SERPL ELPH: 13.4 %
ALPHA2 GLOB SERPL ELPH-MCNC: 1 G/DL
ALT SERPL-CCNC: 13 U/L
ANION GAP SERPL CALC-SCNC: 16 MMOL/L
AST SERPL-CCNC: 20 U/L
B-GLOBULIN MFR SERPL ELPH: 11.3 %
B-GLOBULIN SERPL ELPH-MCNC: 0.8 G/DL
BILIRUB SERPL-MCNC: 0.3 MG/DL
BUN SERPL-MCNC: 25 MG/DL
CALCIUM SERPL-MCNC: 9.8 MG/DL
CHLORIDE SERPL-SCNC: 107 MMOL/L
CO2 SERPL-SCNC: 20 MMOL/L
CREAT SERPL-MCNC: 1.97 MG/DL
DEPRECATED KAPPA LC FREE/LAMBDA SER: 1.51 RATIO
EGFR: 35 ML/MIN/1.73M2
GAMMA GLOB FLD ELPH-MCNC: 0.8 G/DL
GAMMA GLOB MFR SERPL ELPH: 11.5 %
GLUCOSE SERPL-MCNC: 107 MG/DL
IGA SER QL IEP: 175 MG/DL
IGG SER QL IEP: 907 MG/DL
IGM SER QL IEP: 30 MG/DL
INTERPRETATION SERPL IEP-IMP: NORMAL
KAPPA LC CSF-MCNC: 2.45 MG/DL
KAPPA LC SERPL-MCNC: 3.7 MG/DL
LDH SERPL-CCNC: 181 U/L
M PROTEIN SPEC IFE-MCNC: NORMAL
POTASSIUM SERPL-SCNC: 3.9 MMOL/L
PROT SERPL-MCNC: 7.3 G/DL
SODIUM SERPL-SCNC: 143 MMOL/L

## 2023-08-15 ENCOUNTER — NON-APPOINTMENT (OUTPATIENT)
Age: 73
End: 2023-08-15

## 2023-08-18 ENCOUNTER — RESULT REVIEW (OUTPATIENT)
Age: 73
End: 2023-08-18

## 2023-08-18 ENCOUNTER — APPOINTMENT (OUTPATIENT)
Dept: INFUSION THERAPY | Facility: HOSPITAL | Age: 73
End: 2023-08-18

## 2023-08-18 ENCOUNTER — APPOINTMENT (OUTPATIENT)
Dept: HEMATOLOGY ONCOLOGY | Facility: CLINIC | Age: 73
End: 2023-08-18
Payer: MEDICARE

## 2023-08-18 VITALS
OXYGEN SATURATION: 99 % | TEMPERATURE: 96.6 F | BODY MASS INDEX: 21.59 KG/M2 | SYSTOLIC BLOOD PRESSURE: 145 MMHG | HEART RATE: 69 BPM | WEIGHT: 125.64 LBS | RESPIRATION RATE: 16 BRPM | DIASTOLIC BLOOD PRESSURE: 65 MMHG

## 2023-08-18 LAB
BASOPHILS # BLD AUTO: 0.06 K/UL — SIGNIFICANT CHANGE UP (ref 0–0.2)
BASOPHILS NFR BLD AUTO: 0.9 % — SIGNIFICANT CHANGE UP (ref 0–2)
EOSINOPHIL # BLD AUTO: 0.43 K/UL — SIGNIFICANT CHANGE UP (ref 0–0.5)
EOSINOPHIL NFR BLD AUTO: 6.4 % — HIGH (ref 0–6)
HCT VFR BLD CALC: 30.3 % — LOW (ref 39–50)
HGB BLD-MCNC: 10.1 G/DL — LOW (ref 13–17)
IMM GRANULOCYTES NFR BLD AUTO: 0.3 % — SIGNIFICANT CHANGE UP (ref 0–0.9)
LYMPHOCYTES # BLD AUTO: 1.55 K/UL — SIGNIFICANT CHANGE UP (ref 1–3.3)
LYMPHOCYTES # BLD AUTO: 23 % — SIGNIFICANT CHANGE UP (ref 13–44)
MCHC RBC-ENTMCNC: 30.6 PG — SIGNIFICANT CHANGE UP (ref 27–34)
MCHC RBC-ENTMCNC: 33.3 G/DL — SIGNIFICANT CHANGE UP (ref 32–36)
MCV RBC AUTO: 91.8 FL — SIGNIFICANT CHANGE UP (ref 80–100)
MONOCYTES # BLD AUTO: 0.53 K/UL — SIGNIFICANT CHANGE UP (ref 0–0.9)
MONOCYTES NFR BLD AUTO: 7.9 % — SIGNIFICANT CHANGE UP (ref 2–14)
NEUTROPHILS # BLD AUTO: 4.14 K/UL — SIGNIFICANT CHANGE UP (ref 1.8–7.4)
NEUTROPHILS NFR BLD AUTO: 61.5 % — SIGNIFICANT CHANGE UP (ref 43–77)
NRBC # BLD: 0 /100 WBCS — SIGNIFICANT CHANGE UP (ref 0–0)
PLATELET # BLD AUTO: 158 K/UL — SIGNIFICANT CHANGE UP (ref 150–400)
RBC # BLD: 3.3 M/UL — LOW (ref 4.2–5.8)
RBC # FLD: 12.6 % — SIGNIFICANT CHANGE UP (ref 10.3–14.5)
WBC # BLD: 6.73 K/UL — SIGNIFICANT CHANGE UP (ref 3.8–10.5)
WBC # FLD AUTO: 6.73 K/UL — SIGNIFICANT CHANGE UP (ref 3.8–10.5)

## 2023-08-18 PROCEDURE — 99213 OFFICE O/P EST LOW 20 MIN: CPT

## 2023-08-18 NOTE — ASSESSMENT
[FreeTextEntry1] : 72 year-old Mr. MEIER is seen in follow up for IgG Kappa Multiple Myeloma presented with lytic percy lesions, diagnosed in 9/2019. He received XRT to percy lesions,was started on RVD induction, achieved VGPR. Last PET 05/2020 - markedly improved and reduced FDG avidity throughout. His most recent SPEP/MICAH are normal, and FLCR has normalized as well. He is in sCR. Hold Zometa for increased Cr to 1.9.     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  - MRI T-spine (9/25/22): abnormal signal involving T8 vertebral body which may be related to multiple myeloma - Repeat T-spine MRI in 3 months ~3/2/2023, to re-assess previous findings as there was no definitive evidence of myeloma involvement. Will repeat in 3 months from 12/2/22 as he has not received any myeloma treatment since his last dose of Velcade on 3/3/22.  - Continue treatment regimen with Velcade 3 week on,  1 week off, Dex 20mg weekly, and monthly Zometa - Achieved sCR as of 01/2023 labs  - Above regimen stopped after 4 cycles, switched 2 Q2w Velcade  - Patient visited his [place] and remained off treatment for a month - Will continue Velcade SQ Q2W - Discontinue Dex  - Continue Zometa q month up to one year (up to December 2023) - Hold zomenta as Cr increased to 1.9 - Nephrology evaluation - Continue ppx medications (Acyclovir)  - Follow up in 2 months    # Anemia  - Multifactorial origin including CKD, CKD now improving therefore will trend Hgb further before intervention. - Anemia w/u completed on 11/23/22

## 2023-08-18 NOTE — HISTORY OF PRESENT ILLNESS
[Disease:__________________________] : Disease: [unfilled] [Treatment Protocol] : Treatment Protocol [de-identified] : As per Dr Kilgore's note on 8/18/2020\par  \par  "Mr Rand was referred to my office for newly diagnosed IgG kappa multiple myeloma. The patient's primary language is Pashto, he has his daughter Mirlande as the , per his wishes. He was seen by Dr. Andrés Valle (hematology) in Feb 2019 and had a BM bx showing 10-15% plasma cells, concerning for smoldering myeloma. According to the daughter, he was awaiting insurance approval for imaging studies. He was admitted from 8/18/19 at [facility] where he presented with pain in the L leg/lower back, worsening for the past 2-3 months. On labs, he was found to have a total protein of 10, Ca level 12 until 8/29/19. Dental consult was called and patient needs to have 10 teeth extracted -thus, IV bisphosphonates were not given, pt improved with hydration. He also had a slightly inc'ed creatinine -improved after IVF. CT C/A/P 8/18/19 showed a lytic expansile soft tissue lesion centered in the manubrium measuring approximately 7.3 x 3.4 x 4.3 cm, invading both 1st ribs. There was also a A lytic expansile soft tissue lesion in the T8 vertebral body causing mass effect on spinal canal and obliterating left neural foramina at T7-8 and T8-9. ON 8/22/19 an MRI thoracic spine was done showing multiple areas of tumor involvement within the thoracic spine in keeping with the patient's history of multiple myeloma. Prominent lesion within C7 on the right incompletely seen.\par  Large lesion within T8 on the left producing epidural tumor extension and moderate narrowing of the spinal canal with mild flattening of the spinal cord. He was evaluated on 8/22/19 by Dr. Foley (Rad Onc) and was given 5 fractions of XRT from 8/23/19 to 8/29/19 to C7 and T8. He was discharged home on 8/29/19 with follow up in the outpatient office, and on Dexamethasone 4mg po daily. \par  \par   \par  \par  \par  \par  Interval History: The patient is being followed for IgG kappa MM with multiple bone lytic lesions, hypercalcemia (resolved) and mild anemia. He started Velcade/Dexa weekly on 9/919. The patient got the Revlimid and started it C1 day 1 on 10/21/19 and hed been tolerating it well. \par  \par  Starting in February, his blood counts became low -plt count 54, Hb 8.3 wbc 5.8. Revlimid was held -after 2/3/20. He got 1 shot Velcade/Dexa on 2/3/20, NO chemo since then. \par  BM done on 3/4/20 to eval for residual disease (MM) vs. MDS. BM showed recovering marrow elements, plasma cells <5%. SPEP/MICAH from 3/9/20 showed NO monoclonal proteins, c/w CR. \par  \par  The PET scan from 5/7/20 showed L uptake maxillary -pt reports that he had dentures done in Jan 2020 and has discomfort from them. He did not go to the dentist during COVID Rib fractures -pt denied falls/trauma. Patient has dental appointment for next week. He has some discomfort in the upper teeth b/l. He also missed his Velcade SQ last week 'i did not know if i am supposed to continue it.' He has no neuropathy from it, tolerating well. "\par   [de-identified] : RISS- II [FreeTextEntry1] : s/p RT to C7/T8 spine\par  RVD 9/2019 -6/2020 transitioned to maintenance velcade q2wk started 6/19/2020 [de-identified] : Oct 6, 2020 Pt is here for initial visit with me. He has his dtr on the phone, prefers her included in conversation and declined an . Pt is feeling well. He is tolerating monthly zometa and maintenance velcade w/o any adverse effects. Last set of myeloma labs done in August showed stable remission. He is requesting refill of several meds today. ---------------------------------  1/25/2021 Patient presents for a follow up. He has no complaints except infrequent short duration chills with no fever. He is on biweekly Velcade and monthly Zometa. His last treatment of Zometa was on 1/8/2021 and last Velcade on 1/22/2021. His most recent SPEP/MICAH was normal.     6/10/2021 Patient seen in follow up. He did not go to his home country as he planned. He does not endorse any big change in his health status. She saw his Kidney doctor who told him he is iron deficient (labs are not supportive). His anemia is multifactorial including CKD. His most recent myeloma labs (03/2021) show continued CR.   9/1/2021 Mr Rand returns for a follow up. His daughter is accompanying him. He has no complaints. He reports that he develops diarrhea (one motion) the day after his Velcade treatment. He plans to visit his home town soon. He has some unavoidable things to take care of. Of note he has been in sCR.   1/19/2022 Patient seen in follow up. He has done well in the interim. He also visited his home country in this interval. He was safe all along.  As regards to his disease, he is stable and appears to be in remission. He has no complaints.    11/23/2022 Patient presents to clinic after being lost to follow-up; went to his country for an extended visit from 3/10 - 9/8/2022. Was on maintenance velcade [id number] starting 6/19/2020, last dose was on 3/3/22. Was on monthly Zometa since 11/2019, last dose of zometa was on 2/17/2022. In the interim, he developed severe weakness and was hospitalized at [facility] (9/24-10/11/22) for Acute respiratory failure with hypoxia, and severe sepsis due to GBS. Was discharged to rehab, and as of yesterday he was discharged from rehab. Will now be doing PT at home. During inpatient work up, he was found to have diffuse lytic lesions noted in C/T/L spine CT scans (9/25/22) which were identified as "Multiple mixed lytic sclerotic lesions may be sequela of previously treated multiple myeloma". MRI of the C/T/L spine were done for follow up revealing an "abnormal signal involving T8 vertebral body which may be related to multiple myeloma". Was seen by neurosurgery, no acute intervention. In patient immunofixation did not reveal a monoclonal band; SPEP WNL; kappa/lambda ratio not diagnostic. Today he feels at baseline, but notes residual weakness from GBS. Patient denies bone pain, fever, chills, night sweats, back pain, headache, or peripheral neuropathy. Good appetite, stable weight.  12/23/2022 Follow up. Today he feels at baseline, but notes residual weakness from GBS. Today is C1D22 of Velcade and dexamethasone since restarting it; he is tolerating the medications well. He has some questions requesting clarification on Acyclovir and dexamethasone, otherwise no other concerns. Patient denies bone pain, fever, chills, night sweats, back pain, headache, or peripheral neuropathy. Good appetite, stable weight.   1/20/2023  Patient seen in follow up for multiple myeloma. He was on maintenance Velcade. He went too his home country and missed treatment for more than two months. His disease progressed as noted upon return. He had new percy lesion. He was again started on the same induction regimen. He is also getting Zometa monthly. He has now achieved VGPR having only mild high Kappa, no M-spike/band. He offers no complaints. Denies any infection, fever, bone pain, weigh loss.    2/16/2023 Patient presents for a follow up. He missed his last treatment as he was having cough body ache. He saw his PCP and took a course of Azithromycin. His symptoms relieved. He is feeling well now. His treatment schedule for tomorrow (2/17/2023). Of note, his myeloma labs from 01/2023 are suggestive of sCR.    3/9/2023 Patient presents for a follow up. He has done well in the interim. His cough has resolved. He plans to take a chemo vacation for a month as he plans to visit his home country. He will finish his C4 treatment tomorrow, following which he will continue on maintenance therapy which can be started after he comes back. Of note he again as achieved a very good partial response.   5/19/2023 Patient returns for a follow up. He has missed 2 cycles of treatment as he went back to his home country. His last paraprotein labs are from 03/2023 and they are WNL including FLCR suggesting sCR.  He has no complaints.   8/18/2023 Patient presents for a follow up. He is in CR. He is on Q2W Velate and QM Zometa. However, his Zometa has been held this month as his Cr has increased to 1.9 from 1.4. He has been asked to see a nephrologist. He is physically well and offers no complaints.

## 2023-08-18 NOTE — RESULTS/DATA
[FreeTextEntry1] : 8/18/2023 HGB 10.1 Cr: pending.  MICAH (8/4/2023) No M-spike, no M-band  KFLC 3.7 LFLC 2.45 FLCR 1.51 (Normal)     3/9/2023 Most recent labs Hgb 9.4  Plt 141  M-spike: unable to quantitate K 4.0 L 1.89 K/.L 2.12   1/20/2023  Most recent myeloma labs from 12/23/2022  IgG Kappa disease  No M-spike, no M-band  IgG 931 IgA 132 IgM 32 K 2.62 L 1.45 K/L 1.79   Most recent CBC from 1/12/2023 Mild anemia (10.8 and thrombocytopenia (135K)   1/19/2022 Stable myeloma labs with normal range values.Mild high Lappa lambda with normal ratio is likely due to his renal failure.  MIld to moderate anemia is stable.   9/1/2021 Last PCN labs in 06/2021 showed no M-spike, M-band, Normal FLCR.  6/9/2021 Last Myeloma labs in 03/2021 showed no M-spike, M-band, Normal FLCR.   1/25/2021 Most recent SPEP / MICAH (1/8/2021) No monoclonal band   ------------------------------------------------------------------------------------------   PET/CT Whole Body Oncology FDG             Final  No Documents Attached       EXAM:  PETCT WB ONC FDG SUBS   PROCEDURE DATE:  05/07/2020    INTERPRETATION:  PROCEDURE:  PET/CT WHOLE BODY  RADIOPHARMACEUTICAL:  9.97 mCi F-18, FDG, I.V.  CLINICAL INFORMATION:  70-year-old male referred for follow-up of multiple myeloma on Zometa. Bone marrow biopsy done 3/4/2020 showed less than 5% plasma cells and repeat myeloma labs done on 3/9/2020, showed CT are. PET/CT is done as part of the subsequent treatment strategy evaluation.  TECHNIQUE:  Fasting blood sugar measured prior to injection of radiopharmaceutical was 116 mg/dl. Following intravenous injection of the radiopharmaceutical and an uptake period of approximately 60 minutes, FDG-PET/CT was obtained on a  "[employer] Discovery 710 from the vertex of the skull to the toes. Oral contrast was given during the uptake period. CT was performed during shallow respiration. The CT protocol was optimized for PET attenuation correction and to provide anatomic detail for localization of PET abnormalities. The CT protocol was not designed to produce and cannot replace state-of-the-art diagnostic CT images with specific imaging protocols for different body parts and indications. Images were reviewed on a dedicated workstation using multiplanar reconstruction.  The standardized uptake values (SUV) are normalized to patient body weight and indicate the highest activity concentration (SUVmax) in a given disease site. All image numbers refer to the axial image number.  COMPARISON:  FDG PET/CT dated 9/7/2019.  OTHER STUDIES USED FOR CORRELATION: MRI of cervical spine dated 9/9/2019.  FINDINGS:  HEAD/NECK: Physiologic FDG activity in the brain.  Mild hypermetabolism in left masseter muscle, decreased as compared to prior study, may be secondary to bruxism. Elongated focus of increased FDG activity in left lower cervical region may reflect inflammation or muscle spasm (image 77).  New hypermetabolic lucency in left maxillary alveolus is nonspecific (SUV 8.0; image 53). Recommend correlation with dental exam. A new small hypermetabolic focus is seen in the right maxilla (SUV 3.7; image 55).  CHEST: Few new small hypermetabolic foci in bilateral hilar region likely correspond to small lymph nodes that are difficult to delineate on CT. For reference, left perihilar region demonstrates SUV 3.6 (image 117). Resolution of FDG-avid subcentimeter left posterior paraesophageal lymph node.  LUNGS: No abnormal FDG activity. Calcified right lower lobe granuloma, unchanged (image 128).  PLEURA/PERICARDIUM: No abnormal FDG activity. No effusion.  HEPATOBILIARY/PANCREAS: Physiologic FDG activity. Liver SUV mean is 2.1; previous 2.3.  SPLEEN: No abnormal FDG activity. Normal in size.  ADRENAL GLANDS: No abnormal FDG activity. No nodule.  KIDNEYS/URINARY BLADDER: Physiologic FDG activity. Multiple bilateral renal cysts, measuring up to 4.6 cm on the right, unchanged.  PELVIC ORGANS: No abnormal FDG activity.  ABDOMINOPELVIC NODES: No enlarged or FDG avid lymph node  BOWEL/PERITONEUM/MESENTERY: Physiologic FDG activity. Resolution of hypermetabolism in distal esophagus/GE junction.  BONES: Near total resolution of previously seen hypermetabolic foci in the axial skeleton, right humerus, and proximal bilateral femurs. Previously seen lytic lesions demonstrate new areas of sclerosis on CT. For reference, one of the most FDG-avid residual lesions is a mixed lytic and sclerotic lesion in the left scapula which demonstrates SUV 3.0 (image 98); previously lytic with SUV 5.7. Previously seen hypermetabolic lesion in right sphenoid bone has resolved. Previously seen large destructive lesion in the manubrium the sternum demonstrates new sclerosis with SUV 3.0 (image 103); previous SUV 5.3. Resolution of hypermetabolic intramedullary soft tissue in proximal right humerus. Near total resolution of hypermetabolic intramedullary soft tissue in proximal left femur (SUV 1.3; image 273; previous SUV 5.3).  FDG-avid fracture in left inferior pubic ramus demonstrates increased callus formation and is similar in metabolism (SUV 3.8; image 250; previous SUV 3.1). Few new mildly FDG-avid bilateral rib fractures. For reference, fracture in the anterior aspect of the left second rib demonstrates SUV 3.4 (image 105).  Resolution of hypermetabolism in left palmar musculature.  IMPRESSION:  Abnormal whole body FDG-PET/CT scan.  1. Near total resolution of hypermetabolism associated with lytic and intramedullary lesions in the axial and appendicular skeleton as compared to prior study dated 9/7/2019. Previously seen lytic lesions demonstrate new sclerosis. Findings are compatible with response to interval therapy.  2. Few new mildly FDG-avid bilateral rib fractures. An FDG-avid fracture in the left inferior pubic ramus demonstrates increased callus formation and is similar in metabolism.  3. New hypermetabolic lucency in left maxillary alveolus and new small hypermetabolic focus in right maxilla are nonspecific. Recommend correlation with dental exam.  4. Few new mildly FDG-avid nonspecific bilateral hilar lymph nodes.  5. Resolution of many specific hypermetabolism in distal esophagus/GE junction.                LUANA RAMOS M.D., NUCLEAR MEDICINE ATTENDING This document has been electronically signed. May  7 2020  4:08PM      Ordered by: OZZY ALMEIDA       Collected/Examined: 07May2020 12:57PM        Verified by: OZZY ALMEIDA 11May2020 09:27AM         Result Communication: Call patient with results,Discussed results with patient; Stage: Final         Performed at: [facility]       Resulted: 07May2020 04:11PM       Last Updated: 11May2020 09:27AM       Accession: [id number]           Pathology             Final  No Documents Attached       [facility] Accession Number : [id number]  HARDEEP SUBASH                          2    Cytopathology Report      Final Diagnosis SOFT TISSUE, SACRUM, CT GUIDED CORE BIOPSY Plasma cell neoplasm See note and description.  Diagnostic note:  Overall the morphologic and immunophenotypic findings are consistent with plasma cell neoplasm. The differential diagnosis includes plasmacytoma vs plasma cells myeloma. Correlation with laboratory, radiologic and clinical findings is required for further classification.  Microscopic description: The touch prep slides are composed of numerous enlarged plasma cells with prominent nucleoli, occasional binucleated forms are seen.  Histologic sections consist of multiple needle shaped cores of soft tissue showing proliferation of enlarged plasma cells with prominent nucleoli, forming sheets.  Immunohistochemistry:  , kappaISH, lambdaISH, cyclinD1, p53 stains performed on paraffin embedded section of block 1C.  Neoplastic cells are: Positive:  , KappaISH, p53 Negative:  LambdaISH, cyclinD1  Flow cytometry analysis (82-NA-): Monotypic plasma cells (24.1% of total analyzed cells) are present. There are two aberrant monoclonal plasma cell populations: Population #1 (16.5% of total analyzed cells, 68.6% of plasma cell population) positive for cytoplasmic kappa, CD38 dimmer, CD45 dimmer, CD56, ; negative for , CD19, CD20. Population # 2 (7.6% of total analyzed cells, 31.4% of plasma cell population) positive for cytoplasmic kappa       HARDEEP, SUBASH                          2    Cytopathology Report     (brighter), , CD38 brighter, CD45 dim, CD56 (brighter), ; negative for CD19, CD20.  Screened by: Jose Juan LARA(ASCP) Verified by: Orlando Spicer MD (Electronic Signature) Reported on: 08/22/19 13:22 EDT, [address] [place] [id number] Cytology technical processing performed at [address] [place] [id number] _________________________________________________________________   Specimen(s) Submitted SOFT TISSUE, SACRUM, CT GUIDED CORE BIOPSY   Statement of Adequacy Immediate cytologic study for adequacy of specimen using a Diff- Quik stain was performed at [facility] - [address] [place] [address], [place], [place]. Touch imprints acceptable for further evaluation by Jose Juan LARA([facility]).   Clinical Information Sacral mass.   Gross Description Core Biopsy: Tissue collected: Specimen container has been inspected to confirm patient?s name and date of birth, contains 3  tan cores measuring 1.0, 1.0, 0.8 cm in length (and multiple fragments). Entire specimen submitted in 2 cassette(s) labeled 1B and 1 C.  4 Touch prep slides ( 2 air dried + 2 fixed)  FNA: No material submitted for cell block (No block 1A)  Prepared: 4 Touch Prep, 1 core biopsy labeled 1B 1 core biopsy labeled 1C 6 Total slides      Ordered by: DELORES MOSS       Collected/Examined: [id number] 03:14PM        Verified by: OZZY ALMEIDA 18Jun2020 03:07PM         Result Communication: No patient communication needed at this time; Stage: Final         Performed at: [facility]       Resulted: [id number] 01:22PM       Last Updated: 18Jun2020 03:07PM       Accession: [id number]           Pathology             Final  No Documents Attached       [facility] Accession Number : [id number]  RASHID MEIER                          4    Addendum Report     Hematopathology Addendum Comprehensive Hematopathology Report  Final Diagnosis: 1, 2. Bone marrow biopsy and bone marrow aspirate - Plasma cell myeloma (10% with focal 25% by  stain) - Slight erythroid predominant trilineage hematopoiesis with maturation  Diagnostic Note: Please note findings of a normal male karyotype and a negative multiple myeloma FISH panel. Based on the additional findings, the diagnosis has not changed. Correlation with studies for myeloma-related organ dysfunction is necessary.  Morphology: Microscopic description: 1. Biopsy: Sections of bone marrow biopsy show normocellularity (60 to 70% cellularity), mild plasmacytosis with foci of small clusters, slight erythroid predominant trilineage hematopoiesis with maturation, mild megakaryocytosis with normal morphology, and increased iron stores. 2. Aspirate: Cellular spicules are not present, precluding differential count. Review of the smear shows some maturing and predominant mature myeloid cells, a few erythroid elements, and rare megakaryocytes.  Ancillary Studies: Bone marrow aspirate iron stain: No spicules are present to evaluate for iron stores and there are insufficient nRBC to evaluate for ring sideroblasts. Congo red stain is negative for amyloidosis. Flow cytometry:  Monotypic plasma cells (1% of cells), positive for cytoplasmic kappa, CD38, , dimmer CD45, partial CD56; negative CD19, CD20, . CD45/side scatter shows no significant blast population. There is no increase in CD34,  or CD14 positive cells. Lymphocytes (8% of cells) show a heterogeneous population of T- cells (with normal CD4 to CD8 ratio), and polytypic B-cells. Immunohistochemical stains ( performed on the block 1A and 1B, cyclin-D1 performed on block 1A):   stains show overall approximately 10% plasma cells, with foci of small clusters, focal up to 25%. Plasma cells are negative for cyclin-D1.  Cytogenetics: Result: Normal male karyotype Karyotype: 46,XY[20]       HARDEEP, SUBASH                          4    Addendum Report     FISH: Result: NORMAL FISH - - MULTIPLE MYELOMA PANEL Probe(s) and Location(s): FGFR3(4p16), CCND1(11q13), RB1(13q14), IGH(14q32), MAF(16q23), TP53(17p13.1)  Clinical History/Data: New diagnosis multiple myeloma CBC on 08/24/2019: WBC: 8.09 K/uL, HGB: 9.5 g/dL, MCV: 90 fl, Plt: 227 K/uL Serum Immunofixation: IgG kappa  Verified by: Nancy Farris M.D. (Electronic Signature) Reported on: 09/07/19 10:37 EDT, [address] [place] [address] _________________________________________________________________   Hematopathology Report     Final Diagnosis 1, 2. Bone marrow biopsy and bone marrow aspirate - Plasma cell myeloma (10% with focal 25% by  stain) - Slight erythroid predominant trilineage hematopoiesis with maturation  See note and description.  Diagnostic note: Correlation with studies for myeloma-related organ dysfunction is necessary. Comprehensive report with results of pending ancillary studies to follow.  Ancillary studies Bone marrow aspirate iron stain: No spicules are present to evaluate for iron stores and there are insufficient nRBC to evaluate for ring sideroblasts.  Flow cytometry:  Monotypic plasma cells (1% of cells), positive for cytoplasmic kappa, CD38, , dimmer CD45, partial CD56; negative CD19, CD20, . CD45/side scatter shows no significant blast population. There is no increase in CD34,  or CD14 positive cells.       HARDEEP, SUBASH                          4    Hematopathology Report     Lymphocytes (8% of cells) show a heterogeneous population of T- cells (with normal CD4 to CD8 ratio), and polytypic B-cells.  Immunohistochemical stains ( performed on the block 1A and 1B, cyclin-D1 performed on block 1A):   stains show overall approximately 10% plasma cells, with foci of small clusters, focal up to 25%. Plasma cells are negative for cyclin-D1.  Microscopic description: 1. Biopsy: Sections of bone marrow biopsy show normocellularity (60 to 70% cellularity), mild plasmacytosis with foci of small clusters, slight erythroid predominant trilineage hematopoiesis with maturation, mild megakaryocytosis with normal morphology, and increased iron stores.  2. Aspirate: Cellular spicules are not present, precluding differential count. Review of the smear shows some maturing and predominant mature myeloid cells, a few erythroid elements, and rare megakaryocytes.  Comment:  Iron stain (examined to evaluate for iron stores; see microscopic description) and Giemsa stain (shows appropriate staining pattern) are performed and evaluated on block(s): 1A, 1B.  Slide(s) with built in immunohistochemical study control(s) associated and negative control with this case has/have been verified by the sign out pathologist. For slide(s) without built in controls positive control slides has/have been reviewed and approved by immunohistochemistry lab These immunohistochemical/ in-situ hybridization tests have been developed and their performance characteristics determined by [facility], Department of Pathology, Division of Immunopathology, [phone] [place] [address], [place] [address].  It has not been cleared or approved by the U.S. Food and Drug Administration.  The FDA has determined that such clearance or approval is not necessary.  This test is used for clinical purposes.  The laboratory is certified under the CLIA-88 as qualified to perform high complexity clinical testing.  Verified by: Nancy Farris M.D. (Electronic Signature) Reported on: 08/27/19 10:00 EDT, [address] [place] [id number] _________________________________________________________________        HARDEEP, SUBASH                          4    Hematopathology Report     Clinical History New diagnosis multiple myeloma CBC on 08/24/2019: WBC: 8.09 K/uL, HGB: 9.5 g/dL, MCV: 90 fl, Plt: 227 K/uL Serum Immunofixation: IgG kappa  Specimen(s) Submitted 1     Bone marrow biopsy left 2     Bone marrow aspirate  Gross Description 1. The specimen is received in bouin's fixative and the specimen container is labeled: Left bone marrow biopsy.  It consists of a 0.5 x 0.2 cm bone marrow core with a 1.5 x 1.3 x 0.5 cm blood clot.  Entirely submitted.  Two cassettes: A = bone marrow core; B = blood clot. 2. The specimen is received labeled with patient's name and consists of 3 air dried slide(s). 2 slide(s) stained with Monzon Giemsa stain. 1 stained for iron stain. In addition to other data that may appear on the specimen container, the label has been inspected to confirm the presence of the patient's name and date of birth. Catarina Torres 08/22/2019 17:12      Ordered by: BECKY BILLS       Collected/Examined: [id number] 03:50PM        Verified by: BECKY BILLS [id number] 05:42PM         Result Communication: No patient communication needed at this time; Stage: Final         Performed at: [facility]       Resulted: [id number] 10:37AM       Last Updated: [id number] 05:42PM       Accession: [id number]

## 2023-08-22 LAB
ALBUMIN SERPL ELPH-MCNC: 4.9 G/DL
ALP BLD-CCNC: 50 U/L
ALT SERPL-CCNC: 16 U/L
ANION GAP SERPL CALC-SCNC: 15 MMOL/L
AST SERPL-CCNC: 20 U/L
BILIRUB SERPL-MCNC: 0.2 MG/DL
BUN SERPL-MCNC: 29 MG/DL
CALCIUM SERPL-MCNC: 9.4 MG/DL
CHLORIDE SERPL-SCNC: 109 MMOL/L
CO2 SERPL-SCNC: 19 MMOL/L
CREAT SERPL-MCNC: 1.85 MG/DL
EGFR: 38 ML/MIN/1.73M2
GLUCOSE SERPL-MCNC: 103 MG/DL
POTASSIUM SERPL-SCNC: 4.6 MMOL/L
PROT SERPL-MCNC: 7.5 G/DL
SODIUM SERPL-SCNC: 142 MMOL/L

## 2023-09-01 ENCOUNTER — RESULT REVIEW (OUTPATIENT)
Age: 73
End: 2023-09-01

## 2023-09-01 ENCOUNTER — APPOINTMENT (OUTPATIENT)
Dept: HEMATOLOGY ONCOLOGY | Facility: CLINIC | Age: 73
End: 2023-09-01

## 2023-09-01 ENCOUNTER — APPOINTMENT (OUTPATIENT)
Dept: INFUSION THERAPY | Facility: HOSPITAL | Age: 73
End: 2023-09-01

## 2023-09-01 LAB
BASOPHILS # BLD AUTO: 0.07 K/UL — SIGNIFICANT CHANGE UP (ref 0–0.2)
BASOPHILS NFR BLD AUTO: 1.1 % — SIGNIFICANT CHANGE UP (ref 0–2)
EOSINOPHIL # BLD AUTO: 0.28 K/UL — SIGNIFICANT CHANGE UP (ref 0–0.5)
EOSINOPHIL NFR BLD AUTO: 4.4 % — SIGNIFICANT CHANGE UP (ref 0–6)
HCT VFR BLD CALC: 29.2 % — LOW (ref 39–50)
HGB BLD-MCNC: 10.1 G/DL — LOW (ref 13–17)
IMM GRANULOCYTES NFR BLD AUTO: 0.5 % — SIGNIFICANT CHANGE UP (ref 0–0.9)
LYMPHOCYTES # BLD AUTO: 1.81 K/UL — SIGNIFICANT CHANGE UP (ref 1–3.3)
LYMPHOCYTES # BLD AUTO: 28.3 % — SIGNIFICANT CHANGE UP (ref 13–44)
MCHC RBC-ENTMCNC: 30.9 PG — SIGNIFICANT CHANGE UP (ref 27–34)
MCHC RBC-ENTMCNC: 34.6 G/DL — SIGNIFICANT CHANGE UP (ref 32–36)
MCV RBC AUTO: 89.3 FL — SIGNIFICANT CHANGE UP (ref 80–100)
MONOCYTES # BLD AUTO: 0.76 K/UL — SIGNIFICANT CHANGE UP (ref 0–0.9)
MONOCYTES NFR BLD AUTO: 11.9 % — SIGNIFICANT CHANGE UP (ref 2–14)
NEUTROPHILS # BLD AUTO: 3.45 K/UL — SIGNIFICANT CHANGE UP (ref 1.8–7.4)
NEUTROPHILS NFR BLD AUTO: 53.8 % — SIGNIFICANT CHANGE UP (ref 43–77)
NRBC # BLD: 0 /100 WBCS — SIGNIFICANT CHANGE UP (ref 0–0)
PLATELET # BLD AUTO: 154 K/UL — SIGNIFICANT CHANGE UP (ref 150–400)
RBC # BLD: 3.27 M/UL — LOW (ref 4.2–5.8)
RBC # FLD: 12.8 % — SIGNIFICANT CHANGE UP (ref 10.3–14.5)
WBC # BLD: 6.4 K/UL — SIGNIFICANT CHANGE UP (ref 3.8–10.5)
WBC # FLD AUTO: 6.4 K/UL — SIGNIFICANT CHANGE UP (ref 3.8–10.5)

## 2023-09-12 ENCOUNTER — APPOINTMENT (OUTPATIENT)
Dept: CARDIOLOGY | Facility: CLINIC | Age: 73
End: 2023-09-12
Payer: MEDICARE

## 2023-09-12 ENCOUNTER — NON-APPOINTMENT (OUTPATIENT)
Age: 73
End: 2023-09-12

## 2023-09-12 VITALS
DIASTOLIC BLOOD PRESSURE: 70 MMHG | HEART RATE: 81 BPM | BODY MASS INDEX: 22.15 KG/M2 | WEIGHT: 125 LBS | HEIGHT: 63 IN | OXYGEN SATURATION: 99 % | TEMPERATURE: 97.7 F | SYSTOLIC BLOOD PRESSURE: 120 MMHG

## 2023-09-12 PROCEDURE — 99214 OFFICE O/P EST MOD 30 MIN: CPT

## 2023-09-12 PROCEDURE — 93000 ELECTROCARDIOGRAM COMPLETE: CPT

## 2023-09-15 ENCOUNTER — RESULT REVIEW (OUTPATIENT)
Age: 73
End: 2023-09-15

## 2023-09-15 ENCOUNTER — APPOINTMENT (OUTPATIENT)
Dept: INFUSION THERAPY | Facility: HOSPITAL | Age: 73
End: 2023-09-15

## 2023-09-15 LAB
BASOPHILS # BLD AUTO: 0.07 K/UL — SIGNIFICANT CHANGE UP (ref 0–0.2)
BASOPHILS NFR BLD AUTO: 1 % — SIGNIFICANT CHANGE UP (ref 0–2)
EOSINOPHIL # BLD AUTO: 0.22 K/UL — SIGNIFICANT CHANGE UP (ref 0–0.5)
EOSINOPHIL NFR BLD AUTO: 3.2 % — SIGNIFICANT CHANGE UP (ref 0–6)
HCT VFR BLD CALC: 29.3 % — LOW (ref 39–50)
HGB BLD-MCNC: 10.2 G/DL — LOW (ref 13–17)
IMM GRANULOCYTES NFR BLD AUTO: 2.1 % — HIGH (ref 0–0.9)
LYMPHOCYTES # BLD AUTO: 1.58 K/UL — SIGNIFICANT CHANGE UP (ref 1–3.3)
LYMPHOCYTES # BLD AUTO: 22.6 % — SIGNIFICANT CHANGE UP (ref 13–44)
MCHC RBC-ENTMCNC: 30.8 PG — SIGNIFICANT CHANGE UP (ref 27–34)
MCHC RBC-ENTMCNC: 34.8 G/DL — SIGNIFICANT CHANGE UP (ref 32–36)
MCV RBC AUTO: 88.5 FL — SIGNIFICANT CHANGE UP (ref 80–100)
MONOCYTES # BLD AUTO: 0.53 K/UL — SIGNIFICANT CHANGE UP (ref 0–0.9)
MONOCYTES NFR BLD AUTO: 7.6 % — SIGNIFICANT CHANGE UP (ref 2–14)
NEUTROPHILS # BLD AUTO: 4.43 K/UL — SIGNIFICANT CHANGE UP (ref 1.8–7.4)
NEUTROPHILS NFR BLD AUTO: 63.5 % — SIGNIFICANT CHANGE UP (ref 43–77)
NRBC # BLD: 0 /100 WBCS — SIGNIFICANT CHANGE UP (ref 0–0)
PLATELET # BLD AUTO: 163 K/UL — SIGNIFICANT CHANGE UP (ref 150–400)
RBC # BLD: 3.31 M/UL — LOW (ref 4.2–5.8)
RBC # FLD: 12.7 % — SIGNIFICANT CHANGE UP (ref 10.3–14.5)
WBC # BLD: 6.98 K/UL — SIGNIFICANT CHANGE UP (ref 3.8–10.5)
WBC # FLD AUTO: 6.98 K/UL — SIGNIFICANT CHANGE UP (ref 3.8–10.5)

## 2023-09-19 RX ORDER — METOCLOPRAMIDE 10 MG/1
10 TABLET ORAL EVERY 6 HOURS
Qty: 40 | Refills: 1 | Status: ACTIVE | COMMUNITY
Start: 2019-09-03 | End: 1900-01-01

## 2023-09-21 ENCOUNTER — OUTPATIENT (OUTPATIENT)
Dept: OUTPATIENT SERVICES | Facility: HOSPITAL | Age: 73
LOS: 1 days | Discharge: HOME | End: 2023-09-21

## 2023-09-21 DIAGNOSIS — Z95.5 PRESENCE OF CORONARY ANGIOPLASTY IMPLANT AND GRAFT: Chronic | ICD-10-CM

## 2023-09-21 DIAGNOSIS — C90.00 MULTIPLE MYELOMA NOT HAVING ACHIEVED REMISSION: ICD-10-CM

## 2023-09-25 NOTE — ED PROVIDER NOTE - PROGRESS NOTE DETAILS
Subjective:    Patient ID: Amanda Stoll is a 13 y.o. y.o. female    Chief Complaint:   Chief Complaint   Patient presents with    Dysmenorrhea     States she hasnt been having a regular monthly cycle wants to start nexplanon to regulate       History of Present Illness:  Amanda presents today for discussion of irregular cycles.  Patient has not been having a monthly cycle and she wants to get Nexplanon in order to help regulate them.  I explained that Nexplanon is not specifically designed for menstrual regulation.  Other options for birth control that can regulate cycles were discussed with patient and her aunt.  She desires to proceed with Nexplanon anyway      Review of Systems   Constitutional:  Negative for chills, fatigue and fever.   Respiratory:  Negative for shortness of breath.    Cardiovascular:  Negative for chest pain.   Gastrointestinal:  Negative for abdominal pain, constipation, diarrhea and nausea.   Genitourinary:  Positive for menstrual problem. Negative for dysuria, pelvic pain and vaginal bleeding.   Neurological:  Negative for headaches.   Psychiatric/Behavioral:  Negative for depression.          Objective:    Vital Signs:  Vitals:    09/25/23 1546   BP: 119/85   Pulse: 98     Wt Readings from Last 1 Encounters:   09/25/23 67.5 kg (148 lb 12.8 oz)     Body mass index is 27.22 kg/m².    Physical Exam:  General:  alert, no distress   Skin:  Skin color, texture, turgor normal. No rashes or lesions   Abdomen:  Soft, nontender   Extremities: No cyanosis, clubbing, edema       Use and side effects of Nexplanon discussed with patient.  Explained that device is not exactly designed for menstrual regulation.  Other options discussed with patient and caregiver and she desires to proceed with Nexplanon.  All questions answered    I spent a total of 30 minutes on the day of the visit.  This includes face to face time and non-face to face time preparing to see the patient (eg, review of tests),  obtaining and/or reviewing separately obtained history, documenting clinical information in the electronic or other health record, independently interpreting results and communicating results to the patient/family/caregiver, or care coordinator.           Assessment:      1. Irregular menses    2. General counseling and advice for contraceptive management          Plan:      Irregular menses  -     Device Authorization Order    General counseling and advice for contraceptive management    Return to clinic for Nexplanon placement       Radha Pan MD, FACOG   09/25/2023 3:54 PM        Nita De La Rosa MD PGY1: Nephro at bedside to see patient, nothing to do from their perspective. Noted mild Cr elevation, seems to be at baseline. Patient awaiting CT scan Nita De La Rosa MD PGY1: patient reporting improvement in nausea, abdominal pain. Lab work up non actionable. Abdominal CT re-demonstrates known lytic lesions, no evidence of bowel obstruction or other acute intraabdominal pathology. Small right renal calculus but non obstructing, unlikely to be contributing to patients current symptoms. He is tolerating PO and ambulating without difficulty at time of discharge. Discussed outpatient follow-up with patient and daughter who expressed understanding. Return to ED precautions given and patient was discharged to home in good condition. Nita De La Rosa MD PGY1: Nephro at bedside to see patient, nothing to do from their perspective. Noted mild Cr elevation, appears to be at baseline. urinalysis without signs of infection. Patient awaiting CT scan Nita De La Rosa MD PGY1: patient reporting improvement in nausea, abdominal pain. Lab work up non actionable. Abdominal CT re-demonstrates known lytic lesions, no evidence of bowel obstruction or other acute intraabdominal pathology. Small right renal calculus but non obstructing, unlikely to be contributing to patients current symptoms. Patient tolerating PO but unfortunately still unable to ambulate. Attempted to walk patient at bedside with daughter and I assisting and patient collapsed, was caught and helped back to bed, no injuries sustained. He continues to report feeling weak especially in lower extremities. Will add on another liter of IVF, check a troponin level, repeat EKG to rule out atypical ACS presentation. Nita De La Rosa MD PGY1: patient reports feeling increased back pain and abdominal bloating with IV fluids running, received ~600ml of second bag, will stop fluids for now. Tylenol and Pepcid ordered. Patient most likely will need to be admitted due to weakness/inability to ambulate David Kelly, PGY-3: Pt reexamined at bedside, resting comfortably, vitals stable. Still weak however feels improved from presentation. Trops neg x2, lytes wnl, UA negative. No sig anemia. Discussed ongoing weakness with pt and family, offered admission as pt is having difficulty ambulating however they are deferring at this time, reports f/u with PCP and oncology within the week. Will d/c with continuation of home meds and strict return precautions.

## 2023-09-29 ENCOUNTER — RESULT REVIEW (OUTPATIENT)
Age: 73
End: 2023-09-29

## 2023-09-29 ENCOUNTER — APPOINTMENT (OUTPATIENT)
Dept: INFUSION THERAPY | Facility: HOSPITAL | Age: 73
End: 2023-09-29

## 2023-09-29 DIAGNOSIS — Z51.11 ENCOUNTER FOR ANTINEOPLASTIC CHEMOTHERAPY: ICD-10-CM

## 2023-09-29 LAB
BASOPHILS # BLD AUTO: 0.05 K/UL — SIGNIFICANT CHANGE UP (ref 0–0.2)
BASOPHILS NFR BLD AUTO: 0.9 % — SIGNIFICANT CHANGE UP (ref 0–2)
EOSINOPHIL # BLD AUTO: 0.24 K/UL — SIGNIFICANT CHANGE UP (ref 0–0.5)
EOSINOPHIL NFR BLD AUTO: 4.3 % — SIGNIFICANT CHANGE UP (ref 0–6)
HCT VFR BLD CALC: 29.2 % — LOW (ref 39–50)
HGB BLD-MCNC: 9.8 G/DL — LOW (ref 13–17)
IMM GRANULOCYTES NFR BLD AUTO: 0.2 % — SIGNIFICANT CHANGE UP (ref 0–0.9)
LYMPHOCYTES # BLD AUTO: 1.59 K/UL — SIGNIFICANT CHANGE UP (ref 1–3.3)
LYMPHOCYTES # BLD AUTO: 28.6 % — SIGNIFICANT CHANGE UP (ref 13–44)
MCHC RBC-ENTMCNC: 30.4 PG — SIGNIFICANT CHANGE UP (ref 27–34)
MCHC RBC-ENTMCNC: 33.6 G/DL — SIGNIFICANT CHANGE UP (ref 32–36)
MCV RBC AUTO: 90.7 FL — SIGNIFICANT CHANGE UP (ref 80–100)
MONOCYTES # BLD AUTO: 0.53 K/UL — SIGNIFICANT CHANGE UP (ref 0–0.9)
MONOCYTES NFR BLD AUTO: 9.5 % — SIGNIFICANT CHANGE UP (ref 2–14)
NEUTROPHILS # BLD AUTO: 3.13 K/UL — SIGNIFICANT CHANGE UP (ref 1.8–7.4)
NEUTROPHILS NFR BLD AUTO: 56.5 % — SIGNIFICANT CHANGE UP (ref 43–77)
NRBC # BLD: 0 /100 WBCS — SIGNIFICANT CHANGE UP (ref 0–0)
PLATELET # BLD AUTO: 147 K/UL — LOW (ref 150–400)
RBC # BLD: 3.22 M/UL — LOW (ref 4.2–5.8)
RBC # FLD: 13 % — SIGNIFICANT CHANGE UP (ref 10.3–14.5)
WBC # BLD: 5.55 K/UL — SIGNIFICANT CHANGE UP (ref 3.8–10.5)
WBC # FLD AUTO: 5.55 K/UL — SIGNIFICANT CHANGE UP (ref 3.8–10.5)

## 2023-10-01 PROBLEM — Z92.3 HISTORY OF RADIATION THERAPY: Status: RESOLVED | Noted: 2019-09-03 | Resolved: 2023-10-01

## 2023-10-13 ENCOUNTER — APPOINTMENT (OUTPATIENT)
Dept: INFUSION THERAPY | Facility: HOSPITAL | Age: 73
End: 2023-10-13

## 2023-10-13 ENCOUNTER — RESULT REVIEW (OUTPATIENT)
Age: 73
End: 2023-10-13

## 2023-10-13 ENCOUNTER — APPOINTMENT (OUTPATIENT)
Dept: HEMATOLOGY ONCOLOGY | Facility: CLINIC | Age: 73
End: 2023-10-13

## 2023-10-13 LAB
BASOPHILS # BLD AUTO: 0.07 K/UL — SIGNIFICANT CHANGE UP (ref 0–0.2)
BASOPHILS NFR BLD AUTO: 1 % — SIGNIFICANT CHANGE UP (ref 0–2)
EOSINOPHIL # BLD AUTO: 0.28 K/UL — SIGNIFICANT CHANGE UP (ref 0–0.5)
EOSINOPHIL NFR BLD AUTO: 3.9 % — SIGNIFICANT CHANGE UP (ref 0–6)
HCT VFR BLD CALC: 29.1 % — LOW (ref 39–50)
HGB BLD-MCNC: 9.9 G/DL — LOW (ref 13–17)
IMM GRANULOCYTES NFR BLD AUTO: 0.4 % — SIGNIFICANT CHANGE UP (ref 0–0.9)
LYMPHOCYTES # BLD AUTO: 1.37 K/UL — SIGNIFICANT CHANGE UP (ref 1–3.3)
LYMPHOCYTES # BLD AUTO: 18.9 % — SIGNIFICANT CHANGE UP (ref 13–44)
MCHC RBC-ENTMCNC: 30.5 PG — SIGNIFICANT CHANGE UP (ref 27–34)
MCHC RBC-ENTMCNC: 34 G/DL — SIGNIFICANT CHANGE UP (ref 32–36)
MCV RBC AUTO: 89.5 FL — SIGNIFICANT CHANGE UP (ref 80–100)
MONOCYTES # BLD AUTO: 0.75 K/UL — SIGNIFICANT CHANGE UP (ref 0–0.9)
MONOCYTES NFR BLD AUTO: 10.3 % — SIGNIFICANT CHANGE UP (ref 2–14)
NEUTROPHILS # BLD AUTO: 4.76 K/UL — SIGNIFICANT CHANGE UP (ref 1.8–7.4)
NEUTROPHILS NFR BLD AUTO: 65.5 % — SIGNIFICANT CHANGE UP (ref 43–77)
NRBC # BLD: 0 /100 WBCS — SIGNIFICANT CHANGE UP (ref 0–0)
PLATELET # BLD AUTO: 147 K/UL — LOW (ref 150–400)
RBC # BLD: 3.25 M/UL — LOW (ref 4.2–5.8)
RBC # FLD: 12.9 % — SIGNIFICANT CHANGE UP (ref 10.3–14.5)
WBC # BLD: 7.26 K/UL — SIGNIFICANT CHANGE UP (ref 3.8–10.5)
WBC # FLD AUTO: 7.26 K/UL — SIGNIFICANT CHANGE UP (ref 3.8–10.5)

## 2023-10-27 ENCOUNTER — RESULT REVIEW (OUTPATIENT)
Age: 73
End: 2023-10-27

## 2023-10-27 ENCOUNTER — APPOINTMENT (OUTPATIENT)
Dept: HEMATOLOGY ONCOLOGY | Facility: CLINIC | Age: 73
End: 2023-10-27
Payer: MEDICARE

## 2023-10-27 ENCOUNTER — APPOINTMENT (OUTPATIENT)
Dept: INFUSION THERAPY | Facility: HOSPITAL | Age: 73
End: 2023-10-27

## 2023-10-27 VITALS
TEMPERATURE: 97.1 F | SYSTOLIC BLOOD PRESSURE: 128 MMHG | WEIGHT: 130.6 LBS | OXYGEN SATURATION: 98 % | BODY MASS INDEX: 23.14 KG/M2 | DIASTOLIC BLOOD PRESSURE: 67 MMHG | HEART RATE: 69 BPM | RESPIRATION RATE: 16 BRPM | HEIGHT: 63 IN

## 2023-10-27 LAB
BASOPHILS # BLD AUTO: 0.05 K/UL — SIGNIFICANT CHANGE UP (ref 0–0.2)
BASOPHILS # BLD AUTO: 0.05 K/UL — SIGNIFICANT CHANGE UP (ref 0–0.2)
BASOPHILS NFR BLD AUTO: 0.8 % — SIGNIFICANT CHANGE UP (ref 0–2)
BASOPHILS NFR BLD AUTO: 0.8 % — SIGNIFICANT CHANGE UP (ref 0–2)
EOSINOPHIL # BLD AUTO: 0.17 K/UL — SIGNIFICANT CHANGE UP (ref 0–0.5)
EOSINOPHIL # BLD AUTO: 0.17 K/UL — SIGNIFICANT CHANGE UP (ref 0–0.5)
EOSINOPHIL NFR BLD AUTO: 2.6 % — SIGNIFICANT CHANGE UP (ref 0–6)
EOSINOPHIL NFR BLD AUTO: 2.6 % — SIGNIFICANT CHANGE UP (ref 0–6)
HCT VFR BLD CALC: 29 % — LOW (ref 39–50)
HCT VFR BLD CALC: 29 % — LOW (ref 39–50)
HGB BLD-MCNC: 9.9 G/DL — LOW (ref 13–17)
HGB BLD-MCNC: 9.9 G/DL — LOW (ref 13–17)
IMM GRANULOCYTES NFR BLD AUTO: 0.3 % — SIGNIFICANT CHANGE UP (ref 0–0.9)
IMM GRANULOCYTES NFR BLD AUTO: 0.3 % — SIGNIFICANT CHANGE UP (ref 0–0.9)
LYMPHOCYTES # BLD AUTO: 1.61 K/UL — SIGNIFICANT CHANGE UP (ref 1–3.3)
LYMPHOCYTES # BLD AUTO: 1.61 K/UL — SIGNIFICANT CHANGE UP (ref 1–3.3)
LYMPHOCYTES # BLD AUTO: 24.6 % — SIGNIFICANT CHANGE UP (ref 13–44)
LYMPHOCYTES # BLD AUTO: 24.6 % — SIGNIFICANT CHANGE UP (ref 13–44)
MCHC RBC-ENTMCNC: 31.1 PG — SIGNIFICANT CHANGE UP (ref 27–34)
MCHC RBC-ENTMCNC: 31.1 PG — SIGNIFICANT CHANGE UP (ref 27–34)
MCHC RBC-ENTMCNC: 34.1 G/DL — SIGNIFICANT CHANGE UP (ref 32–36)
MCHC RBC-ENTMCNC: 34.1 G/DL — SIGNIFICANT CHANGE UP (ref 32–36)
MCV RBC AUTO: 91.2 FL — SIGNIFICANT CHANGE UP (ref 80–100)
MCV RBC AUTO: 91.2 FL — SIGNIFICANT CHANGE UP (ref 80–100)
MONOCYTES # BLD AUTO: 0.41 K/UL — SIGNIFICANT CHANGE UP (ref 0–0.9)
MONOCYTES # BLD AUTO: 0.41 K/UL — SIGNIFICANT CHANGE UP (ref 0–0.9)
MONOCYTES NFR BLD AUTO: 6.3 % — SIGNIFICANT CHANGE UP (ref 2–14)
MONOCYTES NFR BLD AUTO: 6.3 % — SIGNIFICANT CHANGE UP (ref 2–14)
NEUTROPHILS # BLD AUTO: 4.29 K/UL — SIGNIFICANT CHANGE UP (ref 1.8–7.4)
NEUTROPHILS # BLD AUTO: 4.29 K/UL — SIGNIFICANT CHANGE UP (ref 1.8–7.4)
NEUTROPHILS NFR BLD AUTO: 65.4 % — SIGNIFICANT CHANGE UP (ref 43–77)
NEUTROPHILS NFR BLD AUTO: 65.4 % — SIGNIFICANT CHANGE UP (ref 43–77)
NRBC # BLD: 0 /100 WBCS — SIGNIFICANT CHANGE UP (ref 0–0)
NRBC # BLD: 0 /100 WBCS — SIGNIFICANT CHANGE UP (ref 0–0)
PLATELET # BLD AUTO: 183 K/UL — SIGNIFICANT CHANGE UP (ref 150–400)
PLATELET # BLD AUTO: 183 K/UL — SIGNIFICANT CHANGE UP (ref 150–400)
RBC # BLD: 3.18 M/UL — LOW (ref 4.2–5.8)
RBC # BLD: 3.18 M/UL — LOW (ref 4.2–5.8)
RBC # FLD: 12.8 % — SIGNIFICANT CHANGE UP (ref 10.3–14.5)
RBC # FLD: 12.8 % — SIGNIFICANT CHANGE UP (ref 10.3–14.5)
WBC # BLD: 6.55 K/UL — SIGNIFICANT CHANGE UP (ref 3.8–10.5)
WBC # BLD: 6.55 K/UL — SIGNIFICANT CHANGE UP (ref 3.8–10.5)
WBC # FLD AUTO: 6.55 K/UL — SIGNIFICANT CHANGE UP (ref 3.8–10.5)
WBC # FLD AUTO: 6.55 K/UL — SIGNIFICANT CHANGE UP (ref 3.8–10.5)

## 2023-10-27 PROCEDURE — 99213 OFFICE O/P EST LOW 20 MIN: CPT

## 2023-10-30 LAB
ALBUMIN SERPL ELPH-MCNC: 4.9 G/DL
ALP BLD-CCNC: 50 U/L
ALT SERPL-CCNC: 16 U/L
ANION GAP SERPL CALC-SCNC: 14 MMOL/L
AST SERPL-CCNC: 21 U/L
BILIRUB SERPL-MCNC: 0.3 MG/DL
BUN SERPL-MCNC: 22 MG/DL
CALCIUM SERPL-MCNC: 10.2 MG/DL
CHLORIDE SERPL-SCNC: 106 MMOL/L
CO2 SERPL-SCNC: 24 MMOL/L
CREAT SERPL-MCNC: 1.63 MG/DL
EGFR: 44 ML/MIN/1.73M2
GLUCOSE SERPL-MCNC: 128 MG/DL
LDH SERPL-CCNC: 206 U/L
POTASSIUM SERPL-SCNC: 4.5 MMOL/L
PROT SERPL-MCNC: 7.7 G/DL
SODIUM SERPL-SCNC: 144 MMOL/L

## 2023-11-01 LAB
ALBUMIN MFR SERPL ELPH: 59.5 %
ALBUMIN SERPL-MCNC: 4.6 G/DL
ALBUMIN/GLOB SERPL: 1.5 RATIO
ALPHA1 GLOB MFR SERPL ELPH: 4.7 %
ALPHA1 GLOB SERPL ELPH-MCNC: 0.4 G/DL
ALPHA2 GLOB MFR SERPL ELPH: 12.5 %
ALPHA2 GLOB SERPL ELPH-MCNC: 1 G/DL
B-GLOBULIN MFR SERPL ELPH: 11.1 %
B-GLOBULIN SERPL ELPH-MCNC: 0.9 G/DL
DEPRECATED KAPPA LC FREE/LAMBDA SER: 1.88 RATIO
GAMMA GLOB FLD ELPH-MCNC: 0.9 G/DL
GAMMA GLOB MFR SERPL ELPH: 12.2 %
IGA SER QL IEP: 189 MG/DL
IGG SER QL IEP: 1047 MG/DL
IGM SER QL IEP: 36 MG/DL
INTERPRETATION SERPL IEP-IMP: NORMAL
KAPPA LC CSF-MCNC: 2.42 MG/DL
KAPPA LC SERPL-MCNC: 4.54 MG/DL
M PROTEIN SPEC IFE-MCNC: NORMAL
PROT SERPL-MCNC: 7.7 G/DL
PROT SERPL-MCNC: 7.7 G/DL

## 2023-11-10 ENCOUNTER — RESULT REVIEW (OUTPATIENT)
Age: 73
End: 2023-11-10

## 2023-11-10 ENCOUNTER — APPOINTMENT (OUTPATIENT)
Dept: INFUSION THERAPY | Facility: HOSPITAL | Age: 73
End: 2023-11-10

## 2023-11-10 ENCOUNTER — APPOINTMENT (OUTPATIENT)
Dept: HEMATOLOGY ONCOLOGY | Facility: CLINIC | Age: 73
End: 2023-11-10

## 2023-11-10 LAB
BASOPHILS # BLD AUTO: 0.06 K/UL — SIGNIFICANT CHANGE UP (ref 0–0.2)
BASOPHILS # BLD AUTO: 0.06 K/UL — SIGNIFICANT CHANGE UP (ref 0–0.2)
BASOPHILS NFR BLD AUTO: 1 % — SIGNIFICANT CHANGE UP (ref 0–2)
BASOPHILS NFR BLD AUTO: 1 % — SIGNIFICANT CHANGE UP (ref 0–2)
EOSINOPHIL # BLD AUTO: 0.28 K/UL — SIGNIFICANT CHANGE UP (ref 0–0.5)
EOSINOPHIL # BLD AUTO: 0.28 K/UL — SIGNIFICANT CHANGE UP (ref 0–0.5)
EOSINOPHIL NFR BLD AUTO: 4.9 % — SIGNIFICANT CHANGE UP (ref 0–6)
EOSINOPHIL NFR BLD AUTO: 4.9 % — SIGNIFICANT CHANGE UP (ref 0–6)
HCT VFR BLD CALC: 28.2 % — LOW (ref 39–50)
HCT VFR BLD CALC: 28.2 % — LOW (ref 39–50)
HGB BLD-MCNC: 9.8 G/DL — LOW (ref 13–17)
HGB BLD-MCNC: 9.8 G/DL — LOW (ref 13–17)
IMM GRANULOCYTES NFR BLD AUTO: 1.4 % — HIGH (ref 0–0.9)
IMM GRANULOCYTES NFR BLD AUTO: 1.4 % — HIGH (ref 0–0.9)
LYMPHOCYTES # BLD AUTO: 1.76 K/UL — SIGNIFICANT CHANGE UP (ref 1–3.3)
LYMPHOCYTES # BLD AUTO: 1.76 K/UL — SIGNIFICANT CHANGE UP (ref 1–3.3)
LYMPHOCYTES # BLD AUTO: 30.7 % — SIGNIFICANT CHANGE UP (ref 13–44)
LYMPHOCYTES # BLD AUTO: 30.7 % — SIGNIFICANT CHANGE UP (ref 13–44)
MCHC RBC-ENTMCNC: 31.5 PG — SIGNIFICANT CHANGE UP (ref 27–34)
MCHC RBC-ENTMCNC: 31.5 PG — SIGNIFICANT CHANGE UP (ref 27–34)
MCHC RBC-ENTMCNC: 34.8 G/DL — SIGNIFICANT CHANGE UP (ref 32–36)
MCHC RBC-ENTMCNC: 34.8 G/DL — SIGNIFICANT CHANGE UP (ref 32–36)
MCV RBC AUTO: 90.7 FL — SIGNIFICANT CHANGE UP (ref 80–100)
MCV RBC AUTO: 90.7 FL — SIGNIFICANT CHANGE UP (ref 80–100)
MONOCYTES # BLD AUTO: 0.7 K/UL — SIGNIFICANT CHANGE UP (ref 0–0.9)
MONOCYTES # BLD AUTO: 0.7 K/UL — SIGNIFICANT CHANGE UP (ref 0–0.9)
MONOCYTES NFR BLD AUTO: 12.2 % — SIGNIFICANT CHANGE UP (ref 2–14)
MONOCYTES NFR BLD AUTO: 12.2 % — SIGNIFICANT CHANGE UP (ref 2–14)
NEUTROPHILS # BLD AUTO: 2.85 K/UL — SIGNIFICANT CHANGE UP (ref 1.8–7.4)
NEUTROPHILS # BLD AUTO: 2.85 K/UL — SIGNIFICANT CHANGE UP (ref 1.8–7.4)
NEUTROPHILS NFR BLD AUTO: 49.8 % — SIGNIFICANT CHANGE UP (ref 43–77)
NEUTROPHILS NFR BLD AUTO: 49.8 % — SIGNIFICANT CHANGE UP (ref 43–77)
NRBC # BLD: 0 /100 WBCS — SIGNIFICANT CHANGE UP (ref 0–0)
NRBC # BLD: 0 /100 WBCS — SIGNIFICANT CHANGE UP (ref 0–0)
PLATELET # BLD AUTO: 143 K/UL — LOW (ref 150–400)
PLATELET # BLD AUTO: 143 K/UL — LOW (ref 150–400)
RBC # BLD: 3.11 M/UL — LOW (ref 4.2–5.8)
RBC # BLD: 3.11 M/UL — LOW (ref 4.2–5.8)
RBC # FLD: 13 % — SIGNIFICANT CHANGE UP (ref 10.3–14.5)
RBC # FLD: 13 % — SIGNIFICANT CHANGE UP (ref 10.3–14.5)
WBC # BLD: 5.73 K/UL — SIGNIFICANT CHANGE UP (ref 3.8–10.5)
WBC # BLD: 5.73 K/UL — SIGNIFICANT CHANGE UP (ref 3.8–10.5)
WBC # FLD AUTO: 5.73 K/UL — SIGNIFICANT CHANGE UP (ref 3.8–10.5)
WBC # FLD AUTO: 5.73 K/UL — SIGNIFICANT CHANGE UP (ref 3.8–10.5)

## 2023-11-13 ENCOUNTER — OUTPATIENT (OUTPATIENT)
Dept: OUTPATIENT SERVICES | Facility: HOSPITAL | Age: 73
LOS: 1 days | Discharge: HOME | End: 2023-11-13

## 2023-11-13 DIAGNOSIS — C90.00 MULTIPLE MYELOMA NOT HAVING ACHIEVED REMISSION: ICD-10-CM

## 2023-11-13 DIAGNOSIS — Z95.5 PRESENCE OF CORONARY ANGIOPLASTY IMPLANT AND GRAFT: Chronic | ICD-10-CM

## 2023-11-13 DIAGNOSIS — R11.2 NAUSEA WITH VOMITING, UNSPECIFIED: ICD-10-CM

## 2023-11-24 ENCOUNTER — APPOINTMENT (OUTPATIENT)
Dept: INFUSION THERAPY | Facility: HOSPITAL | Age: 73
End: 2023-11-24

## 2023-11-24 ENCOUNTER — RESULT REVIEW (OUTPATIENT)
Age: 73
End: 2023-11-24

## 2023-11-24 ENCOUNTER — APPOINTMENT (OUTPATIENT)
Dept: HEMATOLOGY ONCOLOGY | Facility: CLINIC | Age: 73
End: 2023-11-24

## 2023-11-24 DIAGNOSIS — I25.10 ATHEROSCLEROTIC HEART DISEASE OF NATIVE CORONARY ARTERY W/OUT ANGINA PECTORIS: ICD-10-CM

## 2023-11-24 LAB
BASOPHILS # BLD AUTO: 0.05 K/UL — SIGNIFICANT CHANGE UP (ref 0–0.2)
BASOPHILS # BLD AUTO: 0.05 K/UL — SIGNIFICANT CHANGE UP (ref 0–0.2)
BASOPHILS NFR BLD AUTO: 0.9 % — SIGNIFICANT CHANGE UP (ref 0–2)
BASOPHILS NFR BLD AUTO: 0.9 % — SIGNIFICANT CHANGE UP (ref 0–2)
EOSINOPHIL # BLD AUTO: 0.24 K/UL — SIGNIFICANT CHANGE UP (ref 0–0.5)
EOSINOPHIL # BLD AUTO: 0.24 K/UL — SIGNIFICANT CHANGE UP (ref 0–0.5)
EOSINOPHIL NFR BLD AUTO: 4.2 % — SIGNIFICANT CHANGE UP (ref 0–6)
EOSINOPHIL NFR BLD AUTO: 4.2 % — SIGNIFICANT CHANGE UP (ref 0–6)
HCT VFR BLD CALC: 29.2 % — LOW (ref 39–50)
HCT VFR BLD CALC: 29.2 % — LOW (ref 39–50)
HGB BLD-MCNC: 9.8 G/DL — LOW (ref 13–17)
HGB BLD-MCNC: 9.8 G/DL — LOW (ref 13–17)
IMM GRANULOCYTES NFR BLD AUTO: 0.9 % — SIGNIFICANT CHANGE UP (ref 0–0.9)
IMM GRANULOCYTES NFR BLD AUTO: 0.9 % — SIGNIFICANT CHANGE UP (ref 0–0.9)
LYMPHOCYTES # BLD AUTO: 1.71 K/UL — SIGNIFICANT CHANGE UP (ref 1–3.3)
LYMPHOCYTES # BLD AUTO: 1.71 K/UL — SIGNIFICANT CHANGE UP (ref 1–3.3)
LYMPHOCYTES # BLD AUTO: 30.1 % — SIGNIFICANT CHANGE UP (ref 13–44)
LYMPHOCYTES # BLD AUTO: 30.1 % — SIGNIFICANT CHANGE UP (ref 13–44)
MCHC RBC-ENTMCNC: 30.6 PG — SIGNIFICANT CHANGE UP (ref 27–34)
MCHC RBC-ENTMCNC: 30.6 PG — SIGNIFICANT CHANGE UP (ref 27–34)
MCHC RBC-ENTMCNC: 33.6 G/DL — SIGNIFICANT CHANGE UP (ref 32–36)
MCHC RBC-ENTMCNC: 33.6 G/DL — SIGNIFICANT CHANGE UP (ref 32–36)
MCV RBC AUTO: 91.3 FL — SIGNIFICANT CHANGE UP (ref 80–100)
MCV RBC AUTO: 91.3 FL — SIGNIFICANT CHANGE UP (ref 80–100)
MONOCYTES # BLD AUTO: 0.56 K/UL — SIGNIFICANT CHANGE UP (ref 0–0.9)
MONOCYTES # BLD AUTO: 0.56 K/UL — SIGNIFICANT CHANGE UP (ref 0–0.9)
MONOCYTES NFR BLD AUTO: 9.9 % — SIGNIFICANT CHANGE UP (ref 2–14)
MONOCYTES NFR BLD AUTO: 9.9 % — SIGNIFICANT CHANGE UP (ref 2–14)
NEUTROPHILS # BLD AUTO: 3.07 K/UL — SIGNIFICANT CHANGE UP (ref 1.8–7.4)
NEUTROPHILS # BLD AUTO: 3.07 K/UL — SIGNIFICANT CHANGE UP (ref 1.8–7.4)
NEUTROPHILS NFR BLD AUTO: 54 % — SIGNIFICANT CHANGE UP (ref 43–77)
NEUTROPHILS NFR BLD AUTO: 54 % — SIGNIFICANT CHANGE UP (ref 43–77)
NRBC # BLD: 0 /100 WBCS — SIGNIFICANT CHANGE UP (ref 0–0)
NRBC # BLD: 0 /100 WBCS — SIGNIFICANT CHANGE UP (ref 0–0)
PLATELET # BLD AUTO: 143 K/UL — LOW (ref 150–400)
PLATELET # BLD AUTO: 143 K/UL — LOW (ref 150–400)
RBC # BLD: 3.2 M/UL — LOW (ref 4.2–5.8)
RBC # BLD: 3.2 M/UL — LOW (ref 4.2–5.8)
RBC # FLD: 12.9 % — SIGNIFICANT CHANGE UP (ref 10.3–14.5)
RBC # FLD: 12.9 % — SIGNIFICANT CHANGE UP (ref 10.3–14.5)
WBC # BLD: 5.68 K/UL — SIGNIFICANT CHANGE UP (ref 3.8–10.5)
WBC # BLD: 5.68 K/UL — SIGNIFICANT CHANGE UP (ref 3.8–10.5)
WBC # FLD AUTO: 5.68 K/UL — SIGNIFICANT CHANGE UP (ref 3.8–10.5)
WBC # FLD AUTO: 5.68 K/UL — SIGNIFICANT CHANGE UP (ref 3.8–10.5)

## 2023-11-27 DIAGNOSIS — Z51.11 ENCOUNTER FOR ANTINEOPLASTIC CHEMOTHERAPY: ICD-10-CM

## 2023-12-08 ENCOUNTER — RESULT REVIEW (OUTPATIENT)
Age: 73
End: 2023-12-08

## 2023-12-08 ENCOUNTER — APPOINTMENT (OUTPATIENT)
Dept: HEMATOLOGY ONCOLOGY | Facility: CLINIC | Age: 73
End: 2023-12-08

## 2023-12-08 ENCOUNTER — APPOINTMENT (OUTPATIENT)
Dept: INFUSION THERAPY | Facility: HOSPITAL | Age: 73
End: 2023-12-08

## 2023-12-08 LAB
BASOPHILS # BLD AUTO: 0.05 K/UL — SIGNIFICANT CHANGE UP (ref 0–0.2)
BASOPHILS # BLD AUTO: 0.05 K/UL — SIGNIFICANT CHANGE UP (ref 0–0.2)
BASOPHILS NFR BLD AUTO: 0.9 % — SIGNIFICANT CHANGE UP (ref 0–2)
BASOPHILS NFR BLD AUTO: 0.9 % — SIGNIFICANT CHANGE UP (ref 0–2)
EOSINOPHIL # BLD AUTO: 0.21 K/UL — SIGNIFICANT CHANGE UP (ref 0–0.5)
EOSINOPHIL # BLD AUTO: 0.21 K/UL — SIGNIFICANT CHANGE UP (ref 0–0.5)
EOSINOPHIL NFR BLD AUTO: 3.7 % — SIGNIFICANT CHANGE UP (ref 0–6)
EOSINOPHIL NFR BLD AUTO: 3.7 % — SIGNIFICANT CHANGE UP (ref 0–6)
HCT VFR BLD CALC: 29.4 % — LOW (ref 39–50)
HCT VFR BLD CALC: 29.4 % — LOW (ref 39–50)
HGB BLD-MCNC: 10.2 G/DL — LOW (ref 13–17)
HGB BLD-MCNC: 10.2 G/DL — LOW (ref 13–17)
IMM GRANULOCYTES NFR BLD AUTO: 0.2 % — SIGNIFICANT CHANGE UP (ref 0–0.9)
IMM GRANULOCYTES NFR BLD AUTO: 0.2 % — SIGNIFICANT CHANGE UP (ref 0–0.9)
LYMPHOCYTES # BLD AUTO: 1.9 K/UL — SIGNIFICANT CHANGE UP (ref 1–3.3)
LYMPHOCYTES # BLD AUTO: 1.9 K/UL — SIGNIFICANT CHANGE UP (ref 1–3.3)
LYMPHOCYTES # BLD AUTO: 33.4 % — SIGNIFICANT CHANGE UP (ref 13–44)
LYMPHOCYTES # BLD AUTO: 33.4 % — SIGNIFICANT CHANGE UP (ref 13–44)
MCHC RBC-ENTMCNC: 31 PG — SIGNIFICANT CHANGE UP (ref 27–34)
MCHC RBC-ENTMCNC: 31 PG — SIGNIFICANT CHANGE UP (ref 27–34)
MCHC RBC-ENTMCNC: 34.7 G/DL — SIGNIFICANT CHANGE UP (ref 32–36)
MCHC RBC-ENTMCNC: 34.7 G/DL — SIGNIFICANT CHANGE UP (ref 32–36)
MCV RBC AUTO: 89.4 FL — SIGNIFICANT CHANGE UP (ref 80–100)
MCV RBC AUTO: 89.4 FL — SIGNIFICANT CHANGE UP (ref 80–100)
MONOCYTES # BLD AUTO: 0.63 K/UL — SIGNIFICANT CHANGE UP (ref 0–0.9)
MONOCYTES # BLD AUTO: 0.63 K/UL — SIGNIFICANT CHANGE UP (ref 0–0.9)
MONOCYTES NFR BLD AUTO: 11.1 % — SIGNIFICANT CHANGE UP (ref 2–14)
MONOCYTES NFR BLD AUTO: 11.1 % — SIGNIFICANT CHANGE UP (ref 2–14)
NEUTROPHILS # BLD AUTO: 2.89 K/UL — SIGNIFICANT CHANGE UP (ref 1.8–7.4)
NEUTROPHILS # BLD AUTO: 2.89 K/UL — SIGNIFICANT CHANGE UP (ref 1.8–7.4)
NEUTROPHILS NFR BLD AUTO: 50.7 % — SIGNIFICANT CHANGE UP (ref 43–77)
NEUTROPHILS NFR BLD AUTO: 50.7 % — SIGNIFICANT CHANGE UP (ref 43–77)
NRBC # BLD: 0 /100 WBCS — SIGNIFICANT CHANGE UP (ref 0–0)
NRBC # BLD: 0 /100 WBCS — SIGNIFICANT CHANGE UP (ref 0–0)
PLATELET # BLD AUTO: 157 K/UL — SIGNIFICANT CHANGE UP (ref 150–400)
PLATELET # BLD AUTO: 157 K/UL — SIGNIFICANT CHANGE UP (ref 150–400)
RBC # BLD: 3.29 M/UL — LOW (ref 4.2–5.8)
RBC # BLD: 3.29 M/UL — LOW (ref 4.2–5.8)
RBC # FLD: 12.5 % — SIGNIFICANT CHANGE UP (ref 10.3–14.5)
RBC # FLD: 12.5 % — SIGNIFICANT CHANGE UP (ref 10.3–14.5)
WBC # BLD: 5.69 K/UL — SIGNIFICANT CHANGE UP (ref 3.8–10.5)
WBC # BLD: 5.69 K/UL — SIGNIFICANT CHANGE UP (ref 3.8–10.5)
WBC # FLD AUTO: 5.69 K/UL — SIGNIFICANT CHANGE UP (ref 3.8–10.5)
WBC # FLD AUTO: 5.69 K/UL — SIGNIFICANT CHANGE UP (ref 3.8–10.5)

## 2023-12-22 ENCOUNTER — RESULT REVIEW (OUTPATIENT)
Age: 73
End: 2023-12-22

## 2023-12-22 ENCOUNTER — APPOINTMENT (OUTPATIENT)
Dept: HEMATOLOGY ONCOLOGY | Facility: CLINIC | Age: 73
End: 2023-12-22

## 2023-12-22 ENCOUNTER — APPOINTMENT (OUTPATIENT)
Dept: INFUSION THERAPY | Facility: HOSPITAL | Age: 73
End: 2023-12-22

## 2023-12-22 LAB
BASOPHILS # BLD AUTO: 0.07 K/UL — SIGNIFICANT CHANGE UP (ref 0–0.2)
BASOPHILS # BLD AUTO: 0.07 K/UL — SIGNIFICANT CHANGE UP (ref 0–0.2)
BASOPHILS NFR BLD AUTO: 1.3 % — SIGNIFICANT CHANGE UP (ref 0–2)
BASOPHILS NFR BLD AUTO: 1.3 % — SIGNIFICANT CHANGE UP (ref 0–2)
EOSINOPHIL # BLD AUTO: 0.24 K/UL — SIGNIFICANT CHANGE UP (ref 0–0.5)
EOSINOPHIL # BLD AUTO: 0.24 K/UL — SIGNIFICANT CHANGE UP (ref 0–0.5)
EOSINOPHIL NFR BLD AUTO: 4.3 % — SIGNIFICANT CHANGE UP (ref 0–6)
EOSINOPHIL NFR BLD AUTO: 4.3 % — SIGNIFICANT CHANGE UP (ref 0–6)
HCT VFR BLD CALC: 28.9 % — LOW (ref 39–50)
HCT VFR BLD CALC: 28.9 % — LOW (ref 39–50)
HGB BLD-MCNC: 9.9 G/DL — LOW (ref 13–17)
HGB BLD-MCNC: 9.9 G/DL — LOW (ref 13–17)
IMM GRANULOCYTES NFR BLD AUTO: 0.4 % — SIGNIFICANT CHANGE UP (ref 0–0.9)
IMM GRANULOCYTES NFR BLD AUTO: 0.4 % — SIGNIFICANT CHANGE UP (ref 0–0.9)
LYMPHOCYTES # BLD AUTO: 1.67 K/UL — SIGNIFICANT CHANGE UP (ref 1–3.3)
LYMPHOCYTES # BLD AUTO: 1.67 K/UL — SIGNIFICANT CHANGE UP (ref 1–3.3)
LYMPHOCYTES # BLD AUTO: 29.9 % — SIGNIFICANT CHANGE UP (ref 13–44)
LYMPHOCYTES # BLD AUTO: 29.9 % — SIGNIFICANT CHANGE UP (ref 13–44)
MCHC RBC-ENTMCNC: 30.8 PG — SIGNIFICANT CHANGE UP (ref 27–34)
MCHC RBC-ENTMCNC: 30.8 PG — SIGNIFICANT CHANGE UP (ref 27–34)
MCHC RBC-ENTMCNC: 34.3 G/DL — SIGNIFICANT CHANGE UP (ref 32–36)
MCHC RBC-ENTMCNC: 34.3 G/DL — SIGNIFICANT CHANGE UP (ref 32–36)
MCV RBC AUTO: 90 FL — SIGNIFICANT CHANGE UP (ref 80–100)
MCV RBC AUTO: 90 FL — SIGNIFICANT CHANGE UP (ref 80–100)
MONOCYTES # BLD AUTO: 0.54 K/UL — SIGNIFICANT CHANGE UP (ref 0–0.9)
MONOCYTES # BLD AUTO: 0.54 K/UL — SIGNIFICANT CHANGE UP (ref 0–0.9)
MONOCYTES NFR BLD AUTO: 9.7 % — SIGNIFICANT CHANGE UP (ref 2–14)
MONOCYTES NFR BLD AUTO: 9.7 % — SIGNIFICANT CHANGE UP (ref 2–14)
NEUTROPHILS # BLD AUTO: 3.05 K/UL — SIGNIFICANT CHANGE UP (ref 1.8–7.4)
NEUTROPHILS # BLD AUTO: 3.05 K/UL — SIGNIFICANT CHANGE UP (ref 1.8–7.4)
NEUTROPHILS NFR BLD AUTO: 54.4 % — SIGNIFICANT CHANGE UP (ref 43–77)
NEUTROPHILS NFR BLD AUTO: 54.4 % — SIGNIFICANT CHANGE UP (ref 43–77)
NRBC # BLD: 0 /100 WBCS — SIGNIFICANT CHANGE UP (ref 0–0)
NRBC # BLD: 0 /100 WBCS — SIGNIFICANT CHANGE UP (ref 0–0)
PLATELET # BLD AUTO: 161 K/UL — SIGNIFICANT CHANGE UP (ref 150–400)
PLATELET # BLD AUTO: 161 K/UL — SIGNIFICANT CHANGE UP (ref 150–400)
RBC # BLD: 3.21 M/UL — LOW (ref 4.2–5.8)
RBC # BLD: 3.21 M/UL — LOW (ref 4.2–5.8)
RBC # FLD: 12.6 % — SIGNIFICANT CHANGE UP (ref 10.3–14.5)
RBC # FLD: 12.6 % — SIGNIFICANT CHANGE UP (ref 10.3–14.5)
WBC # BLD: 5.59 K/UL — SIGNIFICANT CHANGE UP (ref 3.8–10.5)
WBC # BLD: 5.59 K/UL — SIGNIFICANT CHANGE UP (ref 3.8–10.5)
WBC # FLD AUTO: 5.59 K/UL — SIGNIFICANT CHANGE UP (ref 3.8–10.5)
WBC # FLD AUTO: 5.59 K/UL — SIGNIFICANT CHANGE UP (ref 3.8–10.5)

## 2023-12-23 DIAGNOSIS — R11.2 NAUSEA WITH VOMITING, UNSPECIFIED: ICD-10-CM

## 2024-01-05 ENCOUNTER — RESULT REVIEW (OUTPATIENT)
Age: 74
End: 2024-01-05

## 2024-01-05 ENCOUNTER — APPOINTMENT (OUTPATIENT)
Dept: HEMATOLOGY ONCOLOGY | Facility: CLINIC | Age: 74
End: 2024-01-05
Payer: MEDICARE

## 2024-01-05 ENCOUNTER — APPOINTMENT (OUTPATIENT)
Dept: INFUSION THERAPY | Facility: HOSPITAL | Age: 74
End: 2024-01-05

## 2024-01-05 VITALS
DIASTOLIC BLOOD PRESSURE: 76 MMHG | HEIGHT: 63.03 IN | BODY MASS INDEX: 23.44 KG/M2 | OXYGEN SATURATION: 98 % | WEIGHT: 132.28 LBS | TEMPERATURE: 98 F | SYSTOLIC BLOOD PRESSURE: 145 MMHG | HEART RATE: 60 BPM | RESPIRATION RATE: 16 BRPM

## 2024-01-05 LAB
BASOPHILS # BLD AUTO: 0.06 K/UL — SIGNIFICANT CHANGE UP (ref 0–0.2)
BASOPHILS # BLD AUTO: 0.06 K/UL — SIGNIFICANT CHANGE UP (ref 0–0.2)
BASOPHILS NFR BLD AUTO: 1 % — SIGNIFICANT CHANGE UP (ref 0–2)
BASOPHILS NFR BLD AUTO: 1 % — SIGNIFICANT CHANGE UP (ref 0–2)
EOSINOPHIL # BLD AUTO: 0.18 K/UL — SIGNIFICANT CHANGE UP (ref 0–0.5)
EOSINOPHIL # BLD AUTO: 0.18 K/UL — SIGNIFICANT CHANGE UP (ref 0–0.5)
EOSINOPHIL NFR BLD AUTO: 3.1 % — SIGNIFICANT CHANGE UP (ref 0–6)
EOSINOPHIL NFR BLD AUTO: 3.1 % — SIGNIFICANT CHANGE UP (ref 0–6)
HCT VFR BLD CALC: 32.7 % — LOW (ref 39–50)
HCT VFR BLD CALC: 32.7 % — LOW (ref 39–50)
HGB BLD-MCNC: 10.8 G/DL — LOW (ref 13–17)
HGB BLD-MCNC: 10.8 G/DL — LOW (ref 13–17)
IMM GRANULOCYTES NFR BLD AUTO: 0.2 % — SIGNIFICANT CHANGE UP (ref 0–0.9)
IMM GRANULOCYTES NFR BLD AUTO: 0.2 % — SIGNIFICANT CHANGE UP (ref 0–0.9)
LYMPHOCYTES # BLD AUTO: 1.81 K/UL — SIGNIFICANT CHANGE UP (ref 1–3.3)
LYMPHOCYTES # BLD AUTO: 1.81 K/UL — SIGNIFICANT CHANGE UP (ref 1–3.3)
LYMPHOCYTES # BLD AUTO: 31.5 % — SIGNIFICANT CHANGE UP (ref 13–44)
LYMPHOCYTES # BLD AUTO: 31.5 % — SIGNIFICANT CHANGE UP (ref 13–44)
MCHC RBC-ENTMCNC: 30.7 PG — SIGNIFICANT CHANGE UP (ref 27–34)
MCHC RBC-ENTMCNC: 30.7 PG — SIGNIFICANT CHANGE UP (ref 27–34)
MCHC RBC-ENTMCNC: 33 G/DL — SIGNIFICANT CHANGE UP (ref 32–36)
MCHC RBC-ENTMCNC: 33 G/DL — SIGNIFICANT CHANGE UP (ref 32–36)
MCV RBC AUTO: 92.9 FL — SIGNIFICANT CHANGE UP (ref 80–100)
MCV RBC AUTO: 92.9 FL — SIGNIFICANT CHANGE UP (ref 80–100)
MONOCYTES # BLD AUTO: 0.56 K/UL — SIGNIFICANT CHANGE UP (ref 0–0.9)
MONOCYTES # BLD AUTO: 0.56 K/UL — SIGNIFICANT CHANGE UP (ref 0–0.9)
MONOCYTES NFR BLD AUTO: 9.7 % — SIGNIFICANT CHANGE UP (ref 2–14)
MONOCYTES NFR BLD AUTO: 9.7 % — SIGNIFICANT CHANGE UP (ref 2–14)
NEUTROPHILS # BLD AUTO: 3.13 K/UL — SIGNIFICANT CHANGE UP (ref 1.8–7.4)
NEUTROPHILS # BLD AUTO: 3.13 K/UL — SIGNIFICANT CHANGE UP (ref 1.8–7.4)
NEUTROPHILS NFR BLD AUTO: 54.5 % — SIGNIFICANT CHANGE UP (ref 43–77)
NEUTROPHILS NFR BLD AUTO: 54.5 % — SIGNIFICANT CHANGE UP (ref 43–77)
NRBC # BLD: 0 /100 WBCS — SIGNIFICANT CHANGE UP (ref 0–0)
NRBC # BLD: 0 /100 WBCS — SIGNIFICANT CHANGE UP (ref 0–0)
PLATELET # BLD AUTO: 147 K/UL — LOW (ref 150–400)
PLATELET # BLD AUTO: 147 K/UL — LOW (ref 150–400)
RBC # BLD: 3.52 M/UL — LOW (ref 4.2–5.8)
RBC # BLD: 3.52 M/UL — LOW (ref 4.2–5.8)
RBC # FLD: 12.9 % — SIGNIFICANT CHANGE UP (ref 10.3–14.5)
RBC # FLD: 12.9 % — SIGNIFICANT CHANGE UP (ref 10.3–14.5)
WBC # BLD: 5.75 K/UL — SIGNIFICANT CHANGE UP (ref 3.8–10.5)
WBC # BLD: 5.75 K/UL — SIGNIFICANT CHANGE UP (ref 3.8–10.5)
WBC # FLD AUTO: 5.75 K/UL — SIGNIFICANT CHANGE UP (ref 3.8–10.5)
WBC # FLD AUTO: 5.75 K/UL — SIGNIFICANT CHANGE UP (ref 3.8–10.5)

## 2024-01-05 PROCEDURE — 99213 OFFICE O/P EST LOW 20 MIN: CPT

## 2024-01-05 NOTE — HISTORY OF PRESENT ILLNESS
[Disease:__________________________] : Disease: [unfilled] [de-identified] : As per Dr Kilgore's note on 8/18/2020\par  \par  "Mr Rand was referred to my office for newly diagnosed IgG kappa multiple myeloma. The patient's primary language is Yoruba, he has his daughter Mirlande as the , per his wishes. He was seen by Dr. Andrés Valle (hematology) in Feb 2019 and had a BM bx showing 10-15% plasma cells, concerning for smoldering myeloma. According to the daughter, he was awaiting insurance approval for imaging studies. He was admitted from 8/18/19 at [facility] where he presented with pain in the L leg/lower back, worsening for the past 2-3 months. On labs, he was found to have a total protein of 10, Ca level 12 until 8/29/19. Dental consult was called and patient needs to have 10 teeth extracted -thus, IV bisphosphonates were not given, pt improved with hydration. He also had a slightly inc'ed creatinine -improved after IVF. CT C/A/P 8/18/19 showed a lytic expansile soft tissue lesion centered in the manubrium measuring approximately 7.3 x 3.4 x 4.3 cm, invading both 1st ribs. There was also a A lytic expansile soft tissue lesion in the T8 vertebral body causing mass effect on spinal canal and obliterating left neural foramina at T7-8 and T8-9. ON 8/22/19 an MRI thoracic spine was done showing multiple areas of tumor involvement within the thoracic spine in keeping with the patient's history of multiple myeloma. Prominent lesion within C7 on the right incompletely seen.\par  Large lesion within T8 on the left producing epidural tumor extension and moderate narrowing of the spinal canal with mild flattening of the spinal cord. He was evaluated on 8/22/19 by Dr. Foley (Rad Onc) and was given 5 fractions of XRT from 8/23/19 to 8/29/19 to C7 and T8. He was discharged home on 8/29/19 with follow up in the outpatient office, and on Dexamethasone 4mg po daily. \par  \par   \par  \par  \par  \par  Interval History: The patient is being followed for IgG kappa MM with multiple bone lytic lesions, hypercalcemia (resolved) and mild anemia. He started Velcade/Dexa weekly on 9/919. The patient got the Revlimid and started it C1 day 1 on 10/21/19 and hed been tolerating it well. \par  \par  Starting in February, his blood counts became low -plt count 54, Hb 8.3 wbc 5.8. Revlimid was held -after 2/3/20. He got 1 shot Velcade/Dexa on 2/3/20, NO chemo since then. \par  BM done on 3/4/20 to eval for residual disease (MM) vs. MDS. BM showed recovering marrow elements, plasma cells <5%. SPEP/MICAH from 3/9/20 showed NO monoclonal proteins, c/w CR. \par  \par  The PET scan from 5/7/20 showed L uptake maxillary -pt reports that he had dentures done in Jan 2020 and has discomfort from them. He did not go to the dentist during COVID Rib fractures -pt denied falls/trauma. Patient has dental appointment for next week. He has some discomfort in the upper teeth b/l. He also missed his Velcade SQ last week 'i did not know if i am supposed to continue it.' He has no neuropathy from it, tolerating well. "\par   [de-identified] : RISS- II [Treatment Protocol] : Treatment Protocol [FreeTextEntry1] : s/p RT to C7/T8 spine\par  RVD 9/2019 -6/2020 transitioned to maintenance velcade q2wk started 6/19/2020 [de-identified] : Oct 6, 2020 Pt is here for initial visit with me. He has his dtr on the phone, prefers her included in conversation and declined an . Pt is feeling well. He is tolerating monthly zometa and maintenance velcade w/o any adverse effects. Last set of myeloma labs done in August showed stable remission. He is requesting refill of several meds today. ---------------------------------  1/25/2021 Patient presents for a follow up. He has no complaints except infrequent short duration chills with no fever. He is on biweekly Velcade and monthly Zometa. His last treatment of Zometa was on 1/8/2021 and last Velcade on 1/22/2021. His most recent SPEP/MICAH was normal.     6/10/2021 Patient seen in follow up. He did not go to his home country as he planned. He does not endorse any big change in his health status. She saw his Kidney doctor who told him he is iron deficient (labs are not supportive). His anemia is multifactorial including CKD. His most recent myeloma labs (03/2021) show continued CR.   9/1/2021 Mr Rand returns for a follow up. His daughter is accompanying him. He has no complaints. He reports that he develops diarrhea (one motion) the day after his Velcade treatment. He plans to visit his home town soon. He has some unavoidable things to take care of. Of note he has been in sCR.   1/19/2022 Patient seen in follow up. He has done well in the interim. He also visited his home country in this interval. He was safe all along.  As regards to his disease, he is stable and appears to be in remission. He has no complaints.    11/23/2022 Patient presents to clinic after being lost to follow-up; went to his country for an extended visit from 3/10 - 9/8/2022. Was on maintenance velcade [id number] starting 6/19/2020, last dose was on 3/3/22. Was on monthly Zometa since 11/2019, last dose of zometa was on 2/17/2022. In the interim, he developed severe weakness and was hospitalized at [facility] (9/24-10/11/22) for Acute respiratory failure with hypoxia, and severe sepsis due to GBS. Was discharged to rehab, and as of yesterday he was discharged from rehab. Will now be doing PT at home. During inpatient work up, he was found to have diffuse lytic lesions noted in C/T/L spine CT scans (9/25/22) which were identified as "Multiple mixed lytic sclerotic lesions may be sequela of previously treated multiple myeloma". MRI of the C/T/L spine were done for follow up revealing an "abnormal signal involving T8 vertebral body which may be related to multiple myeloma". Was seen by neurosurgery, no acute intervention. In patient immunofixation did not reveal a monoclonal band; SPEP WNL; kappa/lambda ratio not diagnostic. Today he feels at baseline, but notes residual weakness from GBS. Patient denies bone pain, fever, chills, night sweats, back pain, headache, or peripheral neuropathy. Good appetite, stable weight.  12/23/2022 Follow up. Today he feels at baseline, but notes residual weakness from GBS. Today is C1D22 of Velcade and dexamethasone since restarting it; he is tolerating the medications well. He has some questions requesting clarification on Acyclovir and dexamethasone, otherwise no other concerns. Patient denies bone pain, fever, chills, night sweats, back pain, headache, or peripheral neuropathy. Good appetite, stable weight.   1/20/2023  Patient seen in follow up for multiple myeloma. He was on maintenance Velcade. He went too his home country and missed treatment for more than two months. His disease progressed as noted upon return. He had new percy lesion. He was again started on the same induction regimen. He is also getting Zometa monthly. He has now achieved VGPR having only mild high Kappa, no M-spike/band. He offers no complaints. Denies any infection, fever, bone pain, weigh loss.    2/16/2023 Patient presents for a follow up. He missed his last treatment as he was having cough body ache. He saw his PCP and took a course of Azithromycin. His symptoms relieved. He is feeling well now. His treatment schedule for tomorrow (2/17/2023). Of note, his myeloma labs from 01/2023 are suggestive of sCR.    3/9/2023 Patient presents for a follow up. He has done well in the interim. His cough has resolved. He plans to take a chemo vacation for a month as he plans to visit his home country. He will finish his C4 treatment tomorrow, following which he will continue on maintenance therapy which can be started after he comes back. Of note he again as achieved a very good partial response.   5/19/2023 Patient returns for a follow up. He has missed 2 cycles of treatment as he went back to his home country. His last paraprotein labs are from 03/2023 and they are WNL including FLCR suggesting sCR.  He has no complaints.   8/18/2023 Patient presents for a follow up. He is in CR. He is on Q2W Velate and QM Zometa. However, his Zometa has been held this month as his Cr has increased to 1.9 from 1.4. He has been asked to see a nephrologist. He is physically well and offers no complaints.    10/27/2023 Patient seen in follow up for MM in remission. He is on Velcade maintenance. His Zometa (restarted after recurrence of disease) has been discontinued as his creatinine was increasing. His MICAH was normal and his FLCR is WNL.   1/5/2024 Patient returns for a follow up. He is in stable health. Continuing Q2W maintenance Velcade and monthly Zometa. A repeat skeletal survey has been ordered but not scheduled yet. His M-spike and and M-band are not detectable, however touch high Kappa is present likely due to CKD.  He has no complaints of bone pain and has no other issues.

## 2024-01-05 NOTE — RESULTS/DATA
[FreeTextEntry1] : 1/5/2023 Today's CBC: Improved H/H (10.8)  Stable platelets (147K) and WBC As of last visit MICAH: No M-spike  No M-band Normal IGs K 4.54 Normal L FLCR 2.42 Normal LDH   10/27/2023 Last visit labs  Normal range MICAH, FLCR  Stable and mild anemia and thrombocytopenia noted  8/18/2023 HGB 10.1 Cr: pending.  MICAH (8/4/2023) No M-spike, no M-band  KFLC 3.7 LFLC 2.45 FLCR 1.51 (Normal)     3/9/2023 Most recent labs Hgb 9.4  Plt 141  M-spike: unable to quantitate K 4.0 L 1.89 K/.L 2.12   1/20/2023  Most recent myeloma labs from 12/23/2022  IgG Kappa disease  No M-spike, no M-band  IgG 931 IgA 132 IgM 32 K 2.62 L 1.45 K/L 1.79   Most recent CBC from 1/12/2023 Mild anemia (10.8 and thrombocytopenia (135K)   1/19/2022 Stable myeloma labs with normal range values.Mild high Lappa lambda with normal ratio is likely due to his renal failure.  MIld to moderate anemia is stable.   9/1/2021 Last PCN labs in 06/2021 showed no M-spike, M-band, Normal FLCR.  6/9/2021 Last Myeloma labs in 03/2021 showed no M-spike, M-band, Normal FLCR.   1/25/2021 Most recent SPEP / MICAH (1/8/2021) No monoclonal band   ------------------------------------------------------------------------------------------   PET/CT Whole Body Oncology FDG             Final  No Documents Attached       EXAM:  PETCT WB ONC FDG SUBS   PROCEDURE DATE:  05/07/2020    INTERPRETATION:  PROCEDURE:  PET/CT WHOLE BODY  RADIOPHARMACEUTICAL:  9.97 mCi F-18, FDG, I.V.  CLINICAL INFORMATION:  70-year-old male referred for follow-up of multiple myeloma on Zometa. Bone marrow biopsy done 3/4/2020 showed less than 5% plasma cells and repeat myeloma labs done on 3/9/2020, showed CT are. PET/CT is done as part of the subsequent treatment strategy evaluation.  TECHNIQUE:  Fasting blood sugar measured prior to injection of radiopharmaceutical was 116 mg/dl. Following intravenous injection of the radiopharmaceutical and an uptake period of approximately 60 minutes, FDG-PET/CT was obtained on a  [employer] Discovery 710 from the vertex of the skull to the toes. Oral contrast was given during the uptake period. CT was performed during shallow respiration. The CT protocol was optimized for PET attenuation correction and to provide anatomic detail for localization of PET abnormalities. The CT protocol was not designed to produce and cannot replace state-of-the-art diagnostic CT images with specific imaging protocols for different body parts and indications. Images were reviewed on a dedicated workstation using multiplanar reconstruction.  The standardized uptake values (SUV) are normalized to patient body weight and indicate the highest activity concentration (SUVmax) in a given disease site. All image numbers refer to the axial image number.  COMPARISON:  FDG PET/CT dated 9/7/2019.  OTHER STUDIES USED FOR CORRELATION: MRI of cervical spine dated 9/9/2019.  FINDINGS:  HEAD/NECK: Physiologic FDG activity in the brain.  Mild hypermetabolism in left masseter muscle, decreased as compared to prior study, may be secondary to bruxism. Elongated focus of increased FDG activity in left lower cervical region may reflect inflammation or muscle spasm (image 77).  New hypermetabolic lucency in left maxillary alveolus is nonspecific (SUV 8.0; image 53). Recommend correlation with dental exam. A new small hypermetabolic focus is seen in the right maxilla (SUV 3.7; image 55).  CHEST: Few new small hypermetabolic foci in bilateral hilar region likely correspond to small lymph nodes that are difficult to delineate on CT. For reference, left perihilar region demonstrates SUV 3.6 (image 117). Resolution of FDG-avid subcentimeter left posterior paraesophageal lymph node.  LUNGS: No abnormal FDG activity. Calcified right lower lobe granuloma, unchanged (image 128).  PLEURA/PERICARDIUM: No abnormal FDG activity. No effusion.  HEPATOBILIARY/PANCREAS: Physiologic FDG activity. Liver SUV mean is 2.1; previous 2.3.  SPLEEN: No abnormal FDG activity. Normal in size.  ADRENAL GLANDS: No abnormal FDG activity. No nodule.  KIDNEYS/URINARY BLADDER: Physiologic FDG activity. Multiple bilateral renal cysts, measuring up to 4.6 cm on the right, unchanged.  PELVIC ORGANS: No abnormal FDG activity.  ABDOMINOPELVIC NODES: No enlarged or FDG avid lymph node  BOWEL/PERITONEUM/MESENTERY: Physiologic FDG activity. Resolution of hypermetabolism in distal esophagus/GE junction.  BONES: Near total resolution of previously seen hypermetabolic foci in the axial skeleton, right humerus, and proximal bilateral femurs. Previously seen lytic lesions demonstrate new areas of sclerosis on CT. For reference, one of the most FDG-avid residual lesions is a mixed lytic and sclerotic lesion in the left scapula which demonstrates SUV 3.0 (image 98); previously lytic with SUV 5.7. Previously seen hypermetabolic lesion in right sphenoid bone has resolved. Previously seen large destructive lesion in the manubrium the sternum demonstrates new sclerosis with SUV 3.0 (image 103); previous SUV 5.3. Resolution of hypermetabolic intramedullary soft tissue in proximal right humerus. Near total resolution of hypermetabolic intramedullary soft tissue in proximal left femur (SUV 1.3; image 273; previous SUV 5.3).  FDG-avid fracture in left inferior pubic ramus demonstrates increased callus formation and is similar in metabolism (SUV 3.8; image 250; previous SUV 3.1). Few new mildly FDG-avid bilateral rib fractures. For reference, fracture in the anterior aspect of the left second rib demonstrates SUV 3.4 (image 105).  Resolution of hypermetabolism in left palmar musculature.  IMPRESSION:  Abnormal whole body FDG-PET/CT scan.  1. Near total resolution of hypermetabolism associated with lytic and intramedullary lesions in the axial and appendicular skeleton as compared to prior study dated 9/7/2019. Previously seen lytic lesions demonstrate new sclerosis. Findings are compatible with response to interval therapy.  2. Few new mildly FDG-avid bilateral rib fractures. An FDG-avid fracture in the left inferior pubic ramus demonstrates increased callus formation and is similar in metabolism.  3. New hypermetabolic lucency in left maxillary alveolus and new small hypermetabolic focus in right maxilla are nonspecific. Recommend correlation with dental exam.  4. Few new mildly FDG-avid nonspecific bilateral hilar lymph nodes.  5. Resolution of many specific hypermetabolism in distal esophagus/GE junction.                LUANA RAMOS M.D., NUCLEAR MEDICINE ATTENDING This document has been electronically signed. May  7 2020  4:08PM      Ordered by: OZZY ALMEIDA       Collected/Examined: 07May2020 12:57PM        Verified by: OZZY ALMEIDA 11May2020 09:27AM         Result Communication: Call patient with results,Discussed results with patient; Stage: Final         Performed at: [facility]       Resulted: 07May2020 04:11PM       Last Updated: 11May2020 09:27AM       Accession: [id number]           Pathology             Final  No Documents Attached       [facility] Accession Number : [id number]  HOMER MEIERASH                          2    Cytopathology Report      Final Diagnosis SOFT TISSUE, SACRUM, CT GUIDED CORE BIOPSY Plasma cell neoplasm See note and description.  Diagnostic note:  Overall the morphologic and immunophenotypic findings are consistent with plasma cell neoplasm. The differential diagnosis includes plasmacytoma vs plasma cells myeloma. Correlation with laboratory, radiologic and clinical findings is required for further classification.  Microscopic description: The touch prep slides are composed of numerous enlarged plasma cells with prominent nucleoli, occasional binucleated forms are seen.  Histologic sections consist of multiple needle shaped cores of soft tissue showing proliferation of enlarged plasma cells with prominent nucleoli, forming sheets.  Immunohistochemistry:  , kappaISH, lambdaISH, cyclinD1, p53 stains performed on paraffin embedded section of block 1C.  Neoplastic cells are: Positive:  , KappaISH, p53 Negative:  LambdaISH, cyclinD1  Flow cytometry analysis (69-KV-): Monotypic plasma cells (24.1% of total analyzed cells) are present. There are two aberrant monoclonal plasma cell populations: Population #1 (16.5% of total analyzed cells, 68.6% of plasma cell population) positive for cytoplasmic kappa, CD38 dimmer, CD45 dimmer, CD56, ; negative for , CD19, CD20. Population # 2 (7.6% of total analyzed cells, 31.4% of plasma cell population) positive for cytoplasmic kappa       HARDEEP SUBASH                          2    Cytopathology Report     (brighter), , CD38 brighter, CD45 dim, CD56 (brighter), ; negative for CD19, CD20.  Screened by: Jose Juan LARA([facility]) Verified by: Orlando Spicer MD (Electronic Signature) Reported on: 08/22/19 13:22 EDT, [address] [place] [address] Cytology technical processing performed at [address] [place] [id number] _________________________________________________________________   Specimen(s) Submitted SOFT TISSUE, SACRUM, CT GUIDED CORE BIOPSY   Statement of Adequacy Immediate cytologic study for adequacy of specimen using a Diff- Quik stain was performed at [facility], [address] [place] [address]. Touch imprints acceptable for further evaluation by Jose Juan LARA([facility]).   Clinical Information Sacral mass.   Gross Description Core Biopsy: Tissue collected: Specimen container has been inspected to confirm patient?s name and date of birth, contains 3  tan cores measuring 1.0, 1.0, 0.8 cm in length (and multiple fragments). Entire specimen submitted in 2 cassette(s) labeled 1B and 1 C.  4 Touch prep slides ( 2 air dried + 2 fixed)  FNA: No material submitted for cell block (No block 1A)  Prepared: 4 Touch Prep, 1 core biopsy labeled 1B 1 core biopsy labeled 1C 6 Total slides      Ordered by: DELORES MOSS       Collected/Examined: [id number] 03:14PM        Verified by: OZZY ALMEIDA 18Jun2020 03:07PM         Result Communication: No patient communication needed at this time; Stage: Final         Performed at: [facility]       Resulted: [id number] 01:22PM       Last Updated: 18Jun2020 03:07PM       Accession: [id number]           Pathology             Final  No Documents Attached       [facility] Accession Number : [id number]  HARDEEP, SUBASH                          4    Addendum Report     Hematopathology Addendum Comprehensive Hematopathology Report  Final Diagnosis: 1, 2. Bone marrow biopsy and bone marrow aspirate - Plasma cell myeloma (10% with focal 25% by  stain) - Slight erythroid predominant trilineage hematopoiesis with maturation  Diagnostic Note: Please note findings of a normal male karyotype and a negative multiple myeloma FISH panel. Based on the additional findings, the diagnosis has not changed. Correlation with studies for myeloma-related organ dysfunction is necessary.  Morphology: Microscopic description: 1. Biopsy: Sections of bone marrow biopsy show normocellularity (60 to 70% cellularity), mild plasmacytosis with foci of small clusters, slight erythroid predominant trilineage hematopoiesis with maturation, mild megakaryocytosis with normal morphology, and increased iron stores. 2. Aspirate: Cellular spicules are not present, precluding differential count. Review of the smear shows some maturing and predominant mature myeloid cells, a few erythroid elements, and rare megakaryocytes.  Ancillary Studies: Bone marrow aspirate iron stain: No spicules are present to evaluate for iron stores and there are insufficient nRBC to evaluate for ring sideroblasts. Congo red stain is negative for amyloidosis. Flow cytometry:  Monotypic plasma cells (1% of cells), positive for cytoplasmic kappa, CD38, , dimmer CD45, partial CD56; negative CD19, CD20, . CD45/side scatter shows no significant blast population. There is no increase in CD34,  or CD14 positive cells. Lymphocytes (8% of cells) show a heterogeneous population of T- cells (with normal CD4 to CD8 ratio), and polytypic B-cells. Immunohistochemical stains ( performed on the block 1A and 1B, cyclin-D1 performed on block 1A):   stains show overall approximately 10% plasma cells, with foci of small clusters, focal up to 25%. Plasma cells are negative for cyclin-D1.  Cytogenetics: Result: Normal male karyotype Karyotype: 46,XY[20]       HARDEEP, SUBASH                          4    Addendum Report     FISH: Result: NORMAL FISH - - MULTIPLE MYELOMA PANEL Probe(s) and Location(s): FGFR3(4p16), CCND1(11q13), RB1(13q14), IGH(14q32), MAF(16q23), TP53(17p13.1)  Clinical History/Data: New diagnosis multiple myeloma CBC on 08/24/2019: WBC: 8.09 K/uL, HGB: 9.5 g/dL, MCV: 90 fl, Plt: 227 K/uL Serum Immunofixation: IgG kappa  Verified by: Nancy Farris M.D. (Electronic Signature) Reported on: 09/07/19 10:37 EDT, [address] [place] [id number] _________________________________________________________________   Hematopathology Report     Final Diagnosis 1, 2. Bone marrow biopsy and bone marrow aspirate - Plasma cell myeloma (10% with focal 25% by  stain) - Slight erythroid predominant trilineage hematopoiesis with maturation  See note and description.  Diagnostic note: Correlation with studies for myeloma-related organ dysfunction is necessary. Comprehensive report with results of pending ancillary studies to follow.  Ancillary studies Bone marrow aspirate iron stain: No spicules are present to evaluate for iron stores and there are insufficient nRBC to evaluate for ring sideroblasts.  Flow cytometry:  Monotypic plasma cells (1% of cells), positive for cytoplasmic kappa, CD38, , dimmer CD45, partial CD56; negative CD19, CD20, . CD45/side scatter shows no significant blast population. There is no increase in CD34,  or CD14 positive cells.       HARDEEP, SUBASH                          4    Hematopathology Report     Lymphocytes (8% of cells) show a heterogeneous population of T- cells (with normal CD4 to CD8 ratio), and polytypic B-cells.  Immunohistochemical stains ( performed on the block 1A and 1B, cyclin-D1 performed on block 1A):   stains show overall approximately 10% plasma cells, with foci of small clusters, focal up to 25%. Plasma cells are negative for cyclin-D1.  Microscopic description: 1. Biopsy: Sections of bone marrow biopsy show normocellularity (60 to 70% cellularity), mild plasmacytosis with foci of small clusters, slight erythroid predominant trilineage hematopoiesis with maturation, mild megakaryocytosis with normal morphology, and increased iron stores.  2. Aspirate: Cellular spicules are not present, precluding differential count. Review of the smear shows some maturing and predominant mature myeloid cells, a few erythroid elements, and rare megakaryocytes.  Comment:  Iron stain (examined to evaluate for iron stores; see microscopic description) and Giemsa stain (shows appropriate staining pattern) are performed and evaluated on block(s): 1A, 1B.  Slide(s) with built in immunohistochemical study control(s) associated and negative control with this case has/have been verified by the sign out pathologist. For slide(s) without built in controls positive control slides has/have been reviewed and approved by immunohistochemistry lab These immunohistochemical/ in-situ hybridization tests have been developed and their performance characteristics determined by [facility], Department of Pathology, Division of Immunopathology, [address] [place] [address].  It has not been cleared or approved by the U.S. Food and Drug Administration.  The FDA has determined that such clearance or approval is not necessary.  This test is used for clinical purposes.  The laboratory is certified under the CLIA-88 as qualified to perform high complexity clinical testing.  Verified by: Nancy Farris M.D. (Electronic Signature) Reported on: 08/27/19 10:00 EDT, [address] [place] [address] _________________________________________________________________        HARDEEP, SUBASH                          4    Hematopathology Report     Clinical History New diagnosis multiple myeloma CBC on 08/24/2019: WBC: 8.09 K/uL, HGB: 9.5 g/dL, MCV: 90 fl, Plt: 227 K/uL Serum Immunofixation: IgG kappa  Specimen(s) Submitted 1     Bone marrow biopsy left 2     Bone marrow aspirate  Gross Description 1. The specimen is received in bouin's fixative and the specimen container is labeled: Left bone marrow biopsy.  It consists of a 0.5 x 0.2 cm bone marrow core with a 1.5 x 1.3 x 0.5 cm blood clot.  Entirely submitted.  Two cassettes: A = bone marrow core; B = blood clot. 2. The specimen is received labeled with patient's name and consists of 3 air dried slide(s). 2 slide(s) stained with Monzon Giemsa stain. 1 stained for iron stain. In addition to other data that may appear on the specimen container, the label has been inspected to confirm the presence of the patient's name and date of birth. Catarina PENADennys Torres 08/22/2019 17:12      Ordered by: BECKY BILLS       Collected/Examined: [id number] 03:50PM        Verified by: BECKY BILLS [id number] 05:42PM         Result Communication: No patient communication needed at this time; Stage: Final         Performed at: [facility]       Resulted: [id number] 10:37AM       Last Updated: [id number] 05:42PM       Accession: [id number]

## 2024-01-05 NOTE — ASSESSMENT
[FreeTextEntry1] : 72 year-old Mr. MEIER is seen in follow up for IgG Kappa Multiple Myeloma presented with lytic percy lesions, diagnosed in 9/2019. He received XRT to percy lesions,was started on RVD induction, achieved VGPR. Last PET 05/2020 - markedly improved and reduced FDG avidity throughout. His most recent SPEP/MICAH are normal, and FLCR has normalized as well. He is in sCR. Held Zometa for increased Cr to 1.9.  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 - MRI T-spine (9/25/22): abnormal signal involving T8 vertebral body which may be related to multiple myeloma - Repeat T-spine MRI in 3 months ~3/2/2023, to re-assess previous findings as there was no definitive evidence of myeloma involvement. Will repeat in 3 months from 12/2/22 as he has not received any myeloma treatment since his last dose of Velcade on 3/3/22. - Continue treatment regimen with Velcade 3 week on, 1 week off, Dex 20mg weekly, and monthly Zometa - Achieved sCR as of 01/2023 labs - Above regimen stopped after 4 cycles, switched 2 Q2w Velcade - Patient visited his [place] and remained off treatment for a month - Will continue Velcade SQ Q2W - Discontinue Dex - Continue Zometa q month up to one year (up to December 2023) - Held Zometa as Cr increased to 1.9  - Most recent Cr is 1.6  - Nephrology evaluation  - Restart Zometa until a repeat skeletal survey (scheduled) - Continue ppx medications (Acyclovir) - Follow up in 2 months   # Anemia - Improved. Multifactorial origin including CKD, CKD now improving therefore will trend Hgb further before intervention. - Anemia w/u completed on 11/23/22.- AOCD

## 2024-01-08 LAB
ALBUMIN SERPL ELPH-MCNC: 5.4 G/DL
ALP BLD-CCNC: 47 U/L
ALT SERPL-CCNC: 19 U/L
ANION GAP SERPL CALC-SCNC: 15 MMOL/L
AST SERPL-CCNC: 27 U/L
BILIRUB SERPL-MCNC: 0.4 MG/DL
BUN SERPL-MCNC: 25 MG/DL
CALCIUM SERPL-MCNC: 9.7 MG/DL
CHLORIDE SERPL-SCNC: 107 MMOL/L
CO2 SERPL-SCNC: 20 MMOL/L
CREAT SERPL-MCNC: 1.81 MG/DL
EGFR: 39 ML/MIN/1.73M2
GLUCOSE SERPL-MCNC: 116 MG/DL
LDH SERPL-CCNC: 202 U/L
POTASSIUM SERPL-SCNC: 5.5 MMOL/L
PROT SERPL-MCNC: 8 G/DL
SODIUM SERPL-SCNC: 143 MMOL/L

## 2024-01-09 LAB
ALBUMIN MFR SERPL ELPH: 62.3 %
ALBUMIN SERPL-MCNC: 5.1 G/DL
ALBUMIN/GLOB SERPL: 1.6 RATIO
ALPHA1 GLOB MFR SERPL ELPH: 3.9 %
ALPHA1 GLOB SERPL ELPH-MCNC: 0.3 G/DL
ALPHA2 GLOB MFR SERPL ELPH: 11.4 %
ALPHA2 GLOB SERPL ELPH-MCNC: 0.9 G/DL
B-GLOBULIN MFR SERPL ELPH: 11.1 %
B-GLOBULIN SERPL ELPH-MCNC: 0.9 G/DL
DEPRECATED KAPPA LC FREE/LAMBDA SER: 1.71 RATIO
GAMMA GLOB FLD ELPH-MCNC: 0.9 G/DL
GAMMA GLOB MFR SERPL ELPH: 11.3 %
IGA SER QL IEP: 185 MG/DL
IGG SER QL IEP: 952 MG/DL
IGM SER QL IEP: 32 MG/DL
INTERPRETATION SERPL IEP-IMP: NORMAL
KAPPA LC CSF-MCNC: 1.98 MG/DL
KAPPA LC SERPL-MCNC: 3.38 MG/DL
M PROTEIN SPEC IFE-MCNC: NORMAL
PROT SERPL-MCNC: 8.2 G/DL
PROT SERPL-MCNC: 8.2 G/DL

## 2024-01-12 ENCOUNTER — OUTPATIENT (OUTPATIENT)
Dept: OUTPATIENT SERVICES | Facility: HOSPITAL | Age: 74
LOS: 1 days | Discharge: HOME | End: 2024-01-12

## 2024-01-12 DIAGNOSIS — Z95.5 PRESENCE OF CORONARY ANGIOPLASTY IMPLANT AND GRAFT: Chronic | ICD-10-CM

## 2024-01-12 DIAGNOSIS — C90.00 MULTIPLE MYELOMA NOT HAVING ACHIEVED REMISSION: ICD-10-CM

## 2024-01-19 ENCOUNTER — APPOINTMENT (OUTPATIENT)
Dept: INFUSION THERAPY | Facility: HOSPITAL | Age: 74
End: 2024-01-19

## 2024-01-19 ENCOUNTER — RESULT REVIEW (OUTPATIENT)
Age: 74
End: 2024-01-19

## 2024-01-19 ENCOUNTER — APPOINTMENT (OUTPATIENT)
Dept: HEMATOLOGY ONCOLOGY | Facility: CLINIC | Age: 74
End: 2024-01-19

## 2024-01-19 LAB
BASOPHILS # BLD AUTO: 0.05 K/UL — SIGNIFICANT CHANGE UP (ref 0–0.2)
BASOPHILS NFR BLD AUTO: 1 % — SIGNIFICANT CHANGE UP (ref 0–2)
EOSINOPHIL # BLD AUTO: 0.2 K/UL — SIGNIFICANT CHANGE UP (ref 0–0.5)
EOSINOPHIL NFR BLD AUTO: 4 % — SIGNIFICANT CHANGE UP (ref 0–6)
HCT VFR BLD CALC: 28.7 % — LOW (ref 39–50)
HGB BLD-MCNC: 9.9 G/DL — LOW (ref 13–17)
IMM GRANULOCYTES NFR BLD AUTO: 1.2 % — HIGH (ref 0–0.9)
LYMPHOCYTES # BLD AUTO: 1.5 K/UL — SIGNIFICANT CHANGE UP (ref 1–3.3)
LYMPHOCYTES # BLD AUTO: 29.8 % — SIGNIFICANT CHANGE UP (ref 13–44)
MCHC RBC-ENTMCNC: 30.9 PG — SIGNIFICANT CHANGE UP (ref 27–34)
MCHC RBC-ENTMCNC: 34.5 G/DL — SIGNIFICANT CHANGE UP (ref 32–36)
MCV RBC AUTO: 89.7 FL — SIGNIFICANT CHANGE UP (ref 80–100)
MONOCYTES # BLD AUTO: 0.46 K/UL — SIGNIFICANT CHANGE UP (ref 0–0.9)
MONOCYTES NFR BLD AUTO: 9.1 % — SIGNIFICANT CHANGE UP (ref 2–14)
NEUTROPHILS # BLD AUTO: 2.76 K/UL — SIGNIFICANT CHANGE UP (ref 1.8–7.4)
NEUTROPHILS NFR BLD AUTO: 54.9 % — SIGNIFICANT CHANGE UP (ref 43–77)
NRBC # BLD: 0 /100 WBCS — SIGNIFICANT CHANGE UP (ref 0–0)
PLATELET # BLD AUTO: 128 K/UL — LOW (ref 150–400)
RBC # BLD: 3.2 M/UL — LOW (ref 4.2–5.8)
RBC # FLD: 12.8 % — SIGNIFICANT CHANGE UP (ref 10.3–14.5)
WBC # BLD: 5.03 K/UL — SIGNIFICANT CHANGE UP (ref 3.8–10.5)
WBC # FLD AUTO: 5.03 K/UL — SIGNIFICANT CHANGE UP (ref 3.8–10.5)

## 2024-01-20 DIAGNOSIS — R11.2 NAUSEA WITH VOMITING, UNSPECIFIED: ICD-10-CM

## 2024-01-20 DIAGNOSIS — Z51.11 ENCOUNTER FOR ANTINEOPLASTIC CHEMOTHERAPY: ICD-10-CM

## 2024-01-27 ENCOUNTER — OUTPATIENT (OUTPATIENT)
Dept: OUTPATIENT SERVICES | Facility: HOSPITAL | Age: 74
LOS: 1 days | End: 2024-01-27
Payer: MEDICARE

## 2024-01-27 ENCOUNTER — APPOINTMENT (OUTPATIENT)
Dept: RADIOLOGY | Facility: IMAGING CENTER | Age: 74
End: 2024-01-27
Payer: MEDICARE

## 2024-01-27 DIAGNOSIS — Z95.5 PRESENCE OF CORONARY ANGIOPLASTY IMPLANT AND GRAFT: Chronic | ICD-10-CM

## 2024-01-27 DIAGNOSIS — C90.00 MULTIPLE MYELOMA NOT HAVING ACHIEVED REMISSION: ICD-10-CM

## 2024-01-27 PROCEDURE — 77075 RADEX OSSEOUS SURVEY COMPL: CPT | Mod: 26

## 2024-01-27 PROCEDURE — 77075 RADEX OSSEOUS SURVEY COMPL: CPT

## 2024-02-16 ENCOUNTER — RESULT REVIEW (OUTPATIENT)
Age: 74
End: 2024-02-16

## 2024-02-16 ENCOUNTER — APPOINTMENT (OUTPATIENT)
Dept: INFUSION THERAPY | Facility: HOSPITAL | Age: 74
End: 2024-02-16

## 2024-02-16 ENCOUNTER — APPOINTMENT (OUTPATIENT)
Dept: HEMATOLOGY ONCOLOGY | Facility: CLINIC | Age: 74
End: 2024-02-16

## 2024-02-16 LAB
ALBUMIN SERPL ELPH-MCNC: 4.9 G/DL — SIGNIFICANT CHANGE UP (ref 3.3–5)
ALP SERPL-CCNC: 49 U/L — SIGNIFICANT CHANGE UP (ref 40–120)
ALT FLD-CCNC: 16 U/L — SIGNIFICANT CHANGE UP (ref 10–45)
ANION GAP SERPL CALC-SCNC: 15 MMOL/L — SIGNIFICANT CHANGE UP (ref 5–17)
AST SERPL-CCNC: 43 U/L — HIGH (ref 10–40)
BASOPHILS # BLD AUTO: 0.06 K/UL — SIGNIFICANT CHANGE UP (ref 0–0.2)
BASOPHILS NFR BLD AUTO: 1 % — SIGNIFICANT CHANGE UP (ref 0–2)
BILIRUB SERPL-MCNC: 0.3 MG/DL — SIGNIFICANT CHANGE UP (ref 0.2–1.2)
BUN SERPL-MCNC: 40 MG/DL — HIGH (ref 7–23)
CALCIUM SERPL-MCNC: 10 MG/DL — SIGNIFICANT CHANGE UP (ref 8.4–10.5)
CHLORIDE SERPL-SCNC: 104 MMOL/L — SIGNIFICANT CHANGE UP (ref 96–108)
CO2 SERPL-SCNC: 20 MMOL/L — LOW (ref 22–31)
CREAT SERPL-MCNC: 1.76 MG/DL — HIGH (ref 0.5–1.3)
EGFR: 40 ML/MIN/1.73M2 — LOW
EOSINOPHIL # BLD AUTO: 0.2 K/UL — SIGNIFICANT CHANGE UP (ref 0–0.5)
EOSINOPHIL NFR BLD AUTO: 3.4 % — SIGNIFICANT CHANGE UP (ref 0–6)
GLUCOSE SERPL-MCNC: 106 MG/DL — HIGH (ref 70–99)
HCT VFR BLD CALC: 31.4 % — LOW (ref 39–50)
HGB BLD-MCNC: 10.4 G/DL — LOW (ref 13–17)
IMM GRANULOCYTES NFR BLD AUTO: 0.2 % — SIGNIFICANT CHANGE UP (ref 0–0.9)
LYMPHOCYTES # BLD AUTO: 1.84 K/UL — SIGNIFICANT CHANGE UP (ref 1–3.3)
LYMPHOCYTES # BLD AUTO: 31 % — SIGNIFICANT CHANGE UP (ref 13–44)
MCHC RBC-ENTMCNC: 30.3 PG — SIGNIFICANT CHANGE UP (ref 27–34)
MCHC RBC-ENTMCNC: 33.1 G/DL — SIGNIFICANT CHANGE UP (ref 32–36)
MCV RBC AUTO: 91.5 FL — SIGNIFICANT CHANGE UP (ref 80–100)
MONOCYTES # BLD AUTO: 0.6 K/UL — SIGNIFICANT CHANGE UP (ref 0–0.9)
MONOCYTES NFR BLD AUTO: 10.1 % — SIGNIFICANT CHANGE UP (ref 2–14)
NEUTROPHILS # BLD AUTO: 3.23 K/UL — SIGNIFICANT CHANGE UP (ref 1.8–7.4)
NEUTROPHILS NFR BLD AUTO: 54.3 % — SIGNIFICANT CHANGE UP (ref 43–77)
NRBC # BLD: 0 /100 WBCS — SIGNIFICANT CHANGE UP (ref 0–0)
PLATELET # BLD AUTO: 140 K/UL — LOW (ref 150–400)
POTASSIUM SERPL-MCNC: 5.1 MMOL/L — SIGNIFICANT CHANGE UP (ref 3.5–5.3)
POTASSIUM SERPL-SCNC: 5.1 MMOL/L — SIGNIFICANT CHANGE UP (ref 3.5–5.3)
PROT SERPL-MCNC: 8.1 G/DL — SIGNIFICANT CHANGE UP (ref 6–8.3)
RBC # BLD: 3.43 M/UL — LOW (ref 4.2–5.8)
RBC # FLD: 13.1 % — SIGNIFICANT CHANGE UP (ref 10.3–14.5)
SODIUM SERPL-SCNC: 139 MMOL/L — SIGNIFICANT CHANGE UP (ref 135–145)
WBC # BLD: 5.94 K/UL — SIGNIFICANT CHANGE UP (ref 3.8–10.5)
WBC # FLD AUTO: 5.94 K/UL — SIGNIFICANT CHANGE UP (ref 3.8–10.5)

## 2024-02-17 LAB
ALBUMIN SERPL ELPH-MCNC: 5.1 G/DL
ALP BLD-CCNC: 52 U/L
ALT SERPL-CCNC: 19 U/L
ANION GAP SERPL CALC-SCNC: 14 MMOL/L
AST SERPL-CCNC: 23 U/L
BILIRUB SERPL-MCNC: 0.2 MG/DL
BUN SERPL-MCNC: 34 MG/DL
CALCIUM SERPL-MCNC: 9.8 MG/DL
CHLORIDE SERPL-SCNC: 106 MMOL/L
CO2 SERPL-SCNC: 21 MMOL/L
CREAT SERPL-MCNC: 1.89 MG/DL
EGFR: 37 ML/MIN/1.73M2
GLUCOSE SERPL-MCNC: 111 MG/DL
POTASSIUM SERPL-SCNC: 5.2 MMOL/L
PROT SERPL-MCNC: 7.8 G/DL
SODIUM SERPL-SCNC: 141 MMOL/L

## 2024-03-07 ENCOUNTER — OUTPATIENT (OUTPATIENT)
Dept: OUTPATIENT SERVICES | Facility: HOSPITAL | Age: 74
LOS: 1 days | Discharge: HOME | End: 2024-03-07

## 2024-03-07 DIAGNOSIS — C90.00 MULTIPLE MYELOMA NOT HAVING ACHIEVED REMISSION: ICD-10-CM

## 2024-03-07 DIAGNOSIS — Z95.5 PRESENCE OF CORONARY ANGIOPLASTY IMPLANT AND GRAFT: Chronic | ICD-10-CM

## 2024-03-12 ENCOUNTER — NON-APPOINTMENT (OUTPATIENT)
Age: 74
End: 2024-03-12

## 2024-03-12 ENCOUNTER — APPOINTMENT (OUTPATIENT)
Dept: CARDIOLOGY | Facility: CLINIC | Age: 74
End: 2024-03-12
Payer: MEDICARE

## 2024-03-12 VITALS
WEIGHT: 130 LBS | OXYGEN SATURATION: 98 % | SYSTOLIC BLOOD PRESSURE: 116 MMHG | RESPIRATION RATE: 16 BRPM | TEMPERATURE: 98.3 F | DIASTOLIC BLOOD PRESSURE: 68 MMHG | BODY MASS INDEX: 22.2 KG/M2 | HEIGHT: 64 IN | HEART RATE: 71 BPM

## 2024-03-12 DIAGNOSIS — E78.00 PURE HYPERCHOLESTEROLEMIA, UNSPECIFIED: ICD-10-CM

## 2024-03-12 DIAGNOSIS — I35.0 NONRHEUMATIC AORTIC (VALVE) STENOSIS: ICD-10-CM

## 2024-03-12 PROCEDURE — 93000 ELECTROCARDIOGRAM COMPLETE: CPT

## 2024-03-12 PROCEDURE — 99214 OFFICE O/P EST MOD 30 MIN: CPT

## 2024-03-12 NOTE — DISCUSSION/SUMMARY
[FreeTextEntry1] : In a summary Yoly López is an elderly male with CAD s/p stent, stable. Continue Aspirin. Hypertension, controlled. Continue Amlodipine, Losartan and Metoprolol.  Cut down on salt intake. Hypercholesterolemia, continue Atorvastatin and low cholesterol diet. LDL goal less than 70 g/dL. Aortic stenosis will get Echo to assess LV systolic function and reevaluate Aortic stenosis.   Follow up in 4 months.

## 2024-03-12 NOTE — PHYSICAL EXAM
[Normal Conjunctiva] : normal conjunctiva [No Carotid Bruit] : no carotid bruit [Normal S1, S2] : normal S1, S2 [Soft] : abdomen soft [Non Tender] : non-tender [Normal Bowel Sounds] : normal bowel sounds [No Edema] : no edema [No Cyanosis] : no cyanosis [Normal] : alert and oriented, normal memory [de-identified] : Ejection systolic murmur in 2 nd ICS.

## 2024-03-12 NOTE — REVIEW OF SYSTEMS
[Fever] : no fever [Headache] : no headache [Chills] : no chills [Feeling Fatigued] : not feeling fatigued [Blurry Vision] : no blurred vision [Chest Discomfort] : no chest discomfort [Dyspnea on exertion] : dyspnea during exertion [Lower Ext Edema] : no extremity edema [Orthopnea] : no orthopnea [Palpitations] : no palpitations [PND] : no PND [Abdominal Pain] : no abdominal pain [Nausea] : no nausea [Vomiting] : no vomiting [Heartburn] : no heartburn [Joint Pain] : joint pain [Rash] : no rash [Itching] : no itching [Dizziness] : no dizziness [Tremor] : no tremor was seen [Numbness (Hypoesthesia)] : no numbness [Tingling (Paresthesia)] : no tingling [Confusion] : no confusion was observed [Under Stress] : not under stress [Easy Bleeding] : no tendency for easy bleeding [Easy Bruising] : no tendency for easy bruising [Negative] : Respiratory [FreeTextEntry2] : on and off fatigue.

## 2024-03-12 NOTE — HISTORY OF PRESENT ILLNESS
[FreeTextEntry1] : Yoly López is a 74- year- old male with history of CAD s/p stent, hypertension, Aortic stenosis and hypercholesterolemia comes for follow up visit. Denies any chest pain or palpitations. Mild shortness of breath on exertion especially climbing stairs which is relieved with rest in few minutes. Compliant to medications. Physically active. Follows up with PCP. Getting treatment for Multiple myeloma.

## 2024-03-15 ENCOUNTER — APPOINTMENT (OUTPATIENT)
Dept: INFUSION THERAPY | Facility: HOSPITAL | Age: 74
End: 2024-03-15

## 2024-03-15 ENCOUNTER — RESULT REVIEW (OUTPATIENT)
Age: 74
End: 2024-03-15

## 2024-03-15 ENCOUNTER — APPOINTMENT (OUTPATIENT)
Dept: HEMATOLOGY ONCOLOGY | Facility: CLINIC | Age: 74
End: 2024-03-15
Payer: MEDICARE

## 2024-03-15 LAB
ALBUMIN SERPL ELPH-MCNC: 4.9 G/DL — SIGNIFICANT CHANGE UP (ref 3.3–5)
ALP SERPL-CCNC: 44 U/L — SIGNIFICANT CHANGE UP (ref 40–120)
ALT FLD-CCNC: 17 U/L — SIGNIFICANT CHANGE UP (ref 10–45)
ANION GAP SERPL CALC-SCNC: 10 MMOL/L — SIGNIFICANT CHANGE UP (ref 5–17)
AST SERPL-CCNC: 28 U/L — SIGNIFICANT CHANGE UP (ref 10–40)
BASOPHILS # BLD AUTO: 0.06 K/UL — SIGNIFICANT CHANGE UP (ref 0–0.2)
BASOPHILS NFR BLD AUTO: 1 % — SIGNIFICANT CHANGE UP (ref 0–2)
BILIRUB SERPL-MCNC: 0.5 MG/DL — SIGNIFICANT CHANGE UP (ref 0.2–1.2)
BUN SERPL-MCNC: 31 MG/DL — HIGH (ref 7–23)
CALCIUM SERPL-MCNC: 9.6 MG/DL — SIGNIFICANT CHANGE UP (ref 8.4–10.5)
CHLORIDE SERPL-SCNC: 106 MMOL/L — SIGNIFICANT CHANGE UP (ref 96–108)
CO2 SERPL-SCNC: 23 MMOL/L — SIGNIFICANT CHANGE UP (ref 22–31)
CREAT SERPL-MCNC: 2.05 MG/DL — HIGH (ref 0.5–1.3)
EGFR: 33 ML/MIN/1.73M2 — LOW
EOSINOPHIL # BLD AUTO: 0 K/UL — SIGNIFICANT CHANGE UP (ref 0–0.5)
EOSINOPHIL NFR BLD AUTO: 0 % — SIGNIFICANT CHANGE UP (ref 0–6)
GLUCOSE SERPL-MCNC: 113 MG/DL — HIGH (ref 70–99)
HCT VFR BLD CALC: 29 % — LOW (ref 39–50)
HGB BLD-MCNC: 10 G/DL — LOW (ref 13–17)
IGA FLD-MCNC: 184 MG/DL — SIGNIFICANT CHANGE UP (ref 84–499)
IGG FLD-MCNC: 935 MG/DL — SIGNIFICANT CHANGE UP (ref 610–1660)
IGM SERPL-MCNC: 26 MG/DL — LOW (ref 35–242)
KAPPA LC SER QL IFE: 3.32 MG/DL — HIGH (ref 0.33–1.94)
KAPPA/LAMBDA FREE LIGHT CHAIN RATIO, SERUM: 1.69 RATIO — HIGH (ref 0.26–1.65)
LAMBDA LC SER QL IFE: 1.97 MG/DL — SIGNIFICANT CHANGE UP (ref 0.57–2.63)
LYMPHOCYTES # BLD AUTO: 1.42 K/UL — SIGNIFICANT CHANGE UP (ref 1–3.3)
LYMPHOCYTES # BLD AUTO: 23 % — SIGNIFICANT CHANGE UP (ref 13–44)
MCHC RBC-ENTMCNC: 30.7 PG — SIGNIFICANT CHANGE UP (ref 27–34)
MCHC RBC-ENTMCNC: 34.5 G/DL — SIGNIFICANT CHANGE UP (ref 32–36)
MCV RBC AUTO: 89 FL — SIGNIFICANT CHANGE UP (ref 80–100)
MONOCYTES # BLD AUTO: 0.62 K/UL — SIGNIFICANT CHANGE UP (ref 0–0.9)
MONOCYTES NFR BLD AUTO: 10 % — SIGNIFICANT CHANGE UP (ref 2–14)
NEUTROPHILS # BLD AUTO: 4.08 K/UL — SIGNIFICANT CHANGE UP (ref 1.8–7.4)
NEUTROPHILS NFR BLD AUTO: 66 % — SIGNIFICANT CHANGE UP (ref 43–77)
NRBC # BLD: 0 /100 WBCS — SIGNIFICANT CHANGE UP (ref 0–0)
NRBC # BLD: SIGNIFICANT CHANGE UP /100 WBCS (ref 0–0)
PLAT MORPH BLD: NORMAL — SIGNIFICANT CHANGE UP
PLATELET # BLD AUTO: 141 K/UL — LOW (ref 150–400)
POTASSIUM SERPL-MCNC: 4.4 MMOL/L — SIGNIFICANT CHANGE UP (ref 3.5–5.3)
POTASSIUM SERPL-SCNC: 4.4 MMOL/L — SIGNIFICANT CHANGE UP (ref 3.5–5.3)
PROT SERPL-MCNC: 7.5 G/DL — SIGNIFICANT CHANGE UP (ref 6–8.3)
PROT SERPL-MCNC: 7.8 G/DL — SIGNIFICANT CHANGE UP (ref 6–8.3)
RBC # BLD: 3.26 M/UL — LOW (ref 4.2–5.8)
RBC # FLD: 13.3 % — SIGNIFICANT CHANGE UP (ref 10.3–14.5)
RBC BLD AUTO: SIGNIFICANT CHANGE UP
SODIUM SERPL-SCNC: 140 MMOL/L — SIGNIFICANT CHANGE UP (ref 135–145)
WBC # BLD: 6.18 K/UL — SIGNIFICANT CHANGE UP (ref 3.8–10.5)
WBC # FLD AUTO: 6.18 K/UL — SIGNIFICANT CHANGE UP (ref 3.8–10.5)

## 2024-03-15 PROCEDURE — 99213 OFFICE O/P EST LOW 20 MIN: CPT

## 2024-03-15 NOTE — RESULTS/DATA
[FreeTextEntry1] : 3/15/2024 WBC 6.18 Hgb 10 Platelets 141 Most recent CMP 2/16- Cr 1.76 Most recent MICAH 1/9 No monoclonal bands Improved KFLC and KL ratio  Skeletal survey PROCEDURE DATE:  01/27/2024 INTERPRETATION:  CLINICAL INDICATION: myeloma on Velcade; surveillance  EXAM: A skeletal survey was performed including images of the calvarium, spinal column, chest, pelvis, and both upper and lower extremities on 1/27/2024 at 1342. No prior skeletal survey available for comparison. Compared to appearance of relevant osseous structures on spine MRI from 9/27/2022.  FINDINGS/ IMPRESSION: Myelomatous lucencies with slight endosteal scalloping in proximal left femoral diaphysis subtrochanteric region, may slightly increase risk for pathologic fracture.  Old left superior and inferior pubic rami fracture deformities. Otherwise no current upper or lower extremity fractures.  No vertebral compression fractures.  Additional incidentally observed findings on the survey include: Multiple old bilateral rib fracture deformities. Multilevel spinal degenerative change.  --- End of Report ---   1/5/2023 Today's CBC: Improved H/H (10.8)  Stable platelets (147K) and WBC As of last visit MICAH: No M-spike  No M-band Normal IGs K 4.54 Normal L FLCR 2.42 Normal LDH   10/27/2023 Last visit labs  Normal range MICAH, FLCR  Stable and mild anemia and thrombocytopenia noted  8/18/2023 HGB 10.1 Cr: pending.  MICAH (8/4/2023) No M-spike, no M-band  KFLC 3.7 LFLC 2.45 FLCR 1.51 (Normal)     3/9/2023 Most recent labs Hgb 9.4  Plt 141  M-spike: unable to quantitate K 4.0 L 1.89 K/.L 2.12   1/20/2023  Most recent myeloma labs from 12/23/2022  IgG Kappa disease  No M-spike, no M-band  IgG 931 IgA 132 IgM 32 K 2.62 L 1.45 K/L 1.79   Most recent CBC from 1/12/2023 Mild anemia (10.8 and thrombocytopenia (135K)   1/19/2022 Stable myeloma labs with normal range values.Mild high Lappa lambda with normal ratio is likely due to his renal failure.  MIld to moderate anemia is stable.   9/1/2021 Last PCN labs in 06/2021 showed no M-spike, M-band, Normal FLCR.  6/9/2021 Last Myeloma labs in 03/2021 showed no M-spike, M-band, Normal FLCR.   1/25/2021 Most recent SPEP / MICAH (1/8/2021) No monoclonal band   ------------------------------------------------------------------------------------------   PET/CT Whole Body Oncology FDG             Final  No Documents Attached       EXAM:  PETCT WB ONC FDG SUBS   PROCEDURE DATE:  05/07/2020    INTERPRETATION:  PROCEDURE:  PET/CT WHOLE BODY  RADIOPHARMACEUTICAL:  9.97 mCi F-18, FDG, I.V.  CLINICAL INFORMATION:  70-year-old male referred for follow-up of multiple myeloma on Zometa. Bone marrow biopsy done 3/4/2020 showed less than 5% plasma cells and repeat myeloma labs done on 3/9/2020, showed CT are. PET/CT is done as part of the subsequent treatment strategy evaluation.  TECHNIQUE:  Fasting blood sugar measured prior to injection of radiopharmaceutical was 116 mg/dl. Following intravenous injection of the radiopharmaceutical and an uptake period of approximately 60 minutes, FDG-PET/CT was obtained on a  [employer] 710 from the vertex of the skull to the toes. Oral contrast was given during the uptake period. CT was performed during shallow respiration. The CT protocol was optimized for PET attenuation correction and to provide anatomic detail for localization of PET abnormalities. The CT protocol was not designed to produce and cannot replace state-of-the-art diagnostic CT images with specific imaging protocols for different body parts and indications. Images were reviewed on a dedicated workstation using multiplanar reconstruction.  The standardized uptake values (SUV) are normalized to patient body weight and indicate the highest activity concentration (SUVmax) in a given disease site. All image numbers refer to the axial image number.  COMPARISON:  FDG PET/CT dated 9/7/2019.  OTHER STUDIES USED FOR CORRELATION: MRI of cervical spine dated 9/9/2019.  FINDINGS:  HEAD/NECK: Physiologic FDG activity in the brain.  Mild hypermetabolism in left masseter muscle, decreased as compared to prior study, may be secondary to bruxism. Elongated focus of increased FDG activity in left lower cervical region may reflect inflammation or muscle spasm (image 77).  New hypermetabolic lucency in left maxillary alveolus is nonspecific (SUV 8.0; image 53). Recommend correlation with dental exam. A new small hypermetabolic focus is seen in the right maxilla (SUV 3.7; image 55).  CHEST: Few new small hypermetabolic foci in bilateral hilar region likely correspond to small lymph nodes that are difficult to delineate on CT. For reference, left perihilar region demonstrates SUV 3.6 (image 117). Resolution of FDG-avid subcentimeter left posterior paraesophageal lymph node.  LUNGS: No abnormal FDG activity. Calcified right lower lobe granuloma, unchanged (image 128).  PLEURA/PERICARDIUM: No abnormal FDG activity. No effusion.  HEPATOBILIARY/PANCREAS: Physiologic FDG activity. Liver SUV mean is 2.1; previous 2.3.  SPLEEN: No abnormal FDG activity. Normal in size.  ADRENAL GLANDS: No abnormal FDG activity. No nodule.  KIDNEYS/URINARY BLADDER: Physiologic FDG activity. Multiple bilateral renal cysts, measuring up to 4.6 cm on the right, unchanged.  PELVIC ORGANS: No abnormal FDG activity.  ABDOMINOPELVIC NODES: No enlarged or FDG avid lymph node  BOWEL/PERITONEUM/MESENTERY: Physiologic FDG activity. Resolution of hypermetabolism in distal esophagus/GE junction.  BONES: Near total resolution of previously seen hypermetabolic foci in the axial skeleton, right humerus, and proximal bilateral femurs. Previously seen lytic lesions demonstrate new areas of sclerosis on CT. For reference, one of the most FDG-avid residual lesions is a mixed lytic and sclerotic lesion in the left scapula which demonstrates SUV 3.0 (image 98); previously lytic with SUV 5.7. Previously seen hypermetabolic lesion in right sphenoid bone has resolved. Previously seen large destructive lesion in the manubrium the sternum demonstrates new sclerosis with SUV 3.0 (image 103); previous SUV 5.3. Resolution of hypermetabolic intramedullary soft tissue in proximal right humerus. Near total resolution of hypermetabolic intramedullary soft tissue in proximal left femur (SUV 1.3; image 273; previous SUV 5.3).  FDG-avid fracture in left inferior pubic ramus demonstrates increased callus formation and is similar in metabolism (SUV 3.8; image 250; previous SUV 3.1). Few new mildly FDG-avid bilateral rib fractures. For reference, fracture in the anterior aspect of the left second rib demonstrates SUV 3.4 (image 105).  Resolution of hypermetabolism in left palmar musculature.  IMPRESSION:  Abnormal whole body FDG-PET/CT scan.  1. Near total resolution of hypermetabolism associated with lytic and intramedullary lesions in the axial and appendicular skeleton as compared to prior study dated 9/7/2019. Previously seen lytic lesions demonstrate new sclerosis. Findings are compatible with response to interval therapy.  2. Few new mildly FDG-avid bilateral rib fractures. An FDG-avid fracture in the left inferior pubic ramus demonstrates increased callus formation and is similar in metabolism.  3. New hypermetabolic lucency in left maxillary alveolus and new small hypermetabolic focus in right maxilla are nonspecific. Recommend correlation with dental exam.  4. Few new mildly FDG-avid nonspecific bilateral hilar lymph nodes.  5. Resolution of many specific hypermetabolism in distal esophagus/GE junction.                LUANA RAMOS M.D., NUCLEAR MEDICINE ATTENDING This document has been electronically signed. May  7 2020  4:08PM      Ordered by: OZZY ALMEIDA       Collected/Examined: 07May2020 12:57PM        Verified by: OZZY ALMEIDA 11May2020 09:27AM         Result Communication: Call patient with results,Discussed results with patient; Stage: Final         Performed at: [facility]       Resulted: [id number] 04:11PM       Last Updated: 11May2020 09:27AM       Accession: [id number]           Pathology             Final  No Documents Attached       [facility] Accession Number : [id number]  HARDEEP, SUBASH                          2    Cytopathology Report      Final Diagnosis SOFT TISSUE, SACRUM, CT GUIDED CORE BIOPSY Plasma cell neoplasm See note and description.  Diagnostic note:  Overall the morphologic and immunophenotypic findings are consistent with plasma cell neoplasm. The differential diagnosis includes plasmacytoma vs plasma cells myeloma. Correlation with laboratory, radiologic and clinical findings is required for further classification.  Microscopic description: The touch prep slides are composed of numerous enlarged plasma cells with prominent nucleoli, occasional binucleated forms are seen.  Histologic sections consist of multiple needle shaped cores of soft tissue showing proliferation of enlarged plasma cells with prominent nucleoli, forming sheets.  Immunohistochemistry:  , kappaISH, lambdaISH, cyclinD1, p53 stains performed on paraffin embedded section of block 1C.  Neoplastic cells are: Positive:  , KappaISH, p53 Negative:  LambdaISH, cyclinD1  Flow cytometry analysis (12-US-): Monotypic plasma cells (24.1% of total analyzed cells) are present. There are two aberrant monoclonal plasma cell populations: Population #1 (16.5% of total analyzed cells, 68.6% of plasma cell population) positive for cytoplasmic kappa, CD38 dimmer, CD45 dimmer, CD56, ; negative for , CD19, CD20. Population # 2 (7.6% of total analyzed cells, 31.4% of plasma cell population) positive for cytoplasmic kappa       HARDEEP, SUBASH                          2    Cytopathology Report     (brighter), , CD38 brighter, CD45 dim, CD56 (brighter), ; negative for CD19, CD20.  Screened by: Jose Juan LARA([facility]) Verified by: Orlando Spicer MD (Electronic Signature) Reported on: 08/22/19 13:22 EDT, [address] [place] [id number] Cytology technical processing performed at [address] [place] [id number] _________________________________________________________________   Specimen(s) Submitted SOFT TISSUE, SACRUM, CT GUIDED CORE BIOPSY   Statement of Adequacy Immediate cytologic study for adequacy of specimen using a Diff- Quik stain was performed at [facility] - [address] [place] [address], [place], [place]. Touch imprints acceptable for further evaluation by Jose Juan LARA([facility]).   Clinical Information Sacral mass.   Gross Description Core Biopsy: Tissue collected: Specimen container has been inspected to confirm patient?s name and date of birth, contains 3  tan cores measuring 1.0, 1.0, 0.8 cm in length (and multiple fragments). Entire specimen submitted in 2 cassette(s) labeled 1B and 1 C.  4 Touch prep slides ( 2 air dried + 2 fixed)  FNA: No material submitted for cell block (No block 1A)  Prepared: 4 Touch Prep, 1 core biopsy labeled 1B 1 core biopsy labeled 1C 6 Total slides      Ordered by: DELORES MOSS       Collected/Examined: [id number] 03:14PM        Verified by: OZZY ALMEIDA [id number] 03:07PM         Result Communication: No patient communication needed at this time; Stage: Final         Performed at: [facility]       Resulted: [id number] 01:22PM       Last Updated: 18Jun2020 03:07PM       Accession: [id number]           Pathology             Final  No Documents Attached       [facility] Accession Number : [id number]  RASHID MEIER                          4    Addendum Report     Hematopathology Addendum Comprehensive Hematopathology Report  Final Diagnosis: 1, 2. Bone marrow biopsy and bone marrow aspirate - Plasma cell myeloma (10% with focal 25% by  stain) - Slight erythroid predominant trilineage hematopoiesis with maturation  Diagnostic Note: Please note findings of a normal male karyotype and a negative multiple myeloma FISH panel. Based on the additional findings, the diagnosis has not changed. Correlation with studies for myeloma-related organ dysfunction is necessary.  Morphology: Microscopic description: 1. Biopsy: Sections of bone marrow biopsy show normocellularity (60 to 70% cellularity), mild plasmacytosis with foci of small clusters, slight erythroid predominant trilineage hematopoiesis with maturation, mild megakaryocytosis with normal morphology, and increased iron stores. 2. Aspirate: Cellular spicules are not present, precluding differential count. Review of the smear shows some maturing and predominant mature myeloid cells, a few erythroid elements, and rare megakaryocytes.  Ancillary Studies: Bone marrow aspirate iron stain: No spicules are present to evaluate for iron stores and there are insufficient nRBC to evaluate for ring sideroblasts. Congo red stain is negative for amyloidosis. Flow cytometry:  Monotypic plasma cells (1% of cells), positive for cytoplasmic kappa, CD38, , dimmer CD45, partial CD56; negative CD19, CD20, . CD45/side scatter shows no significant blast population. There is no increase in CD34,  or CD14 positive cells. Lymphocytes (8% of cells) show a heterogeneous population of T- cells (with normal CD4 to CD8 ratio), and polytypic B-cells. Immunohistochemical stains ( performed on the block 1A and 1B, cyclin-D1 performed on block 1A):   stains show overall approximately 10% plasma cells, with foci of small clusters, focal up to 25%. Plasma cells are negative for cyclin-D1.  Cytogenetics: Result: Normal male karyotype Karyotype: 46,XY[20]       HARDEEP, SUBASH                          4    Addendum Report     FISH: Result: NORMAL FISH - - MULTIPLE MYELOMA PANEL Probe(s) and Location(s): FGFR3(4p16), CCND1(11q13), RB1(13q14), IGH(14q32), MAF(16q23), TP53(17p13.1)  Clinical History/Data: New diagnosis multiple myeloma CBC on 08/24/2019: WBC: 8.09 K/uL, HGB: 9.5 g/dL, MCV: 90 fl, Plt: 227 K/uL Serum Immunofixation: IgG kappa  Verified by: Nancy Farris M.D. (Electronic Signature) Reported on: 09/07/19 10:37 EDT, [address] [place] [address], [place] [address] _________________________________________________________________   Hematopathology Report     Final Diagnosis 1, 2. Bone marrow biopsy and bone marrow aspirate - Plasma cell myeloma (10% with focal 25% by  stain) - Slight erythroid predominant trilineage hematopoiesis with maturation  See note and description.  Diagnostic note: Correlation with studies for myeloma-related organ dysfunction is necessary. Comprehensive report with results of pending ancillary studies to follow.  Ancillary studies Bone marrow aspirate iron stain: No spicules are present to evaluate for iron stores and there are insufficient nRBC to evaluate for ring sideroblasts.  Flow cytometry:  Monotypic plasma cells (1% of cells), positive for cytoplasmic kappa, CD38, , dimmer CD45, partial CD56; negative CD19, CD20, . CD45/side scatter shows no significant blast population. There is no increase in CD34,  or CD14 positive cells.       HARDEEP, SUBASH                          4    Hematopathology Report     Lymphocytes (8% of cells) show a heterogeneous population of T- cells (with normal CD4 to CD8 ratio), and polytypic B-cells.  Immunohistochemical stains ( performed on the block 1A and 1B, cyclin-D1 performed on block 1A):   stains show overall approximately 10% plasma cells, with foci of small clusters, focal up to 25%. Plasma cells are negative for cyclin-D1.  Microscopic description: 1. Biopsy: Sections of bone marrow biopsy show normocellularity (60 to 70% cellularity), mild plasmacytosis with foci of small clusters, slight erythroid predominant trilineage hematopoiesis with maturation, mild megakaryocytosis with normal morphology, and increased iron stores.  2. Aspirate: Cellular spicules are not present, precluding differential count. Review of the smear shows some maturing and predominant mature myeloid cells, a few erythroid elements, and rare megakaryocytes.  Comment:  Iron stain (examined to evaluate for iron stores; see microscopic description) and Giemsa stain (shows appropriate staining pattern) are performed and evaluated on block(s): 1A, 1B.  Slide(s) with built in immunohistochemical study control(s) associated and negative control with this case has/have been verified by the sign out pathologist. For slide(s) without built in controls positive control slides has/have been reviewed and approved by immunohistochemistry lab These immunohistochemical/ in-situ hybridization tests have been developed and their performance characteristics determined by [facility], Department of Pathology, Division of Immunopathology, [address] [place] [address], [place] [address].  It has not been cleared or approved by the U.S. Food and Drug Administration.  The FDA has determined that such clearance or approval is not necessary.  This test is used for clinical purposes.  The laboratory is certified under the CLIA-88 as qualified to perform high complexity clinical testing.  Verified by: Nancy Farris M.D. (Electronic Signature) Reported on: 08/27/19 10:00 EDT, [address] [place] [id number] _________________________________________________________________        HARDEEP, SUBASH                          4    Hematopathology Report     Clinical History New diagnosis multiple myeloma CBC on 08/24/2019: WBC: 8.09 K/uL, HGB: 9.5 g/dL, MCV: 90 fl, Plt: 227 K/uL Serum Immunofixation: IgG kappa  Specimen(s) Submitted 1     Bone marrow biopsy left 2     Bone marrow aspirate  Gross Description 1. The specimen is received in bouin's fixative and the specimen container is labeled: Left bone marrow biopsy.  It consists of a 0.5 x 0.2 cm bone marrow core with a 1.5 x 1.3 x 0.5 cm blood clot.  Entirely submitted.  Two cassettes: A = bone marrow core; B = blood clot. 2. The specimen is received labeled with patient's name and consists of 3 air dried slide(s). 2 slide(s) stained with Monzon Giemsa stain. 1 stained for iron stain. In addition to other data that may appear on the specimen container, the label has been inspected to confirm the presence of the patient's name and date of birth. Catarina Torres 08/22/2019 17:12      Ordered by: BECKY BILLS       Collected/Examined: [id number] 03:50PM        Verified by: BECKY BILLS [id number] 05:42PM         Result Communication: No patient communication needed at this time; Stage: Final         Performed at: [facility]       Resulted: [id number] 10:37AM       Last Updated: [id number] 05:42PM       Accession: [id number]

## 2024-03-15 NOTE — ASSESSMENT
[FreeTextEntry1] : 74 year-old Mr. MEIER is seen in follow up for IgG Kappa Multiple Myeloma presented with lytic percy lesions, diagnosed in 9/2019. He received XRT to percy lesions,was started on RVD induction, achieved VGPR. Last PET 05/2020 - markedly improved and reduced FDG avidity throughout. His most recent SPEP/MICAH are normal, and FLCR has normalized as well. He is in sCR. Held Zometa for increased Cr to 1.9. Skeletal survey in Jan 2024 revealed myelomatous lucencies with slight endosteal scalloping in prox lef fem diaphysis which may inc risk for pathologic fx. Will need to continue Zometa monthly. Stable PCN labs.  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 - MRI T-spine (9/25/22): abnormal signal involving T8 vertebral body which may be related to multiple myeloma - Repeat T-spine MRI in 3 months ~3/2/2023, to re-assess previous findings as there was no definitive evidence of myeloma involvement. Will repeat in 3 months from 12/2/22 as he has not received any myeloma treatment since his last dose of Velcade on 3/3/22. - Continue treatment regimen with Velcade 3 week on, 1 week off, Dex 20mg weekly, and monthly Zometa - Achieved sCR as of 01/2023 labs - Above regimen stopped after 4 cycles, switched 2 Q2w Velcade - Patient visited his [place] and remained off treatment for a month - Will continue Velcade SQ Q2W - Discontinue Dex - Continue Zometa q month up to one year (up to December 2023) - Held Zometa as Cr increased to 1.9  - Hold Zometa for Cr clearance <30  - Nephrology evaluation  - Skeletal survey (Jan 2024) - Myelomatous lucencies with slight endosteal scalloping in prox lef fem diaphysis which may inc risk for pathologic fx ->> continue Zometa q2 months - CMP a week prior to Zometa incase it needs to be held due to low Cr clearance - Obtain CMP, LDH, MICAH monthly - Pending CMP, LDH, MICAH - Continue ppx medications (Acyclovir) - Follow up in 2 months  # Anemia - Improved. Multifactorial origin including CKD, CKD now improving therefore will trend Hgb further before intervention - Anemia w/u completed on 11/23/22 - AOCD  - Stable  RTC in 2 months Case and management discussed w/ Dr. Bailey

## 2024-03-15 NOTE — HISTORY OF PRESENT ILLNESS
[Disease:__________________________] : Disease: [unfilled] [Treatment Protocol] : Treatment Protocol [de-identified] : As per Dr Kilgore's note on 8/18/2020\par  \par  "Mr Rand was referred to my office for newly diagnosed IgG kappa multiple myeloma. The patient's primary language is Vietnamese, he has his daughter Mirlande as the , per his wishes. He was seen by Dr. Andrés Valle (hematology) in Feb 2019 and had a BM bx showing 10-15% plasma cells, concerning for smoldering myeloma. According to the daughter, he was awaiting insurance approval for imaging studies. He was admitted from 8/18/19 at [facility] where he presented with pain in the L leg/lower back, worsening for the past 2-3 months. On labs, he was found to have a total protein of 10, Ca level 12 until 8/29/19. Dental consult was called and patient needs to have 10 teeth extracted -thus, IV bisphosphonates were not given, pt improved with hydration. He also had a slightly inc'ed creatinine -improved after IVF. CT C/A/P 8/18/19 showed a lytic expansile soft tissue lesion centered in the manubrium measuring approximately 7.3 x 3.4 x 4.3 cm, invading both 1st ribs. There was also a A lytic expansile soft tissue lesion in the T8 vertebral body causing mass effect on spinal canal and obliterating left neural foramina at T7-8 and T8-9. ON 8/22/19 an MRI thoracic spine was done showing multiple areas of tumor involvement within the thoracic spine in keeping with the patient's history of multiple myeloma. Prominent lesion within C7 on the right incompletely seen.\par  Large lesion within T8 on the left producing epidural tumor extension and moderate narrowing of the spinal canal with mild flattening of the spinal cord. He was evaluated on 8/22/19 by Dr. Foley (Rad Onc) and was given 5 fractions of XRT from 8/23/19 to 8/29/19 to C7 and T8. He was discharged home on 8/29/19 with follow up in the outpatient office, and on Dexamethasone 4mg po daily. \par  \par   \par  \par  \par  \par  Interval History: The patient is being followed for IgG kappa MM with multiple bone lytic lesions, hypercalcemia (resolved) and mild anemia. He started Velcade/Dexa weekly on 9/919. The patient got the Revlimid and started it C1 day 1 on 10/21/19 and hed been tolerating it well. \par  \par  Starting in February, his blood counts became low -plt count 54, Hb 8.3 wbc 5.8. Revlimid was held -after 2/3/20. He got 1 shot Velcade/Dexa on 2/3/20, NO chemo since then. \par  BM done on 3/4/20 to eval for residual disease (MM) vs. MDS. BM showed recovering marrow elements, plasma cells <5%. SPEP/MICAH from 3/9/20 showed NO monoclonal proteins, c/w CR. \par  \par  The PET scan from 5/7/20 showed L uptake maxillary -pt reports that he had dentures done in Jan 2020 and has discomfort from them. He did not go to the dentist during COVID Rib fractures -pt denied falls/trauma. Patient has dental appointment for next week. He has some discomfort in the upper teeth b/l. He also missed his Velcade SQ last week 'i did not know if i am supposed to continue it.' He has no neuropathy from it, tolerating well. "\par   [de-identified] : RISS- II [de-identified] : Oct 6, 2020 Pt is here for initial visit with me. He has his dtr on the phone, prefers her included in conversation and declined an . Pt is feeling well. He is tolerating monthly zometa and maintenance velcade w/o any adverse effects. Last set of myeloma labs done in August showed stable remission. He is requesting refill of several meds today. ---------------------------------  1/25/2021 Patient presents for a follow up. He has no complaints except infrequent short duration chills with no fever. He is on biweekly Velcade and monthly Zometa. His last treatment of Zometa was on 1/8/2021 and last Velcade on 1/22/2021. His most recent SPEP/MICAH was normal.     6/10/2021 Patient seen in follow up. He did not go to his home country as he planned. He does not endorse any big change in his health status. She saw his Kidney doctor who told him he is iron deficient (labs are not supportive). His anemia is multifactorial including CKD. His most recent myeloma labs (03/2021) show continued CR.   9/1/2021 Mr Rand returns for a follow up. His daughter is accompanying him. He has no complaints. He reports that he develops diarrhea (one motion) the day after his Velcade treatment. He plans to visit his home town soon. He has some unavoidable things to take care of. Of note he has been in sCR.   1/19/2022 Patient seen in follow up. He has done well in the interim. He also visited his home country in this interval. He was safe all along.  As regards to his disease, he is stable and appears to be in remission. He has no complaints.    11/23/2022 Patient presents to clinic after being lost to follow-up; went to his country for an extended visit from 3/10 - 9/8/2022. Was on maintenance velcade [id number] starting 6/19/2020, last dose was on 3/3/22. Was on monthly Zometa since 11/2019, last dose of zometa was on 2/17/2022. In the interim, he developed severe weakness and was hospitalized at [facility] (9/24-10/11/22) for Acute respiratory failure with hypoxia, and severe sepsis due to GBS. Was discharged to rehab, and as of yesterday he was discharged from rehab. Will now be doing PT at home. During inpatient work up, he was found to have diffuse lytic lesions noted in C/T/L spine CT scans (9/25/22) which were identified as "Multiple mixed lytic sclerotic lesions may be sequela of previously treated multiple myeloma". MRI of the C/T/L spine were done for follow up revealing an "abnormal signal involving T8 vertebral body which may be related to multiple myeloma". Was seen by neurosurgery, no acute intervention. In patient immunofixation did not reveal a monoclonal band; SPEP WNL; kappa/lambda ratio not diagnostic. Today he feels at baseline, but notes residual weakness from GBS. Patient denies bone pain, fever, chills, night sweats, back pain, headache, or peripheral neuropathy. Good appetite, stable weight.  12/23/2022 Follow up. Today he feels at baseline, but notes residual weakness from GBS. Today is C1D22 of Velcade and dexamethasone since restarting it; he is tolerating the medications well. He has some questions requesting clarification on Acyclovir and dexamethasone, otherwise no other concerns. Patient denies bone pain, fever, chills, night sweats, back pain, headache, or peripheral neuropathy. Good appetite, stable weight.   1/20/2023  Patient seen in follow up for multiple myeloma. He was on maintenance Velcade. He went too his home country and missed treatment for more than two months. His disease progressed as noted upon return. He had new percy lesion. He was again started on the same induction regimen. He is also getting Zometa monthly. He has now achieved VGPR having only mild high Kappa, no M-spike/band. He offers no complaints. Denies any infection, fever, bone pain, weigh loss.    2/16/2023 Patient presents for a follow up. He missed his last treatment as he was having cough body ache. He saw his PCP and took a course of Azithromycin. His symptoms relieved. He is feeling well now. His treatment schedule for tomorrow (2/17/2023). Of note, his myeloma labs from 01/2023 are suggestive of sCR.    3/9/2023 Patient presents for a follow up. He has done well in the interim. His cough has resolved. He plans to take a chemo vacation for a month as he plans to visit his home country. He will finish his C4 treatment tomorrow, following which he will continue on maintenance therapy which can be started after he comes back. Of note he again as achieved a very good partial response.   5/19/2023 Patient returns for a follow up. He has missed 2 cycles of treatment as he went back to his home country. His last paraprotein labs are from 03/2023 and they are WNL including FLCR suggesting sCR.  He has no complaints.   8/18/2023 Patient presents for a follow up. He is in CR. He is on Q2W Velate and QM Zometa. However, his Zometa has been held this month as his Cr has increased to 1.9 from 1.4. He has been asked to see a nephrologist. He is physically well and offers no complaints.    10/27/2023 Patient seen in follow up for MM in remission. He is on Velcade maintenance. His Zometa (restarted after recurrence of disease) has been discontinued as his creatinine was increasing. His MICAH was normal and his FLCR is WNL.   1/5/2024 Patient returns for a follow up. He is in stable health. Continuing Q2W maintenance Velcade and monthly Zometa. A repeat skeletal survey has been ordered but not scheduled yet. His M-spike and and M-band are not detectable, however touch high Kappa is present likely due to CKD.  He has no complaints of bone pain and has no other issues.  3/15/2024 Patient seen in the tx room for a follow up visit. He is in good health. He is supposed to be on Velcade q2 weeks however has scheduled himself to receive it every month instead. Zometa monthly continued. We are monitoring his Cr clearance before administering Zometa. Repeat skeletal survey on Jan 2024 revealed myelomatous lucencies with slight endosteal scalloping in prox lef fem diaphysis which may inc risk for pathologic fx. Pending MICAH ad CMP today. Denies fevers, night sweats, unintentional weight loss, bone pain  [FreeTextEntry1] : s/p RT to C7/T8 spine\par  RVD 9/2019 -6/2020 transitioned to maintenance velcade q2wk started 6/19/2020

## 2024-03-18 DIAGNOSIS — R11.2 NAUSEA WITH VOMITING, UNSPECIFIED: ICD-10-CM

## 2024-03-19 LAB
% ALBUMIN: 62.7 % — SIGNIFICANT CHANGE UP
% ALPHA 1: 3.8 % — SIGNIFICANT CHANGE UP
% ALPHA 2: 10.9 % — SIGNIFICANT CHANGE UP
% BETA: 11.4 % — SIGNIFICANT CHANGE UP
% GAMMA: 11.2 % — SIGNIFICANT CHANGE UP
ALBUMIN SERPL ELPH-MCNC: 4.7 G/DL — SIGNIFICANT CHANGE UP (ref 3.6–5.5)
ALBUMIN/GLOB SERPL ELPH: 1.7 RATIO — SIGNIFICANT CHANGE UP
ALPHA1 GLOB SERPL ELPH-MCNC: 0.3 G/DL — SIGNIFICANT CHANGE UP (ref 0.1–0.4)
ALPHA2 GLOB SERPL ELPH-MCNC: 0.8 G/DL — SIGNIFICANT CHANGE UP (ref 0.5–1)
B-GLOBULIN SERPL ELPH-MCNC: 0.9 G/DL — SIGNIFICANT CHANGE UP (ref 0.5–1)
GAMMA GLOBULIN: 0.8 G/DL — SIGNIFICANT CHANGE UP (ref 0.6–1.6)
INTERPRETATION SERPL IFE-IMP: SIGNIFICANT CHANGE UP
PROT PATTERN SERPL ELPH-IMP: SIGNIFICANT CHANGE UP
PROT SERPL-MCNC: 7.5 G/DL — SIGNIFICANT CHANGE UP (ref 6–8.3)

## 2024-03-26 ENCOUNTER — APPOINTMENT (OUTPATIENT)
Dept: CARDIOLOGY | Facility: CLINIC | Age: 74
End: 2024-03-26
Payer: MEDICARE

## 2024-03-26 ENCOUNTER — RESULT REVIEW (OUTPATIENT)
Age: 74
End: 2024-03-26

## 2024-03-26 DIAGNOSIS — I10 ESSENTIAL (PRIMARY) HYPERTENSION: ICD-10-CM

## 2024-03-26 PROCEDURE — 93306 TTE W/DOPPLER COMPLETE: CPT

## 2024-03-29 ENCOUNTER — APPOINTMENT (OUTPATIENT)
Dept: HEMATOLOGY ONCOLOGY | Facility: CLINIC | Age: 74
End: 2024-03-29

## 2024-03-29 ENCOUNTER — RESULT REVIEW (OUTPATIENT)
Age: 74
End: 2024-03-29

## 2024-03-29 ENCOUNTER — APPOINTMENT (OUTPATIENT)
Dept: INFUSION THERAPY | Facility: HOSPITAL | Age: 74
End: 2024-03-29

## 2024-03-29 LAB
ALBUMIN SERPL ELPH-MCNC: 4.9 G/DL — SIGNIFICANT CHANGE UP (ref 3.3–5)
ALP SERPL-CCNC: 52 U/L — SIGNIFICANT CHANGE UP (ref 40–120)
ALT FLD-CCNC: 11 U/L — SIGNIFICANT CHANGE UP (ref 10–45)
ANION GAP SERPL CALC-SCNC: 13 MMOL/L — SIGNIFICANT CHANGE UP (ref 5–17)
AST SERPL-CCNC: 28 U/L — SIGNIFICANT CHANGE UP (ref 10–40)
BASOPHILS # BLD AUTO: 0.06 K/UL — SIGNIFICANT CHANGE UP (ref 0–0.2)
BASOPHILS NFR BLD AUTO: 1.1 % — SIGNIFICANT CHANGE UP (ref 0–2)
BILIRUB SERPL-MCNC: 0.4 MG/DL — SIGNIFICANT CHANGE UP (ref 0.2–1.2)
BUN SERPL-MCNC: 33 MG/DL — HIGH (ref 7–23)
CALCIUM SERPL-MCNC: 9.8 MG/DL — SIGNIFICANT CHANGE UP (ref 8.4–10.5)
CHLORIDE SERPL-SCNC: 107 MMOL/L — SIGNIFICANT CHANGE UP (ref 96–108)
CO2 SERPL-SCNC: 19 MMOL/L — LOW (ref 22–31)
CREAT SERPL-MCNC: 2.14 MG/DL — HIGH (ref 0.5–1.3)
EGFR: 32 ML/MIN/1.73M2 — LOW
EOSINOPHIL # BLD AUTO: 0.19 K/UL — SIGNIFICANT CHANGE UP (ref 0–0.5)
EOSINOPHIL NFR BLD AUTO: 3.6 % — SIGNIFICANT CHANGE UP (ref 0–6)
GLUCOSE SERPL-MCNC: 108 MG/DL — HIGH (ref 70–99)
HCT VFR BLD CALC: 28.3 % — LOW (ref 39–50)
HGB BLD-MCNC: 9.8 G/DL — LOW (ref 13–17)
IMM GRANULOCYTES NFR BLD AUTO: 0.6 % — SIGNIFICANT CHANGE UP (ref 0–0.9)
LYMPHOCYTES # BLD AUTO: 1.33 K/UL — SIGNIFICANT CHANGE UP (ref 1–3.3)
LYMPHOCYTES # BLD AUTO: 25.4 % — SIGNIFICANT CHANGE UP (ref 13–44)
MCHC RBC-ENTMCNC: 30.7 PG — SIGNIFICANT CHANGE UP (ref 27–34)
MCHC RBC-ENTMCNC: 34.6 G/DL — SIGNIFICANT CHANGE UP (ref 32–36)
MCV RBC AUTO: 88.7 FL — SIGNIFICANT CHANGE UP (ref 80–100)
MONOCYTES # BLD AUTO: 0.41 K/UL — SIGNIFICANT CHANGE UP (ref 0–0.9)
MONOCYTES NFR BLD AUTO: 7.8 % — SIGNIFICANT CHANGE UP (ref 2–14)
NEUTROPHILS # BLD AUTO: 3.22 K/UL — SIGNIFICANT CHANGE UP (ref 1.8–7.4)
NEUTROPHILS NFR BLD AUTO: 61.5 % — SIGNIFICANT CHANGE UP (ref 43–77)
NRBC # BLD: 0 /100 WBCS — SIGNIFICANT CHANGE UP (ref 0–0)
PLATELET # BLD AUTO: 156 K/UL — SIGNIFICANT CHANGE UP (ref 150–400)
POTASSIUM SERPL-MCNC: 4.4 MMOL/L — SIGNIFICANT CHANGE UP (ref 3.5–5.3)
POTASSIUM SERPL-SCNC: 4.4 MMOL/L — SIGNIFICANT CHANGE UP (ref 3.5–5.3)
PROT SERPL-MCNC: 8.1 G/DL — SIGNIFICANT CHANGE UP (ref 6–8.3)
PROT SERPL-MCNC: 8.2 G/DL — SIGNIFICANT CHANGE UP (ref 6–8.3)
RBC # BLD: 3.19 M/UL — LOW (ref 4.2–5.8)
RBC # FLD: 13.6 % — SIGNIFICANT CHANGE UP (ref 10.3–14.5)
SODIUM SERPL-SCNC: 140 MMOL/L — SIGNIFICANT CHANGE UP (ref 135–145)
WBC # BLD: 5.24 K/UL — SIGNIFICANT CHANGE UP (ref 3.8–10.5)
WBC # FLD AUTO: 5.24 K/UL — SIGNIFICANT CHANGE UP (ref 3.8–10.5)

## 2024-04-01 DIAGNOSIS — Z51.11 ENCOUNTER FOR ANTINEOPLASTIC CHEMOTHERAPY: ICD-10-CM

## 2024-04-01 LAB
IGA FLD-MCNC: 213 MG/DL — SIGNIFICANT CHANGE UP (ref 84–499)
IGG FLD-MCNC: 938 MG/DL — SIGNIFICANT CHANGE UP (ref 610–1660)
IGM SERPL-MCNC: 29 MG/DL — LOW (ref 35–242)
KAPPA LC SER QL IFE: 3.46 MG/DL — HIGH (ref 0.33–1.94)
KAPPA/LAMBDA FREE LIGHT CHAIN RATIO, SERUM: 1.51 RATIO — SIGNIFICANT CHANGE UP (ref 0.26–1.65)
LAMBDA LC SER QL IFE: 2.29 MG/DL — SIGNIFICANT CHANGE UP (ref 0.57–2.63)

## 2024-04-02 LAB
% ALBUMIN: 60.1 % — SIGNIFICANT CHANGE UP
% ALPHA 1: 4.5 % — SIGNIFICANT CHANGE UP
% ALPHA 2: 12.7 % — SIGNIFICANT CHANGE UP
% BETA: 11.8 % — SIGNIFICANT CHANGE UP
% GAMMA: 10.9 % — SIGNIFICANT CHANGE UP
ALBUMIN SERPL ELPH-MCNC: 4.9 G/DL — SIGNIFICANT CHANGE UP (ref 3.6–5.5)
ALBUMIN/GLOB SERPL ELPH: 1.5 RATIO — SIGNIFICANT CHANGE UP
ALPHA1 GLOB SERPL ELPH-MCNC: 0.4 G/DL — SIGNIFICANT CHANGE UP (ref 0.1–0.4)
ALPHA2 GLOB SERPL ELPH-MCNC: 1 G/DL — SIGNIFICANT CHANGE UP (ref 0.5–1)
B-GLOBULIN SERPL ELPH-MCNC: 1 G/DL — SIGNIFICANT CHANGE UP (ref 0.5–1)
GAMMA GLOBULIN: 0.9 G/DL — SIGNIFICANT CHANGE UP (ref 0.6–1.6)
INTERPRETATION SERPL IFE-IMP: SIGNIFICANT CHANGE UP
PROT PATTERN SERPL ELPH-IMP: SIGNIFICANT CHANGE UP
PROT SERPL-MCNC: 8.2 G/DL — SIGNIFICANT CHANGE UP (ref 6–8.3)

## 2024-04-12 ENCOUNTER — NON-APPOINTMENT (OUTPATIENT)
Age: 74
End: 2024-04-12

## 2024-04-12 ENCOUNTER — RESULT REVIEW (OUTPATIENT)
Age: 74
End: 2024-04-12

## 2024-04-12 ENCOUNTER — APPOINTMENT (OUTPATIENT)
Dept: HEMATOLOGY ONCOLOGY | Facility: CLINIC | Age: 74
End: 2024-04-12

## 2024-04-12 ENCOUNTER — APPOINTMENT (OUTPATIENT)
Dept: INFUSION THERAPY | Facility: HOSPITAL | Age: 74
End: 2024-04-12

## 2024-04-12 LAB
ALBUMIN SERPL ELPH-MCNC: 4.8 G/DL — SIGNIFICANT CHANGE UP (ref 3.3–5)
ALP SERPL-CCNC: 48 U/L — SIGNIFICANT CHANGE UP (ref 40–120)
ALT FLD-CCNC: 16 U/L — SIGNIFICANT CHANGE UP (ref 10–45)
ANION GAP SERPL CALC-SCNC: 12 MMOL/L — SIGNIFICANT CHANGE UP (ref 5–17)
AST SERPL-CCNC: 30 U/L — SIGNIFICANT CHANGE UP (ref 10–40)
BASOPHILS # BLD AUTO: 0.07 K/UL — SIGNIFICANT CHANGE UP (ref 0–0.2)
BASOPHILS NFR BLD AUTO: 1.2 % — SIGNIFICANT CHANGE UP (ref 0–2)
BILIRUB SERPL-MCNC: 0.4 MG/DL — SIGNIFICANT CHANGE UP (ref 0.2–1.2)
BUN SERPL-MCNC: 31 MG/DL — HIGH (ref 7–23)
CALCIUM SERPL-MCNC: 9.6 MG/DL — SIGNIFICANT CHANGE UP (ref 8.4–10.5)
CHLORIDE SERPL-SCNC: 105 MMOL/L — SIGNIFICANT CHANGE UP (ref 96–108)
CO2 SERPL-SCNC: 22 MMOL/L — SIGNIFICANT CHANGE UP (ref 22–31)
CREAT SERPL-MCNC: 2.25 MG/DL — HIGH (ref 0.5–1.3)
EGFR: 30 ML/MIN/1.73M2 — LOW
EOSINOPHIL # BLD AUTO: 0.16 K/UL — SIGNIFICANT CHANGE UP (ref 0–0.5)
EOSINOPHIL NFR BLD AUTO: 2.7 % — SIGNIFICANT CHANGE UP (ref 0–6)
GLUCOSE SERPL-MCNC: 110 MG/DL — HIGH (ref 70–99)
HCT VFR BLD CALC: 27.6 % — LOW (ref 39–50)
HGB BLD-MCNC: 9.4 G/DL — LOW (ref 13–17)
IGA FLD-MCNC: 187 MG/DL — SIGNIFICANT CHANGE UP (ref 84–499)
IGG FLD-MCNC: 907 MG/DL — SIGNIFICANT CHANGE UP (ref 610–1660)
IGM SERPL-MCNC: 26 MG/DL — LOW (ref 35–242)
IMM GRANULOCYTES NFR BLD AUTO: 0.2 % — SIGNIFICANT CHANGE UP (ref 0–0.9)
KAPPA LC SER QL IFE: 3.43 MG/DL — HIGH (ref 0.33–1.94)
KAPPA/LAMBDA FREE LIGHT CHAIN RATIO, SERUM: 1.52 RATIO — SIGNIFICANT CHANGE UP (ref 0.26–1.65)
LAMBDA LC SER QL IFE: 2.26 MG/DL — SIGNIFICANT CHANGE UP (ref 0.57–2.63)
LYMPHOCYTES # BLD AUTO: 1.55 K/UL — SIGNIFICANT CHANGE UP (ref 1–3.3)
LYMPHOCYTES # BLD AUTO: 26.5 % — SIGNIFICANT CHANGE UP (ref 13–44)
MCHC RBC-ENTMCNC: 31.3 PG — SIGNIFICANT CHANGE UP (ref 27–34)
MCHC RBC-ENTMCNC: 34.1 G/DL — SIGNIFICANT CHANGE UP (ref 32–36)
MCV RBC AUTO: 92 FL — SIGNIFICANT CHANGE UP (ref 80–100)
MONOCYTES # BLD AUTO: 0.52 K/UL — SIGNIFICANT CHANGE UP (ref 0–0.9)
MONOCYTES NFR BLD AUTO: 8.9 % — SIGNIFICANT CHANGE UP (ref 2–14)
NEUTROPHILS # BLD AUTO: 3.55 K/UL — SIGNIFICANT CHANGE UP (ref 1.8–7.4)
NEUTROPHILS NFR BLD AUTO: 60.5 % — SIGNIFICANT CHANGE UP (ref 43–77)
NRBC # BLD: 0 /100 WBCS — SIGNIFICANT CHANGE UP (ref 0–0)
PLATELET # BLD AUTO: 158 K/UL — SIGNIFICANT CHANGE UP (ref 150–400)
POTASSIUM SERPL-MCNC: 4.2 MMOL/L — SIGNIFICANT CHANGE UP (ref 3.5–5.3)
POTASSIUM SERPL-SCNC: 4.2 MMOL/L — SIGNIFICANT CHANGE UP (ref 3.5–5.3)
PROT SERPL-MCNC: 7.6 G/DL — SIGNIFICANT CHANGE UP (ref 6–8.3)
PROT SERPL-MCNC: 7.7 G/DL — SIGNIFICANT CHANGE UP (ref 6–8.3)
RBC # BLD: 3 M/UL — LOW (ref 4.2–5.8)
RBC # FLD: 13.4 % — SIGNIFICANT CHANGE UP (ref 10.3–14.5)
SODIUM SERPL-SCNC: 139 MMOL/L — SIGNIFICANT CHANGE UP (ref 135–145)
WBC # BLD: 5.86 K/UL — SIGNIFICANT CHANGE UP (ref 3.8–10.5)
WBC # FLD AUTO: 5.86 K/UL — SIGNIFICANT CHANGE UP (ref 3.8–10.5)

## 2024-04-16 LAB
% ALBUMIN: 60.8 % — SIGNIFICANT CHANGE UP
% ALPHA 1: 4.4 % — SIGNIFICANT CHANGE UP
% ALPHA 2: 12.2 % — SIGNIFICANT CHANGE UP
% BETA: 11.7 % — SIGNIFICANT CHANGE UP
% GAMMA: 10.9 % — SIGNIFICANT CHANGE UP
ALBUMIN SERPL ELPH-MCNC: 4.6 G/DL — SIGNIFICANT CHANGE UP (ref 3.6–5.5)
ALBUMIN/GLOB SERPL ELPH: 1.5 RATIO — SIGNIFICANT CHANGE UP
ALPHA1 GLOB SERPL ELPH-MCNC: 0.3 G/DL — SIGNIFICANT CHANGE UP (ref 0.1–0.4)
ALPHA2 GLOB SERPL ELPH-MCNC: 0.9 G/DL — SIGNIFICANT CHANGE UP (ref 0.5–1)
B-GLOBULIN SERPL ELPH-MCNC: 0.9 G/DL — SIGNIFICANT CHANGE UP (ref 0.5–1)
GAMMA GLOBULIN: 0.8 G/DL — SIGNIFICANT CHANGE UP (ref 0.6–1.6)
INTERPRETATION SERPL IFE-IMP: SIGNIFICANT CHANGE UP
PROT PATTERN SERPL ELPH-IMP: SIGNIFICANT CHANGE UP
PROT SERPL-MCNC: 7.6 G/DL — SIGNIFICANT CHANGE UP (ref 6–8.3)

## 2024-04-26 ENCOUNTER — APPOINTMENT (OUTPATIENT)
Dept: HEMATOLOGY ONCOLOGY | Facility: CLINIC | Age: 74
End: 2024-04-26

## 2024-04-26 ENCOUNTER — RESULT REVIEW (OUTPATIENT)
Age: 74
End: 2024-04-26

## 2024-04-26 ENCOUNTER — APPOINTMENT (OUTPATIENT)
Dept: INFUSION THERAPY | Facility: HOSPITAL | Age: 74
End: 2024-04-26

## 2024-04-26 LAB
ALBUMIN SERPL ELPH-MCNC: 4.8 G/DL — SIGNIFICANT CHANGE UP (ref 3.3–5)
ALP SERPL-CCNC: 45 U/L — SIGNIFICANT CHANGE UP (ref 40–120)
ALT FLD-CCNC: 12 U/L — SIGNIFICANT CHANGE UP (ref 10–45)
ANION GAP SERPL CALC-SCNC: 12 MMOL/L — SIGNIFICANT CHANGE UP (ref 5–17)
AST SERPL-CCNC: 27 U/L — SIGNIFICANT CHANGE UP (ref 10–40)
BASOPHILS # BLD AUTO: 0.06 K/UL — SIGNIFICANT CHANGE UP (ref 0–0.2)
BASOPHILS NFR BLD AUTO: 1.1 % — SIGNIFICANT CHANGE UP (ref 0–2)
BILIRUB SERPL-MCNC: 0.4 MG/DL — SIGNIFICANT CHANGE UP (ref 0.2–1.2)
BUN SERPL-MCNC: 23 MG/DL — SIGNIFICANT CHANGE UP (ref 7–23)
CALCIUM SERPL-MCNC: 9.6 MG/DL — SIGNIFICANT CHANGE UP (ref 8.4–10.5)
CHLORIDE SERPL-SCNC: 107 MMOL/L — SIGNIFICANT CHANGE UP (ref 96–108)
CO2 SERPL-SCNC: 22 MMOL/L — SIGNIFICANT CHANGE UP (ref 22–31)
CREAT SERPL-MCNC: 1.94 MG/DL — HIGH (ref 0.5–1.3)
EGFR: 35 ML/MIN/1.73M2 — LOW
EOSINOPHIL # BLD AUTO: 0.22 K/UL — SIGNIFICANT CHANGE UP (ref 0–0.5)
EOSINOPHIL NFR BLD AUTO: 3.9 % — SIGNIFICANT CHANGE UP (ref 0–6)
GLUCOSE SERPL-MCNC: 115 MG/DL — HIGH (ref 70–99)
HCT VFR BLD CALC: 27.5 % — LOW (ref 39–50)
HGB BLD-MCNC: 9.1 G/DL — LOW (ref 13–17)
IMM GRANULOCYTES NFR BLD AUTO: 0.2 % — SIGNIFICANT CHANGE UP (ref 0–0.9)
LDH SERPL L TO P-CCNC: 201 U/L — SIGNIFICANT CHANGE UP (ref 50–242)
LYMPHOCYTES # BLD AUTO: 1.43 K/UL — SIGNIFICANT CHANGE UP (ref 1–3.3)
LYMPHOCYTES # BLD AUTO: 25.6 % — SIGNIFICANT CHANGE UP (ref 13–44)
MCHC RBC-ENTMCNC: 31.2 PG — SIGNIFICANT CHANGE UP (ref 27–34)
MCHC RBC-ENTMCNC: 33.1 G/DL — SIGNIFICANT CHANGE UP (ref 32–36)
MCV RBC AUTO: 94.2 FL — SIGNIFICANT CHANGE UP (ref 80–100)
MONOCYTES # BLD AUTO: 0.53 K/UL — SIGNIFICANT CHANGE UP (ref 0–0.9)
MONOCYTES NFR BLD AUTO: 9.5 % — SIGNIFICANT CHANGE UP (ref 2–14)
NEUTROPHILS # BLD AUTO: 3.33 K/UL — SIGNIFICANT CHANGE UP (ref 1.8–7.4)
NEUTROPHILS NFR BLD AUTO: 59.7 % — SIGNIFICANT CHANGE UP (ref 43–77)
NRBC # BLD: 0 /100 WBCS — SIGNIFICANT CHANGE UP (ref 0–0)
PLATELET # BLD AUTO: 153 K/UL — SIGNIFICANT CHANGE UP (ref 150–400)
POTASSIUM SERPL-MCNC: 4.8 MMOL/L — SIGNIFICANT CHANGE UP (ref 3.5–5.3)
POTASSIUM SERPL-SCNC: 4.8 MMOL/L — SIGNIFICANT CHANGE UP (ref 3.5–5.3)
PROT SERPL-MCNC: 7.6 G/DL — SIGNIFICANT CHANGE UP (ref 6–8.3)
RBC # BLD: 2.92 M/UL — LOW (ref 4.2–5.8)
RBC # FLD: 13.3 % — SIGNIFICANT CHANGE UP (ref 10.3–14.5)
SODIUM SERPL-SCNC: 141 MMOL/L — SIGNIFICANT CHANGE UP (ref 135–145)
WBC # BLD: 5.58 K/UL — SIGNIFICANT CHANGE UP (ref 3.8–10.5)
WBC # FLD AUTO: 5.58 K/UL — SIGNIFICANT CHANGE UP (ref 3.8–10.5)

## 2024-05-01 ENCOUNTER — OUTPATIENT (OUTPATIENT)
Dept: OUTPATIENT SERVICES | Facility: HOSPITAL | Age: 74
LOS: 1 days | Discharge: HOME | End: 2024-05-01

## 2024-05-01 DIAGNOSIS — Z95.5 PRESENCE OF CORONARY ANGIOPLASTY IMPLANT AND GRAFT: Chronic | ICD-10-CM

## 2024-05-01 DIAGNOSIS — C90.00 MULTIPLE MYELOMA NOT HAVING ACHIEVED REMISSION: ICD-10-CM

## 2024-05-10 ENCOUNTER — RESULT REVIEW (OUTPATIENT)
Age: 74
End: 2024-05-10

## 2024-05-10 ENCOUNTER — NON-APPOINTMENT (OUTPATIENT)
Age: 74
End: 2024-05-10

## 2024-05-10 ENCOUNTER — APPOINTMENT (OUTPATIENT)
Dept: HEMATOLOGY ONCOLOGY | Facility: CLINIC | Age: 74
End: 2024-05-10

## 2024-05-10 ENCOUNTER — APPOINTMENT (OUTPATIENT)
Dept: INFUSION THERAPY | Facility: HOSPITAL | Age: 74
End: 2024-05-10

## 2024-05-10 LAB
BASOPHILS # BLD AUTO: 0.06 K/UL — SIGNIFICANT CHANGE UP (ref 0–0.2)
BASOPHILS NFR BLD AUTO: 1.1 % — SIGNIFICANT CHANGE UP (ref 0–2)
EOSINOPHIL # BLD AUTO: 0.17 K/UL — SIGNIFICANT CHANGE UP (ref 0–0.5)
EOSINOPHIL NFR BLD AUTO: 3 % — SIGNIFICANT CHANGE UP (ref 0–6)
HCT VFR BLD CALC: 26.4 % — LOW (ref 39–50)
HGB BLD-MCNC: 8.9 G/DL — LOW (ref 13–17)
IMM GRANULOCYTES NFR BLD AUTO: 0.4 % — SIGNIFICANT CHANGE UP (ref 0–0.9)
LYMPHOCYTES # BLD AUTO: 1.31 K/UL — SIGNIFICANT CHANGE UP (ref 1–3.3)
LYMPHOCYTES # BLD AUTO: 23.3 % — SIGNIFICANT CHANGE UP (ref 13–44)
MCHC RBC-ENTMCNC: 31.2 PG — SIGNIFICANT CHANGE UP (ref 27–34)
MCHC RBC-ENTMCNC: 33.7 G/DL — SIGNIFICANT CHANGE UP (ref 32–36)
MCV RBC AUTO: 92.6 FL — SIGNIFICANT CHANGE UP (ref 80–100)
MONOCYTES # BLD AUTO: 0.53 K/UL — SIGNIFICANT CHANGE UP (ref 0–0.9)
MONOCYTES NFR BLD AUTO: 9.4 % — SIGNIFICANT CHANGE UP (ref 2–14)
NEUTROPHILS # BLD AUTO: 3.54 K/UL — SIGNIFICANT CHANGE UP (ref 1.8–7.4)
NEUTROPHILS NFR BLD AUTO: 62.8 % — SIGNIFICANT CHANGE UP (ref 43–77)
NRBC # BLD: 0 /100 WBCS — SIGNIFICANT CHANGE UP (ref 0–0)
PLATELET # BLD AUTO: 151 K/UL — SIGNIFICANT CHANGE UP (ref 150–400)
RBC # BLD: 2.85 M/UL — LOW (ref 4.2–5.8)
RBC # FLD: 13.2 % — SIGNIFICANT CHANGE UP (ref 10.3–14.5)
WBC # BLD: 5.63 K/UL — SIGNIFICANT CHANGE UP (ref 3.8–10.5)
WBC # FLD AUTO: 5.63 K/UL — SIGNIFICANT CHANGE UP (ref 3.8–10.5)

## 2024-05-13 DIAGNOSIS — Z51.11 ENCOUNTER FOR ANTINEOPLASTIC CHEMOTHERAPY: ICD-10-CM

## 2024-05-13 DIAGNOSIS — R11.2 NAUSEA WITH VOMITING, UNSPECIFIED: ICD-10-CM

## 2024-05-15 ENCOUNTER — APPOINTMENT (OUTPATIENT)
Dept: INFUSION THERAPY | Facility: HOSPITAL | Age: 74
End: 2024-05-15

## 2024-05-24 ENCOUNTER — RESULT REVIEW (OUTPATIENT)
Age: 74
End: 2024-05-24

## 2024-05-24 ENCOUNTER — APPOINTMENT (OUTPATIENT)
Dept: INFUSION THERAPY | Facility: HOSPITAL | Age: 74
End: 2024-05-24

## 2024-05-24 ENCOUNTER — APPOINTMENT (OUTPATIENT)
Dept: HEMATOLOGY ONCOLOGY | Facility: CLINIC | Age: 74
End: 2024-05-24

## 2024-05-24 LAB
BASOPHILS # BLD AUTO: 0.05 K/UL — SIGNIFICANT CHANGE UP (ref 0–0.2)
BASOPHILS NFR BLD AUTO: 0.7 % — SIGNIFICANT CHANGE UP (ref 0–2)
EOSINOPHIL # BLD AUTO: 0.2 K/UL — SIGNIFICANT CHANGE UP (ref 0–0.5)
EOSINOPHIL NFR BLD AUTO: 2.9 % — SIGNIFICANT CHANGE UP (ref 0–6)
FERRITIN SERPL-MCNC: 97 NG/ML — SIGNIFICANT CHANGE UP (ref 30–400)
HCT VFR BLD CALC: 26.3 % — LOW (ref 39–50)
HGB BLD-MCNC: 9 G/DL — LOW (ref 13–17)
IMM GRANULOCYTES NFR BLD AUTO: 0.9 % — SIGNIFICANT CHANGE UP (ref 0–0.9)
IRON SATN MFR SERPL: 12 % — LOW (ref 16–55)
IRON SATN MFR SERPL: 45 UG/DL — SIGNIFICANT CHANGE UP (ref 45–165)
LYMPHOCYTES # BLD AUTO: 1.53 K/UL — SIGNIFICANT CHANGE UP (ref 1–3.3)
LYMPHOCYTES # BLD AUTO: 22.2 % — SIGNIFICANT CHANGE UP (ref 13–44)
MCHC RBC-ENTMCNC: 31.7 PG — SIGNIFICANT CHANGE UP (ref 27–34)
MCHC RBC-ENTMCNC: 34.2 G/DL — SIGNIFICANT CHANGE UP (ref 32–36)
MCV RBC AUTO: 92.6 FL — SIGNIFICANT CHANGE UP (ref 80–100)
MONOCYTES # BLD AUTO: 0.57 K/UL — SIGNIFICANT CHANGE UP (ref 0–0.9)
MONOCYTES NFR BLD AUTO: 8.3 % — SIGNIFICANT CHANGE UP (ref 2–14)
NEUTROPHILS # BLD AUTO: 4.49 K/UL — SIGNIFICANT CHANGE UP (ref 1.8–7.4)
NEUTROPHILS NFR BLD AUTO: 65 % — SIGNIFICANT CHANGE UP (ref 43–77)
NRBC # BLD: 0 /100 WBCS — SIGNIFICANT CHANGE UP (ref 0–0)
PLATELET # BLD AUTO: 144 K/UL — LOW (ref 150–400)
RBC # BLD: 2.84 M/UL — LOW (ref 4.2–5.8)
RBC # FLD: 12.7 % — SIGNIFICANT CHANGE UP (ref 10.3–14.5)
TIBC SERPL-MCNC: 385 UG/DL — SIGNIFICANT CHANGE UP (ref 220–430)
UIBC SERPL-MCNC: 340 UG/DL — SIGNIFICANT CHANGE UP (ref 110–370)
WBC # BLD: 6.9 K/UL — SIGNIFICANT CHANGE UP (ref 3.8–10.5)
WBC # FLD AUTO: 6.9 K/UL — SIGNIFICANT CHANGE UP (ref 3.8–10.5)

## 2024-05-24 RX ORDER — ACYCLOVIR 400 MG/1
400 TABLET ORAL TWICE DAILY
Qty: 60 | Refills: 3 | Status: ACTIVE | COMMUNITY
Start: 2022-11-23 | End: 1900-01-01

## 2024-05-29 LAB — EPO SERPL-MCNC: 8.8 MIU/ML — SIGNIFICANT CHANGE UP (ref 2.6–18.5)

## 2024-06-05 ENCOUNTER — APPOINTMENT (OUTPATIENT)
Dept: HEMATOLOGY ONCOLOGY | Facility: CLINIC | Age: 74
End: 2024-06-05
Payer: MEDICARE

## 2024-06-05 ENCOUNTER — RESULT REVIEW (OUTPATIENT)
Age: 74
End: 2024-06-05

## 2024-06-05 VITALS
HEART RATE: 67 BPM | TEMPERATURE: 97.5 F | WEIGHT: 131.17 LBS | BODY MASS INDEX: 22.52 KG/M2 | SYSTOLIC BLOOD PRESSURE: 154 MMHG | RESPIRATION RATE: 16 BRPM | DIASTOLIC BLOOD PRESSURE: 62 MMHG | OXYGEN SATURATION: 99 %

## 2024-06-05 DIAGNOSIS — N18.9 CHRONIC KIDNEY DISEASE, UNSPECIFIED: ICD-10-CM

## 2024-06-05 DIAGNOSIS — Z51.11 ENCOUNTER FOR ANTINEOPLASTIC CHEMOTHERAPY: ICD-10-CM

## 2024-06-05 DIAGNOSIS — C90.00 MULTIPLE MYELOMA NOT HAVING ACHIEVED REMISSION: ICD-10-CM

## 2024-06-05 DIAGNOSIS — M89.9 DISORDER OF BONE, UNSPECIFIED: ICD-10-CM

## 2024-06-05 DIAGNOSIS — D64.9 ANEMIA, UNSPECIFIED: ICD-10-CM

## 2024-06-05 LAB
BASOPHILS # BLD AUTO: 0.04 K/UL — SIGNIFICANT CHANGE UP (ref 0–0.2)
BASOPHILS NFR BLD AUTO: 0.7 % — SIGNIFICANT CHANGE UP (ref 0–2)
EOSINOPHIL # BLD AUTO: 0.14 K/UL — SIGNIFICANT CHANGE UP (ref 0–0.5)
EOSINOPHIL NFR BLD AUTO: 2.4 % — SIGNIFICANT CHANGE UP (ref 0–6)
HCT VFR BLD CALC: 28 % — LOW (ref 39–50)
HGB BLD-MCNC: 9.3 G/DL — LOW (ref 13–17)
IMM GRANULOCYTES NFR BLD AUTO: 0.2 % — SIGNIFICANT CHANGE UP (ref 0–0.9)
LYMPHOCYTES # BLD AUTO: 1.53 K/UL — SIGNIFICANT CHANGE UP (ref 1–3.3)
LYMPHOCYTES # BLD AUTO: 26.6 % — SIGNIFICANT CHANGE UP (ref 13–44)
MCHC RBC-ENTMCNC: 31.2 PG — SIGNIFICANT CHANGE UP (ref 27–34)
MCHC RBC-ENTMCNC: 33.2 G/DL — SIGNIFICANT CHANGE UP (ref 32–36)
MCV RBC AUTO: 94 FL — SIGNIFICANT CHANGE UP (ref 80–100)
MONOCYTES # BLD AUTO: 0.57 K/UL — SIGNIFICANT CHANGE UP (ref 0–0.9)
MONOCYTES NFR BLD AUTO: 9.9 % — SIGNIFICANT CHANGE UP (ref 2–14)
NEUTROPHILS # BLD AUTO: 3.47 K/UL — SIGNIFICANT CHANGE UP (ref 1.8–7.4)
NEUTROPHILS NFR BLD AUTO: 60.2 % — SIGNIFICANT CHANGE UP (ref 43–77)
NRBC # BLD: 0 /100 WBCS — SIGNIFICANT CHANGE UP (ref 0–0)
PLATELET # BLD AUTO: 154 K/UL — SIGNIFICANT CHANGE UP (ref 150–400)
RBC # BLD: 2.98 M/UL — LOW (ref 4.2–5.8)
RBC # FLD: 13.1 % — SIGNIFICANT CHANGE UP (ref 10.3–14.5)
WBC # BLD: 5.76 K/UL — SIGNIFICANT CHANGE UP (ref 3.8–10.5)
WBC # FLD AUTO: 5.76 K/UL — SIGNIFICANT CHANGE UP (ref 3.8–10.5)

## 2024-06-05 PROCEDURE — 99214 OFFICE O/P EST MOD 30 MIN: CPT

## 2024-06-05 NOTE — HISTORY OF PRESENT ILLNESS
[Disease:__________________________] : Disease: [unfilled] [Treatment Protocol] : Treatment Protocol [de-identified] : As per Dr Kilgore's note on 8/18/2020\par  \par  "Mr Rand was referred to my office for newly diagnosed IgG kappa multiple myeloma. The patient's primary language is Maori, he has his daughter Mirlande as the , per his wishes. He was seen by Dr. Andrés Valle (hematology) in Feb 2019 and had a BM bx showing 10-15% plasma cells, concerning for smoldering myeloma. According to the daughter, he was awaiting insurance approval for imaging studies. He was admitted from 8/18/19 at [facility] where he presented with pain in the L leg/lower back, worsening for the past 2-3 months. On labs, he was found to have a total protein of 10, Ca level 12 until 8/29/19. Dental consult was called and patient needs to have 10 teeth extracted -thus, IV bisphosphonates were not given, pt improved with hydration. He also had a slightly inc'ed creatinine -improved after IVF. CT C/A/P 8/18/19 showed a lytic expansile soft tissue lesion centered in the manubrium measuring approximately 7.3 x 3.4 x 4.3 cm, invading both 1st ribs. There was also a A lytic expansile soft tissue lesion in the T8 vertebral body causing mass effect on spinal canal and obliterating left neural foramina at T7-8 and T8-9. ON 8/22/19 an MRI thoracic spine was done showing multiple areas of tumor involvement within the thoracic spine in keeping with the patient's history of multiple myeloma. Prominent lesion within C7 on the right incompletely seen.\par  Large lesion within T8 on the left producing epidural tumor extension and moderate narrowing of the spinal canal with mild flattening of the spinal cord. He was evaluated on 8/22/19 by Dr. Foley (Rad Onc) and was given 5 fractions of XRT from 8/23/19 to 8/29/19 to C7 and T8. He was discharged home on 8/29/19 with follow up in the outpatient office, and on Dexamethasone 4mg po daily. \par  \par   \par  \par  \par  \par  Interval History: The patient is being followed for IgG kappa MM with multiple bone lytic lesions, hypercalcemia (resolved) and mild anemia. He started Velcade/Dexa weekly on 9/919. The patient got the Revlimid and started it C1 day 1 on 10/21/19 and hed been tolerating it well. \par  \par  Starting in February, his blood counts became low -plt count 54, Hb 8.3 wbc 5.8. Revlimid was held -after 2/3/20. He got 1 shot Velcade/Dexa on 2/3/20, NO chemo since then. \par  BM done on 3/4/20 to eval for residual disease (MM) vs. MDS. BM showed recovering marrow elements, plasma cells <5%. SPEP/MICAH from 3/9/20 showed NO monoclonal proteins, c/w CR. \par  \par  The PET scan from 5/7/20 showed L uptake maxillary -pt reports that he had dentures done in Jan 2020 and has discomfort from them. He did not go to the dentist during COVID Rib fractures -pt denied falls/trauma. Patient has dental appointment for next week. He has some discomfort in the upper teeth b/l. He also missed his Velcade SQ last week 'i did not know if i am supposed to continue it.' He has no neuropathy from it, tolerating well. "\par   [de-identified] : RISS- II [FreeTextEntry1] : s/p RT to C7/T8 spine\par  RVD 9/2019 -6/2020 transitioned to maintenance velcade q2wk started 6/19/2020 [de-identified] : Oct 6, 2020 Pt is here for initial visit with me. He has his dtr on the phone, prefers her included in conversation and declined an . Pt is feeling well. He is tolerating monthly zometa and maintenance velcade w/o any adverse effects. Last set of myeloma labs done in August showed stable remission. He is requesting refill of several meds today. ---------------------------------  1/25/2021 Patient presents for a follow up. He has no complaints except infrequent short duration chills with no fever. He is on biweekly Velcade and monthly Zometa. His last treatment of Zometa was on 1/8/2021 and last Velcade on 1/22/2021. His most recent SPEP/MICAH was normal.     6/10/2021 Patient seen in follow up. He did not go to his home country as he planned. He does not endorse any big change in his health status. She saw his Kidney doctor who told him he is iron deficient (labs are not supportive). His anemia is multifactorial including CKD. His most recent myeloma labs (03/2021) show continued CR.   9/1/2021 Mr Rand returns for a follow up. His daughter is accompanying him. He has no complaints. He reports that he develops diarrhea (one motion) the day after his Velcade treatment. He plans to visit his home town soon. He has some unavoidable things to take care of. Of note he has been in sCR.   1/19/2022 Patient seen in follow up. He has done well in the interim. He also visited his home country in this interval. He was safe all along.  As regards to his disease, he is stable and appears to be in remission. He has no complaints.    11/23/2022 Patient presents to clinic after being lost to follow-up; went to his country for an extended visit from 3/10 - 9/8/2022. Was on maintenance velcade [id number] starting 6/19/2020, last dose was on 3/3/22. Was on monthly Zometa since 11/2019, last dose of zometa was on 2/17/2022. In the interim, he developed severe weakness and was hospitalized at [facility] (9/24-10/11/22) for Acute respiratory failure with hypoxia, and severe sepsis due to GBS. Was discharged to rehab, and as of yesterday he was discharged from rehab. Will now be doing PT at home. During inpatient work up, he was found to have diffuse lytic lesions noted in C/T/L spine CT scans (9/25/22) which were identified as "Multiple mixed lytic sclerotic lesions may be sequela of previously treated multiple myeloma". MRI of the C/T/L spine were done for follow up revealing an "abnormal signal involving T8 vertebral body which may be related to multiple myeloma". Was seen by neurosurgery, no acute intervention. In patient immunofixation did not reveal a monoclonal band; SPEP WNL; kappa/lambda ratio not diagnostic. Today he feels at baseline, but notes residual weakness from GBS. Patient denies bone pain, fever, chills, night sweats, back pain, headache, or peripheral neuropathy. Good appetite, stable weight.  12/23/2022 Follow up. Today he feels at baseline, but notes residual weakness from GBS. Today is C1D22 of Velcade and dexamethasone since restarting it; he is tolerating the medications well. He has some questions requesting clarification on Acyclovir and dexamethasone, otherwise no other concerns. Patient denies bone pain, fever, chills, night sweats, back pain, headache, or peripheral neuropathy. Good appetite, stable weight.   1/20/2023  Patient seen in follow up for multiple myeloma. He was on maintenance Velcade. He went too his home country and missed treatment for more than two months. His disease progressed as noted upon return. He had new percy lesion. He was again started on the same induction regimen. He is also getting Zometa monthly. He has now achieved VGPR having only mild high Kappa, no M-spike/band. He offers no complaints. Denies any infection, fever, bone pain, weigh loss.    2/16/2023 Patient presents for a follow up. He missed his last treatment as he was having cough body ache. He saw his PCP and took a course of Azithromycin. His symptoms relieved. He is feeling well now. His treatment schedule for tomorrow (2/17/2023). Of note, his myeloma labs from 01/2023 are suggestive of sCR.    3/9/2023 Patient presents for a follow up. He has done well in the interim. His cough has resolved. He plans to take a chemo vacation for a month as he plans to visit his home country. He will finish his C4 treatment tomorrow, following which he will continue on maintenance therapy which can be started after he comes back. Of note he again as achieved a very good partial response.   5/19/2023 Patient returns for a follow up. He has missed 2 cycles of treatment as he went back to his home country. His last paraprotein labs are from 03/2023 and they are WNL including FLCR suggesting sCR.  He has no complaints.   8/18/2023 Patient presents for a follow up. He is in CR. He is on Q2W Velate and QM Zometa. However, his Zometa has been held this month as his Cr has increased to 1.9 from 1.4. He has been asked to see a nephrologist. He is physically well and offers no complaints.    10/27/2023 Patient seen in follow up for MM in remission. He is on Velcade maintenance. His Zometa (restarted after recurrence of disease) has been discontinued as his creatinine was increasing. His MICAH was normal and his FLCR is WNL.   1/5/2024 Patient returns for a follow up. He is in stable health. Continuing Q2W maintenance Velcade and monthly Zometa. A repeat skeletal survey has been ordered but not scheduled yet. His M-spike and and M-band are not detectable, however touch high Kappa is present likely due to CKD.  He has no complaints of bone pain and has no other issues.  3/15/2024 Patient seen in the tx room for a follow up visit. He is in good health. He is supposed to be on Velcade q2 weeks however has scheduled himself to receive it every month instead. Zometa monthly continued. We are monitoring his Cr clearance before administering Zometa. Repeat skeletal survey on Jan 2024 revealed myelomatous lucencies with slight endosteal scalloping in prox lef fem diaphysis which may inc risk for pathologic fx. Pending MICAH ad CMP today. Denies fevers, night sweats, unintentional weight loss, bone pain   6/5/2024 Patient returns for a follow up for MM in CR biochemically (no measurable disease). He has been on maintenance Velcade for more than two years since his relapse. He is also on Zometa for his percy disease. However, a metastatic survey X-ray did not show any active percy lesions although it did show old lesions. His kidney function is gradually getting worse, eGFR is now 35. Patient endorses no noticeable change in his health status.    *

## 2024-06-05 NOTE — PHYSICAL EXAM
Medicare Wellness Visit  Plan for Preventive Care    A good way for you to stay healthy is to use preventive care.  Medicare covers many services that can help you stay healthy.* The goal of these services is to find any health problems as quickly as possible. Finding problems early can help make them easier to treat.  Your personal plan below lists the services you may need and when they are due.     Health Maintenance Summary     Topic Due On Due Status Completed On Postpone Until Reason    Colorectal Cancer Screening - Colonoscopy Oct 10, 2018 Due Soon Oct 10, 2013      Abdominal Aortic Aneurysm (AAA) Screening   Completed Mar 17, 2017      Medicare Wellness Visit Sep 12, 2019 Not Due Sep 12, 2018      IMMUNIZATION - DTaP/Tdap/Td Mar 3, 2018 Postponed Mar 3, 2008 Sep 12, 2019 Insurance or Financial    Immunization-Influenza Sep 1, 2018 Due On       Depression Screening Sep 12, 2019 Not Due Sep 12, 2018      Hepatitis C Screening  Completed Aug 4, 2015      Pneumococcal Vaccine 65+ Low/Medium Risk May 28, 2016 Postponed May 28, 2015 Sep 12, 2019 Patient Refused           Preventive Care for Women and Men    Heart Screenings (Cardiovascular):  · Blood tests are used to check your cholesterol, lipid and triglyceride levels. High levels can increase your risk for heart disease and stroke. High levels can be treated with medications, diet and exercise. Lowering your levels can help keep your heart and blood vessels healthy.  Your provider will order these tests if they are needed.    · An ultrasound is done to see if you have an abdominal aortic aneurysm (AAA).  This is an enlargement of one of the main blood vessels that delivers blood to the body.   In the [place], 9,000 deaths are caused by AAA.  You may not even know you have this problem and as many as 1 in 3 people will have a serious problem if it is not treated.  Early diagnosis allows for more effective treatment and cure.  If you have a family  history of AAA or are a male age 65-75 who has smoked, you are at higher risk of an AAA.  Your provider can order this test, if needed.    Colorectal Screening:  · There are many tests that are used to check for cancer of your colon and rectum. You and your provider should discuss what test is best for you and when to have it done.  Options include:  · Screening Colonoscopy: exam of the entire colon, seen through a flexible lighted tube.  · Flexible Sigmoidoscopy: exam of the last third (sigmoid portion) of the colon and rectum, seen through a flexible lighted tube.  · Cologuard DNA stool test: a sample of your stool is used to screen for cancer and unseen blood in your stool.  · Fecal Occult Blood Test: a sample of your stool is studied to find any unseen blood    Flu Shot:  · An immunization that helps to prevent influenza (the flu). You should get this every year. The best time to get the shot is in the fall.    Pneumococcal Shot:  • Vaccines are available that can help prevent pneumococcal disease, which is any type of infection caused by Streptococcus pneumoniae bacteria.   Their use can prevent some cases of pneumonia, meningitis, and sepsis. There are two types of pneumococcal vaccines:   o Conjugate vaccines (PCV-13 or Prevnar 13®) - helps protect against the 13 types of pneumococcal bacteria that are the most common causes of serious infections in children and adults.    o Polysaccharide vaccine (PPSV23 or Ydnjkaymr90®) - helps protect against 23 types of pneumococcal bacteria for patients who are recommended to get it.  These vaccines should be given at least 12 months apart.  A booster is usually not needed.     Hepatitis B Shot:  · An immunization that helps to protect people from getting Hepatitis B. Hepatitis B is a virus that spreads through contact with infected blood or body fluids. Many people with the virus do not have symptoms.  The virus can lead to serious problems, such as liver disease. Some  people are at higher risk than others. Your doctor will tell you if you need this shot.     Diabetes Screening:  · A test to measure sugar (glucose) in your blood is called a fasting blood sugar. Fasting means you cannot have food or drink for at least 8 hours before the test. This test can detect diabetes long before you may notice symptoms.    Glaucoma Screening:  · Glaucoma screening is performed by your eye doctor. The test measures the fluid pressure inside your eyes to determine if you have glaucoma.     Hepatitis C Screening:  · A blood test to see if you have the hepatitis C virus.  Hepatitis C attacks the liver and is a major cause of chronic liver disease.  Medicare will cover a single screening for all adults born between 1945 & 1965, or high risk patients (people who have injected illegal drugs or people who have had blood transfusions).  High risk patients who continue to inject illegal drugs can be screened for Hepatitis C every year.    Smoking and Tobacco-Use Cessation Counseling:  · Tobacco is the single greatest cause of disease and early death in our country today. Medication and counseling together can increase a person’s chance of quitting for good.   · Medicare covers two quitting attempts per year, with four counseling sessions per attempt (eight sessions in a 12 month period)    Preventive Screening tests for Women    Screening Mammograms and Breast Exams:  · An x-ray of your breasts to check for breast cancer before you or your doctor may be able to feel it.  If breast cancer is found early it can usually be treated with success.    Pelvic Exams and Pap Tests:  · An exam to check for cervical and vaginal cancer. A Pap test is a lab test in which cells are taken from your cervix and sent to the lab to look for signs of cervical cancer. If cancer of the cervix is found early, chances for a cure are good. Testing can generally end at age 65, or if a woman has a hysterectomy for a benign  condition. Your provider may recommend more frequent testing if certain abnormal results are found.    Bone Mass Measurements:  · A painless x-ray of your bone density to see if you are at risk for a broken bone. Bone density refers to the thickness of bones or how tightly the bone tissue is packed.    Preventive Screening tests for Men    Prostate Screening:  · PSA - Prostate Cancer blood test.  Experts do not recommend routine screening of healthy men with no signs or symptoms of prostate disease.  However, men should not ignore urinary symptoms, and should discuss their family history with their doctor.    *Medicare pays for many preventive services to keep you healthy. For some of these services, you might have to pay a deductible, coinsurance, and / or copayment.  The amounts vary depending on the type of services you need and the kind of Medicare health plan you have.                Medicare Wellness Visit  Plan for Preventive Care    A good way for you to stay healthy is to use preventive care.  Medicare covers many services that can help you stay healthy.* The goal of these services is to find any health problems as quickly as possible. Finding problems early can help make them easier to treat.  Your personal plan below lists the services you may need and when they are due.     Health Maintenance Summary     Topic Due On Due Status Completed On Postpone Until Reason    Colorectal Cancer Screening - Colonoscopy Oct 10, 2018 Due Soon Oct 10, 2013      Abdominal Aortic Aneurysm (AAA) Screening   Completed Mar 17, 2017      Medicare Wellness Visit Sep 12, 2019 Not Due Sep 12, 2018      IMMUNIZATION - DTaP/Tdap/Td Mar 3, 2018 Postponed Mar 3, 2008 Sep 12, 2019 Insurance or Financial    Immunization-Influenza Sep 1, 2018 Due On       Depression Screening Sep 12, 2019 Not Due Sep 12, 2018      Hepatitis C Screening  Completed Aug 4, 2015      Pneumococcal Vaccine 65+ Low/Medium Risk May 28, 2016 Postponed May 28,  2015 Sep 12, 2019 Patient Refused           Preventive Care for Women and Men    Heart Screenings (Cardiovascular):  · Blood tests are used to check your cholesterol, lipid and triglyceride levels. High levels can increase your risk for heart disease and stroke. High levels can be treated with medications, diet and exercise. Lowering your levels can help keep your heart and blood vessels healthy.  Your provider will order these tests if they are needed.    · An ultrasound is done to see if you have an abdominal aortic aneurysm (AAA).  This is an enlargement of one of the main blood vessels that delivers blood to the body.   In the [place], 9,000 deaths are caused by AAA.  You may not even know you have this problem and as many as 1 in 3 people will have a serious problem if it is not treated.  Early diagnosis allows for more effective treatment and cure.  If you have a family history of AAA or are a male age 65-75 who has smoked, you are at higher risk of an AAA.  Your provider can order this test, if needed.    Colorectal Screening:  · There are many tests that are used to check for cancer of your colon and rectum. You and your provider should discuss what test is best for you and when to have it done.  Options include:  · Screening Colonoscopy: exam of the entire colon, seen through a flexible lighted tube.  · Flexible Sigmoidoscopy: exam of the last third (sigmoid portion) of the colon and rectum, seen through a flexible lighted tube.  · Cologuard DNA stool test: a sample of your stool is used to screen for cancer and unseen blood in your stool.  · Fecal Occult Blood Test: a sample of your stool is studied to find any unseen blood    Flu Shot:  · An immunization that helps to prevent influenza (the flu). You should get this every year. The best time to get the shot is in the fall.    Pneumococcal Shot:  • Vaccines are available that can help prevent pneumococcal disease, which is any type of infection  caused by Streptococcus pneumoniae bacteria.   Their use can prevent some cases of pneumonia, meningitis, and sepsis. There are two types of pneumococcal vaccines:   o Conjugate vaccines (PCV-13 or Prevnar 13®) - helps protect against the 13 types of pneumococcal bacteria that are the most common causes of serious infections in children and adults.    o Polysaccharide vaccine (PPSV23 or Sxcytjgsz95®) - helps protect against 23 types of pneumococcal bacteria for patients who are recommended to get it.  These vaccines should be given at least 12 months apart.  A booster is usually not needed.     Hepatitis B Shot:  · An immunization that helps to protect people from getting Hepatitis B. Hepatitis B is a virus that spreads through contact with infected blood or body fluids. Many people with the virus do not have symptoms.  The virus can lead to serious problems, such as liver disease. Some people are at higher risk than others. Your doctor will tell you if you need this shot.     Diabetes Screening:  · A test to measure sugar (glucose) in your blood is called a fasting blood sugar. Fasting means you cannot have food or drink for at least 8 hours before the test. This test can detect diabetes long before you may notice symptoms.    Glaucoma Screening:  · Glaucoma screening is performed by your eye doctor. The test measures the fluid pressure inside your eyes to determine if you have glaucoma.     Hepatitis C Screening:  · A blood test to see if you have the hepatitis C virus.  Hepatitis C attacks the liver and is a major cause of chronic liver disease.  Medicare will cover a single screening for all adults born between 1945 & 1965, or high risk patients (people who have injected illegal drugs or people who have had blood transfusions).  High risk patients who continue to inject illegal drugs can be screened for Hepatitis C every year.    Smoking and Tobacco-Use Cessation Counseling:  · Tobacco is the single greatest  cause of disease and early death in our country today. Medication and counseling together can increase a person’s chance of quitting for good.   · Medicare covers two quitting attempts per year, with four counseling sessions per attempt (eight sessions in a 12 month period)    Preventive Screening tests for Women    Screening Mammograms and Breast Exams:  · An x-ray of your breasts to check for breast cancer before you or your doctor may be able to feel it.  If breast cancer is found early it can usually be treated with success.    Pelvic Exams and Pap Tests:  · An exam to check for cervical and vaginal cancer. A Pap test is a lab test in which cells are taken from your cervix and sent to the lab to look for signs of cervical cancer. If cancer of the cervix is found early, chances for a cure are good. Testing can generally end at age 65, or if a woman has a hysterectomy for a benign condition. Your provider may recommend more frequent testing if certain abnormal results are found.    Bone Mass Measurements:  · A painless x-ray of your bone density to see if you are at risk for a broken bone. Bone density refers to the thickness of bones or how tightly the bone tissue is packed.    Preventive Screening tests for Men    Prostate Screening:  · PSA - Prostate Cancer blood test.  Experts do not recommend routine screening of healthy men with no signs or symptoms of prostate disease.  However, men should not ignore urinary symptoms, and should discuss their family history with their doctor.    *Medicare pays for many preventive services to keep you healthy. For some of these services, you might have to pay a deductible, coinsurance, and / or copayment.  The amounts vary depending on the type of services you need and the kind of Medicare health plan you have.               [Fully active, able to carry on all pre-disease performance without restriction] : Status 0 - Fully active, able to carry on all pre-disease performance without restriction [Normal] : affect appropriate

## 2024-06-05 NOTE — ASSESSMENT
[FreeTextEntry1] : 74 year-old Mr. MEIER is seen in follow up for IgG Kappa Multiple Myeloma presented with lytic dale lesions, diagnosed in 9/2019. He received XRT to dale lesions,was started on RVD induction, achieved VGPR. Last PET 05/2020 - markedly improved and reduced FDG avidity throughout. His most recent SPEP/MICAH are normal, and FLCR has normalized as well. He is in sCR. Held Zometa for increased Cr to 1.9. Skeletal survey in Jan 2024 revealed myelomatous lucencies with slight endosteal scalloping in prox lef fem diaphysis which may inc risk for pathologic fx. Will need to continue Zometa monthly. Stable PCN labs.  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 - MRI T-spine (9/25/22): abnormal signal involving T8 vertebral body which may be related to multiple myeloma - Repeat T-spine MRI in 3 months ~3/2/2023, to re-assess previous findings as there was no definitive evidence of myeloma involvement. Will repeat in 3 months from 12/2/22 as he has not received any myeloma treatment since his last dose of Velcade on 3/3/22. - Continue treatment regimen with Velcade 3 week on, 1 week off, Dex 20mg weekly, and monthly Zometa - Achieved sCR as of 01/2023 labs - Above regimen stopped after 4 cycles, switched 2 Q2w Velcade - Patient visited his home country and remained off treatment for a month - Obtain CMP, LDH, MICAH monthly - Last MICAH (04/2024): no biochemically measurable disease  - Will continue Velcade SQ Q2W - Continue to hold Dex  - Continue ppx medications (Acyclovir) - Follow up in 2 months   [] Dale disease  - Continued Zometa q month up to one year (up to December 2023) - Held Zometa as Cr increased to 1.9  - Skeletal survey (Jan 2024) - Myelomatous lucencies with slight endosteal scalloping in prox lef fem diaphysis which may inc risk for pathologic fx  - Discontinue Zometa (patient has no active dale disease, Alk Phos normal, CKD worsening) - Nephrology follow up   # Anemia - Multifactorial origin including CKD, CKD now improving therefore will trend Hgb further before intervention - Anemia w/u completed on 11/23/22 - AOCD/AORF  - Will start Procrit 20,000 units QW   RTC in 2 months

## 2024-06-05 NOTE — RESULTS/DATA
[FreeTextEntry1] : 6/5/2024 WBC normal  Hgb 9.3 Platelets normal  Iron, B12, folate - normal  EPO 8.8  Most recent CMP 04/2024 Cr 1.94 < 2.25 Most recent MICAH 04/2024 No monoclonal bands Normal FLC, FLCR    3/15/2024 WBC 6.18 Hgb 10 Platelets 141 Most recent CMP 2/16- Cr 1.76 Most recent MICAH 1/9 No monoclonal bands Improved KFLC and KL ratio  Skeletal survey PROCEDURE DATE:  01/27/2024 INTERPRETATION:  CLINICAL INDICATION: myeloma on Velcade; surveillance  EXAM: A skeletal survey was performed including images of the calvarium, spinal column, chest, pelvis, and both upper and lower extremities on 1/27/2024 at 1342. No prior skeletal survey available for comparison. Compared to appearance of relevant osseous structures on spine MRI from 9/27/2022.  FINDINGS/ IMPRESSION: Myelomatous lucencies with slight endosteal scalloping in proximal left femoral diaphysis subtrochanteric region, may slightly increase risk for pathologic fracture.  Old left superior and inferior pubic rami fracture deformities. Otherwise no current upper or lower extremity fractures.  No vertebral compression fractures.  Additional incidentally observed findings on the survey include: Multiple old bilateral rib fracture deformities. Multilevel spinal degenerative change.  --- End of Report ---   1/5/2023 Today's CBC: Improved H/H (10.8)  Stable platelets (147K) and WBC As of last visit MICAH: No M-spike  No M-band Normal IGs K 4.54 Normal L FLCR 2.42 Normal LDH   10/27/2023 Last visit labs  Normal range MICAH, FLCR  Stable and mild anemia and thrombocytopenia noted  8/18/2023 HGB 10.1 Cr: pending.  MICAH (8/4/2023) No M-spike, no M-band  KFLC 3.7 LFLC 2.45 FLCR 1.51 (Normal)     3/9/2023 Most recent labs Hgb 9.4  Plt 141  M-spike: unable to quantitate K 4.0 L 1.89 K/.L 2.12   1/20/2023  Most recent myeloma labs from 12/23/2022  IgG Kappa disease  No M-spike, no M-band  IgG 931 IgA 132 IgM 32 K 2.62 L 1.45 K/L 1.79   Most recent CBC from 1/12/2023 Mild anemia (10.8 and thrombocytopenia (135K)   1/19/2022 Stable myeloma labs with normal range values.Mild high Lappa lambda with normal ratio is likely due to his renal failure.  MIld to moderate anemia is stable.   9/1/2021 Last PCN labs in 06/2021 showed no M-spike, M-band, Normal FLCR.  6/9/2021 Last Myeloma labs in 03/2021 showed no M-spike, M-band, Normal FLCR.   1/25/2021 Most recent SPEP / MICAH (1/8/2021) No monoclonal band   ------------------------------------------------------------------------------------------   PET/CT Whole Body Oncology FDG             Final  No Documents Attached       EXAM:  PETCT WB ONC FDG SUBS   PROCEDURE DATE:  05/07/2020    INTERPRETATION:  PROCEDURE:  PET/CT WHOLE BODY  RADIOPHARMACEUTICAL:  9.97 mCi F-18, FDG, I.V.  CLINICAL INFORMATION:  70-year-old male referred for follow-up of multiple myeloma on Zometa. Bone marrow biopsy done 3/4/2020 showed less than 5% plasma cells and repeat myeloma labs done on 3/9/2020, showed CT are. PET/CT is done as part of the subsequent treatment strategy evaluation.  TECHNIQUE:  Fasting blood sugar measured prior to injection of radiopharmaceutical was 116 mg/dl. Following intravenous injection of the radiopharmaceutical and an uptake period of approximately 60 minutes, FDG-PET/CT was obtained on a  [employer] 710 from the vertex of the skull to the toes. Oral contrast was given during the uptake period. CT was performed during shallow respiration. The CT protocol was optimized for PET attenuation correction and to provide anatomic detail for localization of PET abnormalities. The CT protocol was not designed to produce and cannot replace state-of-the-art diagnostic CT images with specific imaging protocols for different body parts and indications. Images were reviewed on a dedicated workstation using multiplanar reconstruction.  The standardized uptake values (SUV) are normalized to patient body weight and indicate the highest activity concentration (SUVmax) in a given disease site. All image numbers refer to the axial image number.  COMPARISON:  FDG PET/CT dated 9/7/2019.  OTHER STUDIES USED FOR CORRELATION: MRI of cervical spine dated 9/9/2019.  FINDINGS:  HEAD/NECK: Physiologic FDG activity in the brain.  Mild hypermetabolism in left masseter muscle, decreased as compared to prior study, may be secondary to bruxism. Elongated focus of increased FDG activity in left lower cervical region may reflect inflammation or muscle spasm (image 77).  New hypermetabolic lucency in left maxillary alveolus is nonspecific (SUV 8.0; image 53). Recommend correlation with dental exam. A new small hypermetabolic focus is seen in the right maxilla (SUV 3.7; image 55).  CHEST: Few new small hypermetabolic foci in bilateral hilar region likely correspond to small lymph nodes that are difficult to delineate on CT. For reference, left perihilar region demonstrates SUV 3.6 (image 117). Resolution of FDG-avid subcentimeter left posterior paraesophageal lymph node.  LUNGS: No abnormal FDG activity. Calcified right lower lobe granuloma, unchanged (image 128).  PLEURA/PERICARDIUM: No abnormal FDG activity. No effusion.  HEPATOBILIARY/PANCREAS: Physiologic FDG activity. Liver SUV mean is 2.1; previous 2.3.  SPLEEN: No abnormal FDG activity. Normal in size.  ADRENAL GLANDS: No abnormal FDG activity. No nodule.  KIDNEYS/URINARY BLADDER: Physiologic FDG activity. Multiple bilateral renal cysts, measuring up to 4.6 cm on the right, unchanged.  PELVIC ORGANS: No abnormal FDG activity.  ABDOMINOPELVIC NODES: No enlarged or FDG avid lymph node  BOWEL/PERITONEUM/MESENTERY: Physiologic FDG activity. Resolution of hypermetabolism in distal esophagus/GE junction.  BONES: Near total resolution of previously seen hypermetabolic foci in the axial skeleton, right humerus, and proximal bilateral femurs. Previously seen lytic lesions demonstrate new areas of sclerosis on CT. For reference, one of the most FDG-avid residual lesions is a mixed lytic and sclerotic lesion in the left scapula which demonstrates SUV 3.0 (image 98); previously lytic with SUV 5.7. Previously seen hypermetabolic lesion in right sphenoid bone has resolved. Previously seen large destructive lesion in the manubrium the sternum demonstrates new sclerosis with SUV 3.0 (image 103); previous SUV 5.3. Resolution of hypermetabolic intramedullary soft tissue in proximal right humerus. Near total resolution of hypermetabolic intramedullary soft tissue in proximal left femur (SUV 1.3; image 273; previous SUV 5.3).  FDG-avid fracture in left inferior pubic ramus demonstrates increased callus formation and is similar in metabolism (SUV 3.8; image 250; previous SUV 3.1). Few new mildly FDG-avid bilateral rib fractures. For reference, fracture in the anterior aspect of the left second rib demonstrates SUV 3.4 (image 105).  Resolution of hypermetabolism in left palmar musculature.  IMPRESSION:  Abnormal whole body FDG-PET/CT scan.  1. Near total resolution of hypermetabolism associated with lytic and intramedullary lesions in the axial and appendicular skeleton as compared to prior study dated 9/7/2019. Previously seen lytic lesions demonstrate new sclerosis. Findings are compatible with response to interval therapy.  2. Few new mildly FDG-avid bilateral rib fractures. An FDG-avid fracture in the left inferior pubic ramus demonstrates increased callus formation and is similar in metabolism.  3. New hypermetabolic lucency in left maxillary alveolus and new small hypermetabolic focus in right maxilla are nonspecific. Recommend correlation with dental exam.  4. Few new mildly FDG-avid nonspecific bilateral hilar lymph nodes.  5. Resolution of many specific hypermetabolism in distal esophagus/GE junction.                LUANA RAMOS M.D., NUCLEAR MEDICINE ATTENDING This document has been electronically signed. May  7 2020  4:08PM      Ordered by: OZZY ALMEIDA       Collected/Examined: 07May2020 12:57PM        Verified by: OZZY ALMEIDA 11May2020 09:27AM         Result Communication: Call patient with results,Discussed results with patient; Stage: Final         Performed at: [facility] at the [facility]       Resulted: 07May2020 04:11PM       Last Updated: 11May2020 09:27AM       Accession: [id number]           Pathology             Final  No Documents Attached       [facility] Accession Number : [id number]  HARDEEP, SUBASH                          2    Cytopathology Report      Final Diagnosis SOFT TISSUE, SACRUM, CT GUIDED CORE BIOPSY Plasma cell neoplasm See note and description.  Diagnostic note:  Overall the morphologic and immunophenotypic findings are consistent with plasma cell neoplasm. The differential diagnosis includes plasmacytoma vs plasma cells myeloma. Correlation with laboratory, radiologic and clinical findings is required for further classification.  Microscopic description: The touch prep slides are composed of numerous enlarged plasma cells with prominent nucleoli, occasional binucleated forms are seen.  Histologic sections consist of multiple needle shaped cores of soft tissue showing proliferation of enlarged plasma cells with prominent nucleoli, forming sheets.  Immunohistochemistry:  , kappaISH, lambdaISH, cyclinD1, p53 stains performed on paraffin embedded section of block 1C.  Neoplastic cells are: Positive:  , KappaISH, p53 Negative:  LambdaISH, cyclinD1  Flow cytometry analysis (88-JD-): Monotypic plasma cells (24.1% of total analyzed cells) are present. There are two aberrant monoclonal plasma cell populations: Population #1 (16.5% of total analyzed cells, 68.6% of plasma cell population) positive for cytoplasmic kappa, CD38 dimmer, CD45 dimmer, CD56, ; negative for , CD19, CD20. Population # 2 (7.6% of total analyzed cells, 31.4% of plasma cell population) positive for cytoplasmic kappa       HARDEEP SUBASH                          2    Cytopathology Report     (brighter), , CD38 brighter, CD45 dim, CD56 (brighter), ; negative for CD19, CD20.  Screened by: Jose Juan LARA(ASCP) Verified by: Orlando Spicer MD (Electronic Signature) Reported on: 08/22/19 13:22 EDT, [address] [place] [id number] Cytology technical processing performed at [address] [place] [id number] _________________________________________________________________   Specimen(s) Submitted SOFT TISSUE, SACRUM, CT GUIDED CORE BIOPSY   Statement of Adequacy Immediate cytologic study for adequacy of specimen using a Diff- Quik stain was performed at [facility], [phone] [place] [address]. Touch imprints acceptable for further evaluation by Jose Juan LARA([facility]).   Clinical Information Sacral mass.   Gross Description Core Biopsy: Tissue collected: Specimen container has been inspected to confirm patient?s name and date of birth, contains 3  tan cores measuring 1.0, 1.0, 0.8 cm in length (and multiple fragments). Entire specimen submitted in 2 cassette(s) labeled 1B and 1 C.  4 Touch prep slides ( 2 air dried + 2 fixed)  FNA: No material submitted for cell block (No block 1A)  Prepared: 4 Touch Prep, 1 core biopsy labeled 1B 1 core biopsy labeled 1C 6 Total slides      Ordered by: DELORES MOSS       Collected/Examined: [id number] 03:14PM        Verified by: OZZY ALMEIDA [id number] 03:07PM         Result Communication: No patient communication needed at this time; Stage: Final         Performed at: [facility]       Resulted: [id number] 01:22PM       Last Updated: 18Jun2020 03:07PM       Accession: [id number]           Pathology             Final  No Documents Attached       [facility] Accession Number : [id number]  RASHID MEIER                          4    Addendum Report     Hematopathology Addendum Comprehensive Hematopathology Report  Final Diagnosis: 1, 2. Bone marrow biopsy and bone marrow aspirate - Plasma cell myeloma (10% with focal 25% by  stain) - Slight erythroid predominant trilineage hematopoiesis with maturation  Diagnostic Note: Please note findings of a normal male karyotype and a negative multiple myeloma FISH panel. Based on the additional findings, the diagnosis has not changed. Correlation with studies for myeloma-related organ dysfunction is necessary.  Morphology: Microscopic description: 1. Biopsy: Sections of bone marrow biopsy show normocellularity (60 to 70% cellularity), mild plasmacytosis with foci of small clusters, slight erythroid predominant trilineage hematopoiesis with maturation, mild megakaryocytosis with normal morphology, and increased iron stores. 2. Aspirate: Cellular spicules are not present, precluding differential count. Review of the smear shows some maturing and predominant mature myeloid cells, a few erythroid elements, and rare megakaryocytes.  Ancillary Studies: Bone marrow aspirate iron stain: No spicules are present to evaluate for iron stores and there are insufficient nRBC to evaluate for ring sideroblasts. Congo red stain is negative for amyloidosis. Flow cytometry:  Monotypic plasma cells (1% of cells), positive for cytoplasmic kappa, CD38, , dimmer CD45, partial CD56; negative CD19, CD20, . CD45/side scatter shows no significant blast population. There is no increase in CD34,  or CD14 positive cells. Lymphocytes (8% of cells) show a heterogeneous population of T- cells (with normal CD4 to CD8 ratio), and polytypic B-cells. Immunohistochemical stains ( performed on the block 1A and 1B, cyclin-D1 performed on block 1A):   stains show overall approximately 10% plasma cells, with foci of small clusters, focal up to 25%. Plasma cells are negative for cyclin-D1.  Cytogenetics: Result: Normal male karyotype Karyotype: 46,XY[20]       HARDEEP, SUBASH                          4    Addendum Report     FISH: Result: NORMAL FISH - - MULTIPLE MYELOMA PANEL Probe(s) and Location(s): FGFR3(4p16), CCND1(11q13), RB1(13q14), IGH(14q32), MAF(16q23), TP53(17p13.1)  Clinical History/Data: New diagnosis multiple myeloma CBC on 08/24/2019: WBC: 8.09 K/uL, HGB: 9.5 g/dL, MCV: 90 fl, Plt: 227 K/uL Serum Immunofixation: IgG kappa  Verified by: Nancy Farris M.D. (Electronic Signature) Reported on: 09/07/19 10:37 EDT, [address] [place] [id number] _________________________________________________________________   Hematopathology Report     Final Diagnosis 1, 2. Bone marrow biopsy and bone marrow aspirate - Plasma cell myeloma (10% with focal 25% by  stain) - Slight erythroid predominant trilineage hematopoiesis with maturation  See note and description.  Diagnostic note: Correlation with studies for myeloma-related organ dysfunction is necessary. Comprehensive report with results of pending ancillary studies to follow.  Ancillary studies Bone marrow aspirate iron stain: No spicules are present to evaluate for iron stores and there are insufficient nRBC to evaluate for ring sideroblasts.  Flow cytometry:  Monotypic plasma cells (1% of cells), positive for cytoplasmic kappa, CD38, , dimmer CD45, partial CD56; negative CD19, CD20, . CD45/side scatter shows no significant blast population. There is no increase in CD34,  or CD14 positive cells.       HARDEEP, SUBASH                          4    Hematopathology Report     Lymphocytes (8% of cells) show a heterogeneous population of T- cells (with normal CD4 to CD8 ratio), and polytypic B-cells.  Immunohistochemical stains ( performed on the block 1A and 1B, cyclin-D1 performed on block 1A):   stains show overall approximately 10% plasma cells, with foci of small clusters, focal up to 25%. Plasma cells are negative for cyclin-D1.  Microscopic description: 1. Biopsy: Sections of bone marrow biopsy show normocellularity (60 to 70% cellularity), mild plasmacytosis with foci of small clusters, slight erythroid predominant trilineage hematopoiesis with maturation, mild megakaryocytosis with normal morphology, and increased iron stores.  2. Aspirate: Cellular spicules are not present, precluding differential count. Review of the smear shows some maturing and predominant mature myeloid cells, a few erythroid elements, and rare megakaryocytes.  Comment:  Iron stain (examined to evaluate for iron stores; see microscopic description) and Giemsa stain (shows appropriate staining pattern) are performed and evaluated on block(s): 1A, 1B.  Slide(s) with built in immunohistochemical study control(s) associated and negative control with this case has/have been verified by the sign out pathologist. For slide(s) without built in controls positive control slides has/have been reviewed and approved by immunohistochemistry lab These immunohistochemical/ in-situ hybridization tests have been developed and their performance characteristics determined by [facility], Department of Pathology, Division of Immunopathology, [phone] [place] [address].  It has not been cleared or approved by the U.S. Food and Drug Administration.  The FDA has determined that such clearance or approval is not necessary.  This test is used for clinical purposes.  The laboratory is certified under the CLIA-88 as qualified to perform high complexity clinical testing.  Verified by: Nancy Farris M.D. (Electronic Signature) Reported on: 08/27/19 10:00 EDT, [address] [place] [address] [place] [address] _________________________________________________________________        HARDEEP, SUBASH                          4    Hematopathology Report     Clinical History New diagnosis multiple myeloma CBC on 08/24/2019: WBC: 8.09 K/uL, HGB: 9.5 g/dL, MCV: 90 fl, Plt: 227 K/uL Serum Immunofixation: IgG kappa  Specimen(s) Submitted 1     Bone marrow biopsy left 2     Bone marrow aspirate  Gross Description 1. The specimen is received in bouin's fixative and the specimen container is labeled: Left bone marrow biopsy.  It consists of a 0.5 x 0.2 cm bone marrow core with a 1.5 x 1.3 x 0.5 cm blood clot.  Entirely submitted.  Two cassettes: A = bone marrow core; B = blood clot. 2. The specimen is received labeled with patient's name and consists of 3 air dried slide(s). 2 slide(s) stained with Monzon Giemsa stain. 1 stained for iron stain. In addition to other data that may appear on the specimen container, the label has been inspected to confirm the presence of the patient's name and date of birth. Catarina Torres 08/22/2019 17:12      Ordered by: BECKY BILLS       Collected/Examined: [id number] 03:50PM        Verified by: BECKY BILLS [id number] 05:42PM         Result Communication: No patient communication needed at this time; Stage: Final         Performed at: [facility]       Resulted: [id number] 10:37AM       Last Updated: [id number] 05:42PM       Accession: [id number]

## 2024-06-07 ENCOUNTER — RESULT REVIEW (OUTPATIENT)
Age: 74
End: 2024-06-07

## 2024-06-07 ENCOUNTER — APPOINTMENT (OUTPATIENT)
Dept: HEMATOLOGY ONCOLOGY | Facility: CLINIC | Age: 74
End: 2024-06-07

## 2024-06-07 ENCOUNTER — APPOINTMENT (OUTPATIENT)
Dept: INFUSION THERAPY | Facility: HOSPITAL | Age: 74
End: 2024-06-07

## 2024-06-07 LAB
BASOPHILS # BLD AUTO: 0.04 K/UL — SIGNIFICANT CHANGE UP (ref 0–0.2)
BASOPHILS NFR BLD AUTO: 0.7 % — SIGNIFICANT CHANGE UP (ref 0–2)
EOSINOPHIL # BLD AUTO: 0.17 K/UL — SIGNIFICANT CHANGE UP (ref 0–0.5)
EOSINOPHIL NFR BLD AUTO: 3.2 % — SIGNIFICANT CHANGE UP (ref 0–6)
HCT VFR BLD CALC: 26.8 % — LOW (ref 39–50)
HGB BLD-MCNC: 9.2 G/DL — LOW (ref 13–17)
IMM GRANULOCYTES NFR BLD AUTO: 0.2 % — SIGNIFICANT CHANGE UP (ref 0–0.9)
LYMPHOCYTES # BLD AUTO: 1.28 K/UL — SIGNIFICANT CHANGE UP (ref 1–3.3)
LYMPHOCYTES # BLD AUTO: 23.9 % — SIGNIFICANT CHANGE UP (ref 13–44)
MCHC RBC-ENTMCNC: 31.5 PG — SIGNIFICANT CHANGE UP (ref 27–34)
MCHC RBC-ENTMCNC: 34.3 G/DL — SIGNIFICANT CHANGE UP (ref 32–36)
MCV RBC AUTO: 91.8 FL — SIGNIFICANT CHANGE UP (ref 80–100)
MONOCYTES # BLD AUTO: 0.54 K/UL — SIGNIFICANT CHANGE UP (ref 0–0.9)
MONOCYTES NFR BLD AUTO: 10.1 % — SIGNIFICANT CHANGE UP (ref 2–14)
NEUTROPHILS # BLD AUTO: 3.32 K/UL — SIGNIFICANT CHANGE UP (ref 1.8–7.4)
NEUTROPHILS NFR BLD AUTO: 61.9 % — SIGNIFICANT CHANGE UP (ref 43–77)
NRBC # BLD: 0 /100 WBCS — SIGNIFICANT CHANGE UP (ref 0–0)
PLATELET # BLD AUTO: 144 K/UL — LOW (ref 150–400)
RBC # BLD: 2.92 M/UL — LOW (ref 4.2–5.8)
RBC # FLD: 13.1 % — SIGNIFICANT CHANGE UP (ref 10.3–14.5)
WBC # BLD: 5.36 K/UL — SIGNIFICANT CHANGE UP (ref 3.8–10.5)
WBC # FLD AUTO: 5.36 K/UL — SIGNIFICANT CHANGE UP (ref 3.8–10.5)

## 2024-06-11 LAB
ALBUMIN MFR SERPL ELPH: 61.4 %
ALBUMIN SERPL ELPH-MCNC: 5.1 G/DL
ALBUMIN SERPL-MCNC: 4.7 G/DL
ALBUMIN/GLOB SERPL: 1.6 RATIO
ALP BLD-CCNC: 49 U/L
ALPHA1 GLOB MFR SERPL ELPH: 4.3 %
ALPHA1 GLOB SERPL ELPH-MCNC: 0.3 G/DL
ALPHA2 GLOB MFR SERPL ELPH: 11.9 %
ALPHA2 GLOB SERPL ELPH-MCNC: 0.9 G/DL
ALT SERPL-CCNC: 17 U/L
ANION GAP SERPL CALC-SCNC: 15 MMOL/L
AST SERPL-CCNC: 24 U/L
B-GLOBULIN MFR SERPL ELPH: 11.4 %
B-GLOBULIN SERPL ELPH-MCNC: 0.9 G/DL
BILIRUB SERPL-MCNC: 0.4 MG/DL
BUN SERPL-MCNC: 27 MG/DL
CALCIUM SERPL-MCNC: 10 MG/DL
CHLORIDE SERPL-SCNC: 104 MMOL/L
CO2 SERPL-SCNC: 20 MMOL/L
CREAT SERPL-MCNC: 2.1 MG/DL
DEPRECATED KAPPA LC FREE/LAMBDA SER: 1.33 RATIO
EGFR: 32 ML/MIN/1.73M2
GAMMA GLOB FLD ELPH-MCNC: 0.8 G/DL
GAMMA GLOB MFR SERPL ELPH: 11 %
GLUCOSE SERPL-MCNC: 97 MG/DL
IGA SER QL IEP: 191 MG/DL
IGG SER QL IEP: 907 MG/DL
IGM SER QL IEP: 24 MG/DL
INTERPRETATION SERPL IEP-IMP: NORMAL
KAPPA LC CSF-MCNC: 2.12 MG/DL
KAPPA LC SERPL-MCNC: 2.83 MG/DL
LDH SERPL-CCNC: 200 U/L
M PROTEIN SPEC IFE-MCNC: NORMAL
POTASSIUM SERPL-SCNC: 4.4 MMOL/L
PROT SERPL-MCNC: 7.7 G/DL
SODIUM SERPL-SCNC: 139 MMOL/L

## 2024-06-14 ENCOUNTER — RESULT REVIEW (OUTPATIENT)
Age: 74
End: 2024-06-14

## 2024-06-14 ENCOUNTER — APPOINTMENT (OUTPATIENT)
Dept: INFUSION THERAPY | Facility: HOSPITAL | Age: 74
End: 2024-06-14

## 2024-06-14 LAB
BASOPHILS # BLD AUTO: 0.05 K/UL — SIGNIFICANT CHANGE UP (ref 0–0.2)
BASOPHILS NFR BLD AUTO: 0.9 % — SIGNIFICANT CHANGE UP (ref 0–2)
EOSINOPHIL # BLD AUTO: 0.13 K/UL — SIGNIFICANT CHANGE UP (ref 0–0.5)
EOSINOPHIL NFR BLD AUTO: 2.4 % — SIGNIFICANT CHANGE UP (ref 0–6)
HCT VFR BLD CALC: 26.1 % — LOW (ref 39–50)
HGB BLD-MCNC: 9 G/DL — LOW (ref 13–17)
IMM GRANULOCYTES NFR BLD AUTO: 1.1 % — HIGH (ref 0–0.9)
LYMPHOCYTES # BLD AUTO: 1.54 K/UL — SIGNIFICANT CHANGE UP (ref 1–3.3)
LYMPHOCYTES # BLD AUTO: 29 % — SIGNIFICANT CHANGE UP (ref 13–44)
MCHC RBC-ENTMCNC: 31.5 PG — SIGNIFICANT CHANGE UP (ref 27–34)
MCHC RBC-ENTMCNC: 34.5 G/DL — SIGNIFICANT CHANGE UP (ref 32–36)
MCV RBC AUTO: 91.3 FL — SIGNIFICANT CHANGE UP (ref 80–100)
MONOCYTES # BLD AUTO: 0.49 K/UL — SIGNIFICANT CHANGE UP (ref 0–0.9)
MONOCYTES NFR BLD AUTO: 9.2 % — SIGNIFICANT CHANGE UP (ref 2–14)
NEUTROPHILS # BLD AUTO: 3.04 K/UL — SIGNIFICANT CHANGE UP (ref 1.8–7.4)
NEUTROPHILS NFR BLD AUTO: 57.4 % — SIGNIFICANT CHANGE UP (ref 43–77)
NRBC # BLD: 0 /100 WBCS — SIGNIFICANT CHANGE UP (ref 0–0)
PLATELET # BLD AUTO: 117 K/UL — LOW (ref 150–400)
RBC # BLD: 2.86 M/UL — LOW (ref 4.2–5.8)
RBC # FLD: 12.9 % — SIGNIFICANT CHANGE UP (ref 10.3–14.5)
WBC # BLD: 5.31 K/UL — SIGNIFICANT CHANGE UP (ref 3.8–10.5)
WBC # FLD AUTO: 5.31 K/UL — SIGNIFICANT CHANGE UP (ref 3.8–10.5)

## 2024-06-21 ENCOUNTER — APPOINTMENT (OUTPATIENT)
Dept: HEMATOLOGY ONCOLOGY | Facility: CLINIC | Age: 74
End: 2024-06-21

## 2024-06-21 ENCOUNTER — RESULT REVIEW (OUTPATIENT)
Age: 74
End: 2024-06-21

## 2024-06-21 ENCOUNTER — APPOINTMENT (OUTPATIENT)
Dept: INFUSION THERAPY | Facility: HOSPITAL | Age: 74
End: 2024-06-21

## 2024-06-21 LAB
BASOPHILS # BLD AUTO: 0.04 K/UL — SIGNIFICANT CHANGE UP (ref 0–0.2)
BASOPHILS NFR BLD AUTO: 0.7 % — SIGNIFICANT CHANGE UP (ref 0–2)
EOSINOPHIL # BLD AUTO: 0.16 K/UL — SIGNIFICANT CHANGE UP (ref 0–0.5)
EOSINOPHIL NFR BLD AUTO: 3 % — SIGNIFICANT CHANGE UP (ref 0–6)
HCT VFR BLD CALC: 26.8 % — LOW (ref 39–50)
HGB BLD-MCNC: 9.1 G/DL — LOW (ref 13–17)
IMM GRANULOCYTES NFR BLD AUTO: 0.4 % — SIGNIFICANT CHANGE UP (ref 0–0.9)
LYMPHOCYTES # BLD AUTO: 1.39 K/UL — SIGNIFICANT CHANGE UP (ref 1–3.3)
LYMPHOCYTES # BLD AUTO: 25.7 % — SIGNIFICANT CHANGE UP (ref 13–44)
MCHC RBC-ENTMCNC: 31.9 PG — SIGNIFICANT CHANGE UP (ref 27–34)
MCHC RBC-ENTMCNC: 34 G/DL — SIGNIFICANT CHANGE UP (ref 32–36)
MCV RBC AUTO: 94 FL — SIGNIFICANT CHANGE UP (ref 80–100)
MONOCYTES # BLD AUTO: 0.58 K/UL — SIGNIFICANT CHANGE UP (ref 0–0.9)
MONOCYTES NFR BLD AUTO: 10.7 % — SIGNIFICANT CHANGE UP (ref 2–14)
NEUTROPHILS # BLD AUTO: 3.21 K/UL — SIGNIFICANT CHANGE UP (ref 1.8–7.4)
NEUTROPHILS NFR BLD AUTO: 59.5 % — SIGNIFICANT CHANGE UP (ref 43–77)
NRBC # BLD: 0 /100 WBCS — SIGNIFICANT CHANGE UP (ref 0–0)
PLATELET # BLD AUTO: 164 K/UL — SIGNIFICANT CHANGE UP (ref 150–400)
RBC # BLD: 2.85 M/UL — LOW (ref 4.2–5.8)
RBC # FLD: 13.1 % — SIGNIFICANT CHANGE UP (ref 10.3–14.5)
WBC # BLD: 5.4 K/UL — SIGNIFICANT CHANGE UP (ref 3.8–10.5)
WBC # FLD AUTO: 5.4 K/UL — SIGNIFICANT CHANGE UP (ref 3.8–10.5)

## 2024-06-28 ENCOUNTER — RESULT REVIEW (OUTPATIENT)
Age: 74
End: 2024-06-28

## 2024-06-28 ENCOUNTER — APPOINTMENT (OUTPATIENT)
Dept: INFUSION THERAPY | Facility: HOSPITAL | Age: 74
End: 2024-06-28

## 2024-06-28 LAB
BASOPHILS # BLD AUTO: 0.04 K/UL — SIGNIFICANT CHANGE UP (ref 0–0.2)
BASOPHILS NFR BLD AUTO: 0.8 % — SIGNIFICANT CHANGE UP (ref 0–2)
EOSINOPHIL # BLD AUTO: 0.1 K/UL — SIGNIFICANT CHANGE UP (ref 0–0.5)
EOSINOPHIL NFR BLD AUTO: 1.9 % — SIGNIFICANT CHANGE UP (ref 0–6)
HCT VFR BLD CALC: 29.7 % — LOW (ref 39–50)
HGB BLD-MCNC: 10.1 G/DL — LOW (ref 13–17)
IMM GRANULOCYTES NFR BLD AUTO: 1 % — HIGH (ref 0–0.9)
LYMPHOCYTES # BLD AUTO: 1.15 K/UL — SIGNIFICANT CHANGE UP (ref 1–3.3)
LYMPHOCYTES # BLD AUTO: 22.2 % — SIGNIFICANT CHANGE UP (ref 13–44)
MCHC RBC-ENTMCNC: 31.7 PG — SIGNIFICANT CHANGE UP (ref 27–34)
MCHC RBC-ENTMCNC: 34 G/DL — SIGNIFICANT CHANGE UP (ref 32–36)
MCV RBC AUTO: 93.1 FL — SIGNIFICANT CHANGE UP (ref 80–100)
MONOCYTES # BLD AUTO: 0.5 K/UL — SIGNIFICANT CHANGE UP (ref 0–0.9)
MONOCYTES NFR BLD AUTO: 9.7 % — SIGNIFICANT CHANGE UP (ref 2–14)
NEUTROPHILS # BLD AUTO: 3.34 K/UL — SIGNIFICANT CHANGE UP (ref 1.8–7.4)
NEUTROPHILS NFR BLD AUTO: 64.4 % — SIGNIFICANT CHANGE UP (ref 43–77)
NRBC # BLD: 0 /100 WBCS — SIGNIFICANT CHANGE UP (ref 0–0)
PLATELET # BLD AUTO: 150 K/UL — SIGNIFICANT CHANGE UP (ref 150–400)
RBC # BLD: 3.19 M/UL — LOW (ref 4.2–5.8)
RBC # FLD: 12.9 % — SIGNIFICANT CHANGE UP (ref 10.3–14.5)
WBC # BLD: 5.18 K/UL — SIGNIFICANT CHANGE UP (ref 3.8–10.5)
WBC # FLD AUTO: 5.18 K/UL — SIGNIFICANT CHANGE UP (ref 3.8–10.5)

## 2024-07-01 ENCOUNTER — OUTPATIENT (OUTPATIENT)
Dept: OUTPATIENT SERVICES | Facility: HOSPITAL | Age: 74
LOS: 1 days | Discharge: HOME | End: 2024-07-01

## 2024-07-01 DIAGNOSIS — C90.00 MULTIPLE MYELOMA NOT HAVING ACHIEVED REMISSION: ICD-10-CM

## 2024-07-01 DIAGNOSIS — Z95.5 PRESENCE OF CORONARY ANGIOPLASTY IMPLANT AND GRAFT: Chronic | ICD-10-CM

## 2024-07-05 ENCOUNTER — RESULT REVIEW (OUTPATIENT)
Age: 74
End: 2024-07-05

## 2024-07-05 ENCOUNTER — APPOINTMENT (OUTPATIENT)
Dept: INFUSION THERAPY | Facility: HOSPITAL | Age: 74
End: 2024-07-05

## 2024-07-05 ENCOUNTER — APPOINTMENT (OUTPATIENT)
Dept: HEMATOLOGY ONCOLOGY | Facility: CLINIC | Age: 74
End: 2024-07-05

## 2024-07-05 LAB
BASOPHILS # BLD AUTO: 0.06 K/UL — SIGNIFICANT CHANGE UP (ref 0–0.2)
BASOPHILS NFR BLD AUTO: 0.9 % — SIGNIFICANT CHANGE UP (ref 0–2)
EOSINOPHIL # BLD AUTO: 0.14 K/UL — SIGNIFICANT CHANGE UP (ref 0–0.5)
EOSINOPHIL NFR BLD AUTO: 2.2 % — SIGNIFICANT CHANGE UP (ref 0–6)
HCT VFR BLD CALC: 30.4 % — LOW (ref 39–50)
HGB BLD-MCNC: 10.1 G/DL — LOW (ref 13–17)
IMM GRANULOCYTES NFR BLD AUTO: 0.6 % — SIGNIFICANT CHANGE UP (ref 0–0.9)
LYMPHOCYTES # BLD AUTO: 1.57 K/UL — SIGNIFICANT CHANGE UP (ref 1–3.3)
LYMPHOCYTES # BLD AUTO: 24.2 % — SIGNIFICANT CHANGE UP (ref 13–44)
MCHC RBC-ENTMCNC: 30.6 PG — SIGNIFICANT CHANGE UP (ref 27–34)
MCHC RBC-ENTMCNC: 33.2 G/DL — SIGNIFICANT CHANGE UP (ref 32–36)
MCV RBC AUTO: 92.1 FL — SIGNIFICANT CHANGE UP (ref 80–100)
MONOCYTES # BLD AUTO: 0.73 K/UL — SIGNIFICANT CHANGE UP (ref 0–0.9)
MONOCYTES NFR BLD AUTO: 11.2 % — SIGNIFICANT CHANGE UP (ref 2–14)
NEUTROPHILS # BLD AUTO: 3.96 K/UL — SIGNIFICANT CHANGE UP (ref 1.8–7.4)
NEUTROPHILS NFR BLD AUTO: 60.9 % — SIGNIFICANT CHANGE UP (ref 43–77)
NRBC # BLD: 0 /100 WBCS — SIGNIFICANT CHANGE UP (ref 0–0)
NRBC BLD-RTO: 0 /100 WBCS — SIGNIFICANT CHANGE UP (ref 0–0)
PLATELET # BLD AUTO: 182 K/UL — SIGNIFICANT CHANGE UP (ref 150–400)
RBC # BLD: 3.3 M/UL — LOW (ref 4.2–5.8)
RBC # FLD: 12.9 % — SIGNIFICANT CHANGE UP (ref 10.3–14.5)
WBC # BLD: 6.5 K/UL — SIGNIFICANT CHANGE UP (ref 3.8–10.5)
WBC # FLD AUTO: 6.5 K/UL — SIGNIFICANT CHANGE UP (ref 3.8–10.5)

## 2024-07-08 DIAGNOSIS — Z51.11 ENCOUNTER FOR ANTINEOPLASTIC CHEMOTHERAPY: ICD-10-CM

## 2024-07-08 DIAGNOSIS — R11.2 NAUSEA WITH VOMITING, UNSPECIFIED: ICD-10-CM

## 2024-07-10 ENCOUNTER — APPOINTMENT (OUTPATIENT)
Dept: CARDIOLOGY | Facility: CLINIC | Age: 74
End: 2024-07-10
Payer: MEDICARE

## 2024-07-10 VITALS
HEART RATE: 55 BPM | WEIGHT: 125 LBS | OXYGEN SATURATION: 98 % | BODY MASS INDEX: 21.34 KG/M2 | TEMPERATURE: 98.2 F | SYSTOLIC BLOOD PRESSURE: 108 MMHG | DIASTOLIC BLOOD PRESSURE: 58 MMHG | HEIGHT: 64 IN

## 2024-07-10 DIAGNOSIS — E78.00 PURE HYPERCHOLESTEROLEMIA, UNSPECIFIED: ICD-10-CM

## 2024-07-10 DIAGNOSIS — I25.10 ATHEROSCLEROTIC HEART DISEASE OF NATIVE CORONARY ARTERY W/OUT ANGINA PECTORIS: ICD-10-CM

## 2024-07-10 DIAGNOSIS — I10 ESSENTIAL (PRIMARY) HYPERTENSION: ICD-10-CM

## 2024-07-10 PROCEDURE — 99214 OFFICE O/P EST MOD 30 MIN: CPT

## 2024-07-12 ENCOUNTER — APPOINTMENT (OUTPATIENT)
Dept: INFUSION THERAPY | Facility: HOSPITAL | Age: 74
End: 2024-07-12

## 2024-07-18 DIAGNOSIS — D64.9 ANEMIA, UNSPECIFIED: ICD-10-CM

## 2024-07-19 ENCOUNTER — RESULT REVIEW (OUTPATIENT)
Age: 74
End: 2024-07-19

## 2024-07-19 ENCOUNTER — APPOINTMENT (OUTPATIENT)
Dept: INFUSION THERAPY | Facility: HOSPITAL | Age: 74
End: 2024-07-19

## 2024-07-19 ENCOUNTER — APPOINTMENT (OUTPATIENT)
Dept: HEMATOLOGY ONCOLOGY | Facility: CLINIC | Age: 74
End: 2024-07-19

## 2024-07-19 LAB
BASOPHILS # BLD AUTO: 0.08 K/UL — SIGNIFICANT CHANGE UP (ref 0–0.2)
BASOPHILS NFR BLD AUTO: 1.2 % — SIGNIFICANT CHANGE UP (ref 0–2)
EOSINOPHIL # BLD AUTO: 0.18 K/UL — SIGNIFICANT CHANGE UP (ref 0–0.5)
EOSINOPHIL NFR BLD AUTO: 2.7 % — SIGNIFICANT CHANGE UP (ref 0–6)
HCT VFR BLD CALC: 31.7 % — LOW (ref 39–50)
HGB BLD-MCNC: 10.5 G/DL — LOW (ref 13–17)
IMM GRANULOCYTES NFR BLD AUTO: 1.8 % — HIGH (ref 0–0.9)
LYMPHOCYTES # BLD AUTO: 1.7 K/UL — SIGNIFICANT CHANGE UP (ref 1–3.3)
LYMPHOCYTES # BLD AUTO: 25.4 % — SIGNIFICANT CHANGE UP (ref 13–44)
MCHC RBC-ENTMCNC: 30.3 PG — SIGNIFICANT CHANGE UP (ref 27–34)
MCHC RBC-ENTMCNC: 33.1 G/DL — SIGNIFICANT CHANGE UP (ref 32–36)
MCV RBC AUTO: 91.4 FL — SIGNIFICANT CHANGE UP (ref 80–100)
MONOCYTES # BLD AUTO: 0.68 K/UL — SIGNIFICANT CHANGE UP (ref 0–0.9)
MONOCYTES NFR BLD AUTO: 10.2 % — SIGNIFICANT CHANGE UP (ref 2–14)
NEUTROPHILS # BLD AUTO: 3.93 K/UL — SIGNIFICANT CHANGE UP (ref 1.8–7.4)
NEUTROPHILS NFR BLD AUTO: 58.7 % — SIGNIFICANT CHANGE UP (ref 43–77)
NRBC # BLD: 0 /100 WBCS — SIGNIFICANT CHANGE UP (ref 0–0)
NRBC BLD-RTO: 0 /100 WBCS — SIGNIFICANT CHANGE UP (ref 0–0)
PLATELET # BLD AUTO: 165 K/UL — SIGNIFICANT CHANGE UP (ref 150–400)
RBC # BLD: 3.47 M/UL — LOW (ref 4.2–5.8)
RBC # FLD: 12.7 % — SIGNIFICANT CHANGE UP (ref 10.3–14.5)
WBC # BLD: 6.69 K/UL — SIGNIFICANT CHANGE UP (ref 3.8–10.5)
WBC # FLD AUTO: 6.69 K/UL — SIGNIFICANT CHANGE UP (ref 3.8–10.5)

## 2024-07-26 ENCOUNTER — APPOINTMENT (OUTPATIENT)
Dept: INFUSION THERAPY | Facility: HOSPITAL | Age: 74
End: 2024-07-26

## 2024-08-02 ENCOUNTER — RESULT REVIEW (OUTPATIENT)
Age: 74
End: 2024-08-02

## 2024-08-02 ENCOUNTER — APPOINTMENT (OUTPATIENT)
Dept: HEMATOLOGY ONCOLOGY | Facility: CLINIC | Age: 74
End: 2024-08-02

## 2024-08-02 ENCOUNTER — APPOINTMENT (OUTPATIENT)
Dept: INFUSION THERAPY | Facility: HOSPITAL | Age: 74
End: 2024-08-02

## 2024-08-02 LAB
BASOPHILS # BLD AUTO: 0.05 K/UL — SIGNIFICANT CHANGE UP (ref 0–0.2)
BASOPHILS NFR BLD AUTO: 0.8 % — SIGNIFICANT CHANGE UP (ref 0–2)
EOSINOPHIL # BLD AUTO: 0.17 K/UL — SIGNIFICANT CHANGE UP (ref 0–0.5)
EOSINOPHIL NFR BLD AUTO: 2.6 % — SIGNIFICANT CHANGE UP (ref 0–6)
HCT VFR BLD CALC: 30.5 % — LOW (ref 39–50)
HGB BLD-MCNC: 9.9 G/DL — LOW (ref 13–17)
IMM GRANULOCYTES NFR BLD AUTO: 0.2 % — SIGNIFICANT CHANGE UP (ref 0–0.9)
LYMPHOCYTES # BLD AUTO: 1.4 K/UL — SIGNIFICANT CHANGE UP (ref 1–3.3)
LYMPHOCYTES # BLD AUTO: 21.1 % — SIGNIFICANT CHANGE UP (ref 13–44)
MCHC RBC-ENTMCNC: 29.4 PG — SIGNIFICANT CHANGE UP (ref 27–34)
MCHC RBC-ENTMCNC: 32.5 G/DL — SIGNIFICANT CHANGE UP (ref 32–36)
MCV RBC AUTO: 90.5 FL — SIGNIFICANT CHANGE UP (ref 80–100)
MONOCYTES # BLD AUTO: 0.54 K/UL — SIGNIFICANT CHANGE UP (ref 0–0.9)
MONOCYTES NFR BLD AUTO: 8.2 % — SIGNIFICANT CHANGE UP (ref 2–14)
NEUTROPHILS # BLD AUTO: 4.45 K/UL — SIGNIFICANT CHANGE UP (ref 1.8–7.4)
NEUTROPHILS NFR BLD AUTO: 67.1 % — SIGNIFICANT CHANGE UP (ref 43–77)
NRBC # BLD: 0 /100 WBCS — SIGNIFICANT CHANGE UP (ref 0–0)
NRBC BLD-RTO: 0 /100 WBCS — SIGNIFICANT CHANGE UP (ref 0–0)
PLATELET # BLD AUTO: 152 K/UL — SIGNIFICANT CHANGE UP (ref 150–400)
RBC # BLD: 3.37 M/UL — LOW (ref 4.2–5.8)
RBC # FLD: 13.2 % — SIGNIFICANT CHANGE UP (ref 10.3–14.5)
WBC # BLD: 6.62 K/UL — SIGNIFICANT CHANGE UP (ref 3.8–10.5)
WBC # FLD AUTO: 6.62 K/UL — SIGNIFICANT CHANGE UP (ref 3.8–10.5)

## 2024-08-09 ENCOUNTER — APPOINTMENT (OUTPATIENT)
Dept: INFUSION THERAPY | Facility: HOSPITAL | Age: 74
End: 2024-08-09

## 2024-08-16 ENCOUNTER — RESULT REVIEW (OUTPATIENT)
Age: 74
End: 2024-08-16

## 2024-08-16 ENCOUNTER — APPOINTMENT (OUTPATIENT)
Dept: HEMATOLOGY ONCOLOGY | Facility: CLINIC | Age: 74
End: 2024-08-16
Payer: MEDICARE

## 2024-08-16 ENCOUNTER — APPOINTMENT (OUTPATIENT)
Dept: HEMATOLOGY ONCOLOGY | Facility: CLINIC | Age: 74
End: 2024-08-16

## 2024-08-16 ENCOUNTER — APPOINTMENT (OUTPATIENT)
Dept: INFUSION THERAPY | Facility: HOSPITAL | Age: 74
End: 2024-08-16

## 2024-08-16 VITALS
TEMPERATURE: 98.4 F | BODY MASS INDEX: 20.93 KG/M2 | DIASTOLIC BLOOD PRESSURE: 54 MMHG | SYSTOLIC BLOOD PRESSURE: 140 MMHG | RESPIRATION RATE: 16 BRPM | WEIGHT: 121.89 LBS | HEART RATE: 58 BPM | OXYGEN SATURATION: 98 %

## 2024-08-16 DIAGNOSIS — C90.00 MULTIPLE MYELOMA NOT HAVING ACHIEVED REMISSION: ICD-10-CM

## 2024-08-16 LAB
BASOPHILS # BLD AUTO: 0.04 K/UL — SIGNIFICANT CHANGE UP (ref 0–0.2)
BASOPHILS NFR BLD AUTO: 0.8 % — SIGNIFICANT CHANGE UP (ref 0–2)
EOSINOPHIL # BLD AUTO: 0.14 K/UL — SIGNIFICANT CHANGE UP (ref 0–0.5)
EOSINOPHIL NFR BLD AUTO: 2.9 % — SIGNIFICANT CHANGE UP (ref 0–6)
HCT VFR BLD CALC: 32.6 % — LOW (ref 39–50)
HGB BLD-MCNC: 10.3 G/DL — LOW (ref 13–17)
IMM GRANULOCYTES NFR BLD AUTO: 0.2 % — SIGNIFICANT CHANGE UP (ref 0–0.9)
LYMPHOCYTES # BLD AUTO: 1.14 K/UL — SIGNIFICANT CHANGE UP (ref 1–3.3)
LYMPHOCYTES # BLD AUTO: 23.9 % — SIGNIFICANT CHANGE UP (ref 13–44)
MCHC RBC-ENTMCNC: 28.9 PG — SIGNIFICANT CHANGE UP (ref 27–34)
MCHC RBC-ENTMCNC: 31.6 G/DL — LOW (ref 32–36)
MCV RBC AUTO: 91.3 FL — SIGNIFICANT CHANGE UP (ref 80–100)
MONOCYTES # BLD AUTO: 0.43 K/UL — SIGNIFICANT CHANGE UP (ref 0–0.9)
MONOCYTES NFR BLD AUTO: 9 % — SIGNIFICANT CHANGE UP (ref 2–14)
NEUTROPHILS # BLD AUTO: 3 K/UL — SIGNIFICANT CHANGE UP (ref 1.8–7.4)
NEUTROPHILS NFR BLD AUTO: 63.2 % — SIGNIFICANT CHANGE UP (ref 43–77)
NRBC # BLD: 0 /100 WBCS — SIGNIFICANT CHANGE UP (ref 0–0)
NRBC BLD-RTO: 0 /100 WBCS — SIGNIFICANT CHANGE UP (ref 0–0)
PLATELET # BLD AUTO: 141 K/UL — LOW (ref 150–400)
RBC # BLD: 3.57 M/UL — LOW (ref 4.2–5.8)
RBC # FLD: 13.5 % — SIGNIFICANT CHANGE UP (ref 10.3–14.5)
WBC # BLD: 4.76 K/UL — SIGNIFICANT CHANGE UP (ref 3.8–10.5)
WBC # FLD AUTO: 4.76 K/UL — SIGNIFICANT CHANGE UP (ref 3.8–10.5)

## 2024-08-16 PROCEDURE — 99213 OFFICE O/P EST LOW 20 MIN: CPT

## 2024-08-21 NOTE — RESULTS/DATA
[FreeTextEntry1] : 8/16/2024 WBC normal Hgb 10.3  Platelets 141 eGFR 38 Pending MICAH  6/5/2024 WBC normal  Hgb 9.3 Platelets normal  Iron, B12, folate - normal  EPO 8.8  Most recent CMP 04/2024 Cr 1.94 < 2.25 Most recent MICAH 04/2024 No monoclonal bands Normal FLC, FLCR    3/15/2024 WBC 6.18 Hgb 10 Platelets 141 Most recent CMP 2/16- Cr 1.76 Most recent MICAH 1/9 No monoclonal bands Improved KFLC and KL ratio  Skeletal survey PROCEDURE DATE:  01/27/2024 INTERPRETATION:  CLINICAL INDICATION: myeloma on Velcade; surveillance  EXAM: A skeletal survey was performed including images of the calvarium, spinal column, chest, pelvis, and both upper and lower extremities on 1/27/2024 at 1342. No prior skeletal survey available for comparison. Compared to appearance of relevant osseous structures on spine MRI from 9/27/2022.  FINDINGS/ IMPRESSION: Myelomatous lucencies with slight endosteal scalloping in proximal left femoral diaphysis subtrochanteric region, may slightly increase risk for pathologic fracture.  Old left superior and inferior pubic rami fracture deformities. Otherwise no current upper or lower extremity fractures.  No vertebral compression fractures.  Additional incidentally observed findings on the survey include: Multiple old bilateral rib fracture deformities. Multilevel spinal degenerative change.  --- End of Report ---   1/5/2023 Today's CBC: Improved H/H (10.8)  Stable platelets (147K) and WBC As of last visit MICAH: No M-spike  No M-band Normal IGs K 4.54 Normal L FLCR 2.42 Normal LDH   10/27/2023 Last visit labs  Normal range MICAH, FLCR  Stable and mild anemia and thrombocytopenia noted  8/18/2023 HGB 10.1 Cr: pending.  MICAH (8/4/2023) No M-spike, no M-band  KFLC 3.7 LFLC 2.45 FLCR 1.51 (Normal)     3/9/2023 Most recent labs Hgb 9.4  Plt 141  M-spike: unable to quantitate K 4.0 L 1.89 K/.L 2.12   1/20/2023  Most recent myeloma labs from 12/23/2022  IgG Kappa disease  No M-spike, no M-band  IgG 931 IgA 132 IgM 32 K 2.62 L 1.45 K/L 1.79   Most recent CBC from 1/12/2023 Mild anemia (10.8 and thrombocytopenia (135K)   1/19/2022 Stable myeloma labs with normal range values.Mild high Lappa lambda with normal ratio is likely due to his renal failure.  MIld to moderate anemia is stable.   9/1/2021 Last PCN labs in 06/2021 showed no M-spike, M-band, Normal FLCR.  6/9/2021 Last Myeloma labs in 03/2021 showed no M-spike, M-band, Normal FLCR.   1/25/2021 Most recent SPEP / MICAH (1/8/2021) No monoclonal band   ------------------------------------------------------------------------------------------   PET/CT Whole Body Oncology FDG             Final  No Documents Attached       EXAM:  PETCT WB ONC FDG SUBS   PROCEDURE DATE:  05/07/2020    INTERPRETATION:  PROCEDURE:  PET/CT WHOLE BODY  RADIOPHARMACEUTICAL:  9.97 mCi F-18, FDG, I.V.  CLINICAL INFORMATION:  70-year-old male referred for follow-up of multiple myeloma on Zometa. Bone marrow biopsy done 3/4/2020 showed less than 5% plasma cells and repeat myeloma labs done on 3/9/2020, showed CT are. PET/CT is done as part of the subsequent treatment strategy evaluation.  TECHNIQUE:  Fasting blood sugar measured prior to injection of radiopharmaceutical was 116 mg/dl. Following intravenous injection of the radiopharmaceutical and an uptake period of approximately 60 minutes, FDG-PET/CT was obtained on a  GE Discovery 710 from the vertex of the skull to the toes. Oral contrast was given during the uptake period. CT was performed during shallow respiration. The CT protocol was optimized for PET attenuation correction and to provide anatomic detail for localization of PET abnormalities. The CT protocol was not designed to produce and cannot replace state-of-the-art diagnostic CT images with specific imaging protocols for different body parts and indications. Images were reviewed on a dedicated workstation using multiplanar reconstruction.  The standardized uptake values (SUV) are normalized to patient body weight and indicate the highest activity concentration (SUVmax) in a given disease site. All image numbers refer to the axial image number.  COMPARISON:  FDG PET/CT dated 9/7/2019.  OTHER STUDIES USED FOR CORRELATION: MRI of cervical spine dated 9/9/2019.  FINDINGS:  HEAD/NECK: Physiologic FDG activity in the brain.  Mild hypermetabolism in left masseter muscle, decreased as compared to prior study, may be secondary to bruxism. Elongated focus of increased FDG activity in left lower cervical region may reflect inflammation or muscle spasm (image 77).  New hypermetabolic lucency in left maxillary alveolus is nonspecific (SUV 8.0; image 53). Recommend correlation with dental exam. A new small hypermetabolic focus is seen in the right maxilla (SUV 3.7; image 55).  CHEST: Few new small hypermetabolic foci in bilateral hilar region likely correspond to small lymph nodes that are difficult to delineate on CT. For reference, left perihilar region demonstrates SUV 3.6 (image 117). Resolution of FDG-avid subcentimeter left posterior paraesophageal lymph node.  LUNGS: No abnormal FDG activity. Calcified right lower lobe granuloma, unchanged (image 128).  PLEURA/PERICARDIUM: No abnormal FDG activity. No effusion.  HEPATOBILIARY/PANCREAS: Physiologic FDG activity. Liver SUV mean is 2.1; previous 2.3.  SPLEEN: No abnormal FDG activity. Normal in size.  ADRENAL GLANDS: No abnormal FDG activity. No nodule.  KIDNEYS/URINARY BLADDER: Physiologic FDG activity. Multiple bilateral renal cysts, measuring up to 4.6 cm on the right, unchanged.  PELVIC ORGANS: No abnormal FDG activity.  ABDOMINOPELVIC NODES: No enlarged or FDG avid lymph node  BOWEL/PERITONEUM/MESENTERY: Physiologic FDG activity. Resolution of hypermetabolism in distal esophagus/GE junction.  BONES: Near total resolution of previously seen hypermetabolic foci in the axial skeleton, right humerus, and proximal bilateral femurs. Previously seen lytic lesions demonstrate new areas of sclerosis on CT. For reference, one of the most FDG-avid residual lesions is a mixed lytic and sclerotic lesion in the left scapula which demonstrates SUV 3.0 (image 98); previously lytic with SUV 5.7. Previously seen hypermetabolic lesion in right sphenoid bone has resolved. Previously seen large destructive lesion in the manubrium the sternum demonstrates new sclerosis with SUV 3.0 (image 103); previous SUV 5.3. Resolution of hypermetabolic intramedullary soft tissue in proximal right humerus. Near total resolution of hypermetabolic intramedullary soft tissue in proximal left femur (SUV 1.3; image 273; previous SUV 5.3).  FDG-avid fracture in left inferior pubic ramus demonstrates increased callus formation and is similar in metabolism (SUV 3.8; image 250; previous SUV 3.1). Few new mildly FDG-avid bilateral rib fractures. For reference, fracture in the anterior aspect of the left second rib demonstrates SUV 3.4 (image 105).  Resolution of hypermetabolism in left palmar musculature.  IMPRESSION:  Abnormal whole body FDG-PET/CT scan.  1. Near total resolution of hypermetabolism associated with lytic and intramedullary lesions in the axial and appendicular skeleton as compared to prior study dated 9/7/2019. Previously seen lytic lesions demonstrate new sclerosis. Findings are compatible with response to interval therapy.  2. Few new mildly FDG-avid bilateral rib fractures. An FDG-avid fracture in the left inferior pubic ramus demonstrates increased callus formation and is similar in metabolism.  3. New hypermetabolic lucency in left maxillary alveolus and new small hypermetabolic focus in right maxilla are nonspecific. Recommend correlation with dental exam.  4. Few new mildly FDG-avid nonspecific bilateral hilar lymph nodes.  5. Resolution of many specific hypermetabolism in distal esophagus/GE junction.                LUANA RAMOS M.D., NUCLEAR MEDICINE ATTENDING This document has been electronically signed. May  7 2020  4:08PM      Ordered by: OZZY ALMEIDA       Collected/Examined: 07May2020 12:57PM        Verified by: OZZY ALMEIDA 11May2020 09:27AM         Result Communication: Call patient with results,Discussed results with patient; Stage: Final         Performed at: [facility] at the [facility]       Resulted: [id number] 04:11PM       Last Updated: 11May2020 09:27AM       Accession: [id number]           Pathology             Final  No Documents Attached       [facility] Accession Number : [id number]  HARDEEP, SUBASH                          2    Cytopathology Report      Final Diagnosis SOFT TISSUE, SACRUM, CT GUIDED CORE BIOPSY Plasma cell neoplasm See note and description.  Diagnostic note:  Overall the morphologic and immunophenotypic findings are consistent with plasma cell neoplasm. The differential diagnosis includes plasmacytoma vs plasma cells myeloma. Correlation with laboratory, radiologic and clinical findings is required for further classification.  Microscopic description: The touch prep slides are composed of numerous enlarged plasma cells with prominent nucleoli, occasional binucleated forms are seen.  Histologic sections consist of multiple needle shaped cores of soft tissue showing proliferation of enlarged plasma cells with prominent nucleoli, forming sheets.  Immunohistochemistry:  , kappaISH, lambdaISH, cyclinD1, p53 stains performed on paraffin embedded section of block 1C.  Neoplastic cells are: Positive:  , KappaISH, p53 Negative:  LambdaISH, cyclinD1  Flow cytometry analysis (42-SV-): Monotypic plasma cells (24.1% of total analyzed cells) are present. There are two aberrant monoclonal plasma cell populations: Population #1 (16.5% of total analyzed cells, 68.6% of plasma cell population) positive for cytoplasmic kappa, CD38 dimmer, CD45 dimmer, CD56, ; negative for , CD19, CD20. Population # 2 (7.6% of total analyzed cells, 31.4% of plasma cell population) positive for cytoplasmic kappa       HARDEEP, SUBASH                          2    Cytopathology Report     (brighter), , CD38 brighter, CD45 dim, CD56 (brighter), ; negative for CD19, CD20.  Screened by: Jose Juan LARA(ASCP) Verified by: Orlando Spicer MD (Electronic Signature) Reported on: 08/22/19 13:22 EDT, [address] [place] [id number] Cytology technical processing performed at [address] [place] [id number] _________________________________________________________________   Specimen(s) Submitted SOFT TISSUE, SACRUM, CT GUIDED CORE BIOPSY   Statement of Adequacy Immediate cytologic study for adequacy of specimen using a Diff- Quik stain was performed at [facility], [address] [place] [address], [place], [place]. Touch imprints acceptable for further evaluation by Jose Juan LARA([facility]).   Clinical Information Sacral mass.   Gross Description Core Biopsy: Tissue collected: Specimen container has been inspected to confirm patient?s name and date of birth, contains 3  tan cores measuring 1.0, 1.0, 0.8 cm in length (and multiple fragments). Entire specimen submitted in 2 cassette(s) labeled 1B and 1 C.  4 Touch prep slides ( 2 air dried + 2 fixed)  FNA: No material submitted for cell block (No block 1A)  Prepared: 4 Touch Prep, 1 core biopsy labeled 1B 1 core biopsy labeled 1C 6 Total slides      Ordered by: DELORES MOSS       Collected/Examined: [id number] 03:14PM        Verified by: OZZY ALMEIDA [id number] 03:07PM         Result Communication: No patient communication needed at this time; Stage: Final         Performed at: [facility]       Resulted: [id number] 01:22PM       Last Updated: 18Jun2020 03:07PM       Accession: [id number]           Pathology             Final  No Documents Attached       [facility] Accession Number : [id number]  RASHID MEIER                          4    Addendum Report     Hematopathology Addendum Comprehensive Hematopathology Report  Final Diagnosis: 1, 2. Bone marrow biopsy and bone marrow aspirate - Plasma cell myeloma (10% with focal 25% by  stain) - Slight erythroid predominant trilineage hematopoiesis with maturation  Diagnostic Note: Please note findings of a normal male karyotype and a negative multiple myeloma FISH panel. Based on the additional findings, the diagnosis has not changed. Correlation with studies for myeloma-related organ dysfunction is necessary.  Morphology: Microscopic description: 1. Biopsy: Sections of bone marrow biopsy show normocellularity (60 to 70% cellularity), mild plasmacytosis with foci of small clusters, slight erythroid predominant trilineage hematopoiesis with maturation, mild megakaryocytosis with normal morphology, and increased iron stores. 2. Aspirate: Cellular spicules are not present, precluding differential count. Review of the smear shows some maturing and predominant mature myeloid cells, a few erythroid elements, and rare megakaryocytes.  Ancillary Studies: Bone marrow aspirate iron stain: No spicules are present to evaluate for iron stores and there are insufficient nRBC to evaluate for ring sideroblasts. Congo red stain is negative for amyloidosis. Flow cytometry:  Monotypic plasma cells (1% of cells), positive for cytoplasmic kappa, CD38, , dimmer CD45, partial CD56; negative CD19, CD20, . CD45/side scatter shows no significant blast population. There is no increase in CD34,  or CD14 positive cells. Lymphocytes (8% of cells) show a heterogeneous population of T- cells (with normal CD4 to CD8 ratio), and polytypic B-cells. Immunohistochemical stains ( performed on the block 1A and 1B, cyclin-D1 performed on block 1A):   stains show overall approximately 10% plasma cells, with foci of small clusters, focal up to 25%. Plasma cells are negative for cyclin-D1.  Cytogenetics: Result: Normal male karyotype Karyotype: 46,XY[20]       HARDEEP, SUBASH                          4    Addendum Report     FISH: Result: NORMAL FISH - - MULTIPLE MYELOMA PANEL Probe(s) and Location(s): FGFR3(4p16), CCND1(11q13), RB1(13q14), IGH(14q32), MAF(16q23), TP53(17p13.1)  Clinical History/Data: New diagnosis multiple myeloma CBC on 08/24/2019: WBC: 8.09 K/uL, HGB: 9.5 g/dL, MCV: 90 fl, Plt: 227 K/uL Serum Immunofixation: IgG kappa  Verified by: Nancy Farris M.D. (Electronic Signature) Reported on: 09/07/19 10:37 EDT, [address] [place] [id number] _________________________________________________________________   Hematopathology Report     Final Diagnosis 1, 2. Bone marrow biopsy and bone marrow aspirate - Plasma cell myeloma (10% with focal 25% by  stain) - Slight erythroid predominant trilineage hematopoiesis with maturation  See note and description.  Diagnostic note: Correlation with studies for myeloma-related organ dysfunction is necessary. Comprehensive report with results of pending ancillary studies to follow.  Ancillary studies Bone marrow aspirate iron stain: No spicules are present to evaluate for iron stores and there are insufficient nRBC to evaluate for ring sideroblasts.  Flow cytometry:  Monotypic plasma cells (1% of cells), positive for cytoplasmic kappa, CD38, , dimmer CD45, partial CD56; negative CD19, CD20, . CD45/side scatter shows no significant blast population. There is no increase in CD34,  or CD14 positive cells.       HARDEEP, SUBASH                          4    Hematopathology Report     Lymphocytes (8% of cells) show a heterogeneous population of T- cells (with normal CD4 to CD8 ratio), and polytypic B-cells.  Immunohistochemical stains ( performed on the block 1A and 1B, cyclin-D1 performed on block 1A):   stains show overall approximately 10% plasma cells, with foci of small clusters, focal up to 25%. Plasma cells are negative for cyclin-D1.  Microscopic description: 1. Biopsy: Sections of bone marrow biopsy show normocellularity (60 to 70% cellularity), mild plasmacytosis with foci of small clusters, slight erythroid predominant trilineage hematopoiesis with maturation, mild megakaryocytosis with normal morphology, and increased iron stores.  2. Aspirate: Cellular spicules are not present, precluding differential count. Review of the smear shows some maturing and predominant mature myeloid cells, a few erythroid elements, and rare megakaryocytes.  Comment:  Iron stain (examined to evaluate for iron stores; see microscopic description) and Giemsa stain (shows appropriate staining pattern) are performed and evaluated on block(s): 1A, 1B.  Slide(s) with built in immunohistochemical study control(s) associated and negative control with this case has/have been verified by the sign out pathologist. For slide(s) without built in controls positive control slides has/have been reviewed and approved by immunohistochemistry lab These immunohistochemical/ in-situ hybridization tests have been developed and their performance characteristics determined by [facility], Department of Pathology, Division of Immunopathology, [address] [place] [address].  It has not been cleared or approved by the U.S. Food and Drug Administration.  The FDA has determined that such clearance or approval is not necessary.  This test is used for clinical purposes.  The laboratory is certified under the CLIA-88 as qualified to perform high complexity clinical testing.  Verified by: Nancy Farris M.D. (Electronic Signature) Reported on: 08/27/19 10:00 EDT, [address] [facility], [place] [address] _________________________________________________________________        HARDEEP, SUBASH                          4    Hematopathology Report     Clinical History New diagnosis multiple myeloma CBC on 08/24/2019: WBC: 8.09 K/uL, HGB: 9.5 g/dL, MCV: 90 fl, Plt: 227 K/uL Serum Immunofixation: IgG kappa  Specimen(s) Submitted 1     Bone marrow biopsy left 2     Bone marrow aspirate  Gross Description 1. The specimen is received in bouin's fixative and the specimen container is labeled: Left bone marrow biopsy.  It consists of a 0.5 x 0.2 cm bone marrow core with a 1.5 x 1.3 x 0.5 cm blood clot.  Entirely submitted.  Two cassettes: A = bone marrow core; B = blood clot. 2. The specimen is received labeled with patient's name and consists of 3 air dried slide(s). 2 slide(s) stained with Monzon Giemsa stain. 1 stained for iron stain. In addition to other data that may appear on the specimen container, the label has been inspected to confirm the presence of the patient's name and date of birth. Catarina Torres 08/22/2019 17:12      Ordered by: BECKY BILLS       Collected/Examined: [id number] 03:50PM        Verified by: BECKY BILLS [id number] 05:42PM         Result Communication: No patient communication needed at this time; Stage: Final         Performed at: [facility]       Resulted: [id number] 10:37AM       Last Updated: [id number] 05:42PM       Accession: [id number]

## 2024-08-21 NOTE — RESULTS/DATA
[FreeTextEntry1] : 8/16/2024 WBC normal Hgb 10.3  Platelets 141 eGFR 38 Pending MICAH  6/5/2024 WBC normal  Hgb 9.3 Platelets normal  Iron, B12, folate - normal  EPO 8.8  Most recent CMP 04/2024 Cr 1.94 < 2.25 Most recent MICAH 04/2024 No monoclonal bands Normal FLC, FLCR    3/15/2024 WBC 6.18 Hgb 10 Platelets 141 Most recent CMP 2/16- Cr 1.76 Most recent MICAH 1/9 No monoclonal bands Improved KFLC and KL ratio  Skeletal survey PROCEDURE DATE:  01/27/2024 INTERPRETATION:  CLINICAL INDICATION: myeloma on Velcade; surveillance  EXAM: A skeletal survey was performed including images of the calvarium, spinal column, chest, pelvis, and both upper and lower extremities on 1/27/2024 at 1342. No prior skeletal survey available for comparison. Compared to appearance of relevant osseous structures on spine MRI from 9/27/2022.  FINDINGS/ IMPRESSION: Myelomatous lucencies with slight endosteal scalloping in proximal left femoral diaphysis subtrochanteric region, may slightly increase risk for pathologic fracture.  Old left superior and inferior pubic rami fracture deformities. Otherwise no current upper or lower extremity fractures.  No vertebral compression fractures.  Additional incidentally observed findings on the survey include: Multiple old bilateral rib fracture deformities. Multilevel spinal degenerative change.  --- End of Report ---   1/5/2023 Today's CBC: Improved H/H (10.8)  Stable platelets (147K) and WBC As of last visit MICAH: No M-spike  No M-band Normal IGs K 4.54 Normal L FLCR 2.42 Normal LDH   10/27/2023 Last visit labs  Normal range MICAH, FLCR  Stable and mild anemia and thrombocytopenia noted  8/18/2023 HGB 10.1 Cr: pending.  MICAH (8/4/2023) No M-spike, no M-band  KFLC 3.7 LFLC 2.45 FLCR 1.51 (Normal)     3/9/2023 Most recent labs Hgb 9.4  Plt 141  M-spike: unable to quantitate K 4.0 L 1.89 K/.L 2.12   1/20/2023  Most recent myeloma labs from 12/23/2022  IgG Kappa disease  No M-spike, no M-band  IgG 931 IgA 132 IgM 32 K 2.62 L 1.45 K/L 1.79   Most recent CBC from 1/12/2023 Mild anemia (10.8 and thrombocytopenia (135K)   1/19/2022 Stable myeloma labs with normal range values.Mild high Lappa lambda with normal ratio is likely due to his renal failure.  MIld to moderate anemia is stable.   9/1/2021 Last PCN labs in 06/2021 showed no M-spike, M-band, Normal FLCR.  6/9/2021 Last Myeloma labs in 03/2021 showed no M-spike, M-band, Normal FLCR.   1/25/2021 Most recent SPEP / MICAH (1/8/2021) No monoclonal band   ------------------------------------------------------------------------------------------   PET/CT Whole Body Oncology FDG             Final  No Documents Attached       EXAM:  PETCT WB ONC FDG SUBS   PROCEDURE DATE:  05/07/2020    INTERPRETATION:  PROCEDURE:  PET/CT WHOLE BODY  RADIOPHARMACEUTICAL:  9.97 mCi F-18, FDG, I.V.  CLINICAL INFORMATION:  70-year-old male referred for follow-up of multiple myeloma on Zometa. Bone marrow biopsy done 3/4/2020 showed less than 5% plasma cells and repeat myeloma labs done on 3/9/2020, showed CT are. PET/CT is done as part of the subsequent treatment strategy evaluation.  TECHNIQUE:  Fasting blood sugar measured prior to injection of radiopharmaceutical was 116 mg/dl. Following intravenous injection of the radiopharmaceutical and an uptake period of approximately 60 minutes, FDG-PET/CT was obtained on a  GE Discovery 710 from the vertex of the skull to the toes. Oral contrast was given during the uptake period. CT was performed during shallow respiration. The CT protocol was optimized for PET attenuation correction and to provide anatomic detail for localization of PET abnormalities. The CT protocol was not designed to produce and cannot replace state-of-the-art diagnostic CT images with specific imaging protocols for different body parts and indications. Images were reviewed on a dedicated workstation using multiplanar reconstruction.  The standardized uptake values (SUV) are normalized to patient body weight and indicate the highest activity concentration (SUVmax) in a given disease site. All image numbers refer to the axial image number.  COMPARISON:  FDG PET/CT dated 9/7/2019.  OTHER STUDIES USED FOR CORRELATION: MRI of cervical spine dated 9/9/2019.  FINDINGS:  HEAD/NECK: Physiologic FDG activity in the brain.  Mild hypermetabolism in left masseter muscle, decreased as compared to prior study, may be secondary to bruxism. Elongated focus of increased FDG activity in left lower cervical region may reflect inflammation or muscle spasm (image 77).  New hypermetabolic lucency in left maxillary alveolus is nonspecific (SUV 8.0; image 53). Recommend correlation with dental exam. A new small hypermetabolic focus is seen in the right maxilla (SUV 3.7; image 55).  CHEST: Few new small hypermetabolic foci in bilateral hilar region likely correspond to small lymph nodes that are difficult to delineate on CT. For reference, left perihilar region demonstrates SUV 3.6 (image 117). Resolution of FDG-avid subcentimeter left posterior paraesophageal lymph node.  LUNGS: No abnormal FDG activity. Calcified right lower lobe granuloma, unchanged (image 128).  PLEURA/PERICARDIUM: No abnormal FDG activity. No effusion.  HEPATOBILIARY/PANCREAS: Physiologic FDG activity. Liver SUV mean is 2.1; previous 2.3.  SPLEEN: No abnormal FDG activity. Normal in size.  ADRENAL GLANDS: No abnormal FDG activity. No nodule.  KIDNEYS/URINARY BLADDER: Physiologic FDG activity. Multiple bilateral renal cysts, measuring up to 4.6 cm on the right, unchanged.  PELVIC ORGANS: No abnormal FDG activity.  ABDOMINOPELVIC NODES: No enlarged or FDG avid lymph node  BOWEL/PERITONEUM/MESENTERY: Physiologic FDG activity. Resolution of hypermetabolism in distal esophagus/GE junction.  BONES: Near total resolution of previously seen hypermetabolic foci in the axial skeleton, right humerus, and proximal bilateral femurs. Previously seen lytic lesions demonstrate new areas of sclerosis on CT. For reference, one of the most FDG-avid residual lesions is a mixed lytic and sclerotic lesion in the left scapula which demonstrates SUV 3.0 (image 98); previously lytic with SUV 5.7. Previously seen hypermetabolic lesion in right sphenoid bone has resolved. Previously seen large destructive lesion in the manubrium the sternum demonstrates new sclerosis with SUV 3.0 (image 103); previous SUV 5.3. Resolution of hypermetabolic intramedullary soft tissue in proximal right humerus. Near total resolution of hypermetabolic intramedullary soft tissue in proximal left femur (SUV 1.3; image 273; previous SUV 5.3).  FDG-avid fracture in left inferior pubic ramus demonstrates increased callus formation and is similar in metabolism (SUV 3.8; image 250; previous SUV 3.1). Few new mildly FDG-avid bilateral rib fractures. For reference, fracture in the anterior aspect of the left second rib demonstrates SUV 3.4 (image 105).  Resolution of hypermetabolism in left palmar musculature.  IMPRESSION:  Abnormal whole body FDG-PET/CT scan.  1. Near total resolution of hypermetabolism associated with lytic and intramedullary lesions in the axial and appendicular skeleton as compared to prior study dated 9/7/2019. Previously seen lytic lesions demonstrate new sclerosis. Findings are compatible with response to interval therapy.  2. Few new mildly FDG-avid bilateral rib fractures. An FDG-avid fracture in the left inferior pubic ramus demonstrates increased callus formation and is similar in metabolism.  3. New hypermetabolic lucency in left maxillary alveolus and new small hypermetabolic focus in right maxilla are nonspecific. Recommend correlation with dental exam.  4. Few new mildly FDG-avid nonspecific bilateral hilar lymph nodes.  5. Resolution of many specific hypermetabolism in distal esophagus/GE junction.                LUANA RAMOS M.D., NUCLEAR MEDICINE ATTENDING This document has been electronically signed. May  7 2020  4:08PM      Ordered by: OZZY ALMEIDA       Collected/Examined: 07May2020 12:57PM        Verified by: OZZY ALMEIDA 11May2020 09:27AM         Result Communication: Call patient with results,Discussed results with patient; Stage: Final         Performed at: [facility]       Resulted: [id number] 04:11PM       Last Updated: 11May2020 09:27AM       Accession: [id number]           Pathology             Final  No Documents Attached       [facility] Accession Number : [id number]  HARDEEP, SUBASH                          2    Cytopathology Report      Final Diagnosis SOFT TISSUE, SACRUM, CT GUIDED CORE BIOPSY Plasma cell neoplasm See note and description.  Diagnostic note:  Overall the morphologic and immunophenotypic findings are consistent with plasma cell neoplasm. The differential diagnosis includes plasmacytoma vs plasma cells myeloma. Correlation with laboratory, radiologic and clinical findings is required for further classification.  Microscopic description: The touch prep slides are composed of numerous enlarged plasma cells with prominent nucleoli, occasional binucleated forms are seen.  Histologic sections consist of multiple needle shaped cores of soft tissue showing proliferation of enlarged plasma cells with prominent nucleoli, forming sheets.  Immunohistochemistry:  , kappaISH, lambdaISH, cyclinD1, p53 stains performed on paraffin embedded section of block 1C.  Neoplastic cells are: Positive:  , KappaISH, p53 Negative:  LambdaISH, cyclinD1  Flow cytometry analysis (73-HJ-): Monotypic plasma cells (24.1% of total analyzed cells) are present. There are two aberrant monoclonal plasma cell populations: Population #1 (16.5% of total analyzed cells, 68.6% of plasma cell population) positive for cytoplasmic kappa, CD38 dimmer, CD45 dimmer, CD56, ; negative for , CD19, CD20. Population # 2 (7.6% of total analyzed cells, 31.4% of plasma cell population) positive for cytoplasmic kappa       HARDEEP, SUBASH                          2    Cytopathology Report     (brighter), , CD38 brighter, CD45 dim, CD56 (brighter), ; negative for CD19, CD20.  Screened by: Jose Juan LARA(ASCP) Verified by: Orlando Spicer MD (Electronic Signature) Reported on: 08/22/19 13:22 EDT, [address] [place] [id number] Cytology technical processing performed at [address] [place] [id number] _________________________________________________________________   Specimen(s) Submitted SOFT TISSUE, SACRUM, CT GUIDED CORE BIOPSY   Statement of Adequacy Immediate cytologic study for adequacy of specimen using a Diff- Quik stain was performed at [facility], [address] [place] [address], [place], [place]. Touch imprints acceptable for further evaluation by Jose Juan LARA([facility]).   Clinical Information Sacral mass.   Gross Description Core Biopsy: Tissue collected: Specimen container has been inspected to confirm patient?s name and date of birth, contains 3  tan cores measuring 1.0, 1.0, 0.8 cm in length (and multiple fragments). Entire specimen submitted in 2 cassette(s) labeled 1B and 1 C.  4 Touch prep slides ( 2 air dried + 2 fixed)  FNA: No material submitted for cell block (No block 1A)  Prepared: 4 Touch Prep, 1 core biopsy labeled 1B 1 core biopsy labeled 1C 6 Total slides      Ordered by: EDLORES MOSS       Collected/Examined: [id number] 03:14PM        Verified by: OZZY ALMEIDA [id number] 03:07PM         Result Communication: No patient communication needed at this time; Stage: Final         Performed at: [facility]       Resulted: [id number] 01:22PM       Last Updated: 18Jun2020 03:07PM       Accession: [id number]           Pathology             Final  No Documents Attached       [facility] Accession Number : [id number]  RASHID MEIER                          4    Addendum Report     Hematopathology Addendum Comprehensive Hematopathology Report  Final Diagnosis: 1, 2. Bone marrow biopsy and bone marrow aspirate - Plasma cell myeloma (10% with focal 25% by  stain) - Slight erythroid predominant trilineage hematopoiesis with maturation  Diagnostic Note: Please note findings of a normal male karyotype and a negative multiple myeloma FISH panel. Based on the additional findings, the diagnosis has not changed. Correlation with studies for myeloma-related organ dysfunction is necessary.  Morphology: Microscopic description: 1. Biopsy: Sections of bone marrow biopsy show normocellularity (60 to 70% cellularity), mild plasmacytosis with foci of small clusters, slight erythroid predominant trilineage hematopoiesis with maturation, mild megakaryocytosis with normal morphology, and increased iron stores. 2. Aspirate: Cellular spicules are not present, precluding differential count. Review of the smear shows some maturing and predominant mature myeloid cells, a few erythroid elements, and rare megakaryocytes.  Ancillary Studies: Bone marrow aspirate iron stain: No spicules are present to evaluate for iron stores and there are insufficient nRBC to evaluate for ring sideroblasts. Congo red stain is negative for amyloidosis. Flow cytometry:  Monotypic plasma cells (1% of cells), positive for cytoplasmic kappa, CD38, , dimmer CD45, partial CD56; negative CD19, CD20, . CD45/side scatter shows no significant blast population. There is no increase in CD34,  or CD14 positive cells. Lymphocytes (8% of cells) show a heterogeneous population of T- cells (with normal CD4 to CD8 ratio), and polytypic B-cells. Immunohistochemical stains ( performed on the block 1A and 1B, cyclin-D1 performed on block 1A):   stains show overall approximately 10% plasma cells, with foci of small clusters, focal up to 25%. Plasma cells are negative for cyclin-D1.  Cytogenetics: Result: Normal male karyotype Karyotype: 46,XY[20]       HARDEEP, SUBASH                          4    Addendum Report     FISH: Result: NORMAL FISH - - MULTIPLE MYELOMA PANEL Probe(s) and Location(s): FGFR3(4p16), CCND1(11q13), RB1(13q14), IGH(14q32), MAF(16q23), TP53(17p13.1)  Clinical History/Data: New diagnosis multiple myeloma CBC on 08/24/2019: WBC: 8.09 K/uL, HGB: 9.5 g/dL, MCV: 90 fl, Plt: 227 K/uL Serum Immunofixation: IgG kappa  Verified by: Nancy Farris M.D. (Electronic Signature) Reported on: 09/07/19 10:37 EDT, [address] [place] [id number] _________________________________________________________________   Hematopathology Report     Final Diagnosis 1, 2. Bone marrow biopsy and bone marrow aspirate - Plasma cell myeloma (10% with focal 25% by  stain) - Slight erythroid predominant trilineage hematopoiesis with maturation  See note and description.  Diagnostic note: Correlation with studies for myeloma-related organ dysfunction is necessary. Comprehensive report with results of pending ancillary studies to follow.  Ancillary studies Bone marrow aspirate iron stain: No spicules are present to evaluate for iron stores and there are insufficient nRBC to evaluate for ring sideroblasts.  Flow cytometry:  Monotypic plasma cells (1% of cells), positive for cytoplasmic kappa, CD38, , dimmer CD45, partial CD56; negative CD19, CD20, . CD45/side scatter shows no significant blast population. There is no increase in CD34,  or CD14 positive cells.       HARDEEP, SUBASH                          4    Hematopathology Report     Lymphocytes (8% of cells) show a heterogeneous population of T- cells (with normal CD4 to CD8 ratio), and polytypic B-cells.  Immunohistochemical stains ( performed on the block 1A and 1B, cyclin-D1 performed on block 1A):   stains show overall approximately 10% plasma cells, with foci of small clusters, focal up to 25%. Plasma cells are negative for cyclin-D1.  Microscopic description: 1. Biopsy: Sections of bone marrow biopsy show normocellularity (60 to 70% cellularity), mild plasmacytosis with foci of small clusters, slight erythroid predominant trilineage hematopoiesis with maturation, mild megakaryocytosis with normal morphology, and increased iron stores.  2. Aspirate: Cellular spicules are not present, precluding differential count. Review of the smear shows some maturing and predominant mature myeloid cells, a few erythroid elements, and rare megakaryocytes.  Comment:  Iron stain (examined to evaluate for iron stores; see microscopic description) and Giemsa stain (shows appropriate staining pattern) are performed and evaluated on block(s): 1A, 1B.  Slide(s) with built in immunohistochemical study control(s) associated and negative control with this case has/have been verified by the sign out pathologist. For slide(s) without built in controls positive control slides has/have been reviewed and approved by immunohistochemistry lab These immunohistochemical/ in-situ hybridization tests have been developed and their performance characteristics determined by [facility], Department of Pathology, Division of Immunopathology, [address] [place] [address].  It has not been cleared or approved by the U.S. Food and Drug Administration.  The FDA has determined that such clearance or approval is not necessary.  This test is used for clinical purposes.  The laboratory is certified under the CLIA-88 as qualified to perform high complexity clinical testing.  Verified by: Nancy Farris M.D. (Electronic Signature) Reported on: 08/27/19 10:00 EDT, [address] [facility], [place] [address] _________________________________________________________________        HARDEEP, SUBASH                          4    Hematopathology Report     Clinical History New diagnosis multiple myeloma CBC on 08/24/2019: WBC: 8.09 K/uL, HGB: 9.5 g/dL, MCV: 90 fl, Plt: 227 K/uL Serum Immunofixation: IgG kappa  Specimen(s) Submitted 1     Bone marrow biopsy left 2     Bone marrow aspirate  Gross Description 1. The specimen is received in bouin's fixative and the specimen container is labeled: Left bone marrow biopsy.  It consists of a 0.5 x 0.2 cm bone marrow core with a 1.5 x 1.3 x 0.5 cm blood clot.  Entirely submitted.  Two cassettes: A = bone marrow core; B = blood clot. 2. The specimen is received labeled with patient's name and consists of 3 air dried slide(s). 2 slide(s) stained with Monzon Giemsa stain. 1 stained for iron stain. In addition to other data that may appear on the specimen container, the label has been inspected to confirm the presence of the patient's name and date of birth. Catarina Torres 08/22/2019 17:12      Ordered by: BECKY BILLS       Collected/Examined: [id number] 03:50PM        Verified by: BECKY BILLS [id number] 05:42PM         Result Communication: No patient communication needed at this time; Stage: Final         Performed at: [facility]       Resulted: [id number] 10:37AM       Last Updated: [id number] 05:42PM       Accession: [id number]

## 2024-08-21 NOTE — ASSESSMENT
[FreeTextEntry1] : 74 year-old Mr. MEIER is seen in follow up for IgG Kappa Multiple Myeloma presented with lytic dale lesions, diagnosed in 9/2019. He received XRT to dale lesions,was started on RVD induction, achieved VGPR. Last PET 05/2020 - markedly improved and reduced FDG avidity throughout. His most recent SPEP/MICAH are normal, and FLCR has normalized as well. He is in sCR. Held Zometa for increased Cr to 1.9. Skeletal survey in Jan 2024 revealed myelomatous lucencies with slight endosteal scalloping in prox lef fem diaphysis which may inc risk for pathologic fx. Discontinued Zometa as of June 2024. Stable PCN labs.  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 - MRI T-spine (9/25/22): abnormal signal involving T8 vertebral body which may be related to multiple myeloma - Repeat T-spine MRI in 3 months ~3/2/2023, to re-assess previous findings as there was no definitive evidence of myeloma involvement. Will repeat in 3 months from 12/2/22 as he has not received any myeloma treatment since his last dose of Velcade on 3/3/22. - Continue treatment regimen with Velcade 3 week on, 1 week off, Dex 20mg weekly, and monthly Zometa - Achieved sCR as of 01/2023 labs - Above regimen stopped after 4 cycles, switched 2 Q2w Velcade - Patient visited his [place] and remained off treatment for a month - Obtain CMP, LDH, MICAH monthly - Last MICAH (06/2024): no biochemically measurable disease  - Discussed decreasing frequency of Velcade to a1islsc however pt insists on continuing [id number]  - Will continue Velcade SQ Q2W - Continue ppx medications (Acyclovir) - Follow up in 2 months  [ ] Dale disease  - Continued Zometa q month up to one year (up to December 2023) - Held Zometa as Cr increased to 1.9  - Skeletal survey (Jan 2024) - Myelomatous lucencies with slight endosteal scalloping in prox lef fem diaphysis which may inc risk for pathologic fx  - Discontinued Zometa (patient has no active dale disease, Alk Phos normal, CKD worsening) as of June 2024  - Continue f/u w/ nephrology   # Anemia - Multifactorial origin including CKD, CKD now improving therefore will trend Hgb further before intervention - Anemia w/u completed on 11/23/22 - AOCD/AORF  - Recommended Procrit 20k U weekly however pt opted for every other week with Zometa    RTC in 2 months Case and management discussed with Dr. Bailey   Addendum 8/21/2024 Discussed BW results with daughter who is actively involved in his care. PCN labs stable, kidney function slightly improved.  Continue regimen as discussed and RTC in 2 months. All questions answered to the best of my abilities. Patient verbalized understanding and agreed with the plan.

## 2024-08-21 NOTE — HISTORY OF PRESENT ILLNESS
[Disease:__________________________] : Disease: [unfilled] [Treatment Protocol] : Treatment Protocol [de-identified] : As per Dr Kilgore's note on 8/18/2020\par  \par  "Mr Rand was referred to my office for newly diagnosed IgG kappa multiple myeloma. The patient's primary language is Turkish, he has his daughter Mirlande as the , per his wishes. He was seen by Dr. Andrés Valle (hematology) in Feb 2019 and had a BM bx showing 10-15% plasma cells, concerning for smoldering myeloma. According to the daughter, he was awaiting insurance approval for imaging studies. He was admitted from 8/18/19 at [facility] where he presented with pain in the L leg/lower back, worsening for the past 2-3 months. On labs, he was found to have a total protein of 10, Ca level 12 until 8/29/19. Dental consult was called and patient needs to have 10 teeth extracted -thus, IV bisphosphonates were not given, pt improved with hydration. He also had a slightly inc'ed creatinine -improved after IVF. CT C/A/P 8/18/19 showed a lytic expansile soft tissue lesion centered in the manubrium measuring approximately 7.3 x 3.4 x 4.3 cm, invading both 1st ribs. There was also a A lytic expansile soft tissue lesion in the T8 vertebral body causing mass effect on spinal canal and obliterating left neural foramina at T7-8 and T8-9. ON 8/22/19 an MRI thoracic spine was done showing multiple areas of tumor involvement within the thoracic spine in keeping with the patient's history of multiple myeloma. Prominent lesion within C7 on the right incompletely seen.\par  Large lesion within T8 on the left producing epidural tumor extension and moderate narrowing of the spinal canal with mild flattening of the spinal cord. He was evaluated on 8/22/19 by Dr. Foley (Rad Onc) and was given 5 fractions of XRT from 8/23/19 to 8/29/19 to C7 and T8. He was discharged home on 8/29/19 with follow up in the outpatient office, and on Dexamethasone 4mg po daily. \par  \par   \par  \par  \par  \par  Interval History: The patient is being followed for IgG kappa MM with multiple bone lytic lesions, hypercalcemia (resolved) and mild anemia. He started Velcade/Dexa weekly on 9/919. The patient got the Revlimid and started it C1 day 1 on 10/21/19 and hed been tolerating it well. \par  \par  Starting in February, his blood counts became low -plt count 54, Hb 8.3 wbc 5.8. Revlimid was held -after 2/3/20. He got 1 shot Velcade/Dexa on 2/3/20, NO chemo since then. \par  BM done on 3/4/20 to eval for residual disease (MM) vs. MDS. BM showed recovering marrow elements, plasma cells <5%. SPEP/MICAH from 3/9/20 showed NO monoclonal proteins, c/w CR. \par  \par  The PET scan from 5/7/20 showed L uptake maxillary -pt reports that he had dentures done in Jan 2020 and has discomfort from them. He did not go to the dentist during COVID Rib fractures -pt denied falls/trauma. Patient has dental appointment for next week. He has some discomfort in the upper teeth b/l. He also missed his Velcade SQ last week 'i did not know if i am supposed to continue it.' He has no neuropathy from it, tolerating well. "\par   [de-identified] : RISS- II [FreeTextEntry1] : s/p RT to C7/T8 spine\par  RVD 9/2019 -6/2020 transitioned to maintenance velcade q2wk started 6/19/2020 [de-identified] : Oct 6, 2020 Pt is here for initial visit with me. He has his dtr on the phone, prefers her included in conversation and declined an . Pt is feeling well. He is tolerating monthly zometa and maintenance velcade w/o any adverse effects. Last set of myeloma labs done in August showed stable remission. He is requesting refill of several meds today. ---------------------------------  1/25/2021 Patient presents for a follow up. He has no complaints except infrequent short duration chills with no fever. He is on biweekly Velcade and monthly Zometa. His last treatment of Zometa was on 1/8/2021 and last Velcade on 1/22/2021. His most recent SPEP/MICAH was normal.     6/10/2021 Patient seen in follow up. He did not go to his home country as he planned. He does not endorse any big change in his health status. She saw his Kidney doctor who told him he is iron deficient (labs are not supportive). His anemia is multifactorial including CKD. His most recent myeloma labs (03/2021) show continued CR.   9/1/2021 Mr Rand returns for a follow up. His daughter is accompanying him. He has no complaints. He reports that he develops diarrhea (one motion) the day after his Velcade treatment. He plans to visit his home town soon. He has some unavoidable things to take care of. Of note he has been in sCR.   1/19/2022 Patient seen in follow up. He has done well in the interim. He also visited his home country in this interval. He was safe all along.  As regards to his disease, he is stable and appears to be in remission. He has no complaints.    11/23/2022 Patient presents to clinic after being lost to follow-up; went to his country for an extended visit from 3/10 - 9/8/2022. Was on maintenance velcade [id number] starting 6/19/2020, last dose was on 3/3/22. Was on monthly Zometa since 11/2019, last dose of zometa was on 2/17/2022. In the interim, he developed severe weakness and was hospitalized at [facility] (9/24-10/11/22) for Acute respiratory failure with hypoxia, and severe sepsis due to GBS. Was discharged to rehab, and as of yesterday he was discharged from rehab. Will now be doing PT at home. During inpatient work up, he was found to have diffuse lytic lesions noted in C/T/L spine CT scans (9/25/22) which were identified as "Multiple mixed lytic sclerotic lesions may be sequela of previously treated multiple myeloma". MRI of the C/T/L spine were done for follow up revealing an "abnormal signal involving T8 vertebral body which may be related to multiple myeloma". Was seen by neurosurgery, no acute intervention. In patient immunofixation did not reveal a monoclonal band; SPEP WNL; kappa/lambda ratio not diagnostic. Today he feels at baseline, but notes residual weakness from GBS. Patient denies bone pain, fever, chills, night sweats, back pain, headache, or peripheral neuropathy. Good appetite, stable weight.  12/23/2022 Follow up. Today he feels at baseline, but notes residual weakness from GBS. Today is C1D22 of Velcade and dexamethasone since restarting it; he is tolerating the medications well. He has some questions requesting clarification on Acyclovir and dexamethasone, otherwise no other concerns. Patient denies bone pain, fever, chills, night sweats, back pain, headache, or peripheral neuropathy. Good appetite, stable weight.   1/20/2023  Patient seen in follow up for multiple myeloma. He was on maintenance Velcade. He went too his home country and missed treatment for more than two months. His disease progressed as noted upon return. He had new percy lesion. He was again started on the same induction regimen. He is also getting Zometa monthly. He has now achieved VGPR having only mild high Kappa, no M-spike/band. He offers no complaints. Denies any infection, fever, bone pain, weigh loss.    2/16/2023 Patient presents for a follow up. He missed his last treatment as he was having cough body ache. He saw his PCP and took a course of Azithromycin. His symptoms relieved. He is feeling well now. His treatment schedule for tomorrow (2/17/2023). Of note, his myeloma labs from 01/2023 are suggestive of sCR.    3/9/2023 Patient presents for a follow up. He has done well in the interim. His cough has resolved. He plans to take a chemo vacation for a month as he plans to visit his home country. He will finish his C4 treatment tomorrow, following which he will continue on maintenance therapy which can be started after he comes back. Of note he again as achieved a very good partial response.   5/19/2023 Patient returns for a follow up. He has missed 2 cycles of treatment as he went back to his home country. His last paraprotein labs are from 03/2023 and they are WNL including FLCR suggesting sCR.  He has no complaints.   8/18/2023 Patient presents for a follow up. He is in CR. He is on Q2W Velate and QM Zometa. However, his Zometa has been held this month as his Cr has increased to 1.9 from 1.4. He has been asked to see a nephrologist. He is physically well and offers no complaints.    10/27/2023 Patient seen in follow up for MM in remission. He is on Velcade maintenance. His Zometa (restarted after recurrence of disease) has been discontinued as his creatinine was increasing. His MICAH was normal and his FLCR is WNL.   1/5/2024 Patient returns for a follow up. He is in stable health. Continuing Q2W maintenance Velcade and monthly Zometa. A repeat skeletal survey has been ordered but not scheduled yet. His M-spike and and M-band are not detectable, however touch high Kappa is present likely due to CKD.  He has no complaints of bone pain and has no other issues.  3/15/2024 Patient seen in the tx room for a follow up visit. He is in good health. He is supposed to be on Velcade q2 weeks however has scheduled himself to receive it every month instead. Zometa monthly continued. We are monitoring his Cr clearance before administering Zometa. Repeat skeletal survey on Jan 2024 revealed myelomatous lucencies with slight endosteal scalloping in prox lef fem diaphysis which may inc risk for pathologic fx. Pending MICAH ad CMP today. Denies fevers, night sweats, unintentional weight loss, bone pain   6/5/2024 Patient returns for a follow up for MM in CR biochemically (no measurable disease). He has been on maintenance Velcade for more than two years since his relapse. He is also on Zometa for his percy disease. However, a metastatic survey X-ray did not show any active percy lesions although it did show old lesions. His kidney function is gradually getting worse, eGFR is now 35. Patient endorses no noticeable change in his health status.   8/16/2024 Patient returns for a follow up for MM. He has been on maintenance Velcade for more than two years since his relapse. Discontinued Zometa since June. His kidney function is stable, following nephro. On Procrit every other week for anemia. Denies fevers, night sweats, bone pain, unintentional weight loss. Appetite is good

## 2024-08-21 NOTE — ASSESSMENT
[FreeTextEntry1] : 74 year-old Mr. MEIER is seen in follow up for IgG Kappa Multiple Myeloma presented with lytic dale lesions, diagnosed in 9/2019. He received XRT to dale lesions,was started on RVD induction, achieved VGPR. Last PET 05/2020 - markedly improved and reduced FDG avidity throughout. His most recent SPEP/MICAH are normal, and FLCR has normalized as well. He is in sCR. Held Zometa for increased Cr to 1.9. Skeletal survey in Jan 2024 revealed myelomatous lucencies with slight endosteal scalloping in prox lef fem diaphysis which may inc risk for pathologic fx. Discontinued Zometa as of June 2024. Stable PCN labs.  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 - MRI T-spine (9/25/22): abnormal signal involving T8 vertebral body which may be related to multiple myeloma - Repeat T-spine MRI in 3 months ~3/2/2023, to re-assess previous findings as there was no definitive evidence of myeloma involvement. Will repeat in 3 months from 12/2/22 as he has not received any myeloma treatment since his last dose of Velcade on 3/3/22. - Continue treatment regimen with Velcade 3 week on, 1 week off, Dex 20mg weekly, and monthly Zometa - Achieved sCR as of 01/2023 labs - Above regimen stopped after 4 cycles, switched 2 Q2w Velcade - Patient visited his home country and remained off treatment for a month - Obtain CMP, LDH, MICAH monthly - Last MICAH (06/2024): no biochemically measurable disease  - Discussed decreasing frequency of Velcade to [id number] however pt insists on continuing [id number]  - Will continue Velcade SQ Q2W - Continue ppx medications (Acyclovir) - Follow up in 2 months  [ ] Dale disease  - Continued Zometa q month up to one year (up to December 2023) - Held Zometa as Cr increased to 1.9  - Skeletal survey (Jan 2024) - Myelomatous lucencies with slight endosteal scalloping in prox lef fem diaphysis which may inc risk for pathologic fx  - Discontinued Zometa (patient has no active dale disease, Alk Phos normal, CKD worsening) as of June 2024  - Continue f/u w/ nephrology   # Anemia - Multifactorial origin including CKD, CKD now improving therefore will trend Hgb further before intervention - Anemia w/u completed on 11/23/22 - AOCD/AORF  - Recommended Procrit 20k U weekly however pt opted for every other week with Zometa    RTC in 2 months Case and management discussed with Dr. Bailey   Addendum 8/21/2024 Discussed BW results with daughter who is actively involved in his care. PCN labs stable, kidney function slightly improved.  Continue regimen as discussed and RTC in 2 months. All questions answered to the best of my abilities. Patient verbalized understanding and agreed with the plan.

## 2024-08-21 NOTE — HISTORY OF PRESENT ILLNESS
[Disease:__________________________] : Disease: [unfilled] [Treatment Protocol] : Treatment Protocol [de-identified] : As per Dr Kilgore's note on 8/18/2020\par  \par  "Mr Rand was referred to my office for newly diagnosed IgG kappa multiple myeloma. The patient's primary language is Persian, he has his daughter Mirlande as the , per his wishes. He was seen by Dr. Andrés Valle (hematology) in Feb 2019 and had a BM bx showing 10-15% plasma cells, concerning for smoldering myeloma. According to the daughter, he was awaiting insurance approval for imaging studies. He was admitted from 8/18/19 at [facility] where he presented with pain in the L leg/lower back, worsening for the past 2-3 months. On labs, he was found to have a total protein of 10, Ca level 12 until 8/29/19. Dental consult was called and patient needs to have 10 teeth extracted -thus, IV bisphosphonates were not given, pt improved with hydration. He also had a slightly inc'ed creatinine -improved after IVF. CT C/A/P 8/18/19 showed a lytic expansile soft tissue lesion centered in the manubrium measuring approximately 7.3 x 3.4 x 4.3 cm, invading both 1st ribs. There was also a A lytic expansile soft tissue lesion in the T8 vertebral body causing mass effect on spinal canal and obliterating left neural foramina at T7-8 and T8-9. ON 8/22/19 an MRI thoracic spine was done showing multiple areas of tumor involvement within the thoracic spine in keeping with the patient's history of multiple myeloma. Prominent lesion within C7 on the right incompletely seen.\par  Large lesion within T8 on the left producing epidural tumor extension and moderate narrowing of the spinal canal with mild flattening of the spinal cord. He was evaluated on 8/22/19 by Dr. Foley (Rad Onc) and was given 5 fractions of XRT from 8/23/19 to 8/29/19 to C7 and T8. He was discharged home on 8/29/19 with follow up in the outpatient office, and on Dexamethasone 4mg po daily. \par  \par   \par  \par  \par  \par  Interval History: The patient is being followed for IgG kappa MM with multiple bone lytic lesions, hypercalcemia (resolved) and mild anemia. He started Velcade/Dexa weekly on 9/919. The patient got the Revlimid and started it C1 day 1 on 10/21/19 and hed been tolerating it well. \par  \par  Starting in February, his blood counts became low -plt count 54, Hb 8.3 wbc 5.8. Revlimid was held -after 2/3/20. He got 1 shot Velcade/Dexa on 2/3/20, NO chemo since then. \par  BM done on 3/4/20 to eval for residual disease (MM) vs. MDS. BM showed recovering marrow elements, plasma cells <5%. SPEP/MICAH from 3/9/20 showed NO monoclonal proteins, c/w CR. \par  \par  The PET scan from 5/7/20 showed L uptake maxillary -pt reports that he had dentures done in Jan 2020 and has discomfort from them. He did not go to the dentist during COVID Rib fractures -pt denied falls/trauma. Patient has dental appointment for next week. He has some discomfort in the upper teeth b/l. He also missed his Velcade SQ last week 'i did not know if i am supposed to continue it.' He has no neuropathy from it, tolerating well. "\par   [de-identified] : RISS- II [FreeTextEntry1] : s/p RT to C7/T8 spine\par  RVD 9/2019 -6/2020 transitioned to maintenance velcade q2wk started 6/19/2020 [de-identified] : Oct 6, 2020 Pt is here for initial visit with me. He has his dtr on the phone, prefers her included in conversation and declined an . Pt is feeling well. He is tolerating monthly zometa and maintenance velcade w/o any adverse effects. Last set of myeloma labs done in August showed stable remission. He is requesting refill of several meds today. ---------------------------------  1/25/2021 Patient presents for a follow up. He has no complaints except infrequent short duration chills with no fever. He is on biweekly Velcade and monthly Zometa. His last treatment of Zometa was on 1/8/2021 and last Velcade on 1/22/2021. His most recent SPEP/MICAH was normal.     6/10/2021 Patient seen in follow up. He did not go to his home country as he planned. He does not endorse any big change in his health status. She saw his Kidney doctor who told him he is iron deficient (labs are not supportive). His anemia is multifactorial including CKD. His most recent myeloma labs (03/2021) show continued CR.   9/1/2021 Mr Rand returns for a follow up. His daughter is accompanying him. He has no complaints. He reports that he develops diarrhea (one motion) the day after his Velcade treatment. He plans to visit his home town soon. He has some unavoidable things to take care of. Of note he has been in sCR.   1/19/2022 Patient seen in follow up. He has done well in the interim. He also visited his home country in this interval. He was safe all along.  As regards to his disease, he is stable and appears to be in remission. He has no complaints.    11/23/2022 Patient presents to clinic after being lost to follow-up; went to his country for an extended visit from 3/10 - 9/8/2022. Was on maintenance velcade [id number] starting 6/19/2020, last dose was on 3/3/22. Was on monthly Zometa since 11/2019, last dose of zometa was on 2/17/2022. In the interim, he developed severe weakness and was hospitalized at [facility] (9/24-10/11/22) for Acute respiratory failure with hypoxia, and severe sepsis due to GBS. Was discharged to rehab, and as of yesterday he was discharged from rehab. Will now be doing PT at home. During inpatient work up, he was found to have diffuse lytic lesions noted in C/T/L spine CT scans (9/25/22) which were identified as "Multiple mixed lytic sclerotic lesions may be sequela of previously treated multiple myeloma". MRI of the C/T/L spine were done for follow up revealing an "abnormal signal involving T8 vertebral body which may be related to multiple myeloma". Was seen by neurosurgery, no acute intervention. In patient immunofixation did not reveal a monoclonal band; SPEP WNL; kappa/lambda ratio not diagnostic. Today he feels at baseline, but notes residual weakness from GBS. Patient denies bone pain, fever, chills, night sweats, back pain, headache, or peripheral neuropathy. Good appetite, stable weight.  12/23/2022 Follow up. Today he feels at baseline, but notes residual weakness from GBS. Today is C1D22 of Velcade and dexamethasone since restarting it; he is tolerating the medications well. He has some questions requesting clarification on Acyclovir and dexamethasone, otherwise no other concerns. Patient denies bone pain, fever, chills, night sweats, back pain, headache, or peripheral neuropathy. Good appetite, stable weight.   1/20/2023  Patient seen in follow up for multiple myeloma. He was on maintenance Velcade. He went too his home country and missed treatment for more than two months. His disease progressed as noted upon return. He had new percy lesion. He was again started on the same induction regimen. He is also getting Zometa monthly. He has now achieved VGPR having only mild high Kappa, no M-spike/band. He offers no complaints. Denies any infection, fever, bone pain, weigh loss.    2/16/2023 Patient presents for a follow up. He missed his last treatment as he was having cough body ache. He saw his PCP and took a course of Azithromycin. His symptoms relieved. He is feeling well now. His treatment schedule for tomorrow (2/17/2023). Of note, his myeloma labs from 01/2023 are suggestive of sCR.    3/9/2023 Patient presents for a follow up. He has done well in the interim. His cough has resolved. He plans to take a chemo vacation for a month as he plans to visit his home country. He will finish his C4 treatment tomorrow, following which he will continue on maintenance therapy which can be started after he comes back. Of note he again as achieved a very good partial response.   5/19/2023 Patient returns for a follow up. He has missed 2 cycles of treatment as he went back to his home country. His last paraprotein labs are from 03/2023 and they are WNL including FLCR suggesting sCR.  He has no complaints.   8/18/2023 Patient presents for a follow up. He is in CR. He is on Q2W Velate and QM Zometa. However, his Zometa has been held this month as his Cr has increased to 1.9 from 1.4. He has been asked to see a nephrologist. He is physically well and offers no complaints.    10/27/2023 Patient seen in follow up for MM in remission. He is on Velcade maintenance. His Zometa (restarted after recurrence of disease) has been discontinued as his creatinine was increasing. His MICAH was normal and his FLCR is WNL.   1/5/2024 Patient returns for a follow up. He is in stable health. Continuing Q2W maintenance Velcade and monthly Zometa. A repeat skeletal survey has been ordered but not scheduled yet. His M-spike and and M-band are not detectable, however touch high Kappa is present likely due to CKD.  He has no complaints of bone pain and has no other issues.  3/15/2024 Patient seen in the tx room for a follow up visit. He is in good health. He is supposed to be on Velcade q2 weeks however has scheduled himself to receive it every month instead. Zometa monthly continued. We are monitoring his Cr clearance before administering Zometa. Repeat skeletal survey on Jan 2024 revealed myelomatous lucencies with slight endosteal scalloping in prox lef fem diaphysis which may inc risk for pathologic fx. Pending MICAH ad CMP today. Denies fevers, night sweats, unintentional weight loss, bone pain   6/5/2024 Patient returns for a follow up for MM in CR biochemically (no measurable disease). He has been on maintenance Velcade for more than two years since his relapse. He is also on Zometa for his percy disease. However, a metastatic survey X-ray did not show any active percy lesions although it did show old lesions. His kidney function is gradually getting worse, eGFR is now 35. Patient endorses no noticeable change in his health status.   8/16/2024 Patient returns for a follow up for MM. He has been on maintenance Velcade for more than two years since his relapse. Discontinued Zometa since June. His kidney function is stable, following nephro. On Procrit every other week for anemia. Denies fevers, night sweats, bone pain, unintentional weight loss. Appetite is good

## 2024-08-23 ENCOUNTER — APPOINTMENT (OUTPATIENT)
Dept: INFUSION THERAPY | Facility: HOSPITAL | Age: 74
End: 2024-08-23

## 2024-08-30 ENCOUNTER — APPOINTMENT (OUTPATIENT)
Dept: INFUSION THERAPY | Facility: HOSPITAL | Age: 74
End: 2024-08-30

## 2024-08-30 ENCOUNTER — APPOINTMENT (OUTPATIENT)
Dept: HEMATOLOGY ONCOLOGY | Facility: CLINIC | Age: 74
End: 2024-08-30

## 2024-08-30 ENCOUNTER — RESULT REVIEW (OUTPATIENT)
Age: 74
End: 2024-08-30

## 2024-08-30 DIAGNOSIS — C90.00 MULTIPLE MYELOMA NOT HAVING ACHIEVED REMISSION: ICD-10-CM

## 2024-08-30 LAB
APPEARANCE UR: CLEAR — SIGNIFICANT CHANGE UP
BASOPHILS # BLD AUTO: 0.07 K/UL — SIGNIFICANT CHANGE UP (ref 0–0.2)
BASOPHILS NFR BLD AUTO: 1.1 % — SIGNIFICANT CHANGE UP (ref 0–2)
BILIRUB UR-MCNC: NEGATIVE — SIGNIFICANT CHANGE UP
COLOR SPEC: YELLOW — SIGNIFICANT CHANGE UP
DIFF PNL FLD: NEGATIVE — SIGNIFICANT CHANGE UP
EOSINOPHIL # BLD AUTO: 0.2 K/UL — SIGNIFICANT CHANGE UP (ref 0–0.5)
EOSINOPHIL NFR BLD AUTO: 3.3 % — SIGNIFICANT CHANGE UP (ref 0–6)
GLUCOSE UR QL: 500 MG/DL
HCT VFR BLD CALC: 32.2 % — LOW (ref 39–50)
HGB BLD-MCNC: 10.3 G/DL — LOW (ref 13–17)
IMM GRANULOCYTES NFR BLD AUTO: 0.3 % — SIGNIFICANT CHANGE UP (ref 0–0.9)
KETONES UR-MCNC: NEGATIVE MG/DL — SIGNIFICANT CHANGE UP
LEUKOCYTE ESTERASE UR-ACNC: NEGATIVE — SIGNIFICANT CHANGE UP
LYMPHOCYTES # BLD AUTO: 1.22 K/UL — SIGNIFICANT CHANGE UP (ref 1–3.3)
LYMPHOCYTES # BLD AUTO: 20 % — SIGNIFICANT CHANGE UP (ref 13–44)
MCHC RBC-ENTMCNC: 28.5 PG — SIGNIFICANT CHANGE UP (ref 27–34)
MCHC RBC-ENTMCNC: 32 G/DL — SIGNIFICANT CHANGE UP (ref 32–36)
MCV RBC AUTO: 89.2 FL — SIGNIFICANT CHANGE UP (ref 80–100)
MONOCYTES # BLD AUTO: 0.53 K/UL — SIGNIFICANT CHANGE UP (ref 0–0.9)
MONOCYTES NFR BLD AUTO: 8.7 % — SIGNIFICANT CHANGE UP (ref 2–14)
NEUTROPHILS # BLD AUTO: 4.07 K/UL — SIGNIFICANT CHANGE UP (ref 1.8–7.4)
NEUTROPHILS NFR BLD AUTO: 66.6 % — SIGNIFICANT CHANGE UP (ref 43–77)
NITRITE UR-MCNC: NEGATIVE — SIGNIFICANT CHANGE UP
NRBC # BLD: 0 /100 WBCS — SIGNIFICANT CHANGE UP (ref 0–0)
NRBC BLD-RTO: 0 /100 WBCS — SIGNIFICANT CHANGE UP (ref 0–0)
PH UR: 6 — SIGNIFICANT CHANGE UP (ref 5–8)
PLATELET # BLD AUTO: 126 K/UL — LOW (ref 150–400)
PROT UR-MCNC: NEGATIVE MG/DL — SIGNIFICANT CHANGE UP
RBC # BLD: 3.61 M/UL — LOW (ref 4.2–5.8)
RBC # FLD: 13.8 % — SIGNIFICANT CHANGE UP (ref 10.3–14.5)
SP GR SPEC: 1.01 — SIGNIFICANT CHANGE UP (ref 1–1.03)
UROBILINOGEN FLD QL: 0.2 MG/DL — SIGNIFICANT CHANGE UP (ref 0.2–1)
WBC # BLD: 6.11 K/UL — SIGNIFICANT CHANGE UP (ref 3.8–10.5)
WBC # FLD AUTO: 6.11 K/UL — SIGNIFICANT CHANGE UP (ref 3.8–10.5)

## 2024-09-01 ENCOUNTER — OUTPATIENT (OUTPATIENT)
Dept: OUTPATIENT SERVICES | Facility: HOSPITAL | Age: 74
LOS: 1 days | Discharge: HOME | End: 2024-09-01

## 2024-09-01 DIAGNOSIS — Z95.5 PRESENCE OF CORONARY ANGIOPLASTY IMPLANT AND GRAFT: Chronic | ICD-10-CM

## 2024-09-01 DIAGNOSIS — C90.00 MULTIPLE MYELOMA NOT HAVING ACHIEVED REMISSION: ICD-10-CM

## 2024-09-06 NOTE — DIETITIAN INITIAL EVALUATION ADULT - HEIGHT FOR BMI (CENTIMETERS)
Case Management Discharge Planning Note    Patient name Danilo Padilla Jr.  Location /-01 MRN [id number]  : 1956 Date 2024       Current Admission Date: 2024  Current Admission Diagnosis:Acute pulmonary embolism without acute cor pulmonale (HCC)   Patient Active Problem List    Diagnosis Date Noted Date Diagnosed    Acute pulmonary embolism without acute cor pulmonale (HCC) 2024     Kidney stones 2024     Contusion of rib on right side 02/10/2024     Viral infection 2023     GERD (gastroesophageal reflux disease)      Hyperlipidemia      Hypertension      Nontraumatic complete tear of left rotator cuff 12/15/2021     Shoulder impingement syndrome, right 2020     Bursitis of left knee 2019     Pes anserinus bursitis of left knee 2019       LOS (days): 0  Geometric Mean LOS (GMLOS) (days):   Days to GMLOS:     OBJECTIVE:            Current admission status: Observation   Preferred Pharmacy:   [id number] - [place], [place] - [address]  [address] [address]  Phone: [phone] Fax: [phone]    Primary Care Provider: Fredis Geiger MD    Primary Insurance: [employer]  Secondary Insurance:     DISCHARGE DETAILS:                            CM completed a price check for Pt's Eliquis. Per Pharmacist, Eliquis is $610.87 for 1 month supply, and the Eliquis is covered through pt's insurance. CM informed Dr. Mayer, who switched prescription to Xarelto. Per Pharmacist, pt's Xarelto is $585.71 for 1 month supply, and the Xarelto is covered by pt's insurance. CM informed Dr. Mayer of Xarelto savage.                                                                                              162.56

## 2024-09-13 ENCOUNTER — RESULT REVIEW (OUTPATIENT)
Age: 74
End: 2024-09-13

## 2024-09-13 ENCOUNTER — APPOINTMENT (OUTPATIENT)
Dept: INFUSION THERAPY | Facility: HOSPITAL | Age: 74
End: 2024-09-13

## 2024-09-13 ENCOUNTER — APPOINTMENT (OUTPATIENT)
Dept: HEMATOLOGY ONCOLOGY | Facility: CLINIC | Age: 74
End: 2024-09-13

## 2024-09-13 DIAGNOSIS — D64.9 ANEMIA, UNSPECIFIED: ICD-10-CM

## 2024-09-13 LAB
BASOPHILS # BLD AUTO: 0.06 K/UL — SIGNIFICANT CHANGE UP (ref 0–0.2)
BASOPHILS NFR BLD AUTO: 1 % — SIGNIFICANT CHANGE UP (ref 0–2)
EOSINOPHIL # BLD AUTO: 0.2 K/UL — SIGNIFICANT CHANGE UP (ref 0–0.5)
EOSINOPHIL NFR BLD AUTO: 3.5 % — SIGNIFICANT CHANGE UP (ref 0–6)
HCT VFR BLD CALC: 33.2 % — LOW (ref 39–50)
HGB BLD-MCNC: 10.6 G/DL — LOW (ref 13–17)
IMM GRANULOCYTES NFR BLD AUTO: 1.2 % — HIGH (ref 0–0.9)
LYMPHOCYTES # BLD AUTO: 1.41 K/UL — SIGNIFICANT CHANGE UP (ref 1–3.3)
LYMPHOCYTES # BLD AUTO: 24.6 % — SIGNIFICANT CHANGE UP (ref 13–44)
MCHC RBC-ENTMCNC: 28.3 PG — SIGNIFICANT CHANGE UP (ref 27–34)
MCHC RBC-ENTMCNC: 31.9 G/DL — LOW (ref 32–36)
MCV RBC AUTO: 88.5 FL — SIGNIFICANT CHANGE UP (ref 80–100)
MONOCYTES # BLD AUTO: 0.5 K/UL — SIGNIFICANT CHANGE UP (ref 0–0.9)
MONOCYTES NFR BLD AUTO: 8.7 % — SIGNIFICANT CHANGE UP (ref 2–14)
NEUTROPHILS # BLD AUTO: 3.5 K/UL — SIGNIFICANT CHANGE UP (ref 1.8–7.4)
NEUTROPHILS NFR BLD AUTO: 61 % — SIGNIFICANT CHANGE UP (ref 43–77)
NRBC # BLD: 0 /100 WBCS — SIGNIFICANT CHANGE UP (ref 0–0)
PLATELET # BLD AUTO: 131 K/UL — LOW (ref 150–400)
RBC # BLD: 3.75 M/UL — LOW (ref 4.2–5.8)
RBC # FLD: 14.4 % — SIGNIFICANT CHANGE UP (ref 10.3–14.5)
WBC # BLD: 5.74 K/UL — SIGNIFICANT CHANGE UP (ref 3.8–10.5)
WBC # FLD AUTO: 5.74 K/UL — SIGNIFICANT CHANGE UP (ref 3.8–10.5)

## 2024-09-16 DIAGNOSIS — Z51.11 ENCOUNTER FOR ANTINEOPLASTIC CHEMOTHERAPY: ICD-10-CM

## 2024-09-27 ENCOUNTER — RESULT REVIEW (OUTPATIENT)
Age: 74
End: 2024-09-27

## 2024-09-27 ENCOUNTER — APPOINTMENT (OUTPATIENT)
Dept: HEMATOLOGY ONCOLOGY | Facility: CLINIC | Age: 74
End: 2024-09-27

## 2024-09-27 ENCOUNTER — APPOINTMENT (OUTPATIENT)
Dept: INFUSION THERAPY | Facility: HOSPITAL | Age: 74
End: 2024-09-27

## 2024-09-27 DIAGNOSIS — C90.00 MULTIPLE MYELOMA NOT HAVING ACHIEVED REMISSION: ICD-10-CM

## 2024-09-27 LAB
BASOPHILS # BLD AUTO: 0.05 K/UL — SIGNIFICANT CHANGE UP (ref 0–0.2)
BASOPHILS NFR BLD AUTO: 0.9 % — SIGNIFICANT CHANGE UP (ref 0–2)
EOSINOPHIL # BLD AUTO: 0.2 K/UL — SIGNIFICANT CHANGE UP (ref 0–0.5)
EOSINOPHIL NFR BLD AUTO: 3.5 % — SIGNIFICANT CHANGE UP (ref 0–6)
HCT VFR BLD CALC: 31 % — LOW (ref 39–50)
HGB BLD-MCNC: 9.8 G/DL — LOW (ref 13–17)
IMM GRANULOCYTES NFR BLD AUTO: 0.4 % — SIGNIFICANT CHANGE UP (ref 0–0.9)
LYMPHOCYTES # BLD AUTO: 1.31 K/UL — SIGNIFICANT CHANGE UP (ref 1–3.3)
LYMPHOCYTES # BLD AUTO: 23.2 % — SIGNIFICANT CHANGE UP (ref 13–44)
MCHC RBC-ENTMCNC: 28.2 PG — SIGNIFICANT CHANGE UP (ref 27–34)
MCHC RBC-ENTMCNC: 31.6 G/DL — LOW (ref 32–36)
MCV RBC AUTO: 89.1 FL — SIGNIFICANT CHANGE UP (ref 80–100)
MONOCYTES # BLD AUTO: 0.5 K/UL — SIGNIFICANT CHANGE UP (ref 0–0.9)
MONOCYTES NFR BLD AUTO: 8.9 % — SIGNIFICANT CHANGE UP (ref 2–14)
NEUTROPHILS # BLD AUTO: 3.56 K/UL — SIGNIFICANT CHANGE UP (ref 1.8–7.4)
NEUTROPHILS NFR BLD AUTO: 63.1 % — SIGNIFICANT CHANGE UP (ref 43–77)
NRBC # BLD: 0 /100 WBCS — SIGNIFICANT CHANGE UP (ref 0–0)
PLATELET # BLD AUTO: 139 K/UL — LOW (ref 150–400)
RBC # BLD: 3.48 M/UL — LOW (ref 4.2–5.8)
RBC # FLD: 15 % — HIGH (ref 10.3–14.5)
WBC # BLD: 5.64 K/UL — SIGNIFICANT CHANGE UP (ref 3.8–10.5)
WBC # FLD AUTO: 5.64 K/UL — SIGNIFICANT CHANGE UP (ref 3.8–10.5)

## 2024-10-11 ENCOUNTER — RESULT REVIEW (OUTPATIENT)
Age: 74
End: 2024-10-11

## 2024-10-11 ENCOUNTER — APPOINTMENT (OUTPATIENT)
Dept: INFUSION THERAPY | Facility: HOSPITAL | Age: 74
End: 2024-10-11

## 2024-10-11 ENCOUNTER — APPOINTMENT (OUTPATIENT)
Dept: HEMATOLOGY ONCOLOGY | Facility: CLINIC | Age: 74
End: 2024-10-11

## 2024-10-11 LAB
BASOPHILS # BLD AUTO: 0 K/UL — SIGNIFICANT CHANGE UP (ref 0–0.2)
BASOPHILS NFR BLD AUTO: 0 % — SIGNIFICANT CHANGE UP (ref 0–2)
EOSINOPHIL # BLD AUTO: 0.32 K/UL — SIGNIFICANT CHANGE UP (ref 0–0.5)
EOSINOPHIL NFR BLD AUTO: 5 % — SIGNIFICANT CHANGE UP (ref 0–6)
HCT VFR BLD CALC: 34.3 % — LOW (ref 39–50)
HGB BLD-MCNC: 11.1 G/DL — LOW (ref 13–17)
LG PLATELETS BLD QL AUTO: SLIGHT — SIGNIFICANT CHANGE UP
LYMPHOCYTES # BLD AUTO: 1.16 K/UL — SIGNIFICANT CHANGE UP (ref 1–3.3)
LYMPHOCYTES # BLD AUTO: 18 % — SIGNIFICANT CHANGE UP (ref 13–44)
MCHC RBC-ENTMCNC: 28 PG — SIGNIFICANT CHANGE UP (ref 27–34)
MCHC RBC-ENTMCNC: 32.4 G/DL — SIGNIFICANT CHANGE UP (ref 32–36)
MCV RBC AUTO: 86.4 FL — SIGNIFICANT CHANGE UP (ref 80–100)
MONOCYTES # BLD AUTO: 0.39 K/UL — SIGNIFICANT CHANGE UP (ref 0–0.9)
MONOCYTES NFR BLD AUTO: 6 % — SIGNIFICANT CHANGE UP (ref 2–14)
NEUTROPHILS # BLD AUTO: 4.51 K/UL — SIGNIFICANT CHANGE UP (ref 1.8–7.4)
NEUTROPHILS NFR BLD AUTO: 70 % — SIGNIFICANT CHANGE UP (ref 43–77)
NRBC # BLD: 0 /100 WBCS — SIGNIFICANT CHANGE UP (ref 0–0)
NRBC # BLD: SIGNIFICANT CHANGE UP /100 WBCS (ref 0–0)
PLAT MORPH BLD: ABNORMAL
PLATELET # BLD AUTO: 155 K/UL — SIGNIFICANT CHANGE UP (ref 150–400)
RBC # BLD: 3.97 M/UL — LOW (ref 4.2–5.8)
RBC # FLD: 15 % — HIGH (ref 10.3–14.5)
RBC BLD AUTO: SIGNIFICANT CHANGE UP
VARIANT LYMPHS # BLD: 1 % — SIGNIFICANT CHANGE UP (ref 0–6)
WBC # BLD: 6.44 K/UL — SIGNIFICANT CHANGE UP (ref 3.8–10.5)
WBC # FLD AUTO: 6.44 K/UL — SIGNIFICANT CHANGE UP (ref 3.8–10.5)

## 2024-10-15 DIAGNOSIS — C90.00 MULTIPLE MYELOMA NOT HAVING ACHIEVED REMISSION: ICD-10-CM

## 2024-10-22 NOTE — H&P ADULT - PROBLEM/PLAN-2
Vinny Smith Jr. is a 89 year old male.    Chief Complaint   Patient presents with    Epistaxis     Nosebleed right nostril; last nosebleed was 12:10am this morning       HISTORY OF PRESENT ILLNESS    He presents with a history of nosebleeds for the last year or so.  Given a referral to see me sometime ago but never came and only noted significant increase in frequency and the length of time bleeding in the past few months.  He is on Xarelto and has not stopped it recently.  Last nosebleed was yesterday.  No other signs, symptoms or complaints     12/17/21 doing very well.  Had one tiny nosebleed within a few days of being cauterized with since then he has had no further bleeding from the nose at all.  Very happy with the results of his cauterization.  Using Neosporin or Vaseline at least 3 times a day recently and this seems to be helping with dryness intranasally.  No other signs, symptoms or complaints.  Using Xarelto daily.    10/22/24 I last saw him about 3 years ago and at that time I cauterized hypertrophic vessel with silver nitrate.  He has done quite well up until a few weeks ago when he started having some bleeding once again.  Only on the right side actually occurred last evening at around 12 at night.  Brisk he puts pressure reports piece of Kleenex in his nose and it typically stops.  Here for evaluation and management.  Social History     Socioeconomic History    Marital status:    Tobacco Use    Smoking status: Never    Smokeless tobacco: Never   Vaping Use    Vaping status: Never Used   Substance and Sexual Activity    Alcohol use: No    Drug use: No   Other Topics Concern    Caffeine Concern No       Family History   Problem Relation Age of Onset    Gastro-Intestinal Disorder Father         diverticulitis    Heart Disease Mother         coronary artery disease (cause of death)    Arthritis Sister     Diabetes Maternal Grandmother     Glaucoma Neg     Macular degeneration Neg        Past  Medical History:    Age-related nuclear cataract of both eyes    Contusion of left hip and thigh, initial encounter    Cough    Epistaxis    Floater, vitreous, bilateral    Global amnesia    per N minutes in [place]    Headache    Hypothyroid    Inguinal hernia    Nocturia    Palpitations    toprol    Perforated ear drum    Physical exam, annual    Prostate cancer (HCC)    seed implant    Pulse irregularity       Past Surgical History:   Procedure Laterality Date    Colonoscopy      Electrocardiogram, complete  2012    scanned to media tab    Inguinal hernia repair      Inner ear surgery proc unlisted      mastoidectomy         REVIEW OF SYSTEMS    System Neg/Pos Details   Constitutional Negative Fatigue, fever and weight loss.   ENMT Negative Drooling.   Eyes Negative Blurred vision and vision changes.   Respiratory Negative Dyspnea and wheezing.   Cardio Negative Chest pain, irregular heartbeat/palpitations and syncope.   GI Negative Abdominal pain and diarrhea.   Endocrine Negative Cold intolerance and heat intolerance.   Neuro Negative Tremors.   Psych Negative Anxiety and depression.   Integumentary Negative Frequent skin infections, pigment change and rash.   Hema/Lymph Negative Easy bleeding and easy bruising.           PHYSICAL EXAM    Ht 5' 11\" (1.803 m)   Wt 162 lb (73.5 kg)   BMI 22.59 kg/m²        Constitutional Normal Overall appearance - Normal.   Psychiatric Normal Orientation - Oriented to time, place, person & situation. Appropriate mood and affect.   Neck Exam Normal Inspection - Normal. Palpation - Normal. Parotid gland - Normal. Thyroid gland - Normal.   Eyes Normal Conjunctiva - Right: Normal, Left: Normal. Pupil - Right: Normal, Left: Normal. Fundus - Right: Normal, Left: Normal.   Neurological Normal Memory - Normal. Cranial nerves - Cranial nerves II through XII grossly intact.   Head/Face Normal Facial features - Normal. Eyebrows - Normal. Skull - Normal.        Nasopharynx  Normal External nose - Normal. Lips/teeth/gums - Normal. Tonsils - Normal. Oropharynx - Normal.   Ears Normal Inspection - Right: Normal, Left: Normal. Canal - Right: Normal, Left: Normal. TM - Right: Normal, Left: Normal.   Skin Normal Inspection - Normal.        Lymph Detail Normal Submental. Submandibular. Anterior cervical. Posterior cervical. Supraclavicular.        Nose/Mouth/Throat Normal External nose - Normal. Lips/teeth/gums - Normal. Tonsils - Normal. Oropharynx - Normal.   Nose/Mouth/Throat Normal Nares - Right: Normal Left: Normal. Septum -Normal  Turbinates - Right: Normal, Left: Normal.  Prominent vessel right septum.   Procedures:  Control Epistaxis:  Pre-Procedure Care: Consent was obtained. Procedure/Risks were explained. Questions were answered. Correct patient identified. Correct side and site confirmed.   Control of a simple anterior nosebleed was performed. Location of the bleed was Kiesselbach's plexus. The procedure was performed on the right side.  Bleeding site/vessels were treated with AgNO3 cauterization.  Anesthesia used was topical Lidocaine/epinephrine 4%/1:50207   Patient tolerated the procedure well. Discharge instructions were discussed with the patient/parent. Epistaxis precautions were explained and understood. Use Vaseline and/or nasal saline gel to the affected area TID.       Current Outpatient Medications:     atorvastatin 10 MG Oral Tab, Take 1 tablet (10 mg total) by mouth daily., Disp: 90 tablet, Rfl: 3    levothyroxine 100 MCG Oral Tab, Take 1 tablet (100 mcg total) by mouth before breakfast., Disp: 90 tablet, Rfl: 3    pantoprazole 40 MG Oral Tab EC, Take 1 tablet (40 mg total) by mouth before breakfast., Disp: 90 tablet, Rfl: 3    amLODIPine 5 MG Oral Tab, Take 1 tablet (5 mg total) by mouth daily., Disp: , Rfl:     rivaroxaban 15 MG Oral Tab, Take 1 tablet (15 mg total) by mouth daily with food., Disp: , Rfl:     Multiple Vitamins-Minerals (CENTRUM SILVER) Oral Tab,  Take 1 tablet by mouth daily., Disp: , Rfl:     DilTIAZem HCl ER Coated Beads 120 MG Oral Capsule SR 24 Hr, Take 1 capsule (120 mg total) by mouth daily., Disp: 30 capsule, Rfl: 1  ASSESSMENT AND PLAN    1. Nosebleed  Right septal vessel cauterized with silver nitrate without difficulty.  No bleeding noted.  Return to see me in 1 month for reevaluation.  Epistaxis precautions and management of acute nosebleeds discussed and understood.        This note was prepared using Dragon Medical voice recognition dictation software. As a result errors may occur. When identified these errors have been corrected. While every attempt is made to correct errors during dictation discrepancies may still exist    Aniket Hernandez MD    10/22/2024    9:19 AM     DISPLAY PLAN FREE TEXT

## 2024-10-25 ENCOUNTER — NON-APPOINTMENT (OUTPATIENT)
Age: 74
End: 2024-10-25

## 2024-10-25 ENCOUNTER — APPOINTMENT (OUTPATIENT)
Dept: INFUSION THERAPY | Facility: HOSPITAL | Age: 74
End: 2024-10-25

## 2024-10-25 ENCOUNTER — APPOINTMENT (OUTPATIENT)
Dept: HEMATOLOGY ONCOLOGY | Facility: CLINIC | Age: 74
End: 2024-10-25
Payer: MEDICARE

## 2024-10-25 ENCOUNTER — APPOINTMENT (OUTPATIENT)
Dept: HEMATOLOGY ONCOLOGY | Facility: CLINIC | Age: 74
End: 2024-10-25

## 2024-10-25 ENCOUNTER — RESULT REVIEW (OUTPATIENT)
Age: 74
End: 2024-10-25

## 2024-10-25 VITALS
BODY MASS INDEX: 20.96 KG/M2 | OXYGEN SATURATION: 99 % | SYSTOLIC BLOOD PRESSURE: 136 MMHG | DIASTOLIC BLOOD PRESSURE: 71 MMHG | TEMPERATURE: 97.3 F | HEART RATE: 54 BPM | WEIGHT: 122.11 LBS | RESPIRATION RATE: 16 BRPM

## 2024-10-25 DIAGNOSIS — Z51.11 ENCOUNTER FOR ANTINEOPLASTIC CHEMOTHERAPY: ICD-10-CM

## 2024-10-25 DIAGNOSIS — D64.9 ANEMIA, UNSPECIFIED: ICD-10-CM

## 2024-10-25 DIAGNOSIS — N18.9 CHRONIC KIDNEY DISEASE, UNSPECIFIED: ICD-10-CM

## 2024-10-25 LAB
ALBUMIN SERPL ELPH-MCNC: 4.3 G/DL
ALP BLD-CCNC: 58 U/L
ALT SERPL-CCNC: 18 U/L
ANION GAP SERPL CALC-SCNC: 12 MMOL/L
AST SERPL-CCNC: 25 U/L
BASOPHILS # BLD AUTO: 0.06 K/UL — SIGNIFICANT CHANGE UP (ref 0–0.2)
BASOPHILS NFR BLD AUTO: 0.9 % — SIGNIFICANT CHANGE UP (ref 0–2)
BILIRUB SERPL-MCNC: 0.3 MG/DL
BUN SERPL-MCNC: 26 MG/DL
CALCIUM SERPL-MCNC: 9.5 MG/DL
CHLORIDE SERPL-SCNC: 106 MMOL/L
CO2 SERPL-SCNC: 21 MMOL/L
CREAT SERPL-MCNC: 1.74 MG/DL
DEPRECATED KAPPA LC FREE/LAMBDA SER: 1.49 RATIO
EGFR: 40 ML/MIN/1.73M2
EOSINOPHIL # BLD AUTO: 0.44 K/UL — SIGNIFICANT CHANGE UP (ref 0–0.5)
EOSINOPHIL NFR BLD AUTO: 6.8 % — HIGH (ref 0–6)
GLUCOSE SERPL-MCNC: 124 MG/DL
HCT VFR BLD CALC: 31.1 % — LOW (ref 39–50)
HGB BLD-MCNC: 9.8 G/DL — LOW (ref 13–17)
IGA SER QL IEP: 164 MG/DL
IGG SER QL IEP: 865 MG/DL
IGM SER QL IEP: 42 MG/DL
IMM GRANULOCYTES NFR BLD AUTO: 0.3 % — SIGNIFICANT CHANGE UP (ref 0–0.9)
KAPPA LC CSF-MCNC: 2.05 MG/DL
KAPPA LC SERPL-MCNC: 3.06 MG/DL
LDH SERPL-CCNC: 177 U/L
LYMPHOCYTES # BLD AUTO: 1.35 K/UL — SIGNIFICANT CHANGE UP (ref 1–3.3)
LYMPHOCYTES # BLD AUTO: 21 % — SIGNIFICANT CHANGE UP (ref 13–44)
MCHC RBC-ENTMCNC: 27.9 PG — SIGNIFICANT CHANGE UP (ref 27–34)
MCHC RBC-ENTMCNC: 31.5 G/DL — LOW (ref 32–36)
MCV RBC AUTO: 88.6 FL — SIGNIFICANT CHANGE UP (ref 80–100)
MONOCYTES # BLD AUTO: 0.7 K/UL — SIGNIFICANT CHANGE UP (ref 0–0.9)
MONOCYTES NFR BLD AUTO: 10.9 % — SIGNIFICANT CHANGE UP (ref 2–14)
NEUTROPHILS # BLD AUTO: 3.86 K/UL — SIGNIFICANT CHANGE UP (ref 1.8–7.4)
NEUTROPHILS NFR BLD AUTO: 60.1 % — SIGNIFICANT CHANGE UP (ref 43–77)
NRBC # BLD: 0 /100 WBCS — SIGNIFICANT CHANGE UP (ref 0–0)
PLATELET # BLD AUTO: 142 K/UL — LOW (ref 150–400)
POTASSIUM SERPL-SCNC: 4.8 MMOL/L
PROT SERPL-MCNC: 7.1 G/DL
PROT SERPL-MCNC: 7.1 G/DL
RBC # BLD: 3.51 M/UL — LOW (ref 4.2–5.8)
RBC # FLD: 15.7 % — HIGH (ref 10.3–14.5)
SODIUM SERPL-SCNC: 140 MMOL/L
WBC # BLD: 6.43 K/UL — SIGNIFICANT CHANGE UP (ref 3.8–10.5)
WBC # FLD AUTO: 6.43 K/UL — SIGNIFICANT CHANGE UP (ref 3.8–10.5)

## 2024-10-25 PROCEDURE — 99214 OFFICE O/P EST MOD 30 MIN: CPT

## 2024-11-07 ENCOUNTER — OUTPATIENT (OUTPATIENT)
Dept: OUTPATIENT SERVICES | Facility: HOSPITAL | Age: 74
LOS: 1 days | Discharge: HOME | End: 2024-11-07

## 2024-11-07 DIAGNOSIS — C90.00 MULTIPLE MYELOMA NOT HAVING ACHIEVED REMISSION: ICD-10-CM

## 2024-11-07 DIAGNOSIS — Z95.5 PRESENCE OF CORONARY ANGIOPLASTY IMPLANT AND GRAFT: Chronic | ICD-10-CM

## 2024-11-08 ENCOUNTER — APPOINTMENT (OUTPATIENT)
Dept: HEMATOLOGY ONCOLOGY | Facility: CLINIC | Age: 74
End: 2024-11-08

## 2024-11-08 ENCOUNTER — APPOINTMENT (OUTPATIENT)
Dept: INFUSION THERAPY | Facility: HOSPITAL | Age: 74
End: 2024-11-08

## 2024-11-08 ENCOUNTER — RESULT REVIEW (OUTPATIENT)
Age: 74
End: 2024-11-08

## 2024-11-08 DIAGNOSIS — C90.00 MULTIPLE MYELOMA NOT HAVING ACHIEVED REMISSION: ICD-10-CM

## 2024-11-08 LAB
BASOPHILS # BLD AUTO: 0.07 K/UL — SIGNIFICANT CHANGE UP (ref 0–0.2)
BASOPHILS NFR BLD AUTO: 1.1 % — SIGNIFICANT CHANGE UP (ref 0–2)
EOSINOPHIL # BLD AUTO: 0.2 K/UL — SIGNIFICANT CHANGE UP (ref 0–0.5)
EOSINOPHIL NFR BLD AUTO: 3.1 % — SIGNIFICANT CHANGE UP (ref 0–6)
HCT VFR BLD CALC: 31.5 % — LOW (ref 39–50)
HGB BLD-MCNC: 10.2 G/DL — LOW (ref 13–17)
IMM GRANULOCYTES NFR BLD AUTO: 0.8 % — SIGNIFICANT CHANGE UP (ref 0–0.9)
LYMPHOCYTES # BLD AUTO: 1.29 K/UL — SIGNIFICANT CHANGE UP (ref 1–3.3)
LYMPHOCYTES # BLD AUTO: 19.9 % — SIGNIFICANT CHANGE UP (ref 13–44)
MCHC RBC-ENTMCNC: 28.5 PG — SIGNIFICANT CHANGE UP (ref 27–34)
MCHC RBC-ENTMCNC: 32.4 G/DL — SIGNIFICANT CHANGE UP (ref 32–36)
MCV RBC AUTO: 88 FL — SIGNIFICANT CHANGE UP (ref 80–100)
MONOCYTES # BLD AUTO: 0.57 K/UL — SIGNIFICANT CHANGE UP (ref 0–0.9)
MONOCYTES NFR BLD AUTO: 8.8 % — SIGNIFICANT CHANGE UP (ref 2–14)
NEUTROPHILS # BLD AUTO: 4.3 K/UL — SIGNIFICANT CHANGE UP (ref 1.8–7.4)
NEUTROPHILS NFR BLD AUTO: 66.3 % — SIGNIFICANT CHANGE UP (ref 43–77)
NRBC # BLD: 0 /100 WBCS — SIGNIFICANT CHANGE UP (ref 0–0)
PLATELET # BLD AUTO: 133 K/UL — LOW (ref 150–400)
RBC # BLD: 3.58 M/UL — LOW (ref 4.2–5.8)
RBC # FLD: 16.2 % — HIGH (ref 10.3–14.5)
WBC # BLD: 6.48 K/UL — SIGNIFICANT CHANGE UP (ref 3.8–10.5)
WBC # FLD AUTO: 6.48 K/UL — SIGNIFICANT CHANGE UP (ref 3.8–10.5)

## 2024-11-11 ENCOUNTER — NON-APPOINTMENT (OUTPATIENT)
Age: 74
End: 2024-11-11

## 2024-11-11 ENCOUNTER — APPOINTMENT (OUTPATIENT)
Dept: CARDIOLOGY | Facility: CLINIC | Age: 74
End: 2024-11-11
Payer: MEDICARE

## 2024-11-11 VITALS
HEIGHT: 64 IN | OXYGEN SATURATION: 99 % | RESPIRATION RATE: 16 BRPM | BODY MASS INDEX: 20.83 KG/M2 | DIASTOLIC BLOOD PRESSURE: 56 MMHG | HEART RATE: 69 BPM | TEMPERATURE: 97.2 F | WEIGHT: 122 LBS | SYSTOLIC BLOOD PRESSURE: 114 MMHG

## 2024-11-11 DIAGNOSIS — I10 ESSENTIAL (PRIMARY) HYPERTENSION: ICD-10-CM

## 2024-11-11 DIAGNOSIS — R11.2 NAUSEA WITH VOMITING, UNSPECIFIED: ICD-10-CM

## 2024-11-11 DIAGNOSIS — I44.7 LEFT BUNDLE-BRANCH BLOCK, UNSPECIFIED: ICD-10-CM

## 2024-11-11 DIAGNOSIS — I25.10 ATHEROSCLEROTIC HEART DISEASE OF NATIVE CORONARY ARTERY W/OUT ANGINA PECTORIS: ICD-10-CM

## 2024-11-11 DIAGNOSIS — E78.00 PURE HYPERCHOLESTEROLEMIA, UNSPECIFIED: ICD-10-CM

## 2024-11-11 DIAGNOSIS — Z51.11 ENCOUNTER FOR ANTINEOPLASTIC CHEMOTHERAPY: ICD-10-CM

## 2024-11-11 PROCEDURE — 99214 OFFICE O/P EST MOD 30 MIN: CPT

## 2024-11-11 PROCEDURE — 93000 ELECTROCARDIOGRAM COMPLETE: CPT

## 2024-11-11 RX ORDER — METOPROLOL SUCCINATE 25 MG/1
25 TABLET, EXTENDED RELEASE ORAL DAILY
Refills: 0 | Status: ACTIVE | COMMUNITY

## 2024-11-22 ENCOUNTER — APPOINTMENT (OUTPATIENT)
Dept: HEMATOLOGY ONCOLOGY | Facility: CLINIC | Age: 74
End: 2024-11-22

## 2024-11-22 ENCOUNTER — RESULT REVIEW (OUTPATIENT)
Age: 74
End: 2024-11-22

## 2024-11-22 ENCOUNTER — APPOINTMENT (OUTPATIENT)
Dept: INFUSION THERAPY | Facility: HOSPITAL | Age: 74
End: 2024-11-22

## 2024-11-22 DIAGNOSIS — C90.00 MULTIPLE MYELOMA NOT HAVING ACHIEVED REMISSION: ICD-10-CM

## 2024-11-22 LAB
BASOPHILS # BLD AUTO: 0.05 K/UL — SIGNIFICANT CHANGE UP (ref 0–0.2)
BASOPHILS NFR BLD AUTO: 0.9 % — SIGNIFICANT CHANGE UP (ref 0–2)
EOSINOPHIL # BLD AUTO: 0.21 K/UL — SIGNIFICANT CHANGE UP (ref 0–0.5)
EOSINOPHIL NFR BLD AUTO: 3.7 % — SIGNIFICANT CHANGE UP (ref 0–6)
HCT VFR BLD CALC: 31.2 % — LOW (ref 39–50)
HGB BLD-MCNC: 10.1 G/DL — LOW (ref 13–17)
IMM GRANULOCYTES NFR BLD AUTO: 0.2 % — SIGNIFICANT CHANGE UP (ref 0–0.9)
LYMPHOCYTES # BLD AUTO: 1.22 K/UL — SIGNIFICANT CHANGE UP (ref 1–3.3)
LYMPHOCYTES # BLD AUTO: 21.3 % — SIGNIFICANT CHANGE UP (ref 13–44)
MCHC RBC-ENTMCNC: 28.6 PG — SIGNIFICANT CHANGE UP (ref 27–34)
MCHC RBC-ENTMCNC: 32.4 G/DL — SIGNIFICANT CHANGE UP (ref 32–36)
MCV RBC AUTO: 88.4 FL — SIGNIFICANT CHANGE UP (ref 80–100)
MONOCYTES # BLD AUTO: 0.51 K/UL — SIGNIFICANT CHANGE UP (ref 0–0.9)
MONOCYTES NFR BLD AUTO: 8.9 % — SIGNIFICANT CHANGE UP (ref 2–14)
NEUTROPHILS # BLD AUTO: 3.73 K/UL — SIGNIFICANT CHANGE UP (ref 1.8–7.4)
NEUTROPHILS NFR BLD AUTO: 65 % — SIGNIFICANT CHANGE UP (ref 43–77)
NRBC # BLD: 0 /100 WBCS — SIGNIFICANT CHANGE UP (ref 0–0)
PLATELET # BLD AUTO: 146 K/UL — LOW (ref 150–400)
RBC # BLD: 3.53 M/UL — LOW (ref 4.2–5.8)
RBC # FLD: 16.5 % — HIGH (ref 10.3–14.5)
WBC # BLD: 5.73 K/UL — SIGNIFICANT CHANGE UP (ref 3.8–10.5)
WBC # FLD AUTO: 5.73 K/UL — SIGNIFICANT CHANGE UP (ref 3.8–10.5)

## 2024-12-02 NOTE — PROGRESS NOTE ADULT - ASSESSMENT
Pt states she fell on Friday and broke her leg. She has asked to cancel her appointment tomorrow. She said she went to the ED and the baby is fine but she will not be in tomorrow. I only see her on the schedule for cytotec.    CB# is [phone]   Patient is a 73 yo M with PMH of HTN, HLD, CAD, cardiac stent, Multiple Myeloma who presented to the ED for 3 days of generalized weakness.    EKG: ST PVCs  Tele: NSR PVCs episodes of PAT    1. Weakness  -rapidly progressed. was unable to move extremities.   -s/p RRT for hypoxia now intubated in MICU   -neuro on board, possible GBS vs pathology in neck  -echo shows mild AR/AS, normal LV systolic function and mild diastolic dysfunction   -IVIG 35g qd (3/3) as per neuro for possible GBS completed 9/28    2. CAD s/p PCI  -in 2015 in [facility] as per daughter patient had heart attack  -no reported CP prior to hospitalization    3. MM  -heme/onc on board    4. ?Afib  -?afib on tele  -no AC 2/2 anemia and duodenal ulcers

## 2024-12-06 ENCOUNTER — RESULT REVIEW (OUTPATIENT)
Age: 74
End: 2024-12-06

## 2024-12-06 ENCOUNTER — APPOINTMENT (OUTPATIENT)
Dept: HEMATOLOGY ONCOLOGY | Facility: CLINIC | Age: 74
End: 2024-12-06

## 2024-12-06 ENCOUNTER — APPOINTMENT (OUTPATIENT)
Dept: INFUSION THERAPY | Facility: HOSPITAL | Age: 74
End: 2024-12-06

## 2024-12-06 LAB
BASOPHILS # BLD AUTO: 0.07 K/UL — SIGNIFICANT CHANGE UP (ref 0–0.2)
BASOPHILS NFR BLD AUTO: 1.2 % — SIGNIFICANT CHANGE UP (ref 0–2)
EOSINOPHIL # BLD AUTO: 0.18 K/UL — SIGNIFICANT CHANGE UP (ref 0–0.5)
EOSINOPHIL NFR BLD AUTO: 3.2 % — SIGNIFICANT CHANGE UP (ref 0–6)
HCT VFR BLD CALC: 32.1 % — LOW (ref 39–50)
HGB BLD-MCNC: 10.2 G/DL — LOW (ref 13–17)
IMM GRANULOCYTES NFR BLD AUTO: 0.4 % — SIGNIFICANT CHANGE UP (ref 0–0.9)
LYMPHOCYTES # BLD AUTO: 1.21 K/UL — SIGNIFICANT CHANGE UP (ref 1–3.3)
LYMPHOCYTES # BLD AUTO: 21.5 % — SIGNIFICANT CHANGE UP (ref 13–44)
MCHC RBC-ENTMCNC: 28.9 PG — SIGNIFICANT CHANGE UP (ref 27–34)
MCHC RBC-ENTMCNC: 31.8 G/DL — LOW (ref 32–36)
MCV RBC AUTO: 90.9 FL — SIGNIFICANT CHANGE UP (ref 80–100)
MONOCYTES # BLD AUTO: 0.53 K/UL — SIGNIFICANT CHANGE UP (ref 0–0.9)
MONOCYTES NFR BLD AUTO: 9.4 % — SIGNIFICANT CHANGE UP (ref 2–14)
NEUTROPHILS # BLD AUTO: 3.62 K/UL — SIGNIFICANT CHANGE UP (ref 1.8–7.4)
NEUTROPHILS NFR BLD AUTO: 64.3 % — SIGNIFICANT CHANGE UP (ref 43–77)
NRBC # BLD: 0 /100 WBCS — SIGNIFICANT CHANGE UP (ref 0–0)
PLATELET # BLD AUTO: 159 K/UL — SIGNIFICANT CHANGE UP (ref 150–400)
RBC # BLD: 3.53 M/UL — LOW (ref 4.2–5.8)
RBC # FLD: 15.9 % — HIGH (ref 10.3–14.5)
WBC # BLD: 5.63 K/UL — SIGNIFICANT CHANGE UP (ref 3.8–10.5)
WBC # FLD AUTO: 5.63 K/UL — SIGNIFICANT CHANGE UP (ref 3.8–10.5)

## 2024-12-11 NOTE — PATIENT PROFILE ADULT - NSPROGENSOURCEINFO_GEN_A_NUR
Chart reviewed.  [facility] CRISELDA PINEDA notified of patient status.      I have spoken with Soila via  Services and he is agreeable to having Thoracentesis today (12/11) rather than 12/12 as scheduled.  He confirms that he has transportation available.    I have spoken with Asmita DIEHL at  and received arrival time of 2 pm today at [facility].    Soila acknowledges understanding and has taken  address and directions as he has not had thoracentesis at this site before.    All questions answered.  Patient plans for thoracentesis today.    Peds scheduling notified of change and schedule reflects new date for procedure.    Delaney CORTES  Interventional Radiology RN             
patient

## 2024-12-12 LAB
ALBUMIN SERPL ELPH-MCNC: 4.7 G/DL
ALP BLD-CCNC: 56 U/L
ALT SERPL-CCNC: 20 U/L
ANION GAP SERPL CALC-SCNC: 14 MMOL/L
AST SERPL-CCNC: 27 U/L
BILIRUB SERPL-MCNC: 0.4 MG/DL
BUN SERPL-MCNC: 26 MG/DL
CALCIUM SERPL-MCNC: 9.8 MG/DL
CHLORIDE SERPL-SCNC: 105 MMOL/L
CO2 SERPL-SCNC: 22 MMOL/L
CREAT SERPL-MCNC: 1.58 MG/DL
EGFR: 45 ML/MIN/1.73M2
GLUCOSE SERPL-MCNC: 106 MG/DL
LDH SERPL-CCNC: 197 U/L
POTASSIUM SERPL-SCNC: 4.5 MMOL/L
PROT SERPL-MCNC: 7.3 G/DL
SODIUM SERPL-SCNC: 141 MMOL/L

## 2024-12-13 ENCOUNTER — RESULT REVIEW (OUTPATIENT)
Age: 74
End: 2024-12-13

## 2024-12-13 ENCOUNTER — APPOINTMENT (OUTPATIENT)
Dept: HEMATOLOGY ONCOLOGY | Facility: CLINIC | Age: 74
End: 2024-12-13
Payer: MEDICARE

## 2024-12-13 VITALS
DIASTOLIC BLOOD PRESSURE: 73 MMHG | RESPIRATION RATE: 16 BRPM | WEIGHT: 125 LBS | SYSTOLIC BLOOD PRESSURE: 151 MMHG | OXYGEN SATURATION: 98 % | HEART RATE: 60 BPM | BODY MASS INDEX: 21.46 KG/M2 | TEMPERATURE: 97.4 F

## 2024-12-13 DIAGNOSIS — D64.9 ANEMIA, UNSPECIFIED: ICD-10-CM

## 2024-12-13 DIAGNOSIS — C90.00 MULTIPLE MYELOMA NOT HAVING ACHIEVED REMISSION: ICD-10-CM

## 2024-12-13 DIAGNOSIS — N18.9 CHRONIC KIDNEY DISEASE, UNSPECIFIED: ICD-10-CM

## 2024-12-13 DIAGNOSIS — Z51.11 ENCOUNTER FOR ANTINEOPLASTIC CHEMOTHERAPY: ICD-10-CM

## 2024-12-13 LAB
ALBUMIN MFR SERPL ELPH: 63.4 %
ALBUMIN SERPL-MCNC: 4.6 G/DL
ALBUMIN/GLOB SERPL: 1.7 RATIO
ALPHA1 GLOB MFR SERPL ELPH: 3.8 %
ALPHA1 GLOB SERPL ELPH-MCNC: 0.3 G/DL
ALPHA2 GLOB MFR SERPL ELPH: 10.2 %
ALPHA2 GLOB SERPL ELPH-MCNC: 0.7 G/DL
B-GLOBULIN MFR SERPL ELPH: 11.4 %
B-GLOBULIN SERPL ELPH-MCNC: 0.8 G/DL
BASOPHILS # BLD AUTO: 0.04 K/UL — SIGNIFICANT CHANGE UP (ref 0–0.2)
BASOPHILS NFR BLD AUTO: 0.7 % — SIGNIFICANT CHANGE UP (ref 0–2)
DEPRECATED KAPPA LC FREE/LAMBDA SER: 1.79 RATIO
EOSINOPHIL # BLD AUTO: 0.18 K/UL — SIGNIFICANT CHANGE UP (ref 0–0.5)
EOSINOPHIL NFR BLD AUTO: 3 % — SIGNIFICANT CHANGE UP (ref 0–6)
GAMMA GLOB FLD ELPH-MCNC: 0.8 G/DL
GAMMA GLOB MFR SERPL ELPH: 11.2 %
HCT VFR BLD CALC: 31.6 % — LOW (ref 39–50)
HGB BLD-MCNC: 10 G/DL — LOW (ref 13–17)
IGA SER QL IEP: 182 MG/DL
IGG SER QL IEP: 864 MG/DL
IGM SER QL IEP: 42 MG/DL
IMM GRANULOCYTES NFR BLD AUTO: 0.2 % — SIGNIFICANT CHANGE UP (ref 0–0.9)
INTERPRETATION SERPL IEP-IMP: NORMAL
KAPPA LC CSF-MCNC: 1.87 MG/DL
KAPPA LC SERPL-MCNC: 3.34 MG/DL
LYMPHOCYTES # BLD AUTO: 1.26 K/UL — SIGNIFICANT CHANGE UP (ref 1–3.3)
LYMPHOCYTES # BLD AUTO: 20.7 % — SIGNIFICANT CHANGE UP (ref 13–44)
M PROTEIN SPEC IFE-MCNC: NORMAL
MCHC RBC-ENTMCNC: 28.5 PG — SIGNIFICANT CHANGE UP (ref 27–34)
MCHC RBC-ENTMCNC: 31.6 G/DL — LOW (ref 32–36)
MCV RBC AUTO: 90 FL — SIGNIFICANT CHANGE UP (ref 80–100)
MONOCYTES # BLD AUTO: 0.52 K/UL — SIGNIFICANT CHANGE UP (ref 0–0.9)
MONOCYTES NFR BLD AUTO: 8.5 % — SIGNIFICANT CHANGE UP (ref 2–14)
NEUTROPHILS # BLD AUTO: 4.08 K/UL — SIGNIFICANT CHANGE UP (ref 1.8–7.4)
NEUTROPHILS NFR BLD AUTO: 66.9 % — SIGNIFICANT CHANGE UP (ref 43–77)
NRBC # BLD: 0 /100 WBCS — SIGNIFICANT CHANGE UP (ref 0–0)
PLATELET # BLD AUTO: 145 K/UL — LOW (ref 150–400)
PROT SERPL-MCNC: 7.3 G/DL
PROT SERPL-MCNC: 7.3 G/DL
RBC # BLD: 3.51 M/UL — LOW (ref 4.2–5.8)
RBC # FLD: 16 % — HIGH (ref 10.3–14.5)
WBC # BLD: 6.09 K/UL — SIGNIFICANT CHANGE UP (ref 3.8–10.5)
WBC # FLD AUTO: 6.09 K/UL — SIGNIFICANT CHANGE UP (ref 3.8–10.5)

## 2024-12-13 PROCEDURE — 99214 OFFICE O/P EST MOD 30 MIN: CPT

## 2024-12-20 ENCOUNTER — RESULT REVIEW (OUTPATIENT)
Age: 74
End: 2024-12-20

## 2024-12-20 ENCOUNTER — APPOINTMENT (OUTPATIENT)
Dept: INFUSION THERAPY | Facility: HOSPITAL | Age: 74
End: 2024-12-20

## 2024-12-20 ENCOUNTER — APPOINTMENT (OUTPATIENT)
Dept: HEMATOLOGY ONCOLOGY | Facility: CLINIC | Age: 74
End: 2024-12-20

## 2024-12-20 LAB
BASOPHILS # BLD AUTO: 0.07 K/UL — SIGNIFICANT CHANGE UP (ref 0–0.2)
BASOPHILS NFR BLD AUTO: 1.2 % — SIGNIFICANT CHANGE UP (ref 0–2)
EOSINOPHIL # BLD AUTO: 0.18 K/UL — SIGNIFICANT CHANGE UP (ref 0–0.5)
EOSINOPHIL NFR BLD AUTO: 3 % — SIGNIFICANT CHANGE UP (ref 0–6)
HCT VFR BLD CALC: 31.2 % — LOW (ref 39–50)
HGB BLD-MCNC: 10 G/DL — LOW (ref 13–17)
IMM GRANULOCYTES NFR BLD AUTO: 0.2 % — SIGNIFICANT CHANGE UP (ref 0–0.9)
LYMPHOCYTES # BLD AUTO: 1.28 K/UL — SIGNIFICANT CHANGE UP (ref 1–3.3)
LYMPHOCYTES # BLD AUTO: 21.7 % — SIGNIFICANT CHANGE UP (ref 13–44)
MCHC RBC-ENTMCNC: 28.5 PG — SIGNIFICANT CHANGE UP (ref 27–34)
MCHC RBC-ENTMCNC: 32.1 G/DL — SIGNIFICANT CHANGE UP (ref 32–36)
MCV RBC AUTO: 88.9 FL — SIGNIFICANT CHANGE UP (ref 80–100)
MONOCYTES # BLD AUTO: 0.45 K/UL — SIGNIFICANT CHANGE UP (ref 0–0.9)
MONOCYTES NFR BLD AUTO: 7.6 % — SIGNIFICANT CHANGE UP (ref 2–14)
NEUTROPHILS # BLD AUTO: 3.92 K/UL — SIGNIFICANT CHANGE UP (ref 1.8–7.4)
NEUTROPHILS NFR BLD AUTO: 66.3 % — SIGNIFICANT CHANGE UP (ref 43–77)
NRBC # BLD: 0 /100 WBCS — SIGNIFICANT CHANGE UP (ref 0–0)
PLATELET # BLD AUTO: 164 K/UL — SIGNIFICANT CHANGE UP (ref 150–400)
RBC # BLD: 3.51 M/UL — LOW (ref 4.2–5.8)
RBC # FLD: 15.5 % — HIGH (ref 10.3–14.5)
WBC # BLD: 5.91 K/UL — SIGNIFICANT CHANGE UP (ref 3.8–10.5)
WBC # FLD AUTO: 5.91 K/UL — SIGNIFICANT CHANGE UP (ref 3.8–10.5)

## 2025-01-03 ENCOUNTER — APPOINTMENT (OUTPATIENT)
Dept: HEMATOLOGY ONCOLOGY | Facility: CLINIC | Age: 75
End: 2025-01-03

## 2025-01-03 ENCOUNTER — RESULT REVIEW (OUTPATIENT)
Age: 75
End: 2025-01-03

## 2025-01-03 ENCOUNTER — APPOINTMENT (OUTPATIENT)
Dept: INFUSION THERAPY | Facility: HOSPITAL | Age: 75
End: 2025-01-03

## 2025-01-03 LAB
BASOPHILS # BLD AUTO: 0.05 K/UL — SIGNIFICANT CHANGE UP (ref 0–0.2)
BASOPHILS NFR BLD AUTO: 0.9 % — SIGNIFICANT CHANGE UP (ref 0–2)
EOSINOPHIL # BLD AUTO: 0.17 K/UL — SIGNIFICANT CHANGE UP (ref 0–0.5)
EOSINOPHIL NFR BLD AUTO: 2.9 % — SIGNIFICANT CHANGE UP (ref 0–6)
HCT VFR BLD CALC: 31.1 % — LOW (ref 39–50)
HGB BLD-MCNC: 10 G/DL — LOW (ref 13–17)
IMM GRANULOCYTES NFR BLD AUTO: 0.3 % — SIGNIFICANT CHANGE UP (ref 0–0.9)
LYMPHOCYTES # BLD AUTO: 1.18 K/UL — SIGNIFICANT CHANGE UP (ref 1–3.3)
LYMPHOCYTES # BLD AUTO: 20.1 % — SIGNIFICANT CHANGE UP (ref 13–44)
MCHC RBC-ENTMCNC: 28.6 PG — SIGNIFICANT CHANGE UP (ref 27–34)
MCHC RBC-ENTMCNC: 32.2 G/DL — SIGNIFICANT CHANGE UP (ref 32–36)
MCV RBC AUTO: 88.9 FL — SIGNIFICANT CHANGE UP (ref 80–100)
MONOCYTES # BLD AUTO: 0.5 K/UL — SIGNIFICANT CHANGE UP (ref 0–0.9)
MONOCYTES NFR BLD AUTO: 8.5 % — SIGNIFICANT CHANGE UP (ref 2–14)
NEUTROPHILS # BLD AUTO: 3.96 K/UL — SIGNIFICANT CHANGE UP (ref 1.8–7.4)
NEUTROPHILS NFR BLD AUTO: 67.3 % — SIGNIFICANT CHANGE UP (ref 43–77)
NRBC # BLD: 0 /100 WBCS — SIGNIFICANT CHANGE UP (ref 0–0)
PLATELET # BLD AUTO: 163 K/UL — SIGNIFICANT CHANGE UP (ref 150–400)
RBC # BLD: 3.5 M/UL — LOW (ref 4.2–5.8)
RBC # FLD: 15.1 % — HIGH (ref 10.3–14.5)
WBC # BLD: 5.88 K/UL — SIGNIFICANT CHANGE UP (ref 3.8–10.5)
WBC # FLD AUTO: 5.88 K/UL — SIGNIFICANT CHANGE UP (ref 3.8–10.5)

## 2025-01-09 DIAGNOSIS — N18.9 CHRONIC KIDNEY DISEASE, UNSPECIFIED: ICD-10-CM

## 2025-01-16 ENCOUNTER — OUTPATIENT (OUTPATIENT)
Dept: OUTPATIENT SERVICES | Facility: HOSPITAL | Age: 75
LOS: 1 days | Discharge: HOME | End: 2025-01-16

## 2025-01-16 DIAGNOSIS — C90.00 MULTIPLE MYELOMA NOT HAVING ACHIEVED REMISSION: ICD-10-CM

## 2025-01-16 DIAGNOSIS — Z95.5 PRESENCE OF CORONARY ANGIOPLASTY IMPLANT AND GRAFT: Chronic | ICD-10-CM

## 2025-01-17 ENCOUNTER — RESULT REVIEW (OUTPATIENT)
Age: 75
End: 2025-01-17

## 2025-01-17 ENCOUNTER — APPOINTMENT (OUTPATIENT)
Dept: HEMATOLOGY ONCOLOGY | Facility: CLINIC | Age: 75
End: 2025-01-17

## 2025-01-17 ENCOUNTER — APPOINTMENT (OUTPATIENT)
Dept: INFUSION THERAPY | Facility: HOSPITAL | Age: 75
End: 2025-01-17

## 2025-01-17 DIAGNOSIS — C90.00 MULTIPLE MYELOMA NOT HAVING ACHIEVED REMISSION: ICD-10-CM

## 2025-01-17 LAB
BASOPHILS # BLD AUTO: 0.07 K/UL — SIGNIFICANT CHANGE UP (ref 0–0.2)
BASOPHILS NFR BLD AUTO: 1.1 % — SIGNIFICANT CHANGE UP (ref 0–2)
EOSINOPHIL # BLD AUTO: 0.16 K/UL — SIGNIFICANT CHANGE UP (ref 0–0.5)
EOSINOPHIL NFR BLD AUTO: 2.6 % — SIGNIFICANT CHANGE UP (ref 0–6)
HCT VFR BLD CALC: 32.6 % — LOW (ref 39–50)
HGB BLD-MCNC: 10.5 G/DL — LOW (ref 13–17)
IMM GRANULOCYTES NFR BLD AUTO: 0.5 % — SIGNIFICANT CHANGE UP (ref 0–0.9)
LYMPHOCYTES # BLD AUTO: 1.56 K/UL — SIGNIFICANT CHANGE UP (ref 1–3.3)
LYMPHOCYTES # BLD AUTO: 25.5 % — SIGNIFICANT CHANGE UP (ref 13–44)
MCHC RBC-ENTMCNC: 28.4 PG — SIGNIFICANT CHANGE UP (ref 27–34)
MCHC RBC-ENTMCNC: 32.2 G/DL — SIGNIFICANT CHANGE UP (ref 32–36)
MCV RBC AUTO: 88.1 FL — SIGNIFICANT CHANGE UP (ref 80–100)
MONOCYTES # BLD AUTO: 0.51 K/UL — SIGNIFICANT CHANGE UP (ref 0–0.9)
MONOCYTES NFR BLD AUTO: 8.3 % — SIGNIFICANT CHANGE UP (ref 2–14)
NEUTROPHILS # BLD AUTO: 3.79 K/UL — SIGNIFICANT CHANGE UP (ref 1.8–7.4)
NEUTROPHILS NFR BLD AUTO: 62 % — SIGNIFICANT CHANGE UP (ref 43–77)
NRBC # BLD: 0 /100 WBCS — SIGNIFICANT CHANGE UP (ref 0–0)
NRBC BLD-RTO: 0 /100 WBCS — SIGNIFICANT CHANGE UP (ref 0–0)
PLATELET # BLD AUTO: 166 K/UL — SIGNIFICANT CHANGE UP (ref 150–400)
RBC # BLD: 3.7 M/UL — LOW (ref 4.2–5.8)
RBC # FLD: 14.6 % — HIGH (ref 10.3–14.5)
WBC # BLD: 6.12 K/UL — SIGNIFICANT CHANGE UP (ref 3.8–10.5)
WBC # FLD AUTO: 6.12 K/UL — SIGNIFICANT CHANGE UP (ref 3.8–10.5)

## 2025-01-21 DIAGNOSIS — R11.2 NAUSEA WITH VOMITING, UNSPECIFIED: ICD-10-CM

## 2025-01-21 DIAGNOSIS — Z51.11 ENCOUNTER FOR ANTINEOPLASTIC CHEMOTHERAPY: ICD-10-CM

## 2025-01-31 ENCOUNTER — RESULT REVIEW (OUTPATIENT)
Age: 75
End: 2025-01-31

## 2025-01-31 ENCOUNTER — APPOINTMENT (OUTPATIENT)
Dept: INFUSION THERAPY | Facility: HOSPITAL | Age: 75
End: 2025-01-31

## 2025-01-31 ENCOUNTER — APPOINTMENT (OUTPATIENT)
Dept: HEMATOLOGY ONCOLOGY | Facility: CLINIC | Age: 75
End: 2025-01-31

## 2025-01-31 LAB
BASOPHILS # BLD AUTO: 0.04 K/UL — SIGNIFICANT CHANGE UP (ref 0–0.2)
BASOPHILS NFR BLD AUTO: 0.7 % — SIGNIFICANT CHANGE UP (ref 0–2)
EOSINOPHIL # BLD AUTO: 0.15 K/UL — SIGNIFICANT CHANGE UP (ref 0–0.5)
EOSINOPHIL NFR BLD AUTO: 2.7 % — SIGNIFICANT CHANGE UP (ref 0–6)
HCT VFR BLD CALC: 32.6 % — LOW (ref 39–50)
HGB BLD-MCNC: 10.4 G/DL — LOW (ref 13–17)
IMM GRANULOCYTES NFR BLD AUTO: 0.2 % — SIGNIFICANT CHANGE UP (ref 0–0.9)
LYMPHOCYTES # BLD AUTO: 1.32 K/UL — SIGNIFICANT CHANGE UP (ref 1–3.3)
LYMPHOCYTES # BLD AUTO: 24.1 % — SIGNIFICANT CHANGE UP (ref 13–44)
MCHC RBC-ENTMCNC: 28.1 PG — SIGNIFICANT CHANGE UP (ref 27–34)
MCHC RBC-ENTMCNC: 31.9 G/DL — LOW (ref 32–36)
MCV RBC AUTO: 88.1 FL — SIGNIFICANT CHANGE UP (ref 80–100)
MONOCYTES # BLD AUTO: 0.5 K/UL — SIGNIFICANT CHANGE UP (ref 0–0.9)
MONOCYTES NFR BLD AUTO: 9.1 % — SIGNIFICANT CHANGE UP (ref 2–14)
NEUTROPHILS # BLD AUTO: 3.45 K/UL — SIGNIFICANT CHANGE UP (ref 1.8–7.4)
NEUTROPHILS NFR BLD AUTO: 63.2 % — SIGNIFICANT CHANGE UP (ref 43–77)
NRBC # BLD: 0 /100 WBCS — SIGNIFICANT CHANGE UP (ref 0–0)
NRBC BLD-RTO: 0 /100 WBCS — SIGNIFICANT CHANGE UP (ref 0–0)
PLATELET # BLD AUTO: 175 K/UL — SIGNIFICANT CHANGE UP (ref 150–400)
RBC # BLD: 3.7 M/UL — LOW (ref 4.2–5.8)
RBC # FLD: 14.3 % — SIGNIFICANT CHANGE UP (ref 10.3–14.5)
WBC # BLD: 5.47 K/UL — SIGNIFICANT CHANGE UP (ref 3.8–10.5)
WBC # FLD AUTO: 5.47 K/UL — SIGNIFICANT CHANGE UP (ref 3.8–10.5)

## 2025-03-12 ENCOUNTER — APPOINTMENT (OUTPATIENT)
Dept: CARDIOLOGY | Facility: CLINIC | Age: 75
End: 2025-03-12

## 2025-04-02 PROBLEM — R06.02 SHORTNESS OF BREATH ON EXERTION: Status: RESOLVED | Noted: 2017-04-13 | Resolved: 2025-04-02

## 2025-08-04 ENCOUNTER — APPOINTMENT (OUTPATIENT)
Dept: HEMATOLOGY ONCOLOGY | Facility: CLINIC | Age: 75
End: 2025-08-04
Payer: MEDICARE

## 2025-08-04 ENCOUNTER — RESULT REVIEW (OUTPATIENT)
Age: 75
End: 2025-08-04

## 2025-08-04 DIAGNOSIS — N18.9 CHRONIC KIDNEY DISEASE, UNSPECIFIED: ICD-10-CM

## 2025-08-04 DIAGNOSIS — C90.00 MULTIPLE MYELOMA NOT HAVING ACHIEVED REMISSION: ICD-10-CM

## 2025-08-04 DIAGNOSIS — D64.9 ANEMIA, UNSPECIFIED: ICD-10-CM

## 2025-08-04 DIAGNOSIS — Z51.11 ENCOUNTER FOR ANTINEOPLASTIC CHEMOTHERAPY: ICD-10-CM

## 2025-08-04 DIAGNOSIS — M89.8X9 OTHER SPECIFIED DISORDERS OF BONE, UNSPECIFIED SITE: ICD-10-CM

## 2025-08-04 PROCEDURE — 99214 OFFICE O/P EST MOD 30 MIN: CPT

## 2025-08-05 LAB
ALBUMIN SERPL ELPH-MCNC: 5 G/DL
ALP BLD-CCNC: 49 U/L
ALT SERPL-CCNC: 21 U/L
ANION GAP SERPL CALC-SCNC: 18 MMOL/L
AST SERPL-CCNC: 27 U/L
BILIRUB SERPL-MCNC: 0.3 MG/DL
BUN SERPL-MCNC: 30 MG/DL
CALCIUM SERPL-MCNC: 9.6 MG/DL
CHLORIDE SERPL-SCNC: 103 MMOL/L
CO2 SERPL-SCNC: 18 MMOL/L
CREAT SERPL-MCNC: 1.58 MG/DL
EGFRCR SERPLBLD CKD-EPI 2021: 45 ML/MIN/1.73M2
GLUCOSE SERPL-MCNC: 170 MG/DL
LDH SERPL-CCNC: 191 U/L
POTASSIUM SERPL-SCNC: 4.6 MMOL/L
PROT SERPL-MCNC: 7.7 G/DL
SODIUM SERPL-SCNC: 139 MMOL/L

## 2025-08-08 LAB
ALBUMIN MFR SERPL ELPH: 60.7 %
ALBUMIN SERPL-MCNC: 4.6 G/DL
ALBUMIN/GLOB SERPL: 1.5 RATIO
ALPHA1 GLOB MFR SERPL ELPH: 4.3 %
ALPHA1 GLOB SERPL ELPH-MCNC: 0.3 G/DL
ALPHA2 GLOB MFR SERPL ELPH: 11.3 %
ALPHA2 GLOB SERPL ELPH-MCNC: 0.9 G/DL
B-GLOBULIN MFR SERPL ELPH: 11.3 %
B-GLOBULIN SERPL ELPH-MCNC: 0.9 G/DL
DEPRECATED KAPPA LC FREE/LAMBDA SER: 1.25 RATIO
GAMMA GLOB FLD ELPH-MCNC: 0.9 G/DL
GAMMA GLOB MFR SERPL ELPH: 12.4 %
IGA SERPL-MCNC: 211 MG/DL
IGG SERPL-MCNC: 994 MG/DL
IGM SERPL-MCNC: 35 MG/DL
INTERPRETATION SERPL IEP-IMP: NORMAL
KAPPA LC CSF-MCNC: 2.96 MG/DL
KAPPA LC SERPL-MCNC: 3.71 MG/DL
M PROTEIN SPEC IFE-MCNC: NORMAL
PROT SERPL-MCNC: 7.6 G/DL
PROT SERPL-MCNC: 7.6 G/DL

## 2025-08-15 ENCOUNTER — APPOINTMENT (OUTPATIENT)
Dept: HEMATOLOGY ONCOLOGY | Facility: CLINIC | Age: 75
End: 2025-08-15

## 2025-08-15 ENCOUNTER — APPOINTMENT (OUTPATIENT)
Dept: INFUSION THERAPY | Facility: HOSPITAL | Age: 75
End: 2025-08-15

## 2025-08-15 ENCOUNTER — RESULT REVIEW (OUTPATIENT)
Age: 75
End: 2025-08-15

## 2025-08-18 LAB
ALBUMIN SERPL ELPH-MCNC: 4.8 G/DL
ALP BLD-CCNC: 49 U/L
ALT SERPL-CCNC: 21 U/L
ANION GAP SERPL CALC-SCNC: 14 MMOL/L
AST SERPL-CCNC: 25 U/L
BILIRUB SERPL-MCNC: 0.4 MG/DL
BUN SERPL-MCNC: 28 MG/DL
CALCIUM SERPL-MCNC: 9.2 MG/DL
CHLORIDE SERPL-SCNC: 106 MMOL/L
CO2 SERPL-SCNC: 20 MMOL/L
CREAT SERPL-MCNC: 1.56 MG/DL
EGFRCR SERPLBLD CKD-EPI 2021: 46 ML/MIN/1.73M2
GLUCOSE SERPL-MCNC: 136 MG/DL
LDH SERPL-CCNC: 187 U/L
POTASSIUM SERPL-SCNC: 4.2 MMOL/L
PROT SERPL-MCNC: 7.3 G/DL
SODIUM SERPL-SCNC: 140 MMOL/L

## 2025-08-20 ENCOUNTER — APPOINTMENT (OUTPATIENT)
Dept: CARDIOLOGY | Facility: CLINIC | Age: 75
End: 2025-08-20
Payer: MEDICARE

## 2025-08-20 ENCOUNTER — RESULT REVIEW (OUTPATIENT)
Age: 75
End: 2025-08-20

## 2025-08-20 VITALS
OXYGEN SATURATION: 99 % | HEART RATE: 62 BPM | HEIGHT: 65 IN | WEIGHT: 116 LBS | BODY MASS INDEX: 19.33 KG/M2 | TEMPERATURE: 98.7 F | DIASTOLIC BLOOD PRESSURE: 66 MMHG | SYSTOLIC BLOOD PRESSURE: 167 MMHG

## 2025-08-20 VITALS — DIASTOLIC BLOOD PRESSURE: 59 MMHG | SYSTOLIC BLOOD PRESSURE: 156 MMHG

## 2025-08-20 DIAGNOSIS — I35.0 NONRHEUMATIC AORTIC (VALVE) STENOSIS: ICD-10-CM

## 2025-08-20 DIAGNOSIS — I25.10 ATHEROSCLEROTIC HEART DISEASE OF NATIVE CORONARY ARTERY W/OUT ANGINA PECTORIS: ICD-10-CM

## 2025-08-20 DIAGNOSIS — I10 ESSENTIAL (PRIMARY) HYPERTENSION: ICD-10-CM

## 2025-08-20 DIAGNOSIS — Z01.810 ENCOUNTER FOR PREPROCEDURAL CARDIOVASCULAR EXAMINATION: ICD-10-CM

## 2025-08-20 LAB
ALBUMIN MFR SERPL ELPH: 60.1 %
ALBUMIN SERPL-MCNC: 4.2 G/DL
ALBUMIN/GLOB SERPL: 1.5 RATIO
ALPHA1 GLOB MFR SERPL ELPH: 4.1 %
ALPHA1 GLOB SERPL ELPH-MCNC: 0.3 G/DL
ALPHA2 GLOB MFR SERPL ELPH: 11.2 %
ALPHA2 GLOB SERPL ELPH-MCNC: 0.8 G/DL
B-GLOBULIN MFR SERPL ELPH: 11.9 %
B-GLOBULIN SERPL ELPH-MCNC: 0.8 G/DL
DEPRECATED KAPPA LC FREE/LAMBDA SER: 1.42 RATIO
GAMMA GLOB FLD ELPH-MCNC: 0.9 G/DL
GAMMA GLOB MFR SERPL ELPH: 12.7 %
IGA SERPL-MCNC: 198 MG/DL
IGG SERPL-MCNC: 954 MG/DL
IGM SERPL-MCNC: 38 MG/DL
INTERPRETATION SERPL IEP-IMP: NORMAL
KAPPA LC CSF-MCNC: 2.63 MG/DL
KAPPA LC SERPL-MCNC: 3.74 MG/DL
M PROTEIN SPEC IFE-MCNC: NORMAL
PROT SERPL-MCNC: 7 G/DL
PROT SERPL-MCNC: 7 G/DL

## 2025-08-20 PROCEDURE — 93306 TTE W/DOPPLER COMPLETE: CPT

## 2025-08-20 PROCEDURE — 99214 OFFICE O/P EST MOD 30 MIN: CPT

## 2025-08-20 PROCEDURE — 93000 ELECTROCARDIOGRAM COMPLETE: CPT

## 2025-08-29 ENCOUNTER — APPOINTMENT (OUTPATIENT)
Dept: INFUSION THERAPY | Facility: HOSPITAL | Age: 75
End: 2025-08-29

## 2025-08-29 ENCOUNTER — APPOINTMENT (OUTPATIENT)
Dept: HEMATOLOGY ONCOLOGY | Facility: CLINIC | Age: 75
End: 2025-08-29

## 2025-08-29 ENCOUNTER — RESULT REVIEW (OUTPATIENT)
Age: 75
End: 2025-08-29

## 2025-09-12 ENCOUNTER — APPOINTMENT (OUTPATIENT)
Dept: HEMATOLOGY ONCOLOGY | Facility: CLINIC | Age: 75
End: 2025-09-12

## 2025-09-12 ENCOUNTER — RESULT REVIEW (OUTPATIENT)
Age: 75
End: 2025-09-12

## 2025-09-12 ENCOUNTER — APPOINTMENT (OUTPATIENT)
Dept: INFUSION THERAPY | Facility: HOSPITAL | Age: 75
End: 2025-09-12

## 2025-09-14 LAB
ALBUMIN SERPL ELPH-MCNC: 4.5 G/DL
ALP BLD-CCNC: 51 U/L
ALT SERPL-CCNC: 20 U/L
ANION GAP SERPL CALC-SCNC: 14 MMOL/L
AST SERPL-CCNC: 26 U/L
BILIRUB SERPL-MCNC: 0.4 MG/DL
BUN SERPL-MCNC: 25 MG/DL
CALCIUM SERPL-MCNC: 9.2 MG/DL
CHLORIDE SERPL-SCNC: 105 MMOL/L
CO2 SERPL-SCNC: 20 MMOL/L
CREAT SERPL-MCNC: 1.67 MG/DL
EGFRCR SERPLBLD CKD-EPI 2021: 42 ML/MIN/1.73M2
GLUCOSE SERPL-MCNC: 154 MG/DL
LDH SERPL-CCNC: 166 U/L
POTASSIUM SERPL-SCNC: 4.6 MMOL/L
PROT SERPL-MCNC: 7.2 G/DL
SODIUM SERPL-SCNC: 139 MMOL/L

## 2025-09-16 LAB
ALBUMIN MFR SERPL ELPH: 59.9 %
ALBUMIN SERPL-MCNC: 4.3 G/DL
ALBUMIN/GLOB SERPL: 1.5 RATIO
ALPHA1 GLOB MFR SERPL ELPH: 4.6 %
ALPHA1 GLOB SERPL ELPH-MCNC: 0.3 G/DL
ALPHA2 GLOB MFR SERPL ELPH: 11.2 %
ALPHA2 GLOB SERPL ELPH-MCNC: 0.8 G/DL
B-GLOBULIN MFR SERPL ELPH: 12 %
B-GLOBULIN SERPL ELPH-MCNC: 0.9 G/DL
DEPRECATED KAPPA LC FREE/LAMBDA SER: 1.34 RATIO
GAMMA GLOB FLD ELPH-MCNC: 0.9 G/DL
GAMMA GLOB MFR SERPL ELPH: 12.3 %
IGA SERPL-MCNC: 178 MG/DL
IGG SERPL-MCNC: 815 MG/DL
IGM SERPL-MCNC: 32 MG/DL
INTERPRETATION SERPL IEP-IMP: NORMAL
KAPPA LC CSF-MCNC: 2.35 MG/DL
KAPPA LC SERPL-MCNC: 3.16 MG/DL
M PROTEIN SPEC IFE-MCNC: NORMAL
PROT SERPL-MCNC: 7.2 G/DL
PROT SERPL-MCNC: 7.2 G/DL

## (undated) DEVICE — DENTURE CUP PINK

## (undated) DEVICE — BITE BLOCK ADULT 20 X 27MM (GREEN)

## (undated) DEVICE — UNDERPAD LINEN SAVER 17 X 24"

## (undated) DEVICE — TUBING MEDI-VAC W MAXIGRIP CONNECTORS 1/4"X6'

## (undated) DEVICE — Device

## (undated) DEVICE — CATH ELECHMSTAT  INJ 7FR 210CM

## (undated) DEVICE — CONTAINER FORMALIN 80ML YELLOW

## (undated) DEVICE — SALIVA EJECTOR (BLUE)

## (undated) DEVICE — DRSG CURITY GAUZE SPONGE 4 X 4" 12-PLY NON-STERILE

## (undated) DEVICE — BASIN EMESIS 10IN GRADUATED MAUVE

## (undated) DEVICE — GOWN LG

## (undated) DEVICE — ELCTR ECG CONDUCTIVE ADHESIVE

## (undated) DEVICE — DRSG BANDAID 0.75X3"

## (undated) DEVICE — TUBING IV SET GRAVITY 3Y 100" MACRO

## (undated) DEVICE — PACK IV START WITH CHG

## (undated) DEVICE — LUBRICATING JELLY HR ONE SHOT 3G

## (undated) DEVICE — LINE BREATHE SAMPLNG

## (undated) DEVICE — BIOPSY FORCEP RADIAL JAW 4 STANDARD WITH NEEDLE

## (undated) DEVICE — DRSG 2X2

## (undated) DEVICE — BIOPSY FORCEP COLD DISP

## (undated) DEVICE — CATH IV SAFE BC 22G X 1" (BLUE)

## (undated) DEVICE — CLAMP BX HOT RAD JAW 3